# Patient Record
Sex: FEMALE | Race: WHITE | NOT HISPANIC OR LATINO | Employment: OTHER | URBAN - NONMETROPOLITAN AREA
[De-identification: names, ages, dates, MRNs, and addresses within clinical notes are randomized per-mention and may not be internally consistent; named-entity substitution may affect disease eponyms.]

---

## 2017-01-23 ENCOUNTER — OFFICE VISIT (OUTPATIENT)
Dept: ENDOCRINOLOGY | Facility: CLINIC | Age: 62
End: 2017-01-23

## 2017-01-23 VITALS
DIASTOLIC BLOOD PRESSURE: 74 MMHG | HEIGHT: 69 IN | WEIGHT: 190.9 LBS | HEART RATE: 72 BPM | SYSTOLIC BLOOD PRESSURE: 120 MMHG | BODY MASS INDEX: 28.27 KG/M2

## 2017-01-23 DIAGNOSIS — E10.69 MIXED DIABETIC HYPERLIPIDEMIA ASSOCIATED WITH TYPE 1 DIABETES MELLITUS (HCC): ICD-10-CM

## 2017-01-23 DIAGNOSIS — E78.2 MIXED DIABETIC HYPERLIPIDEMIA ASSOCIATED WITH TYPE 1 DIABETES MELLITUS (HCC): ICD-10-CM

## 2017-01-23 DIAGNOSIS — E10.42 TYPE 1 DIABETES MELLITUS WITH DIABETIC POLYNEUROPATHY (HCC): Primary | ICD-10-CM

## 2017-01-23 DIAGNOSIS — E11.59 HYPERTENSION ASSOCIATED WITH DIABETES (HCC): ICD-10-CM

## 2017-01-23 DIAGNOSIS — I15.2 HYPERTENSION ASSOCIATED WITH DIABETES (HCC): ICD-10-CM

## 2017-01-23 PROCEDURE — PTNOCHG PR CUSTOM PT NO CHARGE VISIT: Performed by: INTERNAL MEDICINE

## 2017-01-23 PROCEDURE — 95250 CONT GLUC MNTR PHYS/QHP EQP: CPT | Performed by: INTERNAL MEDICINE

## 2017-01-23 PROCEDURE — 99214 OFFICE O/P EST MOD 30 MIN: CPT | Performed by: INTERNAL MEDICINE

## 2017-01-23 NOTE — MR AVS SNAPSHOT
Janelle WAGONER Millard   1/23/2017 2:30 PM   Appointment    Dept Phone:  [phone]   Encounter #:  [id number]    Provider:  [facility]   Department:  [facility] ENDOCRINOLOGY                Your Full Care Plan              Your Updated Medication List          This list is accurate as of: 1/23/17  4:05 PM.  Always use your most recent med list.                ACCU-CHEK SMARTVIEW test strip   Generic drug:  glucose blood       atorvastatin 10 MG tablet   Commonly known as:  LIPITOR       B-D ULTRAFINE III SHORT PEN 31G X 8 MM misc   Generic drug:  Insulin Pen Needle       buPROPion 100 MG tablet   Commonly known as:  WELLBUTRIN       carvedilol 12.5 MG tablet   Commonly known as:  COREG       clindamycin 300 MG capsule   Commonly known as:  CLEOCIN       gabapentin 600 MG tablet   Commonly known as:  NEURONTIN       HUMALOG KWIKPEN 100 UNIT/ML solution pen-injector   Generic drug:  Insulin Lispro       HYDROcodone-acetaminophen 7.5-325 MG per tablet   Commonly known as:  NORCO       insulin aspart 100 UNIT/ML solution pen-injector sc pen   Commonly known as:  novoLOG FLEXPEN       isosorbide mononitrate 30 MG 24 hr tablet   Commonly known as:  IMDUR       * LANTUS SOLOSTAR 100 UNIT/ML injection pen   Generic drug:  Insulin Glargine       * TOUJEO SOLOSTAR 300 UNIT/ML solution pen-injector   Generic drug:  Insulin Glargine       leflunomide 20 MG tablet   Commonly known as:  ARAVA       lidocaine 5 %   Commonly known as:  LIDODERM       * lisinopril 5 MG tablet   Commonly known as:  PRINIVIL,ZESTRIL       * lisinopril 10 MG tablet   Commonly known as:  PRINIVIL,ZESTRIL   TAKE 1 TABLET BY MOUTH TWICE DAILY       montelukast 10 MG tablet   Commonly known as:  SINGULAIR       nitroglycerin 0.2 MG/HR patch   Commonly known as:  NITRODUR       PARoxetine 20 MG tablet   Commonly known as:  PAXIL       promethazine 25 MG tablet   Commonly known as:  PHENERGAN       RANEXA 1000 MG 12 hr tablet   Generic drug:  ranolazine       TRUEPLUS LANCETS 33G misc       * Notice:  This list has 4 medication(s) that are the same as other medications prescribed for you. Read the directions carefully, and ask your doctor or other care provider to review them with you.            Instructions     None    Patient Instructions History      Upcoming Appointments     Visit Type Date Time Department    FO[place] UP 2017  1:15 PM [facility]    DIABETES EDUCATION 2017  2:30 PM [facility]    DIABETES EDUCATION 2017  1:00 PM [facility] Signup     [facility] OnePIN allows you to send messages to your doctor, view your test results, renew your prescriptions, schedule appointments, and more. To sign up, go to [email or web address] and click on the Sign Up Now link in the New User? box. Enter your OnePIN Activation Code exactly as it appears below along with the last four digits of your Social Security Number and your Date of Birth () to complete the sign-up process. If you do not sign up before the expiration date, you must request a new code.    OnePIN Activation Code: [email or web address]  Expires: 2017  2:09 PM    If you have questions, you can email [email or web address] or call [phone] to talk to our OnePIN staff. Remember, OnePIN is NOT to be used for urgent needs. For medical emergencies, dial 911.               Other Info from Your Visit           Your Appointments     2017  1:00 PM CST   Diabetic Education with [facility], [facility] (--)    [address]  [address] [place] [id number] [phone]              Allergies     Contrast Dye  Shortness Of Breath, Itching    Dilaudid [Hydromorphone Hcl]  Itching      Vital Signs     Smoking Status                   Current Every Day Smoker

## 2017-01-23 NOTE — PROGRESS NOTES
Janelle Millard is a 61 y.o. female seen by diabetes educator 01/23/2017 for insertion of Sully diagnostic continuous glucose monitor.     Sensor inserted in office. Instructed patient to wear sensor up to 14 days. Patient is to return to clinic in 2 weeks for sensor removal and results. Instructed patient to remove sensor if he has a CT scan, MRI, or X-ray, or if skin irritation occurs. Referral for Dexcom submitted 01/23/2017.     Leatha Hernandez MS, RD, LD, CDE

## 2017-02-01 RX ORDER — LISINOPRIL 10 MG/1
TABLET ORAL
Qty: 60 TABLET | Refills: 5 | Status: SHIPPED | OUTPATIENT
Start: 2017-02-01 | End: 2017-08-29 | Stop reason: SDUPTHER

## 2017-02-05 PROBLEM — E10.69 MIXED DIABETIC HYPERLIPIDEMIA ASSOCIATED WITH TYPE 1 DIABETES MELLITUS (HCC): Status: ACTIVE | Noted: 2017-02-05

## 2017-02-05 PROBLEM — E11.59 HYPERTENSION ASSOCIATED WITH DIABETES: Status: ACTIVE | Noted: 2017-02-05

## 2017-02-05 PROBLEM — E10.42 TYPE 1 DIABETES MELLITUS WITH DIABETIC POLYNEUROPATHY: Status: ACTIVE | Noted: 2017-02-05

## 2017-02-05 PROBLEM — I15.2 HYPERTENSION ASSOCIATED WITH DIABETES (HCC): Status: ACTIVE | Noted: 2017-02-05

## 2017-02-05 PROBLEM — E78.2 MIXED DIABETIC HYPERLIPIDEMIA ASSOCIATED WITH TYPE 1 DIABETES MELLITUS (HCC): Status: ACTIVE | Noted: 2017-02-05

## 2017-06-27 ENCOUNTER — OFFICE VISIT (OUTPATIENT)
Dept: PAIN MEDICINE | Facility: CLINIC | Age: 62
End: 2017-06-27

## 2017-06-27 ENCOUNTER — APPOINTMENT (OUTPATIENT)
Dept: LAB | Facility: HOSPITAL | Age: 62
End: 2017-06-27

## 2017-06-27 VITALS
HEIGHT: 69 IN | WEIGHT: 191.8 LBS | DIASTOLIC BLOOD PRESSURE: 72 MMHG | BODY MASS INDEX: 28.41 KG/M2 | SYSTOLIC BLOOD PRESSURE: 122 MMHG

## 2017-06-27 DIAGNOSIS — Z79.899 HIGH RISK MEDICATIONS (NOT ANTICOAGULANTS) LONG-TERM USE: ICD-10-CM

## 2017-06-27 DIAGNOSIS — M47.812 CERVICAL SPONDYLOSIS WITHOUT MYELOPATHY: ICD-10-CM

## 2017-06-27 DIAGNOSIS — M79.18 MYOFACIAL MUSCLE PAIN: ICD-10-CM

## 2017-06-27 DIAGNOSIS — M50.30 DDD (DEGENERATIVE DISC DISEASE), CERVICAL: ICD-10-CM

## 2017-06-27 DIAGNOSIS — M54.2 CERVICALGIA: Primary | ICD-10-CM

## 2017-06-27 PROCEDURE — 80307 DRUG TEST PRSMV CHEM ANLYZR: CPT | Performed by: NURSE PRACTITIONER

## 2017-06-27 PROCEDURE — G0481 DRUG TEST DEF 8-14 CLASSES: HCPCS | Performed by: NURSE PRACTITIONER

## 2017-06-27 PROCEDURE — 99214 OFFICE O/P EST MOD 30 MIN: CPT | Performed by: PAIN MEDICINE

## 2017-06-27 RX ORDER — ASPIRIN 81 MG/1
81 TABLET ORAL DAILY
COMMUNITY

## 2017-06-27 RX ORDER — TIZANIDINE 4 MG/1
2 TABLET ORAL 2 TIMES DAILY PRN
Qty: 60 TABLET | Refills: 1 | Status: SHIPPED | OUTPATIENT
Start: 2017-06-27 | End: 2017-07-27

## 2017-06-27 RX ORDER — CYCLOSPORINE 0.5 MG/ML
1 EMULSION OPHTHALMIC 2 TIMES DAILY
Status: ON HOLD | COMMUNITY
End: 2020-10-14

## 2017-06-27 RX ORDER — PYRIDOXINE HCL (VITAMIN B6) 100 MG
200 TABLET ORAL 2 TIMES DAILY
Status: ON HOLD | COMMUNITY
End: 2020-10-14

## 2017-06-27 RX ORDER — ACETAMINOPHEN 325 MG/1
975 TABLET ORAL 2 TIMES DAILY
Status: ON HOLD | COMMUNITY
End: 2020-10-14

## 2017-06-27 RX ORDER — UREA 10 %
LOTION (ML) TOPICAL NIGHTLY
Status: ON HOLD | COMMUNITY
End: 2020-10-14

## 2017-06-27 RX ORDER — LANOLIN ALCOHOL/MO/W.PET/CERES
1000 CREAM (GRAM) TOPICAL 2 TIMES DAILY
Status: ON HOLD | COMMUNITY
End: 2020-10-14

## 2017-06-27 NOTE — PROGRESS NOTES
"Janelle Millard is a 61 y.o. female.   1955    HPI:   Location: neck, left shoulder, lower back and bilateral hand  Quality: aching, sharp, dull and tingling  Severity: 8/10  Timing: constant  Alleviating: rest   Aggravating: sitting  and ambulating     Pt seen Dr. Martell last 2/2016 related to chronic neck, left shoulder, hands, and low back pain. Pt had lower back eval and treated with opiate medications and injections (reports 2 injections with 50% effectiveness). Pt states \"took herself off Lortab and handled pain with OTC\". Pt with neck pain primary pain today. Pt with neck pain and off for years, neck pain with occ left arm pain for years (possible from carpal tunnel pt reports). Pt with bilateral carpal tunnel surgery via Dr. Nayana painting of 2016 remains wearing left wrist brace. I discussed MRI versus Xray.  Pt states \"I am claustrophobic\". BRYANT and any neurological impairments discussed with patient that may need of emergency evaluation. Explained in great detail history and physical, examination, ancillary physician notes and images reviewed, and pain management options.        The following portions of the patient's history were reviewed by me and updated as appropriate: allergies, current medications, past family history, past medical history, past social history, past surgical history and problem list.    Past Medical History:   Diagnosis Date   • Arthritis, rheumatoid    • Arthropathy associated with neurological disorder    • Arthropathy of lumbar facet joint    • Asthma    • Benign hypertension    • Carpal tunnel syndrome    • Diabetes    • Diabetic polyneuropathy    • Dyslipidemia    • Heart disease    • Hypertension    • Joint pain    • Mild depression     Saddness post Surgery 7/10/14 improved after counseling.   • Multiple vessel coronary artery disease     post CABG      • Myofascial pain    • Neurologic disorder associated with diabetes mellitus     Neuropathy of chest      • Neuropathy    • " Onychomycosis    • Peripheral vascular disease    • Retinopathy    • Tobacco user    • Type 1 diabetes mellitus    • Type 2 diabetes mellitus        Social History     Social History   • Marital status:      Spouse name: N/A   • Number of children: N/A   • Years of education: N/A     Occupational History   • Not on file.     Social History Main Topics   • Smoking status: Current Every Day Smoker     Types: Cigarettes   • Smokeless tobacco: Never Used   • Alcohol use No   • Drug use: No   • Sexual activity: Defer     Other Topics Concern   • Not on file     Social History Narrative       Family History   Problem Relation Age of Onset   • Asthma Other    • Coronary artery disease Other    • Diabetes Other    • Hyperlipidemia Other    • Hypertension Other    • Osteoporosis Other          Current Outpatient Prescriptions:   •  ACCU-CHEK SMARTVIEW test strip, , Disp: , Rfl:   •  acetaminophen (TYLENOL) 325 MG tablet, Take 975 mg by mouth 2 (Two) Times a Day., Disp: , Rfl:   •  aspirin 81 MG EC tablet, Take 81 mg by mouth Daily., Disp: , Rfl:   •  atorvastatin (LIPITOR) 10 MG tablet, , Disp: , Rfl:   •  B-D ULTRAFINE III SHORT PEN 31G X 8 MM misc, , Disp: , Rfl:   •  Calcium Citrate-Vitamin D (CALCIUM + D PO), Take 1 tablet/day by mouth., Disp: , Rfl:   •  carvedilol (COREG) 12.5 MG tablet, , Disp: , Rfl:   •  cycloSPORINE (RESTASIS) 0.05 % ophthalmic emulsion, 1 drop 2 (Two) Times a Day., Disp: , Rfl:   •  gabapentin (NEURONTIN) 600 MG tablet, 1,200 mg 3 (Three) Times a Day., Disp: , Rfl:   •  HUMALOG KWIKPEN 100 UNIT/ML solution pen-injector, , Disp: , Rfl:   •  insulin aspart (novoLOG FLEXPEN) 100 UNIT/ML solution pen-injector sc pen, Inject  under the skin 3 (Three) Times a Day With Meals., Disp: , Rfl:   •  isosorbide mononitrate (IMDUR) 30 MG 24 hr tablet, , Disp: , Rfl:   •  leflunomide (ARAVA) 20 MG tablet, , Disp: , Rfl:   •  lisinopril (PRINIVIL,ZESTRIL) 10 MG tablet, TAKE 1 TABLET BY MOUTH TWICE DAILY  (Patient taking differently: 1 tablet by mouth once daily per pt), Disp: 60 tablet, Rfl: 5  •  melatonin 1 MG tablet, Take  by mouth Every Night., Disp: , Rfl:   •  montelukast (SINGULAIR) 10 MG tablet, , Disp: , Rfl:   •  Multiple Vitamin (DAILY VITAMIN FORMULA PO), Take 1 tablet/day by mouth., Disp: , Rfl:   •  nitroglycerin (NITRODUR) 0.2 MG/HR patch, , Disp: , Rfl:   •  PARoxetine (PAXIL) 20 MG tablet, , Disp: , Rfl:   •  pyridoxine (VITAMIN B-6) 100 MG tablet tablet, Take 200 mg by mouth 2 (Two) Times a Day., Disp: , Rfl:   •  TOUJEO SOLOSTAR 300 UNIT/ML solution pen-injector, , Disp: , Rfl:   •  TRUEPLUS LANCETS 33G misc, , Disp: , Rfl:   •  vitamin B-12 (CYANOCOBALAMIN) 1000 MCG tablet, Take 1,000 mcg by mouth 2 (Two) Times a Day., Disp: , Rfl:   •  promethazine (PHENERGAN) 25 MG tablet, , Disp: , Rfl:   •  tiZANidine (ZANAFLEX) 4 MG tablet, Take 0.5 tablets by mouth 2 (Two) Times a Day As Needed for Muscle Spasms for up to 30 days., Disp: 60 tablet, Rfl: 1    Allergies   Allergen Reactions   • Contrast Dye Shortness Of Breath and Itching   • Corticosteroids Other (See Comments)     Pt diabetic makes sugar go up    • Codeine Itching   • Ibuprofen        Review of Systems   Musculoskeletal: Positive for back pain (lower) and neck pain.        Left shoulder pain  Bilateral hand pain   All other systems reviewed and are negative.    All systems reviewed and negative except for above.    Physical Exam   Constitutional: She is oriented to person, place, and time. She appears well-developed and well-nourished. No distress.   Walking with cane    Left wrist pain   HENT:   Head: Normocephalic and atraumatic.   Eyes: Conjunctivae and EOM are normal. Pupils are equal, round, and reactive to light.   Neck: Normal range of motion. Neck supple.   Cardiovascular: Normal rate and regular rhythm.    Pulmonary/Chest: Effort normal. No respiratory distress. She has no wheezes.   Abdominal: Soft.   Musculoskeletal:         Cervical back: She exhibits decreased range of motion (slight limited flex and ext with FL chiquis) and tenderness ( BPSM TTP).        Lumbar back: She exhibits tenderness ( mildine TTP).   Neurological: She is alert and oriented to person, place, and time. She displays no tremor. She displays no seizure activity. Gait ( cane) abnormal. Coordination normal.   Reflex Scores:       Tricep reflexes are 2+ on the right side and 2+ on the left side.       Bicep reflexes are 2+ on the right side and 2+ on the left side.       Brachioradialis reflexes are 2+ on the right side and 2+ on the left side.       Patellar reflexes are 2+ on the right side and 2+ on the left side.       Achilles reflexes are 1+ on the right side and 1+ on the left side.  Mild SLR chiquis    Upper arm strength essentially 4/5- brace to left wrist    Lower leg strength essentially 4/5   Skin: Skin is warm and dry. No rash noted. She is not diaphoretic. No pallor.   Psychiatric: She has a normal mood and affect. Her behavior is normal. Judgment normal. She expresses no homicidal and no suicidal ideation. She expresses no suicidal plans and no homicidal plans.   Slow low tone speech   Nursing note and vitals reviewed.      Janelle was seen today for back pain, neck pain, shoulder pain and hand pain.    Diagnoses and all orders for this visit:    Cervicalgia  -     XR Spine Cervical Complete 4 or 5 View; Future  -     Ambulatory Referral to Physical Therapy Evaluate and treat (neck pain with muscles- check xray done today)    Cervical spondylosis without myelopathy  -     XR Spine Cervical Complete 4 or 5 View; Future  -     Ambulatory Referral to Physical Therapy Evaluate and treat (neck pain with muscles- check xray done today)    DDD (degenerative disc disease), cervical  -     XR Spine Cervical Complete 4 or 5 View; Future  -     Ambulatory Referral to Physical Therapy Evaluate and treat (neck pain with muscles- check xray done today)    High risk  "medications (not anticoagulants) long-term use  -     ToxASSURE Select 13 (MW)    Myofacial muscle pain  -     tiZANidine (ZANAFLEX) 4 MG tablet; Take 0.5 tablets by mouth 2 (Two) Times a Day As Needed for Muscle Spasms for up to 30 days.        Medication: Zanaflex 2mg BID PRN. Precautions and Indications. Take at night for 3 nights. pt is currently on Neurontin and Tylenol.    Interventional: I will order an X Ray cervical spine today and eval.  Pt states \"I don't want MRI right now- I am claustrophobic- please do an Xray first\". I have explained the difference in MRI vs Xrays and the benefits. Will order Xray today. Cervical injections discussed. BRYANT and any neurological impairments discussed with patient that may need of emergency evaluation.      I have reviewed ancillary notes and images for today's examination;      C spine xray 2006       1.                  Severe degenerative changes at C5-6 with grade I   retrolisthesis of C5 over C6 without instability.    2.                  Significantly decreased disc space at C5-6 and   neural foraminal encroachments bilaterally.   Result Narrative     Several views including flexion and extension.       Grade I retrolisthesis of C5 over c6 with significantly decreased   disc space and large spur formations are noted. No significant   instability in flexion and extension views are seen.  No acute   fracture or dislocation is identified.  Prevertebral soft tissues are   normal.          Rehab:  I have discussed and ordered PT for neck and muscles.     Behavioral: No aberrant behavior noted. CHARISSA Report # [id number]  was reviewed and is consistent with stated history    Urine drug screen Ordered today to test for drugs of abuse and prescribed medications          This document has been electronically signed by JESSIE Merrill on June 27, 2017 4:18 PM          This document has been electronically signed by JESSIE Merrill on June 27, 2017 4:18 PM     "

## 2017-07-07 ENCOUNTER — TELEPHONE (OUTPATIENT)
Dept: PAIN MEDICINE | Facility: CLINIC | Age: 62
End: 2017-07-07

## 2017-07-07 LAB — CONV REPORT SUMMARY: NORMAL

## 2017-07-10 ENCOUNTER — HOSPITAL ENCOUNTER (OUTPATIENT)
Dept: PHYSICAL THERAPY | Facility: HOSPITAL | Age: 62
Setting detail: THERAPIES SERIES
Discharge: HOME | End: 2017-07-10

## 2017-07-10 DIAGNOSIS — M50.30 DDD (DEGENERATIVE DISC DISEASE), CERVICAL: ICD-10-CM

## 2017-07-10 DIAGNOSIS — M54.2 CERVICALGIA: Primary | ICD-10-CM

## 2017-07-10 PROCEDURE — G8981 BODY POS CURRENT STATUS: HCPCS | Performed by: PHYSICAL THERAPIST

## 2017-07-10 PROCEDURE — 97161 PT EVAL LOW COMPLEX 20 MIN: CPT | Performed by: PHYSICAL THERAPIST

## 2017-07-10 PROCEDURE — G8982 BODY POS GOAL STATUS: HCPCS | Performed by: PHYSICAL THERAPIST

## 2017-07-10 NOTE — THERAPY EVALUATION
Outpatient Physical Therapy Ortho Initial Evaluation  [facility]     Patient Name: Janelle Millard  : 1955  MRN: [id number]  Today's Date: 7/10/2017      Visit Date: 07/10/2017     Pt attended  scheduled visits.   Re-cert date 17  Pt will RTD 3 weeks    Patient Active Problem List   Diagnosis   • Carpal tunnel syndrome of left wrist   • Carpal tunnel syndrome   • S/P carpal tunnel release   • Type 1 diabetes mellitus with diabetic polyneuropathy   • Mixed diabetic hyperlipidemia associated with type 1 diabetes mellitus   • Hypertension associated with diabetes        Past Medical History:   Diagnosis Date   • Arthritis, rheumatoid    • Arthropathy associated with neurological disorder    • Arthropathy of lumbar facet joint    • Asthma    • Benign hypertension    • Carpal tunnel syndrome    • Diabetes    • Diabetic polyneuropathy    • Dyslipidemia    • Heart disease    • Hypertension    • Joint pain    • Mild depression     Saddness post Surgery 7/10/14 improved after counseling.   • Multiple vessel coronary artery disease     post CABG      • Myofascial pain    • Neurologic disorder associated with diabetes mellitus     Neuropathy of chest      • Neuropathy    • Onychomycosis    • Peripheral vascular disease    • Retinopathy    • Tobacco user    • Type 1 diabetes mellitus    • Type 2 diabetes mellitus         Past Surgical History:   Procedure Laterality Date   • CARDIAC SURGERY  2014    Critical stenosis in the RCA. Diffusely calcified LAD with 30-80% stenosis. OMB #3 with 60% to 70% stenosis. Preserved LV systolic function with EF of 55%.   • CARPAL TUNNEL RELEASE Right 10/15/2014    Right carpal tunnel release.   • CARPAL TUNNEL RELEASE     • CATARACT EXTRACTION, BILATERAL Bilateral    • CORONARY ARTERY BYPASS GRAFT  2014    CABG x 3 with LIMA to LAD, endarterectomy to LAD, SVG to OMB and SVG to distal RCA with endarterectomy to distal RCA. Endo-vein harvesting.   •  CYSTECTOMY Left     Breast cycst   • EXCISION LESION      Remove breast lesion - cyst   • EYE SURGERY      Insert lens prosthesis   • FOOT SURGERY  10/18/2011    Exostectomy of the right foot. Preulcerative lesion with Charot deformity, right foot   • LUMBAR SPINE SURGERY  11/09/2015    Lumbosacral radiofrequency thermal coagulation.   • NERVE BLOCK  09/14/2015    Lumbar medial branch block.   • TOE NAIL AVULSION  09/03/2015    DEBRIDE NAIL 6 OR MORE 45493 (1)     Medications (Admitted on 7/10/2017)     Outpatient Medications     ACCU-CHEK SMARTVIEW test strip      acetaminophen (TYLENOL) 325 MG tablet      aspirin 81 MG EC tablet      atorvastatin (LIPITOR) 10 MG tablet      B-D ULTRAFINE III SHORT PEN 31G X 8 MM misc      Calcium Citrate-Vitamin D (CALCIUM + D PO)      carvedilol (COREG) 12.5 MG tablet      cycloSPORINE (RESTASIS) 0.05 % ophthalmic emulsion      gabapentin (NEURONTIN) 600 MG tablet      HUMALOG KWIKPEN 100 UNIT/ML solution pen-injector      insulin aspart (novoLOG FLEXPEN) 100 UNIT/ML solution pen-injector sc pen      isosorbide mononitrate (IMDUR) 30 MG 24 hr tablet      leflunomide (ARAVA) 20 MG tablet      lisinopril (PRINIVIL,ZESTRIL) 10 MG tablet      melatonin 1 MG tablet      montelukast (SINGULAIR) 10 MG tablet      Multiple Vitamin (DAILY VITAMIN FORMULA PO)      nitroglycerin (NITRODUR) 0.2 MG/HR patch      PARoxetine (PAXIL) 20 MG tablet      promethazine (PHENERGAN) 25 MG tablet      pyridoxine (VITAMIN B-6) 100 MG tablet tablet      tiZANidine (ZANAFLEX) 4 MG tablet      TOUJEO SOLOSTAR 300 UNIT/ML solution pen-injector      TRUEPLUS LANCETS 33G misc      vitamin B-12 (CYANOCOBALAMIN) 1000 MCG tablet          Visit Dx:     ICD-10-CM ICD-9-CM   1. Cervicalgia M54.2 723.1   2. DDD (degenerative disc disease), cervical M50.30 722.4                 PT Ortho       07/10/17 1400    Subjective Comments    Subjective Comments 60 y/o female presents with multiple c/o pain and limitation.   is here to address her neck pain. Pt relates was performing a comprehensive exercises program , until 4 month ago when she understood her Dr to tell her to stop the program. States has been having increased soreness and pain in L>R upper trap and lateral neck. describes limitation in AROM. Pt states uses pain creme for relief. Pt is not currrently working. Pt has multiple c/o pain and co-morbidities. Pt lives at home alone.   -LF    Subjective Pain    Able to rate subjective pain? yes  -LF    Pre-Treatment Pain Level 6  -LF    Posture/Observations    Posture/Observations Comments Pt presents with brace on L hand/ wrist, uses a cane to walk, has head slightly turned/ sidebent to L, slight ly forward bent, rounded shoulders. Unsteady gait pattern. Pt tends to sit forward bent.   -LF    Special Tests/Palpation    Special Tests/Palpation --   TTP over L upper trap, C paraspinals  -LF    ROM (Range of Motion)    General ROM Detail B shoulder ROM: limited elevation > 90. CROM: flexion WFL; ext with c/o pulling; SB L 15 with c/o pain. R 30 min c/o; rotation L 20, R 25.   -LF    MMT (Manual Muscle Testing)    General MMT Assessment Detail not tested today  -LF      User Key  (r) = Recorded By, (t) = Taken By, (c) = Cosigned By    Initials Name Provider Type    LILIANA Escobar PT Physical Therapist                            Therapy Education       07/10/17 1700          Therapy Education    Given HEP;Symptoms/condition management;Pain management;Posture/body mechanics  -LF      Program New  -LF      How Provided Verbal;Demonstration;Written  -LF      Provided to Patient  -LF      Level of Understanding Teach back education performed;Verbalized;Demonstrated  -LF        User Key  (r) = Recorded By, (t) = Taken By, (c) = Cosigned By    Initials Name Provider Type    LILIANA Escobar PT Physical Therapist                PT OP Goals       07/10/17 1700       PT Short Term Goals    STG Date to Achieve 07/24/17  -LF      STG 1 Pt will demonstrate 50% active CROM   -LF     STG 2 Pt will report resuming home exercise program daily.  -LF     STG 3 I in HEP each session  -LF     Long Term Goals    LTG Date to Achieve 08/07/17  -LF     LTG 1 Pt will  demonstrate 75% CROM  -LF     LTG 2 Pt miguel report being able to look over shoulder driving with min limitation.   -LF     LTG 3 I in final HEP   -LF     Time Calculation    PT Goal Re-Cert Due Date 07/31/17  -LF       User Key  (r) = Recorded By, (t) = Taken By, (c) = Cosigned By    Initials Name Provider Type    LF Betty Escobar, PT Physical Therapist                PT Assessment/Plan       07/10/17 1700       PT Assessment    Functional Limitations Limitation in home management;Performance in leisure activities;Performance in self-care ADL  -LF     Impairments Impaired flexibility;Pain;Muscle strength;Posture;Range of motion  -LF     Assessment Comments 60 y/o female presents with c/o chronic neck pain, limited AROM, muscular tightness, lack of HEP, min stretching, demotivated. Pt would benefit from skilled intervention to address the above mentioned deficits. Pt understood and performed HEP.   -LF     Please refer to paper survey for additional self-reported information Yes  -LF     Rehab Potential Good  -LF     Patient/caregiver participated in establishment of treatment plan and goals Yes  -LF     Patient would benefit from skilled therapy intervention Yes  -LF     PT Plan    PT Frequency 2x/week  -LF     Predicted Duration of Therapy Intervention (days/wks) 4 weeks - pt may not continue therapy due to co-pay  -LF     Planned CPT's? PT RE-EVAL: 15444;PT THER PROC EA 15 MIN: 83412;PT THER ACT EA 15 MIN: 94466;PT MANUAL THERAPY EA 15 MIN: 12072;PT HOT OR COLD PACK TREAT MCARE;PT NEUROMUSC RE-EDUCATION EA 15 MIN: 98197  -LF     Physical Therapy Interventions (Optional Details) home exercise program;manual therapy techniques;modalities;patient/family education;ROM (Range of  "Motion);strengthening;stretching  -LF     PT Plan Comments Continue therapy involving education, stretching, tissue mobilization, modalites, strengthening, postural education, progressive HEP instruction.   -LF       User Key  (r) = Recorded By, (t) = Taken By, (c) = Cosigned By    Initials Name Provider Type    LILIANA Escobar, PT Physical Therapist                  Exercises       07/10/17 1400          Subjective Comments    Subjective Comments 60 y/o female presents with multiple c/o pain and limitation. States is here to address her neck pain. Pt relates was performing a comprehensive exercises program , until 4 month ago when she understood her Dr to tell her to stop the program. States has been having increased soreness and pain in L>R upper trap and lateral neck. describes limitation in AROM. Pt states uses pain creme for relief. Pt is not currrently working. Pt has multiple c/o pain and co-morbidities. Pt lives at home alone.   -LF      Subjective Pain    Able to rate subjective pain? yes  -LF      Pre-Treatment Pain Level 6  -LF      Exercise 1    Exercise Name 1 seated shoulder shrugs  -LF      Cueing 1 Verbal;Demo  -LF      Reps 1 10   instruct, demo, perform, educate for HEP   -LF      Exercise 2    Exercise Name 2 sit against wall, sit tall , practive erect posture  -LF      Cueing 2 Verbal;Demo  -LF      Reps 2 5   30 sec, 5 x.  instruct, demo, perform, educate for HEP   -LF      Exercise 3    Exercise Name 3 self mobilization with towel: \"assist\", \"resist\", tissue mobilization, upper trap fascial lengthening  -LF      Cueing 3 Verbal;Demo  -LF      Time (Seconds) 3 10   instruct, demo, perform, educate for HEP   -LF      Exercise 4    Exercise Name 4 Reviewed pt's former HEP and encouraged he rto resume , avoiding the mini squats  -LF      Cueing 4 Verbal;Demo  -LF      Time (Minutes) 4 5  -LF        User Key  (r) = Recorded By, (t) = Taken By, (c) = Cosigned By    Initials Name Provider Type    LF " Betty Escobar, PT Physical Therapist                              Outcome Measures       07/10/17 1405 07/10/17 1400       Neck Disability Index    Section 1 - Pain Intensity  3  -LF     Section 2 - Personal Care  2  -LF     Section 3 - Lifting  3  -LF     Section 4 - Work  3  -LF     Section 5 - Headaches  2  -LF     Section 6 - Concentration  2  -LF     Section 7 - Sleeping  0  -LF     Section 8 - Driving  2  -LF     Section 9 - Reading  1  -LF     Section 10 - Recreation  4  -LF     Neck Disability Index Score  22  -LF     Functional Assessment    Outcome Measure Options --  -LF Neck Disability Index (NDI)  -LF       User Key  (r) = Recorded By, (t) = Taken By, (c) = Cosigned By    Initials Name Provider Type    LF Betty Escobar, PT Physical Therapist            Time Calculation:   Start Time: 1350  Stop Time: 1430  Time Calculation (min): 40 min  Total Timed Code Minutes- PT: 40 minute(s)     Therapy Charges for Today     Code Description Service Date Service Provider Modifiers Qty    62027779647  PT CHNG MAIN POS CURRENT 7/10/2017 Betty Escobar, PT GP, CJ 1    88147062833 HC PT CHNG MAIN POS PROJECTED 7/10/2017 Betty Escobar, PT GP, CI 1    36751944724 HC PT EVAL LOW COMPLEXITY 2 7/10/2017 Betty Escobar, PT GP 1    15010527691 HC PT THER SUPP EA 15 MIN 7/10/2017 Betty Escobar, PT GP 1          PT G-Codes  Outcome Measure Options: Neck Disability Index (NDI)  Score: 22  Functional Limitation: Changing and maintaining body position  Changing and Maintaining Body Position Current Status (): At least 20 percent but less than 40 percent impaired, limited or restricted  Changing and Maintaining Body Position Goal Status (): At least 1 percent but less than 20 percent impaired, limited or restricted         Betty Escobar, PT  7/10/2017

## 2017-07-17 ENCOUNTER — DOCUMENTATION (OUTPATIENT)
Dept: PHYSICAL THERAPY | Facility: HOSPITAL | Age: 62
End: 2017-07-17

## 2017-08-23 ENCOUNTER — OFFICE VISIT (OUTPATIENT)
Dept: PAIN MEDICINE | Facility: CLINIC | Age: 62
End: 2017-08-23

## 2017-08-23 VITALS
DIASTOLIC BLOOD PRESSURE: 78 MMHG | WEIGHT: 194.9 LBS | BODY MASS INDEX: 28.87 KG/M2 | SYSTOLIC BLOOD PRESSURE: 130 MMHG | HEIGHT: 69 IN

## 2017-08-23 DIAGNOSIS — Z79.899 HIGH RISK MEDICATIONS (NOT ANTICOAGULANTS) LONG-TERM USE: ICD-10-CM

## 2017-08-23 DIAGNOSIS — M50.30 DDD (DEGENERATIVE DISC DISEASE), CERVICAL: Primary | ICD-10-CM

## 2017-08-23 DIAGNOSIS — Z87.39 HX OF RHEUMATOID ARTHRITIS: ICD-10-CM

## 2017-08-23 DIAGNOSIS — M54.2 CERVICALGIA: ICD-10-CM

## 2017-08-23 PROCEDURE — 99213 OFFICE O/P EST LOW 20 MIN: CPT | Performed by: PAIN MEDICINE

## 2017-08-23 RX ORDER — TIZANIDINE 4 MG/1
4 TABLET ORAL 3 TIMES DAILY PRN
Qty: 90 TABLET | Refills: 2 | Status: SHIPPED | OUTPATIENT
Start: 2017-08-23 | End: 2017-09-22

## 2017-08-23 NOTE — PROGRESS NOTES
Janelle Millard is a 62 y.o. female.   1955    HPI:   Location: neck, left shoulder, lower back and left hand  Quality: aching, sharp, dull and tingling  Severity: 8/10  Timing: constant  Alleviating: rest  Aggravating: ambulating  and sitting    States the tizanidine helps, but is only taking once a day currently.       The following portions of the patient's history were reviewed by me and updated as appropriate: allergies, current medications, past family history, past medical history, past social history, past surgical history and problem list.    Past Medical History:   Diagnosis Date   • Arthritis, rheumatoid    • Arthropathy associated with neurological disorder    • Arthropathy of lumbar facet joint    • Asthma    • Benign hypertension    • Carpal tunnel syndrome    • Diabetes    • Diabetic polyneuropathy    • Dyslipidemia    • Heart disease    • Hypertension    • Joint pain    • Mild depression     Saddness post Surgery 7/10/14 improved after counseling.   • Multiple vessel coronary artery disease     post CABG      • Myofascial pain    • Neurologic disorder associated with diabetes mellitus     Neuropathy of chest      • Neuropathy    • Onychomycosis    • Peripheral vascular disease    • Retinopathy    • Tobacco user    • Type 1 diabetes mellitus    • Type 2 diabetes mellitus        Social History     Social History   • Marital status:      Spouse name: N/A   • Number of children: N/A   • Years of education: N/A     Occupational History   • Not on file.     Social History Main Topics   • Smoking status: Current Every Day Smoker     Types: Cigarettes   • Smokeless tobacco: Never Used   • Alcohol use No   • Drug use: No   • Sexual activity: Defer     Other Topics Concern   • Not on file     Social History Narrative       Family History   Problem Relation Age of Onset   • Asthma Other    • Coronary artery disease Other    • Diabetes Other    • Hyperlipidemia Other    • Hypertension Other    •  Osteoporosis Other          Current Outpatient Prescriptions:   •  ACCU-CHEK SMARTVIEW test strip, , Disp: , Rfl:   •  acetaminophen (TYLENOL) 325 MG tablet, Take 975 mg by mouth 2 (Two) Times a Day., Disp: , Rfl:   •  aspirin 81 MG EC tablet, Take 81 mg by mouth Daily., Disp: , Rfl:   •  atorvastatin (LIPITOR) 10 MG tablet, , Disp: , Rfl:   •  B-D ULTRAFINE III SHORT PEN 31G X 8 MM misc, , Disp: , Rfl:   •  Calcium Citrate-Vitamin D (CALCIUM + D PO), Take 1 tablet/day by mouth., Disp: , Rfl:   •  carvedilol (COREG) 12.5 MG tablet, , Disp: , Rfl:   •  cycloSPORINE (RESTASIS) 0.05 % ophthalmic emulsion, 1 drop 2 (Two) Times a Day., Disp: , Rfl:   •  gabapentin (NEURONTIN) 600 MG tablet, 1,200 mg 3 (Three) Times a Day., Disp: , Rfl:   •  HUMALOG KWIKPEN 100 UNIT/ML solution pen-injector, , Disp: , Rfl:   •  insulin aspart (novoLOG FLEXPEN) 100 UNIT/ML solution pen-injector sc pen, Inject  under the skin 3 (Three) Times a Day With Meals., Disp: , Rfl:   •  isosorbide mononitrate (IMDUR) 30 MG 24 hr tablet, , Disp: , Rfl:   •  leflunomide (ARAVA) 20 MG tablet, , Disp: , Rfl:   •  lisinopril (PRINIVIL,ZESTRIL) 10 MG tablet, TAKE 1 TABLET BY MOUTH TWICE DAILY (Patient taking differently: 1 tablet by mouth once daily per pt), Disp: 60 tablet, Rfl: 5  •  melatonin 1 MG tablet, Take  by mouth Every Night., Disp: , Rfl:   •  montelukast (SINGULAIR) 10 MG tablet, , Disp: , Rfl:   •  Multiple Vitamin (DAILY VITAMIN FORMULA PO), Take 1 tablet/day by mouth., Disp: , Rfl:   •  nitroglycerin (NITRODUR) 0.2 MG/HR patch, , Disp: , Rfl:   •  PARoxetine (PAXIL) 20 MG tablet, , Disp: , Rfl:   •  promethazine (PHENERGAN) 25 MG tablet, , Disp: , Rfl:   •  pyridoxine (VITAMIN B-6) 100 MG tablet tablet, Take 200 mg by mouth 2 (Two) Times a Day., Disp: , Rfl:   •  TOUJEO SOLOSTAR 300 UNIT/ML solution pen-injector, , Disp: , Rfl:   •  TRUEPLUS LANCETS 33G misc, , Disp: , Rfl:   •  vitamin B-12 (CYANOCOBALAMIN) 1000 MCG tablet, Take 1,000 mcg  by mouth 2 (Two) Times a Day., Disp: , Rfl:     Allergies   Allergen Reactions   • Contrast Dye Shortness Of Breath and Itching   • Corticosteroids Other (See Comments)     Pt diabetic makes sugar go up    • Codeine Itching   • Ibuprofen        Review of Systems   Musculoskeletal: Positive for back pain and neck pain.        L.shoulder,b.hand     All other systems reviewed and are negative.    All systems reviewed and negative except for above.    Physical Exam   Constitutional: She appears well-developed and well-nourished. No distress.   Musculoskeletal:        Cervical back: Decreased range of motion: full arom with pain.   Left wrist in pain.   Neurological: She is alert.   Psychiatric: She has a normal mood and affect. Her behavior is normal. Judgment normal.       Janelle was seen today for back pain, neck pain and pain.    Diagnoses and all orders for this visit:    DDD (degenerative disc disease), cervical    Cervicalgia    Hx of rheumatoid arthritis    High risk medications (not anticoagulants) long-term use      Medication: Refill tizanidine as above.    Interventional: none at this time    Rehab: none at this time    Behavioral: No aberrant behavior noted. [facility] Report #[id number]  was reviewed and is consistent with stated history    Urine drug screen None at this time          This document has been electronically signed by Javier Martell MD on August 23, 2017 11:08 AM

## 2017-08-29 RX ORDER — LISINOPRIL 10 MG/1
TABLET ORAL
Qty: 30 TABLET | Refills: 4 | Status: SHIPPED | OUTPATIENT
Start: 2017-08-29 | End: 2018-01-25 | Stop reason: SDUPTHER

## 2017-09-01 ENCOUNTER — TELEPHONE (OUTPATIENT)
Dept: PAIN MEDICINE | Facility: CLINIC | Age: 62
End: 2017-09-01

## 2017-09-06 ENCOUNTER — OFFICE VISIT (OUTPATIENT)
Dept: PAIN MEDICINE | Facility: CLINIC | Age: 62
End: 2017-09-06

## 2017-09-06 VITALS
WEIGHT: 193.1 LBS | BODY MASS INDEX: 28.6 KG/M2 | SYSTOLIC BLOOD PRESSURE: 140 MMHG | HEIGHT: 69 IN | DIASTOLIC BLOOD PRESSURE: 80 MMHG

## 2017-09-06 DIAGNOSIS — M54.2 CERVICALGIA: ICD-10-CM

## 2017-09-06 DIAGNOSIS — M25.512 LEFT SHOULDER PAIN, UNSPECIFIED CHRONICITY: ICD-10-CM

## 2017-09-06 DIAGNOSIS — Z87.39 HX OF RHEUMATOID ARTHRITIS: ICD-10-CM

## 2017-09-06 DIAGNOSIS — M79.18 MYOFASCIAL PAIN: Primary | ICD-10-CM

## 2017-09-06 PROCEDURE — 99212 OFFICE O/P EST SF 10 MIN: CPT | Performed by: PAIN MEDICINE

## 2017-09-06 NOTE — PROGRESS NOTES
Janelle Millard is a 62 y.o. female.   1955      HPI:   Location: neck, left shoulder, lower back and left hand  Quality: aching, sharp, dull and tingling  Severity: 8/10  Timing: constant  Alleviating: rest  Aggravating: sitting  and ambulating     New left neck and shoulder pain.  Tender.  Not constant.  Has not been to PT for 3 years.     The following portions of the patient's history were reviewed by me and updated as appropriate: allergies, current medications, past family history, past medical history, past social history, past surgical history and problem list.    Past Medical History:   Diagnosis Date   • Arthritis, rheumatoid    • Arthropathy associated with neurological disorder    • Arthropathy of lumbar facet joint    • Asthma    • Benign hypertension    • Carpal tunnel syndrome    • Diabetes    • Diabetic polyneuropathy    • Dyslipidemia    • Heart disease    • Hypertension    • Joint pain    • Mild depression     Saddness post Surgery 7/10/14 improved after counseling.   • Multiple vessel coronary artery disease     post CABG      • Myofascial pain    • Neurologic disorder associated with diabetes mellitus     Neuropathy of chest      • Neuropathy    • Onychomycosis    • Peripheral vascular disease    • Retinopathy    • Tobacco user    • Type 1 diabetes mellitus    • Type 2 diabetes mellitus        Social History     Social History   • Marital status:      Spouse name: N/A   • Number of children: N/A   • Years of education: N/A     Occupational History   • Not on file.     Social History Main Topics   • Smoking status: Current Every Day Smoker     Types: Cigarettes   • Smokeless tobacco: Never Used   • Alcohol use No   • Drug use: No   • Sexual activity: Defer     Other Topics Concern   • Not on file     Social History Narrative       Family History   Problem Relation Age of Onset   • Asthma Other    • Coronary artery disease Other    • Diabetes Other    • Hyperlipidemia Other    •  Hypertension Other    • Osteoporosis Other          Current Outpatient Prescriptions:   •  ACCU-CHEK SMARTVIEW test strip, , Disp: , Rfl:   •  acetaminophen (TYLENOL) 325 MG tablet, Take 975 mg by mouth 2 (Two) Times a Day., Disp: , Rfl:   •  aspirin 81 MG EC tablet, Take 81 mg by mouth Daily., Disp: , Rfl:   •  atorvastatin (LIPITOR) 10 MG tablet, , Disp: , Rfl:   •  B-D ULTRAFINE III SHORT PEN 31G X 8 MM misc, , Disp: , Rfl:   •  Calcium Citrate-Vitamin D (CALCIUM + D PO), Take 1 tablet/day by mouth., Disp: , Rfl:   •  carvedilol (COREG) 12.5 MG tablet, , Disp: , Rfl:   •  cycloSPORINE (RESTASIS) 0.05 % ophthalmic emulsion, 1 drop 2 (Two) Times a Day., Disp: , Rfl:   •  gabapentin (NEURONTIN) 600 MG tablet, 1,200 mg 3 (Three) Times a Day., Disp: , Rfl:   •  HUMALOG KWIKPEN 100 UNIT/ML solution pen-injector, , Disp: , Rfl:   •  insulin aspart (novoLOG FLEXPEN) 100 UNIT/ML solution pen-injector sc pen, Inject  under the skin 3 (Three) Times a Day With Meals., Disp: , Rfl:   •  isosorbide mononitrate (IMDUR) 30 MG 24 hr tablet, , Disp: , Rfl:   •  leflunomide (ARAVA) 20 MG tablet, , Disp: , Rfl:   •  lisinopril (PRINIVIL,ZESTRIL) 10 MG tablet, TAKE 1 TABLET EVERY DAY, Disp: 30 tablet, Rfl: 4  •  melatonin 1 MG tablet, Take  by mouth Every Night., Disp: , Rfl:   •  montelukast (SINGULAIR) 10 MG tablet, , Disp: , Rfl:   •  Multiple Vitamin (DAILY VITAMIN FORMULA PO), Take 1 tablet/day by mouth., Disp: , Rfl:   •  nitroglycerin (NITRODUR) 0.2 MG/HR patch, , Disp: , Rfl:   •  PARoxetine (PAXIL) 20 MG tablet, , Disp: , Rfl:   •  promethazine (PHENERGAN) 25 MG tablet, , Disp: , Rfl:   •  pyridoxine (VITAMIN B-6) 100 MG tablet tablet, Take 200 mg by mouth 2 (Two) Times a Day., Disp: , Rfl:   •  tiZANidine (ZANAFLEX) 4 MG tablet, Take 1 tablet by mouth 3 (Three) Times a Day As Needed for Muscle Spasms for up to 30 days., Disp: 90 tablet, Rfl: 2  •  TOUJEO SOLOSTAR 300 UNIT/ML solution pen-injector, , Disp: , Rfl:   •   TRUEPLUS LANCETS 33G misc, , Disp: , Rfl:   •  vitamin B-12 (CYANOCOBALAMIN) 1000 MCG tablet, Take 1,000 mcg by mouth 2 (Two) Times a Day., Disp: , Rfl:     Allergies   Allergen Reactions   • Contrast Dye Shortness Of Breath and Itching   • Corticosteroids Other (See Comments)     Pt diabetic makes sugar go up    • Codeine Itching   • Ibuprofen        Review of Systems   Musculoskeletal: Positive for back pain and neck pain.        L.shoulder,l hand     All other systems reviewed and are negative.    All systems reviewed and negative except for above.    Physical Exam   Constitutional: She appears well-developed and well-nourished. No distress.   Musculoskeletal:        Cervical back: She exhibits decreased range of motion (nearly full flexion and ext with pain.  ) and tenderness.   Neurological: She is alert.   Psychiatric: She has a normal mood and affect. Her behavior is normal. Judgment normal.       Janelle was seen today for neck pain, back pain and pain.    Diagnoses and all orders for this visit:    Myofascial pain  -     Ambulatory Referral to Physical Therapy Evaluate and treat (neck and left shoulder)    Hx of rheumatoid arthritis    Cervicalgia  -     Ambulatory Referral to Physical Therapy Evaluate and treat (neck and left shoulder)    Left shoulder pain, unspecified chronicity  -     Ambulatory Referral to Physical Therapy Evaluate and treat (neck and left shoulder)        Medication: None at this time    Interventional: none at this time    Rehab: rehabillitation for neck and left shoulder    Behavioral: No aberrant behavior noted. [facility] Report #[id number]  was reviewed and is consistent with stated history    Urine drug screen None at this time          This document has been electronically signed by Javier Martell MD on September 6, 2017 11:45 AM

## 2017-09-07 RX ORDER — INSULIN GLARGINE 300 U/ML
INJECTION, SOLUTION SUBCUTANEOUS
Qty: 4.5 ML | Refills: 11 | Status: SHIPPED | OUTPATIENT
Start: 2017-09-07 | End: 2018-10-08 | Stop reason: SDUPTHER

## 2017-11-17 ENCOUNTER — TELEPHONE (OUTPATIENT)
Dept: PAIN MEDICINE | Facility: CLINIC | Age: 62
End: 2017-11-17

## 2017-11-17 RX ORDER — TIZANIDINE HYDROCHLORIDE 4 MG/1
4 CAPSULE, GELATIN COATED ORAL 2 TIMES DAILY
Qty: 60 CAPSULE | Refills: 2 | Status: SHIPPED | OUTPATIENT
Start: 2017-11-17 | End: 2017-12-08 | Stop reason: SDUPTHER

## 2017-12-08 ENCOUNTER — TELEPHONE (OUTPATIENT)
Dept: PAIN MEDICINE | Facility: CLINIC | Age: 62
End: 2017-12-08

## 2017-12-08 RX ORDER — TIZANIDINE HYDROCHLORIDE 4 MG/1
4 CAPSULE, GELATIN COATED ORAL 3 TIMES DAILY
Qty: 90 CAPSULE | Refills: 2 | Status: SHIPPED | OUTPATIENT
Start: 2017-12-08 | End: 2018-12-11

## 2018-01-25 RX ORDER — LISINOPRIL 10 MG/1
TABLET ORAL
Qty: 30 TABLET | Refills: 4 | Status: SHIPPED | OUTPATIENT
Start: 2018-01-25 | End: 2018-06-29 | Stop reason: SDUPTHER

## 2018-02-05 ENCOUNTER — TELEPHONE (OUTPATIENT)
Dept: ENDOCRINOLOGY | Facility: CLINIC | Age: 63
End: 2018-02-05

## 2018-02-05 DIAGNOSIS — E55.9 VITAMIN D DEFICIENCY: ICD-10-CM

## 2018-02-05 DIAGNOSIS — E10.42 TYPE 1 DIABETES MELLITUS WITH DIABETIC POLYNEUROPATHY (HCC): Primary | ICD-10-CM

## 2018-03-01 ENCOUNTER — OFFICE VISIT (OUTPATIENT)
Dept: ENDOCRINOLOGY | Facility: CLINIC | Age: 63
End: 2018-03-01

## 2018-03-01 ENCOUNTER — DOCUMENTATION (OUTPATIENT)
Dept: ENDOCRINOLOGY | Facility: CLINIC | Age: 63
End: 2018-03-01

## 2018-03-01 VITALS
HEART RATE: 92 BPM | SYSTOLIC BLOOD PRESSURE: 138 MMHG | DIASTOLIC BLOOD PRESSURE: 82 MMHG | HEIGHT: 69 IN | BODY MASS INDEX: 27.11 KG/M2 | WEIGHT: 183 LBS

## 2018-03-01 DIAGNOSIS — Z72.0 TOBACCO ABUSE DISORDER: ICD-10-CM

## 2018-03-01 DIAGNOSIS — E55.9 VITAMIN D DEFICIENCY: ICD-10-CM

## 2018-03-01 DIAGNOSIS — E10.42 TYPE 1 DIABETES MELLITUS WITH DIABETIC POLYNEUROPATHY (HCC): ICD-10-CM

## 2018-03-01 DIAGNOSIS — I15.2 HYPERTENSION ASSOCIATED WITH DIABETES (HCC): ICD-10-CM

## 2018-03-01 DIAGNOSIS — E10.69 MIXED DIABETIC HYPERLIPIDEMIA ASSOCIATED WITH TYPE 1 DIABETES MELLITUS (HCC): Primary | ICD-10-CM

## 2018-03-01 DIAGNOSIS — E11.59 HYPERTENSION ASSOCIATED WITH DIABETES (HCC): ICD-10-CM

## 2018-03-01 DIAGNOSIS — E78.2 MIXED DIABETIC HYPERLIPIDEMIA ASSOCIATED WITH TYPE 1 DIABETES MELLITUS (HCC): Primary | ICD-10-CM

## 2018-03-01 LAB — GLUCOSE BLDC GLUCOMTR-MCNC: 159 MG/DL (ref 70–130)

## 2018-03-01 PROCEDURE — 82962 GLUCOSE BLOOD TEST: CPT | Performed by: INTERNAL MEDICINE

## 2018-03-01 PROCEDURE — 99214 OFFICE O/P EST MOD 30 MIN: CPT | Performed by: INTERNAL MEDICINE

## 2018-03-01 PROCEDURE — 99406 BEHAV CHNG SMOKING 3-10 MIN: CPT | Performed by: INTERNAL MEDICINE

## 2018-03-01 RX ORDER — NICOTINE 21-14-7MG
KIT TRANSDERMAL
Qty: 60 EACH | Refills: 2 | Status: SHIPPED | OUTPATIENT
Start: 2018-03-01 | End: 2018-12-11

## 2018-03-01 NOTE — PROGRESS NOTES
Janelle Millard is a 62 y.o. female who presents for  evaluation of   Chief Complaint   Patient presents with   • Diabetes         Primary Care / Referring Provider  Arelis Laird MD      History of Present Illness  Duration/Timing:  Diabetes mellitus type 1  duration about 40y   constant  well controlled    Severity (Complications/Hospitalizations)  Secondary Macrovascular Complications:  CAD, No CVA, PAD  She underwent 3-vessel CABG on 02/24/2014  Secondary Microvascular Complications:  No Diabetic Nephropathy, No proteinuria, Diabetic Retinopathy, Diabetic Neuropathy    Context  Diabetes Regimen:  Insulin, Oral Medications, Compliant with regimen,    Lab Results   Component Value Date    HGBA1C 5.8 (H) 08/03/2016        Exercise:  Exercises    Associated Signs/Symptoms  Hyperglycemic Symptoms:  No polyuria, No polydipsia, No polyphagia  Hypoglycemic Episodes:  Frequent hypoglycemia , checking 4 x daily           Past Medical History:   Diagnosis Date   • Arthritis, rheumatoid    • Arthropathy associated with neurological disorder    • Arthropathy of lumbar facet joint    • Asthma    • Benign hypertension    • Carpal tunnel syndrome    • Diabetes    • Diabetic polyneuropathy    • Dyslipidemia    • Heart disease    • Hypertension    • Joint pain    • Mild depression     Saddness post Surgery 7/10/14 improved after counseling.   • Multiple vessel coronary artery disease     post CABG      • Myofascial pain    • Neurologic disorder associated with diabetes mellitus     Neuropathy of chest      • Neuropathy    • Onychomycosis    • Peripheral vascular disease    • Retinopathy    • Tobacco user    • Type 1 diabetes mellitus    • Type 2 diabetes mellitus      Family History   Problem Relation Age of Onset   • Asthma Other    • Coronary artery disease Other    • Diabetes Other    • Hyperlipidemia Other    • Hypertension Other    • Osteoporosis Other      Social History   Substance Use Topics   • Smoking status:  Current Every Day Smoker     Types: Cigarettes   • Smokeless tobacco: Never Used   • Alcohol use No         Current Outpatient Prescriptions:   •  ACCU-CHEK SMARTVIEW test strip, , Disp: , Rfl:   •  acetaminophen (TYLENOL) 325 MG tablet, Take 975 mg by mouth 2 (Two) Times a Day., Disp: , Rfl:   •  aspirin 81 MG EC tablet, Take 81 mg by mouth Daily., Disp: , Rfl:   •  atorvastatin (LIPITOR) 10 MG tablet, , Disp: , Rfl:   •  B-D ULTRAFINE III SHORT PEN 31G X 8 MM misc, , Disp: , Rfl:   •  Calcium Citrate-Vitamin D (CALCIUM + D PO), Take 1 tablet/day by mouth., Disp: , Rfl:   •  carvedilol (COREG) 12.5 MG tablet, , Disp: , Rfl:   •  cycloSPORINE (RESTASIS) 0.05 % ophthalmic emulsion, 1 drop 2 (Two) Times a Day., Disp: , Rfl:   •  gabapentin (NEURONTIN) 600 MG tablet, 1,200 mg 3 (Three) Times a Day., Disp: , Rfl:   •  insulin aspart (novoLOG FLEXPEN) 100 UNIT/ML solution pen-injector sc pen, Inject  under the skin 3 (Three) Times a Day With Meals., Disp: , Rfl:   •  isosorbide mononitrate (IMDUR) 30 MG 24 hr tablet, , Disp: , Rfl:   •  leflunomide (ARAVA) 20 MG tablet, , Disp: , Rfl:   •  lisinopril (PRINIVIL,ZESTRIL) 10 MG tablet, TAKE 1 TABLET EVERY DAY, Disp: 30 tablet, Rfl: 4  •  melatonin 1 MG tablet, Take  by mouth Every Night., Disp: , Rfl:   •  montelukast (SINGULAIR) 10 MG tablet, , Disp: , Rfl:   •  Multiple Vitamin (DAILY VITAMIN FORMULA PO), Take 1 tablet/day by mouth., Disp: , Rfl:   •  nitroglycerin (NITRODUR) 0.2 MG/HR patch, , Disp: , Rfl:   •  PARoxetine (PAXIL) 20 MG tablet, , Disp: , Rfl:   •  promethazine (PHENERGAN) 25 MG tablet, , Disp: , Rfl:   •  pyridoxine (VITAMIN B-6) 100 MG tablet tablet, Take 200 mg by mouth 2 (Two) Times a Day., Disp: , Rfl:   •  TiZANidine (ZANAFLEX) 4 MG capsule, Take 1 capsule by mouth 3 (Three) Times a Day., Disp: 90 capsule, Rfl: 2  •  TOUJEO SOLOSTAR 300 UNIT/ML solution pen-injector, INJECT 35 UNITS SUBQ AT BEDTIME, Disp: 4.5 mL, Rfl: 11  •  TRUEPLUS LANCETS 33G  misc, , Disp: , Rfl:   •  vitamin B-12 (CYANOCOBALAMIN) 1000 MCG tablet, Take 1,000 mcg by mouth 2 (Two) Times a Day., Disp: , Rfl:     Review of Systems    Review of Systems   Constitutional: Negative for activity change, appetite change, chills, diaphoresis, fatigue, fever and unexpected weight change.   HENT: Negative for congestion, dental problem, drooling, ear discharge, ear pain, facial swelling, mouth sores, postnasal drip, rhinorrhea, sinus pressure, sore throat, tinnitus, trouble swallowing and voice change.    Eyes: Negative for photophobia, pain, discharge, redness, itching and visual disturbance.   Respiratory: Negative for apnea, cough, choking, chest tightness, shortness of breath, wheezing and stridor.    Cardiovascular: Negative for chest pain, palpitations and leg swelling.   Gastrointestinal: Negative for abdominal distention, abdominal pain, constipation, diarrhea, nausea and vomiting.   Endocrine: Negative for cold intolerance, heat intolerance, polydipsia, polyphagia and polyuria.   Genitourinary: Negative for decreased urine volume, difficulty urinating, dysuria, flank pain, frequency, hematuria and urgency.   Musculoskeletal: Negative for arthralgias, back pain, gait problem, joint swelling, myalgias, neck pain and neck stiffness.   Skin: Negative for color change, pallor, rash and wound.   Allergic/Immunologic: Negative for immunocompromised state.   Neurological: Negative for dizziness, tremors, seizures, syncope, facial asymmetry, speech difficulty, weakness, light-headedness, numbness and headaches.   Hematological: Negative for adenopathy.   Psychiatric/Behavioral: Negative for agitation, behavioral problems, confusion, decreased concentration, dysphoric mood, hallucinations, self-injury, sleep disturbance and suicidal ideas. The patient is not nervous/anxious and is not hyperactive.         Objective:     /82 (BP Location: Right arm, Patient Position: Sitting, Cuff Size: Large  "Adult)  Pulse 92  Ht 175.3 cm (69.02\")  Wt 83 kg (183 lb)  BMI 27.01 kg/m2    Physical Exam   Constitutional: She is oriented to person, place, and time. She appears well-developed.   HENT:   Head: Normocephalic.   Right Ear: External ear normal.   Left Ear: External ear normal.   Nose: Nose normal.   Eyes: Conjunctivae and EOM are normal. No scleral icterus.   Neck: Normal range of motion. Neck supple. No tracheal deviation present. No thyromegaly present.   Cardiovascular: Normal rate, regular rhythm, normal heart sounds and intact distal pulses.  Exam reveals no gallop and no friction rub.    No murmur heard.  Pulmonary/Chest: Effort normal and breath sounds normal. No stridor. No respiratory distress. She has no wheezes. She has no rales. She exhibits no tenderness.   Abdominal: Soft. Bowel sounds are normal. She exhibits no distension and no mass. There is no tenderness. There is no rebound and no guarding.   Musculoskeletal: Normal range of motion. She exhibits no tenderness or deformity.   Lymphadenopathy:     She has no cervical adenopathy.   Neurological: She is alert and oriented to person, place, and time. She displays normal reflexes. She exhibits normal muscle tone. Coordination normal.   Skin: No rash noted. No erythema. No pallor.   Psychiatric: She has a normal mood and affect. Her behavior is normal. Judgment and thought content normal.       Lab Review    Results for orders placed or performed in visit on 03/01/18   POC Glucose Fingerstick   Result Value Ref Range    Glucose 159 (A) 70 - 130 mg/dL           Assessment/Plan       ICD-10-CM ICD-9-CM   1. Mixed diabetic hyperlipidemia associated with type 1 diabetes mellitus E10.69 250.81    E78.2 272.2   2. Type 1 diabetes mellitus with diabetic polyneuropathy E10.42 250.61     357.2   3. Hypertension associated with diabetes E11.59 250.80    I10 401.9   4. Vitamin D deficiency E55.9 268.9           Glycemic Management:      Lab Results "   Component Value Date    HGBA1C 5.8 (H) 08/03/2016    HGBA1C 6.7 (H) 08/18/2014    HGBA1C 7.1 (H) 02/22/2014     Lab Results   Component Value Date    CREATININE 0.5 10/03/2016       touejo 27 , back off to 24    Humalog - Novolog  1 unit per 10 grams of carbohydrate   Change to 1 to 8 since less exercise    correction 1:50     no orals     Loss of control , sully cgms inserted , define pattern - reviewed , frequent hypo and hyperlgycemia    Approve for Sully continuous sensor    #1  Patient has diabetes mellitus, insulin-dependent.    #2 She performs blood glucose testing 4 times daily and blood glucose log was brought to office with variability from .    #3  She is requiring  Basal insulin  and Prandial Insulin 3 times daily for a total of 4 injections per day.    #4 Based on blood glucose readings we are making adjustments.     #5 I have personally seen patient within the past 6 months    #6 We plan on seeing her every 2-3 months for continuous adjustment of her diabetes regimen     #7 patient has hypoglycemia with unawareness.    #8 patient has day-to-day variation in her mealtime which confounds the degree of insulin dosing with multiple daily injections.    #9 patient has completed diabetes education program with us.    #10 she has demonstrated the ability to self monitor her glucose.        #11 Patient is motivated in improving  diabetes control     Lipid Management  on atorvastatin 10 mg qhs     No results found for: CHOL  Lab Results   Component Value Date    TRIG 101 08/03/2016    TRIG 89 12/16/2014    TRIG 92 03/19/2014     Lab Results   Component Value Date    HDL 51 (L) 08/03/2016    HDL 33 (L) 12/16/2014    HDL 34 (L) 03/19/2014     No components found for: LDLCALC    Blood Pressure Management  bp controlled on ace i regimen   Microvascular Complication Monitoring:  Diabetic Retinopathy, Diabetic Neuropathy  on gabapentin 2 x  600 mg three times daily   has taken lyrica w/o benefit   cymbalta  didn't work   Immunizations:  Last pneumococcal immunization has had pneumovax  Preventive Care:  Patient is not smoking  Weight Related:  Obesity, Counseled on nutrition, Counseled on physical activity    I advised the patient of the risks in continuing to use tobacco, and I provided this patient with smoking cessation educational materials.    During this visit, I spent > 3-10 minutes counseling the patient regarding smoking cessation.  rx nicotine patches      I reviewed and summarized records from Arelis Laird MD from 2017 and I reviewed / ordered labs.     Orders Placed This Encounter   Procedures   • CBC Auto Differential   • Comprehensive Metabolic Panel   • Hemoglobin A1c   • Lipid Panel   • Microalbumin / Creatinine Urine Ratio - Urine, Clean Catch   • TSH   • Vitamin B12   • Vitamin D 25 Hydroxy   • POC Glucose Fingerstick         A copy of my note was sent to Arelis Laird MD    Please see my above opinion and suggestions.

## 2018-03-01 NOTE — PATIENT INSTRUCTIONS
For more information:    Quit Now [place]  1-800-QUIT-NOW  https://[place].quitlogix.org/en-[place]/  Steps to Quit Smoking  Smoking tobacco can be harmful to your health and can affect almost every organ in your body. Smoking puts you, and those around you, at risk for developing many serious chronic diseases. Quitting smoking is difficult, but it is one of the best things that you can do for your health. It is never too late to quit.  What are the benefits of quitting smoking?  When you quit smoking, you lower your risk of developing serious diseases and conditions, such as:  · Lung cancer or lung disease, such as COPD.  · Heart disease.  · Stroke.  · Heart attack.  · Infertility.  · Osteoporosis and bone fractures.  Additionally, symptoms such as coughing, wheezing, and shortness of breath may get better when you quit. You may also find that you get sick less often because your body is stronger at fighting off colds and infections. If you are pregnant, quitting smoking can help to reduce your chances of having a baby of low birth weight.  How do I get ready to quit?  When you decide to quit smoking, create a plan to make sure that you are successful. Before you quit:  · Pick a date to quit. Set a date within the next two weeks to give you time to prepare.  · Write down the reasons why you are quitting. Keep this list in places where you will see it often, such as on your bathroom mirror or in your car or wallet.  · Identify the people, places, things, and activities that make you want to smoke (triggers) and avoid them. Make sure to take these actions:  ¨ Throw away all cigarettes at home, at work, and in your car.  ¨ Throw away smoking accessories, such as ashtrays and lighters.  ¨ Clean your car and make sure to empty the ashtray.  ¨ Clean your home, including curtains and carpets.  · Tell your family, friends, and coworkers that you are quitting. Support from your loved ones can make quitting easier.  · Talk with  your health care provider about your options for quitting smoking.  · Find out what treatment options are covered by your health insurance.  What strategies can I use to quit smoking?  Talk with your healthcare provider about different strategies to quit smoking. Some strategies include:  · Quitting smoking altogether instead of gradually lessening how much you smoke over a period of time. Research shows that quitting “cold turkey” is more successful than gradually quitting.  · Attending in-person counseling to help you build problem-solving skills. You are more likely to have success in quitting if you attend several counseling sessions. Even short sessions of 10 minutes can be effective.  · Finding resources and support systems that can help you to quit smoking and remain smoke-free after you quit. These resources are most helpful when you use them often. They can include:  ¨ Online chats with a counselor.  ¨ Telephone quitlines.  ¨ Printed self-help materials.  ¨ Support groups or group counseling.  ¨ Text messaging programs.  ¨ Mobile phone applications.  · Taking medicines to help you quit smoking. (If you are pregnant or breastfeeding, talk with your health care provider first.) Some medicines contain nicotine and some do not. Both types of medicines help with cravings, but the medicines that include nicotine help to relieve withdrawal symptoms. Your health care provider may recommend:  ¨ Nicotine patches, gum, or lozenges.  ¨ Nicotine inhalers or sprays.  ¨ Non-nicotine medicine that is taken by mouth.  Talk with your health care provider about combining strategies, such as taking medicines while you are also receiving in-person counseling. Using these two strategies together makes you more likely to succeed in quitting than if you used either strategy on its own.  If you are pregnant or breastfeeding, talk with your health care provider about finding counseling or other support strategies to quit smoking. Do  not take medicine to help you quit smoking unless told to do so by your health care provider.  What things can I do to make it easier to quit?  Quitting smoking might feel overwhelming at first, but there is a lot that you can do to make it easier. Take these important actions:  · Reach out to your family and friends and ask that they support and encourage you during this time. Call telephone quitlines, reach out to support groups, or work with a counselor for support.  · Ask people who smoke to avoid smoking around you.  · Avoid places that trigger you to smoke, such as bars, parties, or smoke-break areas at work.  · Spend time around people who do not smoke.  · Lessen stress in your life, because stress can be a smoking trigger for some people. To lessen stress, try:  ¨ Exercising regularly.  ¨ Deep-breathing exercises.  ¨ Yoga.  ¨ Meditating.  ¨ Performing a body scan. This involves closing your eyes, scanning your body from head to toe, and noticing which parts of your body are particularly tense. Purposefully relax the muscles in those areas.  · Download or purchase mobile phone or tablet apps (applications) that can help you stick to your quit plan by providing reminders, tips, and encouragement. There are many free apps, such as QuitGuide from the CDC (Centers for Disease Control and Prevention). You can find other support for quitting smoking (smoking cessation) through smokefree.gov and other websites.  How will I feel when I quit smoking?  Within the first 24 hours of quitting smoking, you may start to feel some withdrawal symptoms. These symptoms are usually most noticeable 2-3 days after quitting, but they usually do not last beyond 2-3 weeks. Changes or symptoms that you might experience include:  · Mood swings.  · Restlessness, anxiety, or irritation.  · Difficulty concentrating.  · Dizziness.  · Strong cravings for sugary foods in addition to nicotine.  · Mild weight  gain.  · Constipation.  · Nausea.  · Coughing or a sore throat.  · Changes in how your medicines work in your body.  · A depressed mood.  · Difficulty sleeping (insomnia).  After the first 2-3 weeks of quitting, you may start to notice more positive results, such as:  · Improved sense of smell and taste.  · Decreased coughing and sore throat.  · Slower heart rate.  · Lower blood pressure.  · Clearer skin.  · The ability to breathe more easily.  · Fewer sick days.  Quitting smoking is very challenging for most people. Do not get discouraged if you are not successful the first time. Some people need to make many attempts to quit before they achieve long-term success. Do your best to stick to your quit plan, and talk with your health care provider if you have any questions or concerns.  This information is not intended to replace advice given to you by your health care provider. Make sure you discuss any questions you have with your health care provider.  Document Released: 12/12/2002 Document Revised: 08/15/2017 Document Reviewed: 05/03/2016  [employer] Interactive Patient Education © 2017 [employer]

## 2018-04-04 RX ORDER — INSULIN ASPART 100 [IU]/ML
INJECTION, SOLUTION INTRAVENOUS; SUBCUTANEOUS
Qty: 15 ML | Refills: 4 | Status: SHIPPED | OUTPATIENT
Start: 2018-04-04 | End: 2019-04-17 | Stop reason: SDUPTHER

## 2018-06-05 ENCOUNTER — TELEPHONE (OUTPATIENT)
Dept: ENDOCRINOLOGY | Facility: CLINIC | Age: 63
End: 2018-06-05

## 2018-06-05 RX ORDER — BLOOD SUGAR DIAGNOSTIC
STRIP MISCELLANEOUS
Qty: 100 EACH | Refills: 11 | Status: SHIPPED | OUTPATIENT
Start: 2018-06-05 | End: 2019-09-04

## 2018-06-29 RX ORDER — LISINOPRIL 10 MG/1
TABLET ORAL
Qty: 30 TABLET | Refills: 4 | Status: SHIPPED | OUTPATIENT
Start: 2018-06-29 | End: 2018-11-26 | Stop reason: SDUPTHER

## 2018-08-22 ENCOUNTER — TRANSCRIBE ORDERS (OUTPATIENT)
Dept: PODIATRY | Facility: CLINIC | Age: 63
End: 2018-08-22

## 2018-08-22 DIAGNOSIS — M79.672 PAIN IN BOTH FEET: ICD-10-CM

## 2018-08-22 DIAGNOSIS — M06.9 RHEUMATOID ARTHRITIS INVOLVING MULTIPLE JOINTS (HCC): Primary | ICD-10-CM

## 2018-08-22 DIAGNOSIS — M79.671 PAIN IN BOTH FEET: ICD-10-CM

## 2018-09-04 ENCOUNTER — OFFICE VISIT (OUTPATIENT)
Dept: ENDOCRINOLOGY | Facility: CLINIC | Age: 63
End: 2018-09-04

## 2018-09-04 VITALS
OXYGEN SATURATION: 92 % | BODY MASS INDEX: 27.58 KG/M2 | SYSTOLIC BLOOD PRESSURE: 122 MMHG | WEIGHT: 186.2 LBS | HEIGHT: 69 IN | HEART RATE: 84 BPM | DIASTOLIC BLOOD PRESSURE: 76 MMHG

## 2018-09-04 DIAGNOSIS — I15.2 HYPERTENSION ASSOCIATED WITH DIABETES (HCC): ICD-10-CM

## 2018-09-04 DIAGNOSIS — E10.42 TYPE 1 DIABETES MELLITUS WITH DIABETIC POLYNEUROPATHY (HCC): ICD-10-CM

## 2018-09-04 DIAGNOSIS — E78.2 MIXED DIABETIC HYPERLIPIDEMIA ASSOCIATED WITH TYPE 1 DIABETES MELLITUS (HCC): Primary | ICD-10-CM

## 2018-09-04 DIAGNOSIS — E10.8 TYPE 1 DIABETES MELLITUS WITH COMPLICATION (HCC): Primary | ICD-10-CM

## 2018-09-04 DIAGNOSIS — Z72.0 TOBACCO ABUSE DISORDER: ICD-10-CM

## 2018-09-04 DIAGNOSIS — E10.69 MIXED DIABETIC HYPERLIPIDEMIA ASSOCIATED WITH TYPE 1 DIABETES MELLITUS (HCC): Primary | ICD-10-CM

## 2018-09-04 DIAGNOSIS — E11.59 HYPERTENSION ASSOCIATED WITH DIABETES (HCC): ICD-10-CM

## 2018-09-04 DIAGNOSIS — E55.9 VITAMIN D DEFICIENCY: ICD-10-CM

## 2018-09-04 LAB — HBA1C MFR BLD: 6 %

## 2018-09-04 PROCEDURE — 99214 OFFICE O/P EST MOD 30 MIN: CPT | Performed by: INTERNAL MEDICINE

## 2018-09-04 RX ORDER — DOXYCYCLINE HYCLATE 100 MG
TABLET ORAL
Qty: 14 TABLET | Refills: 0 | Status: SHIPPED | OUTPATIENT
Start: 2018-09-04 | End: 2018-12-11

## 2018-09-04 NOTE — PROGRESS NOTES
Janelle Millard is a 63 y.o. female who presents for  evaluation of   No chief complaint on file.        Primary Care / Referring Provider  Arelis Laird MD      History of Present Illness  Duration/Timing:  Diabetes mellitus type 1  duration about 40y   constant  well controlled    Severity (Complications/Hospitalizations)  Secondary Macrovascular Complications:  CAD, No CVA, PAD  She underwent 3-vessel CABG on 02/24/2014  Secondary Microvascular Complications:  No Diabetic Nephropathy, No proteinuria, Diabetic Retinopathy, Diabetic Neuropathy    Context  Diabetes Regimen:  Insulin, Oral Medications, Compliant with regimen,    Lab Results   Component Value Date    HGBA1C 6 08/27/2018        Exercise:  Exercises    Associated Signs/Symptoms  Hyperglycemic Symptoms:  No polyuria, No polydipsia, No polyphagia  Hypoglycemic Episodes:  Frequent hypoglycemia , checking 4 x daily           Past Medical History:   Diagnosis Date   • Arthritis, rheumatoid (CMS/HCC)    • Arthropathy associated with neurological disorder    • Arthropathy of lumbar facet joint    • Asthma    • Benign hypertension    • Carpal tunnel syndrome    • Diabetes (CMS/HCC)    • Diabetic polyneuropathy (CMS/HCC)    • Dyslipidemia    • Heart disease    • Hypertension    • Joint pain    • Mild depression (CMS/HCC)     Saddness post Surgery 7/10/14 improved after counseling.   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)     Neuropathy of chest      • Neuropathy    • Onychomycosis    • Peripheral vascular disease (CMS/HCC)    • Retinopathy    • Tobacco user    • Type 1 diabetes mellitus (CMS/HCC)    • Type 2 diabetes mellitus (CMS/HCC)      Family History   Problem Relation Age of Onset   • Asthma Other    • Coronary artery disease Other    • Diabetes Other    • Hyperlipidemia Other    • Hypertension Other    • Osteoporosis Other      Social  History   Substance Use Topics   • Smoking status: Current Every Day Smoker     Types: Cigarettes   • Smokeless tobacco: Never Used   • Alcohol use No         Current Outpatient Prescriptions:   •  ACCU-CHEK SMARTVIEW test strip, Use to test sugar 3 times per day, Disp: 100 each, Rfl: 11  •  acetaminophen (TYLENOL) 325 MG tablet, Take 975 mg by mouth 2 (Two) Times a Day., Disp: , Rfl:   •  aspirin 81 MG EC tablet, Take 81 mg by mouth Daily., Disp: , Rfl:   •  atorvastatin (LIPITOR) 10 MG tablet, , Disp: , Rfl:   •  B-D ULTRAFINE III SHORT PEN 31G X 8 MM misc, , Disp: , Rfl:   •  Calcium Citrate-Vitamin D (CALCIUM + D PO), Take 1 tablet/day by mouth., Disp: , Rfl:   •  carvedilol (COREG) 12.5 MG tablet, , Disp: , Rfl:   •  cycloSPORINE (RESTASIS) 0.05 % ophthalmic emulsion, 1 drop 2 (Two) Times a Day., Disp: , Rfl:   •  gabapentin (NEURONTIN) 600 MG tablet, 1,200 mg 3 (Three) Times a Day., Disp: , Rfl:   •  isosorbide mononitrate (IMDUR) 30 MG 24 hr tablet, , Disp: , Rfl:   •  leflunomide (ARAVA) 20 MG tablet, , Disp: , Rfl:   •  lisinopril (PRINIVIL,ZESTRIL) 10 MG tablet, TAKE 1 TABLET BY MOUTH EVERY DAY, Disp: 30 tablet, Rfl: 4  •  melatonin 1 MG tablet, Take  by mouth Every Night., Disp: , Rfl:   •  montelukast (SINGULAIR) 10 MG tablet, , Disp: , Rfl:   •  Multiple Vitamin (DAILY VITAMIN FORMULA PO), Take 1 tablet/day by mouth., Disp: , Rfl:   •  Nicotine 21-14-7 MG/24HR kit, 21 mg daily for 4 weeks then 14 mg daily for 2 weeks, then 7 mg daily for 2 weeks, Disp: 60 each, Rfl: 2  •  nitroglycerin (NITRODUR) 0.2 MG/HR patch, , Disp: , Rfl:   •  NOVOLOG FLEXPEN 100 UNIT/ML solution pen-injector sc pen, INJECT BELOW THE SKIN AS DIRECTED (1 UNIT PER 10 CARBS), Disp: 15 mL, Rfl: 4  •  PARoxetine (PAXIL) 20 MG tablet, , Disp: , Rfl:   •  promethazine (PHENERGAN) 25 MG tablet, , Disp: , Rfl:   •  pyridoxine (VITAMIN B-6) 100 MG tablet tablet, Take 200 mg by mouth 2 (Two) Times a Day., Disp: , Rfl:   •  TiZANidine  (ZANAFLEX) 4 MG capsule, Take 1 capsule by mouth 3 (Three) Times a Day., Disp: 90 capsule, Rfl: 2  •  TOUJEO SOLOSTAR 300 UNIT/ML solution pen-injector, INJECT 35 UNITS SUBQ AT BEDTIME, Disp: 4.5 mL, Rfl: 11  •  TRUEPLUS LANCETS 33G misc, , Disp: , Rfl:   •  vitamin B-12 (CYANOCOBALAMIN) 1000 MCG tablet, Take 1,000 mcg by mouth 2 (Two) Times a Day., Disp: , Rfl:     Review of Systems    Review of Systems   Constitutional: Negative for activity change, appetite change, chills, diaphoresis, fatigue, fever and unexpected weight change.   HENT: Negative for congestion, dental problem, drooling, ear discharge, ear pain, facial swelling, mouth sores, postnasal drip, rhinorrhea, sinus pressure, sore throat, tinnitus, trouble swallowing and voice change.    Eyes: Negative for photophobia, pain, discharge, redness, itching and visual disturbance.   Respiratory: Negative for apnea, cough, choking, chest tightness, shortness of breath, wheezing and stridor.    Cardiovascular: Negative for chest pain, palpitations and leg swelling.   Gastrointestinal: Negative for abdominal distention, abdominal pain, constipation, diarrhea, nausea and vomiting.   Endocrine: Negative for cold intolerance, heat intolerance, polydipsia, polyphagia and polyuria.   Genitourinary: Negative for decreased urine volume, difficulty urinating, dysuria, flank pain, frequency, hematuria and urgency.   Musculoskeletal: Negative for arthralgias, back pain, gait problem, joint swelling, myalgias, neck pain and neck stiffness.   Skin: Negative for color change, pallor, rash and wound.   Allergic/Immunologic: Negative for immunocompromised state.   Neurological: Negative for dizziness, tremors, seizures, syncope, facial asymmetry, speech difficulty, weakness, light-headedness, numbness and headaches.   Hematological: Negative for adenopathy.   Psychiatric/Behavioral: Negative for agitation, behavioral problems, confusion, decreased concentration, dysphoric  "mood, hallucinations, self-injury, sleep disturbance and suicidal ideas. The patient is not nervous/anxious and is not hyperactive.         Objective:     /76   Pulse 84   Ht 175.3 cm (69\")   Wt 84.5 kg (186 lb 3.2 oz)   SpO2 92%   BMI 27.50 kg/m²     Physical Exam   Constitutional: She is oriented to person, place, and time. She appears well-developed.   HENT:   Head: Normocephalic.   Right Ear: External ear normal.   Left Ear: External ear normal.   Nose: Nose normal.   Eyes: Conjunctivae and EOM are normal. No scleral icterus.   Neck: Normal range of motion. Neck supple. No tracheal deviation present. No thyromegaly present.   Cardiovascular: Normal rate, regular rhythm, normal heart sounds and intact distal pulses.  Exam reveals no gallop and no friction rub.    No murmur heard.  Pulmonary/Chest: Effort normal and breath sounds normal. No stridor. No respiratory distress. She has no wheezes. She has no rales. She exhibits no tenderness.   Abdominal: Soft. Bowel sounds are normal. She exhibits no distension and no mass. There is no tenderness. There is no rebound and no guarding.   Musculoskeletal: Normal range of motion. She exhibits no tenderness or deformity.   Lymphadenopathy:     She has no cervical adenopathy.   Neurological: She is alert and oriented to person, place, and time. She displays normal reflexes. She exhibits normal muscle tone. Coordination normal.   Skin: No rash noted. No erythema. No pallor.   Psychiatric: She has a normal mood and affect. Her behavior is normal. Judgment and thought content normal.       Lab Review    Results for orders placed or performed in visit on 09/04/18   Hemoglobin A1c   Result Value Ref Range    Hemoglobin A1C 6            Assessment/Plan       ICD-10-CM ICD-9-CM   1. Mixed diabetic hyperlipidemia associated with type 1 diabetes mellitus (CMS/[facility]) E10.69 250.81    E78.2 272.2   2. Type 1 diabetes mellitus with diabetic polyneuropathy (CMS/[facility]) E10.42 " 250.61     357.2   3. Hypertension associated with diabetes (CMS/[facility]) E11.59 250.80    I10 401.9   4. Vitamin D deficiency E55.9 268.9   5. Tobacco abuse disorder Z72.0 305.1           Glycemic Management:      Lab Results   Component Value Date    HGBA1C 6 08/27/2018    HGBA1C 5.8 (H) 08/03/2016    HGBA1C 6.7 (H) 08/18/2014     Lab Results   Component Value Date    CREATININE 0.5 10/03/2016       t[place]ejo 27     Humalog - Novolog  1 unit per 10 grams of carbohydrate     correction 1:50     no orals     Approve for continuous sensor       #1  Patient has diabetes mellitus, insulin-dependent.    #2 She performs blood glucose testing 4 times daily and blood glucose log was brought to office with variability from .    #3  She is requiring  Basal insulin  and Prandial Insulin 3 times daily for a total of 4 injections per day.    #4 Patient tests blood sugars 4 times daily and makes frequent self-adjustments and patient is injecting insulin 4 times daily. She has been doing this for more than 90 days     #5 I have personally seen patient within the past 6 months    #6 We plan on seeing her every 2-3 months for continuous adjustment of her diabetes regimen     #7 patient has hypoglycemia with unawareness.    #8 patient has day-to-day variation in her mealtime which confounds the degree of insulin dosing with multiple daily injections.    #9 patient has completed diabetes education program with us.    #10 she has demonstrated the ability to self monitor her glucose.        #11 Patient is motivated in improving  diabetes control         Lipid Management  on atorvastatin 10 mg qhs     No results found for: CHOL  Lab Results   Component Value Date    TRIG 101 08/03/2016    TRIG 89 12/16/2014    TRIG 92 03/19/2014     Lab Results   Component Value Date    HDL 51 (L) 08/03/2016    HDL 33 (L) 12/16/2014    HDL 34 (L) 03/19/2014     No components found for: LDLCALC    Blood Pressure Management  bp controlled on ace i regimen    Microvascular Complication Monitoring:  Diabetic Retinopathy, Diabetic Neuropathy  on gabapentin 2 x  600 mg three times daily   has taken lyrica w/o benefit   cymbalta didn't work   Immunizations:  Last pneumococcal immunization has had pneumovax  Preventive Care:  Patient is not smoking  Weight Related:  Obesity, Counseled on nutrition, Counseled on physical activity    I advised the patient of the risks in continuing to use tobacco, and I provided this patient with smoking cessation educational materials.    During this visit, I spent > 3-10 minutes counseling the patient regarding smoking cessation.  rx nicotine patches      I reviewed and summarized records from Arelis Laird MD from 2017 and I reviewed / ordered labs.     Orders Placed This Encounter   Procedures   • Hemoglobin A1c     This order was created through External Result Entry         A copy of my note was sent to Arelis Laird MD    Please see my above opinion and suggestions.

## 2018-09-04 NOTE — PROGRESS NOTES
Janelle Millard is a 63 y.o. female seen by diabetes educator 09/04/2018 to discuss continuous glucose monitor options. Patient is interested in Dexcom. Patient voiced concern due to dexterity issues. Recommended Dexcom G6. Will contact [employer] and request authorization for Dexcom G6. AOB, notes, and labs faxed to [employer] 09/04/2018.     Leatha Hernandez MS, RD, LD, CDE

## 2018-09-24 ENCOUNTER — OFFICE VISIT (OUTPATIENT)
Dept: PODIATRY | Facility: CLINIC | Age: 63
End: 2018-09-24

## 2018-09-24 VITALS — BODY MASS INDEX: 27.61 KG/M2 | HEART RATE: 76 BPM | HEIGHT: 69 IN | OXYGEN SATURATION: 98 % | WEIGHT: 186.4 LBS

## 2018-09-24 DIAGNOSIS — M14.60 CHARCOT'S JOINT: Primary | ICD-10-CM

## 2018-09-24 DIAGNOSIS — E10.42 TYPE 1 DIABETES MELLITUS WITH DIABETIC POLYNEUROPATHY (HCC): ICD-10-CM

## 2018-09-24 PROCEDURE — 99203 OFFICE O/P NEW LOW 30 MIN: CPT | Performed by: PODIATRIST

## 2018-09-24 NOTE — PROGRESS NOTES
Janelle Millard  1955  63 y.o. female   [place] - 9/4/2018  A1c 6 (08/27/2018)    Patient presents today with a request to establish a new podiatrist for a diabetic foot exam.    09/24/2018    Chief Complaint   Patient presents with   • Left Foot - diabetic foot care, Pain   • Right Foot - diabetic foot care, Pain       History of Present Illness    Janelle Millard is a 63 y.o.female who presents to clinic for diabetic foot exam.  Patient is a type diabetic.  She states that her blood sugars are well controlled.  She is ambulating in regular nondiabetic shoe gear.  She relates to moderate pain to both of her feet.  She rates the pain as a 4 out of 10.  She describes primarily as burning.  She denies any injuries or trauma to her feet.  She has no other pedal complaints.      Past Medical History:   Diagnosis Date   • Arthritis, rheumatoid (CMS/HCC)    • Arthropathy associated with neurological disorder    • Arthropathy of lumbar facet joint    • Asthma    • Benign hypertension    • Carpal tunnel syndrome    • Diabetes (CMS/HCC)    • Diabetic polyneuropathy (CMS/HCC)    • Dyslipidemia    • Fallen arches    • Hammer toe    • Heart disease    • Hypertension    • Joint pain    • Mild depression (CMS/HCC)     Saddness post Surgery 7/10/14 improved after counseling.   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)     Neuropathy of chest      • Neuropathy    • Onychomycosis    • Peripheral vascular disease (CMS/HCC)    • Retinopathy    • Tobacco user    • Type 1 diabetes mellitus (CMS/HCC)    • Type 2 diabetes mellitus (CMS/HCC)          Past Surgical History:   Procedure Laterality Date   • CARDIAC SURGERY  02/21/2014    Critical stenosis in the RCA. Diffusely calcified LAD with 30-80% stenosis. OMB #3 with 60% to 70% stenosis. Preserved LV systolic function with EF of 55%.   • CARPAL TUNNEL RELEASE Right  10/15/2014    Right carpal tunnel release.   • CARPAL TUNNEL RELEASE     • CATARACT EXTRACTION, BILATERAL Bilateral    • CORONARY ARTERY BYPASS GRAFT  02/24/2014    CABG x 3 with LIMA to LAD, endarterectomy to LAD, SVG to OMB and SVG to distal RCA with endarterectomy to distal RCA. Endo-vein harvesting.   • CYSTECTOMY Left     Breast cycst   • EXCISION LESION      Remove breast lesion - cyst   • EYE SURGERY      Insert lens prosthesis   • FOOT SURGERY  10/18/2011    Exostectomy of the right foot. Preulcerative lesion with Charot deformity, right foot   • LUMBAR SPINE SURGERY  11/09/2015    Lumbosacral radiofrequency thermal coagulation.   • NERVE BLOCK  09/14/2015    Lumbar medial branch block.   • TOE NAIL AVULSION  09/03/2015    DEBRIDE NAIL 6 OR MORE 56373 (1)         Family History   Problem Relation Age of Onset   • Asthma Other    • Coronary artery disease Other    • Diabetes Other    • Hyperlipidemia Other    • Hypertension Other    • Osteoporosis Other    • Cancer Other    • Osteoporosis Mother    • Heart disease Mother    • Heart disease Father    • Diabetes Father    • Heart disease Sister    • Diabetes Sister        Allergies   Allergen Reactions   • Contrast Dye Shortness Of Breath and Itching   • Corticosteroids Other (See Comments)     Pt diabetic makes sugar go up    • Codeine Itching   • Ibuprofen        Social History     Social History   • Marital status:      Spouse name: N/A   • Number of children: N/A   • Years of education: N/A     Occupational History   • Not on file.     Social History Main Topics   • Smoking status: Current Every Day Smoker     Types: Cigarettes   • Smokeless tobacco: Never Used   • Alcohol use No   • Drug use: No   • Sexual activity: Defer     Other Topics Concern   • Not on file     Social History Narrative   • No narrative on file         Current Outpatient Prescriptions   Medication Sig Dispense Refill   • ACCU-CHEK SMARTVIEW test strip Use to test sugar 3 times  per day 100 each 11   • acetaminophen (TYLENOL) 325 MG tablet Take 975 mg by mouth 2 (Two) Times a Day.     • aspirin 81 MG EC tablet Take 81 mg by mouth Daily.     • atorvastatin (LIPITOR) 10 MG tablet      • B-D ULTRAFINE III SHORT PEN 31G X 8 MM misc      • Calcium Citrate-Vitamin D (CALCIUM + D PO) Take 1 tablet/day by mouth.     • carvedilol (COREG) 12.5 MG tablet      • cycloSPORINE (RESTASIS) 0.05 % ophthalmic emulsion 1 drop 2 (Two) Times a Day.     • doxycycline (VIBRAMYICN) 100 MG tablet Any doxcycline 100 mg po bid 14 tablet 0   • gabapentin (NEURONTIN) 600 MG tablet 1,200 mg 3 (Three) Times a Day.     • isosorbide mononitrate (IMDUR) 30 MG 24 hr tablet      • leflunomide (ARAVA) 20 MG tablet      • lisinopril (PRINIVIL,ZESTRIL) 10 MG tablet TAKE 1 TABLET BY MOUTH EVERY DAY 30 tablet 4   • melatonin 1 MG tablet Take  by mouth Every Night.     • montelukast (SINGULAIR) 10 MG tablet      • Multiple Vitamin (DAILY VITAMIN FORMULA PO) Take 1 tablet/day by mouth.     • Nicotine 21-14-7 MG/24HR kit 21 mg daily for 4 weeks then 14 mg daily for 2 weeks, then 7 mg daily for 2 weeks 60 each 2   • nitroglycerin (NITRODUR) 0.2 MG/HR patch      • NOVOLOG FLEXPEN 100 UNIT/ML solution pen-injector sc pen INJECT BELOW THE SKIN AS DIRECTED (1 UNIT PER 10 CARBS) 15 mL 4   • PARoxetine (PAXIL) 20 MG tablet      • promethazine (PHENERGAN) 25 MG tablet      • pyridoxine (VITAMIN B-6) 100 MG tablet tablet Take 200 mg by mouth 2 (Two) Times a Day.     • TiZANidine (ZANAFLEX) 4 MG capsule Take 1 capsule by mouth 3 (Three) Times a Day. 90 capsule 2   • TOUJEO SOLOSTAR 300 UNIT/ML solution pen-injector INJECT 35 UNITS SUBQ AT BEDTIME 4.5 mL 11   • TRUEPLUS LANCETS 33G misc      • vitamin B-12 (CYANOCOBALAMIN) 1000 MCG tablet Take 1,000 mcg by mouth 2 (Two) Times a Day.       No current facility-administered medications for this visit.        Review of Systems   Constitutional: Positive for fatigue.   HENT: Positive for hearing  "loss.    Eyes: Positive for visual disturbance.   Respiratory: Negative.    Cardiovascular: Positive for chest pain.   Gastrointestinal: Positive for abdominal pain, constipation, diarrhea and nausea.   Endocrine: Negative.    Genitourinary: Negative.    Musculoskeletal: Positive for arthralgias, back pain and gait problem.        Foot pain  Ankle pain   Skin: Negative.    Neurological: Positive for numbness.   Psychiatric/Behavioral: Negative.          OBJECTIVE    Pulse 76   Ht 175.3 cm (69\")   Wt 84.6 kg (186 lb 6.4 oz)   SpO2 98%   BMI 27.53 kg/m²       Physical Exam   Constitutional: She is oriented to person, place, and time. She appears well-developed and well-nourished.   HENT:   Head: Normocephalic and atraumatic.   Nose: Nose normal.   Eyes: Pupils are equal, round, and reactive to light. Conjunctivae and EOM are normal.   Pulmonary/Chest: Effort normal. No respiratory distress. She has no wheezes.    Janelle had a diabetic foot exam performed today.  Neurological: She is alert and oriented to person, place, and time. She displays normal reflexes.   Skin: Skin is warm and dry. Capillary refill takes less than 2 seconds. She is not diaphoretic.   Psychiatric: She has a normal mood and affect. Her behavior is normal.   Vitals reviewed.      Gait: Normal    Assistive Device: None    Lower Extremity    Cardiovascular:    DP/PT pulses palpable bilateral  CFT brisk  to all digits  Skin temp is warm to warm from proximal tibia to distal digits bilateral  Pedal hair growth present.   No erythema or edema noted     Musculoskeletal:  Muscle strength is 5/5 for all muscle groups tested   ROM of the 1st MTP is full without pain or crepitus bilateral  ROM of the ankle joint is full without pain or crepitus  bilateral  Rocker-bottom deformity noted bilateral    Dermatological:   Nails 1-5 bilateral are within normal limits for length   Skin is warm, dry and intact bilateral  Webspaces 1-4 bilateral are clean, dry and " intact.   No subcutaneous nodules or masses noted    No open wounds noted     Neurological:   Protective sensation diminished  Sensation intact to light touch          Procedures        ASSESSMENT AND PLAN    Janelle was seen today for diabetic foot care, pain, diabetic foot care and pain.    Diagnoses and all orders for this visit:    Charcot's joint  -     XR Foot 3+ View Bilateral    Type 1 diabetes mellitus with diabetic polyneuropathy (CMS/HCC)      - A diabetic foot screening exam was performed and the patient was educated on the foot complications related to diabetes,  preventative foot care and what signs and symptoms to watch for.  Instructed to contact our office if any foot problems develop before next visit.  - X-rays taken and reviewed  - Diagnosis and treatment of Charcot joint discussed in detail with the patient.  Dispensed handout.  - Rx for custom extra-depth diabetic shoes  - All her questions were answered  - Return to clinic in 3 months            This document has been electronically signed by Delano Jaramillo DPM on September 25, 2018 11:10 AM     9/25/2018  11:10 AM

## 2018-10-08 RX ORDER — INSULIN GLARGINE 300 U/ML
INJECTION, SOLUTION SUBCUTANEOUS
Qty: 4.5 ML | Refills: 11 | Status: SHIPPED | OUTPATIENT
Start: 2018-10-08 | End: 2019-08-12

## 2018-10-10 ENCOUNTER — TELEPHONE (OUTPATIENT)
Dept: FAMILY MEDICINE CLINIC | Facility: CLINIC | Age: 63
End: 2018-10-10

## 2018-10-10 ENCOUNTER — TELEPHONE (OUTPATIENT)
Dept: ENDOCRINOLOGY | Facility: CLINIC | Age: 63
End: 2018-10-10

## 2018-10-10 NOTE — TELEPHONE ENCOUNTER
Pt was signed up ready to go for the nelly with [facility].  Everything had been approved with [employer] but now her insurance has changed to [employer] and she doesn't know what to do.  Please call to advise. When you call just leave a voicemail and she will call you right back.  Her phone is messed up and she can't answer it when it rings.  So leave a message with your name and she will call right back. [phone]

## 2018-11-26 RX ORDER — LISINOPRIL 10 MG/1
TABLET ORAL
Qty: 30 TABLET | Refills: 4 | Status: SHIPPED | OUTPATIENT
Start: 2018-11-26 | End: 2019-04-25 | Stop reason: SDUPTHER

## 2018-11-27 ENCOUNTER — TRANSCRIBE ORDERS (OUTPATIENT)
Dept: ORTHOPEDIC SURGERY | Facility: CLINIC | Age: 63
End: 2018-11-27

## 2018-11-27 DIAGNOSIS — G56.03 BILATERAL CARPAL TUNNEL SYNDROME: Primary | ICD-10-CM

## 2018-12-10 DIAGNOSIS — M25.531 BILATERAL WRIST PAIN: Primary | ICD-10-CM

## 2018-12-10 DIAGNOSIS — M25.532 BILATERAL WRIST PAIN: Primary | ICD-10-CM

## 2018-12-11 ENCOUNTER — OFFICE VISIT (OUTPATIENT)
Dept: ORTHOPEDIC SURGERY | Facility: CLINIC | Age: 63
End: 2018-12-11

## 2018-12-11 VITALS — WEIGHT: 189 LBS | HEIGHT: 69 IN | BODY MASS INDEX: 27.99 KG/M2

## 2018-12-11 DIAGNOSIS — M25.532 BILATERAL WRIST PAIN: ICD-10-CM

## 2018-12-11 DIAGNOSIS — M25.531 BILATERAL WRIST PAIN: ICD-10-CM

## 2018-12-11 DIAGNOSIS — M79.641 BILATERAL HAND PAIN: Primary | ICD-10-CM

## 2018-12-11 DIAGNOSIS — E10.42 TYPE 1 DIABETES MELLITUS WITH DIABETIC POLYNEUROPATHY (HCC): ICD-10-CM

## 2018-12-11 DIAGNOSIS — M05.79 RHEUMATOID ARTHRITIS INVOLVING MULTIPLE SITES WITH POSITIVE RHEUMATOID FACTOR (HCC): ICD-10-CM

## 2018-12-11 DIAGNOSIS — M79.642 BILATERAL HAND PAIN: Primary | ICD-10-CM

## 2018-12-11 DIAGNOSIS — I10 ESSENTIAL HYPERTENSION: ICD-10-CM

## 2018-12-11 PROCEDURE — 99214 OFFICE O/P EST MOD 30 MIN: CPT | Performed by: ORTHOPAEDIC SURGERY

## 2018-12-11 PROCEDURE — 20605 DRAIN/INJ JOINT/BURSA W/O US: CPT | Performed by: ORTHOPAEDIC SURGERY

## 2018-12-11 RX ADMIN — TRIAMCINOLONE ACETONIDE 40 MG: 40 INJECTION, SUSPENSION INTRA-ARTICULAR; INTRAMUSCULAR at 12:27

## 2018-12-11 RX ADMIN — TRIAMCINOLONE ACETONIDE 40 MG: 40 INJECTION, SUSPENSION INTRA-ARTICULAR; INTRAMUSCULAR at 12:30

## 2018-12-11 RX ADMIN — LIDOCAINE HYDROCHLORIDE 1 ML: 10 INJECTION, SOLUTION INFILTRATION; PERINEURAL at 12:27

## 2018-12-11 RX ADMIN — LIDOCAINE HYDROCHLORIDE 1 ML: 10 INJECTION, SOLUTION INFILTRATION; PERINEURAL at 12:30

## 2018-12-11 NOTE — PROGRESS NOTES
Janelle Millard is a 63 y.o. female   Primary provider:  Arelis Laird MD       Chief Complaint   Patient presents with   • Left Hand - Pain     Xray today   • Right Hand - Pain     Xray today       HISTORY OF PRESENT ILLNESS:    Pain   This is a recurrent problem. Episode onset: 5 years. The problem occurs constantly. Associated symptoms include chest pain, joint swelling, numbness and weakness. Associated symptoms comments: Aching, bruising, swelling, tingling. Exacerbated by: Movement, gripping. She has tried ice and heat (bracing) for the symptoms. The treatment provided mild relief.     History of Carpal tunnel release bilaterally 7 years ago by Dr Kraus.    Pain got better after surgery.  Now having severe pain in both wrists  Also having numbness and tingling in hands.    Numbness in thumb, index, and middle fingers both hands   Not involving ring and small.  No new injury  No further treatment since surgery.  Worse in daytime. Wearing braces to drive.     CONCURRENT MEDICAL HISTORY:    Past Medical History:   Diagnosis Date   • Arthritis, rheumatoid (CMS/HCC)    • Arthropathy associated with neurological disorder    • Arthropathy of lumbar facet joint    • Asthma    • Benign hypertension    • Carpal tunnel syndrome    • Diabetes (CMS/HCC)    • Diabetic polyneuropathy (CMS/HCC)    • Dyslipidemia    • Fallen arches    • Hammer toe    • Heart disease    • Hypertension    • Joint pain    • Mild depression (CMS/HCC)     Saddness post Surgery 7/10/14 improved after counseling.   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)     Neuropathy of chest      • Neuropathy    • Onychomycosis    • Peripheral vascular disease (CMS/HCC)    • Retinopathy    • Tobacco user    • Type 1 diabetes mellitus (CMS/HCC)    • Type 2 diabetes mellitus (CMS/HCC)        Allergies   Allergen Reactions   • Contrast Dye Shortness Of  Breath and Itching   • Corticosteroids Other (See Comments)     Pt diabetic makes sugar go up    • Codeine Itching   • Ibuprofen          Current Outpatient Medications:   •  ACCU-CHEK SMARTVIEW test strip, Use to test sugar 3 times per day, Disp: 100 each, Rfl: 11  •  acetaminophen (TYLENOL) 325 MG tablet, Take 975 mg by mouth 2 (Two) Times a Day., Disp: , Rfl:   •  aspirin 81 MG EC tablet, Take 81 mg by mouth Daily., Disp: , Rfl:   •  atorvastatin (LIPITOR) 10 MG tablet, , Disp: , Rfl:   •  B-D ULTRAFINE III SHORT PEN 31G X 8 MM misc, , Disp: , Rfl:   •  Calcium Citrate-Vitamin D (CALCIUM + D PO), Take 1 tablet/day by mouth., Disp: , Rfl:   •  carvedilol (COREG) 12.5 MG tablet, , Disp: , Rfl:   •  cycloSPORINE (RESTASIS) 0.05 % ophthalmic emulsion, 1 drop 2 (Two) Times a Day., Disp: , Rfl:   •  gabapentin (NEURONTIN) 600 MG tablet, 1,200 mg 3 (Three) Times a Day., Disp: , Rfl:   •  isosorbide mononitrate (IMDUR) 30 MG 24 hr tablet, , Disp: , Rfl:   •  leflunomide (ARAVA) 20 MG tablet, , Disp: , Rfl:   •  lisinopril (PRINIVIL,ZESTRIL) 10 MG tablet, TAKE 1 TABLET EVERY DAY, Disp: 30 tablet, Rfl: 4  •  melatonin 1 MG tablet, Take  by mouth Every Night., Disp: , Rfl:   •  montelukast (SINGULAIR) 10 MG tablet, , Disp: , Rfl:   •  Multiple Vitamin (DAILY VITAMIN FORMULA PO), Take 1 tablet/day by mouth., Disp: , Rfl:   •  nitroglycerin (NITRODUR) 0.2 MG/HR patch, , Disp: , Rfl:   •  NOVOLOG FLEXPEN 100 UNIT/ML solution pen-injector sc pen, INJECT BELOW THE SKIN AS DIRECTED (1 UNIT PER 10 CARBS), Disp: 15 mL, Rfl: 4  •  PARoxetine (PAXIL) 20 MG tablet, , Disp: , Rfl:   •  pyridoxine (VITAMIN B-6) 100 MG tablet tablet, Take 200 mg by mouth 2 (Two) Times a Day., Disp: , Rfl:   •  TOUJEO SOLOSTAR 300 UNIT/ML solution pen-injector, INJECT 35 UNITS SUBCUTANEOUSLY AT BEDTIME, Disp: 4.5 mL, Rfl: 11  •  TRUEPLUS LANCETS 33G misc, , Disp: , Rfl:   •  vitamin B-12 (CYANOCOBALAMIN) 1000 MCG tablet, Take 1,000 mcg by mouth 2 (Two)  Times a Day., Disp: , Rfl:     Past Surgical History:   Procedure Laterality Date   • CARDIAC SURGERY  02/21/2014    Critical stenosis in the RCA. Diffusely calcified LAD with 30-80% stenosis. OMB #3 with 60% to 70% stenosis. Preserved LV systolic function with EF of 55%.   • CARPAL TUNNEL RELEASE Right 10/15/2014    Right carpal tunnel release.   • CARPAL TUNNEL RELEASE     • CATARACT EXTRACTION, BILATERAL Bilateral    • CORONARY ARTERY BYPASS GRAFT  02/24/2014    CABG x 3 with LIMA to LAD, endarterectomy to LAD, SVG to OMB and SVG to distal RCA with endarterectomy to distal RCA. Endo-vein harvesting.   • CYSTECTOMY Left     Breast cycst   • EXCISION LESION      Remove breast lesion - cyst   • EYE SURGERY      Insert lens prosthesis   • FOOT SURGERY  10/18/2011    Exostectomy of the right foot. Preulcerative lesion with Charot deformity, right foot   • LUMBAR SPINE SURGERY  11/09/2015    Lumbosacral radiofrequency thermal coagulation.   • NERVE BLOCK  09/14/2015    Lumbar medial branch block.   • TOE NAIL AVULSION  09/03/2015    DEBRIDE NAIL 6 OR MORE 32654 (1)       Family History   Problem Relation Age of Onset   • Asthma Other    • Coronary artery disease Other    • Diabetes Other    • Hyperlipidemia Other    • Hypertension Other    • Osteoporosis Other    • Cancer Other    • Osteoporosis Mother    • Heart disease Mother    • Heart disease Father    • Diabetes Father    • Heart disease Sister    • Diabetes Sister         Social History     Socioeconomic History   • Marital status:      Spouse name: Not on file   • Number of children: Not on file   • Years of education: Not on file   • Highest education level: Not on file   Social Needs   • Financial resource strain: Not on file   • Food insecurity - worry: Not on file   • Food insecurity - inability: Not on file   • Transportation needs - medical: Not on file   • Transportation needs - non-medical: Not on file   Occupational History   • Not on file  "  Tobacco Use   • Smoking status: Current Every Day Smoker     Types: Cigarettes   • Smokeless tobacco: Never Used   Substance and Sexual Activity   • Alcohol use: No   • Drug use: No   • Sexual activity: Defer   Other Topics Concern   • Not on file   Social History Narrative   • Not on file        Review of Systems   HENT: Negative.    Eyes: Positive for visual disturbance.   Respiratory: Positive for wheezing.    Cardiovascular: Positive for chest pain.   Gastrointestinal: Negative.    Endocrine: Negative.    Genitourinary: Negative.    Musculoskeletal: Positive for joint swelling.   Skin: Negative.    Allergic/Immunologic: Negative.    Neurological: Positive for weakness and numbness.        Tingling   Hematological: Negative.    Psychiatric/Behavioral: Negative.        PHYSICAL EXAMINATION:       Ht 175.3 cm (69\")   Wt 85.7 kg (189 lb)   BMI 27.91 kg/m²     Physical Exam   Constitutional: She is oriented to person, place, and time. She appears well-developed and well-nourished.   Neurological: She is alert and oriented to person, place, and time.   Psychiatric: She has a normal mood and affect. Her behavior is normal. Judgment and thought content normal.       GAIT:     []  Normal  [x]  Antalgic    Assistive device: []  None  []  Walker     []  Crutches  [x]  Cane     []  Wheelchair  []  Stretcher    Right Hand Exam     Tenderness   Right hand tenderness location: diffusely tender.    Range of Motion   Wrist   Extension: 10   Flexion: 30     Muscle Strength   : 4/5     Other   Erythema: absent  Sensation: normal  Pulse: present      Left Hand Exam     Tenderness   Left hand tenderness location: diffuse tenderness.     Range of Motion   Wrist   Extension: 10   Flexion: 40     Muscle Strength   :  4/5     Other   Erythema: absent  Sensation: normal  Pulse: present        limited  formation in both hands  Limits to full finger flexion      Xr Wrist 3+ View Bilateral    Result Date: " 12/11/2018  Narrative: Ordering Provider:  Cooper Escoto MD Ordering Diagnosis/Indication:  Bilateral wrist pain Procedure:  XR WRIST 3+ VW BILATERAL Exam Date:  12/11/18 COMPARISON:  Todays X-rays were compared to previous images dated Right wrist October 20, 2012..     Impression:  3 views of both wrists show diffuse osteoarthritic changes throughout the wrist and hand.  There are cystic changes worse on the right than on the left.  She has cystic changes involving the distal aspect of the radius, the distal ulna, as well as diffusely through the carpus.  These findings are consistent with history of rheumatoid arthritis.  These findings are also significantly progressed in comparison to x-rays from October 20, 2012.  The left side shows similar changes but to a lesser degree. Cooper Escoto MD 12/11/18           ASSESSMENT:    Diagnoses and all orders for this visit:    Bilateral hand pain  -     Medium Joint Arthrocentesis  -     Medium Joint Arthrocentesis    Bilateral wrist pain  -     Medium Joint Arthrocentesis  -     Medium Joint Arthrocentesis    Type 1 diabetes mellitus with diabetic polyneuropathy (CMS/HCC)    Rheumatoid arthritis involving multiple sites with positive rheumatoid factor (CMS/HCC)    Essential hypertension      Medium Joint Arthrocentesis  Date/Time: 12/11/2018 12:27 PM  Consent given by: patient  Site marked: site marked  Timeout: Immediately prior to procedure a time out was called to verify the correct patient, procedure, equipment, support staff and site/side marked as required   Supporting Documentation  Indications: pain   Procedure Details  Location: wrist (right) -   Preparation: Patient was prepped and draped in the usual sterile fashion  Needle size: 25 G  Approach: volar  Medications administered: 40 mg triamcinolone acetonide 40 MG/ML; 1 mL lidocaine 1 %  Patient tolerance: patient tolerated the procedure well with no immediate complications    Medium Joint  Arthrocentesis  Date/Time: 12/11/2018 12:30 PM  Consent given by: patient  Site marked: site marked  Timeout: Immediately prior to procedure a time out was called to verify the correct patient, procedure, equipment, support staff and site/side marked as required   Supporting Documentation  Indications: pain   Procedure Details  Location: wrist (left) -   Preparation: Patient was prepped and draped in the usual sterile fashion  Needle size: 25 G  Approach: volar  Medications administered: 1 mL lidocaine 1 %; 40 mg triamcinolone acetonide 40 MG/ML  Patient tolerance: patient tolerated the procedure well with no immediate complications            PLAN    Discussed steroid bilateral wrists  May need a new EMG to assess for nerve root impingement  Has progressive findings on xray due to rheumatoid arthritis.  Continue wearing braces.    Patient's Body mass index is 27.91 kg/m². BMI is above normal parameters. Recommendations include: exercise counseling and nutrition counseling.    Return if symptoms worsen or fail to improve, for recheck.    Cooper Escoto MD

## 2018-12-12 RX ORDER — TRIAMCINOLONE ACETONIDE 40 MG/ML
40 INJECTION, SUSPENSION INTRA-ARTICULAR; INTRAMUSCULAR
Status: COMPLETED | OUTPATIENT
Start: 2018-12-11 | End: 2018-12-11

## 2018-12-12 RX ORDER — LIDOCAINE HYDROCHLORIDE 10 MG/ML
1 INJECTION, SOLUTION INFILTRATION; PERINEURAL
Status: COMPLETED | OUTPATIENT
Start: 2018-12-11 | End: 2018-12-11

## 2018-12-17 ENCOUNTER — TELEPHONE (OUTPATIENT)
Dept: ENDOCRINOLOGY | Facility: CLINIC | Age: 63
End: 2018-12-17

## 2018-12-17 NOTE — TELEPHONE ENCOUNTER
Pt called and says her Dexcom Meter is leaking charge. Says she did not see a  in her boxes.  She was asking if we keep extra chargers or what she needs to do.  Please contact pt with info, thanks!

## 2019-01-15 ENCOUNTER — OFFICE VISIT (OUTPATIENT)
Dept: PODIATRY | Facility: CLINIC | Age: 64
End: 2019-01-15

## 2019-01-15 VITALS — WEIGHT: 178 LBS | BODY MASS INDEX: 26.36 KG/M2 | HEART RATE: 81 BPM | HEIGHT: 69 IN | OXYGEN SATURATION: 96 %

## 2019-01-15 DIAGNOSIS — B35.1 ONYCHOMYCOSIS: Primary | ICD-10-CM

## 2019-01-15 DIAGNOSIS — M79.675 CHRONIC TOE PAIN, BILATERAL: ICD-10-CM

## 2019-01-15 DIAGNOSIS — G89.29 CHRONIC TOE PAIN, BILATERAL: ICD-10-CM

## 2019-01-15 DIAGNOSIS — M79.674 CHRONIC TOE PAIN, BILATERAL: ICD-10-CM

## 2019-01-15 DIAGNOSIS — E10.42 TYPE 1 DIABETES MELLITUS WITH DIABETIC POLYNEUROPATHY (HCC): ICD-10-CM

## 2019-01-15 PROCEDURE — 11721 DEBRIDE NAIL 6 OR MORE: CPT | Performed by: PODIATRIST

## 2019-01-15 NOTE — PROGRESS NOTES
Janelle Dawnel  1955  63 y.o. female   Cami - 9/4/2018  VIDAL Laird 11/20/18  A1c 6 (08/27/2018)   per patient     Patient presents today for routine diabetic foot care       Chief Complaint   Patient presents with   • Left Foot - diabetic nail care   • Right Foot - diabetic nail care       History of Present Illness    Patient presents to clinic today for routine diabetic footcare.  She states that her toenails have become long and painful.  Pain is relieved with debridement.  She states that she has her custom diabetic shoes and they are comfortable.  She denies any other pedal complaints today.      Past Medical History:   Diagnosis Date   • Arthritis, rheumatoid (CMS/HCC)    • Arthropathy associated with neurological disorder    • Arthropathy of lumbar facet joint    • Asthma    • Benign hypertension    • Carpal tunnel syndrome    • Diabetes (CMS/HCC)    • Diabetic polyneuropathy (CMS/HCC)    • Dyslipidemia    • Fallen arches    • Hammer toe    • Heart disease    • Hypertension    • Joint pain    • Mild depression (CMS/HCC)     Saddness post Surgery 7/10/14 improved after counseling.   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)     Neuropathy of chest      • Neuropathy    • Onychomycosis    • Peripheral vascular disease (CMS/HCC)    • Retinopathy    • Tobacco user    • Type 1 diabetes mellitus (CMS/HCC)    • Type 2 diabetes mellitus (CMS/HCC)          Past Surgical History:   Procedure Laterality Date   • CARDIAC SURGERY  02/21/2014    Critical stenosis in the RCA. Diffusely calcified LAD with 30-80% stenosis. OMB #3 with 60% to 70% stenosis. Preserved LV systolic function with EF of 55%.   • CARPAL TUNNEL RELEASE Right 10/15/2014    Right carpal tunnel release.   • CARPAL TUNNEL RELEASE     • CATARACT EXTRACTION, BILATERAL Bilateral    • CORONARY ARTERY BYPASS GRAFT  02/24/2014     CABG x 3 with LIMA to LAD, endarterectomy to LAD, SVG to OMB and SVG to distal RCA with endarterectomy to distal RCA. Endo-vein harvesting.   • CYSTECTOMY Left     Breast cycst   • EXCISION LESION      Remove breast lesion - cyst   • EYE SURGERY      Insert lens prosthesis   • FOOT SURGERY  10/18/2011    Exostectomy of the right foot. Preulcerative lesion with Charot deformity, right foot   • LUMBAR SPINE SURGERY  11/09/2015    Lumbosacral radiofrequency thermal coagulation.   • NERVE BLOCK  09/14/2015    Lumbar medial branch block.   • TOE NAIL AVULSION  09/03/2015    DEBRIDE NAIL 6 OR MORE 21637 (1)         Family History   Problem Relation Age of Onset   • Asthma Other    • Coronary artery disease Other    • Diabetes Other    • Hyperlipidemia Other    • Hypertension Other    • Osteoporosis Other    • Cancer Other    • Osteoporosis Mother    • Heart disease Mother    • Heart disease Father    • Diabetes Father    • Heart disease Sister    • Diabetes Sister        Allergies   Allergen Reactions   • Contrast Dye Shortness Of Breath and Itching   • Corticosteroids Other (See Comments)     Pt diabetic makes sugar go up    • Codeine Itching   • Ibuprofen        Social History     Socioeconomic History   • Marital status:      Spouse name: Not on file   • Number of children: Not on file   • Years of education: Not on file   • Highest education level: Not on file   Social Needs   • Financial resource strain: Not on file   • Food insecurity - worry: Not on file   • Food insecurity - inability: Not on file   • Transportation needs - medical: Not on file   • Transportation needs - non-medical: Not on file   Occupational History   • Not on file   Tobacco Use   • Smoking status: Current Every Day Smoker     Types: Cigarettes   • Smokeless tobacco: Never Used   Substance and Sexual Activity   • Alcohol use: No   • Drug use: No   • Sexual activity: Defer   Other Topics Concern   • Not on file   Social History Narrative  "  • Not on file         Current Outpatient Medications   Medication Sig Dispense Refill   • ACCU-CHEK SMARTVIEW test strip Use to test sugar 3 times per day 100 each 11   • acetaminophen (TYLENOL) 325 MG tablet Take 975 mg by mouth 2 (Two) Times a Day.     • aspirin 81 MG EC tablet Take 81 mg by mouth Daily.     • atorvastatin (LIPITOR) 10 MG tablet      • B-D ULTRAFINE III SHORT PEN 31G X 8 MM misc      • Calcium Citrate-Vitamin D (CALCIUM + D PO) Take 1 tablet/day by mouth.     • carvedilol (COREG) 12.5 MG tablet      • cycloSPORINE (RESTASIS) 0.05 % ophthalmic emulsion 1 drop 2 (Two) Times a Day.     • gabapentin (NEURONTIN) 600 MG tablet 1,200 mg 3 (Three) Times a Day.     • isosorbide mononitrate (IMDUR) 30 MG 24 hr tablet      • leflunomide (ARAVA) 20 MG tablet      • lisinopril (PRINIVIL,ZESTRIL) 10 MG tablet TAKE 1 TABLET EVERY DAY 30 tablet 4   • melatonin 1 MG tablet Take  by mouth Every Night.     • montelukast (SINGULAIR) 10 MG tablet      • Multiple Vitamin (DAILY VITAMIN FORMULA PO) Take 1 tablet/day by mouth.     • nitroglycerin (NITRODUR) 0.2 MG/HR patch      • NOVOLOG FLEXPEN 100 UNIT/ML solution pen-injector sc pen INJECT BELOW THE SKIN AS DIRECTED (1 UNIT PER 10 CARBS) 15 mL 4   • PARoxetine (PAXIL) 20 MG tablet      • pyridoxine (VITAMIN B-6) 100 MG tablet tablet Take 200 mg by mouth 2 (Two) Times a Day.     • TOUJEO SOLOSTAR 300 UNIT/ML solution pen-injector INJECT 35 UNITS SUBCUTANEOUSLY AT BEDTIME 4.5 mL 11   • TRUEPLUS LANCETS 33G misc      • vitamin B-12 (CYANOCOBALAMIN) 1000 MCG tablet Take 1,000 mcg by mouth 2 (Two) Times a Day.       No current facility-administered medications for this visit.        Review of Systems   Constitutional: Negative.    HENT: Negative.    Musculoskeletal:        Toe pain   Skin: Negative.    Psychiatric/Behavioral: Negative.          OBJECTIVE    Pulse 81   Ht 175.3 cm (69.02\")   Wt 80.7 kg (178 lb)   SpO2 96%   BMI 26.27 kg/m²       Physical Exam "   Constitutional: She is oriented to person, place, and time. She appears well-developed and well-nourished.   HENT:   Head: Normocephalic and atraumatic.   Nose: Nose normal.   Eyes: Conjunctivae and EOM are normal. Pupils are equal, round, and reactive to light.   Pulmonary/Chest: Effort normal. No respiratory distress. She has no wheezes.   Neurological: She is alert and oriented to person, place, and time. She displays normal reflexes.   Skin: Skin is warm and dry. Capillary refill takes less than 2 seconds. She is not diaphoretic.   Psychiatric: She has a normal mood and affect. Her behavior is normal.   Vitals reviewed.      Gait: Normal    Assistive Device: None    Lower Extremity    Cardiovascular:    DP/PT pulses palpable bilateral  CFT brisk  to all digits  Skin temp is warm to warm from proximal tibia to distal digits bilateral  Pedal hair growth present.   No erythema or edema noted     Musculoskeletal:  Muscle strength is 5/5 for all muscle groups tested   ROM of the 1st MTP is full without pain or crepitus bilateral  ROM of the ankle joint is full without pain or crepitus  bilateral  Rocker-bottom deformity noted bilateral    Dermatological:   Nails 1-5 bilateral are thickened, discolored, elongated with subungual debris.  Pain on palpation to the nail plates.  Skin is warm, dry and intact bilateral  Webspaces 1-4 bilateral are clean, dry and intact.   No subcutaneous nodules or masses noted    No open wounds noted     Neurological:   Protective sensation diminished  Sensation intact to light touch          Procedures        ASSESSMENT AND PLAN    Janelle was seen today for diabetic nail care and diabetic nail care.    Diagnoses and all orders for this visit:    Onychomycosis    Chronic toe pain, bilateral    Type 1 diabetes mellitus with diabetic polyneuropathy (CMS/HCC)      - Nails 1-5 bilateral were debrided in length and thickness with nail nipper and electric  to decrease fungal load and  risk of infection.   - All her questions were answered  - Return to clinic in 3 months or sooner if needed             This document has been electronically signed by Delano Jaramillo DPM on January 15, 2019 2:26 PM     1/15/2019  2:26 PM

## 2019-04-17 RX ORDER — INSULIN ASPART 100 [IU]/ML
INJECTION, SOLUTION INTRAVENOUS; SUBCUTANEOUS
Qty: 15 ML | Refills: 4 | Status: SHIPPED | OUTPATIENT
Start: 2019-04-17 | End: 2020-04-02

## 2019-04-25 RX ORDER — LISINOPRIL 10 MG/1
10 TABLET ORAL DAILY
Qty: 30 TABLET | Refills: 4 | Status: SHIPPED | OUTPATIENT
Start: 2019-04-25 | End: 2019-06-25 | Stop reason: SDUPTHER

## 2019-06-25 ENCOUNTER — TELEPHONE (OUTPATIENT)
Dept: PODIATRY | Facility: CLINIC | Age: 64
End: 2019-06-25

## 2019-06-25 RX ORDER — LISINOPRIL 10 MG/1
10 TABLET ORAL DAILY
Qty: 90 TABLET | Refills: 0 | Status: SHIPPED | OUTPATIENT
Start: 2019-06-25 | End: 2019-08-12

## 2019-06-25 NOTE — TELEPHONE ENCOUNTER
ALLYSON    Janelle called to schedule an appt for left foot swelling w/ pain. Right foot had two nodules on bottom of foot.  I offered her an appt on 7/15/19, but she requested an earlier appt date.  I told her I would have to ask, then I will call her back w/ an appt time.  Can she be worked in sooner?  Thank you.    Janelle - [phone]

## 2019-07-01 ENCOUNTER — OFFICE VISIT (OUTPATIENT)
Dept: PODIATRY | Facility: CLINIC | Age: 64
End: 2019-07-01

## 2019-07-01 VITALS — BODY MASS INDEX: 26.36 KG/M2 | HEIGHT: 69 IN | WEIGHT: 178 LBS | OXYGEN SATURATION: 94 % | HEART RATE: 76 BPM

## 2019-07-01 DIAGNOSIS — M79.675 CHRONIC TOE PAIN, BILATERAL: ICD-10-CM

## 2019-07-01 DIAGNOSIS — B35.1 ONYCHOMYCOSIS: Primary | ICD-10-CM

## 2019-07-01 DIAGNOSIS — E10.42 TYPE 1 DIABETES MELLITUS WITH DIABETIC POLYNEUROPATHY (HCC): ICD-10-CM

## 2019-07-01 DIAGNOSIS — M14.60 CHARCOT'S JOINT: ICD-10-CM

## 2019-07-01 DIAGNOSIS — G89.29 CHRONIC TOE PAIN, BILATERAL: ICD-10-CM

## 2019-07-01 DIAGNOSIS — M79.674 CHRONIC TOE PAIN, BILATERAL: ICD-10-CM

## 2019-07-01 PROCEDURE — 11721 DEBRIDE NAIL 6 OR MORE: CPT | Performed by: PODIATRIST

## 2019-07-01 PROCEDURE — 99213 OFFICE O/P EST LOW 20 MIN: CPT | Performed by: PODIATRIST

## 2019-07-01 NOTE — PROGRESS NOTES
Janelle WAGONER Millard  1955  63 y.o. female   Cami - 9/4/2018  VIDAL Laird 11/20/18  A1c 6 (08/27/2018)   per patient     Patient presents today for routine diabetic foot care       Chief Complaint   Patient presents with   • Left Foot - diabetic nail care   • Right Foot - diabetic nail care       History of Present Illness    Patient presents to clinic today for routine diabetic footcare.  She states that her toenails have become long and painful.  Pain is relieved with debridement.  Patient states that approximately 1 week ago she had acute left ankle pain without injury.  2 days later the pain was gone.  She did nothing to treat the pain.  Patient is a type I diabetic.  States that her blood sugars are well controlled.  Relates to numbness in both feet.        Past Medical History:   Diagnosis Date   • Arthritis, rheumatoid (CMS/HCC)    • Arthropathy associated with neurological disorder    • Arthropathy of lumbar facet joint    • Asthma    • Benign hypertension    • Carpal tunnel syndrome    • Diabetes (CMS/HCC)    • Diabetic polyneuropathy (CMS/HCC)    • Dyslipidemia    • Fallen arches    • Hammer toe    • Heart disease    • Hypertension    • Joint pain    • Mild depression (CMS/HCC)     Saddness post Surgery 7/10/14 improved after counseling.   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)     Neuropathy of chest      • Neuropathy    • Onychomycosis    • Peripheral vascular disease (CMS/HCC)    • Retinopathy    • Tobacco user    • Type 1 diabetes mellitus (CMS/HCC)    • Type 2 diabetes mellitus (CMS/HCC)          Past Surgical History:   Procedure Laterality Date   • CARDIAC SURGERY  02/21/2014    Critical stenosis in the RCA. Diffusely calcified LAD with 30-80% stenosis. OMB #3 with 60% to 70% stenosis. Preserved LV systolic function with EF of 55%.   • CARPAL TUNNEL RELEASE Right 10/15/2014     Right carpal tunnel release.   • CARPAL TUNNEL RELEASE     • CATARACT EXTRACTION, BILATERAL Bilateral    • CORONARY ARTERY BYPASS GRAFT  02/24/2014    CABG x 3 with LIMA to LAD, endarterectomy to LAD, SVG to OMB and SVG to distal RCA with endarterectomy to distal RCA. Endo-vein harvesting.   • CYSTECTOMY Left     Breast cycst   • EXCISION LESION      Remove breast lesion - cyst   • EYE SURGERY      Insert lens prosthesis   • FOOT SURGERY  10/18/2011    Exostectomy of the right foot. Preulcerative lesion with Charot deformity, right foot   • LUMBAR SPINE SURGERY  11/09/2015    Lumbosacral radiofrequency thermal coagulation.   • NERVE BLOCK  09/14/2015    Lumbar medial branch block.   • TOE NAIL AVULSION  09/03/2015    DEBRIDE NAIL 6 OR MORE 75497 (1)         Family History   Problem Relation Age of Onset   • Asthma Other    • Coronary artery disease Other    • Diabetes Other    • Hyperlipidemia Other    • Hypertension Other    • Osteoporosis Other    • Cancer Other    • Osteoporosis Mother    • Heart disease Mother    • Heart disease Father    • Diabetes Father    • Heart disease Sister    • Diabetes Sister        Allergies   Allergen Reactions   • Contrast Dye Shortness Of Breath and Itching   • Corticosteroids Other (See Comments)     Pt diabetic makes sugar go up    • Codeine Itching   • Ibuprofen        Social History     Socioeconomic History   • Marital status:      Spouse name: Not on file   • Number of children: Not on file   • Years of education: Not on file   • Highest education level: Not on file   Tobacco Use   • Smoking status: Current Every Day Smoker     Types: Cigarettes   • Smokeless tobacco: Never Used   Substance and Sexual Activity   • Alcohol use: No   • Drug use: No   • Sexual activity: Defer         Current Outpatient Medications   Medication Sig Dispense Refill   • ACCU-CHEK SMARTVIEW test strip Use to test sugar 3 times per day 100 each 11   • acetaminophen (TYLENOL) 325 MG tablet  "Take 975 mg by mouth 2 (Two) Times a Day.     • aspirin 81 MG EC tablet Take 81 mg by mouth Daily.     • atorvastatin (LIPITOR) 10 MG tablet      • B-D ULTRAFINE III SHORT PEN 31G X 8 MM misc      • Calcium Citrate-Vitamin D (CALCIUM + D PO) Take 1 tablet/day by mouth.     • carvedilol (COREG) 12.5 MG tablet      • cycloSPORINE (RESTASIS) 0.05 % ophthalmic emulsion 1 drop 2 (Two) Times a Day.     • gabapentin (NEURONTIN) 600 MG tablet 1,200 mg 3 (Three) Times a Day.     • isosorbide mononitrate (IMDUR) 30 MG 24 hr tablet      • leflunomide (ARAVA) 20 MG tablet      • lisinopril (PRINIVIL,ZESTRIL) 10 MG tablet Take 1 tablet by mouth Daily. 90 tablet 0   • melatonin 1 MG tablet Take  by mouth Every Night.     • montelukast (SINGULAIR) 10 MG tablet      • Multiple Vitamin (DAILY VITAMIN FORMULA PO) Take 1 tablet/day by mouth.     • nitroglycerin (NITRODUR) 0.2 MG/HR patch      • NOVOLOG FLEXPEN 100 UNIT/ML solution pen-injector sc pen INJECT BELOW THE SKIN AS DIRECTED (1 UNIT PER 10 CARBS) 15 mL 4   • PARoxetine (PAXIL) 20 MG tablet      • pyridoxine (VITAMIN B-6) 100 MG tablet tablet Take 200 mg by mouth 2 (Two) Times a Day.     • TOUJEO SOLOSTAR 300 UNIT/ML solution pen-injector INJECT 35 UNITS SUBCUTANEOUSLY AT BEDTIME 4.5 mL 11   • TRUEPLUS LANCETS 33G misc      • vitamin B-12 (CYANOCOBALAMIN) 1000 MCG tablet Take 1,000 mcg by mouth 2 (Two) Times a Day.       No current facility-administered medications for this visit.        Review of Systems   Constitutional: Negative.    HENT: Negative.    Respiratory: Negative.    Cardiovascular: Negative.    Gastrointestinal: Negative.    Endocrine: Negative.    Genitourinary: Negative.    Musculoskeletal: Negative.         Toe pain   Skin: Negative.    Neurological: Negative.    Psychiatric/Behavioral: Negative.          OBJECTIVE    Pulse 76   Ht 175.3 cm (69\")   Wt 80.7 kg (178 lb)   SpO2 94%   BMI 26.29 kg/m²       Physical Exam   Constitutional: She is oriented to " person, place, and time. She appears well-developed and well-nourished.   HENT:   Head: Normocephalic and atraumatic.   Nose: Nose normal.   Eyes: Conjunctivae and EOM are normal. Pupils are equal, round, and reactive to light.   Pulmonary/Chest: Effort normal. No respiratory distress. She has no wheezes.    Janelle had a diabetic foot exam performed today.  Neurological: She is alert and oriented to person, place, and time. She displays normal reflexes.   Skin: Skin is warm and dry. Capillary refill takes less than 2 seconds. She is not diaphoretic.   Psychiatric: She has a normal mood and affect. Her behavior is normal.   Vitals reviewed.      Gait: Normal    Assistive Device: None    Lower Extremity    Cardiovascular:    DP/PT pulses palpable bilateral  CFT brisk  to all digits  Skin temp is warm to warm from proximal tibia to distal digits bilateral  Pedal hair growth present.   No erythema or edema noted     Musculoskeletal:  Muscle strength is 5/5 for all muscle groups tested   ROM of the 1st MTP is full without pain or crepitus bilateral  ROM of the ankle joint is full without pain or crepitus  bilateral  Rocker-bottom deformity noted bilateral    Dermatological:   Nails 1-5 bilateral are thickened, discolored, elongated with subungual debris.  Pain on palpation to the nail plates.  Skin is warm, dry and intact bilateral  Webspaces 1-4 bilateral are clean, dry and intact.   No subcutaneous nodules or masses noted    No open wounds noted     Neurological:   Protective sensation diminished  Sensation intact to light touch          Procedures        ASSESSMENT AND PLAN    Janelle was seen today for diabetic nail care and diabetic nail care.    Diagnoses and all orders for this visit:    Onychomycosis    Chronic toe pain, bilateral    Type 1 diabetes mellitus with diabetic polyneuropathy (CMS/HCC)    Charcot's joint      - A diabetic foot screening exam was performed and the patient was educated on the foot  complications related to diabetes,  preventative foot care and what signs and symptoms to watch for.  Instructed to contact our office if any foot problems develop before next visit.  - Nails 1-5 bilateral were debrided in length and thickness with nail nipper and electric  to decrease fungal load and risk of infection.  - All the patients questions were answered.  - RTC 3 months or sooner if needed.              This document has been electronically signed by Delano Jaramillo DPM on July 1, 2019 2:57 PM     7/1/2019  2:57 PM

## 2019-07-23 ENCOUNTER — APPOINTMENT (OUTPATIENT)
Dept: CT IMAGING | Facility: HOSPITAL | Age: 64
End: 2019-07-23

## 2019-07-23 ENCOUNTER — HOSPITAL ENCOUNTER (OUTPATIENT)
Facility: HOSPITAL | Age: 64
Setting detail: OBSERVATION
Discharge: HOME HEALTH | End: 2019-07-24
Attending: EMERGENCY MEDICINE | Admitting: INTERNAL MEDICINE

## 2019-07-23 ENCOUNTER — APPOINTMENT (OUTPATIENT)
Dept: ULTRASOUND IMAGING | Facility: HOSPITAL | Age: 64
End: 2019-07-23

## 2019-07-23 DIAGNOSIS — Z74.09 IMPAIRED FUNCTIONAL MOBILITY, BALANCE, GAIT, AND ENDURANCE: ICD-10-CM

## 2019-07-23 DIAGNOSIS — R55 SYNCOPE AND COLLAPSE: Primary | ICD-10-CM

## 2019-07-23 LAB
ALBUMIN SERPL-MCNC: 3.6 G/DL (ref 3.5–5.2)
ALBUMIN/GLOB SERPL: 1.4 G/DL
ALP SERPL-CCNC: 90 U/L (ref 39–117)
ALT SERPL W P-5'-P-CCNC: 12 U/L (ref 1–33)
ANION GAP SERPL CALCULATED.3IONS-SCNC: 8 MMOL/L (ref 5–15)
ANION GAP SERPL CALCULATED.3IONS-SCNC: 9 MMOL/L (ref 5–15)
AST SERPL-CCNC: 20 U/L (ref 1–32)
BASOPHILS # BLD AUTO: 0.06 10*3/MM3 (ref 0–0.2)
BASOPHILS # BLD AUTO: 0.06 10*3/MM3 (ref 0–0.2)
BASOPHILS NFR BLD AUTO: 0.8 % (ref 0–1.5)
BASOPHILS NFR BLD AUTO: 0.9 % (ref 0–1.5)
BILIRUB SERPL-MCNC: 0.2 MG/DL (ref 0.2–1.2)
BUN BLD-MCNC: 10 MG/DL (ref 8–23)
BUN BLD-MCNC: 9 MG/DL (ref 8–23)
BUN/CREAT SERPL: 18.5 (ref 7–25)
BUN/CREAT SERPL: 19.1 (ref 7–25)
CALCIUM SPEC-SCNC: 9.4 MG/DL (ref 8.6–10.5)
CALCIUM SPEC-SCNC: 9.7 MG/DL (ref 8.6–10.5)
CHLORIDE SERPL-SCNC: 104 MMOL/L (ref 98–107)
CHLORIDE SERPL-SCNC: 108 MMOL/L (ref 98–107)
CO2 SERPL-SCNC: 28 MMOL/L (ref 22–29)
CO2 SERPL-SCNC: 28 MMOL/L (ref 22–29)
CREAT BLD-MCNC: 0.47 MG/DL (ref 0.57–1)
CREAT BLD-MCNC: 0.54 MG/DL (ref 0.57–1)
DEPRECATED RDW RBC AUTO: 46.4 FL (ref 37–54)
DEPRECATED RDW RBC AUTO: 49 FL (ref 37–54)
EOSINOPHIL # BLD AUTO: 0.18 10*3/MM3 (ref 0–0.4)
EOSINOPHIL # BLD AUTO: 0.27 10*3/MM3 (ref 0–0.4)
EOSINOPHIL NFR BLD AUTO: 2.5 % (ref 0.3–6.2)
EOSINOPHIL NFR BLD AUTO: 4 % (ref 0.3–6.2)
ERYTHROCYTE [DISTWIDTH] IN BLOOD BY AUTOMATED COUNT: 14 % (ref 12.3–15.4)
ERYTHROCYTE [DISTWIDTH] IN BLOOD BY AUTOMATED COUNT: 14.3 % (ref 12.3–15.4)
GFR SERPL CREATININE-BSD FRML MDRD: 113 ML/MIN/1.73
GFR SERPL CREATININE-BSD FRML MDRD: 133 ML/MIN/1.73
GLOBULIN UR ELPH-MCNC: 2.5 GM/DL
GLUCOSE BLD-MCNC: 162 MG/DL (ref 65–99)
GLUCOSE BLD-MCNC: 76 MG/DL (ref 65–99)
GLUCOSE BLDC GLUCOMTR-MCNC: 125 MG/DL (ref 70–130)
GLUCOSE BLDC GLUCOMTR-MCNC: 152 MG/DL (ref 70–130)
GLUCOSE BLDC GLUCOMTR-MCNC: 262 MG/DL (ref 70–130)
GLUCOSE BLDC GLUCOMTR-MCNC: 320 MG/DL (ref 70–130)
GLUCOSE BLDC GLUCOMTR-MCNC: 70 MG/DL (ref 70–130)
HCT VFR BLD AUTO: 37 % (ref 34–46.6)
HCT VFR BLD AUTO: 37.1 % (ref 34–46.6)
HGB BLD-MCNC: 12.3 G/DL (ref 12–15.9)
HGB BLD-MCNC: 12.3 G/DL (ref 12–15.9)
HOLD SPECIMEN: NORMAL
IMM GRANULOCYTES # BLD AUTO: 0.01 10*3/MM3 (ref 0–0.05)
IMM GRANULOCYTES # BLD AUTO: 0.02 10*3/MM3 (ref 0–0.05)
IMM GRANULOCYTES NFR BLD AUTO: 0.1 % (ref 0–0.5)
IMM GRANULOCYTES NFR BLD AUTO: 0.3 % (ref 0–0.5)
LYMPHOCYTES # BLD AUTO: 1.84 10*3/MM3 (ref 0.7–3.1)
LYMPHOCYTES # BLD AUTO: 1.93 10*3/MM3 (ref 0.7–3.1)
LYMPHOCYTES NFR BLD AUTO: 26.5 % (ref 19.6–45.3)
LYMPHOCYTES NFR BLD AUTO: 27.5 % (ref 19.6–45.3)
MAGNESIUM SERPL-MCNC: 1.9 MG/DL (ref 1.6–2.4)
MCH RBC QN AUTO: 30.1 PG (ref 26.6–33)
MCH RBC QN AUTO: 30.8 PG (ref 26.6–33)
MCHC RBC AUTO-ENTMCNC: 33.2 G/DL (ref 31.5–35.7)
MCHC RBC AUTO-ENTMCNC: 33.2 G/DL (ref 31.5–35.7)
MCV RBC AUTO: 90.5 FL (ref 79–97)
MCV RBC AUTO: 92.8 FL (ref 79–97)
MONOCYTES # BLD AUTO: 0.71 10*3/MM3 (ref 0.1–0.9)
MONOCYTES # BLD AUTO: 0.76 10*3/MM3 (ref 0.1–0.9)
MONOCYTES NFR BLD AUTO: 10.5 % (ref 5–12)
MONOCYTES NFR BLD AUTO: 10.6 % (ref 5–12)
NEUTROPHILS # BLD AUTO: 3.81 10*3/MM3 (ref 1.7–7)
NEUTROPHILS # BLD AUTO: 4.32 10*3/MM3 (ref 1.7–7)
NEUTROPHILS NFR BLD AUTO: 56.9 % (ref 42.7–76)
NEUTROPHILS NFR BLD AUTO: 59.4 % (ref 42.7–76)
NRBC BLD AUTO-RTO: 0 /100 WBC (ref 0–0.2)
NRBC BLD AUTO-RTO: 0 /100 WBC (ref 0–0.2)
NT-PROBNP SERPL-MCNC: 316.4 PG/ML (ref 5–900)
PHOSPHATE SERPL-MCNC: 3.8 MG/DL (ref 2.5–4.5)
PLATELET # BLD AUTO: 201 10*3/MM3 (ref 140–450)
PLATELET # BLD AUTO: 203 10*3/MM3 (ref 140–450)
PMV BLD AUTO: 10.8 FL (ref 6–12)
PMV BLD AUTO: 11.2 FL (ref 6–12)
POTASSIUM BLD-SCNC: 3.7 MMOL/L (ref 3.5–5.2)
POTASSIUM BLD-SCNC: 4.3 MMOL/L (ref 3.5–5.2)
PROT SERPL-MCNC: 6.1 G/DL (ref 6–8.5)
RBC # BLD AUTO: 4 10*6/MM3 (ref 3.77–5.28)
RBC # BLD AUTO: 4.09 10*6/MM3 (ref 3.77–5.28)
SODIUM BLD-SCNC: 141 MMOL/L (ref 136–145)
SODIUM BLD-SCNC: 144 MMOL/L (ref 136–145)
TROPONIN T SERPL-MCNC: <0.01 NG/ML (ref 0–0.03)
TROPONIN T SERPL-MCNC: <0.01 NG/ML (ref 0–0.03)
WBC NRBC COR # BLD: 6.7 10*3/MM3 (ref 3.4–10.8)
WBC NRBC COR # BLD: 7.27 10*3/MM3 (ref 3.4–10.8)
WHOLE BLOOD HOLD SPECIMEN: NORMAL

## 2019-07-23 PROCEDURE — 25010000002 ENOXAPARIN PER 10 MG: Performed by: INTERNAL MEDICINE

## 2019-07-23 PROCEDURE — 93010 ELECTROCARDIOGRAM REPORT: CPT | Performed by: INTERNAL MEDICINE

## 2019-07-23 PROCEDURE — 84484 ASSAY OF TROPONIN QUANT: CPT | Performed by: INTERNAL MEDICINE

## 2019-07-23 PROCEDURE — 63710000001 INSULIN ASPART PER 5 UNITS: Performed by: INTERNAL MEDICINE

## 2019-07-23 PROCEDURE — 84484 ASSAY OF TROPONIN QUANT: CPT | Performed by: EMERGENCY MEDICINE

## 2019-07-23 PROCEDURE — 83735 ASSAY OF MAGNESIUM: CPT | Performed by: INTERNAL MEDICINE

## 2019-07-23 PROCEDURE — 85025 COMPLETE CBC W/AUTO DIFF WBC: CPT | Performed by: EMERGENCY MEDICINE

## 2019-07-23 PROCEDURE — 83880 ASSAY OF NATRIURETIC PEPTIDE: CPT | Performed by: INTERNAL MEDICINE

## 2019-07-23 PROCEDURE — 96372 THER/PROPH/DIAG INJ SC/IM: CPT

## 2019-07-23 PROCEDURE — 93005 ELECTROCARDIOGRAM TRACING: CPT | Performed by: EMERGENCY MEDICINE

## 2019-07-23 PROCEDURE — G0378 HOSPITAL OBSERVATION PER HR: HCPCS

## 2019-07-23 PROCEDURE — 82962 GLUCOSE BLOOD TEST: CPT

## 2019-07-23 PROCEDURE — 99284 EMERGENCY DEPT VISIT MOD MDM: CPT

## 2019-07-23 PROCEDURE — 36415 COLL VENOUS BLD VENIPUNCTURE: CPT | Performed by: INTERNAL MEDICINE

## 2019-07-23 PROCEDURE — 85025 COMPLETE CBC W/AUTO DIFF WBC: CPT | Performed by: INTERNAL MEDICINE

## 2019-07-23 PROCEDURE — 80053 COMPREHEN METABOLIC PANEL: CPT | Performed by: EMERGENCY MEDICINE

## 2019-07-23 PROCEDURE — 93880 EXTRACRANIAL BILAT STUDY: CPT

## 2019-07-23 PROCEDURE — 63710000001 INSULIN DETEMIR PER 5 UNITS: Performed by: INTERNAL MEDICINE

## 2019-07-23 PROCEDURE — 70450 CT HEAD/BRAIN W/O DYE: CPT

## 2019-07-23 PROCEDURE — 84100 ASSAY OF PHOSPHORUS: CPT | Performed by: INTERNAL MEDICINE

## 2019-07-23 PROCEDURE — 96361 HYDRATE IV INFUSION ADD-ON: CPT

## 2019-07-23 RX ORDER — ONDANSETRON 4 MG/1
4 TABLET, FILM COATED ORAL EVERY 6 HOURS PRN
Status: DISCONTINUED | OUTPATIENT
Start: 2019-07-23 | End: 2019-07-24 | Stop reason: HOSPADM

## 2019-07-23 RX ORDER — MONTELUKAST SODIUM 10 MG/1
10 TABLET ORAL DAILY
Status: DISCONTINUED | OUTPATIENT
Start: 2019-07-23 | End: 2019-07-24 | Stop reason: HOSPADM

## 2019-07-23 RX ORDER — SODIUM CHLORIDE 0.9 % (FLUSH) 0.9 %
3 SYRINGE (ML) INJECTION EVERY 12 HOURS SCHEDULED
Status: DISCONTINUED | OUTPATIENT
Start: 2019-07-23 | End: 2019-07-24 | Stop reason: HOSPADM

## 2019-07-23 RX ORDER — GABAPENTIN 300 MG/1
600 CAPSULE ORAL EVERY 8 HOURS SCHEDULED
Status: DISCONTINUED | OUTPATIENT
Start: 2019-07-23 | End: 2019-07-24 | Stop reason: HOSPADM

## 2019-07-23 RX ORDER — NICOTINE 21 MG/24HR
1 PATCH, TRANSDERMAL 24 HOURS TRANSDERMAL
Status: DISCONTINUED | OUTPATIENT
Start: 2019-07-23 | End: 2019-07-24 | Stop reason: HOSPADM

## 2019-07-23 RX ORDER — DEXTROSE MONOHYDRATE 25 G/50ML
25 INJECTION, SOLUTION INTRAVENOUS
Status: DISCONTINUED | OUTPATIENT
Start: 2019-07-23 | End: 2019-07-24 | Stop reason: HOSPADM

## 2019-07-23 RX ORDER — SODIUM CHLORIDE 0.9 % (FLUSH) 0.9 %
10 SYRINGE (ML) INJECTION AS NEEDED
Status: DISCONTINUED | OUTPATIENT
Start: 2019-07-23 | End: 2019-07-24 | Stop reason: HOSPADM

## 2019-07-23 RX ORDER — SODIUM CHLORIDE 9 MG/ML
75 INJECTION, SOLUTION INTRAVENOUS CONTINUOUS
Status: DISCONTINUED | OUTPATIENT
Start: 2019-07-23 | End: 2019-07-24 | Stop reason: HOSPADM

## 2019-07-23 RX ORDER — ASPIRIN 81 MG/1
81 TABLET ORAL DAILY
Status: DISCONTINUED | OUTPATIENT
Start: 2019-07-23 | End: 2019-07-24 | Stop reason: HOSPADM

## 2019-07-23 RX ORDER — TIZANIDINE 4 MG/1
4 TABLET ORAL EVERY 8 HOURS PRN
COMMUNITY
End: 2021-01-01 | Stop reason: HOSPADM

## 2019-07-23 RX ORDER — NICOTINE POLACRILEX 4 MG
15 LOZENGE BUCCAL
Status: DISCONTINUED | OUTPATIENT
Start: 2019-07-23 | End: 2019-07-24 | Stop reason: HOSPADM

## 2019-07-23 RX ORDER — ATORVASTATIN CALCIUM 10 MG/1
10 TABLET, FILM COATED ORAL NIGHTLY
Status: DISCONTINUED | OUTPATIENT
Start: 2019-07-23 | End: 2019-07-24 | Stop reason: HOSPADM

## 2019-07-23 RX ORDER — SODIUM CHLORIDE 0.9 % (FLUSH) 0.9 %
3-10 SYRINGE (ML) INJECTION AS NEEDED
Status: DISCONTINUED | OUTPATIENT
Start: 2019-07-23 | End: 2019-07-24 | Stop reason: HOSPADM

## 2019-07-23 RX ORDER — LEFLUNOMIDE 10 MG/1
20 TABLET ORAL DAILY
Status: DISCONTINUED | OUTPATIENT
Start: 2019-07-23 | End: 2019-07-24 | Stop reason: HOSPADM

## 2019-07-23 RX ORDER — LANOLIN ALCOHOL/MO/W.PET/CERES
0.75 CREAM (GRAM) TOPICAL NIGHTLY
Status: DISCONTINUED | OUTPATIENT
Start: 2019-07-23 | End: 2019-07-24 | Stop reason: HOSPADM

## 2019-07-23 RX ORDER — FAMOTIDINE 40 MG/1
40 TABLET, FILM COATED ORAL DAILY
Status: DISCONTINUED | OUTPATIENT
Start: 2019-07-23 | End: 2019-07-24 | Stop reason: HOSPADM

## 2019-07-23 RX ORDER — TIZANIDINE 4 MG/1
4 TABLET ORAL EVERY 8 HOURS SCHEDULED
Status: DISCONTINUED | OUTPATIENT
Start: 2019-07-23 | End: 2019-07-24 | Stop reason: HOSPADM

## 2019-07-23 RX ORDER — PAROXETINE HYDROCHLORIDE 20 MG/1
20 TABLET, FILM COATED ORAL DAILY
Status: DISCONTINUED | OUTPATIENT
Start: 2019-07-23 | End: 2019-07-24 | Stop reason: HOSPADM

## 2019-07-23 RX ORDER — ONDANSETRON 2 MG/ML
4 INJECTION INTRAMUSCULAR; INTRAVENOUS EVERY 6 HOURS PRN
Status: DISCONTINUED | OUTPATIENT
Start: 2019-07-23 | End: 2019-07-24 | Stop reason: HOSPADM

## 2019-07-23 RX ORDER — DOCUSATE SODIUM 100 MG/1
100 CAPSULE, LIQUID FILLED ORAL 2 TIMES DAILY
Status: DISCONTINUED | OUTPATIENT
Start: 2019-07-23 | End: 2019-07-24 | Stop reason: HOSPADM

## 2019-07-23 RX ADMIN — SODIUM CHLORIDE 1000 ML: 900 INJECTION, SOLUTION INTRAVENOUS at 03:24

## 2019-07-23 RX ADMIN — INSULIN DETEMIR 21 UNITS: 100 INJECTION, SOLUTION SUBCUTANEOUS at 20:20

## 2019-07-23 RX ADMIN — LEFLUNOMIDE 20 MG: 10 TABLET ORAL at 10:08

## 2019-07-23 RX ADMIN — ATORVASTATIN CALCIUM 10 MG: 10 TABLET, FILM COATED ORAL at 20:20

## 2019-07-23 RX ADMIN — MONTELUKAST SODIUM 10 MG: 10 TABLET, COATED ORAL at 10:08

## 2019-07-23 RX ADMIN — INSULIN ASPART 4 UNITS: 100 INJECTION, SOLUTION INTRAVENOUS; SUBCUTANEOUS at 20:21

## 2019-07-23 RX ADMIN — DOCUSATE SODIUM 100 MG: 100 CAPSULE, LIQUID FILLED ORAL at 20:20

## 2019-07-23 RX ADMIN — SODIUM CHLORIDE, PRESERVATIVE FREE 3 ML: 5 INJECTION INTRAVENOUS at 09:55

## 2019-07-23 RX ADMIN — INSULIN ASPART 5 UNITS: 100 INJECTION, SOLUTION INTRAVENOUS; SUBCUTANEOUS at 17:29

## 2019-07-23 RX ADMIN — FAMOTIDINE 40 MG: 40 TABLET ORAL at 10:08

## 2019-07-23 RX ADMIN — SODIUM CHLORIDE 75 ML/HR: 900 INJECTION, SOLUTION INTRAVENOUS at 17:34

## 2019-07-23 RX ADMIN — GABAPENTIN 600 MG: 300 CAPSULE ORAL at 17:34

## 2019-07-23 RX ADMIN — ENOXAPARIN SODIUM 40 MG: 40 INJECTION SUBCUTANEOUS at 05:49

## 2019-07-23 RX ADMIN — PAROXETINE HYDROCHLORIDE 20 MG: 20 TABLET, FILM COATED ORAL at 10:08

## 2019-07-23 RX ADMIN — DOCUSATE SODIUM 100 MG: 100 CAPSULE, LIQUID FILLED ORAL at 09:57

## 2019-07-23 RX ADMIN — TIZANIDINE 4 MG: 4 TABLET ORAL at 20:20

## 2019-07-23 RX ADMIN — ASPIRIN 81 MG: 81 TABLET ORAL at 09:57

## 2019-07-23 RX ADMIN — GABAPENTIN 600 MG: 300 CAPSULE ORAL at 09:57

## 2019-07-23 RX ADMIN — NICOTINE 1 PATCH: 21 PATCH TRANSDERMAL at 09:57

## 2019-07-23 RX ADMIN — MELATONIN 0.75 MG: at 20:20

## 2019-07-23 RX ADMIN — SODIUM CHLORIDE 75 ML/HR: 900 INJECTION, SOLUTION INTRAVENOUS at 06:30

## 2019-07-24 VITALS
WEIGHT: 184.6 LBS | RESPIRATION RATE: 18 BRPM | DIASTOLIC BLOOD PRESSURE: 76 MMHG | HEART RATE: 69 BPM | HEIGHT: 69 IN | OXYGEN SATURATION: 95 % | BODY MASS INDEX: 27.34 KG/M2 | SYSTOLIC BLOOD PRESSURE: 147 MMHG | TEMPERATURE: 97.1 F

## 2019-07-24 LAB
ANION GAP SERPL CALCULATED.3IONS-SCNC: 8 MMOL/L (ref 5–15)
BASOPHILS # BLD AUTO: 0.04 10*3/MM3 (ref 0–0.2)
BASOPHILS NFR BLD AUTO: 0.7 % (ref 0–1.5)
BUN BLD-MCNC: 9 MG/DL (ref 8–23)
BUN/CREAT SERPL: 23.7 (ref 7–25)
CALCIUM SPEC-SCNC: 9.1 MG/DL (ref 8.6–10.5)
CHLORIDE SERPL-SCNC: 110 MMOL/L (ref 98–107)
CO2 SERPL-SCNC: 27 MMOL/L (ref 22–29)
CREAT BLD-MCNC: 0.38 MG/DL (ref 0.57–1)
DEPRECATED RDW RBC AUTO: 47.2 FL (ref 37–54)
EOSINOPHIL # BLD AUTO: 0.26 10*3/MM3 (ref 0–0.4)
EOSINOPHIL NFR BLD AUTO: 4.8 % (ref 0.3–6.2)
ERYTHROCYTE [DISTWIDTH] IN BLOOD BY AUTOMATED COUNT: 14.2 % (ref 12.3–15.4)
GFR SERPL CREATININE-BSD FRML MDRD: >150 ML/MIN/1.73
GLUCOSE BLD-MCNC: 73 MG/DL (ref 65–99)
GLUCOSE BLDC GLUCOMTR-MCNC: 106 MG/DL (ref 70–130)
GLUCOSE BLDC GLUCOMTR-MCNC: 132 MG/DL (ref 70–130)
GLUCOSE BLDC GLUCOMTR-MCNC: 161 MG/DL (ref 70–130)
GLUCOSE BLDC GLUCOMTR-MCNC: 181 MG/DL (ref 70–130)
GLUCOSE BLDC GLUCOMTR-MCNC: 32 MG/DL (ref 70–130)
GLUCOSE BLDC GLUCOMTR-MCNC: 57 MG/DL (ref 70–130)
HCT VFR BLD AUTO: 37.3 % (ref 34–46.6)
HGB BLD-MCNC: 12.4 G/DL (ref 12–15.9)
IMM GRANULOCYTES # BLD AUTO: 0.01 10*3/MM3 (ref 0–0.05)
IMM GRANULOCYTES NFR BLD AUTO: 0.2 % (ref 0–0.5)
LYMPHOCYTES # BLD AUTO: 2.21 10*3/MM3 (ref 0.7–3.1)
LYMPHOCYTES NFR BLD AUTO: 40.9 % (ref 19.6–45.3)
MCH RBC QN AUTO: 30.3 PG (ref 26.6–33)
MCHC RBC AUTO-ENTMCNC: 33.2 G/DL (ref 31.5–35.7)
MCV RBC AUTO: 91.2 FL (ref 79–97)
MONOCYTES # BLD AUTO: 0.65 10*3/MM3 (ref 0.1–0.9)
MONOCYTES NFR BLD AUTO: 12 % (ref 5–12)
NEUTROPHILS # BLD AUTO: 2.23 10*3/MM3 (ref 1.7–7)
NEUTROPHILS NFR BLD AUTO: 41.4 % (ref 42.7–76)
NRBC BLD AUTO-RTO: 0 /100 WBC (ref 0–0.2)
PLATELET # BLD AUTO: 184 10*3/MM3 (ref 140–450)
PMV BLD AUTO: 11.2 FL (ref 6–12)
POTASSIUM BLD-SCNC: 4 MMOL/L (ref 3.5–5.2)
RBC # BLD AUTO: 4.09 10*6/MM3 (ref 3.77–5.28)
SODIUM BLD-SCNC: 145 MMOL/L (ref 136–145)
WBC NRBC COR # BLD: 5.4 10*3/MM3 (ref 3.4–10.8)

## 2019-07-24 PROCEDURE — G0378 HOSPITAL OBSERVATION PER HR: HCPCS

## 2019-07-24 PROCEDURE — 96361 HYDRATE IV INFUSION ADD-ON: CPT

## 2019-07-24 PROCEDURE — 85025 COMPLETE CBC W/AUTO DIFF WBC: CPT | Performed by: INTERNAL MEDICINE

## 2019-07-24 PROCEDURE — 97162 PT EVAL MOD COMPLEX 30 MIN: CPT

## 2019-07-24 PROCEDURE — 82962 GLUCOSE BLOOD TEST: CPT

## 2019-07-24 PROCEDURE — 97116 GAIT TRAINING THERAPY: CPT

## 2019-07-24 PROCEDURE — 96374 THER/PROPH/DIAG INJ IV PUSH: CPT

## 2019-07-24 PROCEDURE — 80048 BASIC METABOLIC PNL TOTAL CA: CPT | Performed by: INTERNAL MEDICINE

## 2019-07-24 PROCEDURE — 63710000001 INSULIN ASPART PER 5 UNITS: Performed by: INTERNAL MEDICINE

## 2019-07-24 PROCEDURE — 96372 THER/PROPH/DIAG INJ SC/IM: CPT

## 2019-07-24 PROCEDURE — 25010000002 ENOXAPARIN PER 10 MG: Performed by: INTERNAL MEDICINE

## 2019-07-24 PROCEDURE — 97530 THERAPEUTIC ACTIVITIES: CPT

## 2019-07-24 PROCEDURE — 96376 TX/PRO/DX INJ SAME DRUG ADON: CPT

## 2019-07-24 RX ORDER — DEXTROSE MONOHYDRATE 25 G/50ML
INJECTION, SOLUTION INTRAVENOUS
Status: COMPLETED
Start: 2019-07-24 | End: 2019-07-24

## 2019-07-24 RX ORDER — ATORVASTATIN CALCIUM 10 MG/1
20 TABLET, FILM COATED ORAL NIGHTLY
Qty: 30 TABLET | Refills: 1 | Status: SHIPPED | OUTPATIENT
Start: 2019-07-24 | End: 2019-09-16 | Stop reason: SDUPTHER

## 2019-07-24 RX ADMIN — GABAPENTIN 600 MG: 300 CAPSULE ORAL at 01:34

## 2019-07-24 RX ADMIN — ASPIRIN 81 MG: 81 TABLET ORAL at 08:54

## 2019-07-24 RX ADMIN — NICOTINE 1 PATCH: 21 PATCH TRANSDERMAL at 08:54

## 2019-07-24 RX ADMIN — DEXTROSE MONOHYDRATE 25 G: 25 INJECTION, SOLUTION INTRAVENOUS at 07:31

## 2019-07-24 RX ADMIN — GABAPENTIN 600 MG: 300 CAPSULE ORAL at 17:38

## 2019-07-24 RX ADMIN — ENOXAPARIN SODIUM 40 MG: 40 INJECTION SUBCUTANEOUS at 05:35

## 2019-07-24 RX ADMIN — SODIUM CHLORIDE 75 ML/HR: 900 INJECTION, SOLUTION INTRAVENOUS at 06:03

## 2019-07-24 RX ADMIN — GABAPENTIN 600 MG: 300 CAPSULE ORAL at 08:57

## 2019-07-24 RX ADMIN — TIZANIDINE 4 MG: 4 TABLET ORAL at 14:18

## 2019-07-24 RX ADMIN — TIZANIDINE 4 MG: 4 TABLET ORAL at 05:35

## 2019-07-24 RX ADMIN — INSULIN ASPART 2 UNITS: 100 INJECTION, SOLUTION INTRAVENOUS; SUBCUTANEOUS at 11:36

## 2019-07-24 RX ADMIN — PAROXETINE HYDROCHLORIDE 20 MG: 20 TABLET, FILM COATED ORAL at 08:54

## 2019-07-24 RX ADMIN — DEXTROSE MONOHYDRATE 50 ML: 25 INJECTION, SOLUTION INTRAVENOUS at 01:33

## 2019-07-24 RX ADMIN — LEFLUNOMIDE 20 MG: 10 TABLET ORAL at 08:54

## 2019-07-24 RX ADMIN — MONTELUKAST SODIUM 10 MG: 10 TABLET, COATED ORAL at 08:54

## 2019-07-24 RX ADMIN — DOCUSATE SODIUM 100 MG: 100 CAPSULE, LIQUID FILLED ORAL at 08:54

## 2019-07-24 RX ADMIN — FAMOTIDINE 40 MG: 40 TABLET ORAL at 08:54

## 2019-08-12 ENCOUNTER — OFFICE VISIT (OUTPATIENT)
Dept: ENDOCRINOLOGY | Facility: CLINIC | Age: 64
End: 2019-08-12

## 2019-08-12 VITALS
SYSTOLIC BLOOD PRESSURE: 132 MMHG | HEART RATE: 84 BPM | HEIGHT: 69 IN | DIASTOLIC BLOOD PRESSURE: 76 MMHG | WEIGHT: 182.5 LBS | BODY MASS INDEX: 27.03 KG/M2 | OXYGEN SATURATION: 94 %

## 2019-08-12 DIAGNOSIS — I15.2 HYPERTENSION ASSOCIATED WITH DIABETES (HCC): ICD-10-CM

## 2019-08-12 DIAGNOSIS — E10.69 MIXED DIABETIC HYPERLIPIDEMIA ASSOCIATED WITH TYPE 1 DIABETES MELLITUS (HCC): ICD-10-CM

## 2019-08-12 DIAGNOSIS — E55.9 VITAMIN D DEFICIENCY: ICD-10-CM

## 2019-08-12 DIAGNOSIS — E11.59 HYPERTENSION ASSOCIATED WITH DIABETES (HCC): ICD-10-CM

## 2019-08-12 DIAGNOSIS — E10.42 TYPE 1 DIABETES MELLITUS WITH DIABETIC POLYNEUROPATHY (HCC): Primary | ICD-10-CM

## 2019-08-12 DIAGNOSIS — E78.2 MIXED DIABETIC HYPERLIPIDEMIA ASSOCIATED WITH TYPE 1 DIABETES MELLITUS (HCC): ICD-10-CM

## 2019-08-12 PROBLEM — M06.89 OTHER SPECIFIED RHEUMATOID ARTHRITIS, MULTIPLE SITES (HCC): Status: ACTIVE | Noted: 2017-01-10

## 2019-08-12 LAB
GLUCOSE BLDC GLUCOMTR-MCNC: 306 MG/DL (ref 70–130)
HBA1C MFR BLD: 6.8 %

## 2019-08-12 PROCEDURE — 99406 BEHAV CHNG SMOKING 3-10 MIN: CPT | Performed by: INTERNAL MEDICINE

## 2019-08-12 PROCEDURE — 82962 GLUCOSE BLOOD TEST: CPT | Performed by: INTERNAL MEDICINE

## 2019-08-12 PROCEDURE — 83036 HEMOGLOBIN GLYCOSYLATED A1C: CPT | Performed by: INTERNAL MEDICINE

## 2019-08-12 PROCEDURE — 99214 OFFICE O/P EST MOD 30 MIN: CPT | Performed by: INTERNAL MEDICINE

## 2019-08-12 RX ORDER — LISINOPRIL 10 MG/1
10 TABLET ORAL DAILY
Qty: 90 TABLET | Refills: 3 | Status: ON HOLD | OUTPATIENT
Start: 2019-08-12 | End: 2020-10-14

## 2019-08-12 RX ORDER — LISINOPRIL 10 MG/1
10 TABLET ORAL DAILY
Qty: 90 TABLET | Refills: 0 | Status: SHIPPED | OUTPATIENT
Start: 2019-08-12 | End: 2019-08-12 | Stop reason: SDUPTHER

## 2019-08-12 NOTE — PROGRESS NOTES
Janelle Millard is a 63 y.o. female who presents for  evaluation of   Chief Complaint   Patient presents with   • Diabetes     bs 306         Primary Care / Referring Provider  Arelis Laird MD      History of Present Illness  Duration/Timing:  Diabetes mellitus type 1  duration about 40y   constant  well controlled    Severity (Complications/Hospitalizations)  Secondary Macrovascular Complications:  CAD, No CVA, PAD  She underwent 3-vessel CABG on 02/24/2014  Secondary Microvascular Complications:  No Diabetic Nephropathy, No proteinuria, Diabetic Retinopathy, Diabetic Neuropathy    Context  Diabetes Regimen:  Insulin, Oral Medications, Compliant with regimen,    Lab Results   Component Value Date    HGBA1C 6.8 08/12/2019        Exercise:  Exercises    Associated Signs/Symptoms  Hyperglycemic Symptoms:  No polyuria, No polydipsia, No polyphagia  Hypoglycemic Episodes:  Frequent hypoglycemia , checking 4 x daily           Past Medical History:   Diagnosis Date   • Arthritis, rheumatoid (CMS/HCC)    • Arthropathy associated with neurological disorder    • Arthropathy of lumbar facet joint    • Asthma    • Benign hypertension    • Carpal tunnel syndrome    • Diabetes (CMS/HCC)    • Diabetic polyneuropathy (CMS/HCC)    • Dyslipidemia    • Fallen arches    • Hammer toe    • Heart disease    • Hypertension    • Joint pain    • Mild depression (CMS/HCC)     Saddness post Surgery 7/10/14 improved after counseling.   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)     Neuropathy of chest      • Neuropathy    • Onychomycosis    • Peripheral vascular disease (CMS/HCC)    • Retinopathy    • Tobacco user    • Type 1 diabetes mellitus (CMS/HCC)    • Type 2 diabetes mellitus (CMS/HCC)      Family History   Problem Relation Age of Onset   • Asthma Other    • Coronary artery disease Other    • Diabetes Other    •  Hyperlipidemia Other    • Hypertension Other    • Osteoporosis Other    • Cancer Other    • Osteoporosis Mother    • Heart disease Mother    • Heart disease Father    • Diabetes Father    • Heart disease Sister    • Diabetes Sister      Social History     Tobacco Use   • Smoking status: Current Every Day Smoker     Types: Cigarettes   • Smokeless tobacco: Never Used   Substance Use Topics   • Alcohol use: No   • Drug use: No         Current Outpatient Medications:   •  ACCU-CHEK SMARTVIEW test strip, Use to test sugar 3 times per day, Disp: 100 each, Rfl: 11  •  acetaminophen (TYLENOL) 325 MG tablet, Take 975 mg by mouth 2 (Two) Times a Day., Disp: , Rfl:   •  aspirin 81 MG EC tablet, Take 81 mg by mouth Daily., Disp: , Rfl:   •  atorvastatin (LIPITOR) 10 MG tablet, Take 2 tablets by mouth Every Night., Disp: 30 tablet, Rfl: 1  •  B-D ULTRAFINE III SHORT PEN 31G X 8 MM misc, , Disp: , Rfl:   •  Calcium Citrate-Vitamin D (CALCIUM + D PO), Take 1 tablet/day by mouth., Disp: , Rfl:   •  cycloSPORINE (RESTASIS) 0.05 % ophthalmic emulsion, 1 drop 2 (Two) Times a Day., Disp: , Rfl:   •  gabapentin (NEURONTIN) 600 MG tablet, 1,200 mg 3 (Three) Times a Day., Disp: , Rfl:   •  isosorbide mononitrate (IMDUR) 30 MG 24 hr tablet, , Disp: , Rfl:   •  leflunomide (ARAVA) 20 MG tablet, , Disp: , Rfl:   •  lisinopril (PRINIVIL,ZESTRIL) 10 MG tablet, Take 1 tablet by mouth Daily. (Patient taking differently: Take 10 mg by mouth 2 (Two) Times a Day.), Disp: 90 tablet, Rfl: 0  •  melatonin 1 MG tablet, Take  by mouth Every Night., Disp: , Rfl:   •  montelukast (SINGULAIR) 10 MG tablet, , Disp: , Rfl:   •  Multiple Vitamin (DAILY VITAMIN FORMULA PO), Take 1 tablet/day by mouth., Disp: , Rfl:   •  nitroglycerin (NITRODUR) 0.2 MG/HR patch, , Disp: , Rfl:   •  NOVOLOG FLEXPEN 100 UNIT/ML solution pen-injector sc pen, INJECT BELOW THE SKIN AS DIRECTED (1 UNIT PER 10 CARBS), Disp: 15 mL, Rfl: 4  •  PARoxetine (PAXIL) 20 MG tablet, , Disp: ,  Rfl:   •  pyridoxine (VITAMIN B-6) 100 MG tablet tablet, Take 200 mg by mouth 2 (Two) Times a Day., Disp: , Rfl:   •  tiZANidine (ZANAFLEX) 4 MG tablet, Take 4 mg by mouth 3 (Three) Times a Day., Disp: , Rfl:   •  TOUJEO SOLOSTAR 300 UNIT/ML solution pen-injector, INJECT 35 UNITS SUBCUTANEOUSLY AT BEDTIME (Patient taking differently: INJECT 27 UNITS SUBCUTANEOUSLY AT BEDTIME), Disp: 4.5 mL, Rfl: 11  •  TRUEPLUS LANCETS 33G misc, , Disp: , Rfl:   •  vitamin B-12 (CYANOCOBALAMIN) 1000 MCG tablet, Take 1,000 mcg by mouth 2 (Two) Times a Day., Disp: , Rfl:     Review of Systems    Review of Systems   Constitutional: Negative for activity change, appetite change, chills, diaphoresis, fatigue, fever and unexpected weight change.   HENT: Negative for congestion, dental problem, drooling, ear discharge, ear pain, facial swelling, mouth sores, postnasal drip, rhinorrhea, sinus pressure, sore throat, tinnitus, trouble swallowing and voice change.    Eyes: Negative for photophobia, pain, discharge, redness, itching and visual disturbance.   Respiratory: Negative for apnea, cough, choking, chest tightness, shortness of breath, wheezing and stridor.    Cardiovascular: Negative for chest pain, palpitations and leg swelling.   Gastrointestinal: Negative for abdominal distention, abdominal pain, constipation, diarrhea, nausea and vomiting.   Endocrine: Negative for cold intolerance, heat intolerance, polydipsia, polyphagia and polyuria.   Genitourinary: Negative for decreased urine volume, difficulty urinating, dysuria, flank pain, frequency, hematuria and urgency.   Musculoskeletal: Negative for arthralgias, back pain, gait problem, joint swelling, myalgias, neck pain and neck stiffness.   Skin: Negative for color change, pallor, rash and wound.   Allergic/Immunologic: Negative for immunocompromised state.   Neurological: Negative for dizziness, tremors, seizures, syncope, facial asymmetry, speech difficulty, weakness,  "light-headedness, numbness and headaches.   Hematological: Negative for adenopathy.   Psychiatric/Behavioral: Negative for agitation, behavioral problems, confusion, decreased concentration, dysphoric mood, hallucinations, self-injury, sleep disturbance and suicidal ideas. The patient is not nervous/anxious and is not hyperactive.         Objective:     /76   Pulse 84   Ht 175.3 cm (69\")   Wt 82.8 kg (182 lb 8 oz)   SpO2 94%   BMI 26.95 kg/m²     Physical Exam   Constitutional: She is oriented to person, place, and time. She appears well-developed.   HENT:   Head: Normocephalic.   Right Ear: External ear normal.   Left Ear: External ear normal.   Nose: Nose normal.   Eyes: Conjunctivae and EOM are normal. No scleral icterus.   Neck: Normal range of motion. Neck supple. No tracheal deviation present. No thyromegaly present.   Cardiovascular: Normal rate, regular rhythm, normal heart sounds and intact distal pulses. Exam reveals no gallop and no friction rub.   No murmur heard.  Pulmonary/Chest: Effort normal and breath sounds normal. No stridor. No respiratory distress. She has no wheezes. She has no rales. She exhibits no tenderness.   Abdominal: Soft. Bowel sounds are normal. She exhibits no distension and no mass. There is no tenderness. There is no rebound and no guarding.   Musculoskeletal: Normal range of motion. She exhibits no tenderness or deformity.   Lymphadenopathy:     She has no cervical adenopathy.   Neurological: She is alert and oriented to person, place, and time. She displays normal reflexes. She exhibits normal muscle tone. Coordination normal.   Skin: No rash noted. No erythema. No pallor.   Psychiatric: She has a normal mood and affect. Her behavior is normal. Judgment and thought content normal.       Lab Review    Results for orders placed or performed in visit on 08/12/19   POC Glucose   Result Value Ref Range    Glucose 306 (A) 70 - 130 mg/dL   POC Glycosylated Hemoglobin (Hb A1C) "   Result Value Ref Range    Hemoglobin A1C 6.8 %           Assessment/Plan       ICD-10-CM ICD-9-CM   1. Type 1 diabetes mellitus with diabetic polyneuropathy (CMS/[facility]) E10.42 250.61     357.2   2. Mixed diabetic hyperlipidemia associated with type 1 diabetes mellitus (CMS/[facility]) E10.69 250.81    E78.2 272.2   3. Hypertension associated with diabetes (CMS/[facility]) E11.59 250.80    I10 401.9   4. Vitamin D deficiency E55.9 268.9   5. Tobacco abuse disorder Z72.0 305.1           Glycemic Management:      Lab Results   Component Value Date    HGBA1C 6.8 08/12/2019    HGBA1C 6 08/27/2018    HGBA1C 6 08/20/2018     Lab Results   Component Value Date    CREATININE 0.38 (L) 07/24/2019       Toujeo 27 units qhs     Humalog - Novolog  1 unit per 10 grams of carbohydrate     correction 1:50     no orals     Has G5 but hasn't used it since didn't know how to put it on         Lipid Management  on atorvastatin 10 mg qhs     No results found for: CHOL  Lab Results   Component Value Date    TRIG 101 08/03/2016    TRIG 89 12/16/2014    TRIG 92 03/19/2014     Lab Results   Component Value Date    HDL 51 (L) 08/03/2016    HDL 33 (L) 12/16/2014    HDL 34 (L) 03/19/2014     No components found for: LDLCALC    Blood Pressure Management    On lisinopril 10 mg qd , increased to 20 and had syncope     Had syncope due to antihypertensives but now doses were decreased    Microvascular Complication Monitoring:  Diabetic Retinopathy, Diabetic Neuropathy  on gabapentin 2 x  600 mg three times daily   has taken lyrica w/o benefit   cymbalta didn't work     Immunizations:  Last pneumococcal immunization has had pneumovax    Preventive Care:  Patient is not smoking    Weight Related:  Obesity, Counseled on nutrition, Counseled on physical activity    I advised the patient of the risks in continuing to use tobacco, and I provided this patient with smoking cessation educational materials.    During this visit, I spent > 3-10 minutes counseling the  patient regarding smoking cessation.  rx nicotine patches    No longer smoking       I reviewed and summarized records from Arelis Laird MD from present year  and I reviewed / ordered labs.     Orders Placed This Encounter   Procedures   • POC Glucose   • POC Glycosylated Hemoglobin (Hb A1C)         A copy of my note was sent to Arelis Laird MD    Please see my above opinion and suggestions.

## 2019-08-13 ENCOUNTER — OFFICE VISIT (OUTPATIENT)
Dept: ENDOCRINOLOGY | Facility: CLINIC | Age: 64
End: 2019-08-13

## 2019-08-13 DIAGNOSIS — E10.42 TYPE 1 DIABETES MELLITUS WITH DIABETIC POLYNEUROPATHY (HCC): Primary | ICD-10-CM

## 2019-08-13 NOTE — PROGRESS NOTES
Janelle Millard is a 63 y.o. female seen by diabetes educator on 08/13/2019 for [employer] G5 training.     Patient was on G5 in past. Had issues applying due to dexterity. Emailed [employer] to see company where transmitter is ordered due to needing new one. Patient receives these from [employer] company. Spoke with [employer] to get order - they need updated notes. Faxed. A reorder specialist will call patient to reorder the transmitter then instructed patient to call office to set up dex[employer] training.      Monae Huffman, KARANN, RN  Diabetes Educator

## 2019-08-29 ENCOUNTER — TELEPHONE (OUTPATIENT)
Dept: ENDOCRINOLOGY | Facility: CLINIC | Age: 64
End: 2019-08-29

## 2019-08-29 NOTE — TELEPHONE ENCOUNTER
Patient stated no one has called her from [employer]. Left message with Mami at [employer] to check on status. Patient stated also needs test strips.

## 2019-09-04 ENCOUNTER — TELEPHONE (OUTPATIENT)
Dept: ENDOCRINOLOGY | Facility: CLINIC | Age: 64
End: 2019-09-04

## 2019-09-16 ENCOUNTER — OFFICE VISIT (OUTPATIENT)
Dept: CARDIAC SURGERY | Facility: CLINIC | Age: 64
End: 2019-09-16

## 2019-09-16 VITALS
HEIGHT: 69 IN | SYSTOLIC BLOOD PRESSURE: 140 MMHG | WEIGHT: 187 LBS | TEMPERATURE: 97.4 F | HEART RATE: 105 BPM | OXYGEN SATURATION: 94 % | DIASTOLIC BLOOD PRESSURE: 70 MMHG | BODY MASS INDEX: 27.7 KG/M2

## 2019-09-16 DIAGNOSIS — E66.3 OVERWEIGHT (BMI 25.0-29.9): ICD-10-CM

## 2019-09-16 DIAGNOSIS — E10.69 MIXED DIABETIC HYPERLIPIDEMIA ASSOCIATED WITH TYPE 1 DIABETES MELLITUS (HCC): ICD-10-CM

## 2019-09-16 DIAGNOSIS — M06.89 OTHER SPECIFIED RHEUMATOID ARTHRITIS, MULTIPLE SITES (HCC): ICD-10-CM

## 2019-09-16 DIAGNOSIS — E11.59 HYPERTENSION ASSOCIATED WITH DIABETES (HCC): ICD-10-CM

## 2019-09-16 DIAGNOSIS — I15.2 HYPERTENSION ASSOCIATED WITH DIABETES (HCC): ICD-10-CM

## 2019-09-16 DIAGNOSIS — E78.2 MIXED DIABETIC HYPERLIPIDEMIA ASSOCIATED WITH TYPE 1 DIABETES MELLITUS (HCC): ICD-10-CM

## 2019-09-16 DIAGNOSIS — I65.23 BILATERAL CAROTID ARTERY STENOSIS: Primary | ICD-10-CM

## 2019-09-16 DIAGNOSIS — E10.42 TYPE 1 DIABETES MELLITUS WITH DIABETIC POLYNEUROPATHY (HCC): ICD-10-CM

## 2019-09-16 DIAGNOSIS — I25.10 CORONARY ARTERY DISEASE INVOLVING NATIVE CORONARY ARTERY OF NATIVE HEART WITHOUT ANGINA PECTORIS: ICD-10-CM

## 2019-09-16 PROCEDURE — 99204 OFFICE O/P NEW MOD 45 MIN: CPT | Performed by: THORACIC SURGERY (CARDIOTHORACIC VASCULAR SURGERY)

## 2019-09-16 RX ORDER — ATORVASTATIN CALCIUM 20 MG/1
20 TABLET, FILM COATED ORAL NIGHTLY
Qty: 90 TABLET | Refills: 4 | Status: SHIPPED | OUTPATIENT
Start: 2019-09-16

## 2019-09-21 PROBLEM — I65.23 BILATERAL CAROTID ARTERY STENOSIS: Status: ACTIVE | Noted: 2019-09-21

## 2019-09-21 PROBLEM — E66.3 OVERWEIGHT (BMI 25.0-29.9): Status: ACTIVE | Noted: 2019-09-21

## 2019-09-21 NOTE — PROGRESS NOTES
9/16/2019    Janelle Millard  1955    Chief Complaint:    Chief Complaint   Patient presents with   • Carotid Artery Disease       HPI:      PCP:  Arelis Laird MD  Cardiology:  Dr Calderón    64 y.o. female with HTN(stable, increased risk stroke, rupture), Diabetes Mellitus(stable, increased risk cardiovascular events) and Obesity(uncontrolled, increased risk cardiovascular events) ,CAD(stable, increase risk cardiovascular events) Carotid Stenosis(new, increase risk stroke).  former smoker.  Asymptomatic carotid stenosis.  Recent hospitalization for syncope, hypotension..  No TIA stroke amaurosis.  No MI claudication. No other associated signs, symptoms or modifying factors.    7/2019 Carotid Duplex([facility]):  DONALD 0-49% (15/31cm/s, ratio 1.3), LICA 50-69% (140/35cm/s, ratio 1.3) antegrade verts.  7/2019 CT Head:  No acute intracranial abnormality.    2014 CABG, MI  7/2019 ECG:  NSR 64, QTc 443  7/2019 Echocardiogram:  EF 50%, LA 41mm, LV 50mm, RVSP 21mmHg.  Mild MR TR.    The following portions of the patient's history were reviewed and updated as appropriate: allergies, current medications, past family history, past medical history, past social history, past surgical history and problem list.  Recent images independently reviewed.  Available laboratory values reviewed.    PMH:  Past Medical History:   Diagnosis Date   • Arthritis, rheumatoid (CMS/HCC)    • Arthropathy associated with neurological disorder    • Arthropathy of lumbar facet joint    • Asthma    • Benign hypertension    • Carpal tunnel syndrome    • Diabetes (CMS/HCC)    • Diabetic polyneuropathy (CMS/HCC)    • Dyslipidemia    • Fallen arches    • Hammer toe    • Heart disease    • Joint pain    • Mild depression (CMS/HCC)     Saddness post Surgery 7/10/14 improved after counseling.   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)      Neuropathy of chest      • Neuropathy    • Onychomycosis    • Peripheral vascular disease (CMS/HCC)    • Retinopathy    • Tobacco user    • Type 1 diabetes mellitus (CMS/HCC)      Past Surgical History:   Procedure Laterality Date   • CARDIAC SURGERY  02/21/2014    Critical stenosis in the RCA. Diffusely calcified LAD with 30-80% stenosis. OMB #3 with 60% to 70% stenosis. Preserved LV systolic function with EF of 55%.   • CARPAL TUNNEL RELEASE Right 10/15/2014    Right carpal tunnel release.   • CARPAL TUNNEL RELEASE     • CATARACT EXTRACTION, BILATERAL Bilateral    • CORONARY ARTERY BYPASS GRAFT  02/24/2014    CABG x 3 with LIMA to LAD, endarterectomy to LAD, SVG to OMB and SVG to distal RCA with endarterectomy to distal RCA. Endo-vein harvesting.   • CYSTECTOMY Left     Breast cycst   • EXCISION LESION      Remove breast lesion - cyst   • EYE SURGERY      Insert lens prosthesis   • FOOT SURGERY  10/18/2011    Exostectomy of the right foot. Preulcerative lesion with Charot deformity, right foot   • LUMBAR SPINE SURGERY  11/09/2015    Lumbosacral radiofrequency thermal coagulation.   • NERVE BLOCK  09/14/2015    Lumbar medial branch block.   • TOE NAIL AVULSION  09/03/2015    DEBRIDE NAIL 6 OR MORE 86253 (1)     Family History   Problem Relation Age of Onset   • Asthma Other    • Coronary artery disease Other    • Diabetes Other    • Hyperlipidemia Other    • Hypertension Other    • Osteoporosis Other    • Cancer Other    • Osteoporosis Mother    • Heart disease Mother    • Heart disease Father    • Diabetes Father    • Heart disease Sister    • Diabetes Sister      Social History     Tobacco Use   • Smoking status: Former Smoker     Types: Cigarettes   • Smokeless tobacco: Never Used   Substance Use Topics   • Alcohol use: No   • Drug use: No       ALLERGIES:  Allergies   Allergen Reactions   • Contrast Dye Shortness Of Breath and Itching   • Corticosteroids Other (See Comments)     Pt diabetic makes sugar go up     • Codeine Itching   • Ibuprofen          MEDICATIONS:    Current Outpatient Medications:   •  acetaminophen (TYLENOL) 325 MG tablet, Take 975 mg by mouth 2 (Two) Times a Day., Disp: , Rfl:   •  aspirin 81 MG EC tablet, Take 81 mg by mouth Daily., Disp: , Rfl:   •  B-D ULTRAFINE III SHORT PEN 31G X 8 MM misc, , Disp: , Rfl:   •  Calcium Citrate-Vitamin D (CALCIUM + D PO), Take 1 tablet/day by mouth., Disp: , Rfl:   •  cycloSPORINE (RESTASIS) 0.05 % ophthalmic emulsion, 1 drop 2 (Two) Times a Day., Disp: , Rfl:   •  gabapentin (NEURONTIN) 600 MG tablet, 1,200 mg 3 (Three) Times a Day., Disp: , Rfl:   •  glucose blood test strip, Contour next test strips use to test sugar 4 times per day. E 10.9, Disp: 200 each, Rfl: 11  •  Insulin Glargine (TOUJEO SOLOSTAR) 300 UNIT/ML solution pen-injector, Inject 27-35 Units under the skin into the appropriate area as directed every night at bedtime. Up to 35 units according to patient, Disp: 3 pen, Rfl: 11  •  isosorbide mononitrate (IMDUR) 30 MG 24 hr tablet, , Disp: , Rfl:   •  leflunomide (ARAVA) 20 MG tablet, , Disp: , Rfl:   •  lisinopril (PRINIVIL,ZESTRIL) 10 MG tablet, Take 1 tablet by mouth Daily., Disp: 90 tablet, Rfl: 3  •  melatonin 1 MG tablet, Take  by mouth Every Night., Disp: , Rfl:   •  montelukast (SINGULAIR) 10 MG tablet, , Disp: , Rfl:   •  Multiple Vitamin (DAILY VITAMIN FORMULA PO), Take 1 tablet/day by mouth., Disp: , Rfl:   •  nitroglycerin (NITRODUR) 0.2 MG/HR patch, , Disp: , Rfl:   •  NOVOLOG FLEXPEN 100 UNIT/ML solution pen-injector sc pen, INJECT BELOW THE SKIN AS DIRECTED (1 UNIT PER 10 CARBS), Disp: 15 mL, Rfl: 4  •  PARoxetine (PAXIL) 20 MG tablet, , Disp: , Rfl:   •  pyridoxine (VITAMIN B-6) 100 MG tablet tablet, Take 200 mg by mouth 2 (Two) Times a Day., Disp: , Rfl:   •  tiZANidine (ZANAFLEX) 4 MG tablet, Take 4 mg by mouth 3 (Three) Times a Day., Disp: , Rfl:   •  TRUEPLUS LANCETS 33G misc, , Disp: , Rfl:   •  vitamin B-12 (CYANOCOBALAMIN) 1000  "MCG tablet, Take 1,000 mcg by mouth 2 (Two) Times a Day., Disp: , Rfl:   •  atorvastatin (LIPITOR) 20 MG tablet, Take 1 tablet by mouth Every Night., Disp: 90 tablet, Rfl: 4    Review of Systems   Review of Systems   Constitution: Positive for malaise/fatigue. Negative for night sweats and weight loss.   HENT: Positive for hearing loss. Negative for hoarse voice and stridor.    Eyes: Negative for vision loss in left eye, vision loss in right eye and visual disturbance.   Cardiovascular: Positive for chest pain. Negative for leg swelling and palpitations.        Pain with walking   Respiratory: Negative for cough, hemoptysis and shortness of breath.    Hematologic/Lymphatic: Negative for adenopathy and bleeding problem. Does not bruise/bleed easily.   Skin: Positive for itching. Negative for color change, poor wound healing and rash.   Musculoskeletal: Positive for arthritis, back pain, muscle weakness and neck pain.   Gastrointestinal: Negative for abdominal pain, dysphagia and heartburn.   Neurological: Positive for numbness and paresthesias. Negative for dizziness and seizures.   Psychiatric/Behavioral: Negative for altered mental status, depression and memory loss. The patient is nervous/anxious.        Physical Exam   Vitals:    09/16/19 1401   BP: 140/70   BP Location: Left arm   Pulse: 105   Temp: 97.4 °F (36.3 °C)   TempSrc: Temporal   SpO2: 94%   Weight: 84.8 kg (187 lb)   Height: 175.3 cm (69\")     Physical Exam   Constitutional: She is oriented to person, place, and time. She is active and cooperative. She does not appear ill. No distress.   HENT:   Head: Atraumatic.   Right Ear: Hearing normal.   Left Ear: Hearing normal.   Nose: No nasal deformity. No epistaxis.   Mouth/Throat: She does not have dentures. Normal dentition.   Eyes: Conjunctivae and lids are normal. Right pupil is round and reactive. Left pupil is round and reactive.   Neck: No JVD present. Carotid bruit is not present. No tracheal " deviation present. No thyroid mass and no thyromegaly present.   Cardiovascular: Normal rate and regular rhythm.   No murmur heard.  Pulses:       Carotid pulses are 2+ on the right side, and 2+ on the left side.       Radial pulses are 2+ on the right side, and 2+ on the left side.        Dorsalis pedis pulses are 2+ on the right side, and 2+ on the left side.        Posterior tibial pulses are 2+ on the right side, and 2+ on the left side.   Pulmonary/Chest: Effort normal and breath sounds normal.   Abdominal: Soft. She exhibits no distension and no mass. There is no splenomegaly or hepatomegaly. There is no tenderness.   Musculoskeletal: She exhibits no deformity.   Gait normal.    Lymphadenopathy:     She has no cervical adenopathy.        Right: No supraclavicular adenopathy present.        Left: No supraclavicular adenopathy present.   Neurological: She is alert and oriented to person, place, and time. She has normal strength.   Skin: Skin is warm and dry. No cyanosis or erythema. No pallor.   No venous staining   Psychiatric: She has a normal mood and affect. Her speech is normal. Judgment and thought content normal.     BUN 9 Creat 0.5      ASSESSMENT:  Janelle was seen today for carotid artery disease.    Diagnoses and all orders for this visit:    Bilateral carotid artery stenosis  -     Duplex Carotid Ultrasound CAR; Future    Mixed diabetic hyperlipidemia associated with type 1 diabetes mellitus (CMS/HCC)    Hypertension associated with diabetes (CMS/HCC)    Coronary artery disease involving native coronary artery of native heart without angina pectoris    Type 1 diabetes mellitus with diabetic polyneuropathy (CMS/HCC)    Other specified rheumatoid arthritis, multiple sites (CMS/HCC)    Other orders  -     atorvastatin (LIPITOR) 20 MG tablet; Take 1 tablet by mouth Every Night.      PLAN:  Detailed discussion with Janelle Millard regarding situation and options.  asympotmatic carotid stenosis..   Multiple risk factors with severe comorbidities.  No intervention indicated at this time.  Will follow with interval imaging.  Risks, benefits discussed.  Understands and wishes to proceed with plan.    Return in 6 months with Carotid Duplex    Return after above studies complete  Obesity Class  0. Increased risk cardiovascular events, sleep and breathing disorders, joint issues, type 2 diabetes mellitus. Options for weight management, heart healthy diet, exercise programs, and associated health risks of obesity discussed.  Recommended regular physical activity, progressive walking program.  Continue current medications as directed.  Will Obtain relevant old records.    Thank you for the opportunity to participate in this patient's care.    Copy to primary care provider.    EMR Dragon/Transcription disclaimer:   Much of this encounter note is an electronic transcription/translation of spoken language to printed text. The electronic translation of spoken language may permit erroneous, or at times, nonsensical words or phrases to be inadvertently transcribed; Although I have reviewed the note for such errors, some may still exist.

## 2019-10-21 RX ORDER — PROCHLORPERAZINE 25 MG/1
1 SUPPOSITORY RECTAL ONCE
Qty: 1 DEVICE | Refills: 0 | Status: SHIPPED | OUTPATIENT
Start: 2019-10-21 | End: 2019-10-21

## 2019-10-21 RX ORDER — PROCHLORPERAZINE 25 MG/1
1 SUPPOSITORY RECTAL ONCE
Qty: 1 EACH | Refills: 3 | Status: SHIPPED | OUTPATIENT
Start: 2019-10-21 | End: 2019-10-21

## 2019-10-21 RX ORDER — PROCHLORPERAZINE 25 MG/1
SUPPOSITORY RECTAL AS NEEDED
Qty: 9 EACH | Refills: 3 | Status: SHIPPED | OUTPATIENT
Start: 2019-10-21

## 2019-10-21 RX ORDER — PROCHLORPERAZINE 25 MG/1
SUPPOSITORY RECTAL AS NEEDED
Qty: 9 EACH | Refills: 3 | Status: SHIPPED | OUTPATIENT
Start: 2019-10-21 | End: 2019-10-21 | Stop reason: SDUPTHER

## 2019-10-21 RX ORDER — PROCHLORPERAZINE 25 MG/1
1 SUPPOSITORY RECTAL ONCE
Qty: 1 EACH | Refills: 3 | Status: SHIPPED | OUTPATIENT
Start: 2019-10-21 | End: 2019-10-21 | Stop reason: SDUPTHER

## 2019-10-21 RX ORDER — PROCHLORPERAZINE 25 MG/1
1 SUPPOSITORY RECTAL ONCE
Qty: 1 DEVICE | Refills: 0 | Status: SHIPPED | OUTPATIENT
Start: 2019-10-21 | End: 2019-10-21 | Stop reason: SDUPTHER

## 2019-11-15 RX ORDER — INSULIN GLARGINE 300 U/ML
INJECTION, SOLUTION SUBCUTANEOUS
Qty: 4.5 ML | Refills: 1 | Status: SHIPPED | OUTPATIENT
Start: 2019-11-15 | End: 2020-03-18

## 2019-11-25 ENCOUNTER — OFFICE VISIT (OUTPATIENT)
Dept: ENDOCRINOLOGY | Facility: CLINIC | Age: 64
End: 2019-11-25

## 2019-11-25 VITALS
HEART RATE: 91 BPM | WEIGHT: 187 LBS | OXYGEN SATURATION: 95 % | SYSTOLIC BLOOD PRESSURE: 128 MMHG | HEIGHT: 69 IN | DIASTOLIC BLOOD PRESSURE: 72 MMHG | BODY MASS INDEX: 27.7 KG/M2

## 2019-11-25 DIAGNOSIS — E10.42 TYPE 1 DIABETES MELLITUS WITH DIABETIC POLYNEUROPATHY (HCC): Primary | ICD-10-CM

## 2019-11-25 PROCEDURE — 99213 OFFICE O/P EST LOW 20 MIN: CPT | Performed by: NURSE PRACTITIONER

## 2019-11-25 RX ORDER — LEVOTHYROXINE SODIUM 0.03 MG/1
50 TABLET ORAL DAILY
COMMUNITY

## 2019-11-25 NOTE — PROGRESS NOTES
Janelle Millard is a 64 y.o. female who presents for  evaluation of   Chief Complaint   Patient presents with   • Diabetes         Primary Care / Referring Provider  Arelis Laird MD      History of Present Illness  Duration/Timing:  Diabetes mellitus type 1  Duration: about 40y   constant  well controlled    Severity (Complications/Hospitalizations)  Secondary Macrovascular Complications:  CAD, No CVA, PAD  She underwent 3-vessel CABG on 02/24/2014  Secondary Microvascular Complications:  No Diabetic Nephropathy, No proteinuria, Diabetic Retinopathy, Diabetic Neuropathy    Context  Diabetes Regimen:  Insulin,Compliant with regimen,    Lab Results   Component Value Date    HGBA1C 6 10/08/2019        Exercise:  Exercises     Associated Signs/Symptoms  Hyperglycemic Symptoms:  No polyuria, No polydipsia, No polyphagia  Hypoglycemic Episodes:  Frequent hypoglycemia , checking 4 x daily           Past Medical History:   Diagnosis Date   • Arthritis, rheumatoid (CMS/HCC)    • Arthropathy associated with neurological disorder    • Arthropathy of lumbar facet joint    • Asthma    • Benign hypertension    • Carpal tunnel syndrome    • Diabetes (CMS/HCC)    • Diabetic polyneuropathy (CMS/HCC)    • Dyslipidemia    • Fallen arches    • Hammer toe    • Heart disease    • Joint pain    • Mild depression (CMS/HCC)     Saddness post Surgery 7/10/14 improved after counseling.   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)     Neuropathy of chest      • Neuropathy    • Onychomycosis    • Peripheral vascular disease (CMS/HCC)    • Retinopathy    • Tobacco user    • Type 1 diabetes mellitus (CMS/HCC)      Family History   Problem Relation Age of Onset   • Asthma Other    • Coronary artery disease Other    • Diabetes Other    • Hyperlipidemia Other    • Hypertension Other    • Osteoporosis Other    • Cancer Other    •  DOS: 9/11/2018 [facility] - RESCHEDULE (lumbar)   Received: Today   Message Contents   [facility] Preauth-Pain Procedures             Surgery/Procedure RESCHEDULED:     Original Date:  9/11/2018     New Date:  9/21/2018   OR Location: [facility]     Other changes to procedure?  No     Reason for change:  Wants the hip injection first.   Thanks!         Osteoporosis Mother    • Heart disease Mother    • Heart disease Father    • Diabetes Father    • Heart disease Sister    • Diabetes Sister      Social History     Tobacco Use   • Smoking status: Former Smoker     Types: Cigarettes   • Smokeless tobacco: Never Used   Substance Use Topics   • Alcohol use: No   • Drug use: No         Current Outpatient Medications:   •  acetaminophen (TYLENOL) 325 MG tablet, Take 975 mg by mouth 2 (Two) Times a Day., Disp: , Rfl:   •  aspirin 81 MG EC tablet, Take 81 mg by mouth Daily., Disp: , Rfl:   •  atorvastatin (LIPITOR) 20 MG tablet, Take 1 tablet by mouth Every Night., Disp: 90 tablet, Rfl: 4  •  B-D ULTRAFINE III SHORT PEN 31G X 8 MM misc, , Disp: , Rfl:   •  Calcium Citrate-Vitamin D (CALCIUM + D PO), Take 1 tablet/day by mouth., Disp: , Rfl:   •  Continuous Blood Gluc Sensor (DEXCOM G6 SENSOR), As Needed (glucose control). Every 10 daysDexcom G6 Sensor Kit [address]-85 Use as directed for continuous glucose monitoring, Disp: 9 each, Rfl: 3  •  cycloSPORINE (RESTASIS) 0.05 % ophthalmic emulsion, 1 drop 2 (Two) Times a Day., Disp: , Rfl:   •  gabapentin (NEURONTIN) 600 MG tablet, 1,200 mg 3 (Three) Times a Day., Disp: , Rfl:   •  glucose blood test strip, Contour next test strips use to test sugar 4 times per day. E 10.9, Disp: 200 each, Rfl: 11  •  Insulin Glargine (TOUJEO SOLOSTAR) 300 UNIT/ML solution pen-injector, Inject 27-35 Units under the skin into the appropriate area as directed every night at bedtime. Up to 35 units according to patient, Disp: 3 pen, Rfl: 11  •  isosorbide mononitrate (IMDUR) 30 MG 24 hr tablet, , Disp: , Rfl:   •  leflunomide (ARAVA) 20 MG tablet, , Disp: , Rfl:   •  levothyroxine (SYNTHROID) 25 MCG tablet, Take 25 mcg by mouth Daily., Disp: , Rfl:   •  lisinopril (PRINIVIL,ZESTRIL) 10 MG tablet, Take 1 tablet by mouth Daily., Disp: 90 tablet, Rfl: 3  •  melatonin 1 MG tablet, Take  by mouth Every Night., Disp: , Rfl:   •  montelukast (SINGULAIR)  10 MG tablet, , Disp: , Rfl:   •  Multiple Vitamin (DAILY VITAMIN FORMULA PO), Take 1 tablet/day by mouth., Disp: , Rfl:   •  nitroglycerin (NITRODUR) 0.2 MG/HR patch, , Disp: , Rfl:   •  NOVOLOG FLEXPEN 100 UNIT/ML solution pen-injector sc pen, INJECT BELOW THE SKIN AS DIRECTED (1 UNIT PER 10 CARBS), Disp: 15 mL, Rfl: 4  •  PARoxetine (PAXIL) 20 MG tablet, , Disp: , Rfl:   •  pyridoxine (VITAMIN B-6) 100 MG tablet tablet, Take 200 mg by mouth 2 (Two) Times a Day., Disp: , Rfl:   •  tiZANidine (ZANAFLEX) 4 MG tablet, Take 4 mg by mouth 3 (Three) Times a Day., Disp: , Rfl:   •  TOUJEO SOLOSTAR 300 UNIT/ML solution pen-injector injection, INJECT 35 UNITS SUBCUTANEOUSLY AT BEDTIME, Disp: 4.5 mL, Rfl: 1  •  TRUEPLUS LANCETS 33G misc, , Disp: , Rfl:   •  vitamin B-12 (CYANOCOBALAMIN) 1000 MCG tablet, Take 1,000 mcg by mouth 2 (Two) Times a Day., Disp: , Rfl:     Review of Systems    Review of Systems   Constitutional: Negative for activity change, appetite change, chills, diaphoresis, fatigue, fever and unexpected weight change.   HENT: Negative for congestion, dental problem, drooling, ear discharge, ear pain, facial swelling, mouth sores, postnasal drip, rhinorrhea, sinus pressure, sore throat, tinnitus, trouble swallowing and voice change.    Eyes: Negative for photophobia, pain, discharge, redness, itching and visual disturbance.   Respiratory: Negative for apnea, cough, choking, chest tightness, shortness of breath, wheezing and stridor.    Cardiovascular: Negative for chest pain, palpitations and leg swelling.   Gastrointestinal: Negative for abdominal distention, abdominal pain, constipation, diarrhea, nausea and vomiting.   Endocrine: Negative for cold intolerance, heat intolerance, polydipsia, polyphagia and polyuria.   Genitourinary: Negative for decreased urine volume, difficulty urinating, dysuria, flank pain, frequency, hematuria and urgency.   Musculoskeletal: Negative for arthralgias, back pain, gait  "problem, joint swelling, myalgias, neck pain and neck stiffness.   Skin: Negative for color change, pallor, rash and wound.   Allergic/Immunologic: Negative for immunocompromised state.   Neurological: Negative for dizziness, tremors, seizures, syncope, facial asymmetry, speech difficulty, weakness, light-headedness, numbness and headaches.   Hematological: Negative for adenopathy.   Psychiatric/Behavioral: Negative for agitation, behavioral problems, confusion, decreased concentration, dysphoric mood, hallucinations, self-injury, sleep disturbance and suicidal ideas. The patient is not nervous/anxious and is not hyperactive.         Objective:     /72   Pulse 91   Ht 175.3 cm (69\")   Wt 84.8 kg (187 lb)   SpO2 95%   BMI 27.62 kg/m²     Physical Exam   Constitutional: She is oriented to person, place, and time. She appears well-developed.   HENT:   Head: Normocephalic.   Right Ear: External ear normal.   Left Ear: External ear normal.   Nose: Nose normal.   Eyes: Conjunctivae and EOM are normal. No scleral icterus.   Neck: Normal range of motion. Neck supple. No tracheal deviation present. No thyromegaly present.   Cardiovascular: Normal rate, regular rhythm, normal heart sounds and intact distal pulses. Exam reveals no gallop and no friction rub.   No murmur heard.  Pulmonary/Chest: Effort normal and breath sounds normal. No stridor. No respiratory distress. She has no wheezes. She has no rales. She exhibits no tenderness.   Abdominal: Soft. Bowel sounds are normal. She exhibits no distension and no mass. There is no tenderness. There is no rebound and no guarding.   Musculoskeletal: Normal range of motion. She exhibits no tenderness or deformity.   Lymphadenopathy:     She has no cervical adenopathy.   Neurological: She is alert and oriented to person, place, and time. She displays normal reflexes. She exhibits normal muscle tone. Coordination normal.   Skin: No rash noted. No erythema. No pallor. "   Psychiatric: She has a normal mood and affect. Her behavior is normal. Judgment and thought content normal.       Lab Review    Results for orders placed or performed in visit on 08/12/19   POC Glucose   Result Value Ref Range    Glucose 306 (A) 70 - 130 mg/dL   POC Glycosylated Hemoglobin (Hb A1C)   Result Value Ref Range    Hemoglobin A1C 6.8 %           Assessment/Plan       ICD-10-CM ICD-9-CM   1. Type 1 diabetes mellitus with diabetic polyneuropathy (CMS/[facility]) E10.42 250.61     357.2           Glycemic Management:      Lab Results   Component Value Date    HGBA1C 6 10/08/2019    HGBA1C 6.8 08/12/2019    HGBA1C 6 08/27/2018     Lab Results   Component Value Date    CREATININE 0.38 (L) 07/24/2019       Toujeo 27 units qhs     Humalog - Novolog  1 unit per 10 grams of carbohydrate     correction 1:50     no orals     Has G5 but hasn't used it since didn't know how to put it on     Waiting for Dexcom    Well controlled     Lipid Management    on atorvastatin 10 mg qhs     Lab Results   Component Value Date    CHOL 149 10/08/2019     Lab Results   Component Value Date    TRIG 55 10/08/2019    TRIG 101 08/03/2016    TRIG 89 12/16/2014     Lab Results   Component Value Date    HDL 61 10/08/2019    HDL 51 (L) 08/03/2016    HDL 33 (L) 12/16/2014     No components found for: LDLCALC    Blood Pressure Management    On lisinopril 10 mg qd , increased to 20 and had syncope     Had syncope due to antihypertensives but now doses were decreased    Microvascular Complication Monitoring:  Diabetic Retinopathy, Diabetic Neuropathy  on gabapentin 2 x  600 mg three times daily   has taken lyrica w/o benefit   cymbalta didn't work     Immunizations:  Last pneumococcal immunization has had pneumovax    Preventive Care:  Patient is not smoking    Weight Related:  Obesity, Refused to be weighed     No longer smoking         Return in about 6 months (around 5/25/2020), or if symptoms worsen or fail to improve, for Recheck.        This  document has been electronically signed by JESSIE Lewis on November 25, 2019 1:32 PM        A copy of my note was sent to Arelis Laird MD    Please see my above opinion and suggestions.

## 2020-01-15 ENCOUNTER — OFFICE VISIT (OUTPATIENT)
Dept: PODIATRY | Facility: CLINIC | Age: 65
End: 2020-01-15

## 2020-01-15 VITALS — BODY MASS INDEX: 27.7 KG/M2 | WEIGHT: 187 LBS | HEART RATE: 94 BPM | HEIGHT: 69 IN | OXYGEN SATURATION: 97 %

## 2020-01-15 DIAGNOSIS — M79.674 CHRONIC TOE PAIN, BILATERAL: ICD-10-CM

## 2020-01-15 DIAGNOSIS — M79.2 NEUROPATHIC PAIN OF FOOT, UNSPECIFIED LATERALITY: ICD-10-CM

## 2020-01-15 DIAGNOSIS — G89.29 CHRONIC TOE PAIN, BILATERAL: ICD-10-CM

## 2020-01-15 DIAGNOSIS — B35.1 ONYCHOMYCOSIS: Primary | ICD-10-CM

## 2020-01-15 DIAGNOSIS — E10.42 TYPE 1 DIABETES MELLITUS WITH DIABETIC POLYNEUROPATHY (HCC): ICD-10-CM

## 2020-01-15 DIAGNOSIS — M79.675 CHRONIC TOE PAIN, BILATERAL: ICD-10-CM

## 2020-01-15 PROCEDURE — 99213 OFFICE O/P EST LOW 20 MIN: CPT | Performed by: PODIATRIST

## 2020-01-15 PROCEDURE — 11721 DEBRIDE NAIL 6 OR MORE: CPT | Performed by: PODIATRIST

## 2020-01-15 NOTE — PROGRESS NOTES
Janelle Millard  1955  64 y.o. female   VIDAL Laird - 11/12/2019  BS - 118 per pt    Patient presents today with a complaint of painful feet (burning, sharp, tingling pain).    01/15/2020     Chief Complaint   Patient presents with   • Left Foot - Follow-up, Pain   • Right Foot - Follow-up, Pain       History of Present Illness    Patient presents to clinic today for routine diabetic footcare.  States that her toenails have become long and painful.  Pain is relieved with debridement.  She also complains of increasing burning, tingling pain to both of her feet.  She does take gabapentin for this which helped slightly.  However pain has become more intense over the last few months.  He denies any new injuries or trauma.  She has no other lower extremity complaints.      Past Medical History:   Diagnosis Date   • Arthritis, rheumatoid (CMS/HCC)    • Arthropathy associated with neurological disorder    • Arthropathy of lumbar facet joint    • Asthma    • Benign hypertension    • Carpal tunnel syndrome    • Diabetes (CMS/HCC)    • Diabetic polyneuropathy (CMS/HCC)    • Dyslipidemia    • Fallen arches    • Hammer toe    • Heart disease    • Joint pain    • Mild depression (CMS/HCC)     Saddness post Surgery 7/10/14 improved after counseling.   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 Diffusely calcified LAD with 30-80% stenosis. OMB #3 with 60% to 70% stenosis. Preserved LV systolic function with EF of 55%.   • CARPAL TUNNEL RELEASE Right 10/15/2014    Right carpal tunnel release.   • CARPAL TUNNEL RELEASE        • CATARACT EXTRACTION, BILATERAL Bilateral    • CORONARY ARTERY BYPASS GRAFT  02/24/2014    CABG x 3 with LIMA to LAD, endarterectomy to LAD, SVG to OMB and SVG to distal RCA with endarterectomy to distal RCA. Endo-vein harvesting.   • CYSTECTOMY Left     Breast cycst   • EXCISION LESION      Remove breast lesion - cyst   • EYE SURGERY      Insert lens prosthesis   • FOOT SURGERY  10/18/2011    Exostectomy of the right foot. Preulcerative lesion with Charot deformity, right foot   • LUMBAR SPINE SURGERY  11/09/2015    Lumbosacral radiofrequency thermal coagulation.   • NERVE BLOCK  09/14/2015    Lumbar medial branch block.   • TOE NAIL AVULSION  09/03/2015    DEBRIDE NAIL 6 OR MORE 99782 (1)         Family History   Problem Relation Age of Onset   • Asthma Other    • Coronary artery disease Other    • Diabetes Other    • Hyperlipidemia Other    • Hypertension Other    • Osteoporosis Other    • Cancer Other    • Osteoporosis Mother    • Heart disease Mother    • Heart disease Father    • Diabetes Father    • Heart disease Sister    • Diabetes Sister        Allergies   Allergen Reactions   • Contrast Dye Shortness Of Breath and Itching   • Corticosteroids Other (See Comments)     Pt diabetic makes sugar go up    • Codeine Itching   • Ibuprofen Unknown - Low Severity       Social History     Socioeconomic History   • Marital status:      Spouse name: Not on file   • Number of children: Not on file   • Years of education: Not on file   • Highest education level: Not on file   Tobacco Use   • Smoking status: Former Smoker     Types: Cigarettes   • Smokeless tobacco: Never Used   Substance and Sexual Activity   • Alcohol use: No   • Drug use: No   • Sexual activity: Defer         Current Outpatient Medications   Medication Sig Dispense Refill   • acetaminophen (TYLENOL) 325 MG tablet Take 975 mg by mouth 2 (Two) Times a Day.     • aspirin 81 MG EC tablet Take 81 mg by mouth Daily.     • atorvastatin (LIPITOR) 20  MG tablet Take 1 tablet by mouth Every Night. 90 tablet 4   • B-D ULTRAFINE III SHORT PEN 31G X 8 MM misc      • Calcium Citrate-Vitamin D (CALCIUM + D PO) Take 1 tablet/day by mouth.     • Continuous Blood Gluc Sensor (DEXCOM G6 SENSOR) As Needed (glucose control). Every 10 daysDexcom G6 Sensor Kit [address]-27 Use as directed for continuous glucose monitoring 9 each 3   • cycloSPORINE (RESTASIS) 0.05 % ophthalmic emulsion 1 drop 2 (Two) Times a Day.     • gabapentin (NEURONTIN) 600 MG tablet 1,200 mg 3 (Three) Times a Day.     • glucose blood test strip Contour next test strips use to test sugar 4 times per day. E 10.9 200 each 11   • Insulin Glargine (TOUJEO SOLOSTAR) 300 UNIT/ML solution pen-injector Inject 27-35 Units under the skin into the appropriate area as directed every night at bedtime. Up to 35 units according to patient 3 pen 11   • isosorbide mononitrate (IMDUR) 30 MG 24 hr tablet      • leflunomide (ARAVA) 20 MG tablet      • levothyroxine (SYNTHROID) 25 MCG tablet Take 25 mcg by mouth Daily.     • lisinopril (PRINIVIL,ZESTRIL) 10 MG tablet Take 1 tablet by mouth Daily. 90 tablet 3   • melatonin 1 MG tablet Take  by mouth Every Night.     • montelukast (SINGULAIR) 10 MG tablet      • Multiple Vitamin (DAILY VITAMIN FORMULA PO) Take 1 tablet/day by mouth.     • nitroglycerin (NITRODUR) 0.2 MG/HR patch      • NOVOLOG FLEXPEN 100 UNIT/ML solution pen-injector sc pen INJECT BELOW THE SKIN AS DIRECTED (1 UNIT PER 10 CARBS) 15 mL 4   • PARoxetine (PAXIL) 20 MG tablet      • pyridoxine (VITAMIN B-6) 100 MG tablet tablet Take 200 mg by mouth 2 (Two) Times a Day.     • tiZANidine (ZANAFLEX) 4 MG tablet Take 4 mg by mouth 3 (Three) Times a Day.     • TOUJEO SOLOSTAR 300 UNIT/ML solution pen-injector injection INJECT 35 UNITS SUBCUTANEOUSLY AT BEDTIME 4.5 mL 1   • TRUEPLUS LANCETS 33G misc      • vitamin B-12 (CYANOCOBALAMIN) 1000 MCG tablet Take 1,000 mcg by mouth 2 (Two) Times a Day.       No current  "facility-administered medications for this visit.        Review of Systems   Constitutional: Negative.    HENT: Negative.    Respiratory: Negative.    Cardiovascular: Negative.    Gastrointestinal: Negative.    Endocrine: Negative.    Genitourinary: Negative.    Musculoskeletal:        Bilateral foot pain     Skin: Negative.    Neurological: Positive for numbness. Negative for dizziness.   Psychiatric/Behavioral: Negative.          OBJECTIVE    Pulse 94   Ht 175.3 cm (69\")   Wt 84.8 kg (187 lb)   SpO2 97%   BMI 27.62 kg/m²       Physical Exam   Constitutional: She is oriented to person, place, and time. She appears well-developed and well-nourished.   HENT:   Head: Normocephalic and atraumatic.   Nose: Nose normal.   Eyes: Pupils are equal, round, and reactive to light. Conjunctivae and EOM are normal.   Pulmonary/Chest: Effort normal. No respiratory distress. She has no wheezes.   Musculoskeletal: She exhibits edema, tenderness and deformity.   Neurological: She is alert and oriented to person, place, and time.   Skin: Skin is warm and dry. Capillary refill takes less than 2 seconds. She is not diaphoretic. No erythema.   Psychiatric: She has a normal mood and affect. Her behavior is normal.   Vitals reviewed.      Gait: Normal    Assistive Device: None    Lower Extremity    Cardiovascular:    DP/PT pulses palpable bilateral  CFT brisk  to all digits  Skin temp is warm to warm from proximal tibia to distal digits bilateral  Pedal hair growth present.   No erythema or edema noted   Musculoskeletal:  Muscle strength is 5/5 for all muscle groups tested   ROM of the 1st MTP is full without pain or crepitus bilateral  ROM of the ankle joint is full without pain or crepitus  bilateral  Rocker-bottom deformity noted bilateral  Dermatological:   Nails 1-5 bilateral are thickened, discolored, elongated with subungual debris.  Pain on palpation to the nail plates.  Skin is warm, dry and intact bilateral  Webspaces 1-4 " bilateral are clean, dry and intact.   No subcutaneous nodules or masses noted    No open wounds noted   Neurological:   Protective sensation diminished  Sensation intact to light touch          Procedures        ASSESSMENT AND PLAN    Janelle was seen today for follow-up, pain, follow-up and pain.    Diagnoses and all orders for this visit:    Onychomycosis    Chronic toe pain, bilateral    Type 1 diabetes mellitus with diabetic polyneuropathy (CMS/HCC)    Neuropathic pain of foot, unspecified laterality      - rx for compounded pain cream for neuropathic foot pain.   - Nails 1-5 bilateral were debrided in length and thickness with nail nipper and electric  to decrease fungal load and risk of infection.  - All the patients questions were answered.  - RTC 3 months or sooner if needed.              This document has been electronically signed by Delano Jaramillo DPM on January 16, 2020 2:00 PM     1/16/2020  2:00 PM

## 2020-02-17 ENCOUNTER — TELEPHONE (OUTPATIENT)
Dept: ENDOCRINOLOGY | Facility: CLINIC | Age: 65
End: 2020-02-17

## 2020-02-17 NOTE — TELEPHONE ENCOUNTER
[facility] called ( Harjinder) and said needs clarification on Novalog flex pen directions  and also needs max daily dose for ins. Purposes.   Thank You

## 2020-02-17 NOTE — TELEPHONE ENCOUNTER
[facility] called ( Harjinder) and said needs clarification on Novalog flex pen directions  and also needs max daily dose for ins. Purposes.   Thank You     Rachelle...................

## 2020-03-18 RX ORDER — INSULIN GLARGINE 300 U/ML
INJECTION, SOLUTION SUBCUTANEOUS
Qty: 4.5 ML | Refills: 1 | Status: ON HOLD | OUTPATIENT
Start: 2020-03-18 | End: 2020-10-14

## 2020-04-02 RX ORDER — INSULIN ASPART 100 [IU]/ML
INJECTION, SOLUTION INTRAVENOUS; SUBCUTANEOUS
Qty: 15 ML | Refills: 3 | Status: ON HOLD | OUTPATIENT
Start: 2020-04-02 | End: 2020-10-14

## 2020-09-23 RX ORDER — ATORVASTATIN CALCIUM 20 MG/1
TABLET, FILM COATED ORAL
Qty: 90 TABLET | Refills: 0 | OUTPATIENT
Start: 2020-09-23

## 2020-09-25 RX ORDER — ATORVASTATIN CALCIUM 20 MG/1
TABLET, FILM COATED ORAL
Qty: 90 TABLET | Refills: 3 | OUTPATIENT
Start: 2020-09-25

## 2020-10-14 ENCOUNTER — HOSPITAL ENCOUNTER (OUTPATIENT)
Facility: HOSPITAL | Age: 65
Setting detail: OBSERVATION
Discharge: HOME | End: 2020-10-16
Attending: FAMILY MEDICINE | Admitting: HOSPITALIST

## 2020-10-14 ENCOUNTER — APPOINTMENT (OUTPATIENT)
Dept: GENERAL RADIOLOGY | Facility: HOSPITAL | Age: 65
End: 2020-10-14

## 2020-10-14 DIAGNOSIS — R55 SYNCOPE AND COLLAPSE: ICD-10-CM

## 2020-10-14 DIAGNOSIS — I25.10 ATHEROSCLEROSIS OF NATIVE CORONARY ARTERY OF NATIVE HEART, ANGINA PRESENCE UNSPECIFIED: ICD-10-CM

## 2020-10-14 DIAGNOSIS — R55 NEAR SYNCOPE: ICD-10-CM

## 2020-10-14 DIAGNOSIS — Z95.1 HX OF CABG: ICD-10-CM

## 2020-10-14 DIAGNOSIS — I10 ESSENTIAL HYPERTENSION, BENIGN: ICD-10-CM

## 2020-10-14 DIAGNOSIS — R07.2 PRECORDIAL PAIN: Primary | ICD-10-CM

## 2020-10-14 LAB
ALBUMIN SERPL-MCNC: 3.3 G/DL (ref 3.5–5.2)
ALBUMIN/GLOB SERPL: 0.9 G/DL
ALP SERPL-CCNC: 105 U/L (ref 39–117)
ALT SERPL W P-5'-P-CCNC: 9 U/L (ref 1–33)
ANION GAP SERPL CALCULATED.3IONS-SCNC: 7 MMOL/L (ref 5–15)
AST SERPL-CCNC: 17 U/L (ref 1–32)
BASOPHILS # BLD AUTO: 0.06 10*3/MM3 (ref 0–0.2)
BASOPHILS NFR BLD AUTO: 0.9 % (ref 0–1.5)
BILIRUB SERPL-MCNC: 0.3 MG/DL (ref 0–1.2)
BUN SERPL-MCNC: 10 MG/DL (ref 8–23)
BUN/CREAT SERPL: 22.2 (ref 7–25)
CALCIUM SPEC-SCNC: 9.7 MG/DL (ref 8.6–10.5)
CHLORIDE SERPL-SCNC: 102 MMOL/L (ref 98–107)
CO2 SERPL-SCNC: 27 MMOL/L (ref 22–29)
CREAT SERPL-MCNC: 0.45 MG/DL (ref 0.57–1)
DEPRECATED RDW RBC AUTO: 52.9 FL (ref 37–54)
EOSINOPHIL # BLD AUTO: 0.19 10*3/MM3 (ref 0–0.4)
EOSINOPHIL NFR BLD AUTO: 2.7 % (ref 0.3–6.2)
ERYTHROCYTE [DISTWIDTH] IN BLOOD BY AUTOMATED COUNT: 15.8 % (ref 12.3–15.4)
GFR SERPL CREATININE-BSD FRML MDRD: 139 ML/MIN/1.73
GLOBULIN UR ELPH-MCNC: 3.5 GM/DL
GLUCOSE SERPL-MCNC: 88 MG/DL (ref 65–99)
HCT VFR BLD AUTO: 38.2 % (ref 34–46.6)
HGB BLD-MCNC: 12.3 G/DL (ref 12–15.9)
HOLD SPECIMEN: NORMAL
HOLD SPECIMEN: NORMAL
IMM GRANULOCYTES # BLD AUTO: 0.03 10*3/MM3 (ref 0–0.05)
IMM GRANULOCYTES NFR BLD AUTO: 0.4 % (ref 0–0.5)
LYMPHOCYTES # BLD AUTO: 1.62 10*3/MM3 (ref 0.7–3.1)
LYMPHOCYTES NFR BLD AUTO: 23 % (ref 19.6–45.3)
MCH RBC QN AUTO: 29.6 PG (ref 26.6–33)
MCHC RBC AUTO-ENTMCNC: 32.2 G/DL (ref 31.5–35.7)
MCV RBC AUTO: 91.8 FL (ref 79–97)
MONOCYTES # BLD AUTO: 0.62 10*3/MM3 (ref 0.1–0.9)
MONOCYTES NFR BLD AUTO: 8.8 % (ref 5–12)
NEUTROPHILS NFR BLD AUTO: 4.53 10*3/MM3 (ref 1.7–7)
NEUTROPHILS NFR BLD AUTO: 64.2 % (ref 42.7–76)
NRBC BLD AUTO-RTO: 0 /100 WBC (ref 0–0.2)
NT-PROBNP SERPL-MCNC: 417.3 PG/ML (ref 0–900)
PLATELET # BLD AUTO: 321 10*3/MM3 (ref 140–450)
PMV BLD AUTO: 9.7 FL (ref 6–12)
POTASSIUM SERPL-SCNC: 3.7 MMOL/L (ref 3.5–5.2)
PROT SERPL-MCNC: 6.8 G/DL (ref 6–8.5)
RBC # BLD AUTO: 4.16 10*6/MM3 (ref 3.77–5.28)
SODIUM SERPL-SCNC: 136 MMOL/L (ref 136–145)
TROPONIN T SERPL-MCNC: <0.01 NG/ML (ref 0–0.03)
WBC # BLD AUTO: 7.05 10*3/MM3 (ref 3.4–10.8)
WHOLE BLOOD HOLD SPECIMEN: NORMAL
WHOLE BLOOD HOLD SPECIMEN: NORMAL

## 2020-10-14 PROCEDURE — 25010000002 HEPARIN (PORCINE) PER 1000 UNITS: Performed by: HOSPITALIST

## 2020-10-14 PROCEDURE — G0378 HOSPITAL OBSERVATION PER HR: HCPCS

## 2020-10-14 PROCEDURE — 99285 EMERGENCY DEPT VISIT HI MDM: CPT

## 2020-10-14 PROCEDURE — 93005 ELECTROCARDIOGRAM TRACING: CPT | Performed by: HOSPITALIST

## 2020-10-14 PROCEDURE — 84484 ASSAY OF TROPONIN QUANT: CPT | Performed by: FAMILY MEDICINE

## 2020-10-14 PROCEDURE — 82962 GLUCOSE BLOOD TEST: CPT

## 2020-10-14 PROCEDURE — 83880 ASSAY OF NATRIURETIC PEPTIDE: CPT | Performed by: FAMILY MEDICINE

## 2020-10-14 PROCEDURE — 71045 X-RAY EXAM CHEST 1 VIEW: CPT

## 2020-10-14 PROCEDURE — 93010 ELECTROCARDIOGRAM REPORT: CPT | Performed by: INTERNAL MEDICINE

## 2020-10-14 PROCEDURE — 63710000001 INSULIN DETEMIR PER 5 UNITS: Performed by: HOSPITALIST

## 2020-10-14 PROCEDURE — 80053 COMPREHEN METABOLIC PANEL: CPT | Performed by: FAMILY MEDICINE

## 2020-10-14 PROCEDURE — 96372 THER/PROPH/DIAG INJ SC/IM: CPT

## 2020-10-14 PROCEDURE — 84484 ASSAY OF TROPONIN QUANT: CPT | Performed by: HOSPITALIST

## 2020-10-14 PROCEDURE — 85025 COMPLETE CBC W/AUTO DIFF WBC: CPT | Performed by: FAMILY MEDICINE

## 2020-10-14 PROCEDURE — 93005 ELECTROCARDIOGRAM TRACING: CPT | Performed by: FAMILY MEDICINE

## 2020-10-14 RX ORDER — SODIUM CHLORIDE 0.9 % (FLUSH) 0.9 %
10 SYRINGE (ML) INJECTION AS NEEDED
Status: DISCONTINUED | OUTPATIENT
Start: 2020-10-14 | End: 2020-10-16 | Stop reason: HOSPADM

## 2020-10-14 RX ORDER — CHOLECALCIFEROL (VITAMIN D3) 125 MCG
5 CAPSULE ORAL NIGHTLY
COMMUNITY

## 2020-10-14 RX ORDER — ACETAMINOPHEN 325 MG/1
650 TABLET ORAL 3 TIMES DAILY
Status: DISCONTINUED | OUTPATIENT
Start: 2020-10-14 | End: 2020-10-16 | Stop reason: HOSPADM

## 2020-10-14 RX ORDER — GABAPENTIN 300 MG/1
600 CAPSULE ORAL EVERY 8 HOURS SCHEDULED
Status: DISCONTINUED | OUTPATIENT
Start: 2020-10-14 | End: 2020-10-16 | Stop reason: HOSPADM

## 2020-10-14 RX ORDER — PROMETHAZINE HYDROCHLORIDE 25 MG/1
25 TABLET ORAL EVERY 6 HOURS PRN
Status: DISCONTINUED | OUTPATIENT
Start: 2020-10-14 | End: 2020-10-16 | Stop reason: HOSPADM

## 2020-10-14 RX ORDER — DEXTROSE MONOHYDRATE 25 G/50ML
25 INJECTION, SOLUTION INTRAVENOUS
Status: DISCONTINUED | OUTPATIENT
Start: 2020-10-14 | End: 2020-10-16 | Stop reason: HOSPADM

## 2020-10-14 RX ORDER — PROMETHAZINE HYDROCHLORIDE 25 MG/1
25 TABLET ORAL EVERY 6 HOURS PRN
COMMUNITY
Start: 2020-05-14 | End: 2021-01-01 | Stop reason: HOSPADM

## 2020-10-14 RX ORDER — INSULIN GLARGINE 300 U/ML
28 INJECTION, SOLUTION SUBCUTANEOUS NIGHTLY
COMMUNITY
End: 2020-12-07 | Stop reason: SDUPTHER

## 2020-10-14 RX ORDER — ACETAMINOPHEN 325 MG/1
650 TABLET ORAL 3 TIMES DAILY
COMMUNITY

## 2020-10-14 RX ORDER — CARVEDILOL 12.5 MG/1
12.5 TABLET ORAL 2 TIMES DAILY WITH MEALS
Status: DISCONTINUED | OUTPATIENT
Start: 2020-10-14 | End: 2020-10-16 | Stop reason: HOSPADM

## 2020-10-14 RX ORDER — NITROGLYCERIN 40 MG/1
1 PATCH TRANSDERMAL DAILY
Status: DISCONTINUED | OUTPATIENT
Start: 2020-10-14 | End: 2020-10-16 | Stop reason: HOSPADM

## 2020-10-14 RX ORDER — LEVOTHYROXINE SODIUM 0.03 MG/1
25 TABLET ORAL
Status: DISCONTINUED | OUTPATIENT
Start: 2020-10-15 | End: 2020-10-16 | Stop reason: HOSPADM

## 2020-10-14 RX ORDER — MULTIVIT-MIN/IRON/FOLIC ACID/K 18-600-40
1 CAPSULE ORAL DAILY
COMMUNITY

## 2020-10-14 RX ORDER — HEPARIN SODIUM 5000 [USP'U]/ML
5000 INJECTION, SOLUTION INTRAVENOUS; SUBCUTANEOUS EVERY 8 HOURS SCHEDULED
Status: DISCONTINUED | OUTPATIENT
Start: 2020-10-14 | End: 2020-10-16 | Stop reason: HOSPADM

## 2020-10-14 RX ORDER — MELATONIN
2000 DAILY
Status: DISCONTINUED | OUTPATIENT
Start: 2020-10-14 | End: 2020-10-16 | Stop reason: HOSPADM

## 2020-10-14 RX ORDER — ASPIRIN 81 MG/1
81 TABLET ORAL DAILY
Status: DISCONTINUED | OUTPATIENT
Start: 2020-10-14 | End: 2020-10-16 | Stop reason: HOSPADM

## 2020-10-14 RX ORDER — NICOTINE POLACRILEX 4 MG
15 LOZENGE BUCCAL
Status: DISCONTINUED | OUTPATIENT
Start: 2020-10-14 | End: 2020-10-16 | Stop reason: HOSPADM

## 2020-10-14 RX ORDER — ISOSORBIDE MONONITRATE 30 MG/1
30 TABLET, EXTENDED RELEASE ORAL DAILY
Status: DISCONTINUED | OUTPATIENT
Start: 2020-10-14 | End: 2020-10-16 | Stop reason: HOSPADM

## 2020-10-14 RX ORDER — ATORVASTATIN CALCIUM 20 MG/1
20 TABLET, FILM COATED ORAL NIGHTLY
Status: DISCONTINUED | OUTPATIENT
Start: 2020-10-14 | End: 2020-10-16 | Stop reason: HOSPADM

## 2020-10-14 RX ORDER — LISINOPRIL 10 MG/1
10 TABLET ORAL DAILY
Status: DISCONTINUED | OUTPATIENT
Start: 2020-10-14 | End: 2020-10-16 | Stop reason: HOSPADM

## 2020-10-14 RX ORDER — CARVEDILOL 12.5 MG/1
TABLET ORAL
COMMUNITY
Start: 2020-04-27

## 2020-10-14 RX ORDER — LANOLIN ALCOHOL/MO/W.PET/CERES
5 CREAM (GRAM) TOPICAL NIGHTLY
Status: DISCONTINUED | OUTPATIENT
Start: 2020-10-14 | End: 2020-10-16 | Stop reason: HOSPADM

## 2020-10-14 RX ORDER — LISINOPRIL 10 MG/1
10 TABLET ORAL DAILY
COMMUNITY
End: 2021-01-01 | Stop reason: HOSPADM

## 2020-10-14 RX ORDER — PAROXETINE HYDROCHLORIDE 20 MG/1
20 TABLET, FILM COATED ORAL NIGHTLY
Status: DISCONTINUED | OUTPATIENT
Start: 2020-10-14 | End: 2020-10-16 | Stop reason: HOSPADM

## 2020-10-14 RX ORDER — TIZANIDINE 4 MG/1
4 TABLET ORAL EVERY 8 HOURS PRN
Status: DISCONTINUED | OUTPATIENT
Start: 2020-10-14 | End: 2020-10-16 | Stop reason: HOSPADM

## 2020-10-14 RX ORDER — SODIUM CHLORIDE 0.9 % (FLUSH) 0.9 %
10 SYRINGE (ML) INJECTION EVERY 12 HOURS SCHEDULED
Status: DISCONTINUED | OUTPATIENT
Start: 2020-10-14 | End: 2020-10-16 | Stop reason: HOSPADM

## 2020-10-14 RX ORDER — GABAPENTIN 600 MG/1
1200 TABLET ORAL 3 TIMES DAILY
COMMUNITY
Start: 2020-08-06 | End: 2021-01-01 | Stop reason: HOSPADM

## 2020-10-14 RX ADMIN — HEPARIN SODIUM 5000 UNITS: 5000 INJECTION INTRAVENOUS; SUBCUTANEOUS at 21:20

## 2020-10-14 RX ADMIN — MELATONIN 2000 UNITS: at 20:00

## 2020-10-14 RX ADMIN — Medication 10 ML: at 10:15

## 2020-10-14 RX ADMIN — ATORVASTATIN CALCIUM 20 MG: 20 TABLET, FILM COATED ORAL at 20:01

## 2020-10-14 RX ADMIN — TIZANIDINE 4 MG: 4 TABLET ORAL at 20:00

## 2020-10-14 RX ADMIN — ASPIRIN 81 MG: 81 TABLET, COATED ORAL at 20:00

## 2020-10-14 RX ADMIN — SODIUM CHLORIDE, PRESERVATIVE FREE 10 ML: 5 INJECTION INTRAVENOUS at 20:03

## 2020-10-14 RX ADMIN — ACETAMINOPHEN 650 MG: 325 TABLET, FILM COATED ORAL at 20:01

## 2020-10-14 RX ADMIN — INSULIN DETEMIR 25 UNITS: 100 INJECTION, SOLUTION SUBCUTANEOUS at 21:19

## 2020-10-14 RX ADMIN — LISINOPRIL 10 MG: 10 TABLET ORAL at 20:00

## 2020-10-14 RX ADMIN — GABAPENTIN 600 MG: 300 CAPSULE ORAL at 21:19

## 2020-10-14 RX ADMIN — CARVEDILOL 12.5 MG: 12.5 TABLET, FILM COATED ORAL at 20:00

## 2020-10-14 RX ADMIN — PAROXETINE HYDROCHLORIDE 20 MG: 20 TABLET, FILM COATED ORAL at 20:01

## 2020-10-14 RX ADMIN — ISOSORBIDE MONONITRATE 30 MG: 30 TABLET, EXTENDED RELEASE ORAL at 20:01

## 2020-10-14 RX ADMIN — MELATONIN 5.25 MG: 3 TAB ORAL at 21:19

## 2020-10-14 RX ADMIN — Medication 1 TABLET: at 20:01

## 2020-10-14 NOTE — ED NOTES
Pt asking for something to eat and drink - no diet orders at this time. inpt RN notified. This RN notified patient.     Monica Mendoza, RN  10/14/20 1316

## 2020-10-14 NOTE — ED PROVIDER NOTES
Subjective   930  cabg 2/2006 [facility]      Syncope  Associated symptoms: chest pain, nausea, shortness of breath and weakness    Associated symptoms: no confusion, no diaphoresis, no dizziness, no fever, no headaches, no seizures and no vomiting    Chest Pain  Pain location:  L chest  Pain quality: stabbing    Pain radiates to:  Neck  Pain severity:  Moderate  Onset quality:  Sudden  Progression:  Improving  Context: movement    Relieved by:  Rest  Worsened by:  Movement  Associated symptoms: fatigue, nausea, shortness of breath, syncope and weakness    Associated symptoms: no abdominal pain, no cough, no diaphoresis, no dizziness, no dysphagia, no fever, no headache and no vomiting    Risk factors: coronary artery disease, diabetes mellitus, high cholesterol, hypertension and smoking        Review of Systems   Constitutional: Positive for activity change and fatigue. Negative for appetite change, chills, diaphoresis and fever.   HENT: Negative for congestion, ear discharge, ear pain, nosebleeds, rhinorrhea, sinus pressure, sore throat and trouble swallowing.    Eyes: Negative for discharge and redness.   Respiratory: Positive for shortness of breath. Negative for apnea, cough, chest tightness and wheezing.    Cardiovascular: Positive for chest pain and syncope.   Gastrointestinal: Positive for diarrhea and nausea. Negative for abdominal pain and vomiting.   Endocrine: Negative for polyuria.   Genitourinary: Negative for dysuria, frequency and urgency.   Musculoskeletal: Negative for myalgias and neck pain.   Skin: Negative for color change and rash.   Allergic/Immunologic: Negative for immunocompromised state.   Neurological: Positive for syncope and weakness. Negative for dizziness, seizures, light-headedness and headaches.   Hematological: Negative for adenopathy. Does not bruise/bleed easily.   Psychiatric/Behavioral: Negative for behavioral problems and confusion.   All other systems reviewed and are  negative.      Past Medical History:   Diagnosis Date   • Arthritis, rheumatoid (CMS/HCC)    • Arthropathy associated with neurological disorder    • Arthropathy of lumbar facet joint    • Asthma    • Benign hypertension    • Carpal tunnel syndrome    • Diabetes (CMS/HCC)    • Diabetic polyneuropathy (CMS/HCC)    • Dyslipidemia    • Fallen arches    • Hammer toe    • Heart disease    • Joint pain    • Mild depression (CMS/HCC)     Saddness post Surgery 7/10/14 improved after counseling.   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)     Neuropathy of chest      • Neuropathy    • Onychomycosis    • Peripheral vascular disease (CMS/HCC)    • Retinopathy    • Tobacco user    • Type 1 diabetes mellitus (CMS/HCC)        Allergies   Allergen Reactions   • Contrast Dye Shortness Of Breath and Itching   • Corticosteroids Other (See Comments)     Pt diabetic makes sugar go up    • Codeine Itching   • Ibuprofen Unknown - Low Severity       Past Surgical History:   Procedure Laterality Date   • CARDIAC SURGERY  02/21/2014    Critical stenosis in the RCA. Diffusely calcified LAD with 30-80% stenosis. OMB #3 with 60% to 70% stenosis. Preserved LV systolic function with EF of 55%.   • CARPAL TUNNEL RELEASE Right 10/15/2014    Right carpal tunnel release.   • CARPAL TUNNEL RELEASE     • CATARACT EXTRACTION, BILATERAL Bilateral    • CORONARY ARTERY BYPASS GRAFT  02/24/2014    CABG x 3 with LIMA to LAD, endarterectomy to LAD, SVG to OMB and SVG to distal RCA with endarterectomy to distal RCA. Endo-vein harvesting.   • CYSTECTOMY Left     Breast cycst   • EXCISION LESION      Remove breast lesion - cyst   • EYE SURGERY      Insert lens prosthesis   • FOOT SURGERY  10/18/2011    Exostectomy of the right foot. Preulcerative lesion with Charot deformity, right foot   • LUMBAR SPINE SURGERY  11/09/2015    Lumbosacral radiofrequency  thermal coagulation.   • NERVE BLOCK  09/14/2015    Lumbar medial branch block.   • TOE NAIL AVULSION  09/03/2015    DEBRIDE NAIL 6 OR MORE 73429 (1)       Family History   Problem Relation Age of Onset   • Asthma Other    • Coronary artery disease Other    • Diabetes Other    • Hyperlipidemia Other    • Hypertension Other    • Osteoporosis Other    • Cancer Other    • Osteoporosis Mother    • Heart disease Mother    • Heart disease Father    • Diabetes Father    • Heart disease Sister    • Diabetes Sister        Social History     Socioeconomic History   • Marital status:      Spouse name: Not on file   • Number of children: Not on file   • Years of education: Not on file   • Highest education level: Not on file   Tobacco Use   • Smoking status: Former Smoker     Types: Cigarettes   • Smokeless tobacco: Never Used   Substance and Sexual Activity   • Alcohol use: No   • Drug use: No   • Sexual activity: Defer           Objective   Physical Exam  Vitals signs and nursing note reviewed.   Constitutional:       Appearance: She is well-developed.   HENT:      Head: Normocephalic and atraumatic.      Nose: Nose normal.   Eyes:      General: No scleral icterus.        Right eye: No discharge.         Left eye: No discharge.      Conjunctiva/sclera: Conjunctivae normal.      Pupils: Pupils are equal, round, and reactive to light.   Neck:      Musculoskeletal: Normal range of motion and neck supple.      Trachea: No tracheal deviation.   Cardiovascular:      Rate and Rhythm: Normal rate and regular rhythm.      Heart sounds: Normal heart sounds. No murmur.   Pulmonary:      Effort: Pulmonary effort is normal. No respiratory distress.      Breath sounds: Normal breath sounds. No stridor. No wheezing or rales.   Abdominal:      General: Bowel sounds are normal. There is no distension.      Palpations: Abdomen is soft. There is no mass.      Tenderness: There is no abdominal tenderness. There is no guarding or rebound.    Skin:     General: Skin is warm and dry.      Findings: No erythema or rash.   Neurological:      Mental Status: She is alert and oriented to person, place, and time.      Coordination: Coordination normal.   Psychiatric:         Behavior: Behavior normal.         Thought Content: Thought content normal.         Procedures           ED Course             Labs Reviewed   COMPREHENSIVE METABOLIC PANEL - Abnormal; Notable for the following components:       Result Value    Creatinine 0.45 (*)     Albumin 3.30 (*)     All other components within normal limits    Narrative:     GFR Normal >60  Chronic Kidney Disease <60  Kidney Failure <15     CBC WITH AUTO DIFFERENTIAL - Abnormal; Notable for the following components:    RDW 15.8 (*)     All other components within normal limits   TROPONIN (IN-HOUSE) - Normal    Narrative:     Troponin T Reference Range:  <= 0.03 ng/mL-   Negative for AMI  >0.03 ng/mL-     Abnormal for myocardial necrosis.  Clinicians would have to utilize clinical acumen, EKG, Troponin and serial changes to determine if it is an Acute Myocardial Infarction or myocardial injury due to an underlying chronic condition.       Results may be falsely decreased if patient taking Biotin.     TROPONIN (IN-HOUSE) - Normal    Narrative:     Troponin T Reference Range:  <= 0.03 ng/mL-   Negative for AMI  >0.03 ng/mL-     Abnormal for myocardial necrosis.  Clinicians would have to utilize clinical acumen, EKG, Troponin and serial changes to determine if it is an Acute Myocardial Infarction or myocardial injury due to an underlying chronic condition.       Results may be falsely decreased if patient taking Biotin.     BNP (IN-HOUSE) - Normal    Narrative:     Among patients with dyspnea, NT-proBNP is highly sensitive for the detection of acute congestive heart failure. In addition NT-proBNP of <300 pg/ml effectively rules out acute congestive heart failure with 99% negative predictive value.    Results may be  falsely decreased if patient taking Biotin.     RAINBOW DRAW    Narrative:     The following orders were created for panel order [place] Draw.  Procedure                               Abnormality         Status                     ---------                               -----------         ------                     Light Blue Top[[id number]]                                   Final result               Green Top (Gel)[[id number]]                                  Final result               Lavender Top[[id number]]                                     Final result               Gold Top - SST[[id number]]                                   Final result                 Please view results for these tests on the individual orders.   CBC AND DIFFERENTIAL    Narrative:     The following orders were created for panel order CBC & Differential.  Procedure                               Abnormality         Status                     ---------                               -----------         ------                     CBC Auto Differential[[id number]]        Abnormal            Final result                 Please view results for these tests on the individual orders.   LIGHT BLUE TOP   GREEN TOP   LAVENDER TOP   GOLD TOP - SST       XR Chest 1 View   Final Result   Stable exam without acute cardiac pulmonary process.      Electronically signed by:  Gautam Allen MD  10/14/2020   11:04 AM CDT Workstation: [id number]                                          [facility]    Final diagnoses:   Precordial pain   Near syncope            David Kelley MD  10/14/20 0310

## 2020-10-14 NOTE — PLAN OF CARE
Problem: Adult Inpatient Plan of Care  Goal: Plan of Care Review  Outcome: Ongoing, Progressing  Flowsheets (Taken 10/14/2020 1514)  Progress: no change  Plan of Care Reviewed With: patient  Outcome Summary: new admit   Goal Outcome Evaluation:  Plan of Care Reviewed With: patient  Progress: no change  Outcome Summary: new admit

## 2020-10-14 NOTE — H&P
[facility] Medicine Admission      Date of Admission: 10/14/2020      Primary Care Physician: Arelis Laird MD      Chief Complaint: Syncope, chest pain    HPI: Patient is a 65-year-old female with multiple medical problems including diabetes, coronary artery disease, and peripheral vascular disease who presented emergency department complaints chest pain and syncope.  Patient states that she has been staying with her niece and nephew for approximately the last 6 weeks and has been improving in strength.  She states that today she went out on the porch and she was going back and she had a syncopal episode.  She did have some left-sided chest pain that radiated to her neck at around the same time.  She had associated nausea.  She denies fever, chills, headaches, and vomiting.  The time of my review she was asymptomatic.  There were no alleviating or exacerbating factors.    Concurrent Medical History:  has a past medical history of Arthritis, rheumatoid (CMS/HCC), Arthropathy associated with neurological disorder, Arthropathy of lumbar facet joint, Asthma, Benign hypertension, Carpal tunnel syndrome, Diabetes (CMS/HCC), Diabetic polyneuropathy (CMS/HCC), Dyslipidemia, Fallen arches, Hammer toe, Heart disease, Joint pain, Mild depression (CMS/HCC), Multiple vessel coronary artery disease, Myofascial pain, Neurologic disorder associated with diabetes mellitus (CMS/HCC), Neuropathy, Onychomycosis, Peripheral vascular disease (CMS/HCC), Retinopathy, Tobacco user, and Type 1 diabetes mellitus (CMS/HCC).    Past Surgical History:  has a past surgical history that includes Carpal tunnel release (Right, 10/15/2014); Cardiac surgery (02/21/2014); Cystectomy (Left); Cataract extraction, bilateral (Bilateral); Carpal tunnel release; Coronary artery bypass graft (02/24/2014); Toe Nail Avulsion (09/03/2015); Foot surgery (10/18/2011); Nerve Block (09/14/2015); Eye surgery;  Lumbar spine surgery (11/09/2015); and Excision Lesion.    Family History: family history includes Asthma in an other family member; Cancer in an other family member; Coronary artery disease in an other family member; Diabetes in her father, sister, and another family member; Heart disease in her father, mother, and sister; Hyperlipidemia in an other family member; Hypertension in an other family member; Osteoporosis in her mother and another family member.     Social History:  reports that she has quit smoking. Her smoking use included cigarettes. She has never used smokeless tobacco. She reports that she does not drink alcohol or use drugs.    Allergies:   Allergies   Allergen Reactions   • Contrast Dye Shortness Of Breath and Itching   • Corticosteroids Other (See Comments)     Pt diabetic makes sugar go up    • Codeine Itching   • Ibuprofen Unknown - Low Severity       Medications:   Prior to Admission medications    Medication Sig Start Date End Date Taking? Authorizing Provider   acetaminophen (TYLENOL) 325 MG tablet Take 650 mg by mouth 3 (Three) Times a Day.   Yes Christiano Coates MD   aspirin 81 MG EC tablet Take 81 mg by mouth Daily.   Yes Christiano Coates MD   atorvastatin (LIPITOR) 20 MG tablet Take 1 tablet by mouth Every Night. 9/16/19  Yes Jorden Sanz MD   B-D ULTRAFINE III SHORT PEN 31G X 8 MM misc  8/8/16  Yes Christiano Coates MD   carvedilol (COREG) 12.5 MG tablet TAKE 1 TABLET BY MOUTH TWICE DAILY 4/27/20  Yes Christiano Coates MD   Continuous Blood Gluc Sensor (DEXCOM G6 SENSOR) As Needed (glucose control). Every 10 daysDexcom G6 Sensor Kit [address]-89 Use as directed for continuous glucose monitoring 10/21/19  Yes Dominic Willard MD   gabapentin (NEURONTIN) 600 MG tablet Take 600 mg by mouth 3 (Three) Times a Day. 8/6/20  Yes Christiano Coates MD   glucose blood test strip Contour next test strips use to test sugar 4 times per day. E 10.9 9/4/19   Yes Dominic Willard MD   insulin aspart (novoLOG FLEXPEN) 100 UNIT/ML solution pen-injector sc pen Inject  under the skin into the appropriate area as directed 3 (Three) Times a Day With Meals. 150-200=2 units, 201-250=3 units, 251-300=5 units, 301-350=7 units, 351-400=9 units, 401-450=12 units, >450=15 units   Yes Christiano Coates MD   Insulin Glargine, 2 Unit Dial, (Toujeo Max SoloStar) 300 UNIT/ML solution pen-injector injection Inject 28 Units under the skin into the appropriate area as directed Every Night.   Yes Christiano Coates MD   isosorbide mononitrate (IMDUR) 30 MG 24 hr tablet Take 30 mg by mouth Daily. 7/18/16  Yes Christiano Coates MD   levothyroxine (SYNTHROID) 25 MCG tablet Take 25 mcg by mouth Daily.   Yes Christiano Coates MD   lisinopril (PRINIVIL,ZESTRIL) 10 MG tablet Take 10 mg by mouth Daily.   Yes Christiano Coates MD   melatonin 5 MG tablet tablet Take 5 mg by mouth Every Night.   Yes Christiano Coates MD   Multiple Vitamins-Minerals (Centrum Silver 50+Women) tablet Take 1 tablet by mouth Daily.   Yes Christiano Coates MD   nitroglycerin (NITRODUR) 0.2 MG/HR patch Place 1 patch on the skin as directed by provider Daily. 6/23/16  Yes Christiano Coates MD   PARoxetine (PAXIL) 20 MG tablet Take 20 mg by mouth Every Night. 8/30/16  Yes Christiano Coates MD   promethazine (PHENERGAN) 25 MG tablet Take 25 mg by mouth Every 6 (Six) Hours As Needed. 5/14/20  Yes Christiano Coates MD   tiZANidine (ZANAFLEX) 4 MG tablet Take 4 mg by mouth Every 8 (Eight) Hours As Needed.   Yes Christiano Coates MD   TRUEPLUS LANCETS 33G misc  7/6/16  Yes Crhistiano Coates MD   Vitamin D, Cholecalciferol, 50 MCG (2000 UT) capsule Take 1 capsule by mouth Daily.   Yes Christiano Coates MD   lisinopril (PRINIVIL,ZESTRIL) 10 MG tablet Take 1 tablet by mouth Daily. 8/12/19 10/14/20 Yes Dominic Willard MD   melatonin 1 MG tablet Take  by mouth Every  Night.  10/14/20 Yes Christiano Coates MD   acetaminophen (TYLENOL) 325 MG tablet Take 975 mg by mouth 2 (Two) Times a Day.  10/14/20  Christiano Coates MD   Calcium Citrate-Vitamin D (CALCIUM + D PO) Take 1 tablet/day by mouth.  10/14/20  Christiano Coates MD   cycloSPORINE (RESTASIS) 0.05 % ophthalmic emulsion 1 drop 2 (Two) Times a Day.  10/14/20  Christiano Coates MD   gabapentin (NEURONTIN) 600 MG tablet 1,200 mg 3 (Three) Times a Day. 8/15/16 10/14/20  Christiano Coates MD   Insulin Glargine (TOUJEO SOLOSTAR) 300 UNIT/ML solution pen-injector Inject 27-35 Units under the skin into the appropriate area as directed every night at bedtime. Up to 35 units according to patient 8/12/19 10/14/20  Dominic Willard MD   leflunomide (ARAVA) 20 MG tablet  8/8/16 10/14/20  Christiano Coates MD   montelukast (SINGULAIR) 10 MG tablet  8/18/16 10/14/20  Christiano Coates MD   Multiple Vitamin (DAILY VITAMIN FORMULA PO) Take 1 tablet/day by mouth.  10/14/20  Christiano Coates MD   NOVOLOG FLEXPEN 100 UNIT/ML solution pen-injector sc pen INJECT UNDER THE SKIN AS DIRECTED (1 UNIT PER 10 CARBS) 4/2/20 10/14/20  Dominic Willard MD   pyridoxine (VITAMIN B-6) 100 MG tablet tablet Take 200 mg by mouth 2 (Two) Times a Day.  10/14/20  Christiano Coates MD TOUJEO SOLOSTAR 300 UNIT/ML solution pen-injector injection INJECT 35 UNITS SUBCUTANEOUSLY AT BEDTIME  Patient taking differently: Inject 28 Units under the skin into the appropriate area as directed Every Night. 3/18/20 10/14/20  Dominic Willard MD   vitamin B-12 (CYANOCOBALAMIN) 1000 MCG tablet Take 1,000 mcg by mouth 2 (Two) Times a Day.  10/14/20  Christiano Coates MD       Review of Systems:  Review of Systems   Constitutional: Negative for appetite change, chills, fatigue, fever and unexpected weight change.   HENT: Negative for congestion, facial swelling, hearing loss, nosebleeds, rhinorrhea, sneezing,  trouble swallowing and voice change.    Eyes: Negative for photophobia and visual disturbance.   Respiratory: Positive for cough and shortness of breath. Negative for apnea, choking, chest tightness, wheezing and stridor.    Cardiovascular: Positive for chest pain. Negative for palpitations and leg swelling.   Gastrointestinal: Negative for abdominal pain, blood in stool, constipation, diarrhea, nausea and vomiting.   Endocrine: Negative for cold intolerance, heat intolerance, polydipsia, polyphagia and polyuria.   Genitourinary: Negative for dysuria, flank pain and hematuria.   Musculoskeletal: Negative for arthralgias, back pain, myalgias and neck pain.   Skin: Negative for rash and wound.   Allergic/Immunologic: Negative for immunocompromised state.   Neurological: Positive for syncope. Negative for dizziness, seizures, speech difficulty, weakness, light-headedness, numbness and headaches.   Hematological: Does not bruise/bleed easily.   Psychiatric/Behavioral: Negative for agitation, behavioral problems, confusion, decreased concentration, hallucinations, self-injury and suicidal ideas. The patient is not nervous/anxious.       Otherwise complete ROS is negative except as mentioned above.    Physical Exam:   Temp:  [97.8 °F (36.6 °C)-99.3 °F (37.4 °C)] 99.3 °F (37.4 °C)  Heart Rate:  [73-90] 89  Resp:  [16-20] 20  BP: (148-175)/(69-81) 175/81     Physical Exam  Constitutional:       Appearance: She is well-developed.   HENT:      Head: Normocephalic and atraumatic.      Nose: Nose normal.   Eyes:      General: Lids are normal. No scleral icterus.     Conjunctiva/sclera: Conjunctivae normal.      Pupils: Pupils are equal, round, and reactive to light.   Neck:      Musculoskeletal: Normal range of motion and neck supple. Normal range of motion. No edema, neck rigidity or spinous process tenderness.      Vascular: No JVD.      Trachea: No tracheal tenderness or tracheal deviation.   Cardiovascular:      Rate and  Rhythm: Normal rate and regular rhythm.      Pulses: Normal pulses.      Heart sounds: Normal heart sounds, S1 normal and S2 normal. No murmur. No friction rub. No gallop.    Pulmonary:      Effort: Pulmonary effort is normal. No accessory muscle usage or respiratory distress.      Breath sounds: Normal breath sounds. No decreased breath sounds, wheezing or rales.   Chest:      Chest wall: No tenderness.   Abdominal:      General: Bowel sounds are normal. There is no distension.      Palpations: Abdomen is soft. There is no mass.      Tenderness: There is no abdominal tenderness. There is no guarding or rebound.   Musculoskeletal:         General: No tenderness.      Right shoulder: She exhibits normal range of motion, no tenderness and no pain.   Skin:     General: Skin is warm.      Coloration: Skin is not pale.      Findings: No rash.   Neurological:      Mental Status: She is alert and oriented to person, place, and time.      Cranial Nerves: No cranial nerve deficit.      Sensory: No sensory deficit.      Motor: No atrophy, abnormal muscle tone or seizure activity.      Coordination: Coordination normal.      Deep Tendon Reflexes: Reflexes are normal and symmetric. Reflexes normal.   Psychiatric:         Behavior: Behavior normal.         Thought Content: Thought content normal.         Judgment: Judgment normal.         Results Reviewed:  I have personally reviewed current lab, radiology, and data and agree with results.  Lab Results (last 24 hours)     Procedure Component Value Units Date/Time    Troponin [[id number]]  (Normal) Collected: 10/14/20 1216    Specimen: Blood Updated: 10/14/20 1240     Troponin T <0.010 ng/mL     Narrative:      Troponin T Reference Range:  <= 0.03 ng/mL-   Negative for AMI  >0.03 ng/mL-     Abnormal for myocardial necrosis.  Clinicians would have to utilize clinical acumen, EKG, Troponin and serial changes to determine if it is an Acute Myocardial Infarction or myocardial injury  due to an underlying chronic condition.       Results may be falsely decreased if patient taking Biotin.      [facility] [[id number]] Collected: 10/14/20 1015    Specimen: Blood Updated: 10/14/20 1130    Narrative:      The following orders were created for panel order [facility].  Procedure                               Abnormality         Status                     ---------                               -----------         ------                     Light Blue Top[[id number]]                                   Final result               Green Top (Gel)[[id number]]                                  Final result               Lavender Top[[id number]]                                     Final result               Gold Top - SST[[id number]]                                   Final result                 Please view results for these tests on the individual orders.    Lavender Top [[id number]] Collected: 10/14/20 1015    Specimen: Blood Updated: 10/14/20 1130     Extra Tube hold for add-on     Comment: Auto resulted       Light Blue Top [[id number]] Collected: 10/14/20 1015    Specimen: Blood Updated: 10/14/20 1115     Extra Tube hold for add-on     Comment: Auto resulted       Green Top (Gel) [[id number]] Collected: 10/14/20 1015    Specimen: Blood Updated: 10/14/20 1115     Extra Tube Hold for add-ons.     Comment: Auto resulted.       Gold Top - SST [[id number]] Collected: 10/14/20 1015    Specimen: Blood Updated: 10/14/20 1115     Extra Tube Hold for add-ons.     Comment: Auto resulted.       Troponin [[id number]]  (Normal) Collected: 10/14/20 1015    Specimen: Blood Updated: 10/14/20 1047     Troponin T <0.010 ng/mL     Narrative:      Troponin T Reference Range:  <= 0.03 ng/mL-   Negative for AMI  >0.03 ng/mL-     Abnormal for myocardial necrosis.  Clinicians would have to utilize clinical acumen, EKG, Troponin and serial changes to determine if it is an Acute Myocardial Infarction or myocardial injury due to an  underlying chronic condition.       Results may be falsely decreased if patient taking Biotin.      Comprehensive Metabolic Panel [[id number]]  (Abnormal) Collected: 10/14/20 1015    Specimen: Blood Updated: 10/14/20 1047     Glucose 88 mg/dL      BUN 10 mg/dL      Creatinine 0.45 mg/dL      Sodium 136 mmol/L      Potassium 3.7 mmol/L      Chloride 102 mmol/L      CO2 27.0 mmol/L      Calcium 9.7 mg/dL      Total Protein 6.8 g/dL      Albumin 3.30 g/dL      ALT (SGPT) 9 U/L      AST (SGOT) 17 U/L      Alkaline Phosphatase 105 U/L      Total Bilirubin 0.3 mg/dL      eGFR Non African Amer 139 mL/min/1.73      Globulin 3.5 gm/dL      A/G Ratio 0.9 g/dL      BUN/Creatinine Ratio 22.2     Anion Gap 7.0 mmol/L     Narrative:      GFR Normal >60  Chronic Kidney Disease <60  Kidney Failure <15      BNP [[id number]]  (Normal) Collected: 10/14/20 1015    Specimen: Blood Updated: 10/14/20 1043     proBNP 417.3 pg/mL     Narrative:      Among patients with dyspnea, NT-proBNP is highly sensitive for the detection of acute congestive heart failure. In addition NT-proBNP of <300 pg/ml effectively rules out acute congestive heart failure with 99% negative predictive value.    Results may be falsely decreased if patient taking Biotin.      CBC & Differential [[id number]]  (Abnormal) Collected: 10/14/20 1015    Specimen: Blood Updated: 10/14/20 1025    Narrative:      The following orders were created for panel order CBC & Differential.  Procedure                               Abnormality         Status                     ---------                               -----------         ------                     CBC Auto Differential[[id number]]        Abnormal            Final result                 Please view results for these tests on the individual orders.    CBC Auto Differential [[id number]]  (Abnormal) Collected: 10/14/20 1015    Specimen: Blood Updated: 10/14/20 1025     WBC 7.05 10*3/mm3      RBC 4.16 10*6/mm3      Hemoglobin 12.3  g/dL      Hematocrit 38.2 %      MCV 91.8 fL      MCH 29.6 pg      MCHC 32.2 g/dL      RDW 15.8 %      RDW-SD 52.9 fl      MPV 9.7 fL      Platelets 321 10*3/mm3      Neutrophil % 64.2 %      Lymphocyte % 23.0 %      Monocyte % 8.8 %      Eosinophil % 2.7 %      Basophil % 0.9 %      Immature Grans % 0.4 %      Neutrophils, Absolute 4.53 10*3/mm3      Lymphocytes, Absolute 1.62 10*3/mm3      Monocytes, Absolute 0.62 10*3/mm3      Eosinophils, Absolute 0.19 10*3/mm3      Basophils, Absolute 0.06 10*3/mm3      Immature Grans, Absolute 0.03 10*3/mm3      nRBC 0.0 /100 WBC         Imaging Results (Last 24 Hours)     Procedure Component Value Units Date/Time    XR Chest 1 View [[id number]] Collected: 10/14/20 1023     Updated: 10/14/20 1105    Narrative:      EXAM: XR CHEST 1 VIEW    COMPARISONS: Radiograph 12/15/2014 and 3/26/2014.    INDICATION: Chest pain    FINDINGS:  Frontal view of the chest.    No significant change in elevation of left hemidiaphragm with  subjacent subsegmental atelectasis. No consolidation, effusion,  pneumothorax. Cardiomediastinal silhouette is unchanged with  normal heart size. No acute osseous abnormality. Sternotomy wires  are noted.      Impression:      Stable exam without acute cardiac pulmonary process.    Electronically signed by:  Gautam Allen MD  10/14/2020  11:04 AM CDT Workstation: [id number]            Assessment:    Active Hospital Problems    Diagnosis   • Precordial pain             Plan:  1.  Syncope: Patient with no history of syncope.  Will monitor on telemetry and get echocardiography.   2.  Chest pain:  Low risk.  Atypical..  Finish rule out myocardial infarction.  3.  Coronary artery disease, continue home medication.  4.  Diabetes: Continue long-acting insulin from home and sliding scale.  5.  DVT prophylaxis: Heparin.    I discussed the patient's findings and my recommendations with: Patient        This document has been electronically signed by Sukhjinder AGUILAR  MD Farooq on October 14, 2020 18:41 CDT

## 2020-10-15 ENCOUNTER — APPOINTMENT (OUTPATIENT)
Dept: CARDIOLOGY | Facility: HOSPITAL | Age: 65
End: 2020-10-15

## 2020-10-15 LAB
ANION GAP SERPL CALCULATED.3IONS-SCNC: 7 MMOL/L (ref 5–15)
BASOPHILS # BLD AUTO: 0.05 10*3/MM3 (ref 0–0.2)
BASOPHILS NFR BLD AUTO: 0.9 % (ref 0–1.5)
BH CV ECHO MEAS - ACS: 1.8 CM
BH CV ECHO MEAS - AO MAX PG (FULL): 8.1 MMHG
BH CV ECHO MEAS - AO MAX PG: 14.6 MMHG
BH CV ECHO MEAS - AO MEAN PG (FULL): 5 MMHG
BH CV ECHO MEAS - AO MEAN PG: 9 MMHG
BH CV ECHO MEAS - AO ROOT AREA (BSA CORRECTED): 1.3
BH CV ECHO MEAS - AO ROOT AREA: 5.7 CM^2
BH CV ECHO MEAS - AO ROOT DIAM: 2.7 CM
BH CV ECHO MEAS - AO V2 MAX: 191 CM/SEC
BH CV ECHO MEAS - AO V2 MEAN: 144 CM/SEC
BH CV ECHO MEAS - AO V2 VTI: 34.8 CM
BH CV ECHO MEAS - ASC AORTA: 3 CM
BH CV ECHO MEAS - AVA(I,A): 3.1 CM^2
BH CV ECHO MEAS - AVA(I,D): 3.1 CM^2
BH CV ECHO MEAS - AVA(V,A): 3 CM^2
BH CV ECHO MEAS - AVA(V,D): 3 CM^2
BH CV ECHO MEAS - BSA(HAYCOCK): 2.1 M^2
BH CV ECHO MEAS - BSA: 2.1 M^2
BH CV ECHO MEAS - BZI_BMI: 29.8 KILOGRAMS/M^2
BH CV ECHO MEAS - BZI_METRIC_HEIGHT: 175.3 CM
BH CV ECHO MEAS - BZI_METRIC_WEIGHT: 91.6 KG
BH CV ECHO MEAS - EDV(CUBED): 35.3 ML
BH CV ECHO MEAS - EDV(MOD-SP2): 39.5 ML
BH CV ECHO MEAS - EDV(MOD-SP4): 47.6 ML
BH CV ECHO MEAS - EDV(TEICH): 43.5 ML
BH CV ECHO MEAS - EF(CUBED): 66.9 %
BH CV ECHO MEAS - EF(MOD-SP2): 37.2 %
BH CV ECHO MEAS - EF(MOD-SP4): 68.7 %
BH CV ECHO MEAS - EF(TEICH): 59.7 %
BH CV ECHO MEAS - ESV(CUBED): 11.7 ML
BH CV ECHO MEAS - ESV(MOD-SP2): 24.8 ML
BH CV ECHO MEAS - ESV(MOD-SP4): 14.9 ML
BH CV ECHO MEAS - ESV(TEICH): 17.5 ML
BH CV ECHO MEAS - FS: 30.8 %
BH CV ECHO MEAS - IVS/LVPW: 0.96
BH CV ECHO MEAS - IVSD: 0.98 CM
BH CV ECHO MEAS - LA DIMENSION: 2.9 CM
BH CV ECHO MEAS - LA/AO: 1.1
BH CV ECHO MEAS - LV DIASTOLIC VOL/BSA (35-75): 22.9 ML/M^2
BH CV ECHO MEAS - LV MASS(C)D: 94 GRAMS
BH CV ECHO MEAS - LV MASS(C)DI: 45.3 GRAMS/M^2
BH CV ECHO MEAS - LV MAX PG: 6.5 MMHG
BH CV ECHO MEAS - LV MEAN PG: 4 MMHG
BH CV ECHO MEAS - LV SYSTOLIC VOL/BSA (12-30): 7.2 ML/M^2
BH CV ECHO MEAS - LV V1 MAX: 127 CM/SEC
BH CV ECHO MEAS - LV V1 MEAN: 89.7 CM/SEC
BH CV ECHO MEAS - LV V1 VTI: 23.9 CM
BH CV ECHO MEAS - LVIDD: 3.3 CM
BH CV ECHO MEAS - LVIDS: 2.3 CM
BH CV ECHO MEAS - LVLD AP2: 7.2 CM
BH CV ECHO MEAS - LVLD AP4: 6.2 CM
BH CV ECHO MEAS - LVLS AP2: 6.3 CM
BH CV ECHO MEAS - LVLS AP4: 5.2 CM
BH CV ECHO MEAS - LVOT AREA (M): 4.5 CM^2
BH CV ECHO MEAS - LVOT AREA: 4.5 CM^2
BH CV ECHO MEAS - LVOT DIAM: 2.4 CM
BH CV ECHO MEAS - LVPWD: 1 CM
BH CV ECHO MEAS - MV A MAX VEL: 135 CM/SEC
BH CV ECHO MEAS - MV DEC SLOPE: 548 CM/SEC^2
BH CV ECHO MEAS - MV E MAX VEL: 114 CM/SEC
BH CV ECHO MEAS - MV E/A: 0.84
BH CV ECHO MEAS - MV P1/2T MAX VEL: 125 CM/SEC
BH CV ECHO MEAS - MV P1/2T: 66.8 MSEC
BH CV ECHO MEAS - MVA P1/2T LCG: 1.8 CM^2
BH CV ECHO MEAS - MVA(P1/2T): 3.3 CM^2
BH CV ECHO MEAS - PA MAX PG (FULL): 0.15 MMHG
BH CV ECHO MEAS - PA MAX PG: 3.3 MMHG
BH CV ECHO MEAS - PA V2 MAX: 90.2 CM/SEC
BH CV ECHO MEAS - RAP SYSTOLE: 5 MMHG
BH CV ECHO MEAS - RV MAX PG: 3.1 MMHG
BH CV ECHO MEAS - RV MEAN PG: 2 MMHG
BH CV ECHO MEAS - RV V1 MAX: 88.1 CM/SEC
BH CV ECHO MEAS - RV V1 MEAN: 71.5 CM/SEC
BH CV ECHO MEAS - RV V1 VTI: 19.5 CM
BH CV ECHO MEAS - RVDD: 2.9 CM
BH CV ECHO MEAS - RVSP: 25.1 MMHG
BH CV ECHO MEAS - SI(AO): 96.1 ML/M^2
BH CV ECHO MEAS - SI(CUBED): 11.4 ML/M^2
BH CV ECHO MEAS - SI(LVOT): 52.1 ML/M^2
BH CV ECHO MEAS - SI(MOD-SP2): 7.1 ML/M^2
BH CV ECHO MEAS - SI(MOD-SP4): 15.8 ML/M^2
BH CV ECHO MEAS - SI(TEICH): 12.5 ML/M^2
BH CV ECHO MEAS - SV(AO): 199.2 ML
BH CV ECHO MEAS - SV(CUBED): 23.6 ML
BH CV ECHO MEAS - SV(LVOT): 108.1 ML
BH CV ECHO MEAS - SV(MOD-SP2): 14.7 ML
BH CV ECHO MEAS - SV(MOD-SP4): 32.7 ML
BH CV ECHO MEAS - SV(TEICH): 26 ML
BH CV ECHO MEAS - TR MAX VEL: 224 CM/SEC
BUN SERPL-MCNC: 9 MG/DL (ref 8–23)
BUN/CREAT SERPL: 22.5 (ref 7–25)
CALCIUM SPEC-SCNC: 9.6 MG/DL (ref 8.6–10.5)
CHLORIDE SERPL-SCNC: 104 MMOL/L (ref 98–107)
CO2 SERPL-SCNC: 28 MMOL/L (ref 22–29)
CREAT SERPL-MCNC: 0.4 MG/DL (ref 0.57–1)
DEPRECATED RDW RBC AUTO: 52.7 FL (ref 37–54)
EOSINOPHIL # BLD AUTO: 0.16 10*3/MM3 (ref 0–0.4)
EOSINOPHIL NFR BLD AUTO: 2.9 % (ref 0.3–6.2)
ERYTHROCYTE [DISTWIDTH] IN BLOOD BY AUTOMATED COUNT: 15.9 % (ref 12.3–15.4)
GFR SERPL CREATININE-BSD FRML MDRD: >150 ML/MIN/1.73
GLUCOSE BLDC GLUCOMTR-MCNC: 171 MG/DL (ref 70–130)
GLUCOSE BLDC GLUCOMTR-MCNC: 257 MG/DL (ref 70–130)
GLUCOSE BLDC GLUCOMTR-MCNC: 266 MG/DL (ref 70–130)
GLUCOSE BLDC GLUCOMTR-MCNC: 314 MG/DL (ref 70–130)
GLUCOSE BLDC GLUCOMTR-MCNC: 357 MG/DL (ref 70–130)
GLUCOSE SERPL-MCNC: 185 MG/DL (ref 65–99)
HCT VFR BLD AUTO: 35.7 % (ref 34–46.6)
HGB BLD-MCNC: 11.6 G/DL (ref 12–15.9)
IMM GRANULOCYTES # BLD AUTO: 0.02 10*3/MM3 (ref 0–0.05)
IMM GRANULOCYTES NFR BLD AUTO: 0.4 % (ref 0–0.5)
LYMPHOCYTES # BLD AUTO: 2.11 10*3/MM3 (ref 0.7–3.1)
LYMPHOCYTES NFR BLD AUTO: 37.7 % (ref 19.6–45.3)
MAXIMAL PREDICTED HEART RATE: 153 BPM
MCH RBC QN AUTO: 29.4 PG (ref 26.6–33)
MCHC RBC AUTO-ENTMCNC: 32.5 G/DL (ref 31.5–35.7)
MCV RBC AUTO: 90.6 FL (ref 79–97)
MONOCYTES # BLD AUTO: 0.64 10*3/MM3 (ref 0.1–0.9)
MONOCYTES NFR BLD AUTO: 11.4 % (ref 5–12)
NEUTROPHILS NFR BLD AUTO: 2.62 10*3/MM3 (ref 1.7–7)
NEUTROPHILS NFR BLD AUTO: 46.7 % (ref 42.7–76)
NRBC BLD AUTO-RTO: 0 /100 WBC (ref 0–0.2)
PLATELET # BLD AUTO: 320 10*3/MM3 (ref 140–450)
PMV BLD AUTO: 10.4 FL (ref 6–12)
POTASSIUM SERPL-SCNC: 4 MMOL/L (ref 3.5–5.2)
RBC # BLD AUTO: 3.94 10*6/MM3 (ref 3.77–5.28)
SODIUM SERPL-SCNC: 139 MMOL/L (ref 136–145)
STRESS TARGET HR: 130 BPM
WBC # BLD AUTO: 5.6 10*3/MM3 (ref 3.4–10.8)

## 2020-10-15 PROCEDURE — 93306 TTE W/DOPPLER COMPLETE: CPT

## 2020-10-15 PROCEDURE — 85025 COMPLETE CBC W/AUTO DIFF WBC: CPT | Performed by: HOSPITALIST

## 2020-10-15 PROCEDURE — 82962 GLUCOSE BLOOD TEST: CPT

## 2020-10-15 PROCEDURE — 93005 ELECTROCARDIOGRAM TRACING: CPT | Performed by: HOSPITALIST

## 2020-10-15 PROCEDURE — 80048 BASIC METABOLIC PNL TOTAL CA: CPT | Performed by: HOSPITALIST

## 2020-10-15 PROCEDURE — 96372 THER/PROPH/DIAG INJ SC/IM: CPT

## 2020-10-15 PROCEDURE — 63710000001 PROMETHAZINE PER 25 MG: Performed by: HOSPITALIST

## 2020-10-15 PROCEDURE — G0378 HOSPITAL OBSERVATION PER HR: HCPCS

## 2020-10-15 PROCEDURE — 93010 ELECTROCARDIOGRAM REPORT: CPT | Performed by: INTERNAL MEDICINE

## 2020-10-15 PROCEDURE — 25010000002 HEPARIN (PORCINE) PER 1000 UNITS: Performed by: HOSPITALIST

## 2020-10-15 PROCEDURE — 63710000001 INSULIN DETEMIR PER 5 UNITS: Performed by: HOSPITALIST

## 2020-10-15 PROCEDURE — 63710000001 INSULIN ASPART PER 5 UNITS: Performed by: HOSPITALIST

## 2020-10-15 RX ADMIN — PAROXETINE HYDROCHLORIDE 20 MG: 20 TABLET, FILM COATED ORAL at 21:11

## 2020-10-15 RX ADMIN — HEPARIN SODIUM 5000 UNITS: 5000 INJECTION INTRAVENOUS; SUBCUTANEOUS at 21:11

## 2020-10-15 RX ADMIN — TIZANIDINE 4 MG: 4 TABLET ORAL at 05:49

## 2020-10-15 RX ADMIN — INSULIN ASPART 3 UNITS: 100 INJECTION, SOLUTION INTRAVENOUS; SUBCUTANEOUS at 07:56

## 2020-10-15 RX ADMIN — PROMETHAZINE HYDROCHLORIDE 25 MG: 25 TABLET ORAL at 19:20

## 2020-10-15 RX ADMIN — GABAPENTIN 600 MG: 300 CAPSULE ORAL at 21:11

## 2020-10-15 RX ADMIN — CARVEDILOL 12.5 MG: 12.5 TABLET, FILM COATED ORAL at 07:58

## 2020-10-15 RX ADMIN — ACETAMINOPHEN 650 MG: 325 TABLET, FILM COATED ORAL at 21:11

## 2020-10-15 RX ADMIN — MELATONIN 2000 UNITS: at 07:57

## 2020-10-15 RX ADMIN — LISINOPRIL 10 MG: 10 TABLET ORAL at 07:58

## 2020-10-15 RX ADMIN — LEVOTHYROXINE SODIUM 25 MCG: 25 TABLET ORAL at 05:44

## 2020-10-15 RX ADMIN — SODIUM CHLORIDE, PRESERVATIVE FREE 10 ML: 5 INJECTION INTRAVENOUS at 21:12

## 2020-10-15 RX ADMIN — INSULIN ASPART 12 UNITS: 100 INJECTION, SOLUTION INTRAVENOUS; SUBCUTANEOUS at 16:16

## 2020-10-15 RX ADMIN — ACETAMINOPHEN 650 MG: 325 TABLET, FILM COATED ORAL at 15:04

## 2020-10-15 RX ADMIN — GABAPENTIN 600 MG: 300 CAPSULE ORAL at 13:13

## 2020-10-15 RX ADMIN — NITROGLYCERIN 1 PATCH: 0.2 PATCH TRANSDERMAL at 07:59

## 2020-10-15 RX ADMIN — ATORVASTATIN CALCIUM 20 MG: 20 TABLET, FILM COATED ORAL at 21:11

## 2020-10-15 RX ADMIN — Medication 1 TABLET: at 07:58

## 2020-10-15 RX ADMIN — INSULIN ASPART 8 UNITS: 100 INJECTION, SOLUTION INTRAVENOUS; SUBCUTANEOUS at 10:44

## 2020-10-15 RX ADMIN — MELATONIN 5.25 MG: 3 TAB ORAL at 21:11

## 2020-10-15 RX ADMIN — ASPIRIN 81 MG: 81 TABLET, COATED ORAL at 07:58

## 2020-10-15 RX ADMIN — HEPARIN SODIUM 5000 UNITS: 5000 INJECTION INTRAVENOUS; SUBCUTANEOUS at 05:44

## 2020-10-15 RX ADMIN — CARVEDILOL 12.5 MG: 12.5 TABLET, FILM COATED ORAL at 15:06

## 2020-10-15 RX ADMIN — SODIUM CHLORIDE, PRESERVATIVE FREE 10 ML: 5 INJECTION INTRAVENOUS at 07:56

## 2020-10-15 RX ADMIN — TIZANIDINE 4 MG: 4 TABLET ORAL at 19:18

## 2020-10-15 RX ADMIN — GABAPENTIN 600 MG: 300 CAPSULE ORAL at 05:44

## 2020-10-15 RX ADMIN — INSULIN DETEMIR 25 UNITS: 100 INJECTION, SOLUTION SUBCUTANEOUS at 21:11

## 2020-10-15 RX ADMIN — ACETAMINOPHEN 650 MG: 325 TABLET, FILM COATED ORAL at 08:00

## 2020-10-15 RX ADMIN — ISOSORBIDE MONONITRATE 30 MG: 30 TABLET, EXTENDED RELEASE ORAL at 07:58

## 2020-10-15 RX ADMIN — HEPARIN SODIUM 5000 UNITS: 5000 INJECTION INTRAVENOUS; SUBCUTANEOUS at 13:13

## 2020-10-15 NOTE — PROGRESS NOTES
[facility]  INPATIENT PROGRESS NOTE    Length of Stay: 0  Date of Admission: 10/14/2020  Primary Care Physician: Arelis Laird MD    Subjective   Chief Complaint: Syncope, chest pain  HPI:   Feeling better.  No specific complaints.    Review of Systems   Constitutional: Negative for appetite change, chills, fatigue, fever and unexpected weight change.   Respiratory: Negative for cough, choking, chest tightness, shortness of breath and wheezing.    Cardiovascular: Negative for chest pain, palpitations and leg swelling.   Gastrointestinal: Negative for abdominal pain, blood in stool, constipation, diarrhea, nausea and vomiting.   Genitourinary: Negative for dysuria, flank pain and hematuria.   Neurological: Negative for dizziness, seizures, syncope, speech difficulty, weakness, light-headedness, numbness and headaches.   Hematological: Does not bruise/bleed easily.        All pertinent negatives and positives are as above. All other systems have been reviewed and are negative unless otherwise stated.     Objective    Temp:  [97.2 °F (36.2 °C)-99.3 °F (37.4 °C)] 97.2 °F (36.2 °C)  Heart Rate:  [76-90] 82  Resp:  [16-20] 16  BP: (143-180)/(65-81) 143/68    Physical Exam  Vitals signs reviewed.   Constitutional:       Appearance: She is well-developed.   HENT:      Head: Normocephalic and atraumatic.   Eyes:      Pupils: Pupils are equal, round, and reactive to light.   Neck:      Musculoskeletal: Normal range of motion and neck supple.   Cardiovascular:      Rate and Rhythm: Normal rate and regular rhythm.      Heart sounds: Normal heart sounds. No murmur. No friction rub. No gallop.    Pulmonary:      Effort: Pulmonary effort is normal. No respiratory distress.      Breath sounds: Normal breath sounds. No wheezing or rales.   Chest:      Chest wall: No tenderness.   Abdominal:      General: Bowel sounds are normal. There is no distension.       Palpations: Abdomen is soft.      Tenderness: There is no abdominal tenderness. There is no guarding.   Skin:     General: Skin is warm and dry.   Psychiatric:         Behavior: Behavior normal.         Thought Content: Thought content normal.             Results Review:  I have reviewed the labs, radiology results, and diagnostic studies.    Laboratory Data:   Results from last 7 days   Lab Units 10/15/20  0542 10/14/20  1015   SODIUM mmol/L 139 136   POTASSIUM mmol/L 4.0 3.7   CHLORIDE mmol/L 104 102   CO2 mmol/L 28.0 27.0   BUN mg/dL 9 10   CREATININE mg/dL 0.40* 0.45*   GLUCOSE mg/dL 185* 88   CALCIUM mg/dL 9.6 9.7   BILIRUBIN mg/dL  --  0.3   ALK PHOS U/L  --  105   ALT (SGPT) U/L  --  9   AST (SGOT) U/L  --  17   ANION GAP mmol/L 7.0 7.0     Estimated Creatinine Clearance: 83.2 mL/min (A) (by C-G formula based on SCr of 0.4 mg/dL (L)).          Results from last 7 days   Lab Units 10/15/20  0543 10/14/20  1015   WBC 10*3/mm3 5.60 7.05   HEMOGLOBIN g/dL 11.6* 12.3   HEMATOCRIT % 35.7 38.2   PLATELETS 10*3/mm3 320 321           Culture Data:   No results found for: BLOODCX  No results found for: URINECX  No results found for: RESPCX  No results found for: WOUNDCX  No results found for: STOOLCX  No components found for: BODYFLD    Radiology Data:   Imaging Results (Last 24 Hours)     ** No results found for the last 24 hours. **          I have reviewed the patient's current medications.     Assessment/Plan     Active Hospital Problems    Diagnosis   • Precordial pain       Plan:    1.  Syncope:  Telemetry unremarkable to this point.  Echocardiography pending.  Will check orthostatics.  2.  Chest pain:  Low risk.  Atypical.  CE neg x 3.  3.  Coronary artery disease, continue home medication.  4.  Diabetes: Continue long-acting insulin from home and sliding scale.  5.  DVT prophylaxis: Heparin.    The patient was evaluated during the global COVID-19 pandemic, and the diagnosis was suspected/considered upon their  initial presentation.  Evaluation, treatment, and testing were consistent with current guidelines for patients who present with complaints or symptoms that may be related to COVID-19.    Discharge Planning: I expect patient to be discharged to home in 1-2 days.        This document has been electronically signed by Sukhjinder Trivedi MD on October 15, 2020 12:48 CDT

## 2020-10-15 NOTE — SIGNIFICANT NOTE
10/15/20 1033   OTHER   Discipline occupational therapist;physical therapist   Rehab Time/Intention   Session Not Performed patient/family declined evaluation   Recommendation   PT - Next Appointment 10/16/20   Recommendation   OT - Next Appointment 10/16/20     PT/OT francesca attempted this AM. Pt declined PT/OT services despite thorough explanation of benefits of increased functional mobility. Pt states she is in too much pain. MD and RN aware. Informed pt that PT/OT will check back tomorrow once pt has had the opportunity to discuss care with her family.

## 2020-10-15 NOTE — PLAN OF CARE
Goal Outcome Evaluation:  Plan of Care Reviewed With: patient  Progress: no change  Outcome Summary: Pt has not complaints of chest pain, SOA, or dizziness overnight. Plan for echo in AM.

## 2020-10-16 VITALS
DIASTOLIC BLOOD PRESSURE: 66 MMHG | WEIGHT: 199.25 LBS | SYSTOLIC BLOOD PRESSURE: 145 MMHG | TEMPERATURE: 97.1 F | HEIGHT: 69 IN | OXYGEN SATURATION: 95 % | BODY MASS INDEX: 29.51 KG/M2 | HEART RATE: 91 BPM | RESPIRATION RATE: 17 BRPM

## 2020-10-16 LAB
ANION GAP SERPL CALCULATED.3IONS-SCNC: 8 MMOL/L (ref 5–15)
BASOPHILS # BLD AUTO: 0.06 10*3/MM3 (ref 0–0.2)
BASOPHILS NFR BLD AUTO: 1 % (ref 0–1.5)
BUN SERPL-MCNC: 8 MG/DL (ref 8–23)
BUN/CREAT SERPL: 20 (ref 7–25)
CALCIUM SPEC-SCNC: 9.8 MG/DL (ref 8.6–10.5)
CHLORIDE SERPL-SCNC: 106 MMOL/L (ref 98–107)
CO2 SERPL-SCNC: 30 MMOL/L (ref 22–29)
CREAT SERPL-MCNC: 0.4 MG/DL (ref 0.57–1)
DEPRECATED RDW RBC AUTO: 52.4 FL (ref 37–54)
EOSINOPHIL # BLD AUTO: 0.22 10*3/MM3 (ref 0–0.4)
EOSINOPHIL NFR BLD AUTO: 3.5 % (ref 0.3–6.2)
ERYTHROCYTE [DISTWIDTH] IN BLOOD BY AUTOMATED COUNT: 15.7 % (ref 12.3–15.4)
GFR SERPL CREATININE-BSD FRML MDRD: >150 ML/MIN/1.73
GLUCOSE BLDC GLUCOMTR-MCNC: 115 MG/DL (ref 70–130)
GLUCOSE BLDC GLUCOMTR-MCNC: 291 MG/DL (ref 70–130)
GLUCOSE SERPL-MCNC: 117 MG/DL (ref 65–99)
HCT VFR BLD AUTO: 36.6 % (ref 34–46.6)
HGB BLD-MCNC: 11.7 G/DL (ref 12–15.9)
IMM GRANULOCYTES # BLD AUTO: 0.02 10*3/MM3 (ref 0–0.05)
IMM GRANULOCYTES NFR BLD AUTO: 0.3 % (ref 0–0.5)
LYMPHOCYTES # BLD AUTO: 2.21 10*3/MM3 (ref 0.7–3.1)
LYMPHOCYTES NFR BLD AUTO: 35 % (ref 19.6–45.3)
MCH RBC QN AUTO: 29.1 PG (ref 26.6–33)
MCHC RBC AUTO-ENTMCNC: 32 G/DL (ref 31.5–35.7)
MCV RBC AUTO: 91 FL (ref 79–97)
MONOCYTES # BLD AUTO: 0.67 10*3/MM3 (ref 0.1–0.9)
MONOCYTES NFR BLD AUTO: 10.6 % (ref 5–12)
NEUTROPHILS NFR BLD AUTO: 3.13 10*3/MM3 (ref 1.7–7)
NEUTROPHILS NFR BLD AUTO: 49.6 % (ref 42.7–76)
NRBC BLD AUTO-RTO: 0 /100 WBC (ref 0–0.2)
PLATELET # BLD AUTO: 299 10*3/MM3 (ref 140–450)
PMV BLD AUTO: 9.9 FL (ref 6–12)
POTASSIUM SERPL-SCNC: 4.2 MMOL/L (ref 3.5–5.2)
RBC # BLD AUTO: 4.02 10*6/MM3 (ref 3.77–5.28)
SODIUM SERPL-SCNC: 144 MMOL/L (ref 136–145)
WBC # BLD AUTO: 6.31 10*3/MM3 (ref 3.4–10.8)

## 2020-10-16 PROCEDURE — 85025 COMPLETE CBC W/AUTO DIFF WBC: CPT | Performed by: HOSPITALIST

## 2020-10-16 PROCEDURE — 90686 IIV4 VACC NO PRSV 0.5 ML IM: CPT | Performed by: HOSPITALIST

## 2020-10-16 PROCEDURE — 80048 BASIC METABOLIC PNL TOTAL CA: CPT | Performed by: HOSPITALIST

## 2020-10-16 PROCEDURE — 25010000002 HEPARIN (PORCINE) PER 1000 UNITS: Performed by: HOSPITALIST

## 2020-10-16 PROCEDURE — G0378 HOSPITAL OBSERVATION PER HR: HCPCS

## 2020-10-16 PROCEDURE — 96372 THER/PROPH/DIAG INJ SC/IM: CPT

## 2020-10-16 PROCEDURE — G0008 ADMIN INFLUENZA VIRUS VAC: HCPCS | Performed by: HOSPITALIST

## 2020-10-16 PROCEDURE — 82962 GLUCOSE BLOOD TEST: CPT

## 2020-10-16 PROCEDURE — 25010000002 INFLUENZA VAC SPLIT QUAD 0.5 ML SUSPENSION PREFILLED SYRINGE: Performed by: HOSPITALIST

## 2020-10-16 RX ADMIN — LISINOPRIL 10 MG: 10 TABLET ORAL at 08:10

## 2020-10-16 RX ADMIN — ASPIRIN 81 MG: 81 TABLET, COATED ORAL at 08:11

## 2020-10-16 RX ADMIN — ISOSORBIDE MONONITRATE 30 MG: 30 TABLET, EXTENDED RELEASE ORAL at 08:10

## 2020-10-16 RX ADMIN — SODIUM CHLORIDE, PRESERVATIVE FREE 10 ML: 5 INJECTION INTRAVENOUS at 08:11

## 2020-10-16 RX ADMIN — NITROGLYCERIN 1 PATCH: 0.2 PATCH TRANSDERMAL at 08:11

## 2020-10-16 RX ADMIN — Medication 1 TABLET: at 08:10

## 2020-10-16 RX ADMIN — CARVEDILOL 12.5 MG: 12.5 TABLET, FILM COATED ORAL at 08:09

## 2020-10-16 RX ADMIN — HEPARIN SODIUM 5000 UNITS: 5000 INJECTION INTRAVENOUS; SUBCUTANEOUS at 05:38

## 2020-10-16 RX ADMIN — LEVOTHYROXINE SODIUM 25 MCG: 25 TABLET ORAL at 05:38

## 2020-10-16 RX ADMIN — GABAPENTIN 600 MG: 300 CAPSULE ORAL at 05:38

## 2020-10-16 RX ADMIN — TIZANIDINE 4 MG: 4 TABLET ORAL at 05:38

## 2020-10-16 RX ADMIN — MELATONIN 2000 UNITS: at 08:10

## 2020-10-16 RX ADMIN — INFLUENZA VIRUS VACCINE 0.5 ML: 15; 15; 15; 15 SUSPENSION INTRAMUSCULAR at 10:09

## 2020-10-16 RX ADMIN — ACETAMINOPHEN 650 MG: 325 TABLET, FILM COATED ORAL at 08:11

## 2020-10-16 NOTE — PLAN OF CARE
Goal Outcome Evaluation:  Plan of Care Reviewed With: patient  Progress: improving  Outcome Summary: Pt's VSS, pain controlled w/ scheduled and prn meds, appears to be resting, will continue to monitor.

## 2020-10-16 NOTE — SIGNIFICANT NOTE
"   10/16/20 0824   OTHER   Discipline physical therapist;occupational therapist   Rehab Time/Intention   Session Not Performed patient/family declined evaluation     PT/OT eval attempted again this AM. Pt again stated she did not want or need PT/OT. PT asked pt if she would like PT/OT to re-attempt eval again tomorrow to which pt stated \"no\" as she \"would not change her mind.\" Will d/c PT/OT orders at this time per pt request. RN informed.   "

## 2020-10-17 ENCOUNTER — READMISSION MANAGEMENT (OUTPATIENT)
Dept: CALL CENTER | Facility: HOSPITAL | Age: 65
End: 2020-10-17

## 2020-10-17 NOTE — OUTREACH NOTE
Prep Survey      Responses   [facility] facility patient discharged from?  [facility]   Is LACE score < 7 ?  No   Eligibility  Readm Mgmt   Discharge diagnosis  Precordial pain   Does the patient have one of the following disease processes/diagnoses(primary or secondary)?  Other   Does the patient have Home health ordered?  No   Is there a DME ordered?  No   Comments regarding appointments  Scheduled per AVS   Prep survey completed?  Yes          Sommer Tellez RN

## 2020-10-19 ENCOUNTER — READMISSION MANAGEMENT (OUTPATIENT)
Dept: CALL CENTER | Facility: HOSPITAL | Age: 65
End: 2020-10-19

## 2020-10-19 NOTE — OUTREACH NOTE
Medical Week 1 Survey      Responses   [facility] patient discharged from?  [place]   Does the patient have one of the following disease processes/diagnoses(primary or secondary)?  Other   Week 1 attempt successful?  Yes   Call start time  1343   Call end time  1345   Discharge diagnosis  Precordial pain   Meds reviewed with patient/caregiver?  Yes   Is the patient having any side effects they believe may be caused by any medication additions or changes?  No   Does the patient have all medications ordered at discharge?  N/A   Is the patient taking all medications as directed (includes completed medication regime)?  Yes   Does the patient have a primary care provider?   Yes   Does the patient have an appointment with their PCP within 7 days of discharge?  Yes   Has the patient kept scheduled appointments due by today?  N/A   Has home health visited the patient within 72 hours of discharge?  N/A   Psychosocial issues?  No   Did the patient receive a copy of their discharge instructions?  Yes   Nursing interventions  Reviewed instructions with patient   What is the patient's perception of their health status since discharge?  Improving   Is the patient/caregiver able to teach back signs and symptoms related to disease process for when to call PCP?  Yes   Is the patient/caregiver able to teach back signs and symptoms related to disease process for when to call 911?  Yes   Is the patient/caregiver able to teach back the hierarchy of who to call/visit for symptoms/problems? PCP, Specialist, Home health nurse, Urgent Care, ED, 911  Yes   If the patient is a current smoker, are they able to teach back resources for cessation?  [id number]   Additional teach back comments  Not interested in smoking cessation, says she is doing very well and requires no further calls.   Week 1 call completed?  Yes   Graduated  Yes   Did the patient feel the follow up calls were helpful during their recovery period?  Yes   Was the  number of calls appropriate?  Yes   Graduated/Revoked comments  Doing very well she says.          Jaye Zapata RN

## 2020-10-21 NOTE — DISCHARGE SUMMARY
[facility] Medicine Services  DISCHARGE SUMMARY       Date of Admission: 10/14/2020  Date of Discharge:  10/16/2020  Primary Care Physician: Arelis Laird MD    Presenting Problem/History of Present Illness:  Precordial pain [R07.2]  Near syncope [R55]       Final Discharge Diagnoses:  Active Hospital Problems    Diagnosis   • Precordial pain       Consults:   Consults     No orders found from 9/15/2020 to 10/15/2020.          Pertinent Test Results:   Lab Results (most recent)     Procedure Component Value Units Date/Time    POC Glucose Once [[id number]]  (Abnormal) Collected: 10/16/20 1008    Specimen: Blood Updated: 10/16/20 1051     Glucose 291 mg/dL     Basic Metabolic Panel [[id number]]  (Abnormal) Collected: 10/16/20 0707    Specimen: Blood Updated: 10/16/20 0737     Glucose 117 mg/dL      BUN 8 mg/dL      Creatinine 0.40 mg/dL      Sodium 144 mmol/L      Potassium 4.2 mmol/L      Chloride 106 mmol/L      CO2 30.0 mmol/L      Calcium 9.8 mg/dL      eGFR Non African Amer >150 mL/min/1.73      BUN/Creatinine Ratio 20.0     Anion Gap 8.0 mmol/L     Narrative:      GFR Normal >60  Chronic Kidney Disease <60  Kidney Failure <15      CBC & Differential [[id number]]  (Abnormal) Collected: 10/16/20 0707    Specimen: Blood Updated: 10/16/20 0722    Narrative:      The following orders were created for panel order CBC & Differential.  Procedure                               Abnormality         Status                     ---------                               -----------         ------                     CBC Auto Differential[[id number]]        Abnormal            Final result                 Please view results for these tests on the individual orders.    CBC Auto Differential [[id number]]  (Abnormal) Collected: 10/16/20 0707    Specimen: Blood Updated: 10/16/20 0722     WBC 6.31 10*3/mm3      RBC 4.02 10*6/mm3      Hemoglobin 11.7 g/dL      Hematocrit 36.6 %      MCV  91.0 fL      MCH 29.1 pg      MCHC 32.0 g/dL      RDW 15.7 %      RDW-SD 52.4 fl      MPV 9.9 fL      Platelets 299 10*3/mm3      Neutrophil % 49.6 %      Lymphocyte % 35.0 %      Monocyte % 10.6 %      Eosinophil % 3.5 %      Basophil % 1.0 %      Immature Grans % 0.3 %      Neutrophils, Absolute 3.13 10*3/mm3      Lymphocytes, Absolute 2.21 10*3/mm3      Monocytes, Absolute 0.67 10*3/mm3      Eosinophils, Absolute 0.22 10*3/mm3      Basophils, Absolute 0.06 10*3/mm3      Immature Grans, Absolute 0.02 10*3/mm3      nRBC 0.0 /100 WBC     POC Glucose Once [[id number]]  (Normal) Collected: 10/16/20 0614    Specimen: Blood Updated: 10/16/20 0656     Glucose 115 mg/dL     Basic Metabolic Panel [[id number]]  (Abnormal) Collected: 10/15/20 0542    Specimen: Blood Updated: 10/15/20 0628     Glucose 185 mg/dL      BUN 9 mg/dL      Creatinine 0.40 mg/dL      Sodium 139 mmol/L      Potassium 4.0 mmol/L      Chloride 104 mmol/L      CO2 28.0 mmol/L      Calcium 9.6 mg/dL      eGFR Non African Amer >150 mL/min/1.73      BUN/Creatinine Ratio 22.5     Anion Gap 7.0 mmol/L     Narrative:      GFR Normal >60  Chronic Kidney Disease <60  Kidney Failure <15      CBC & Differential [[id number]]  (Abnormal) Collected: 10/15/20 0543    Specimen: Blood Updated: 10/15/20 0615    Narrative:      The following orders were created for panel order CBC & Differential.  Procedure                               Abnormality         Status                     ---------                               -----------         ------                     CBC Auto Differential[[id number]]        Abnormal            Final result                 Please view results for these tests on the individual orders.    CBC Auto Differential [[id number]]  (Abnormal) Collected: 10/15/20 0543    Specimen: Blood Updated: 10/15/20 0615     WBC 5.60 10*3/mm3      RBC 3.94 10*6/mm3      Hemoglobin 11.6 g/dL      Hematocrit 35.7 %      MCV 90.6 fL      MCH 29.4 pg      MCHC 32.5 g/dL       RDW 15.9 %      RDW-SD 52.7 fl      MPV 10.4 fL      Platelets 320 10*3/mm3      Neutrophil % 46.7 %      Lymphocyte % 37.7 %      Monocyte % 11.4 %      Eosinophil % 2.9 %      Basophil % 0.9 %      Immature Grans % 0.4 %      Neutrophils, Absolute 2.62 10*3/mm3      Lymphocytes, Absolute 2.11 10*3/mm3      Monocytes, Absolute 0.64 10*3/mm3      Eosinophils, Absolute 0.16 10*3/mm3      Basophils, Absolute 0.05 10*3/mm3      Immature Grans, Absolute 0.02 10*3/mm3      nRBC 0.0 /100 WBC     Troponin [[id number]]  (Normal) Collected: 10/14/20 1850    Specimen: Blood Updated: 10/14/20 1919     Troponin T <0.010 ng/mL     Narrative:      Troponin T Reference Range:  <= 0.03 ng/mL-   Negative for AMI  >0.03 ng/mL-     Abnormal for myocardial necrosis.  Clinicians would have to utilize clinical acumen, EKG, Troponin and serial changes to determine if it is an Acute Myocardial Infarction or myocardial injury due to an underlying chronic condition.       Results may be falsely decreased if patient taking Biotin.      Troponin [[id number]]  (Normal) Collected: 10/14/20 1216    Specimen: Blood Updated: 10/14/20 1240     Troponin T <0.010 ng/mL     Narrative:      Troponin T Reference Range:  <= 0.03 ng/mL-   Negative for AMI  >0.03 ng/mL-     Abnormal for myocardial necrosis.  Clinicians would have to utilize clinical acumen, EKG, Troponin and serial changes to determine if it is an Acute Myocardial Infarction or myocardial injury due to an underlying chronic condition.       Results may be falsely decreased if patient taking Biotin.      [place] Draw [[id number]] Collected: 10/14/20 1015    Specimen: Blood Updated: 10/14/20 1130    Narrative:      The following orders were created for panel order [place] Draw.  Procedure                               Abnormality         Status                     ---------                               -----------         ------                     Light Blue Top[[id number]]                                    Final result               Green Top (Gel)[[id number]]                                  Final result               Lavender Top[[id number]]                                     Final result               Gold Top - SST[[id number]]                                   Final result                 Please view results for these tests on the individual orders.    Lavender Top [[id number]] Collected: 10/14/20 1015    Specimen: Blood Updated: 10/14/20 1130     Extra Tube hold for add-on     Comment: Auto resulted       Light Blue Top [[id number]] Collected: 10/14/20 1015    Specimen: Blood Updated: 10/14/20 1115     Extra Tube hold for add-on     Comment: Auto resulted       Green Top (Gel) [[id number]] Collected: 10/14/20 1015    Specimen: Blood Updated: 10/14/20 1115     Extra Tube Hold for add-ons.     Comment: Auto resulted.       Gold Top - SST [[id number]] Collected: 10/14/20 1015    Specimen: Blood Updated: 10/14/20 1115     Extra Tube Hold for add-ons.     Comment: Auto resulted.       Comprehensive Metabolic Panel [[id number]]  (Abnormal) Collected: 10/14/20 1015    Specimen: Blood Updated: 10/14/20 1047     Glucose 88 mg/dL      BUN 10 mg/dL      Creatinine 0.45 mg/dL      Sodium 136 mmol/L      Potassium 3.7 mmol/L      Chloride 102 mmol/L      CO2 27.0 mmol/L      Calcium 9.7 mg/dL      Total Protein 6.8 g/dL      Albumin 3.30 g/dL      ALT (SGPT) 9 U/L      AST (SGOT) 17 U/L      Alkaline Phosphatase 105 U/L      Total Bilirubin 0.3 mg/dL      eGFR Non African Amer 139 mL/min/1.73      Globulin 3.5 gm/dL      A/G Ratio 0.9 g/dL      BUN/Creatinine Ratio 22.2     Anion Gap 7.0 mmol/L     Narrative:      GFR Normal >60  Chronic Kidney Disease <60  Kidney Failure <15      BNP [[id number]]  (Normal) Collected: 10/14/20 1015    Specimen: Blood Updated: 10/14/20 1043     proBNP 417.3 pg/mL     Narrative:      Among patients with dyspnea, NT-proBNP is highly sensitive for the detection of acute congestive heart  "failure. In addition NT-proBNP of <300 pg/ml effectively rules out acute congestive heart failure with 99% negative predictive value.    Results may be falsely decreased if patient taking Biotin.          Imaging Results (Most Recent)     Procedure Component Value Units Date/Time    XR Chest 1 View [[id number]] Collected: 10/14/20 1023     Updated: 10/14/20 1105    Narrative:      EXAM: XR CHEST 1 VIEW    COMPARISONS: Radiograph 12/15/2014 and 3/26/2014.    INDICATION: Chest pain    FINDINGS:  Frontal view of the chest.    No significant change in elevation of left hemidiaphragm with  subjacent subsegmental atelectasis. No consolidation, effusion,  pneumothorax. Cardiomediastinal silhouette is unchanged with  normal heart size. No acute osseous abnormality. Sternotomy wires  are noted.      Impression:      Stable exam without acute cardiac pulmonary process.    Electronically signed by:  Gautam Allen MD  10/14/2020  11:04 AM CDT Workstation: [id number]          Chief Complaint on Day of Discharge: None    Hospital Course:  The patient is a 65 y.o. female who presented to [facility] with syncope and chest pain.  Chest pain was atypical and ruled out.  Syncopal episode was felt to be orthostatic.  Patient was hydrated and did well.  She was discharged home in good condition.  She will follow-up with Dr. Laird in 1 week    Condition on Discharge: Stable    Physical Exam on Discharge:  /66 (BP Location: Right arm, Patient Position: Lying)   Pulse 91   Temp 97.1 °F (36.2 °C) (Temporal)   Resp 17   Ht 175.3 cm (69\")   Wt 90.4 kg (199 lb 4 oz)   SpO2 95%   BMI 29.42 kg/m²   Physical Exam  Vitals signs reviewed.   Constitutional:       Appearance: She is well-developed.   HENT:      Head: Normocephalic and atraumatic.   Eyes:      Pupils: Pupils are equal, round, and reactive to light.   Neck:      Musculoskeletal: Normal range of motion and neck supple.   Cardiovascular:      " Rate and Rhythm: Normal rate and regular rhythm.      Heart sounds: Normal heart sounds. No murmur. No friction rub. No gallop.    Pulmonary:      Effort: Pulmonary effort is normal. No respiratory distress.      Breath sounds: Normal breath sounds. No wheezing or rales.   Chest:      Chest wall: No tenderness.   Abdominal:      General: Bowel sounds are normal. There is no distension.      Palpations: Abdomen is soft.      Tenderness: There is no abdominal tenderness. There is no guarding.   Skin:     General: Skin is warm and dry.   Psychiatric:         Behavior: Behavior normal.         Thought Content: Thought content normal.       Discharge Disposition:  Home or Self Care    Discharge Medications:     Discharge Medications      Continue These Medications      Instructions Start Date   acetaminophen 325 MG tablet  Commonly known as: TYLENOL   650 mg, Oral, 3 Times Daily      aspirin 81 MG EC tablet   81 mg, Oral, Daily      atorvastatin 20 MG tablet  Commonly known as: LIPITOR   20 mg, Oral, Nightly      B-D ULTRAFINE III SHORT PEN 31G X 8 MM misc  Generic drug: Insulin Pen Needle   No dose, route, or frequency recorded.      carvedilol 12.5 MG tablet  Commonly known as: COREG   TAKE 1 TABLET BY MOUTH TWICE DAILY      Centrum Silver 50+Women tablet   1 tablet, Oral, Daily      Dexcom G6 Sensor   Does not apply, As Needed, Every 10 daysDexcom G6 Sensor Kit [address]-16 Use as directed for continuous glucose monitoring      gabapentin 600 MG tablet  Commonly known as: NEURONTIN   600 mg, Oral, 3 Times Daily      glucose blood test strip   Contour next test strips use to test sugar 4 times per day. E 10.9      insulin aspart 100 UNIT/ML solution pen-injector sc pen  Commonly known as: novoLOG FLEXPEN   Subcutaneous, 3 Times Daily With Meals, 150-200=2 units, 201-250=3 units, 251-300=5 units, 301-350=7 units, 351-400=9 units, 401-450=12 units, >450=15 units       isosorbide mononitrate 30 MG 24 hr tablet  Commonly  known as: IMDUR   30 mg, Oral, Daily      lisinopril 10 MG tablet  Commonly known as: PRINIVIL,ZESTRIL   10 mg, Oral, Daily      melatonin 5 MG tablet tablet   5 mg, Oral, Nightly      nitroglycerin 0.2 MG/HR patch  Commonly known as: NITRODUR   1 patch, Transdermal, Daily      PARoxetine 20 MG tablet  Commonly known as: PAXIL   20 mg, Oral, Nightly      promethazine 25 MG tablet  Commonly known as: PHENERGAN   25 mg, Oral, Every 6 Hours PRN      Synthroid 25 MCG tablet  Generic drug: levothyroxine   25 mcg, Oral, Daily      tiZANidine 4 MG tablet  Commonly known as: ZANAFLEX   4 mg, Oral, Every 8 Hours PRN      Toujeo Max SoloStar 300 UNIT/ML solution pen-injector injection  Generic drug: Insulin Glargine (2 Unit Dial)   28 Units, Subcutaneous, Nightly      TRUEplus Lancets 33G misc   No dose, route, or frequency recorded.      Vitamin D (Cholecalciferol) 50 MCG (2000 UT) capsule   1 capsule, Oral, Daily             Discharge Diet:   Diet Instructions     Advance Diet As Tolerated            Activity at Discharge:   Activity Instructions     Activity as Tolerated            Follow-up Appointments:   PCP in 1 week          This document has been electronically signed by Sukhjinder Trivedi MD on October 21, 2020 17:56 CDT      Time:  35 min

## 2020-10-27 DIAGNOSIS — R07.9 CHEST PAIN, UNSPECIFIED TYPE: Primary | ICD-10-CM

## 2020-11-13 ENCOUNTER — TELEPHONE (OUTPATIENT)
Dept: ENDOCRINOLOGY | Facility: CLINIC | Age: 65
End: 2020-11-13

## 2020-11-13 NOTE — TELEPHONE ENCOUNTER
Pt has run out of her Dexacom 6 and they contacted [employer] and they have faxed us a request . Has appt 12/7 with Sophia  But needs a PA to get supplies going again ..  Pt needs to know if we have rec. The fax . Pt request someone to call . Sugars are running 297 is highest and lows 64     Patient needs someone to call .  [phone]    Thank you

## 2020-12-03 ENCOUNTER — APPOINTMENT (OUTPATIENT)
Dept: NUCLEAR MEDICINE | Facility: HOSPITAL | Age: 65
End: 2020-12-03

## 2020-12-03 ENCOUNTER — APPOINTMENT (OUTPATIENT)
Dept: CARDIOLOGY | Facility: HOSPITAL | Age: 65
End: 2020-12-03

## 2020-12-07 ENCOUNTER — TELEMEDICINE (OUTPATIENT)
Dept: ENDOCRINOLOGY | Facility: CLINIC | Age: 65
End: 2020-12-07

## 2020-12-07 DIAGNOSIS — E55.9 VITAMIN D DEFICIENCY: ICD-10-CM

## 2020-12-07 DIAGNOSIS — I15.2 HYPERTENSION ASSOCIATED WITH DIABETES (HCC): ICD-10-CM

## 2020-12-07 DIAGNOSIS — E10.42 TYPE 1 DIABETES MELLITUS WITH DIABETIC POLYNEUROPATHY (HCC): Primary | ICD-10-CM

## 2020-12-07 DIAGNOSIS — E10.69 MIXED DIABETIC HYPERLIPIDEMIA ASSOCIATED WITH TYPE 1 DIABETES MELLITUS (HCC): ICD-10-CM

## 2020-12-07 DIAGNOSIS — E11.59 HYPERTENSION ASSOCIATED WITH DIABETES (HCC): ICD-10-CM

## 2020-12-07 DIAGNOSIS — E78.2 MIXED DIABETIC HYPERLIPIDEMIA ASSOCIATED WITH TYPE 1 DIABETES MELLITUS (HCC): ICD-10-CM

## 2020-12-07 PROCEDURE — 99213 OFFICE O/P EST LOW 20 MIN: CPT | Performed by: NURSE PRACTITIONER

## 2020-12-07 RX ORDER — INSULIN GLARGINE 300 U/ML
28 INJECTION, SOLUTION SUBCUTANEOUS NIGHTLY
Qty: 2 PEN | Refills: 11 | Status: ON HOLD | OUTPATIENT
Start: 2020-12-07 | End: 2021-01-01 | Stop reason: SDUPTHER

## 2020-12-07 NOTE — PROGRESS NOTES
Janelle Millard is a 65 y.o. female who presents for  evaluation of   Chief Complaint   Patient presents with   • Diabetes   • Hypertension   • Hyperlipidemia     You have chosen to receive care through a telehealth visit.  Do you consent to use a video/audio connection for your medical care today? Yes    This visit lasted 17 minutes     Primary Care / Referring Provider  Arelis Laird MD      History of Present Illness  Duration/Timing:  Diabetes mellitus type 1  Duration: about 40y   constant  well controlled    Severity (Complications/Hospitalizations)  Secondary Macrovascular Complications:  CAD, No CVA, PAD  She underwent 3-vessel CABG on 02/24/2014  Secondary Microvascular Complications:  No Diabetic Nephropathy, No proteinuria, Diabetic Retinopathy, Diabetic Neuropathy    Context  Diabetes Regimen:  Insulin,Compliant with regimen,    Lab Results   Component Value Date    HGBA1C 6 10/08/2019        Exercise:  Exercises     Associated Signs/Symptoms  Hyperglycemic Symptoms:  No polyuria, No polydipsia, No polyphagia  Hypoglycemic Episodes:  Frequent hypoglycemia , checking 4 x daily -      Patient states blood sugar has been pretty well controlled, will try to get to lab as soon as she can.     Past Medical History:   Diagnosis Date   • Arthritis, rheumatoid (CMS/HCC)    • Arthropathy associated with neurological disorder    • Arthropathy of lumbar facet joint    • Asthma    • Benign hypertension    • Carpal tunnel syndrome    • Diabetes (CMS/HCC)    • Diabetic polyneuropathy (CMS/HCC)    • Dyslipidemia    • Fallen arches    • Hammer toe    • Heart disease    • Joint pain    • Mild depression (CMS/HCC)     Saddness post Surgery 7/10/14 improved after counseling.   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)     Neuropathy of chest      • Neuropathy    • Onychomycosis    • Peripheral vascular  disease (CMS/[facility])    • Retinopathy    • Tobacco user    • Type 1 diabetes mellitus (CMS/[facility])      Family History   Problem Relation Age of Onset   • Asthma Other    • Coronary artery disease Other    • Diabetes Other    • Hyperlipidemia Other    • Hypertension Other    • Osteoporosis Other    • Cancer Other    • Osteoporosis Mother    • Heart disease Mother    • Heart disease Father    • Diabetes Father    • Heart disease Sister    • Diabetes Sister      Social History     Tobacco Use   • Smoking status: Former Smoker     Types: Cigarettes   • Smokeless tobacco: Never Used   Substance Use Topics   • Alcohol use: No   • Drug use: No         Current Outpatient Medications:   •  acetaminophen (TYLENOL) 325 MG tablet, Take 650 mg by mouth 3 (Three) Times a Day., Disp: , Rfl:   •  aspirin 81 MG EC tablet, Take 81 mg by mouth Daily., Disp: , Rfl:   •  atorvastatin (LIPITOR) 20 MG tablet, Take 1 tablet by mouth Every Night., Disp: 90 tablet, Rfl: 4  •  B-D ULTRAFINE III SHORT PEN 31G X 8 MM misc, , Disp: , Rfl:   •  carvedilol (COREG) 12.5 MG tablet, TAKE 1 TABLET BY MOUTH TWICE DAILY, Disp: , Rfl:   •  Continuous Blood Gluc Sensor (DEXCOM G6 SENSOR), As Needed (glucose control). Every 10 daysDexcom G6 Sensor Kit [address]-33 Use as directed for continuous glucose monitoring, Disp: 9 each, Rfl: 3  •  gabapentin (NEURONTIN) 600 MG tablet, Take 600 mg by mouth 3 (Three) Times a Day., Disp: , Rfl:   •  glucose blood test strip, Contour next test strips use to test sugar 4 times per day. E 10.9, Disp: 200 each, Rfl: 11  •  insulin aspart (novoLOG FLEXPEN) 100 UNIT/ML solution pen-injector sc pen, 150-200=2 units, 201-250=3 units, 251-300=5 units, 301-350=7 units, 351-400=9 units, 401-450=12 units, >450=15 units, Disp: 4 pen, Rfl: 11  •  Insulin Glargine, 2 Unit Dial, (Toujeo Max SoloStar) 300 UNIT/ML solution pen-injector injection, Inject 28 Units under the skin into the appropriate area as directed Every Night., Disp: 2  pen, Rfl: 11  •  isosorbide mononitrate (IMDUR) 30 MG 24 hr tablet, Take 30 mg by mouth Daily., Disp: , Rfl:   •  levothyroxine (SYNTHROID) 25 MCG tablet, Take 25 mcg by mouth Daily., Disp: , Rfl:   •  lisinopril (PRINIVIL,ZESTRIL) 10 MG tablet, Take 10 mg by mouth Daily., Disp: , Rfl:   •  melatonin 5 MG tablet tablet, Take 5 mg by mouth Every Night., Disp: , Rfl:   •  Multiple Vitamins-Minerals (Centrum Silver 50+Women) tablet, Take 1 tablet by mouth Daily., Disp: , Rfl:   •  nitroglycerin (NITRODUR) 0.2 MG/HR patch, Place 1 patch on the skin as directed by provider Daily., Disp: , Rfl:   •  PARoxetine (PAXIL) 20 MG tablet, Take 20 mg by mouth Every Night., Disp: , Rfl:   •  promethazine (PHENERGAN) 25 MG tablet, Take 25 mg by mouth Every 6 (Six) Hours As Needed., Disp: , Rfl:   •  tiZANidine (ZANAFLEX) 4 MG tablet, Take 4 mg by mouth Every 8 (Eight) Hours As Needed., Disp: , Rfl:   •  TRUEPLUS LANCETS 33G misc, , Disp: , Rfl:   •  Vitamin D, Cholecalciferol, 50 MCG (2000 UT) capsule, Take 1 capsule by mouth Daily., Disp: , Rfl:     Review of Systems    Review of Systems   Constitutional: Negative for activity change, appetite change, chills, diaphoresis, fatigue, fever and unexpected weight change.   HENT: Negative for congestion, dental problem, drooling, ear discharge, ear pain, facial swelling, mouth sores, postnasal drip, rhinorrhea, sinus pressure, sore throat, tinnitus, trouble swallowing and voice change.    Eyes: Negative for photophobia, pain, discharge, redness, itching and visual disturbance.   Respiratory: Negative for apnea, cough, choking, chest tightness, shortness of breath, wheezing and stridor.    Cardiovascular: Negative for chest pain, palpitations and leg swelling.   Gastrointestinal: Negative for abdominal distention, abdominal pain, constipation, diarrhea, nausea and vomiting.   Endocrine: Negative for cold intolerance, heat intolerance, polydipsia, polyphagia and polyuria.   Genitourinary:  Negative for decreased urine volume, difficulty urinating, dysuria, flank pain, frequency, hematuria and urgency.   Musculoskeletal: Negative for arthralgias, back pain, gait problem, joint swelling, myalgias, neck pain and neck stiffness.   Skin: Negative for color change, pallor, rash and wound.   Allergic/Immunologic: Negative for immunocompromised state.   Neurological: Negative for dizziness, tremors, seizures, syncope, facial asymmetry, speech difficulty, weakness, light-headedness, numbness and headaches.   Hematological: Negative for adenopathy.   Psychiatric/Behavioral: Negative for agitation, behavioral problems, confusion, decreased concentration, dysphoric mood, hallucinations, self-injury, sleep disturbance and suicidal ideas. The patient is not nervous/anxious and is not hyperactive.         Objective:     There were no vitals taken for this visit.    Physical Exam   Constitutional: She is cooperative.   HENT:   Head: Normocephalic.   Eyes: Lids are normal.   Abdominal: Normal appearance.   Neurological: She is alert.   Psychiatric: Her speech is normal and behavior is normal. Mood and judgment normal.       Lab Review    Results for orders placed or performed during the hospital encounter of 10/14/20   Troponin    Specimen: Blood   Result Value Ref Range    Troponin T <0.010 0.000 - 0.030 ng/mL   Troponin    Specimen: Blood   Result Value Ref Range    Troponin T <0.010 0.000 - 0.030 ng/mL   Comprehensive Metabolic Panel    Specimen: Blood   Result Value Ref Range    Glucose 88 65 - 99 mg/dL    BUN 10 8 - 23 mg/dL    Creatinine 0.45 (L) 0.57 - 1.00 mg/dL    Sodium 136 136 - 145 mmol/L    Potassium 3.7 3.5 - 5.2 mmol/L    Chloride 102 98 - 107 mmol/L    CO2 27.0 22.0 - 29.0 mmol/L    Calcium 9.7 8.6 - 10.5 mg/dL    Total Protein 6.8 6.0 - 8.5 g/dL    Albumin 3.30 (L) 3.50 - 5.20 g/dL    ALT (SGPT) 9 1 - 33 U/L    AST (SGOT) 17 1 - 32 U/L    Alkaline Phosphatase 105 39 - 117 U/L    Total Bilirubin 0.3  0.0 - 1.2 mg/dL    eGFR Non African Amer 139 >60 mL/min/1.73    Globulin 3.5 gm/dL    A/G Ratio 0.9 g/dL    BUN/Creatinine Ratio 22.2 7.0 - 25.0    Anion Gap 7.0 5.0 - 15.0 mmol/L   BNP    Specimen: Blood   Result Value Ref Range    proBNP 417.3 0.0 - 900.0 pg/mL   CBC Auto Differential    Specimen: Blood   Result Value Ref Range    WBC 7.05 3.40 - 10.80 10*3/mm3    RBC 4.16 3.77 - 5.28 10*6/mm3    Hemoglobin 12.3 12.0 - 15.9 g/dL    Hematocrit 38.2 34.0 - 46.6 %    MCV 91.8 79.0 - 97.0 fL    MCH 29.6 26.6 - 33.0 pg    MCHC 32.2 31.5 - 35.7 g/dL    RDW 15.8 (H) 12.3 - 15.4 %    RDW-SD 52.9 37.0 - 54.0 fl    MPV 9.7 6.0 - 12.0 fL    Platelets 321 140 - 450 10*3/mm3    Neutrophil % 64.2 42.7 - 76.0 %    Lymphocyte % 23.0 19.6 - 45.3 %    Monocyte % 8.8 5.0 - 12.0 %    Eosinophil % 2.7 0.3 - 6.2 %    Basophil % 0.9 0.0 - 1.5 %    Immature Grans % 0.4 0.0 - 0.5 %    Neutrophils, Absolute 4.53 1.70 - 7.00 10*3/mm3    Lymphocytes, Absolute 1.62 0.70 - 3.10 10*3/mm3    Monocytes, Absolute 0.62 0.10 - 0.90 10*3/mm3    Eosinophils, Absolute 0.19 0.00 - 0.40 10*3/mm3    Basophils, Absolute 0.06 0.00 - 0.20 10*3/mm3    Immature Grans, Absolute 0.03 0.00 - 0.05 10*3/mm3    nRBC 0.0 0.0 - 0.2 /100 WBC   Troponin    Specimen: Blood   Result Value Ref Range    Troponin T <0.010 0.000 - 0.030 ng/mL   Basic Metabolic Panel    Specimen: Blood   Result Value Ref Range    Glucose 185 (H) 65 - 99 mg/dL    BUN 9 8 - 23 mg/dL    Creatinine 0.40 (L) 0.57 - 1.00 mg/dL    Sodium 139 136 - 145 mmol/L    Potassium 4.0 3.5 - 5.2 mmol/L    Chloride 104 98 - 107 mmol/L    CO2 28.0 22.0 - 29.0 mmol/L    Calcium 9.6 8.6 - 10.5 mg/dL    eGFR Non African Amer >150 >60 mL/min/1.73    BUN/Creatinine Ratio 22.5 7.0 - 25.0    Anion Gap 7.0 5.0 - 15.0 mmol/L   CBC Auto Differential    Specimen: Blood   Result Value Ref Range    WBC 5.60 3.40 - 10.80 10*3/mm3    RBC 3.94 3.77 - 5.28 10*6/mm3    Hemoglobin 11.6 (L) 12.0 - 15.9 g/dL    Hematocrit 35.7 34.0 -  46.6 %    MCV 90.6 79.0 - 97.0 fL    MCH 29.4 26.6 - 33.0 pg    MCHC 32.5 31.5 - 35.7 g/dL    RDW 15.9 (H) 12.3 - 15.4 %    RDW-SD 52.7 37.0 - 54.0 fl    MPV 10.4 6.0 - 12.0 fL    Platelets 320 140 - 450 10*3/mm3    Neutrophil % 46.7 42.7 - 76.0 %    Lymphocyte % 37.7 19.6 - 45.3 %    Monocyte % 11.4 5.0 - 12.0 %    Eosinophil % 2.9 0.3 - 6.2 %    Basophil % 0.9 0.0 - 1.5 %    Immature Grans % 0.4 0.0 - 0.5 %    Neutrophils, Absolute 2.62 1.70 - 7.00 10*3/mm3    Lymphocytes, Absolute 2.11 0.70 - 3.10 10*3/mm3    Monocytes, Absolute 0.64 0.10 - 0.90 10*3/mm3    Eosinophils, Absolute 0.16 0.00 - 0.40 10*3/mm3    Basophils, Absolute 0.05 0.00 - 0.20 10*3/mm3    Immature Grans, Absolute 0.02 0.00 - 0.05 10*3/mm3    nRBC 0.0 0.0 - 0.2 /100 WBC   Basic Metabolic Panel    Specimen: Blood   Result Value Ref Range    Glucose 117 (H) 65 - 99 mg/dL    BUN 8 8 - 23 mg/dL    Creatinine 0.40 (L) 0.57 - 1.00 mg/dL    Sodium 144 136 - 145 mmol/L    Potassium 4.2 3.5 - 5.2 mmol/L    Chloride 106 98 - 107 mmol/L    CO2 30.0 (H) 22.0 - 29.0 mmol/L    Calcium 9.8 8.6 - 10.5 mg/dL    eGFR Non African Amer >150 >60 mL/min/1.73    BUN/Creatinine Ratio 20.0 7.0 - 25.0    Anion Gap 8.0 5.0 - 15.0 mmol/L   CBC Auto Differential    Specimen: Blood   Result Value Ref Range    WBC 6.31 3.40 - 10.80 10*3/mm3    RBC 4.02 3.77 - 5.28 10*6/mm3    Hemoglobin 11.7 (L) 12.0 - 15.9 g/dL    Hematocrit 36.6 34.0 - 46.6 %    MCV 91.0 79.0 - 97.0 fL    MCH 29.1 26.6 - 33.0 pg    MCHC 32.0 31.5 - 35.7 g/dL    RDW 15.7 (H) 12.3 - 15.4 %    RDW-SD 52.4 37.0 - 54.0 fl    MPV 9.9 6.0 - 12.0 fL    Platelets 299 140 - 450 10*3/mm3    Neutrophil % 49.6 42.7 - 76.0 %    Lymphocyte % 35.0 19.6 - 45.3 %    Monocyte % 10.6 5.0 - 12.0 %    Eosinophil % 3.5 0.3 - 6.2 %    Basophil % 1.0 0.0 - 1.5 %    Immature Grans % 0.3 0.0 - 0.5 %    Neutrophils, Absolute 3.13 1.70 - 7.00 10*3/mm3    Lymphocytes, Absolute 2.21 0.70 - 3.10 10*3/mm3    Monocytes, Absolute 0.67 0.10 -  0.90 10*3/mm3    Eosinophils, Absolute 0.22 0.00 - 0.40 10*3/mm3    Basophils, Absolute 0.06 0.00 - 0.20 10*3/mm3    Immature Grans, Absolute 0.02 0.00 - 0.05 10*3/mm3    nRBC 0.0 0.0 - 0.2 /100 WBC   POC Glucose Once    Specimen: Blood   Result Value Ref Range    Glucose 266 (H) 70 - 130 mg/dL   POC Glucose Once    Specimen: Blood   Result Value Ref Range    Glucose 171 (H) 70 - 130 mg/dL   POC Glucose Once    Specimen: Blood   Result Value Ref Range    Glucose 257 (H) 70 - 130 mg/dL   POC Glucose Once    Specimen: Blood   Result Value Ref Range    Glucose 357 (H) 70 - 130 mg/dL   POC Glucose Once    Specimen: Blood   Result Value Ref Range    Glucose 314 (H) 70 - 130 mg/dL   POC Glucose Once    Specimen: Blood   Result Value Ref Range    Glucose 115 70 - 130 mg/dL   POC Glucose Once    Specimen: Blood   Result Value Ref Range    Glucose 291 (H) 70 - 130 mg/dL   Transthoracic Echo Complete With Contrast if Necessary Per Protocol   Result Value Ref Range    BSA 2.1 m^2    RVIDd 2.9 cm    IVSd 0.98 cm    LVIDd 3.3 cm    LVIDs 2.3 cm    LVPWd 1.0 cm    IVS/LVPW 0.96     FS 30.8 %    EDV(Teich) 43.5 ml    ESV(Teich) 17.5 ml    EF(Teich) 59.7 %    EDV(cubed) 35.3 ml    ESV(cubed) 11.7 ml    EF(cubed) 66.9 %    LV mass(C)d 94.0 grams    LV mass(C)dI 45.3 grams/m^2    SV(Teich) 26.0 ml    SI(Teich) 12.5 ml/m^2    SV(cubed) 23.6 ml    SI(cubed) 11.4 ml/m^2    Ao root diam 2.7 cm    Ao root area 5.7 cm^2    ACS 1.8 cm    LA dimension 2.9 cm    asc Aorta Diam 3.0 cm    LA/Ao 1.1     LVOT diam 2.4 cm    LVOT area 4.5 cm^2    LVOT area(traced) 4.5 cm^2    LVLd ap4 6.2 cm    EDV(MOD-sp4) 47.6 ml    LVLs ap4 5.2 cm    ESV(MOD-sp4) 14.9 ml    EF(MOD-sp4) 68.7 %    LVLd ap2 7.2 cm    EDV(MOD-sp2) 39.5 ml    LVLs ap2 6.3 cm    ESV(MOD-sp2) 24.8 ml    EF(MOD-sp2) 37.2 %    SV(MOD-sp4) 32.7 ml    SI(MOD-sp4) 15.8 ml/m^2    SV(MOD-sp2) 14.7 ml    SI(MOD-sp2) 7.1 ml/m^2    Ao root area (BSA corrected) 1.3     LV Donato Vol (BSA  corrected) 22.9 ml/m^2    LV Sys Vol (BSA corrected) 7.2 ml/m^2    MV E max riaz 114.0 cm/sec    MV A max riaz 135.0 cm/sec    MV E/A 0.84     MV P1/2t max riaz 125.0 cm/sec    MV P1/2t 66.8 msec    MVA(P1/2t) 3.3 cm^2    MV dec slope 548.0 cm/sec^2    Ao pk riaz 191.0 cm/sec    Ao max PG 14.6 mmHg    Ao max PG (full) 8.1 mmHg    Ao V2 mean 144.0 cm/sec    Ao mean PG 9.0 mmHg    Ao mean PG (full) 5.0 mmHg    Ao V2 VTI 34.8 cm    KELI(I,A) 3.1 cm^2    KELI(I,D) 3.1 cm^2    KELI(V,A) 3.0 cm^2    KELI(V,D) 3.0 cm^2    LV V1 max PG 6.5 mmHg    LV V1 mean PG 4.0 mmHg    LV V1 max 127.0 cm/sec    LV V1 mean 89.7 cm/sec    LV V1 VTI 23.9 cm    SV(Ao) 199.2 ml    SI(Ao) 96.1 ml/m^2    SV(LVOT) 108.1 ml    SI(LVOT) 52.1 ml/m^2    PA V2 max 90.2 cm/sec    PA max PG 3.3 mmHg    PA max PG (full) 0.15 mmHg    RV V1 max PG 3.1 mmHg    RV V1 mean PG 2.0 mmHg    RV V1 max 88.1 cm/sec    RV V1 mean 71.5 cm/sec    RV V1 VTI 19.5 cm    TR max riaz 224.0 cm/sec    RVSP(TR) 25.1 mmHg    RAP systole 5.0 mmHg    MVA P1/2T LCG 1.8 cm^2     CV ECHO SASHA - BZI_BMI 29.8 kilograms/m^2     CV ECHO SASHA - BSA(HAYCOCK) 2.1 m^2     CV ECHO SASHA - BZI_METRIC_WEIGHT 91.6 kg     CV ECHO SASHA - BZI_METRIC_HEIGHT 175.3 cm    Target HR (85%) 130 bpm    Max. Pred. HR (100%) 153 bpm   Light Blue Top   Result Value Ref Range    Extra Tube hold for add-on    Green Top (Gel)   Result Value Ref Range    Extra Tube Hold for add-ons.    Lavender Top   Result Value Ref Range    Extra Tube hold for add-on    Gold Top - SST   Result Value Ref Range    Extra Tube Hold for add-ons.            Assessment/Plan       ICD-10-CM ICD-9-CM   1. Type 1 diabetes mellitus with diabetic polyneuropathy (CMS/HCC)  E10.42 250.61     357.2   2. Mixed diabetic hyperlipidemia associated with type 1 diabetes mellitus (CMS/HCC)  E10.69 250.81    E78.2 272.2   3. Hypertension associated with diabetes (CMS/HCC)  E11.59 250.80    I10 401.9   4. Vitamin D deficiency  E55.9 268.9            Glycemic Management:      Lab Results   Component Value Date    HGBA1C 6 10/08/2019    HGBA1C 6.8 08/12/2019    HGBA1C 6 08/27/2018     Lab Results   Component Value Date    CREATININE 0.40 (L) 10/16/2020       Toujeo 26 units qhs- continue     Humalog - Novolog  1 unit per 10 grams of carbohydrate - continue     Has G5 but hasn't used it since didn't know how to put it on     Waiting for Dexcom g6- I will send office note and paper work to [employer]     Well controlled     Since living with niece she has been eating more carbs, trying to improve       Has been unable to get lab work done    We will continue with these doses     Lipid Management    on atorvastatin 10 mg qhs      Lab Results   Component Value Date    CHOL 149 10/08/2019     Lab Results   Component Value Date    TRIG 55 10/08/2019    TRIG 101 08/03/2016    TRIG 89 12/16/2014     Lab Results   Component Value Date    HDL 61 10/08/2019    HDL 51 (L) 08/03/2016    HDL 33 (L) 12/16/2014     No components found for: LDLCALC    Blood Pressure Management    On lisinopril 10 mg qd , increased to 20 and had syncope     Had syncope due to antihypertensives but now doses  decreased    Microvascular Complication Monitoring:  Diabetic Retinopathy, Diabetic Neuropathy  on gabapentin 2 x  600 mg three times daily   has taken lyrica w/o benefit   cymbalta didn't work     Immunizations:  Last pneumococcal immunization has had pneumovax    Flu shot up to date     Preventive Care:  Patient is not smoking    Weight Related:         Niece is now POA her number is [phone]- Violet Maldonado       Return in about 3 months (around 3/7/2021), or if symptoms worsen or fail to improve, for Recheck.        This document has been electronically signed by JESSIE Lewis on December 7, 2020 13:27 CST        A copy of my note was sent to Arelis Laird MD    Please see my above opinion and suggestions.

## 2020-12-11 ENCOUNTER — DOCUMENTATION (OUTPATIENT)
Dept: ENDOCRINOLOGY | Facility: CLINIC | Age: 65
End: 2020-12-11

## 2020-12-29 ENCOUNTER — DOCUMENTATION (OUTPATIENT)
Dept: ENDOCRINOLOGY | Facility: CLINIC | Age: 65
End: 2020-12-29

## 2021-01-01 ENCOUNTER — LAB REQUISITION (OUTPATIENT)
Dept: LAB | Facility: HOSPITAL | Age: 66
End: 2021-01-01

## 2021-01-01 ENCOUNTER — APPOINTMENT (OUTPATIENT)
Dept: GENERAL RADIOLOGY | Facility: HOSPITAL | Age: 66
End: 2021-01-01

## 2021-01-01 ENCOUNTER — APPOINTMENT (OUTPATIENT)
Dept: CARDIOLOGY | Facility: HOSPITAL | Age: 66
End: 2021-01-01

## 2021-01-01 ENCOUNTER — HOSPITAL ENCOUNTER (INPATIENT)
Facility: HOSPITAL | Age: 66
LOS: 13 days | Discharge: NURSING, REHAB OR LONG-TERM CARE | End: 2021-03-19
Attending: EMERGENCY MEDICINE | Admitting: HOSPITALIST

## 2021-01-01 ENCOUNTER — APPOINTMENT (OUTPATIENT)
Dept: CT IMAGING | Facility: HOSPITAL | Age: 66
End: 2021-01-01

## 2021-01-01 ENCOUNTER — HOSPITAL ENCOUNTER (OUTPATIENT)
Facility: HOSPITAL | Age: 66
Setting detail: OBSERVATION
Discharge: NURSING, REHAB OR LONG-TERM CARE | End: 2021-03-29
Attending: EMERGENCY MEDICINE | Admitting: HOSPITALIST

## 2021-01-01 ENCOUNTER — APPOINTMENT (OUTPATIENT)
Dept: NUCLEAR MEDICINE | Facility: HOSPITAL | Age: 66
End: 2021-01-01

## 2021-01-01 ENCOUNTER — HOSPITAL ENCOUNTER (OUTPATIENT)
Facility: HOSPITAL | Age: 66
Setting detail: OBSERVATION
Discharge: NURSING, REHAB OR LONG-TERM CARE | End: 2021-12-03
Attending: EMERGENCY MEDICINE | Admitting: INTERNAL MEDICINE

## 2021-01-01 ENCOUNTER — ANESTHESIA EVENT (OUTPATIENT)
Dept: PERIOP | Facility: HOSPITAL | Age: 66
End: 2021-01-01

## 2021-01-01 ENCOUNTER — APPOINTMENT (OUTPATIENT)
Dept: ULTRASOUND IMAGING | Facility: HOSPITAL | Age: 66
End: 2021-01-01

## 2021-01-01 ENCOUNTER — HOSPITAL ENCOUNTER (INPATIENT)
Facility: HOSPITAL | Age: 66
LOS: 12 days | Discharge: NURSING, REHAB OR LONG-TERM CARE | End: 2021-12-17
Attending: EMERGENCY MEDICINE | Admitting: INTERNAL MEDICINE

## 2021-01-01 ENCOUNTER — ANESTHESIA (OUTPATIENT)
Dept: PERIOP | Facility: HOSPITAL | Age: 66
End: 2021-01-01

## 2021-01-01 ENCOUNTER — APPOINTMENT (OUTPATIENT)
Dept: INTERVENTIONAL RADIOLOGY/VASCULAR | Facility: HOSPITAL | Age: 66
End: 2021-01-01

## 2021-01-01 VITALS
BODY MASS INDEX: 28.23 KG/M2 | SYSTOLIC BLOOD PRESSURE: 138 MMHG | RESPIRATION RATE: 18 BRPM | DIASTOLIC BLOOD PRESSURE: 62 MMHG | HEIGHT: 69 IN | WEIGHT: 190.6 LBS | HEART RATE: 86 BPM | OXYGEN SATURATION: 96 % | TEMPERATURE: 97.2 F

## 2021-01-01 VITALS
DIASTOLIC BLOOD PRESSURE: 64 MMHG | BODY MASS INDEX: 28.15 KG/M2 | HEART RATE: 103 BPM | OXYGEN SATURATION: 95 % | SYSTOLIC BLOOD PRESSURE: 143 MMHG | TEMPERATURE: 97.3 F | HEIGHT: 69 IN | WEIGHT: 190.04 LBS | RESPIRATION RATE: 18 BRPM

## 2021-01-01 VITALS
HEIGHT: 69 IN | DIASTOLIC BLOOD PRESSURE: 64 MMHG | OXYGEN SATURATION: 98 % | WEIGHT: 204.7 LBS | HEART RATE: 77 BPM | TEMPERATURE: 96.8 F | SYSTOLIC BLOOD PRESSURE: 141 MMHG | BODY MASS INDEX: 30.32 KG/M2 | RESPIRATION RATE: 18 BRPM

## 2021-01-01 VITALS
TEMPERATURE: 98.1 F | HEART RATE: 86 BPM | HEIGHT: 69 IN | OXYGEN SATURATION: 97 % | RESPIRATION RATE: 18 BRPM | BODY MASS INDEX: 29.22 KG/M2 | SYSTOLIC BLOOD PRESSURE: 163 MMHG | DIASTOLIC BLOOD PRESSURE: 90 MMHG | WEIGHT: 197.31 LBS

## 2021-01-01 DIAGNOSIS — J18.9 PNEUMONIA OF BOTH LUNGS DUE TO INFECTIOUS ORGANISM, UNSPECIFIED PART OF LUNG: ICD-10-CM

## 2021-01-01 DIAGNOSIS — I25.10 ATHEROSCLEROSIS OF NATIVE CORONARY ARTERY OF NATIVE HEART WITHOUT ANGINA PECTORIS: ICD-10-CM

## 2021-01-01 DIAGNOSIS — G93.40 SEPSIS WITH ENCEPHALOPATHY WITHOUT SEPTIC SHOCK, DUE TO UNSPECIFIED ORGANISM (HCC): Primary | ICD-10-CM

## 2021-01-01 DIAGNOSIS — N39.0 URINARY TRACT INFECTION, SITE NOT SPECIFIED: ICD-10-CM

## 2021-01-01 DIAGNOSIS — Z78.9 IMPAIRED MOBILITY AND ADLS: ICD-10-CM

## 2021-01-01 DIAGNOSIS — J45.909 UNSPECIFIED ASTHMA, UNCOMPLICATED: ICD-10-CM

## 2021-01-01 DIAGNOSIS — N30.01 ACUTE CYSTITIS WITH HEMATURIA: ICD-10-CM

## 2021-01-01 DIAGNOSIS — J18.9 PNEUMONIA OF BOTH LOWER LOBES DUE TO INFECTIOUS ORGANISM: ICD-10-CM

## 2021-01-01 DIAGNOSIS — Z74.09 IMPAIRED FUNCTIONAL MOBILITY, BALANCE, GAIT, AND ENDURANCE: ICD-10-CM

## 2021-01-01 DIAGNOSIS — F41.9 ANXIETY DISORDER, UNSPECIFIED TYPE: ICD-10-CM

## 2021-01-01 DIAGNOSIS — D72.829 LEUKOCYTOSIS, UNSPECIFIED TYPE: ICD-10-CM

## 2021-01-01 DIAGNOSIS — R13.12 OROPHARYNGEAL DYSPHAGIA: ICD-10-CM

## 2021-01-01 DIAGNOSIS — D64.9 ANEMIA, UNSPECIFIED TYPE: ICD-10-CM

## 2021-01-01 DIAGNOSIS — R07.9 CHEST PAIN, UNSPECIFIED TYPE: ICD-10-CM

## 2021-01-01 DIAGNOSIS — I10 ESSENTIAL (PRIMARY) HYPERTENSION: ICD-10-CM

## 2021-01-01 DIAGNOSIS — G89.4 CHRONIC PAIN SYNDROME: ICD-10-CM

## 2021-01-01 DIAGNOSIS — A49.9 UTI (URINARY TRACT INFECTION), BACTERIAL: ICD-10-CM

## 2021-01-01 DIAGNOSIS — I25.710 ATHEROSCLEROSIS OF AUTOLOGOUS VEIN CORONARY ARTERY BYPASS GRAFT WITH UNSTABLE ANGINA PECTORIS (HCC): ICD-10-CM

## 2021-01-01 DIAGNOSIS — Z74.09 IMPAIRED MOBILITY AND ADLS: ICD-10-CM

## 2021-01-01 DIAGNOSIS — Z78.9 IMPAIRED MOBILITY AND ACTIVITIES OF DAILY LIVING: ICD-10-CM

## 2021-01-01 DIAGNOSIS — A41.9 SEPSIS WITHOUT ACUTE ORGAN DYSFUNCTION, DUE TO UNSPECIFIED ORGANISM (HCC): Primary | ICD-10-CM

## 2021-01-01 DIAGNOSIS — N39.0 UTI (URINARY TRACT INFECTION), BACTERIAL: ICD-10-CM

## 2021-01-01 DIAGNOSIS — R68.89 OTHER GENERAL SYMPTOMS AND SIGNS: ICD-10-CM

## 2021-01-01 DIAGNOSIS — R74.8 ELEVATED LIVER ENZYMES: ICD-10-CM

## 2021-01-01 DIAGNOSIS — E10.42 TYPE 1 DIABETES MELLITUS WITH DIABETIC POLYNEUROPATHY (HCC): ICD-10-CM

## 2021-01-01 DIAGNOSIS — R07.9 CHEST PAIN, RULE OUT ACUTE MYOCARDIAL INFARCTION: Primary | ICD-10-CM

## 2021-01-01 DIAGNOSIS — R07.9 CHEST PAIN WITH HIGH RISK FOR CARDIAC ETIOLOGY: Primary | ICD-10-CM

## 2021-01-01 DIAGNOSIS — R41.82 ALTERED MENTAL STATUS, UNSPECIFIED ALTERED MENTAL STATUS TYPE: ICD-10-CM

## 2021-01-01 DIAGNOSIS — R77.8 ELEVATED TROPONIN: ICD-10-CM

## 2021-01-01 DIAGNOSIS — R79.89 ELEVATED D-DIMER: ICD-10-CM

## 2021-01-01 DIAGNOSIS — N17.9 ACUTE KIDNEY FAILURE, UNSPECIFIED (HCC): ICD-10-CM

## 2021-01-01 DIAGNOSIS — R79.89 POSITIVE D DIMER: ICD-10-CM

## 2021-01-01 DIAGNOSIS — R65.20 SEPSIS WITH ENCEPHALOPATHY WITHOUT SEPTIC SHOCK, DUE TO UNSPECIFIED ORGANISM (HCC): Primary | ICD-10-CM

## 2021-01-01 DIAGNOSIS — N13.2 URETERAL STONE WITH HYDRONEPHROSIS: ICD-10-CM

## 2021-01-01 DIAGNOSIS — Z74.09 IMPAIRED MOBILITY AND ACTIVITIES OF DAILY LIVING: ICD-10-CM

## 2021-01-01 DIAGNOSIS — A41.9 SEPSIS WITH ENCEPHALOPATHY WITHOUT SEPTIC SHOCK, DUE TO UNSPECIFIED ORGANISM (HCC): Primary | ICD-10-CM

## 2021-01-01 DIAGNOSIS — Z79.899 OTHER LONG TERM (CURRENT) DRUG THERAPY: ICD-10-CM

## 2021-01-01 DIAGNOSIS — N17.9 AKI (ACUTE KIDNEY INJURY) (HCC): ICD-10-CM

## 2021-01-01 DIAGNOSIS — R79.89 OTHER SPECIFIED ABNORMAL FINDINGS OF BLOOD CHEMISTRY: ICD-10-CM

## 2021-01-01 LAB
ACETONE BLD QL: ABNORMAL
ALBUMIN SERPL-MCNC: 2.6 G/DL (ref 3.5–5.2)
ALBUMIN SERPL-MCNC: 2.6 G/DL (ref 3.5–5.2)
ALBUMIN SERPL-MCNC: 2.8 G/DL (ref 3.5–5.2)
ALBUMIN SERPL-MCNC: 2.9 G/DL (ref 3.5–5.2)
ALBUMIN SERPL-MCNC: 3 G/DL (ref 3.5–5.2)
ALBUMIN SERPL-MCNC: 3.1 G/DL (ref 3.5–5.2)
ALBUMIN SERPL-MCNC: 3.2 G/DL (ref 3.5–5.2)
ALBUMIN SERPL-MCNC: 3.2 G/DL (ref 3.5–5.2)
ALBUMIN SERPL-MCNC: 3.3 G/DL (ref 3.5–5.2)
ALBUMIN SERPL-MCNC: 3.3 G/DL (ref 3.5–5.2)
ALBUMIN SERPL-MCNC: 3.4 G/DL (ref 3.5–5.2)
ALBUMIN SERPL-MCNC: 3.5 G/DL (ref 3.5–5.2)
ALBUMIN SERPL-MCNC: 4.2 G/DL (ref 3.5–5.2)
ALBUMIN/GLOB SERPL: 0.8 G/DL
ALBUMIN/GLOB SERPL: 0.8 G/DL
ALBUMIN/GLOB SERPL: 0.9 G/DL
ALBUMIN/GLOB SERPL: 1 G/DL
ALBUMIN/GLOB SERPL: 1 G/DL
ALBUMIN/GLOB SERPL: 1.1 G/DL
ALBUMIN/GLOB SERPL: 1.1 G/DL
ALP SERPL-CCNC: 114 U/L (ref 39–117)
ALP SERPL-CCNC: 116 U/L (ref 39–117)
ALP SERPL-CCNC: 121 U/L (ref 39–117)
ALP SERPL-CCNC: 123 U/L (ref 39–117)
ALP SERPL-CCNC: 126 U/L (ref 39–117)
ALP SERPL-CCNC: 138 U/L (ref 39–117)
ALP SERPL-CCNC: 139 U/L (ref 39–117)
ALP SERPL-CCNC: 152 U/L (ref 39–117)
ALP SERPL-CCNC: 160 U/L (ref 39–117)
ALP SERPL-CCNC: 182 U/L (ref 39–117)
ALP SERPL-CCNC: 190 U/L (ref 39–117)
ALP SERPL-CCNC: 90 U/L (ref 39–117)
ALT SERPL W P-5'-P-CCNC: 11 U/L (ref 1–33)
ALT SERPL W P-5'-P-CCNC: 12 U/L (ref 1–33)
ALT SERPL W P-5'-P-CCNC: 12 U/L (ref 1–33)
ALT SERPL W P-5'-P-CCNC: 124 U/L (ref 1–33)
ALT SERPL W P-5'-P-CCNC: 124 U/L (ref 1–33)
ALT SERPL W P-5'-P-CCNC: 13 U/L (ref 1–33)
ALT SERPL W P-5'-P-CCNC: 13 U/L (ref 1–33)
ALT SERPL W P-5'-P-CCNC: 134 U/L (ref 1–33)
ALT SERPL W P-5'-P-CCNC: 143 U/L (ref 1–33)
ALT SERPL W P-5'-P-CCNC: 152 U/L (ref 1–33)
ALT SERPL W P-5'-P-CCNC: 16 U/L (ref 1–33)
ALT SERPL W P-5'-P-CCNC: 18 U/L (ref 1–33)
AMORPH URATE CRY URNS QL MICRO: ABNORMAL /HPF
ANION GAP SERPL CALCULATED.3IONS-SCNC: 11 MMOL/L (ref 5–15)
ANION GAP SERPL CALCULATED.3IONS-SCNC: 12 MMOL/L (ref 5–15)
ANION GAP SERPL CALCULATED.3IONS-SCNC: 12 MMOL/L (ref 5–15)
ANION GAP SERPL CALCULATED.3IONS-SCNC: 16 MMOL/L (ref 5–15)
ANION GAP SERPL CALCULATED.3IONS-SCNC: 19 MMOL/L (ref 5–15)
ANION GAP SERPL CALCULATED.3IONS-SCNC: 20 MMOL/L (ref 5–15)
ANION GAP SERPL CALCULATED.3IONS-SCNC: 3 MMOL/L (ref 5–15)
ANION GAP SERPL CALCULATED.3IONS-SCNC: 5 MMOL/L (ref 5–15)
ANION GAP SERPL CALCULATED.3IONS-SCNC: 6 MMOL/L (ref 5–15)
ANION GAP SERPL CALCULATED.3IONS-SCNC: 7 MMOL/L (ref 5–15)
ANION GAP SERPL CALCULATED.3IONS-SCNC: 8 MMOL/L (ref 5–15)
ANION GAP SERPL CALCULATED.3IONS-SCNC: 9 MMOL/L (ref 5–15)
ANISOCYTOSIS BLD QL: ABNORMAL
ANISOCYTOSIS BLD QL: NORMAL
APTT PPP: 100.4 SECONDS (ref 20–40.3)
APTT PPP: 27.7 SECONDS (ref 20–40.3)
APTT PPP: 28.2 SECONDS (ref 20–40.3)
APTT PPP: 30.2 SECONDS (ref 20–40.3)
APTT PPP: 33.1 SECONDS (ref 20–40.3)
APTT PPP: 37 SECONDS (ref 20–40.3)
APTT PPP: 47.1 SECONDS (ref 20–40.3)
APTT PPP: 58.4 SECONDS (ref 20–40.3)
APTT PPP: 69.1 SECONDS (ref 20–40.3)
APTT PPP: 81.6 SECONDS (ref 20–40.3)
APTT PPP: 82.9 SECONDS (ref 20–40.3)
APTT PPP: 84.1 SECONDS (ref 20–40.3)
APTT PPP: >240 SECONDS (ref 20–40.3)
ARTERIAL PATENCY WRIST A: ABNORMAL
AST SERPL-CCNC: 109 U/L (ref 1–32)
AST SERPL-CCNC: 16 U/L (ref 1–32)
AST SERPL-CCNC: 16 U/L (ref 1–32)
AST SERPL-CCNC: 169 U/L (ref 1–32)
AST SERPL-CCNC: 19 U/L (ref 1–32)
AST SERPL-CCNC: 21 U/L (ref 1–32)
AST SERPL-CCNC: 21 U/L (ref 1–32)
AST SERPL-CCNC: 26 U/L (ref 1–32)
AST SERPL-CCNC: 263 U/L (ref 1–32)
AST SERPL-CCNC: 36 U/L (ref 1–32)
AST SERPL-CCNC: 378 U/L (ref 1–32)
AST SERPL-CCNC: 98 U/L (ref 1–32)
ATMOSPHERIC PRESS: 741 MMHG
ATMOSPHERIC PRESS: 749 MMHG
ATMOSPHERIC PRESS: 755 MMHG
BACTERIA BLD CULT: ABNORMAL
BACTERIA ISLT: NORMAL
BACTERIA SPEC AEROBE CULT: ABNORMAL
BACTERIA SPEC AEROBE CULT: NO GROWTH
BACTERIA SPEC AEROBE CULT: NORMAL
BACTERIA UR CULT: NORMAL
BACTERIA UR CULT: NORMAL
BACTERIA UR QL AUTO: ABNORMAL /HPF
BASE EXCESS BLDA CALC-SCNC: -5.9 MMOL/L (ref 0–2)
BASE EXCESS BLDA CALC-SCNC: -9.4 MMOL/L (ref 0–2)
BASE EXCESS BLDA CALC-SCNC: 1.1 MMOL/L (ref 0–2)
BASOPHILS # BLD AUTO: 0.01 10*3/MM3 (ref 0–0.2)
BASOPHILS # BLD AUTO: 0.03 10*3/MM3 (ref 0–0.2)
BASOPHILS # BLD AUTO: 0.04 10*3/MM3 (ref 0–0.2)
BASOPHILS # BLD AUTO: 0.05 10*3/MM3 (ref 0–0.2)
BASOPHILS # BLD AUTO: 0.05 10*3/MM3 (ref 0–0.2)
BASOPHILS # BLD AUTO: 0.06 10*3/MM3 (ref 0–0.2)
BASOPHILS # BLD AUTO: 0.06 10*3/MM3 (ref 0–0.2)
BASOPHILS # BLD AUTO: 0.07 10*3/MM3 (ref 0–0.2)
BASOPHILS # BLD AUTO: 0.08 10*3/MM3 (ref 0–0.2)
BASOPHILS # BLD AUTO: 0.08 10*3/MM3 (ref 0–0.2)
BASOPHILS # BLD AUTO: 0.09 10*3/MM3 (ref 0–0.2)
BASOPHILS # BLD AUTO: 0.09 10*3/MM3 (ref 0–0.2)
BASOPHILS # BLD AUTO: 0.1 10*3/MM3 (ref 0–0.2)
BASOPHILS # BLD AUTO: 0.11 10*3/MM3 (ref 0–0.2)
BASOPHILS # BLD AUTO: 0.13 10*3/MM3 (ref 0–0.2)
BASOPHILS # BLD MANUAL: 0.22 10*3/MM3 (ref 0–0.2)
BASOPHILS NFR BLD AUTO: 0.1 % (ref 0–1.5)
BASOPHILS NFR BLD AUTO: 0.2 % (ref 0–1.5)
BASOPHILS NFR BLD AUTO: 0.3 % (ref 0–1.5)
BASOPHILS NFR BLD AUTO: 0.4 % (ref 0–1.5)
BASOPHILS NFR BLD AUTO: 0.4 % (ref 0–1.5)
BASOPHILS NFR BLD AUTO: 0.5 % (ref 0–1.5)
BASOPHILS NFR BLD AUTO: 0.6 % (ref 0–1.5)
BASOPHILS NFR BLD AUTO: 0.8 % (ref 0–1.5)
BASOPHILS NFR BLD AUTO: 0.9 % (ref 0–1.5)
BASOPHILS NFR BLD AUTO: 0.9 % (ref 0–1.5)
BASOPHILS NFR BLD AUTO: 1 % (ref 0–1.5)
BASOPHILS NFR BLD AUTO: 1.5 % (ref 0–1.5)
BASOPHILS NFR BLD AUTO: 1.7 % (ref 0–1.5)
BDY SITE: ABNORMAL
BH CV ECHO MEAS - ACS: 1.8 CM
BH CV ECHO MEAS - AO MAX PG (FULL): 17.7 MMHG
BH CV ECHO MEAS - AO MAX PG: 11.3 MMHG
BH CV ECHO MEAS - AO MAX PG: 23.4 MMHG
BH CV ECHO MEAS - AO MEAN PG (FULL): 9 MMHG
BH CV ECHO MEAS - AO MEAN PG: 12 MMHG
BH CV ECHO MEAS - AO ROOT AREA (BSA CORRECTED): 1.6
BH CV ECHO MEAS - AO ROOT AREA: 8.6 CM^2
BH CV ECHO MEAS - AO ROOT DIAM: 3.3 CM
BH CV ECHO MEAS - AO V2 MAX: 168 CM/SEC
BH CV ECHO MEAS - AO V2 MAX: 242 CM/SEC
BH CV ECHO MEAS - AO V2 MEAN: 161 CM/SEC
BH CV ECHO MEAS - AO V2 VTI: 53.8 CM
BH CV ECHO MEAS - AVA(I,A): 2.3 CM^2
BH CV ECHO MEAS - AVA(I,D): 2.3 CM^2
BH CV ECHO MEAS - AVA(V,A): 2.1 CM^2
BH CV ECHO MEAS - AVA(V,D): 2.1 CM^2
BH CV ECHO MEAS - BSA(HAYCOCK): 2.1 M^2
BH CV ECHO MEAS - BSA(HAYCOCK): 2.1 M^2
BH CV ECHO MEAS - BSA: 2 M^2
BH CV ECHO MEAS - BSA: 2 M^2
BH CV ECHO MEAS - BZI_BMI: 28.1 KILOGRAMS/M^2
BH CV ECHO MEAS - BZI_BMI: 28.1 KILOGRAMS/M^2
BH CV ECHO MEAS - BZI_METRIC_HEIGHT: 175.3 CM
BH CV ECHO MEAS - BZI_METRIC_HEIGHT: 175.3 CM
BH CV ECHO MEAS - BZI_METRIC_WEIGHT: 86.2 KG
BH CV ECHO MEAS - BZI_METRIC_WEIGHT: 86.2 KG
BH CV ECHO MEAS - EDV(CUBED): 101.2 ML
BH CV ECHO MEAS - EDV(MOD-SP2): 111 ML
BH CV ECHO MEAS - EDV(MOD-SP2): 23.5 ML
BH CV ECHO MEAS - EDV(MOD-SP4): 106 ML
BH CV ECHO MEAS - EDV(MOD-SP4): 50.1 ML
BH CV ECHO MEAS - EDV(TEICH): 100.3 ML
BH CV ECHO MEAS - EF(CUBED): 75.1 %
BH CV ECHO MEAS - EF(MOD-SP2): 34.5 %
BH CV ECHO MEAS - EF(MOD-SP2): 74.3 %
BH CV ECHO MEAS - EF(MOD-SP4): 28.5 %
BH CV ECHO MEAS - EF(MOD-SP4): 72.7 %
BH CV ECHO MEAS - EF(TEICH): 67.1 %
BH CV ECHO MEAS - ESV(CUBED): 25.2 ML
BH CV ECHO MEAS - ESV(MOD-SP2): 6 ML
BH CV ECHO MEAS - ESV(MOD-SP2): 72.7 ML
BH CV ECHO MEAS - ESV(MOD-SP4): 13.7 ML
BH CV ECHO MEAS - ESV(MOD-SP4): 75.8 ML
BH CV ECHO MEAS - ESV(TEICH): 33 ML
BH CV ECHO MEAS - FS: 37.1 %
BH CV ECHO MEAS - IVS/LVPW: 0.59
BH CV ECHO MEAS - IVSD: 0.69 CM
BH CV ECHO MEAS - LA DIMENSION: 3.2 CM
BH CV ECHO MEAS - LA/AO: 0.97
BH CV ECHO MEAS - LV DIASTOLIC VOL/BSA (35-75): 24.8 ML/M^2
BH CV ECHO MEAS - LV DIASTOLIC VOL/BSA (35-75): 52.4 ML/M^2
BH CV ECHO MEAS - LV MASS(C)D: 146.5 GRAMS
BH CV ECHO MEAS - LV MASS(C)DI: 72.5 GRAMS/M^2
BH CV ECHO MEAS - LV MAX PG: 5.8 MMHG
BH CV ECHO MEAS - LV MEAN PG: 3 MMHG
BH CV ECHO MEAS - LV SYSTOLIC VOL/BSA (12-30): 37.5 ML/M^2
BH CV ECHO MEAS - LV SYSTOLIC VOL/BSA (12-30): 6.8 ML/M^2
BH CV ECHO MEAS - LV V1 MAX: 120 CM/SEC
BH CV ECHO MEAS - LV V1 MEAN: 85.8 CM/SEC
BH CV ECHO MEAS - LV V1 VTI: 29.2 CM
BH CV ECHO MEAS - LVIDD: 4.7 CM
BH CV ECHO MEAS - LVIDS: 2.9 CM
BH CV ECHO MEAS - LVLD AP2: 6.8 CM
BH CV ECHO MEAS - LVLD AP2: 8.5 CM
BH CV ECHO MEAS - LVLD AP4: 6.6 CM
BH CV ECHO MEAS - LVLD AP4: 8.2 CM
BH CV ECHO MEAS - LVLS AP2: 5.9 CM
BH CV ECHO MEAS - LVLS AP2: 7.7 CM
BH CV ECHO MEAS - LVLS AP4: 5.8 CM
BH CV ECHO MEAS - LVLS AP4: 7.6 CM
BH CV ECHO MEAS - LVOT AREA (M): 4.2 CM^2
BH CV ECHO MEAS - LVOT AREA: 4.2 CM^2
BH CV ECHO MEAS - LVOT DIAM: 2.3 CM
BH CV ECHO MEAS - LVPWD: 1.2 CM
BH CV ECHO MEAS - MR MAX PG: 44.1 MMHG
BH CV ECHO MEAS - MR MAX PG: 52.1 MMHG
BH CV ECHO MEAS - MR MAX VEL: 332 CM/SEC
BH CV ECHO MEAS - MR MAX VEL: 361 CM/SEC
BH CV ECHO MEAS - MV A MAX VEL: 162 CM/SEC
BH CV ECHO MEAS - MV DEC SLOPE: 814 CM/SEC^2
BH CV ECHO MEAS - MV E MAX VEL: 170 CM/SEC
BH CV ECHO MEAS - MV E/A: 1
BH CV ECHO MEAS - MV MAX PG: 14.1 MMHG
BH CV ECHO MEAS - MV MEAN PG: 7 MMHG
BH CV ECHO MEAS - MV P1/2T MAX VEL: 160 CM/SEC
BH CV ECHO MEAS - MV P1/2T: 57.6 MSEC
BH CV ECHO MEAS - MV V2 MAX: 188 CM/SEC
BH CV ECHO MEAS - MV V2 MEAN: 126 CM/SEC
BH CV ECHO MEAS - MV V2 VTI: 53.7 CM
BH CV ECHO MEAS - MVA P1/2T LCG: 1.4 CM^2
BH CV ECHO MEAS - MVA(P1/2T): 3.8 CM^2
BH CV ECHO MEAS - MVA(VTI): 2.3 CM^2
BH CV ECHO MEAS - PA MAX PG (FULL): 3.2 MMHG
BH CV ECHO MEAS - PA MAX PG: 5.7 MMHG
BH CV ECHO MEAS - PA V2 MAX: 119 CM/SEC
BH CV ECHO MEAS - RAP SYSTOLE: 10 MMHG
BH CV ECHO MEAS - RAP SYSTOLE: 5 MMHG
BH CV ECHO MEAS - RV MAX PG: 2.5 MMHG
BH CV ECHO MEAS - RV MEAN PG: 1 MMHG
BH CV ECHO MEAS - RV V1 MAX: 79 CM/SEC
BH CV ECHO MEAS - RV V1 MEAN: 56.9 CM/SEC
BH CV ECHO MEAS - RV V1 VTI: 21.3 CM
BH CV ECHO MEAS - RVDD: 2.2 CM
BH CV ECHO MEAS - RVSP: 48 MMHG
BH CV ECHO MEAS - RVSP: 56 MMHG
BH CV ECHO MEAS - SI(AO): 227.7 ML/M^2
BH CV ECHO MEAS - SI(CUBED): 37.6 ML/M^2
BH CV ECHO MEAS - SI(LVOT): 60 ML/M^2
BH CV ECHO MEAS - SI(MOD-SP2): 18.9 ML/M^2
BH CV ECHO MEAS - SI(MOD-SP2): 8.6 ML/M^2
BH CV ECHO MEAS - SI(MOD-SP4): 14.9 ML/M^2
BH CV ECHO MEAS - SI(MOD-SP4): 18 ML/M^2
BH CV ECHO MEAS - SI(TEICH): 33.3 ML/M^2
BH CV ECHO MEAS - SV(AO): 460.2 ML
BH CV ECHO MEAS - SV(CUBED): 76 ML
BH CV ECHO MEAS - SV(LVOT): 121.3 ML
BH CV ECHO MEAS - SV(MOD-SP2): 17.5 ML
BH CV ECHO MEAS - SV(MOD-SP2): 38.3 ML
BH CV ECHO MEAS - SV(MOD-SP4): 30.2 ML
BH CV ECHO MEAS - SV(MOD-SP4): 36.4 ML
BH CV ECHO MEAS - SV(TEICH): 67.3 ML
BH CV ECHO MEAS - TR MAX VEL: 328 CM/SEC
BH CV ECHO MEAS - TR MAX VEL: 339 CM/SEC
BILIRUB SERPL-MCNC: 0.2 MG/DL (ref 0–1.2)
BILIRUB SERPL-MCNC: 0.3 MG/DL (ref 0–1.2)
BILIRUB SERPL-MCNC: 0.4 MG/DL (ref 0–1.2)
BILIRUB SERPL-MCNC: 0.5 MG/DL (ref 0–1.2)
BILIRUB SERPL-MCNC: 0.5 MG/DL (ref 0–1.2)
BILIRUB SERPL-MCNC: 0.6 MG/DL (ref 0–1.2)
BILIRUB SERPL-MCNC: 0.6 MG/DL (ref 0–1.2)
BILIRUB SERPL-MCNC: 0.7 MG/DL (ref 0–1.2)
BILIRUB UR QL STRIP: ABNORMAL
BILIRUB UR QL STRIP: ABNORMAL
BILIRUB UR QL STRIP: NEGATIVE
BUN SERPL-MCNC: 10 MG/DL (ref 8–23)
BUN SERPL-MCNC: 11 MG/DL (ref 8–23)
BUN SERPL-MCNC: 12 MG/DL (ref 8–23)
BUN SERPL-MCNC: 12 MG/DL (ref 8–23)
BUN SERPL-MCNC: 13 MG/DL (ref 8–23)
BUN SERPL-MCNC: 13 MG/DL (ref 8–23)
BUN SERPL-MCNC: 14 MG/DL (ref 8–23)
BUN SERPL-MCNC: 15 MG/DL (ref 8–23)
BUN SERPL-MCNC: 16 MG/DL (ref 8–23)
BUN SERPL-MCNC: 17 MG/DL (ref 8–23)
BUN SERPL-MCNC: 17 MG/DL (ref 8–23)
BUN SERPL-MCNC: 19 MG/DL (ref 8–23)
BUN SERPL-MCNC: 19 MG/DL (ref 8–23)
BUN SERPL-MCNC: 20 MG/DL (ref 8–23)
BUN SERPL-MCNC: 21 MG/DL (ref 8–23)
BUN SERPL-MCNC: 22 MG/DL (ref 8–23)
BUN SERPL-MCNC: 23 MG/DL (ref 8–23)
BUN SERPL-MCNC: 32 MG/DL (ref 8–23)
BUN SERPL-MCNC: 32 MG/DL (ref 8–23)
BUN SERPL-MCNC: 33 MG/DL (ref 8–23)
BUN SERPL-MCNC: 36 MG/DL (ref 8–23)
BUN SERPL-MCNC: 36 MG/DL (ref 8–23)
BUN SERPL-MCNC: 41 MG/DL (ref 8–23)
BUN SERPL-MCNC: 43 MG/DL (ref 8–23)
BUN SERPL-MCNC: 43 MG/DL (ref 8–23)
BUN SERPL-MCNC: 44 MG/DL (ref 8–23)
BUN SERPL-MCNC: 5 MG/DL (ref 8–23)
BUN SERPL-MCNC: 8 MG/DL (ref 8–23)
BUN SERPL-MCNC: 8 MG/DL (ref 8–23)
BUN SERPL-MCNC: 9 MG/DL (ref 8–23)
BUN SERPL-MCNC: 9 MG/DL (ref 8–23)
BUN/CREAT SERPL: 12.3 (ref 7–25)
BUN/CREAT SERPL: 12.5 (ref 7–25)
BUN/CREAT SERPL: 13.2 (ref 7–25)
BUN/CREAT SERPL: 13.3 (ref 7–25)
BUN/CREAT SERPL: 14.6 (ref 7–25)
BUN/CREAT SERPL: 15.6 (ref 7–25)
BUN/CREAT SERPL: 15.7 (ref 7–25)
BUN/CREAT SERPL: 16.7 (ref 7–25)
BUN/CREAT SERPL: 17.3 (ref 7–25)
BUN/CREAT SERPL: 17.8 (ref 7–25)
BUN/CREAT SERPL: 18.9 (ref 7–25)
BUN/CREAT SERPL: 21.9 (ref 7–25)
BUN/CREAT SERPL: 22 (ref 7–25)
BUN/CREAT SERPL: 22.2 (ref 7–25)
BUN/CREAT SERPL: 25 (ref 7–25)
BUN/CREAT SERPL: 25 (ref 7–25)
BUN/CREAT SERPL: 25.4 (ref 7–25)
BUN/CREAT SERPL: 25.6 (ref 7–25)
BUN/CREAT SERPL: 26.7 (ref 7–25)
BUN/CREAT SERPL: 27.1 (ref 7–25)
BUN/CREAT SERPL: 27.3 (ref 7–25)
BUN/CREAT SERPL: 27.8 (ref 7–25)
BUN/CREAT SERPL: 32.6 (ref 7–25)
BUN/CREAT SERPL: 33.9 (ref 7–25)
BUN/CREAT SERPL: 34.5 (ref 7–25)
BUN/CREAT SERPL: 35.9 (ref 7–25)
BUN/CREAT SERPL: 37.9 (ref 7–25)
BUN/CREAT SERPL: 39.8 (ref 7–25)
BUN/CREAT SERPL: 40.2 (ref 7–25)
BUN/CREAT SERPL: 42.9 (ref 7–25)
BUN/CREAT SERPL: 44.4 (ref 7–25)
BUN/CREAT SERPL: 44.6 (ref 7–25)
BUN/CREAT SERPL: 45.1 (ref 7–25)
BUN/CREAT SERPL: 46.8 (ref 7–25)
CALCIUM SPEC-SCNC: 10.2 MG/DL (ref 8.6–10.5)
CALCIUM SPEC-SCNC: 10.2 MG/DL (ref 8.6–10.5)
CALCIUM SPEC-SCNC: 10.3 MG/DL (ref 8.6–10.5)
CALCIUM SPEC-SCNC: 10.4 MG/DL (ref 8.6–10.5)
CALCIUM SPEC-SCNC: 10.6 MG/DL (ref 8.6–10.5)
CALCIUM SPEC-SCNC: 11.4 MG/DL (ref 8.6–10.5)
CALCIUM SPEC-SCNC: 8.7 MG/DL (ref 8.6–10.5)
CALCIUM SPEC-SCNC: 8.8 MG/DL (ref 8.6–10.5)
CALCIUM SPEC-SCNC: 8.8 MG/DL (ref 8.6–10.5)
CALCIUM SPEC-SCNC: 8.9 MG/DL (ref 8.6–10.5)
CALCIUM SPEC-SCNC: 9 MG/DL (ref 8.6–10.5)
CALCIUM SPEC-SCNC: 9.1 MG/DL (ref 8.6–10.5)
CALCIUM SPEC-SCNC: 9.4 MG/DL (ref 8.6–10.5)
CALCIUM SPEC-SCNC: 9.5 MG/DL (ref 8.6–10.5)
CALCIUM SPEC-SCNC: 9.7 MG/DL (ref 8.6–10.5)
CALCIUM SPEC-SCNC: 9.7 MG/DL (ref 8.6–10.5)
CALCIUM SPEC-SCNC: 9.8 MG/DL (ref 8.6–10.5)
CALCIUM SPEC-SCNC: 9.9 MG/DL (ref 8.6–10.5)
CHLORIDE SERPL-SCNC: 101 MMOL/L (ref 98–107)
CHLORIDE SERPL-SCNC: 101 MMOL/L (ref 98–107)
CHLORIDE SERPL-SCNC: 102 MMOL/L (ref 98–107)
CHLORIDE SERPL-SCNC: 103 MMOL/L (ref 98–107)
CHLORIDE SERPL-SCNC: 105 MMOL/L (ref 98–107)
CHLORIDE SERPL-SCNC: 108 MMOL/L (ref 98–107)
CHLORIDE SERPL-SCNC: 109 MMOL/L (ref 98–107)
CHLORIDE SERPL-SCNC: 109 MMOL/L (ref 98–107)
CHLORIDE SERPL-SCNC: 110 MMOL/L (ref 98–107)
CHLORIDE SERPL-SCNC: 111 MMOL/L (ref 98–107)
CHLORIDE SERPL-SCNC: 113 MMOL/L (ref 98–107)
CHLORIDE SERPL-SCNC: 114 MMOL/L (ref 98–107)
CHLORIDE SERPL-SCNC: 115 MMOL/L (ref 98–107)
CHLORIDE SERPL-SCNC: 116 MMOL/L (ref 98–107)
CHLORIDE SERPL-SCNC: 117 MMOL/L (ref 98–107)
CHLORIDE SERPL-SCNC: 118 MMOL/L (ref 98–107)
CHLORIDE SERPL-SCNC: 122 MMOL/L (ref 98–107)
CHLORIDE SERPL-SCNC: 87 MMOL/L (ref 98–107)
CHLORIDE SERPL-SCNC: 93 MMOL/L (ref 98–107)
CHLORIDE SERPL-SCNC: 96 MMOL/L (ref 98–107)
CHLORIDE SERPL-SCNC: 96 MMOL/L (ref 98–107)
CHLORIDE SERPL-SCNC: 97 MMOL/L (ref 98–107)
CHLORIDE SERPL-SCNC: 99 MMOL/L (ref 98–107)
CLARITY UR: ABNORMAL
CLARITY UR: CLEAR
CO2 SERPL-SCNC: 13 MMOL/L (ref 22–29)
CO2 SERPL-SCNC: 13 MMOL/L (ref 22–29)
CO2 SERPL-SCNC: 18 MMOL/L (ref 22–29)
CO2 SERPL-SCNC: 18 MMOL/L (ref 22–29)
CO2 SERPL-SCNC: 19 MMOL/L (ref 22–29)
CO2 SERPL-SCNC: 20 MMOL/L (ref 22–29)
CO2 SERPL-SCNC: 21 MMOL/L (ref 22–29)
CO2 SERPL-SCNC: 22 MMOL/L (ref 22–29)
CO2 SERPL-SCNC: 23 MMOL/L (ref 22–29)
CO2 SERPL-SCNC: 24 MMOL/L (ref 22–29)
CO2 SERPL-SCNC: 26 MMOL/L (ref 22–29)
CO2 SERPL-SCNC: 26 MMOL/L (ref 22–29)
CO2 SERPL-SCNC: 28 MMOL/L (ref 22–29)
CO2 SERPL-SCNC: 29 MMOL/L (ref 22–29)
CO2 SERPL-SCNC: 30 MMOL/L (ref 22–29)
CO2 SERPL-SCNC: 30 MMOL/L (ref 22–29)
CO2 SERPL-SCNC: 32 MMOL/L (ref 22–29)
CO2 SERPL-SCNC: 34 MMOL/L (ref 22–29)
COLOR UR: ABNORMAL
COLOR UR: YELLOW
CRE SCREEN PCR: NOT DETECTED
CRE SCREEN PCR: NOT DETECTED
CREAT SERPL-MCNC: 0.4 MG/DL (ref 0.57–1)
CREAT SERPL-MCNC: 0.45 MG/DL (ref 0.57–1)
CREAT SERPL-MCNC: 0.5 MG/DL (ref 0.57–1)
CREAT SERPL-MCNC: 0.52 MG/DL (ref 0.57–1)
CREAT SERPL-MCNC: 0.53 MG/DL (ref 0.57–1)
CREAT SERPL-MCNC: 0.54 MG/DL (ref 0.57–1)
CREAT SERPL-MCNC: 0.55 MG/DL (ref 0.57–1)
CREAT SERPL-MCNC: 0.55 MG/DL (ref 0.57–1)
CREAT SERPL-MCNC: 0.56 MG/DL (ref 0.57–1)
CREAT SERPL-MCNC: 0.58 MG/DL (ref 0.57–1)
CREAT SERPL-MCNC: 0.58 MG/DL (ref 0.57–1)
CREAT SERPL-MCNC: 0.6 MG/DL (ref 0.57–1)
CREAT SERPL-MCNC: 0.6 MG/DL (ref 0.57–1)
CREAT SERPL-MCNC: 0.62 MG/DL (ref 0.57–1)
CREAT SERPL-MCNC: 0.64 MG/DL (ref 0.57–1)
CREAT SERPL-MCNC: 0.67 MG/DL (ref 0.57–1)
CREAT SERPL-MCNC: 0.68 MG/DL (ref 0.57–1)
CREAT SERPL-MCNC: 0.7 MG/DL (ref 0.57–1)
CREAT SERPL-MCNC: 0.72 MG/DL (ref 0.57–1)
CREAT SERPL-MCNC: 0.74 MG/DL (ref 0.57–1)
CREAT SERPL-MCNC: 0.76 MG/DL (ref 0.57–1)
CREAT SERPL-MCNC: 0.77 MG/DL (ref 0.57–1)
CREAT SERPL-MCNC: 0.77 MG/DL (ref 0.57–1)
CREAT SERPL-MCNC: 0.78 MG/DL (ref 0.57–1)
CREAT SERPL-MCNC: 0.81 MG/DL (ref 0.57–1)
CREAT SERPL-MCNC: 0.83 MG/DL (ref 0.57–1)
CREAT SERPL-MCNC: 0.84 MG/DL (ref 0.57–1)
CREAT SERPL-MCNC: 0.89 MG/DL (ref 0.57–1)
CREAT SERPL-MCNC: 0.91 MG/DL (ref 0.57–1)
CREAT SERPL-MCNC: 1.07 MG/DL (ref 0.57–1)
CREAT SERPL-MCNC: 1.08 MG/DL (ref 0.57–1)
CREAT SERPL-MCNC: 1.35 MG/DL (ref 0.57–1)
CREAT SERPL-MCNC: 1.46 MG/DL (ref 0.57–1)
CRP SERPL-MCNC: 6.51 MG/DL (ref 0–0.5)
CRP SERPL-MCNC: 8.88 MG/DL (ref 0–0.5)
D-DIMER, QUANTITATIVE (MAD,POW, STR): 2611 NG/ML (FEU) (ref 0–470)
D-DIMER, QUANTITATIVE (MAD,POW, STR): 3859 NG/ML (FEU) (ref 0–470)
D-LACTATE SERPL-SCNC: 0.9 MMOL/L (ref 0.5–2)
D-LACTATE SERPL-SCNC: 1.1 MMOL/L (ref 0.5–2)
D-LACTATE SERPL-SCNC: 1.2 MMOL/L (ref 0.5–2)
D-LACTATE SERPL-SCNC: 1.4 MMOL/L (ref 0.5–2)
D-LACTATE SERPL-SCNC: 2.6 MMOL/L (ref 0.5–2)
D-LACTATE SERPL-SCNC: 2.6 MMOL/L (ref 0.5–2)
DEPRECATED RDW RBC AUTO: 44.1 FL (ref 37–54)
DEPRECATED RDW RBC AUTO: 46.4 FL (ref 37–54)
DEPRECATED RDW RBC AUTO: 47.2 FL (ref 37–54)
DEPRECATED RDW RBC AUTO: 48.1 FL (ref 37–54)
DEPRECATED RDW RBC AUTO: 48.3 FL (ref 37–54)
DEPRECATED RDW RBC AUTO: 48.4 FL (ref 37–54)
DEPRECATED RDW RBC AUTO: 49.1 FL (ref 37–54)
DEPRECATED RDW RBC AUTO: 50.6 FL (ref 37–54)
DEPRECATED RDW RBC AUTO: 51.5 FL (ref 37–54)
DEPRECATED RDW RBC AUTO: 54.4 FL (ref 37–54)
DEPRECATED RDW RBC AUTO: 55.8 FL (ref 37–54)
DEPRECATED RDW RBC AUTO: 56 FL (ref 37–54)
DEPRECATED RDW RBC AUTO: 57.1 FL (ref 37–54)
DEPRECATED RDW RBC AUTO: 59 FL (ref 37–54)
DEPRECATED RDW RBC AUTO: 59.3 FL (ref 37–54)
DEPRECATED RDW RBC AUTO: 59.3 FL (ref 37–54)
DEPRECATED RDW RBC AUTO: 59.5 FL (ref 37–54)
DEPRECATED RDW RBC AUTO: 61.4 FL (ref 37–54)
DEPRECATED RDW RBC AUTO: 62.5 FL (ref 37–54)
DEPRECATED RDW RBC AUTO: 62.8 FL (ref 37–54)
DEPRECATED RDW RBC AUTO: 64.2 FL (ref 37–54)
DEPRECATED RDW RBC AUTO: 66 FL (ref 37–54)
DEPRECATED RDW RBC AUTO: 66.2 FL (ref 37–54)
DEPRECATED RDW RBC AUTO: 67.5 FL (ref 37–54)
EOSINOPHIL # BLD AUTO: 0 10*3/MM3 (ref 0–0.4)
EOSINOPHIL # BLD AUTO: 0 10*3/MM3 (ref 0–0.4)
EOSINOPHIL # BLD AUTO: 0.01 10*3/MM3 (ref 0–0.4)
EOSINOPHIL # BLD AUTO: 0.02 10*3/MM3 (ref 0–0.4)
EOSINOPHIL # BLD AUTO: 0.06 10*3/MM3 (ref 0–0.4)
EOSINOPHIL # BLD AUTO: 0.08 10*3/MM3 (ref 0–0.4)
EOSINOPHIL # BLD AUTO: 0.15 10*3/MM3 (ref 0–0.4)
EOSINOPHIL # BLD AUTO: 0.18 10*3/MM3 (ref 0–0.4)
EOSINOPHIL # BLD AUTO: 0.2 10*3/MM3 (ref 0–0.4)
EOSINOPHIL # BLD AUTO: 0.24 10*3/MM3 (ref 0–0.4)
EOSINOPHIL # BLD AUTO: 0.28 10*3/MM3 (ref 0–0.4)
EOSINOPHIL # BLD AUTO: 0.36 10*3/MM3 (ref 0–0.4)
EOSINOPHIL # BLD AUTO: 0.39 10*3/MM3 (ref 0–0.4)
EOSINOPHIL # BLD AUTO: 0.4 10*3/MM3 (ref 0–0.4)
EOSINOPHIL # BLD AUTO: 0.44 10*3/MM3 (ref 0–0.4)
EOSINOPHIL # BLD AUTO: 0.46 10*3/MM3 (ref 0–0.4)
EOSINOPHIL # BLD AUTO: 0.47 10*3/MM3 (ref 0–0.4)
EOSINOPHIL # BLD AUTO: 0.49 10*3/MM3 (ref 0–0.4)
EOSINOPHIL # BLD AUTO: 0.49 10*3/MM3 (ref 0–0.4)
EOSINOPHIL # BLD AUTO: 0.54 10*3/MM3 (ref 0–0.4)
EOSINOPHIL # BLD AUTO: 0.56 10*3/MM3 (ref 0–0.4)
EOSINOPHIL NFR BLD AUTO: 0 % (ref 0.3–6.2)
EOSINOPHIL NFR BLD AUTO: 0 % (ref 0.3–6.2)
EOSINOPHIL NFR BLD AUTO: 0.1 % (ref 0.3–6.2)
EOSINOPHIL NFR BLD AUTO: 0.1 % (ref 0.3–6.2)
EOSINOPHIL NFR BLD AUTO: 0.5 % (ref 0.3–6.2)
EOSINOPHIL NFR BLD AUTO: 0.7 % (ref 0.3–6.2)
EOSINOPHIL NFR BLD AUTO: 1.2 % (ref 0.3–6.2)
EOSINOPHIL NFR BLD AUTO: 1.7 % (ref 0.3–6.2)
EOSINOPHIL NFR BLD AUTO: 2.5 % (ref 0.3–6.2)
EOSINOPHIL NFR BLD AUTO: 2.6 % (ref 0.3–6.2)
EOSINOPHIL NFR BLD AUTO: 3 % (ref 0.3–6.2)
EOSINOPHIL NFR BLD AUTO: 3 % (ref 0.3–6.2)
EOSINOPHIL NFR BLD AUTO: 3.2 % (ref 0.3–6.2)
EOSINOPHIL NFR BLD AUTO: 3.5 % (ref 0.3–6.2)
EOSINOPHIL NFR BLD AUTO: 3.6 % (ref 0.3–6.2)
EOSINOPHIL NFR BLD AUTO: 3.6 % (ref 0.3–6.2)
EOSINOPHIL NFR BLD AUTO: 3.7 % (ref 0.3–6.2)
EOSINOPHIL NFR BLD AUTO: 3.8 % (ref 0.3–6.2)
EOSINOPHIL NFR BLD AUTO: 5.4 % (ref 0.3–6.2)
EOSINOPHIL NFR BLD AUTO: 5.4 % (ref 0.3–6.2)
EOSINOPHIL NFR BLD AUTO: 6.4 % (ref 0.3–6.2)
ERYTHROCYTE [DISTWIDTH] IN BLOOD BY AUTOMATED COUNT: 13.6 % (ref 12.3–15.4)
ERYTHROCYTE [DISTWIDTH] IN BLOOD BY AUTOMATED COUNT: 14 % (ref 12.3–15.4)
ERYTHROCYTE [DISTWIDTH] IN BLOOD BY AUTOMATED COUNT: 14 % (ref 12.3–15.4)
ERYTHROCYTE [DISTWIDTH] IN BLOOD BY AUTOMATED COUNT: 14.1 % (ref 12.3–15.4)
ERYTHROCYTE [DISTWIDTH] IN BLOOD BY AUTOMATED COUNT: 14.3 % (ref 12.3–15.4)
ERYTHROCYTE [DISTWIDTH] IN BLOOD BY AUTOMATED COUNT: 14.4 % (ref 12.3–15.4)
ERYTHROCYTE [DISTWIDTH] IN BLOOD BY AUTOMATED COUNT: 14.4 % (ref 12.3–15.4)
ERYTHROCYTE [DISTWIDTH] IN BLOOD BY AUTOMATED COUNT: 14.5 % (ref 12.3–15.4)
ERYTHROCYTE [DISTWIDTH] IN BLOOD BY AUTOMATED COUNT: 14.6 % (ref 12.3–15.4)
ERYTHROCYTE [DISTWIDTH] IN BLOOD BY AUTOMATED COUNT: 14.6 % (ref 12.3–15.4)
ERYTHROCYTE [DISTWIDTH] IN BLOOD BY AUTOMATED COUNT: 14.8 % (ref 12.3–15.4)
ERYTHROCYTE [DISTWIDTH] IN BLOOD BY AUTOMATED COUNT: 17.2 % (ref 12.3–15.4)
ERYTHROCYTE [DISTWIDTH] IN BLOOD BY AUTOMATED COUNT: 17.5 % (ref 12.3–15.4)
ERYTHROCYTE [DISTWIDTH] IN BLOOD BY AUTOMATED COUNT: 17.9 % (ref 12.3–15.4)
ERYTHROCYTE [DISTWIDTH] IN BLOOD BY AUTOMATED COUNT: 18 % (ref 12.3–15.4)
ERYTHROCYTE [DISTWIDTH] IN BLOOD BY AUTOMATED COUNT: 18.1 % (ref 12.3–15.4)
ERYTHROCYTE [DISTWIDTH] IN BLOOD BY AUTOMATED COUNT: 18.4 % (ref 12.3–15.4)
ERYTHROCYTE [DISTWIDTH] IN BLOOD BY AUTOMATED COUNT: 18.7 % (ref 12.3–15.4)
ERYTHROCYTE [DISTWIDTH] IN BLOOD BY AUTOMATED COUNT: 19.3 % (ref 12.3–15.4)
ERYTHROCYTE [DISTWIDTH] IN BLOOD BY AUTOMATED COUNT: 19.4 % (ref 12.3–15.4)
ERYTHROCYTE [DISTWIDTH] IN BLOOD BY AUTOMATED COUNT: 19.5 % (ref 12.3–15.4)
ERYTHROCYTE [DISTWIDTH] IN BLOOD BY AUTOMATED COUNT: 19.5 % (ref 12.3–15.4)
ERYTHROCYTE [DISTWIDTH] IN BLOOD BY AUTOMATED COUNT: 19.6 % (ref 12.3–15.4)
ERYTHROCYTE [DISTWIDTH] IN BLOOD BY AUTOMATED COUNT: 19.7 % (ref 12.3–15.4)
ERYTHROCYTE [DISTWIDTH] IN BLOOD BY AUTOMATED COUNT: 19.8 % (ref 12.3–15.4)
ERYTHROCYTE [DISTWIDTH] IN BLOOD BY AUTOMATED COUNT: 19.9 % (ref 12.3–15.4)
FLUAV RNA RESP QL NAA+PROBE: NOT DETECTED
FLUAV SUBTYP SPEC NAA+PROBE: NOT DETECTED
FLUAV SUBTYP SPEC NAA+PROBE: NOT DETECTED
FLUBV RNA ISLT QL NAA+PROBE: NOT DETECTED
FLUBV RNA ISLT QL NAA+PROBE: NOT DETECTED
FLUBV RNA RESP QL NAA+PROBE: NOT DETECTED
GAS FLOW AIRWAY: 2 LPM
GAS FLOW AIRWAY: 2 LPM
GAS FLOW AIRWAY: 21 LPM
GFR SERPL CREATININE-BSD FRML MDRD: 100 ML/MIN/1.73
GFR SERPL CREATININE-BSD FRML MDRD: 103 ML/MIN/1.73
GFR SERPL CREATININE-BSD FRML MDRD: 104 ML/MIN/1.73
GFR SERPL CREATININE-BSD FRML MDRD: 108 ML/MIN/1.73
GFR SERPL CREATININE-BSD FRML MDRD: 110 ML/MIN/1.73
GFR SERPL CREATININE-BSD FRML MDRD: 110 ML/MIN/1.73
GFR SERPL CREATININE-BSD FRML MDRD: 112 ML/MIN/1.73
GFR SERPL CREATININE-BSD FRML MDRD: 113 ML/MIN/1.73
GFR SERPL CREATININE-BSD FRML MDRD: 113 ML/MIN/1.73
GFR SERPL CREATININE-BSD FRML MDRD: 115 ML/MIN/1.73
GFR SERPL CREATININE-BSD FRML MDRD: 117 ML/MIN/1.73
GFR SERPL CREATININE-BSD FRML MDRD: 123 ML/MIN/1.73
GFR SERPL CREATININE-BSD FRML MDRD: 139 ML/MIN/1.73
GFR SERPL CREATININE-BSD FRML MDRD: 36 ML/MIN/1.73
GFR SERPL CREATININE-BSD FRML MDRD: 39 ML/MIN/1.73
GFR SERPL CREATININE-BSD FRML MDRD: 50 ML/MIN/1.73
GFR SERPL CREATININE-BSD FRML MDRD: 51 ML/MIN/1.73
GFR SERPL CREATININE-BSD FRML MDRD: 61 ML/MIN/1.73
GFR SERPL CREATININE-BSD FRML MDRD: 63 ML/MIN/1.73
GFR SERPL CREATININE-BSD FRML MDRD: 67 ML/MIN/1.73
GFR SERPL CREATININE-BSD FRML MDRD: 68 ML/MIN/1.73
GFR SERPL CREATININE-BSD FRML MDRD: 70 ML/MIN/1.73
GFR SERPL CREATININE-BSD FRML MDRD: 74 ML/MIN/1.73
GFR SERPL CREATININE-BSD FRML MDRD: 75 ML/MIN/1.73
GFR SERPL CREATININE-BSD FRML MDRD: 75 ML/MIN/1.73
GFR SERPL CREATININE-BSD FRML MDRD: 76 ML/MIN/1.73
GFR SERPL CREATININE-BSD FRML MDRD: 78 ML/MIN/1.73
GFR SERPL CREATININE-BSD FRML MDRD: 81 ML/MIN/1.73
GFR SERPL CREATININE-BSD FRML MDRD: 83 ML/MIN/1.73
GFR SERPL CREATININE-BSD FRML MDRD: 86 ML/MIN/1.73
GFR SERPL CREATININE-BSD FRML MDRD: 88 ML/MIN/1.73
GFR SERPL CREATININE-BSD FRML MDRD: 92 ML/MIN/1.73
GFR SERPL CREATININE-BSD FRML MDRD: 96 ML/MIN/1.73
GFR SERPL CREATININE-BSD FRML MDRD: 99 ML/MIN/1.73
GFR SERPL CREATININE-BSD FRML MDRD: >150 ML/MIN/1.73
GLOBULIN UR ELPH-MCNC: 3.1 GM/DL
GLOBULIN UR ELPH-MCNC: 3.2 GM/DL
GLOBULIN UR ELPH-MCNC: 3.3 GM/DL
GLOBULIN UR ELPH-MCNC: 3.4 GM/DL
GLOBULIN UR ELPH-MCNC: 3.5 GM/DL
GLOBULIN UR ELPH-MCNC: 3.6 GM/DL
GLOBULIN UR ELPH-MCNC: 4 GM/DL
GLUCOSE BLDC GLUCOMTR-MCNC: 100 MG/DL (ref 70–130)
GLUCOSE BLDC GLUCOMTR-MCNC: 102 MG/DL (ref 70–130)
GLUCOSE BLDC GLUCOMTR-MCNC: 103 MG/DL (ref 70–130)
GLUCOSE BLDC GLUCOMTR-MCNC: 106 MG/DL (ref 70–130)
GLUCOSE BLDC GLUCOMTR-MCNC: 106 MG/DL (ref 70–130)
GLUCOSE BLDC GLUCOMTR-MCNC: 117 MG/DL (ref 70–130)
GLUCOSE BLDC GLUCOMTR-MCNC: 121 MG/DL (ref 70–130)
GLUCOSE BLDC GLUCOMTR-MCNC: 127 MG/DL (ref 70–130)
GLUCOSE BLDC GLUCOMTR-MCNC: 129 MG/DL (ref 70–130)
GLUCOSE BLDC GLUCOMTR-MCNC: 134 MG/DL (ref 70–130)
GLUCOSE BLDC GLUCOMTR-MCNC: 138 MG/DL (ref 70–130)
GLUCOSE BLDC GLUCOMTR-MCNC: 140 MG/DL (ref 70–130)
GLUCOSE BLDC GLUCOMTR-MCNC: 145 MG/DL (ref 70–130)
GLUCOSE BLDC GLUCOMTR-MCNC: 146 MG/DL (ref 70–130)
GLUCOSE BLDC GLUCOMTR-MCNC: 147 MG/DL (ref 70–130)
GLUCOSE BLDC GLUCOMTR-MCNC: 148 MG/DL (ref 70–130)
GLUCOSE BLDC GLUCOMTR-MCNC: 153 MG/DL (ref 70–130)
GLUCOSE BLDC GLUCOMTR-MCNC: 153 MG/DL (ref 70–130)
GLUCOSE BLDC GLUCOMTR-MCNC: 156 MG/DL (ref 70–130)
GLUCOSE BLDC GLUCOMTR-MCNC: 157 MG/DL (ref 70–130)
GLUCOSE BLDC GLUCOMTR-MCNC: 160 MG/DL (ref 70–130)
GLUCOSE BLDC GLUCOMTR-MCNC: 162 MG/DL (ref 70–130)
GLUCOSE BLDC GLUCOMTR-MCNC: 163 MG/DL (ref 70–130)
GLUCOSE BLDC GLUCOMTR-MCNC: 164 MG/DL (ref 70–130)
GLUCOSE BLDC GLUCOMTR-MCNC: 169 MG/DL (ref 70–130)
GLUCOSE BLDC GLUCOMTR-MCNC: 173 MG/DL (ref 70–130)
GLUCOSE BLDC GLUCOMTR-MCNC: 178 MG/DL (ref 70–130)
GLUCOSE BLDC GLUCOMTR-MCNC: 180 MG/DL (ref 70–130)
GLUCOSE BLDC GLUCOMTR-MCNC: 181 MG/DL (ref 70–130)
GLUCOSE BLDC GLUCOMTR-MCNC: 183 MG/DL (ref 70–130)
GLUCOSE BLDC GLUCOMTR-MCNC: 184 MG/DL (ref 70–130)
GLUCOSE BLDC GLUCOMTR-MCNC: 184 MG/DL (ref 70–130)
GLUCOSE BLDC GLUCOMTR-MCNC: 186 MG/DL (ref 70–130)
GLUCOSE BLDC GLUCOMTR-MCNC: 188 MG/DL (ref 70–130)
GLUCOSE BLDC GLUCOMTR-MCNC: 189 MG/DL (ref 70–130)
GLUCOSE BLDC GLUCOMTR-MCNC: 190 MG/DL (ref 70–130)
GLUCOSE BLDC GLUCOMTR-MCNC: 190 MG/DL (ref 70–130)
GLUCOSE BLDC GLUCOMTR-MCNC: 191 MG/DL (ref 70–130)
GLUCOSE BLDC GLUCOMTR-MCNC: 191 MG/DL (ref 70–130)
GLUCOSE BLDC GLUCOMTR-MCNC: 195 MG/DL (ref 70–130)
GLUCOSE BLDC GLUCOMTR-MCNC: 201 MG/DL (ref 70–130)
GLUCOSE BLDC GLUCOMTR-MCNC: 202 MG/DL (ref 70–130)
GLUCOSE BLDC GLUCOMTR-MCNC: 202 MG/DL (ref 70–130)
GLUCOSE BLDC GLUCOMTR-MCNC: 204 MG/DL (ref 70–130)
GLUCOSE BLDC GLUCOMTR-MCNC: 206 MG/DL (ref 70–130)
GLUCOSE BLDC GLUCOMTR-MCNC: 207 MG/DL (ref 70–130)
GLUCOSE BLDC GLUCOMTR-MCNC: 208 MG/DL (ref 70–130)
GLUCOSE BLDC GLUCOMTR-MCNC: 214 MG/DL (ref 70–130)
GLUCOSE BLDC GLUCOMTR-MCNC: 217 MG/DL (ref 70–130)
GLUCOSE BLDC GLUCOMTR-MCNC: 217 MG/DL (ref 70–130)
GLUCOSE BLDC GLUCOMTR-MCNC: 218 MG/DL (ref 70–130)
GLUCOSE BLDC GLUCOMTR-MCNC: 219 MG/DL (ref 70–130)
GLUCOSE BLDC GLUCOMTR-MCNC: 223 MG/DL (ref 70–130)
GLUCOSE BLDC GLUCOMTR-MCNC: 226 MG/DL (ref 70–130)
GLUCOSE BLDC GLUCOMTR-MCNC: 227 MG/DL (ref 70–130)
GLUCOSE BLDC GLUCOMTR-MCNC: 234 MG/DL (ref 70–130)
GLUCOSE BLDC GLUCOMTR-MCNC: 240 MG/DL (ref 70–130)
GLUCOSE BLDC GLUCOMTR-MCNC: 241 MG/DL (ref 70–130)
GLUCOSE BLDC GLUCOMTR-MCNC: 242 MG/DL (ref 70–130)
GLUCOSE BLDC GLUCOMTR-MCNC: 242 MG/DL (ref 70–130)
GLUCOSE BLDC GLUCOMTR-MCNC: 250 MG/DL (ref 70–130)
GLUCOSE BLDC GLUCOMTR-MCNC: 251 MG/DL (ref 70–130)
GLUCOSE BLDC GLUCOMTR-MCNC: 252 MG/DL (ref 70–130)
GLUCOSE BLDC GLUCOMTR-MCNC: 259 MG/DL (ref 70–130)
GLUCOSE BLDC GLUCOMTR-MCNC: 259 MG/DL (ref 70–130)
GLUCOSE BLDC GLUCOMTR-MCNC: 261 MG/DL (ref 70–130)
GLUCOSE BLDC GLUCOMTR-MCNC: 261 MG/DL (ref 70–130)
GLUCOSE BLDC GLUCOMTR-MCNC: 262 MG/DL (ref 70–130)
GLUCOSE BLDC GLUCOMTR-MCNC: 263 MG/DL (ref 70–130)
GLUCOSE BLDC GLUCOMTR-MCNC: 274 MG/DL (ref 70–130)
GLUCOSE BLDC GLUCOMTR-MCNC: 279 MG/DL (ref 70–130)
GLUCOSE BLDC GLUCOMTR-MCNC: 279 MG/DL (ref 70–130)
GLUCOSE BLDC GLUCOMTR-MCNC: 280 MG/DL (ref 70–130)
GLUCOSE BLDC GLUCOMTR-MCNC: 292 MG/DL (ref 70–130)
GLUCOSE BLDC GLUCOMTR-MCNC: 299 MG/DL (ref 70–130)
GLUCOSE BLDC GLUCOMTR-MCNC: 304 MG/DL (ref 70–130)
GLUCOSE BLDC GLUCOMTR-MCNC: 312 MG/DL (ref 70–130)
GLUCOSE BLDC GLUCOMTR-MCNC: 312 MG/DL (ref 70–130)
GLUCOSE BLDC GLUCOMTR-MCNC: 314 MG/DL (ref 70–130)
GLUCOSE BLDC GLUCOMTR-MCNC: 316 MG/DL (ref 70–130)
GLUCOSE BLDC GLUCOMTR-MCNC: 318 MG/DL (ref 70–130)
GLUCOSE BLDC GLUCOMTR-MCNC: 318 MG/DL (ref 70–130)
GLUCOSE BLDC GLUCOMTR-MCNC: 322 MG/DL (ref 70–130)
GLUCOSE BLDC GLUCOMTR-MCNC: 329 MG/DL (ref 70–130)
GLUCOSE BLDC GLUCOMTR-MCNC: 331 MG/DL (ref 70–130)
GLUCOSE BLDC GLUCOMTR-MCNC: 338 MG/DL (ref 70–130)
GLUCOSE BLDC GLUCOMTR-MCNC: 340 MG/DL (ref 70–130)
GLUCOSE BLDC GLUCOMTR-MCNC: 342 MG/DL (ref 70–130)
GLUCOSE BLDC GLUCOMTR-MCNC: 344 MG/DL (ref 70–130)
GLUCOSE BLDC GLUCOMTR-MCNC: 345 MG/DL (ref 70–130)
GLUCOSE BLDC GLUCOMTR-MCNC: 348 MG/DL (ref 70–130)
GLUCOSE BLDC GLUCOMTR-MCNC: 355 MG/DL (ref 70–130)
GLUCOSE BLDC GLUCOMTR-MCNC: 359 MG/DL (ref 70–130)
GLUCOSE BLDC GLUCOMTR-MCNC: 360 MG/DL (ref 70–130)
GLUCOSE BLDC GLUCOMTR-MCNC: 361 MG/DL (ref 70–130)
GLUCOSE BLDC GLUCOMTR-MCNC: 364 MG/DL (ref 70–130)
GLUCOSE BLDC GLUCOMTR-MCNC: 367 MG/DL (ref 70–130)
GLUCOSE BLDC GLUCOMTR-MCNC: 371 MG/DL (ref 70–130)
GLUCOSE BLDC GLUCOMTR-MCNC: 374 MG/DL (ref 70–130)
GLUCOSE BLDC GLUCOMTR-MCNC: 378 MG/DL (ref 70–130)
GLUCOSE BLDC GLUCOMTR-MCNC: 383 MG/DL (ref 70–130)
GLUCOSE BLDC GLUCOMTR-MCNC: 386 MG/DL (ref 70–130)
GLUCOSE BLDC GLUCOMTR-MCNC: 395 MG/DL (ref 70–130)
GLUCOSE BLDC GLUCOMTR-MCNC: 396 MG/DL (ref 70–130)
GLUCOSE BLDC GLUCOMTR-MCNC: 407 MG/DL (ref 70–130)
GLUCOSE BLDC GLUCOMTR-MCNC: 412 MG/DL (ref 70–130)
GLUCOSE BLDC GLUCOMTR-MCNC: 42 MG/DL (ref 70–130)
GLUCOSE BLDC GLUCOMTR-MCNC: 426 MG/DL (ref 70–130)
GLUCOSE BLDC GLUCOMTR-MCNC: 427 MG/DL (ref 70–130)
GLUCOSE BLDC GLUCOMTR-MCNC: 438 MG/DL (ref 70–130)
GLUCOSE BLDC GLUCOMTR-MCNC: 441 MG/DL (ref 70–130)
GLUCOSE BLDC GLUCOMTR-MCNC: 442 MG/DL (ref 70–130)
GLUCOSE BLDC GLUCOMTR-MCNC: 444 MG/DL (ref 70–130)
GLUCOSE BLDC GLUCOMTR-MCNC: 457 MG/DL (ref 70–130)
GLUCOSE BLDC GLUCOMTR-MCNC: 465 MG/DL (ref 70–130)
GLUCOSE BLDC GLUCOMTR-MCNC: 476 MG/DL (ref 70–130)
GLUCOSE BLDC GLUCOMTR-MCNC: 491 MG/DL (ref 70–130)
GLUCOSE BLDC GLUCOMTR-MCNC: 501 MG/DL (ref 70–130)
GLUCOSE BLDC GLUCOMTR-MCNC: 505 MG/DL (ref 70–130)
GLUCOSE BLDC GLUCOMTR-MCNC: 510 MG/DL (ref 70–130)
GLUCOSE BLDC GLUCOMTR-MCNC: 510 MG/DL (ref 70–130)
GLUCOSE BLDC GLUCOMTR-MCNC: 517 MG/DL (ref 70–130)
GLUCOSE BLDC GLUCOMTR-MCNC: 526 MG/DL (ref 70–130)
GLUCOSE BLDC GLUCOMTR-MCNC: 526 MG/DL (ref 70–130)
GLUCOSE BLDC GLUCOMTR-MCNC: 529 MG/DL (ref 70–130)
GLUCOSE BLDC GLUCOMTR-MCNC: 62 MG/DL (ref 70–130)
GLUCOSE BLDC GLUCOMTR-MCNC: 66 MG/DL (ref 70–130)
GLUCOSE BLDC GLUCOMTR-MCNC: 77 MG/DL (ref 70–130)
GLUCOSE BLDC GLUCOMTR-MCNC: 83 MG/DL (ref 70–130)
GLUCOSE BLDC GLUCOMTR-MCNC: 86 MG/DL (ref 70–130)
GLUCOSE BLDC GLUCOMTR-MCNC: 87 MG/DL (ref 70–130)
GLUCOSE BLDC GLUCOMTR-MCNC: 91 MG/DL (ref 70–130)
GLUCOSE BLDC GLUCOMTR-MCNC: 93 MG/DL (ref 70–130)
GLUCOSE BLDC GLUCOMTR-MCNC: 99 MG/DL (ref 70–130)
GLUCOSE SERPL-MCNC: 112 MG/DL (ref 65–99)
GLUCOSE SERPL-MCNC: 115 MG/DL (ref 65–99)
GLUCOSE SERPL-MCNC: 121 MG/DL (ref 65–99)
GLUCOSE SERPL-MCNC: 128 MG/DL (ref 65–99)
GLUCOSE SERPL-MCNC: 152 MG/DL (ref 65–99)
GLUCOSE SERPL-MCNC: 158 MG/DL (ref 65–99)
GLUCOSE SERPL-MCNC: 162 MG/DL (ref 65–99)
GLUCOSE SERPL-MCNC: 172 MG/DL (ref 65–99)
GLUCOSE SERPL-MCNC: 176 MG/DL (ref 65–99)
GLUCOSE SERPL-MCNC: 182 MG/DL (ref 65–99)
GLUCOSE SERPL-MCNC: 187 MG/DL (ref 65–99)
GLUCOSE SERPL-MCNC: 196 MG/DL (ref 65–99)
GLUCOSE SERPL-MCNC: 200 MG/DL (ref 65–99)
GLUCOSE SERPL-MCNC: 208 MG/DL (ref 65–99)
GLUCOSE SERPL-MCNC: 215 MG/DL (ref 65–99)
GLUCOSE SERPL-MCNC: 225 MG/DL (ref 65–99)
GLUCOSE SERPL-MCNC: 225 MG/DL (ref 65–99)
GLUCOSE SERPL-MCNC: 227 MG/DL (ref 65–99)
GLUCOSE SERPL-MCNC: 235 MG/DL (ref 65–99)
GLUCOSE SERPL-MCNC: 236 MG/DL (ref 65–99)
GLUCOSE SERPL-MCNC: 252 MG/DL (ref 65–99)
GLUCOSE SERPL-MCNC: 270 MG/DL (ref 65–99)
GLUCOSE SERPL-MCNC: 273 MG/DL (ref 65–99)
GLUCOSE SERPL-MCNC: 298 MG/DL (ref 65–99)
GLUCOSE SERPL-MCNC: 313 MG/DL (ref 65–99)
GLUCOSE SERPL-MCNC: 368 MG/DL (ref 65–99)
GLUCOSE SERPL-MCNC: 384 MG/DL (ref 65–99)
GLUCOSE SERPL-MCNC: 397 MG/DL (ref 65–99)
GLUCOSE SERPL-MCNC: 413 MG/DL (ref 65–99)
GLUCOSE SERPL-MCNC: 484 MG/DL (ref 65–99)
GLUCOSE SERPL-MCNC: 493 MG/DL (ref 65–99)
GLUCOSE SERPL-MCNC: 515 MG/DL (ref 65–99)
GLUCOSE SERPL-MCNC: 535 MG/DL (ref 65–99)
GLUCOSE SERPL-MCNC: 560 MG/DL (ref 65–99)
GLUCOSE SERPL-MCNC: 88 MG/DL (ref 65–99)
GLUCOSE SERPL-MCNC: 91 MG/DL (ref 65–99)
GLUCOSE SERPL-MCNC: 99 MG/DL (ref 65–99)
GLUCOSE UR STRIP-MCNC: ABNORMAL MG/DL
GLUCOSE UR STRIP-MCNC: NEGATIVE MG/DL
GRAM STN SPEC: ABNORMAL
GRAM STN SPEC: ABNORMAL
HBA1C MFR BLD: 7.9 % (ref 4.8–5.6)
HBA1C MFR BLD: 8 % (ref 4.8–5.6)
HBA1C MFR BLD: 9.3 % (ref 4.8–5.6)
HCO3 BLDA-SCNC: 17.2 MMOL/L (ref 20–26)
HCO3 BLDA-SCNC: 19.3 MMOL/L (ref 20–26)
HCO3 BLDA-SCNC: 25.8 MMOL/L (ref 20–26)
HCT VFR BLD AUTO: 28.8 % (ref 34–46.6)
HCT VFR BLD AUTO: 30.4 % (ref 34–46.6)
HCT VFR BLD AUTO: 31 % (ref 34–46.6)
HCT VFR BLD AUTO: 31.9 % (ref 34–46.6)
HCT VFR BLD AUTO: 32.1 % (ref 34–46.6)
HCT VFR BLD AUTO: 32.3 % (ref 34–46.6)
HCT VFR BLD AUTO: 32.6 % (ref 34–46.6)
HCT VFR BLD AUTO: 33 % (ref 34–46.6)
HCT VFR BLD AUTO: 33.4 % (ref 34–46.6)
HCT VFR BLD AUTO: 33.6 % (ref 34–46.6)
HCT VFR BLD AUTO: 33.7 % (ref 34–46.6)
HCT VFR BLD AUTO: 33.9 % (ref 34–46.6)
HCT VFR BLD AUTO: 34.3 % (ref 34–46.6)
HCT VFR BLD AUTO: 34.4 % (ref 34–46.6)
HCT VFR BLD AUTO: 35 % (ref 34–46.6)
HCT VFR BLD AUTO: 35.2 % (ref 34–46.6)
HCT VFR BLD AUTO: 35.6 % (ref 34–46.6)
HCT VFR BLD AUTO: 36.2 % (ref 34–46.6)
HCT VFR BLD AUTO: 36.5 % (ref 34–46.6)
HCT VFR BLD AUTO: 36.5 % (ref 34–46.6)
HCT VFR BLD AUTO: 36.7 % (ref 34–46.6)
HCT VFR BLD AUTO: 36.7 % (ref 34–46.6)
HCT VFR BLD AUTO: 37.7 % (ref 34–46.6)
HCT VFR BLD AUTO: 37.7 % (ref 34–46.6)
HCT VFR BLD AUTO: 38.6 % (ref 34–46.6)
HCT VFR BLD AUTO: 40.4 % (ref 34–46.6)
HGB BLD-MCNC: 10.2 G/DL (ref 12–15.9)
HGB BLD-MCNC: 10.2 G/DL (ref 12–15.9)
HGB BLD-MCNC: 10.5 G/DL (ref 12–15.9)
HGB BLD-MCNC: 10.7 G/DL (ref 12–15.9)
HGB BLD-MCNC: 11 G/DL (ref 12–15.9)
HGB BLD-MCNC: 11.1 G/DL (ref 12–15.9)
HGB BLD-MCNC: 11.3 G/DL (ref 12–15.9)
HGB BLD-MCNC: 11.4 G/DL (ref 12–15.9)
HGB BLD-MCNC: 11.5 G/DL (ref 12–15.9)
HGB BLD-MCNC: 11.5 G/DL (ref 12–15.9)
HGB BLD-MCNC: 11.6 G/DL (ref 12–15.9)
HGB BLD-MCNC: 11.7 G/DL (ref 12–15.9)
HGB BLD-MCNC: 11.8 G/DL (ref 12–15.9)
HGB BLD-MCNC: 11.8 G/DL (ref 12–15.9)
HGB BLD-MCNC: 11.9 G/DL (ref 12–15.9)
HGB BLD-MCNC: 11.9 G/DL (ref 12–15.9)
HGB BLD-MCNC: 12.3 G/DL (ref 12–15.9)
HGB BLD-MCNC: 12.4 G/DL (ref 12–15.9)
HGB BLD-MCNC: 12.5 G/DL (ref 12–15.9)
HGB BLD-MCNC: 13 G/DL (ref 12–15.9)
HGB BLD-MCNC: 13.7 G/DL (ref 12–15.9)
HGB BLD-MCNC: 9.3 G/DL (ref 12–15.9)
HGB UR QL STRIP.AUTO: ABNORMAL
HOLD SPECIMEN: NORMAL
HYALINE CASTS UR QL AUTO: ABNORMAL /LPF
HYPOCHROMIA BLD QL: NORMAL
IMM GRANULOCYTES # BLD AUTO: 0.01 10*3/MM3 (ref 0–0.05)
IMM GRANULOCYTES # BLD AUTO: 0.04 10*3/MM3 (ref 0–0.05)
IMM GRANULOCYTES # BLD AUTO: 0.07 10*3/MM3 (ref 0–0.05)
IMM GRANULOCYTES # BLD AUTO: 0.07 10*3/MM3 (ref 0–0.05)
IMM GRANULOCYTES # BLD AUTO: 0.08 10*3/MM3 (ref 0–0.05)
IMM GRANULOCYTES # BLD AUTO: 0.08 10*3/MM3 (ref 0–0.05)
IMM GRANULOCYTES # BLD AUTO: 0.09 10*3/MM3 (ref 0–0.05)
IMM GRANULOCYTES # BLD AUTO: 0.09 10*3/MM3 (ref 0–0.05)
IMM GRANULOCYTES # BLD AUTO: 0.1 10*3/MM3 (ref 0–0.05)
IMM GRANULOCYTES # BLD AUTO: 0.1 10*3/MM3 (ref 0–0.05)
IMM GRANULOCYTES # BLD AUTO: 0.12 10*3/MM3 (ref 0–0.05)
IMM GRANULOCYTES # BLD AUTO: 0.12 10*3/MM3 (ref 0–0.05)
IMM GRANULOCYTES # BLD AUTO: 0.13 10*3/MM3 (ref 0–0.05)
IMM GRANULOCYTES # BLD AUTO: 0.16 10*3/MM3 (ref 0–0.05)
IMM GRANULOCYTES # BLD AUTO: 0.21 10*3/MM3 (ref 0–0.05)
IMM GRANULOCYTES # BLD AUTO: 0.21 10*3/MM3 (ref 0–0.05)
IMM GRANULOCYTES # BLD AUTO: 0.23 10*3/MM3 (ref 0–0.05)
IMM GRANULOCYTES # BLD AUTO: 0.28 10*3/MM3 (ref 0–0.05)
IMM GRANULOCYTES # BLD AUTO: 0.32 10*3/MM3 (ref 0–0.05)
IMM GRANULOCYTES # BLD AUTO: 0.42 10*3/MM3 (ref 0–0.05)
IMM GRANULOCYTES # BLD AUTO: 1.25 10*3/MM3 (ref 0–0.05)
IMM GRANULOCYTES NFR BLD AUTO: 0.1 % (ref 0–0.5)
IMM GRANULOCYTES NFR BLD AUTO: 0.5 % (ref 0–0.5)
IMM GRANULOCYTES NFR BLD AUTO: 0.6 % (ref 0–0.5)
IMM GRANULOCYTES NFR BLD AUTO: 0.7 % (ref 0–0.5)
IMM GRANULOCYTES NFR BLD AUTO: 1 % (ref 0–0.5)
IMM GRANULOCYTES NFR BLD AUTO: 1 % (ref 0–0.5)
IMM GRANULOCYTES NFR BLD AUTO: 1.1 % (ref 0–0.5)
IMM GRANULOCYTES NFR BLD AUTO: 1.2 % (ref 0–0.5)
IMM GRANULOCYTES NFR BLD AUTO: 1.3 % (ref 0–0.5)
IMM GRANULOCYTES NFR BLD AUTO: 1.6 % (ref 0–0.5)
IMM GRANULOCYTES NFR BLD AUTO: 1.7 % (ref 0–0.5)
IMM GRANULOCYTES NFR BLD AUTO: 1.8 % (ref 0–0.5)
IMM GRANULOCYTES NFR BLD AUTO: 2.4 % (ref 0–0.5)
IMM GRANULOCYTES NFR BLD AUTO: 3.8 % (ref 0–0.5)
IMM GRANULOCYTES NFR BLD AUTO: 6.8 % (ref 0–0.5)
IMP STRAIN: NOT DETECTED
IMP STRAIN: NOT DETECTED
INR PPP: 1.23 (ref 0.8–1.2)
INR PPP: 1.4 (ref 0.8–1.2)
INR PPP: 1.4 (ref 0.8–1.2)
INR PPP: 1.83 (ref 0.8–1.2)
ISOLATED FROM: ABNORMAL
KETONES UR QL STRIP: ABNORMAL
KETONES UR QL STRIP: NEGATIVE
KPC STRAIN: NOT DETECTED
KPC STRAIN: NOT DETECTED
LEUKOCYTE ESTERASE UR QL STRIP.AUTO: ABNORMAL
LIPASE SERPL-CCNC: 5 U/L (ref 13–60)
LYMPHOCYTES # BLD AUTO: 0.93 10*3/MM3 (ref 0.7–3.1)
LYMPHOCYTES # BLD AUTO: 1.54 10*3/MM3 (ref 0.7–3.1)
LYMPHOCYTES # BLD AUTO: 1.62 10*3/MM3 (ref 0.7–3.1)
LYMPHOCYTES # BLD AUTO: 1.66 10*3/MM3 (ref 0.7–3.1)
LYMPHOCYTES # BLD AUTO: 1.66 10*3/MM3 (ref 0.7–3.1)
LYMPHOCYTES # BLD AUTO: 1.93 10*3/MM3 (ref 0.7–3.1)
LYMPHOCYTES # BLD AUTO: 1.96 10*3/MM3 (ref 0.7–3.1)
LYMPHOCYTES # BLD AUTO: 2.01 10*3/MM3 (ref 0.7–3.1)
LYMPHOCYTES # BLD AUTO: 2.02 10*3/MM3 (ref 0.7–3.1)
LYMPHOCYTES # BLD AUTO: 2.04 10*3/MM3 (ref 0.7–3.1)
LYMPHOCYTES # BLD AUTO: 2.05 10*3/MM3 (ref 0.7–3.1)
LYMPHOCYTES # BLD AUTO: 2.09 10*3/MM3 (ref 0.7–3.1)
LYMPHOCYTES # BLD AUTO: 2.11 10*3/MM3 (ref 0.7–3.1)
LYMPHOCYTES # BLD AUTO: 2.12 10*3/MM3 (ref 0.7–3.1)
LYMPHOCYTES # BLD AUTO: 2.16 10*3/MM3 (ref 0.7–3.1)
LYMPHOCYTES # BLD AUTO: 2.17 10*3/MM3 (ref 0.7–3.1)
LYMPHOCYTES # BLD AUTO: 2.51 10*3/MM3 (ref 0.7–3.1)
LYMPHOCYTES # BLD AUTO: 2.54 10*3/MM3 (ref 0.7–3.1)
LYMPHOCYTES # BLD AUTO: 2.61 10*3/MM3 (ref 0.7–3.1)
LYMPHOCYTES # BLD AUTO: 2.64 10*3/MM3 (ref 0.7–3.1)
LYMPHOCYTES # BLD AUTO: 2.86 10*3/MM3 (ref 0.7–3.1)
LYMPHOCYTES # BLD MANUAL: 1.27 10*3/MM3 (ref 0.7–3.1)
LYMPHOCYTES # BLD MANUAL: 2.4 10*3/MM3 (ref 0.7–3.1)
LYMPHOCYTES NFR BLD AUTO: 10.6 % (ref 19.6–45.3)
LYMPHOCYTES NFR BLD AUTO: 10.8 % (ref 19.6–45.3)
LYMPHOCYTES NFR BLD AUTO: 13.3 % (ref 19.6–45.3)
LYMPHOCYTES NFR BLD AUTO: 13.4 % (ref 19.6–45.3)
LYMPHOCYTES NFR BLD AUTO: 14.8 % (ref 19.6–45.3)
LYMPHOCYTES NFR BLD AUTO: 15.1 % (ref 19.6–45.3)
LYMPHOCYTES NFR BLD AUTO: 15.8 % (ref 19.6–45.3)
LYMPHOCYTES NFR BLD AUTO: 16.5 % (ref 19.6–45.3)
LYMPHOCYTES NFR BLD AUTO: 17.1 % (ref 19.6–45.3)
LYMPHOCYTES NFR BLD AUTO: 18.6 % (ref 19.6–45.3)
LYMPHOCYTES NFR BLD AUTO: 19 % (ref 19.6–45.3)
LYMPHOCYTES NFR BLD AUTO: 20.4 % (ref 19.6–45.3)
LYMPHOCYTES NFR BLD AUTO: 22.3 % (ref 19.6–45.3)
LYMPHOCYTES NFR BLD AUTO: 22.8 % (ref 19.6–45.3)
LYMPHOCYTES NFR BLD AUTO: 24.8 % (ref 19.6–45.3)
LYMPHOCYTES NFR BLD AUTO: 28 % (ref 19.6–45.3)
LYMPHOCYTES NFR BLD AUTO: 29.6 % (ref 19.6–45.3)
LYMPHOCYTES NFR BLD AUTO: 39.4 % (ref 19.6–45.3)
LYMPHOCYTES NFR BLD AUTO: 4.1 % (ref 19.6–45.3)
LYMPHOCYTES NFR BLD AUTO: 7.6 % (ref 19.6–45.3)
LYMPHOCYTES NFR BLD AUTO: 9.8 % (ref 19.6–45.3)
LYMPHOCYTES NFR BLD MANUAL: 11 % (ref 19.6–45.3)
LYMPHOCYTES NFR BLD MANUAL: 4 % (ref 5–12)
LYMPHOCYTES NFR BLD MANUAL: 6 % (ref 19.6–45.3)
LYMPHOCYTES NFR BLD MANUAL: 8 % (ref 5–12)
Lab: ABNORMAL
MAGNESIUM SERPL-MCNC: 1.5 MG/DL (ref 1.6–2.4)
MAGNESIUM SERPL-MCNC: 1.5 MG/DL (ref 1.6–2.4)
MAGNESIUM SERPL-MCNC: 1.6 MG/DL (ref 1.6–2.4)
MAGNESIUM SERPL-MCNC: 1.7 MG/DL (ref 1.6–2.4)
MAGNESIUM SERPL-MCNC: 1.8 MG/DL (ref 1.6–2.4)
MAGNESIUM SERPL-MCNC: 1.9 MG/DL (ref 1.6–2.4)
MAGNESIUM SERPL-MCNC: 2 MG/DL (ref 1.6–2.4)
MAXIMAL PREDICTED HEART RATE: 152 BPM
MAXIMAL PREDICTED HEART RATE: 152 BPM
MCH RBC QN AUTO: 28.7 PG (ref 26.6–33)
MCH RBC QN AUTO: 28.9 PG (ref 26.6–33)
MCH RBC QN AUTO: 29 PG (ref 26.6–33)
MCH RBC QN AUTO: 29.1 PG (ref 26.6–33)
MCH RBC QN AUTO: 29.3 PG (ref 26.6–33)
MCH RBC QN AUTO: 29.3 PG (ref 26.6–33)
MCH RBC QN AUTO: 29.4 PG (ref 26.6–33)
MCH RBC QN AUTO: 29.6 PG (ref 26.6–33)
MCH RBC QN AUTO: 29.6 PG (ref 26.6–33)
MCH RBC QN AUTO: 29.9 PG (ref 26.6–33)
MCH RBC QN AUTO: 30.2 PG (ref 26.6–33)
MCH RBC QN AUTO: 30.2 PG (ref 26.6–33)
MCH RBC QN AUTO: 30.4 PG (ref 26.6–33)
MCH RBC QN AUTO: 30.4 PG (ref 26.6–33)
MCH RBC QN AUTO: 30.8 PG (ref 26.6–33)
MCH RBC QN AUTO: 30.9 PG (ref 26.6–33)
MCH RBC QN AUTO: 31 PG (ref 26.6–33)
MCH RBC QN AUTO: 31.1 PG (ref 26.6–33)
MCH RBC QN AUTO: 31.2 PG (ref 26.6–33)
MCH RBC QN AUTO: 31.3 PG (ref 26.6–33)
MCH RBC QN AUTO: 31.4 PG (ref 26.6–33)
MCH RBC QN AUTO: 31.6 PG (ref 26.6–33)
MCH RBC QN AUTO: 31.9 PG (ref 26.6–33)
MCH RBC QN AUTO: 31.9 PG (ref 26.6–33)
MCHC RBC AUTO-ENTMCNC: 32.2 G/DL (ref 31.5–35.7)
MCHC RBC AUTO-ENTMCNC: 32.2 G/DL (ref 31.5–35.7)
MCHC RBC AUTO-ENTMCNC: 32.3 G/DL (ref 31.5–35.7)
MCHC RBC AUTO-ENTMCNC: 32.4 G/DL (ref 31.5–35.7)
MCHC RBC AUTO-ENTMCNC: 32.6 G/DL (ref 31.5–35.7)
MCHC RBC AUTO-ENTMCNC: 32.7 G/DL (ref 31.5–35.7)
MCHC RBC AUTO-ENTMCNC: 32.7 G/DL (ref 31.5–35.7)
MCHC RBC AUTO-ENTMCNC: 32.8 G/DL (ref 31.5–35.7)
MCHC RBC AUTO-ENTMCNC: 32.9 G/DL (ref 31.5–35.7)
MCHC RBC AUTO-ENTMCNC: 33.1 G/DL (ref 31.5–35.7)
MCHC RBC AUTO-ENTMCNC: 33.2 G/DL (ref 31.5–35.7)
MCHC RBC AUTO-ENTMCNC: 33.6 G/DL (ref 31.5–35.7)
MCHC RBC AUTO-ENTMCNC: 33.7 G/DL (ref 31.5–35.7)
MCHC RBC AUTO-ENTMCNC: 33.8 G/DL (ref 31.5–35.7)
MCHC RBC AUTO-ENTMCNC: 33.9 G/DL (ref 31.5–35.7)
MCHC RBC AUTO-ENTMCNC: 34 G/DL (ref 31.5–35.7)
MCHC RBC AUTO-ENTMCNC: 34.1 G/DL (ref 31.5–35.7)
MCHC RBC AUTO-ENTMCNC: 34.4 G/DL (ref 31.5–35.7)
MCHC RBC AUTO-ENTMCNC: 34.4 G/DL (ref 31.5–35.7)
MCHC RBC AUTO-ENTMCNC: 34.8 G/DL (ref 31.5–35.7)
MCV RBC AUTO: 86.7 FL (ref 79–97)
MCV RBC AUTO: 87.3 FL (ref 79–97)
MCV RBC AUTO: 87.8 FL (ref 79–97)
MCV RBC AUTO: 89.2 FL (ref 79–97)
MCV RBC AUTO: 89.4 FL (ref 79–97)
MCV RBC AUTO: 89.7 FL (ref 79–97)
MCV RBC AUTO: 90 FL (ref 79–97)
MCV RBC AUTO: 90.3 FL (ref 79–97)
MCV RBC AUTO: 90.6 FL (ref 79–97)
MCV RBC AUTO: 90.7 FL (ref 79–97)
MCV RBC AUTO: 91 FL (ref 79–97)
MCV RBC AUTO: 91.3 FL (ref 79–97)
MCV RBC AUTO: 91.6 FL (ref 79–97)
MCV RBC AUTO: 91.6 FL (ref 79–97)
MCV RBC AUTO: 91.7 FL (ref 79–97)
MCV RBC AUTO: 91.8 FL (ref 79–97)
MCV RBC AUTO: 92.2 FL (ref 79–97)
MCV RBC AUTO: 92.5 FL (ref 79–97)
MCV RBC AUTO: 92.8 FL (ref 79–97)
MCV RBC AUTO: 92.9 FL (ref 79–97)
MCV RBC AUTO: 93.2 FL (ref 79–97)
MCV RBC AUTO: 93.6 FL (ref 79–97)
MCV RBC AUTO: 95.5 FL (ref 79–97)
MCV RBC AUTO: 96.2 FL (ref 79–97)
MODALITY: ABNORMAL
MONOCYTES # BLD AUTO: 0.62 10*3/MM3 (ref 0.1–0.9)
MONOCYTES # BLD AUTO: 0.65 10*3/MM3 (ref 0.1–0.9)
MONOCYTES # BLD AUTO: 0.65 10*3/MM3 (ref 0.1–0.9)
MONOCYTES # BLD AUTO: 0.72 10*3/MM3 (ref 0.1–0.9)
MONOCYTES # BLD AUTO: 0.77 10*3/MM3 (ref 0.1–0.9)
MONOCYTES # BLD AUTO: 0.78 10*3/MM3 (ref 0.1–0.9)
MONOCYTES # BLD AUTO: 0.87 10*3/MM3 (ref 0.1–0.9)
MONOCYTES # BLD AUTO: 0.89 10*3/MM3 (ref 0.1–0.9)
MONOCYTES # BLD AUTO: 0.98 10*3/MM3 (ref 0.1–0.9)
MONOCYTES # BLD AUTO: 0.98 10*3/MM3 (ref 0.1–0.9)
MONOCYTES # BLD AUTO: 1.27 10*3/MM3 (ref 0.1–0.9)
MONOCYTES # BLD AUTO: 1.3 10*3/MM3 (ref 0.1–0.9)
MONOCYTES # BLD AUTO: 1.32 10*3/MM3 (ref 0.1–0.9)
MONOCYTES # BLD AUTO: 1.32 10*3/MM3 (ref 0.1–0.9)
MONOCYTES # BLD AUTO: 1.33 10*3/MM3 (ref 0.1–0.9)
MONOCYTES # BLD AUTO: 1.38 10*3/MM3 (ref 0.1–0.9)
MONOCYTES # BLD AUTO: 1.42 10*3/MM3 (ref 0.1–0.9)
MONOCYTES # BLD AUTO: 1.42 10*3/MM3 (ref 0.1–0.9)
MONOCYTES # BLD AUTO: 1.43 10*3/MM3 (ref 0.1–0.9)
MONOCYTES # BLD AUTO: 1.57 10*3/MM3 (ref 0.1–0.9)
MONOCYTES # BLD AUTO: 1.58 10*3/MM3 (ref 0.1–0.9)
MONOCYTES # BLD AUTO: 1.64 10*3/MM3 (ref 0.1–0.9)
MONOCYTES # BLD AUTO: 1.69 10*3/MM3 (ref 0.1–0.9)
MONOCYTES NFR BLD AUTO: 10.4 % (ref 5–12)
MONOCYTES NFR BLD AUTO: 10.6 % (ref 5–12)
MONOCYTES NFR BLD AUTO: 10.9 % (ref 5–12)
MONOCYTES NFR BLD AUTO: 11 % (ref 5–12)
MONOCYTES NFR BLD AUTO: 11.5 % (ref 5–12)
MONOCYTES NFR BLD AUTO: 12 % (ref 5–12)
MONOCYTES NFR BLD AUTO: 12.7 % (ref 5–12)
MONOCYTES NFR BLD AUTO: 12.9 % (ref 5–12)
MONOCYTES NFR BLD AUTO: 13.7 % (ref 5–12)
MONOCYTES NFR BLD AUTO: 4.2 % (ref 5–12)
MONOCYTES NFR BLD AUTO: 5.5 % (ref 5–12)
MONOCYTES NFR BLD AUTO: 5.7 % (ref 5–12)
MONOCYTES NFR BLD AUTO: 5.7 % (ref 5–12)
MONOCYTES NFR BLD AUTO: 6 % (ref 5–12)
MONOCYTES NFR BLD AUTO: 6.9 % (ref 5–12)
MONOCYTES NFR BLD AUTO: 7.2 % (ref 5–12)
MONOCYTES NFR BLD AUTO: 8 % (ref 5–12)
MONOCYTES NFR BLD AUTO: 8.2 % (ref 5–12)
MONOCYTES NFR BLD AUTO: 9 % (ref 5–12)
MONOCYTES NFR BLD AUTO: 9.4 % (ref 5–12)
MONOCYTES NFR BLD AUTO: 9.7 % (ref 5–12)
NDM STRAIN: NOT DETECTED
NDM STRAIN: NOT DETECTED
NEUTROPHILS # BLD AUTO: 18.14 10*3/MM3 (ref 1.7–7)
NEUTROPHILS # BLD AUTO: 18.3 10*3/MM3 (ref 1.7–7)
NEUTROPHILS NFR BLD AUTO: 10.78 10*3/MM3 (ref 1.7–7)
NEUTROPHILS NFR BLD AUTO: 11.54 10*3/MM3 (ref 1.7–7)
NEUTROPHILS NFR BLD AUTO: 12.77 10*3/MM3 (ref 1.7–7)
NEUTROPHILS NFR BLD AUTO: 14.33 10*3/MM3 (ref 1.7–7)
NEUTROPHILS NFR BLD AUTO: 18.98 10*3/MM3 (ref 1.7–7)
NEUTROPHILS NFR BLD AUTO: 20.2 10*3/MM3 (ref 1.7–7)
NEUTROPHILS NFR BLD AUTO: 24.52 10*3/MM3 (ref 1.7–7)
NEUTROPHILS NFR BLD AUTO: 3.18 10*3/MM3 (ref 1.7–7)
NEUTROPHILS NFR BLD AUTO: 3.69 10*3/MM3 (ref 1.7–7)
NEUTROPHILS NFR BLD AUTO: 3.84 10*3/MM3 (ref 1.7–7)
NEUTROPHILS NFR BLD AUTO: 4.12 10*3/MM3 (ref 1.7–7)
NEUTROPHILS NFR BLD AUTO: 4.64 10*3/MM3 (ref 1.7–7)
NEUTROPHILS NFR BLD AUTO: 43.7 % (ref 42.7–76)
NEUTROPHILS NFR BLD AUTO: 53.4 % (ref 42.7–76)
NEUTROPHILS NFR BLD AUTO: 53.4 % (ref 42.7–76)
NEUTROPHILS NFR BLD AUTO: 57.3 % (ref 42.7–76)
NEUTROPHILS NFR BLD AUTO: 6.82 10*3/MM3 (ref 1.7–7)
NEUTROPHILS NFR BLD AUTO: 61.1 % (ref 42.7–76)
NEUTROPHILS NFR BLD AUTO: 61.3 % (ref 42.7–76)
NEUTROPHILS NFR BLD AUTO: 62.2 % (ref 42.7–76)
NEUTROPHILS NFR BLD AUTO: 66.1 % (ref 42.7–76)
NEUTROPHILS NFR BLD AUTO: 68 % (ref 42.7–76)
NEUTROPHILS NFR BLD AUTO: 68.6 % (ref 42.7–76)
NEUTROPHILS NFR BLD AUTO: 69.7 % (ref 42.7–76)
NEUTROPHILS NFR BLD AUTO: 7 10*3/MM3 (ref 1.7–7)
NEUTROPHILS NFR BLD AUTO: 7.83 10*3/MM3 (ref 1.7–7)
NEUTROPHILS NFR BLD AUTO: 7.95 10*3/MM3 (ref 1.7–7)
NEUTROPHILS NFR BLD AUTO: 70.6 % (ref 42.7–76)
NEUTROPHILS NFR BLD AUTO: 70.8 % (ref 42.7–76)
NEUTROPHILS NFR BLD AUTO: 71.5 % (ref 42.7–76)
NEUTROPHILS NFR BLD AUTO: 74.1 % (ref 42.7–76)
NEUTROPHILS NFR BLD AUTO: 75.3 % (ref 42.7–76)
NEUTROPHILS NFR BLD AUTO: 77.4 % (ref 42.7–76)
NEUTROPHILS NFR BLD AUTO: 77.9 % (ref 42.7–76)
NEUTROPHILS NFR BLD AUTO: 8.38 10*3/MM3 (ref 1.7–7)
NEUTROPHILS NFR BLD AUTO: 8.75 10*3/MM3 (ref 1.7–7)
NEUTROPHILS NFR BLD AUTO: 81.9 % (ref 42.7–76)
NEUTROPHILS NFR BLD AUTO: 85.5 % (ref 42.7–76)
NEUTROPHILS NFR BLD AUTO: 89.3 % (ref 42.7–76)
NEUTROPHILS NFR BLD AUTO: 9.14 10*3/MM3 (ref 1.7–7)
NEUTROPHILS NFR BLD AUTO: 9.44 10*3/MM3 (ref 1.7–7)
NEUTROPHILS NFR BLD AUTO: 9.53 10*3/MM3 (ref 1.7–7)
NEUTROPHILS NFR BLD MANUAL: 60 % (ref 42.7–76)
NEUTROPHILS NFR BLD MANUAL: 73 % (ref 42.7–76)
NEUTS BAND NFR BLD MANUAL: 13 % (ref 0–5)
NEUTS BAND NFR BLD MANUAL: 24 % (ref 0–5)
NITRITE UR QL STRIP: NEGATIVE
NITRITE UR QL STRIP: POSITIVE
NRBC BLD AUTO-RTO: 0 /100 WBC (ref 0–0.2)
NT-PROBNP SERPL-MCNC: 415.8 PG/ML (ref 0–900)
NT-PROBNP SERPL-MCNC: 698.2 PG/ML (ref 0–900)
NT-PROBNP SERPL-MCNC: ABNORMAL PG/ML (ref 0–900)
OSMOLALITY SERPL: 330 MOSM/KG (ref 280–301)
OXA 48 STRAIN: NOT DETECTED
OXA 48 STRAIN: NOT DETECTED
PCO2 BLDA: 36.1 MM HG (ref 35–45)
PCO2 BLDA: 39 MM HG (ref 35–45)
PCO2 BLDA: 40.9 MM HG (ref 35–45)
PH BLDA: 7.25 PH UNITS (ref 7.35–7.45)
PH BLDA: 7.34 PH UNITS (ref 7.35–7.45)
PH BLDA: 7.41 PH UNITS (ref 7.35–7.45)
PH UR STRIP.AUTO: 5.5 [PH] (ref 5–9)
PH UR STRIP.AUTO: 6 [PH] (ref 5–9)
PH UR STRIP.AUTO: 6.5 [PH] (ref 5–9)
PH UR STRIP.AUTO: 6.5 [PH] (ref 5–9)
PH UR STRIP.AUTO: 7 [PH] (ref 5–9)
PH UR STRIP.AUTO: <=5 [PH] (ref 5–9)
PHOSPHATE SERPL-MCNC: 1.3 MG/DL (ref 2.5–4.5)
PHOSPHATE SERPL-MCNC: 1.5 MG/DL (ref 2.5–4.5)
PHOSPHATE SERPL-MCNC: 1.8 MG/DL (ref 2.5–4.5)
PHOSPHATE SERPL-MCNC: 2 MG/DL (ref 2.5–4.5)
PHOSPHATE SERPL-MCNC: 2.1 MG/DL (ref 2.5–4.5)
PHOSPHATE SERPL-MCNC: 2.5 MG/DL (ref 2.5–4.5)
PHOSPHATE SERPL-MCNC: 2.6 MG/DL (ref 2.5–4.5)
PHOSPHATE SERPL-MCNC: 2.6 MG/DL (ref 2.5–4.5)
PHOSPHATE SERPL-MCNC: 3 MG/DL (ref 2.5–4.5)
PHOSPHATE SERPL-MCNC: 3.3 MG/DL (ref 2.5–4.5)
PHOSPHATE SERPL-MCNC: 3.7 MG/DL (ref 2.5–4.5)
PLAT MORPH BLD: NORMAL
PLATELET # BLD AUTO: 168 10*3/MM3 (ref 140–450)
PLATELET # BLD AUTO: 181 10*3/MM3 (ref 140–450)
PLATELET # BLD AUTO: 184 10*3/MM3 (ref 140–450)
PLATELET # BLD AUTO: 194 10*3/MM3 (ref 140–450)
PLATELET # BLD AUTO: 198 10*3/MM3 (ref 140–450)
PLATELET # BLD AUTO: 199 10*3/MM3 (ref 140–450)
PLATELET # BLD AUTO: 218 10*3/MM3 (ref 140–450)
PLATELET # BLD AUTO: 225 10*3/MM3 (ref 140–450)
PLATELET # BLD AUTO: 227 10*3/MM3 (ref 140–450)
PLATELET # BLD AUTO: 235 10*3/MM3 (ref 140–450)
PLATELET # BLD AUTO: 236 10*3/MM3 (ref 140–450)
PLATELET # BLD AUTO: 237 10*3/MM3 (ref 140–450)
PLATELET # BLD AUTO: 259 10*3/MM3 (ref 140–450)
PLATELET # BLD AUTO: 268 10*3/MM3 (ref 140–450)
PLATELET # BLD AUTO: 272 10*3/MM3 (ref 140–450)
PLATELET # BLD AUTO: 278 10*3/MM3 (ref 140–450)
PLATELET # BLD AUTO: 278 10*3/MM3 (ref 140–450)
PLATELET # BLD AUTO: 286 10*3/MM3 (ref 140–450)
PLATELET # BLD AUTO: 288 10*3/MM3 (ref 140–450)
PLATELET # BLD AUTO: 302 10*3/MM3 (ref 140–450)
PLATELET # BLD AUTO: 317 10*3/MM3 (ref 140–450)
PLATELET # BLD AUTO: 327 10*3/MM3 (ref 140–450)
PLATELET # BLD AUTO: 335 10*3/MM3 (ref 140–450)
PLATELET # BLD AUTO: 337 10*3/MM3 (ref 140–450)
PLATELET # BLD AUTO: 384 10*3/MM3 (ref 140–450)
PLATELET # BLD AUTO: 426 10*3/MM3 (ref 140–450)
PMV BLD AUTO: 10.1 FL (ref 6–12)
PMV BLD AUTO: 10.2 FL (ref 6–12)
PMV BLD AUTO: 10.4 FL (ref 6–12)
PMV BLD AUTO: 10.5 FL (ref 6–12)
PMV BLD AUTO: 10.6 FL (ref 6–12)
PMV BLD AUTO: 10.7 FL (ref 6–12)
PMV BLD AUTO: 10.8 FL (ref 6–12)
PMV BLD AUTO: 10.9 FL (ref 6–12)
PMV BLD AUTO: 10.9 FL (ref 6–12)
PMV BLD AUTO: 11 FL (ref 6–12)
PMV BLD AUTO: 11.1 FL (ref 6–12)
PMV BLD AUTO: 11.2 FL (ref 6–12)
PMV BLD AUTO: 11.3 FL (ref 6–12)
PMV BLD AUTO: 11.4 FL (ref 6–12)
PMV BLD AUTO: 11.4 FL (ref 6–12)
PMV BLD AUTO: 11.5 FL (ref 6–12)
PMV BLD AUTO: 11.5 FL (ref 6–12)
PMV BLD AUTO: 11.6 FL (ref 6–12)
PMV BLD AUTO: 11.6 FL (ref 6–12)
PMV BLD AUTO: 13 FL (ref 6–12)
PO2 BLDA: 76.2 MM HG (ref 83–108)
PO2 BLDA: 84.1 MM HG (ref 83–108)
PO2 BLDA: 95.2 MM HG (ref 83–108)
POIKILOCYTOSIS BLD QL SMEAR: NORMAL
POTASSIUM SERPL-SCNC: 2.6 MMOL/L (ref 3.5–5.2)
POTASSIUM SERPL-SCNC: 3.1 MMOL/L (ref 3.5–5.2)
POTASSIUM SERPL-SCNC: 3.3 MMOL/L (ref 3.5–5.2)
POTASSIUM SERPL-SCNC: 3.4 MMOL/L (ref 3.5–5.2)
POTASSIUM SERPL-SCNC: 3.5 MMOL/L (ref 3.5–5.2)
POTASSIUM SERPL-SCNC: 3.6 MMOL/L (ref 3.5–5.2)
POTASSIUM SERPL-SCNC: 3.7 MMOL/L (ref 3.5–5.2)
POTASSIUM SERPL-SCNC: 3.7 MMOL/L (ref 3.5–5.2)
POTASSIUM SERPL-SCNC: 3.8 MMOL/L (ref 3.5–5.2)
POTASSIUM SERPL-SCNC: 3.9 MMOL/L (ref 3.5–5.2)
POTASSIUM SERPL-SCNC: 4 MMOL/L (ref 3.5–5.2)
POTASSIUM SERPL-SCNC: 4 MMOL/L (ref 3.5–5.2)
POTASSIUM SERPL-SCNC: 4.1 MMOL/L (ref 3.5–5.2)
POTASSIUM SERPL-SCNC: 4.2 MMOL/L (ref 3.5–5.2)
POTASSIUM SERPL-SCNC: 4.4 MMOL/L (ref 3.5–5.2)
POTASSIUM SERPL-SCNC: 4.4 MMOL/L (ref 3.5–5.2)
POTASSIUM SERPL-SCNC: 4.5 MMOL/L (ref 3.5–5.2)
POTASSIUM SERPL-SCNC: 4.7 MMOL/L (ref 3.5–5.2)
PROCALCITONIN SERPL-MCNC: 3.08 NG/ML (ref 0–0.25)
PROT SERPL-MCNC: 6 G/DL (ref 6–8.5)
PROT SERPL-MCNC: 6.1 G/DL (ref 6–8.5)
PROT SERPL-MCNC: 6.3 G/DL (ref 6–8.5)
PROT SERPL-MCNC: 6.3 G/DL (ref 6–8.5)
PROT SERPL-MCNC: 6.5 G/DL (ref 6–8.5)
PROT SERPL-MCNC: 6.5 G/DL (ref 6–8.5)
PROT SERPL-MCNC: 6.6 G/DL (ref 6–8.5)
PROT SERPL-MCNC: 6.7 G/DL (ref 6–8.5)
PROT SERPL-MCNC: 6.7 G/DL (ref 6–8.5)
PROT SERPL-MCNC: 6.8 G/DL (ref 6–8.5)
PROT SERPL-MCNC: 7.1 G/DL (ref 6–8.5)
PROT SERPL-MCNC: 8.2 G/DL (ref 6–8.5)
PROT UR QL STRIP: ABNORMAL
PROT UR QL STRIP: NEGATIVE
PROTHROMBIN TIME: 15.3 SECONDS (ref 11.1–15.3)
PROTHROMBIN TIME: 16.9 SECONDS (ref 11.1–15.3)
PROTHROMBIN TIME: 17.9 SECONDS (ref 11.1–15.3)
PROTHROMBIN TIME: 20.8 SECONDS (ref 11.1–15.3)
QT INTERVAL: 276 MS
QT INTERVAL: 282 MS
QT INTERVAL: 290 MS
QT INTERVAL: 326 MS
QT INTERVAL: 330 MS
QT INTERVAL: 354 MS
QT INTERVAL: 354 MS
QT INTERVAL: 358 MS
QT INTERVAL: 366 MS
QT INTERVAL: 372 MS
QT INTERVAL: 380 MS
QT INTERVAL: 412 MS
QTC INTERVAL: 404 MS
QTC INTERVAL: 415 MS
QTC INTERVAL: 433 MS
QTC INTERVAL: 439 MS
QTC INTERVAL: 439 MS
QTC INTERVAL: 440 MS
QTC INTERVAL: 450 MS
QTC INTERVAL: 451 MS
QTC INTERVAL: 452 MS
QTC INTERVAL: 454 MS
QTC INTERVAL: 463 MS
QTC INTERVAL: 474 MS
RBC # BLD AUTO: 3.2 10*6/MM3 (ref 3.77–5.28)
RBC # BLD AUTO: 3.31 10*6/MM3 (ref 3.77–5.28)
RBC # BLD AUTO: 3.45 10*6/MM3 (ref 3.77–5.28)
RBC # BLD AUTO: 3.45 10*6/MM3 (ref 3.77–5.28)
RBC # BLD AUTO: 3.48 10*6/MM3 (ref 3.77–5.28)
RBC # BLD AUTO: 3.53 10*6/MM3 (ref 3.77–5.28)
RBC # BLD AUTO: 3.54 10*6/MM3 (ref 3.77–5.28)
RBC # BLD AUTO: 3.57 10*6/MM3 (ref 3.77–5.28)
RBC # BLD AUTO: 3.67 10*6/MM3 (ref 3.77–5.28)
RBC # BLD AUTO: 3.7 10*6/MM3 (ref 3.77–5.28)
RBC # BLD AUTO: 3.72 10*6/MM3 (ref 3.77–5.28)
RBC # BLD AUTO: 3.74 10*6/MM3 (ref 3.77–5.28)
RBC # BLD AUTO: 3.78 10*6/MM3 (ref 3.77–5.28)
RBC # BLD AUTO: 3.79 10*6/MM3 (ref 3.77–5.28)
RBC # BLD AUTO: 3.82 10*6/MM3 (ref 3.77–5.28)
RBC # BLD AUTO: 3.82 10*6/MM3 (ref 3.77–5.28)
RBC # BLD AUTO: 3.88 10*6/MM3 (ref 3.77–5.28)
RBC # BLD AUTO: 3.92 10*6/MM3 (ref 3.77–5.28)
RBC # BLD AUTO: 3.93 10*6/MM3 (ref 3.77–5.28)
RBC # BLD AUTO: 3.97 10*6/MM3 (ref 3.77–5.28)
RBC # BLD AUTO: 4.02 10*6/MM3 (ref 3.77–5.28)
RBC # BLD AUTO: 4.02 10*6/MM3 (ref 3.77–5.28)
RBC # BLD AUTO: 4.06 10*6/MM3 (ref 3.77–5.28)
RBC # BLD AUTO: 4.16 10*6/MM3 (ref 3.77–5.28)
RBC # BLD AUTO: 4.35 10*6/MM3 (ref 3.77–5.28)
RBC # BLD AUTO: 4.63 10*6/MM3 (ref 3.77–5.28)
RBC # UR STRIP: ABNORMAL /HPF
RBC # UR: ABNORMAL /HPF
RBC MORPH BLD: NORMAL
RBC MORPH BLD: NORMAL
REF LAB TEST METHOD: ABNORMAL
SAO2 % BLDCOA: 93.8 % (ref 94–99)
SAO2 % BLDCOA: 96.4 % (ref 94–99)
SAO2 % BLDCOA: 96.4 % (ref 94–99)
SARS-COV-2 RNA PNL SPEC NAA+PROBE: NOT DETECTED
SARS-COV-2 RNA RESP QL NAA+PROBE: NOT DETECTED
SMALL PLATELETS BLD QL SMEAR: ADEQUATE
SODIUM SERPL-SCNC: 128 MMOL/L (ref 136–145)
SODIUM SERPL-SCNC: 132 MMOL/L (ref 136–145)
SODIUM SERPL-SCNC: 133 MMOL/L (ref 136–145)
SODIUM SERPL-SCNC: 134 MMOL/L (ref 136–145)
SODIUM SERPL-SCNC: 134 MMOL/L (ref 136–145)
SODIUM SERPL-SCNC: 135 MMOL/L (ref 136–145)
SODIUM SERPL-SCNC: 135 MMOL/L (ref 136–145)
SODIUM SERPL-SCNC: 136 MMOL/L (ref 136–145)
SODIUM SERPL-SCNC: 136 MMOL/L (ref 136–145)
SODIUM SERPL-SCNC: 137 MMOL/L (ref 136–145)
SODIUM SERPL-SCNC: 138 MMOL/L (ref 136–145)
SODIUM SERPL-SCNC: 138 MMOL/L (ref 136–145)
SODIUM SERPL-SCNC: 139 MMOL/L (ref 136–145)
SODIUM SERPL-SCNC: 140 MMOL/L (ref 136–145)
SODIUM SERPL-SCNC: 141 MMOL/L (ref 136–145)
SODIUM SERPL-SCNC: 142 MMOL/L (ref 136–145)
SODIUM SERPL-SCNC: 144 MMOL/L (ref 136–145)
SODIUM SERPL-SCNC: 144 MMOL/L (ref 136–145)
SODIUM SERPL-SCNC: 146 MMOL/L (ref 136–145)
SODIUM SERPL-SCNC: 148 MMOL/L (ref 136–145)
SODIUM SERPL-SCNC: 153 MMOL/L (ref 136–145)
SP GR UR STRIP: 1.01 (ref 1–1.03)
SP GR UR STRIP: 1.02 (ref 1–1.03)
SQUAMOUS #/AREA URNS HPF: ABNORMAL /HPF
STRESS TARGET HR: 129 BPM
STRESS TARGET HR: 129 BPM
T4 FREE SERPL-MCNC: 0.9 NG/DL (ref 0.93–1.7)
T4 FREE SERPL-MCNC: 1.26 NG/DL (ref 0.93–1.7)
TOXIC GRANULATION: ABNORMAL
TROPONIN T SERPL-MCNC: 1.58 NG/ML (ref 0–0.03)
TROPONIN T SERPL-MCNC: 2.53 NG/ML (ref 0–0.03)
TROPONIN T SERPL-MCNC: <0.01 NG/ML (ref 0–0.03)
TSH SERPL DL<=0.05 MIU/L-ACNC: 0.58 UIU/ML (ref 0.27–4.2)
TSH SERPL DL<=0.05 MIU/L-ACNC: 1.12 UIU/ML (ref 0.27–4.2)
TSH SERPL DL<=0.05 MIU/L-ACNC: 2.2 UIU/ML (ref 0.27–4.2)
UROBILINOGEN UR QL STRIP: ABNORMAL
VANCOMYCIN PEAK SERPL-MCNC: 22.1 MCG/ML (ref 20–40)
VANCOMYCIN PEAK SERPL-MCNC: 44.9 MCG/ML (ref 20–40)
VANCOMYCIN SERPL-MCNC: <4 MCG/ML (ref 5–40)
VANCOMYCIN TROUGH SERPL-MCNC: 14.9 MCG/ML (ref 5–20)
VANCOMYCIN TROUGH SERPL-MCNC: 15.2 MCG/ML (ref 5–20)
VANCOMYCIN TROUGH SERPL-MCNC: 4.4 MCG/ML (ref 5–20)
VANCOMYCIN TROUGH SERPL-MCNC: 6.3 MCG/ML (ref 5–20)
VENTILATOR MODE: ABNORMAL
VIM STRAIN: NOT DETECTED
VIM STRAIN: NOT DETECTED
WBC # BLD AUTO: 10.39 10*3/MM3 (ref 3.4–10.8)
WBC # BLD AUTO: 11.11 10*3/MM3 (ref 3.4–10.8)
WBC # BLD AUTO: 11.46 10*3/MM3 (ref 3.4–10.8)
WBC # BLD AUTO: 12 10*3/MM3 (ref 3.4–10.8)
WBC # BLD AUTO: 18.41 10*3/MM3 (ref 3.4–10.8)
WBC # BLD AUTO: 21.09 10*3/MM3 (ref 3.4–10.8)
WBC # BLD AUTO: 21.79 10*3/MM3 (ref 3.4–10.8)
WBC # BLD AUTO: 23.17 10*3/MM3 (ref 3.4–10.8)
WBC # BLD AUTO: 7.26 10*3/MM3 (ref 3.4–10.8)
WBC # BLD AUTO: 7.46 10*3/MM3 (ref 3.4–10.8)
WBC # BLD AUTO: 7.71 10*3/MM3 (ref 3.4–10.8)
WBC # UR STRIP: ABNORMAL /HPF
WBC MORPH BLD: NORMAL
WBC NRBC COR # BLD: 12.21 10*3/MM3 (ref 3.4–10.8)
WBC NRBC COR # BLD: 12.88 10*3/MM3 (ref 3.4–10.8)
WBC NRBC COR # BLD: 12.91 10*3/MM3 (ref 3.4–10.8)
WBC NRBC COR # BLD: 13.38 10*3/MM3 (ref 3.4–10.8)
WBC NRBC COR # BLD: 13.67 10*3/MM3 (ref 3.4–10.8)
WBC NRBC COR # BLD: 14.85 10*3/MM3 (ref 3.4–10.8)
WBC NRBC COR # BLD: 15.08 10*3/MM3 (ref 3.4–10.8)
WBC NRBC COR # BLD: 15.56 10*3/MM3 (ref 3.4–10.8)
WBC NRBC COR # BLD: 16.49 10*3/MM3 (ref 3.4–10.8)
WBC NRBC COR # BLD: 17.73 10*3/MM3 (ref 3.4–10.8)
WBC NRBC COR # BLD: 22.64 10*3/MM3 (ref 3.4–10.8)
WBC NRBC COR # BLD: 22.8 10*3/MM3 (ref 3.4–10.8)
WBC NRBC COR # BLD: 28.68 10*3/MM3 (ref 3.4–10.8)
WBC NRBC COR # BLD: 6.7 10*3/MM3 (ref 3.4–10.8)
WBC NRBC COR # BLD: 6.9 10*3/MM3 (ref 3.4–10.8)
WBC UR QL AUTO: ABNORMAL /HPF
WHOLE BLOOD HOLD SPECIMEN: NORMAL
YEAST URNS QL MICRO: ABNORMAL /HPF

## 2021-01-01 PROCEDURE — 63710000001 INSULIN ASPART PER 5 UNITS: Performed by: NURSE PRACTITIONER

## 2021-01-01 PROCEDURE — 25010000002 ENOXAPARIN PER 10 MG: Performed by: UROLOGY

## 2021-01-01 PROCEDURE — 85025 COMPLETE CBC W/AUTO DIFF WBC: CPT | Performed by: NURSE PRACTITIONER

## 2021-01-01 PROCEDURE — 85027 COMPLETE CBC AUTOMATED: CPT | Performed by: INTERNAL MEDICINE

## 2021-01-01 PROCEDURE — 97162 PT EVAL MOD COMPLEX 30 MIN: CPT

## 2021-01-01 PROCEDURE — 94799 UNLISTED PULMONARY SVC/PX: CPT

## 2021-01-01 PROCEDURE — 71250 CT THORAX DX C-: CPT

## 2021-01-01 PROCEDURE — 87636 SARSCOV2 & INF A&B AMP PRB: CPT | Performed by: EMERGENCY MEDICINE

## 2021-01-01 PROCEDURE — 25010000002 PIPERACILLIN SOD-TAZOBACTAM PER 1 G: Performed by: INTERNAL MEDICINE

## 2021-01-01 PROCEDURE — 63710000001 INSULIN ASPART PER 5 UNITS: Performed by: INTERNAL MEDICINE

## 2021-01-01 PROCEDURE — 97530 THERAPEUTIC ACTIVITIES: CPT

## 2021-01-01 PROCEDURE — 87147 CULTURE TYPE IMMUNOLOGIC: CPT | Performed by: FAMILY MEDICINE

## 2021-01-01 PROCEDURE — 25010000002 ENOXAPARIN PER 10 MG: Performed by: HOSPITALIST

## 2021-01-01 PROCEDURE — 81001 URINALYSIS AUTO W/SCOPE: CPT | Performed by: EMERGENCY MEDICINE

## 2021-01-01 PROCEDURE — 25010000002 ENOXAPARIN PER 10 MG: Performed by: EMERGENCY MEDICINE

## 2021-01-01 PROCEDURE — 83735 ASSAY OF MAGNESIUM: CPT | Performed by: INTERNAL MEDICINE

## 2021-01-01 PROCEDURE — 84484 ASSAY OF TROPONIN QUANT: CPT | Performed by: EMERGENCY MEDICINE

## 2021-01-01 PROCEDURE — 93010 ELECTROCARDIOGRAM REPORT: CPT | Performed by: INTERNAL MEDICINE

## 2021-01-01 PROCEDURE — 63710000001 INSULIN ASPART PER 5 UNITS: Performed by: HOSPITALIST

## 2021-01-01 PROCEDURE — 93005 ELECTROCARDIOGRAM TRACING: CPT | Performed by: EMERGENCY MEDICINE

## 2021-01-01 PROCEDURE — 97110 THERAPEUTIC EXERCISES: CPT

## 2021-01-01 PROCEDURE — 82962 GLUCOSE BLOOD TEST: CPT

## 2021-01-01 PROCEDURE — 25010000002 ONDANSETRON PER 1 MG: Performed by: INTERNAL MEDICINE

## 2021-01-01 PROCEDURE — 63710000001 ONDANSETRON PER 8 MG: Performed by: UROLOGY

## 2021-01-01 PROCEDURE — 87086 URINE CULTURE/COLONY COUNT: CPT | Performed by: FAMILY MEDICINE

## 2021-01-01 PROCEDURE — 81003 URINALYSIS AUTO W/O SCOPE: CPT | Performed by: FAMILY MEDICINE

## 2021-01-01 PROCEDURE — 84145 PROCALCITONIN (PCT): CPT | Performed by: FAMILY MEDICINE

## 2021-01-01 PROCEDURE — 84100 ASSAY OF PHOSPHORUS: CPT | Performed by: HOSPITALIST

## 2021-01-01 PROCEDURE — 84443 ASSAY THYROID STIM HORMONE: CPT | Performed by: EMERGENCY MEDICINE

## 2021-01-01 PROCEDURE — C1769 GUIDE WIRE: HCPCS | Performed by: UROLOGY

## 2021-01-01 PROCEDURE — 84132 ASSAY OF SERUM POTASSIUM: CPT | Performed by: HOSPITALIST

## 2021-01-01 PROCEDURE — 25010000002 HEPARIN (PORCINE) PER 1000 UNITS: Performed by: INTERNAL MEDICINE

## 2021-01-01 PROCEDURE — 63710000001 INSULIN ASPART PER 5 UNITS: Performed by: UROLOGY

## 2021-01-01 PROCEDURE — 85025 COMPLETE CBC W/AUTO DIFF WBC: CPT | Performed by: FAMILY MEDICINE

## 2021-01-01 PROCEDURE — 25010000002 MORPHINE PER 10 MG: Performed by: HOSPITALIST

## 2021-01-01 PROCEDURE — 36415 COLL VENOUS BLD VENIPUNCTURE: CPT | Performed by: HOSPITALIST

## 2021-01-01 PROCEDURE — 25010000002 CEFTRIAXONE PER 250 MG: Performed by: HOSPITALIST

## 2021-01-01 PROCEDURE — 25010000002 DIGOXIN PER 500 MCG: Performed by: INTERNAL MEDICINE

## 2021-01-01 PROCEDURE — 93005 ELECTROCARDIOGRAM TRACING: CPT

## 2021-01-01 PROCEDURE — 63710000001 INSULIN DETEMIR PER 5 UNITS: Performed by: UROLOGY

## 2021-01-01 PROCEDURE — 85610 PROTHROMBIN TIME: CPT | Performed by: INTERNAL MEDICINE

## 2021-01-01 PROCEDURE — 25010000003 MAGNESIUM SULFATE 4 GM/100ML SOLUTION: Performed by: UROLOGY

## 2021-01-01 PROCEDURE — 84484 ASSAY OF TROPONIN QUANT: CPT | Performed by: HOSPITALIST

## 2021-01-01 PROCEDURE — 86140 C-REACTIVE PROTEIN: CPT | Performed by: FAMILY MEDICINE

## 2021-01-01 PROCEDURE — 25010000002 PIPERACILLIN SOD-TAZOBACTAM PER 1 G: Performed by: UROLOGY

## 2021-01-01 PROCEDURE — 63710000001 INSULIN DETEMIR PER 5 UNITS: Performed by: HOSPITALIST

## 2021-01-01 PROCEDURE — 87077 CULTURE AEROBIC IDENTIFY: CPT | Performed by: FAMILY MEDICINE

## 2021-01-01 PROCEDURE — 36415 COLL VENOUS BLD VENIPUNCTURE: CPT | Performed by: EMERGENCY MEDICINE

## 2021-01-01 PROCEDURE — 63710000001 INSULIN DETEMIR PER 5 UNITS: Performed by: INTERNAL MEDICINE

## 2021-01-01 PROCEDURE — 85025 COMPLETE CBC W/AUTO DIFF WBC: CPT | Performed by: EMERGENCY MEDICINE

## 2021-01-01 PROCEDURE — 85007 BL SMEAR W/DIFF WBC COUNT: CPT | Performed by: HOSPITALIST

## 2021-01-01 PROCEDURE — 96375 TX/PRO/DX INJ NEW DRUG ADDON: CPT

## 2021-01-01 PROCEDURE — 87635 SARS-COV-2 COVID-19 AMP PRB: CPT | Performed by: HOSPITALIST

## 2021-01-01 PROCEDURE — 71045 X-RAY EXAM CHEST 1 VIEW: CPT

## 2021-01-01 PROCEDURE — 0 TECHNETIUM ALBUMIN AGGREGATED: Performed by: INTERNAL MEDICINE

## 2021-01-01 PROCEDURE — 93970 EXTREMITY STUDY: CPT

## 2021-01-01 PROCEDURE — 92526 ORAL FUNCTION THERAPY: CPT | Performed by: SPEECH-LANGUAGE PATHOLOGIST

## 2021-01-01 PROCEDURE — 96361 HYDRATE IV INFUSION ADD-ON: CPT

## 2021-01-01 PROCEDURE — 25010000002 CEFTRIAXONE PER 250 MG

## 2021-01-01 PROCEDURE — 80048 BASIC METABOLIC PNL TOTAL CA: CPT | Performed by: NURSE PRACTITIONER

## 2021-01-01 PROCEDURE — 0 POTASSIUM CHLORIDE 10 MEQ/100ML SOLUTION: Performed by: INTERNAL MEDICINE

## 2021-01-01 PROCEDURE — 80202 ASSAY OF VANCOMYCIN: CPT | Performed by: INTERNAL MEDICINE

## 2021-01-01 PROCEDURE — 85025 COMPLETE CBC W/AUTO DIFF WBC: CPT | Performed by: HOSPITALIST

## 2021-01-01 PROCEDURE — 84439 ASSAY OF FREE THYROXINE: CPT | Performed by: INTERNAL MEDICINE

## 2021-01-01 PROCEDURE — 83880 ASSAY OF NATRIURETIC PEPTIDE: CPT | Performed by: EMERGENCY MEDICINE

## 2021-01-01 PROCEDURE — 0T778DZ DILATION OF LEFT URETER WITH INTRALUMINAL DEVICE, VIA NATURAL OR ARTIFICIAL OPENING ENDOSCOPIC: ICD-10-PCS | Performed by: UROLOGY

## 2021-01-01 PROCEDURE — 85610 PROTHROMBIN TIME: CPT | Performed by: EMERGENCY MEDICINE

## 2021-01-01 PROCEDURE — C9803 HOPD COVID-19 SPEC COLLECT: HCPCS

## 2021-01-01 PROCEDURE — 80053 COMPREHEN METABOLIC PANEL: CPT | Performed by: FAMILY MEDICINE

## 2021-01-01 PROCEDURE — 76937 US GUIDE VASCULAR ACCESS: CPT

## 2021-01-01 PROCEDURE — 87186 SC STD MICRODIL/AGAR DIL: CPT | Performed by: HOSPITALIST

## 2021-01-01 PROCEDURE — 80053 COMPREHEN METABOLIC PANEL: CPT | Performed by: INTERNAL MEDICINE

## 2021-01-01 PROCEDURE — 85025 COMPLETE CBC W/AUTO DIFF WBC: CPT

## 2021-01-01 PROCEDURE — 83930 ASSAY OF BLOOD OSMOLALITY: CPT | Performed by: INTERNAL MEDICINE

## 2021-01-01 PROCEDURE — 82565 ASSAY OF CREATININE: CPT | Performed by: FAMILY MEDICINE

## 2021-01-01 PROCEDURE — 85730 THROMBOPLASTIN TIME PARTIAL: CPT | Performed by: INTERNAL MEDICINE

## 2021-01-01 PROCEDURE — 25010000002 PIPERACILLIN SOD-TAZOBACTAM PER 1 G: Performed by: HOSPITALIST

## 2021-01-01 PROCEDURE — 25010000002 MORPHINE PER 10 MG: Performed by: EMERGENCY MEDICINE

## 2021-01-01 PROCEDURE — 25010000002 MAGNESIUM SULFATE 2 GM/50ML SOLUTION: Performed by: UROLOGY

## 2021-01-01 PROCEDURE — 96372 THER/PROPH/DIAG INJ SC/IM: CPT

## 2021-01-01 PROCEDURE — 25010000002 HYDRALAZINE PER 20 MG: Performed by: FAMILY MEDICINE

## 2021-01-01 PROCEDURE — 93321 DOPPLER ECHO F-UP/LMTD STD: CPT

## 2021-01-01 PROCEDURE — 94760 N-INVAS EAR/PLS OXIMETRY 1: CPT

## 2021-01-01 PROCEDURE — 80048 BASIC METABOLIC PNL TOTAL CA: CPT | Performed by: UROLOGY

## 2021-01-01 PROCEDURE — 87186 SC STD MICRODIL/AGAR DIL: CPT | Performed by: EMERGENCY MEDICINE

## 2021-01-01 PROCEDURE — G0378 HOSPITAL OBSERVATION PER HR: HCPCS

## 2021-01-01 PROCEDURE — A9540 TC99M MAA: HCPCS | Performed by: INTERNAL MEDICINE

## 2021-01-01 PROCEDURE — 83605 ASSAY OF LACTIC ACID: CPT | Performed by: FAMILY MEDICINE

## 2021-01-01 PROCEDURE — 25010000002 ONDANSETRON PER 1 MG: Performed by: UROLOGY

## 2021-01-01 PROCEDURE — 80053 COMPREHEN METABOLIC PANEL: CPT | Performed by: EMERGENCY MEDICINE

## 2021-01-01 PROCEDURE — 92610 EVALUATE SWALLOWING FUNCTION: CPT | Performed by: SPEECH-LANGUAGE PATHOLOGIST

## 2021-01-01 PROCEDURE — 93005 ELECTROCARDIOGRAM TRACING: CPT | Performed by: INTERNAL MEDICINE

## 2021-01-01 PROCEDURE — 74176 CT ABD & PELVIS W/O CONTRAST: CPT

## 2021-01-01 PROCEDURE — 84100 ASSAY OF PHOSPHORUS: CPT | Performed by: INTERNAL MEDICINE

## 2021-01-01 PROCEDURE — 25010000002 ONDANSETRON PER 1 MG: Performed by: HOSPITALIST

## 2021-01-01 PROCEDURE — 93005 ELECTROCARDIOGRAM TRACING: CPT | Performed by: HOSPITALIST

## 2021-01-01 PROCEDURE — 25010000002 VANCOMYCIN 10 G RECONSTITUTED SOLUTION: Performed by: INTERNAL MEDICINE

## 2021-01-01 PROCEDURE — 85025 COMPLETE CBC W/AUTO DIFF WBC: CPT | Performed by: INTERNAL MEDICINE

## 2021-01-01 PROCEDURE — 93005 ELECTROCARDIOGRAM TRACING: CPT | Performed by: NURSE PRACTITIONER

## 2021-01-01 PROCEDURE — 84132 ASSAY OF SERUM POTASSIUM: CPT | Performed by: INTERNAL MEDICINE

## 2021-01-01 PROCEDURE — 85730 THROMBOPLASTIN TIME PARTIAL: CPT | Performed by: HOSPITALIST

## 2021-01-01 PROCEDURE — 70450 CT HEAD/BRAIN W/O DYE: CPT

## 2021-01-01 PROCEDURE — 80048 BASIC METABOLIC PNL TOTAL CA: CPT | Performed by: HOSPITALIST

## 2021-01-01 PROCEDURE — 87086 URINE CULTURE/COLONY COUNT: CPT | Performed by: EMERGENCY MEDICINE

## 2021-01-01 PROCEDURE — 25010000002 PIPERACILLIN SOD-TAZOBACTAM PER 1 G: Performed by: EMERGENCY MEDICINE

## 2021-01-01 PROCEDURE — 84484 ASSAY OF TROPONIN QUANT: CPT | Performed by: INTERNAL MEDICINE

## 2021-01-01 PROCEDURE — 97535 SELF CARE MNGMENT TRAINING: CPT

## 2021-01-01 PROCEDURE — 83690 ASSAY OF LIPASE: CPT | Performed by: EMERGENCY MEDICINE

## 2021-01-01 PROCEDURE — 36415 COLL VENOUS BLD VENIPUNCTURE: CPT | Performed by: INTERNAL MEDICINE

## 2021-01-01 PROCEDURE — 25010000002 VANCOMYCIN: Performed by: EMERGENCY MEDICINE

## 2021-01-01 PROCEDURE — 36410 VNPNXR 3YR/> PHY/QHP DX/THER: CPT

## 2021-01-01 PROCEDURE — 82803 BLOOD GASES ANY COMBINATION: CPT

## 2021-01-01 PROCEDURE — 25010000002 MORPHINE PER 10 MG: Performed by: UROLOGY

## 2021-01-01 PROCEDURE — 25010000002 VANCOMYCIN 1 G RECONSTITUTED SOLUTION: Performed by: INTERNAL MEDICINE

## 2021-01-01 PROCEDURE — 87040 BLOOD CULTURE FOR BACTERIA: CPT | Performed by: EMERGENCY MEDICINE

## 2021-01-01 PROCEDURE — 99285 EMERGENCY DEPT VISIT HI MDM: CPT

## 2021-01-01 PROCEDURE — 25010000002 ONDANSETRON PER 1 MG: Performed by: EMERGENCY MEDICINE

## 2021-01-01 PROCEDURE — 84484 ASSAY OF TROPONIN QUANT: CPT | Performed by: NURSE PRACTITIONER

## 2021-01-01 PROCEDURE — 81001 URINALYSIS AUTO W/SCOPE: CPT | Performed by: INTERNAL MEDICINE

## 2021-01-01 PROCEDURE — C2617 STENT, NON-COR, TEM W/O DEL: HCPCS | Performed by: UROLOGY

## 2021-01-01 PROCEDURE — 83605 ASSAY OF LACTIC ACID: CPT | Performed by: EMERGENCY MEDICINE

## 2021-01-01 PROCEDURE — 93325 DOPPLER ECHO COLOR FLOW MAPG: CPT

## 2021-01-01 PROCEDURE — 87636 SARSCOV2 & INF A&B AMP PRB: CPT | Performed by: INTERNAL MEDICINE

## 2021-01-01 PROCEDURE — 83605 ASSAY OF LACTIC ACID: CPT | Performed by: INTERNAL MEDICINE

## 2021-01-01 PROCEDURE — 36600 WITHDRAWAL OF ARTERIAL BLOOD: CPT

## 2021-01-01 PROCEDURE — 87086 URINE CULTURE/COLONY COUNT: CPT | Performed by: HOSPITALIST

## 2021-01-01 PROCEDURE — 63710000001 INSULIN ASPART PER 5 UNITS: Performed by: FAMILY MEDICINE

## 2021-01-01 PROCEDURE — 97166 OT EVAL MOD COMPLEX 45 MIN: CPT

## 2021-01-01 PROCEDURE — 85730 THROMBOPLASTIN TIME PARTIAL: CPT | Performed by: EMERGENCY MEDICINE

## 2021-01-01 PROCEDURE — C1758 CATHETER, URETERAL: HCPCS | Performed by: UROLOGY

## 2021-01-01 PROCEDURE — 87102 FUNGUS ISOLATION CULTURE: CPT | Performed by: INTERNAL MEDICINE

## 2021-01-01 PROCEDURE — 99284 EMERGENCY DEPT VISIT MOD MDM: CPT

## 2021-01-01 PROCEDURE — 84443 ASSAY THYROID STIM HORMONE: CPT | Performed by: INTERNAL MEDICINE

## 2021-01-01 PROCEDURE — 76705 ECHO EXAM OF ABDOMEN: CPT

## 2021-01-01 PROCEDURE — BT1F1ZZ FLUOROSCOPY OF LEFT KIDNEY, URETER AND BLADDER USING LOW OSMOLAR CONTRAST: ICD-10-PCS | Performed by: UROLOGY

## 2021-01-01 PROCEDURE — 87186 SC STD MICRODIL/AGAR DIL: CPT | Performed by: FAMILY MEDICINE

## 2021-01-01 PROCEDURE — 84520 ASSAY OF UREA NITROGEN: CPT | Performed by: FAMILY MEDICINE

## 2021-01-01 PROCEDURE — 85379 FIBRIN DEGRADATION QUANT: CPT | Performed by: EMERGENCY MEDICINE

## 2021-01-01 PROCEDURE — 83036 HEMOGLOBIN GLYCOSYLATED A1C: CPT | Performed by: HOSPITALIST

## 2021-01-01 PROCEDURE — 81001 URINALYSIS AUTO W/SCOPE: CPT | Performed by: NURSE PRACTITIONER

## 2021-01-01 PROCEDURE — 84439 ASSAY OF FREE THYROXINE: CPT | Performed by: EMERGENCY MEDICINE

## 2021-01-01 PROCEDURE — 80048 BASIC METABOLIC PNL TOTAL CA: CPT | Performed by: INTERNAL MEDICINE

## 2021-01-01 PROCEDURE — 83735 ASSAY OF MAGNESIUM: CPT | Performed by: UROLOGY

## 2021-01-01 PROCEDURE — 96367 TX/PROPH/DG ADDL SEQ IV INF: CPT

## 2021-01-01 PROCEDURE — 0 INSULIN REGULAR HUMAN PER 5 UNITS: Performed by: INTERNAL MEDICINE

## 2021-01-01 PROCEDURE — 81001 URINALYSIS AUTO W/SCOPE: CPT | Performed by: HOSPITALIST

## 2021-01-01 PROCEDURE — 81001 URINALYSIS AUTO W/SCOPE: CPT | Performed by: FAMILY MEDICINE

## 2021-01-01 PROCEDURE — 25010000002 CEFTRIAXONE: Performed by: EMERGENCY MEDICINE

## 2021-01-01 PROCEDURE — 85025 COMPLETE CBC W/AUTO DIFF WBC: CPT | Performed by: UROLOGY

## 2021-01-01 PROCEDURE — C1751 CATH, INF, PER/CENT/MIDLINE: HCPCS

## 2021-01-01 PROCEDURE — 97530 THERAPEUTIC ACTIVITIES: CPT | Performed by: PHYSICAL THERAPIST

## 2021-01-01 PROCEDURE — 87081 CULTURE SCREEN ONLY: CPT | Performed by: INTERNAL MEDICINE

## 2021-01-01 PROCEDURE — 83036 HEMOGLOBIN GLYCOSYLATED A1C: CPT | Performed by: INTERNAL MEDICINE

## 2021-01-01 PROCEDURE — 87088 URINE BACTERIA CULTURE: CPT | Performed by: HOSPITALIST

## 2021-01-01 PROCEDURE — 82947 ASSAY GLUCOSE BLOOD QUANT: CPT | Performed by: HOSPITALIST

## 2021-01-01 PROCEDURE — 82947 ASSAY GLUCOSE BLOOD QUANT: CPT | Performed by: EMERGENCY MEDICINE

## 2021-01-01 PROCEDURE — 25010000002 ONDANSETRON PER 1 MG: Performed by: NURSE PRACTITIONER

## 2021-01-01 PROCEDURE — 87040 BLOOD CULTURE FOR BACTERIA: CPT | Performed by: FAMILY MEDICINE

## 2021-01-01 PROCEDURE — 78580 LUNG PERFUSION IMAGING: CPT

## 2021-01-01 PROCEDURE — 63710000001 ONDANSETRON PER 8 MG: Performed by: NURSE PRACTITIONER

## 2021-01-01 PROCEDURE — 80048 BASIC METABOLIC PNL TOTAL CA: CPT | Performed by: FAMILY MEDICINE

## 2021-01-01 PROCEDURE — 25010000002 FENTANYL CITRATE (PF) 100 MCG/2ML SOLUTION: Performed by: NURSE ANESTHETIST, CERTIFIED REGISTERED

## 2021-01-01 PROCEDURE — 25010000002 ENOXAPARIN PER 10 MG: Performed by: NURSE PRACTITIONER

## 2021-01-01 PROCEDURE — 96365 THER/PROPH/DIAG IV INF INIT: CPT

## 2021-01-01 PROCEDURE — 81001 URINALYSIS AUTO W/SCOPE: CPT | Performed by: UROLOGY

## 2021-01-01 PROCEDURE — 36415 COLL VENOUS BLD VENIPUNCTURE: CPT

## 2021-01-01 PROCEDURE — 85007 BL SMEAR W/DIFF WBC COUNT: CPT | Performed by: EMERGENCY MEDICINE

## 2021-01-01 PROCEDURE — 87150 DNA/RNA AMPLIFIED PROBE: CPT | Performed by: EMERGENCY MEDICINE

## 2021-01-01 PROCEDURE — 83880 ASSAY OF NATRIURETIC PEPTIDE: CPT | Performed by: INTERNAL MEDICINE

## 2021-01-01 PROCEDURE — 80069 RENAL FUNCTION PANEL: CPT | Performed by: INTERNAL MEDICINE

## 2021-01-01 PROCEDURE — 80202 ASSAY OF VANCOMYCIN: CPT | Performed by: FAMILY MEDICINE

## 2021-01-01 PROCEDURE — 93308 TTE F-UP OR LMTD: CPT

## 2021-01-01 PROCEDURE — 87086 URINE CULTURE/COLONY COUNT: CPT | Performed by: NURSE PRACTITIONER

## 2021-01-01 PROCEDURE — 85730 THROMBOPLASTIN TIME PARTIAL: CPT | Performed by: FAMILY MEDICINE

## 2021-01-01 PROCEDURE — 25010000002 FUROSEMIDE PER 20 MG: Performed by: INTERNAL MEDICINE

## 2021-01-01 PROCEDURE — 25010000002 MAGNESIUM SULFATE 2 GM/50ML SOLUTION: Performed by: INTERNAL MEDICINE

## 2021-01-01 PROCEDURE — P9612 CATHETERIZE FOR URINE SPEC: HCPCS

## 2021-01-01 PROCEDURE — 63710000001 INSULIN DETEMIR PER 5 UNITS: Performed by: NURSE PRACTITIONER

## 2021-01-01 PROCEDURE — 83735 ASSAY OF MAGNESIUM: CPT | Performed by: HOSPITALIST

## 2021-01-01 PROCEDURE — 74420 UROGRAPHY RTRGR +-KUB: CPT

## 2021-01-01 PROCEDURE — 93306 TTE W/DOPPLER COMPLETE: CPT

## 2021-01-01 PROCEDURE — 96376 TX/PRO/DX INJ SAME DRUG ADON: CPT

## 2021-01-01 PROCEDURE — 82009 KETONE BODYS QUAL: CPT | Performed by: INTERNAL MEDICINE

## 2021-01-01 PROCEDURE — 97162 PT EVAL MOD COMPLEX 30 MIN: CPT | Performed by: PHYSICAL THERAPIST

## 2021-01-01 PROCEDURE — 25010000002 CEFEPIME PER 500 MG: Performed by: EMERGENCY MEDICINE

## 2021-01-01 PROCEDURE — 25010000002 PROPOFOL 10 MG/ML EMULSION: Performed by: NURSE ANESTHETIST, CERTIFIED REGISTERED

## 2021-01-01 DEVICE — URETERAL STENT
Type: IMPLANTABLE DEVICE | Site: URETER | Status: FUNCTIONAL
Brand: POLARIS™ ULTRA

## 2021-01-01 RX ORDER — ONDANSETRON 4 MG/1
4 TABLET, FILM COATED ORAL EVERY 6 HOURS PRN
Status: DISCONTINUED | OUTPATIENT
Start: 2021-01-01 | End: 2021-01-01 | Stop reason: HOSPADM

## 2021-01-01 RX ORDER — INSULIN GLARGINE 300 U/ML
16 INJECTION, SOLUTION SUBCUTANEOUS DAILY
COMMUNITY

## 2021-01-01 RX ORDER — CEFDINIR 300 MG/1
300 CAPSULE ORAL 2 TIMES DAILY
Qty: 6 CAPSULE | Refills: 0 | Status: SHIPPED | OUTPATIENT
Start: 2021-01-01 | End: 2021-01-01

## 2021-01-01 RX ORDER — NITROGLYCERIN 40 MG/1
1 PATCH TRANSDERMAL DAILY
Status: DISCONTINUED | OUTPATIENT
Start: 2021-01-01 | End: 2021-01-01 | Stop reason: HOSPADM

## 2021-01-01 RX ORDER — SODIUM CHLORIDE 0.9 % (FLUSH) 0.9 %
10 SYRINGE (ML) INJECTION EVERY 12 HOURS SCHEDULED
Status: DISCONTINUED | OUTPATIENT
Start: 2021-01-01 | End: 2021-01-01 | Stop reason: HOSPADM

## 2021-01-01 RX ORDER — DIAZEPAM 2 MG/1
2 TABLET ORAL 2 TIMES DAILY
Status: ON HOLD | COMMUNITY
End: 2021-01-01 | Stop reason: SDUPTHER

## 2021-01-01 RX ORDER — LEVOFLOXACIN 500 MG/1
500 TABLET, FILM COATED ORAL NIGHTLY
COMMUNITY
End: 2021-01-01 | Stop reason: HOSPADM

## 2021-01-01 RX ORDER — NICOTINE POLACRILEX 4 MG
15 LOZENGE BUCCAL
Status: DISCONTINUED | OUTPATIENT
Start: 2021-01-01 | End: 2021-01-01 | Stop reason: SDUPTHER

## 2021-01-01 RX ORDER — CHLORTHALIDONE 25 MG/1
25 TABLET ORAL DAILY
Status: DISCONTINUED | OUTPATIENT
Start: 2021-01-01 | End: 2021-01-01 | Stop reason: HOSPADM

## 2021-01-01 RX ORDER — DIAZEPAM 2 MG/1
2 TABLET ORAL 2 TIMES DAILY
Qty: 6 TABLET | Refills: 0 | Status: ON HOLD | OUTPATIENT
Start: 2021-01-01 | End: 2022-01-01 | Stop reason: SDUPTHER

## 2021-01-01 RX ORDER — CARVEDILOL 12.5 MG/1
12.5 TABLET ORAL 2 TIMES DAILY
Status: DISCONTINUED | OUTPATIENT
Start: 2021-01-01 | End: 2021-01-01 | Stop reason: HOSPADM

## 2021-01-01 RX ORDER — POTASSIUM CHLORIDE, DEXTROSE MONOHYDRATE AND SODIUM CHLORIDE 300; 5; 900 MG/100ML; G/100ML; MG/100ML
150 INJECTION, SOLUTION INTRAVENOUS CONTINUOUS PRN
Status: DISCONTINUED | OUTPATIENT
Start: 2021-01-01 | End: 2021-01-01

## 2021-01-01 RX ORDER — ISOSORBIDE MONONITRATE 30 MG/1
30 TABLET, EXTENDED RELEASE ORAL DAILY
Status: DISCONTINUED | OUTPATIENT
Start: 2021-01-01 | End: 2021-01-01 | Stop reason: HOSPADM

## 2021-01-01 RX ORDER — DEXTROSE MONOHYDRATE 25 G/50ML
25 INJECTION, SOLUTION INTRAVENOUS
Status: DISCONTINUED | OUTPATIENT
Start: 2021-01-01 | End: 2021-01-01 | Stop reason: HOSPADM

## 2021-01-01 RX ORDER — HALOPERIDOL 5 MG/ML
2 INJECTION INTRAMUSCULAR ONCE
Status: DISCONTINUED | OUTPATIENT
Start: 2021-01-01 | End: 2021-01-01 | Stop reason: HOSPADM

## 2021-01-01 RX ORDER — ACETAMINOPHEN 325 MG/1
650 TABLET ORAL 3 TIMES DAILY
Status: DISCONTINUED | OUTPATIENT
Start: 2021-01-01 | End: 2021-01-01 | Stop reason: HOSPADM

## 2021-01-01 RX ORDER — SODIUM CHLORIDE 0.9 % (FLUSH) 0.9 %
10 SYRINGE (ML) INJECTION AS NEEDED
Status: DISCONTINUED | OUTPATIENT
Start: 2021-01-01 | End: 2021-01-01 | Stop reason: HOSPADM

## 2021-01-01 RX ORDER — PAROXETINE HYDROCHLORIDE 20 MG/1
20 TABLET, FILM COATED ORAL NIGHTLY
Status: DISCONTINUED | OUTPATIENT
Start: 2021-01-01 | End: 2021-01-01 | Stop reason: HOSPADM

## 2021-01-01 RX ORDER — PROPOFOL 10 MG/ML
VIAL (ML) INTRAVENOUS AS NEEDED
Status: DISCONTINUED | OUTPATIENT
Start: 2021-01-01 | End: 2021-01-01 | Stop reason: SURG

## 2021-01-01 RX ORDER — NYSTATIN 100000 [USP'U]/G
POWDER TOPICAL EVERY 12 HOURS SCHEDULED
Status: DISCONTINUED | OUTPATIENT
Start: 2021-01-01 | End: 2021-01-01 | Stop reason: HOSPADM

## 2021-01-01 RX ORDER — DIGOXIN 0.25 MG/ML
250 INJECTION INTRAMUSCULAR; INTRAVENOUS ONCE
Status: COMPLETED | OUTPATIENT
Start: 2021-01-01 | End: 2021-01-01

## 2021-01-01 RX ORDER — SODIUM CHLORIDE 9 MG/ML
125 INJECTION, SOLUTION INTRAVENOUS CONTINUOUS
Status: DISCONTINUED | OUTPATIENT
Start: 2021-01-01 | End: 2021-01-01

## 2021-01-01 RX ORDER — AMIODARONE HYDROCHLORIDE 200 MG/1
200 TABLET ORAL EVERY 12 HOURS SCHEDULED
Status: DISCONTINUED | OUTPATIENT
Start: 2021-01-01 | End: 2021-01-01 | Stop reason: HOSPADM

## 2021-01-01 RX ORDER — ASPIRIN 81 MG/1
81 TABLET ORAL DAILY
Status: DISCONTINUED | OUTPATIENT
Start: 2021-01-01 | End: 2021-01-01 | Stop reason: HOSPADM

## 2021-01-01 RX ORDER — TRAMADOL HYDROCHLORIDE 50 MG/1
50 TABLET ORAL EVERY 6 HOURS PRN
Qty: 12 TABLET | Refills: 0 | Status: SHIPPED | OUTPATIENT
Start: 2021-01-01 | End: 2021-01-01 | Stop reason: HOSPADM

## 2021-01-01 RX ORDER — AMIODARONE HYDROCHLORIDE 200 MG/1
400 TABLET ORAL EVERY 12 HOURS SCHEDULED
Status: DISCONTINUED | OUTPATIENT
Start: 2021-01-01 | End: 2021-01-01 | Stop reason: HOSPADM

## 2021-01-01 RX ORDER — DEXTROSE MONOHYDRATE 25 G/50ML
25 INJECTION, SOLUTION INTRAVENOUS
Status: DISCONTINUED | OUTPATIENT
Start: 2021-01-01 | End: 2021-01-01 | Stop reason: SDUPTHER

## 2021-01-01 RX ORDER — SODIUM CHLORIDE 0.9 % (FLUSH) 0.9 %
10 SYRINGE (ML) INJECTION EVERY 12 HOURS SCHEDULED
Status: DISCONTINUED | OUTPATIENT
Start: 2021-01-01 | End: 2021-01-01

## 2021-01-01 RX ORDER — ATORVASTATIN CALCIUM 20 MG/1
20 TABLET, FILM COATED ORAL NIGHTLY
Status: DISCONTINUED | OUTPATIENT
Start: 2021-01-01 | End: 2021-01-01 | Stop reason: HOSPADM

## 2021-01-01 RX ORDER — TRAMADOL HYDROCHLORIDE 50 MG/1
50 TABLET ORAL EVERY 6 HOURS PRN
Status: ON HOLD | COMMUNITY
End: 2021-01-01 | Stop reason: SDUPTHER

## 2021-01-01 RX ORDER — HEPARIN SODIUM 5000 [USP'U]/ML
30 INJECTION, SOLUTION INTRAVENOUS; SUBCUTANEOUS AS NEEDED
Status: DISCONTINUED | OUTPATIENT
Start: 2021-01-01 | End: 2021-01-01

## 2021-01-01 RX ORDER — IPRATROPIUM BROMIDE AND ALBUTEROL SULFATE 2.5; .5 MG/3ML; MG/3ML
3 SOLUTION RESPIRATORY (INHALATION) EVERY 6 HOURS PRN
Status: DISCONTINUED | OUTPATIENT
Start: 2021-01-01 | End: 2021-01-01 | Stop reason: HOSPADM

## 2021-01-01 RX ORDER — FUROSEMIDE 20 MG/1
20 TABLET ORAL 2 TIMES DAILY
COMMUNITY

## 2021-01-01 RX ORDER — AMIODARONE HYDROCHLORIDE 400 MG/1
400 TABLET ORAL EVERY 12 HOURS SCHEDULED
Qty: 2 TABLET | Refills: 0 | Status: SHIPPED | OUTPATIENT
Start: 2021-01-01 | End: 2021-01-01

## 2021-01-01 RX ORDER — ONDANSETRON 2 MG/ML
4 INJECTION INTRAMUSCULAR; INTRAVENOUS ONCE AS NEEDED
Status: DISCONTINUED | OUTPATIENT
Start: 2021-01-01 | End: 2021-01-01 | Stop reason: HOSPADM

## 2021-01-01 RX ORDER — LEVOTHYROXINE SODIUM 0.03 MG/1
25 TABLET ORAL DAILY
Status: DISCONTINUED | OUTPATIENT
Start: 2021-01-01 | End: 2021-01-01 | Stop reason: HOSPADM

## 2021-01-01 RX ORDER — POTASSIUM CHLORIDE 1.5 G/1.77G
40 POWDER, FOR SOLUTION ORAL AS NEEDED
Status: DISCONTINUED | OUTPATIENT
Start: 2021-01-01 | End: 2021-01-01 | Stop reason: HOSPADM

## 2021-01-01 RX ORDER — OXYCODONE AND ACETAMINOPHEN 10; 325 MG/1; MG/1
1 TABLET ORAL EVERY 4 HOURS PRN
Qty: 18 TABLET | Refills: 0 | Status: ON HOLD | OUTPATIENT
Start: 2021-01-01 | End: 2022-01-01 | Stop reason: SDUPTHER

## 2021-01-01 RX ORDER — MORPHINE SULFATE 2 MG/ML
1 INJECTION, SOLUTION INTRAMUSCULAR; INTRAVENOUS EVERY 4 HOURS PRN
Status: DISPENSED | OUTPATIENT
Start: 2021-01-01 | End: 2021-01-01

## 2021-01-01 RX ORDER — FENTANYL CITRATE 50 UG/ML
INJECTION, SOLUTION INTRAMUSCULAR; INTRAVENOUS AS NEEDED
Status: DISCONTINUED | OUTPATIENT
Start: 2021-01-01 | End: 2021-01-01 | Stop reason: SURG

## 2021-01-01 RX ORDER — HYDROXYZINE HYDROCHLORIDE 25 MG/1
25 TABLET, FILM COATED ORAL NIGHTLY PRN
Status: DISCONTINUED | OUTPATIENT
Start: 2021-01-01 | End: 2021-01-01 | Stop reason: HOSPADM

## 2021-01-01 RX ORDER — SODIUM CHLORIDE 9 MG/ML
250 INJECTION, SOLUTION INTRAVENOUS CONTINUOUS PRN
Status: DISCONTINUED | OUTPATIENT
Start: 2021-01-01 | End: 2021-01-01

## 2021-01-01 RX ORDER — HYDROCODONE BITARTRATE AND ACETAMINOPHEN 5; 325 MG/1; MG/1
1 TABLET ORAL EVERY 4 HOURS PRN
Status: DISCONTINUED | OUTPATIENT
Start: 2021-01-01 | End: 2021-01-01 | Stop reason: HOSPADM

## 2021-01-01 RX ORDER — HEPARIN SODIUM 5000 [USP'U]/ML
5000 INJECTION, SOLUTION INTRAVENOUS; SUBCUTANEOUS EVERY 8 HOURS SCHEDULED
Status: DISCONTINUED | OUTPATIENT
Start: 2021-01-01 | End: 2021-01-01

## 2021-01-01 RX ORDER — INSULIN GLARGINE 300 U/ML
26 INJECTION, SOLUTION SUBCUTANEOUS NIGHTLY
Start: 2021-01-01

## 2021-01-01 RX ORDER — ACETAMINOPHEN 650 MG/1
650 SUPPOSITORY RECTAL EVERY 4 HOURS PRN
Status: DISCONTINUED | OUTPATIENT
Start: 2021-01-01 | End: 2021-01-01 | Stop reason: HOSPADM

## 2021-01-01 RX ORDER — HEPARIN SODIUM 5000 [USP'U]/ML
5000 INJECTION, SOLUTION INTRAVENOUS; SUBCUTANEOUS EVERY 12 HOURS SCHEDULED
Status: DISCONTINUED | OUTPATIENT
Start: 2021-01-01 | End: 2021-01-01 | Stop reason: HOSPADM

## 2021-01-01 RX ORDER — ALBUTEROL SULFATE 2.5 MG/3ML
3 SOLUTION RESPIRATORY (INHALATION)
Status: ON HOLD | COMMUNITY
Start: 2021-01-01 | End: 2021-01-01 | Stop reason: SDUPTHER

## 2021-01-01 RX ORDER — NICOTINE POLACRILEX 4 MG
15 LOZENGE BUCCAL
Status: DISCONTINUED | OUTPATIENT
Start: 2021-01-01 | End: 2021-01-01

## 2021-01-01 RX ORDER — DEXTROSE AND SODIUM CHLORIDE 5; .45 G/100ML; G/100ML
150 INJECTION, SOLUTION INTRAVENOUS CONTINUOUS PRN
Status: DISCONTINUED | OUTPATIENT
Start: 2021-01-01 | End: 2021-01-01

## 2021-01-01 RX ORDER — HEPARIN SODIUM 10000 [USP'U]/100ML
17.2 INJECTION, SOLUTION INTRAVENOUS
Status: DISCONTINUED | OUTPATIENT
Start: 2021-01-01 | End: 2021-01-01

## 2021-01-01 RX ORDER — MAGNESIUM SULFATE HEPTAHYDRATE 40 MG/ML
4 INJECTION, SOLUTION INTRAVENOUS AS NEEDED
Status: DISCONTINUED | OUTPATIENT
Start: 2021-01-01 | End: 2021-01-01 | Stop reason: HOSPADM

## 2021-01-01 RX ORDER — DONEPEZIL HYDROCHLORIDE 5 MG/1
2.5 TABLET, FILM COATED ORAL NIGHTLY
Status: DISCONTINUED | OUTPATIENT
Start: 2021-01-01 | End: 2021-01-01 | Stop reason: HOSPADM

## 2021-01-01 RX ORDER — ONDANSETRON 2 MG/ML
4 INJECTION INTRAMUSCULAR; INTRAVENOUS EVERY 6 HOURS PRN
Status: DISCONTINUED | OUTPATIENT
Start: 2021-01-01 | End: 2021-01-01 | Stop reason: HOSPADM

## 2021-01-01 RX ORDER — ACETAMINOPHEN 650 MG
1 TABLET, EXTENDED RELEASE ORAL 2 TIMES DAILY
COMMUNITY

## 2021-01-01 RX ORDER — DEXTROSE, SODIUM CHLORIDE, AND POTASSIUM CHLORIDE 5; .45; .15 G/100ML; G/100ML; G/100ML
150 INJECTION INTRAVENOUS CONTINUOUS PRN
Status: DISCONTINUED | OUTPATIENT
Start: 2021-01-01 | End: 2021-01-01

## 2021-01-01 RX ORDER — NICOTINE POLACRILEX 4 MG
15 LOZENGE BUCCAL
Status: DISCONTINUED | OUTPATIENT
Start: 2021-01-01 | End: 2021-01-01 | Stop reason: HOSPADM

## 2021-01-01 RX ORDER — SODIUM CHLORIDE 0.9 % (FLUSH) 0.9 %
30 SYRINGE (ML) INJECTION ONCE AS NEEDED
Status: DISCONTINUED | OUTPATIENT
Start: 2021-01-01 | End: 2021-01-01 | Stop reason: HOSPADM

## 2021-01-01 RX ORDER — TRAMADOL HYDROCHLORIDE 50 MG/1
50 TABLET ORAL EVERY 6 HOURS PRN
Status: DISCONTINUED | OUTPATIENT
Start: 2021-01-01 | End: 2021-01-01 | Stop reason: HOSPADM

## 2021-01-01 RX ORDER — SODIUM CHLORIDE AND POTASSIUM CHLORIDE 150; 450 MG/100ML; MG/100ML
250 INJECTION, SOLUTION INTRAVENOUS CONTINUOUS PRN
Status: DISCONTINUED | OUTPATIENT
Start: 2021-01-01 | End: 2021-01-01

## 2021-01-01 RX ORDER — MENTHOL 40 MG/ML
GEL TOPICAL
COMMUNITY

## 2021-01-01 RX ORDER — MULTIPLE VITAMINS W/ MINERALS TAB 9MG-400MCG
1 TAB ORAL DAILY
Status: DISCONTINUED | OUTPATIENT
Start: 2021-01-01 | End: 2021-01-01 | Stop reason: HOSPADM

## 2021-01-01 RX ORDER — HEPARIN SODIUM 5000 [USP'U]/ML
4000 INJECTION, SOLUTION INTRAVENOUS; SUBCUTANEOUS AS NEEDED
Status: DISCONTINUED | OUTPATIENT
Start: 2021-01-01 | End: 2021-01-01

## 2021-01-01 RX ORDER — FLUCONAZOLE 150 MG/1
150 TABLET ORAL ONCE
Status: COMPLETED | OUTPATIENT
Start: 2021-01-01 | End: 2021-01-01

## 2021-01-01 RX ORDER — ONDANSETRON 2 MG/ML
4 INJECTION INTRAMUSCULAR; INTRAVENOUS ONCE
Status: COMPLETED | OUTPATIENT
Start: 2021-01-01 | End: 2021-01-01

## 2021-01-01 RX ORDER — POTASSIUM CHLORIDE 7.45 MG/ML
10 INJECTION INTRAVENOUS
Status: DISCONTINUED | OUTPATIENT
Start: 2021-01-01 | End: 2021-01-01 | Stop reason: HOSPADM

## 2021-01-01 RX ORDER — MAGNESIUM SULFATE HEPTAHYDRATE 40 MG/ML
2 INJECTION, SOLUTION INTRAVENOUS AS NEEDED
Status: DISCONTINUED | OUTPATIENT
Start: 2021-01-01 | End: 2021-01-01 | Stop reason: HOSPADM

## 2021-01-01 RX ORDER — ASPIRIN 81 MG/1
324 TABLET, CHEWABLE ORAL ONCE
Status: DISCONTINUED | OUTPATIENT
Start: 2021-01-01 | End: 2021-01-01 | Stop reason: HOSPADM

## 2021-01-01 RX ORDER — SODIUM CHLORIDE 9 MG/ML
30 INJECTION, SOLUTION INTRAVENOUS CONTINUOUS PRN
Status: DISCONTINUED | OUTPATIENT
Start: 2021-01-01 | End: 2021-01-01

## 2021-01-01 RX ORDER — MORPHINE SULFATE 2 MG/ML
2 INJECTION, SOLUTION INTRAMUSCULAR; INTRAVENOUS EVERY 4 HOURS PRN
Status: DISCONTINUED | OUTPATIENT
Start: 2021-01-01 | End: 2021-01-01 | Stop reason: HOSPADM

## 2021-01-01 RX ORDER — FUROSEMIDE 10 MG/ML
40 INJECTION INTRAMUSCULAR; INTRAVENOUS DAILY
Status: DISCONTINUED | OUTPATIENT
Start: 2021-01-01 | End: 2021-01-01

## 2021-01-01 RX ORDER — SODIUM CHLORIDE AND POTASSIUM CHLORIDE 300; 900 MG/100ML; MG/100ML
250 INJECTION, SOLUTION INTRAVENOUS CONTINUOUS PRN
Status: DISCONTINUED | OUTPATIENT
Start: 2021-01-01 | End: 2021-01-01

## 2021-01-01 RX ORDER — ACETAMINOPHEN 160 MG/5ML
650 SOLUTION ORAL EVERY 4 HOURS PRN
Status: DISCONTINUED | OUTPATIENT
Start: 2021-01-01 | End: 2021-01-01 | Stop reason: HOSPADM

## 2021-01-01 RX ORDER — CLINDAMYCIN PHOSPHATE 600 MG/50ML
600 INJECTION INTRAVENOUS ONCE
Status: COMPLETED | OUTPATIENT
Start: 2021-01-01 | End: 2021-01-01

## 2021-01-01 RX ORDER — CHLORTHALIDONE 25 MG/1
25 TABLET ORAL DAILY
Qty: 30 TABLET | Refills: 0 | Status: ON HOLD | OUTPATIENT
Start: 2021-01-01 | End: 2022-01-01

## 2021-01-01 RX ORDER — OXYCODONE AND ACETAMINOPHEN 10; 325 MG/1; MG/1
1 TABLET ORAL
Status: ON HOLD | COMMUNITY
End: 2021-01-01 | Stop reason: SDUPTHER

## 2021-01-01 RX ORDER — DEXTROSE, SODIUM CHLORIDE, AND POTASSIUM CHLORIDE 5; .9; .15 G/100ML; G/100ML; G/100ML
150 INJECTION INTRAVENOUS CONTINUOUS PRN
Status: DISCONTINUED | OUTPATIENT
Start: 2021-01-01 | End: 2021-01-01

## 2021-01-01 RX ORDER — ALBUTEROL SULFATE 2.5 MG/3ML
2.5 SOLUTION RESPIRATORY (INHALATION) EVERY 4 HOURS PRN
Refills: 12
Start: 2021-01-01

## 2021-01-01 RX ORDER — SODIUM CHLORIDE 450 MG/100ML
250 INJECTION, SOLUTION INTRAVENOUS CONTINUOUS PRN
Status: DISCONTINUED | OUTPATIENT
Start: 2021-01-01 | End: 2021-01-01

## 2021-01-01 RX ORDER — DEXTROSE, SODIUM CHLORIDE, AND POTASSIUM CHLORIDE 5; .45; .3 G/100ML; G/100ML; G/100ML
150 INJECTION INTRAVENOUS CONTINUOUS PRN
Status: DISCONTINUED | OUTPATIENT
Start: 2021-01-01 | End: 2021-01-01

## 2021-01-01 RX ORDER — DEXTROSE MONOHYDRATE 25 G/50ML
12.5 INJECTION, SOLUTION INTRAVENOUS AS NEEDED
Status: DISCONTINUED | OUTPATIENT
Start: 2021-01-01 | End: 2021-01-01 | Stop reason: HOSPADM

## 2021-01-01 RX ORDER — DONEPEZIL HYDROCHLORIDE 5 MG/1
2.5 TABLET, FILM COATED ORAL NIGHTLY
COMMUNITY

## 2021-01-01 RX ORDER — LEVOTHYROXINE SODIUM 0.03 MG/1
25 TABLET ORAL EVERY MORNING
Status: DISCONTINUED | OUTPATIENT
Start: 2021-01-01 | End: 2021-01-01 | Stop reason: HOSPADM

## 2021-01-01 RX ORDER — SODIUM CHLORIDE 9 MG/ML
10 INJECTION, SOLUTION INTRAVENOUS CONTINUOUS PRN
Status: DISCONTINUED | OUTPATIENT
Start: 2021-01-01 | End: 2021-01-01 | Stop reason: HOSPADM

## 2021-01-01 RX ORDER — DEXTROSE MONOHYDRATE 25 G/50ML
25 INJECTION, SOLUTION INTRAVENOUS
Status: DISCONTINUED | OUTPATIENT
Start: 2021-01-01 | End: 2021-01-01

## 2021-01-01 RX ORDER — SODIUM CHLORIDE AND POTASSIUM CHLORIDE 150; 900 MG/100ML; MG/100ML
250 INJECTION, SOLUTION INTRAVENOUS CONTINUOUS PRN
Status: DISCONTINUED | OUTPATIENT
Start: 2021-01-01 | End: 2021-01-01

## 2021-01-01 RX ORDER — SODIUM CHLORIDE 9 MG/ML
100 INJECTION, SOLUTION INTRAVENOUS CONTINUOUS
Status: DISCONTINUED | OUTPATIENT
Start: 2021-01-01 | End: 2021-01-01

## 2021-01-01 RX ORDER — POTASSIUM CHLORIDE 750 MG/1
40 CAPSULE, EXTENDED RELEASE ORAL AS NEEDED
Status: DISCONTINUED | OUTPATIENT
Start: 2021-01-01 | End: 2021-01-01 | Stop reason: HOSPADM

## 2021-01-01 RX ORDER — HYDROXYZINE HYDROCHLORIDE 25 MG/1
25 TABLET, FILM COATED ORAL
COMMUNITY
Start: 2021-01-01

## 2021-01-01 RX ORDER — ACETAMINOPHEN 325 MG/1
650 TABLET ORAL EVERY 4 HOURS PRN
Status: DISCONTINUED | OUTPATIENT
Start: 2021-01-01 | End: 2021-01-01 | Stop reason: HOSPADM

## 2021-01-01 RX ORDER — SODIUM CHLORIDE 0.9 % (FLUSH) 0.9 %
10 SYRINGE (ML) INJECTION ONCE AS NEEDED
Status: COMPLETED | OUTPATIENT
Start: 2021-01-01 | End: 2021-01-01

## 2021-01-01 RX ORDER — LANOLIN ALCOHOL/MO/W.PET/CERES
5 CREAM (GRAM) TOPICAL NIGHTLY
Status: DISCONTINUED | OUTPATIENT
Start: 2021-01-01 | End: 2021-01-01 | Stop reason: HOSPADM

## 2021-01-01 RX ORDER — DILTIAZEM HCL IN NACL,ISO-OSM 125 MG/125
5 PLASTIC BAG, INJECTION (ML) INTRAVENOUS CONTINUOUS
Status: DISCONTINUED | OUTPATIENT
Start: 2021-01-01 | End: 2021-01-01

## 2021-01-01 RX ORDER — FUROSEMIDE 10 MG/ML
40 INJECTION INTRAMUSCULAR; INTRAVENOUS ONCE
Status: COMPLETED | OUTPATIENT
Start: 2021-01-01 | End: 2021-01-01

## 2021-01-01 RX ORDER — DEXTROSE AND SODIUM CHLORIDE 5; .9 G/100ML; G/100ML
150 INJECTION, SOLUTION INTRAVENOUS CONTINUOUS PRN
Status: DISCONTINUED | OUTPATIENT
Start: 2021-01-01 | End: 2021-01-01

## 2021-01-01 RX ORDER — NITROGLYCERIN 0.4 MG/1
0.4 TABLET SUBLINGUAL
Status: DISCONTINUED | OUTPATIENT
Start: 2021-01-01 | End: 2021-01-01 | Stop reason: HOSPADM

## 2021-01-01 RX ORDER — DILTIAZEM HYDROCHLORIDE 5 MG/ML
10 INJECTION INTRAVENOUS ONCE
Status: COMPLETED | OUTPATIENT
Start: 2021-01-01 | End: 2021-01-01

## 2021-01-01 RX ORDER — CEFTRIAXONE 1 G/1
1 INJECTION, POWDER, FOR SOLUTION INTRAMUSCULAR; INTRAVENOUS EVERY 24 HOURS
Qty: 3 G | Refills: 0 | Status: SHIPPED | OUTPATIENT
Start: 2021-01-01 | End: 2021-01-01

## 2021-01-01 RX ORDER — HEPARIN SODIUM 5000 [USP'U]/ML
4000 INJECTION, SOLUTION INTRAVENOUS; SUBCUTANEOUS ONCE
Status: DISCONTINUED | OUTPATIENT
Start: 2021-01-01 | End: 2021-01-01

## 2021-01-01 RX ORDER — SODIUM CHLORIDE, SODIUM LACTATE, POTASSIUM CHLORIDE, CALCIUM CHLORIDE 600; 310; 30; 20 MG/100ML; MG/100ML; MG/100ML; MG/100ML
100 INJECTION, SOLUTION INTRAVENOUS CONTINUOUS
Status: DISCONTINUED | OUTPATIENT
Start: 2021-01-01 | End: 2021-01-01

## 2021-01-01 RX ORDER — NYSTATIN 100000 [USP'U]/G
POWDER TOPICAL DAILY
Status: DISCONTINUED | OUTPATIENT
Start: 2021-01-01 | End: 2021-01-01 | Stop reason: HOSPADM

## 2021-01-01 RX ORDER — HYDRALAZINE HYDROCHLORIDE 20 MG/ML
10 INJECTION INTRAMUSCULAR; INTRAVENOUS EVERY 6 HOURS PRN
Status: DISCONTINUED | OUTPATIENT
Start: 2021-01-01 | End: 2021-01-01 | Stop reason: HOSPADM

## 2021-01-01 RX ORDER — DIAZEPAM 2 MG/1
2 TABLET ORAL 2 TIMES DAILY
Status: DISCONTINUED | OUTPATIENT
Start: 2021-01-01 | End: 2021-01-01 | Stop reason: HOSPADM

## 2021-01-01 RX ORDER — SODIUM CHLORIDE 0.9 % (FLUSH) 0.9 %
3 SYRINGE (ML) INJECTION EVERY 12 HOURS SCHEDULED
Status: DISCONTINUED | OUTPATIENT
Start: 2021-01-01 | End: 2021-01-01 | Stop reason: HOSPADM

## 2021-01-01 RX ORDER — LIDOCAINE 50 MG/G
1 PATCH TOPICAL EVERY 24 HOURS
Qty: 30 EACH | Refills: 0 | Status: SHIPPED | OUTPATIENT
Start: 2021-01-01

## 2021-01-01 RX ORDER — OLANZAPINE 5 MG/1
5 TABLET ORAL NIGHTLY
COMMUNITY

## 2021-01-01 RX ORDER — OLANZAPINE 2.5 MG/1
5 TABLET ORAL NIGHTLY
Status: DISCONTINUED | OUTPATIENT
Start: 2021-01-01 | End: 2021-01-01 | Stop reason: HOSPADM

## 2021-01-01 RX ORDER — SODIUM CHLORIDE 9 MG/ML
50 INJECTION, SOLUTION INTRAVENOUS CONTINUOUS
Status: DISCONTINUED | OUTPATIENT
Start: 2021-01-01 | End: 2021-01-01

## 2021-01-01 RX ORDER — AMIODARONE HYDROCHLORIDE 200 MG/1
200 TABLET ORAL EVERY 12 HOURS SCHEDULED
Qty: 60 TABLET | Refills: 0 | Status: SHIPPED | OUTPATIENT
Start: 2021-01-01

## 2021-01-01 RX ADMIN — INSULIN ASPART 7 UNITS: 100 INJECTION, SOLUTION INTRAVENOUS; SUBCUTANEOUS at 07:18

## 2021-01-01 RX ADMIN — SODIUM CHLORIDE 125 ML/HR: 900 INJECTION, SOLUTION INTRAVENOUS at 05:31

## 2021-01-01 RX ADMIN — INSULIN ASPART 2 UNITS: 100 INJECTION, SOLUTION INTRAVENOUS; SUBCUTANEOUS at 11:04

## 2021-01-01 RX ADMIN — ONDANSETRON 4 MG: 2 INJECTION INTRAMUSCULAR; INTRAVENOUS at 21:23

## 2021-01-01 RX ADMIN — ASPIRIN 81 MG: 81 TABLET, FILM COATED ORAL at 12:48

## 2021-01-01 RX ADMIN — DEXTROSE MONOHYDRATE 12.5 G: 500 INJECTION PARENTERAL at 21:04

## 2021-01-01 RX ADMIN — DILTIAZEM HYDROCHLORIDE 30 MG: 30 TABLET ORAL at 17:35

## 2021-01-01 RX ADMIN — DILTIAZEM HYDROCHLORIDE 30 MG: 30 TABLET ORAL at 06:10

## 2021-01-01 RX ADMIN — ISOSORBIDE MONONITRATE 30 MG: 30 TABLET, EXTENDED RELEASE ORAL at 08:49

## 2021-01-01 RX ADMIN — ONDANSETRON 4 MG: 2 INJECTION INTRAMUSCULAR; INTRAVENOUS at 14:46

## 2021-01-01 RX ADMIN — CARVEDILOL 12.5 MG: 12.5 TABLET, FILM COATED ORAL at 08:03

## 2021-01-01 RX ADMIN — POTASSIUM CHLORIDE 10 MEQ: 7.46 INJECTION, SOLUTION INTRAVENOUS at 14:09

## 2021-01-01 RX ADMIN — MAGNESIUM SULFATE HEPTAHYDRATE 2 G: 40 INJECTION, SOLUTION INTRAVENOUS at 08:35

## 2021-01-01 RX ADMIN — NYSTATIN: 100000 POWDER TOPICAL at 20:53

## 2021-01-01 RX ADMIN — CARVEDILOL 12.5 MG: 12.5 TABLET, FILM COATED ORAL at 12:49

## 2021-01-01 RX ADMIN — DILTIAZEM HYDROCHLORIDE 30 MG: 30 TABLET ORAL at 00:06

## 2021-01-01 RX ADMIN — MORPHINE SULFATE 1 MG: 2 INJECTION, SOLUTION INTRAMUSCULAR; INTRAVENOUS at 14:13

## 2021-01-01 RX ADMIN — INSULIN ASPART 6 UNITS: 100 INJECTION, SOLUTION INTRAVENOUS; SUBCUTANEOUS at 17:33

## 2021-01-01 RX ADMIN — SODIUM CHLORIDE, PRESERVATIVE FREE 10 ML: 5 INJECTION INTRAVENOUS at 08:54

## 2021-01-01 RX ADMIN — SACUBITRIL AND VALSARTAN 1 TABLET: 24; 26 TABLET, FILM COATED ORAL at 20:55

## 2021-01-01 RX ADMIN — DIAZEPAM 2 MG: 2 TABLET ORAL at 08:03

## 2021-01-01 RX ADMIN — ONDANSETRON 4 MG: 2 INJECTION INTRAMUSCULAR; INTRAVENOUS at 10:28

## 2021-01-01 RX ADMIN — ISOSORBIDE MONONITRATE 30 MG: 30 TABLET, EXTENDED RELEASE ORAL at 11:29

## 2021-01-01 RX ADMIN — DILTIAZEM HYDROCHLORIDE 30 MG: 30 TABLET ORAL at 12:18

## 2021-01-01 RX ADMIN — ONDANSETRON 4 MG: 2 INJECTION INTRAMUSCULAR; INTRAVENOUS at 21:02

## 2021-01-01 RX ADMIN — VANCOMYCIN HYDROCHLORIDE 1000 MG: 1 INJECTION, POWDER, LYOPHILIZED, FOR SOLUTION INTRAVENOUS at 01:18

## 2021-01-01 RX ADMIN — ONDANSETRON 4 MG: 2 INJECTION INTRAMUSCULAR; INTRAVENOUS at 17:17

## 2021-01-01 RX ADMIN — PAROXETINE HYDROCHLORIDE 20 MG: 20 TABLET, FILM COATED ORAL at 21:06

## 2021-01-01 RX ADMIN — PIPERACILLIN SODIUM AND TAZOBACTAM SODIUM 3.38 G: 3; .375 INJECTION, POWDER, LYOPHILIZED, FOR SOLUTION INTRAVENOUS at 05:08

## 2021-01-01 RX ADMIN — ASPIRIN 81 MG: 81 TABLET, FILM COATED ORAL at 09:38

## 2021-01-01 RX ADMIN — INSULIN DETEMIR 15 UNITS: 100 INJECTION, SOLUTION SUBCUTANEOUS at 21:17

## 2021-01-01 RX ADMIN — ENOXAPARIN SODIUM 90 MG: 100 INJECTION SUBCUTANEOUS at 16:02

## 2021-01-01 RX ADMIN — VANCOMYCIN HYDROCHLORIDE 750 MG: 100 INJECTION, POWDER, LYOPHILIZED, FOR SOLUTION INTRAVENOUS at 15:25

## 2021-01-01 RX ADMIN — CEFTRIAXONE SODIUM 2 G: 2 INJECTION, POWDER, FOR SOLUTION INTRAMUSCULAR; INTRAVENOUS at 15:46

## 2021-01-01 RX ADMIN — CARVEDILOL 12.5 MG: 12.5 TABLET, FILM COATED ORAL at 20:11

## 2021-01-01 RX ADMIN — CARVEDILOL 12.5 MG: 12.5 TABLET, FILM COATED ORAL at 21:05

## 2021-01-01 RX ADMIN — INSULIN ASPART 8 UNITS: 100 INJECTION, SOLUTION INTRAVENOUS; SUBCUTANEOUS at 09:37

## 2021-01-01 RX ADMIN — ENOXAPARIN SODIUM 90 MG: 100 INJECTION SUBCUTANEOUS at 14:47

## 2021-01-01 RX ADMIN — SODIUM CHLORIDE, PRESERVATIVE FREE 10 ML: 5 INJECTION INTRAVENOUS at 20:52

## 2021-01-01 RX ADMIN — PIPERACILLIN SODIUM AND TAZOBACTAM SODIUM 3.38 G: 3; .375 INJECTION, POWDER, LYOPHILIZED, FOR SOLUTION INTRAVENOUS at 19:33

## 2021-01-01 RX ADMIN — VANCOMYCIN HYDROCHLORIDE 1750 MG: 100 INJECTION, POWDER, LYOPHILIZED, FOR SOLUTION INTRAVENOUS at 11:00

## 2021-01-01 RX ADMIN — CARVEDILOL 12.5 MG: 12.5 TABLET, FILM COATED ORAL at 08:05

## 2021-01-01 RX ADMIN — DIAZEPAM 2 MG: 2 TABLET ORAL at 21:01

## 2021-01-01 RX ADMIN — PIPERACILLIN SODIUM AND TAZOBACTAM SODIUM 3.38 G: 3; .375 INJECTION, POWDER, LYOPHILIZED, FOR SOLUTION INTRAVENOUS at 14:25

## 2021-01-01 RX ADMIN — PAROXETINE HYDROCHLORIDE 20 MG: 20 TABLET, FILM COATED ORAL at 20:11

## 2021-01-01 RX ADMIN — VANCOMYCIN HYDROCHLORIDE 750 MG: 100 INJECTION, POWDER, LYOPHILIZED, FOR SOLUTION INTRAVENOUS at 16:13

## 2021-01-01 RX ADMIN — TRAMADOL HYDROCHLORIDE 50 MG: 50 TABLET, COATED ORAL at 16:10

## 2021-01-01 RX ADMIN — DONEPEZIL HYDROCHLORIDE 2.5 MG: 5 TABLET, FILM COATED ORAL at 21:02

## 2021-01-01 RX ADMIN — INSULIN DETEMIR 15 UNITS: 100 INJECTION, SOLUTION SUBCUTANEOUS at 20:47

## 2021-01-01 RX ADMIN — HYDROCODONE BITARTRATE AND ACETAMINOPHEN 1 TABLET: 5; 325 TABLET ORAL at 08:02

## 2021-01-01 RX ADMIN — CARVEDILOL 12.5 MG: 12.5 TABLET, FILM COATED ORAL at 22:37

## 2021-01-01 RX ADMIN — PAROXETINE HYDROCHLORIDE 20 MG: 20 TABLET, FILM COATED ORAL at 20:48

## 2021-01-01 RX ADMIN — ISOSORBIDE MONONITRATE 30 MG: 30 TABLET, EXTENDED RELEASE ORAL at 09:23

## 2021-01-01 RX ADMIN — POTASSIUM CHLORIDE 10 MEQ: 7.46 INJECTION, SOLUTION INTRAVENOUS at 11:20

## 2021-01-01 RX ADMIN — SACUBITRIL AND VALSARTAN 1 TABLET: 24; 26 TABLET, FILM COATED ORAL at 08:40

## 2021-01-01 RX ADMIN — DILTIAZEM HYDROCHLORIDE 30 MG: 30 TABLET ORAL at 00:29

## 2021-01-01 RX ADMIN — DILTIAZEM HYDROCHLORIDE 30 MG: 30 TABLET ORAL at 01:17

## 2021-01-01 RX ADMIN — ATORVASTATIN CALCIUM 20 MG: 20 TABLET, FILM COATED ORAL at 21:08

## 2021-01-01 RX ADMIN — DIAZEPAM 2 MG: 2 TABLET ORAL at 08:09

## 2021-01-01 RX ADMIN — POTASSIUM CHLORIDE, DEXTROSE MONOHYDRATE AND SODIUM CHLORIDE 150 ML/HR: 150; 5; 450 INJECTION, SOLUTION INTRAVENOUS at 16:11

## 2021-01-01 RX ADMIN — INSULIN DETEMIR 15 UNITS: 100 INJECTION, SOLUTION SUBCUTANEOUS at 20:39

## 2021-01-01 RX ADMIN — PIPERACILLIN SODIUM AND TAZOBACTAM SODIUM 3.38 G: 3; .375 INJECTION, POWDER, LYOPHILIZED, FOR SOLUTION INTRAVENOUS at 02:14

## 2021-01-01 RX ADMIN — ENOXAPARIN SODIUM 40 MG: 40 INJECTION SUBCUTANEOUS at 22:26

## 2021-01-01 RX ADMIN — METOPROLOL TARTRATE 5 MG: 5 INJECTION INTRAVENOUS at 23:19

## 2021-01-01 RX ADMIN — MAGNESIUM SULFATE HEPTAHYDRATE 2 G: 40 INJECTION, SOLUTION INTRAVENOUS at 06:42

## 2021-01-01 RX ADMIN — ENOXAPARIN SODIUM 40 MG: 40 INJECTION SUBCUTANEOUS at 20:10

## 2021-01-01 RX ADMIN — CHLORTHALIDONE 25 MG: 25 TABLET ORAL at 08:39

## 2021-01-01 RX ADMIN — ONDANSETRON 4 MG: 2 INJECTION INTRAMUSCULAR; INTRAVENOUS at 14:30

## 2021-01-01 RX ADMIN — SODIUM CHLORIDE 1000 ML: 900 INJECTION, SOLUTION INTRAVENOUS at 16:01

## 2021-01-01 RX ADMIN — METOPROLOL TARTRATE 5 MG: 5 INJECTION INTRAVENOUS at 00:22

## 2021-01-01 RX ADMIN — DILTIAZEM HYDROCHLORIDE 30 MG: 30 TABLET ORAL at 00:05

## 2021-01-01 RX ADMIN — DIAZEPAM 2 MG: 2 TABLET ORAL at 20:43

## 2021-01-01 RX ADMIN — INSULIN ASPART 15 UNITS: 100 INJECTION, SOLUTION INTRAVENOUS; SUBCUTANEOUS at 20:11

## 2021-01-01 RX ADMIN — CARVEDILOL 12.5 MG: 12.5 TABLET, FILM COATED ORAL at 21:17

## 2021-01-01 RX ADMIN — DILTIAZEM HYDROCHLORIDE 30 MG: 30 TABLET ORAL at 05:01

## 2021-01-01 RX ADMIN — VANCOMYCIN HYDROCHLORIDE 1000 MG: 1 INJECTION, POWDER, LYOPHILIZED, FOR SOLUTION INTRAVENOUS at 11:50

## 2021-01-01 RX ADMIN — ATORVASTATIN CALCIUM 20 MG: 20 TABLET, FILM COATED ORAL at 20:43

## 2021-01-01 RX ADMIN — PAROXETINE HYDROCHLORIDE 20 MG: 20 TABLET, FILM COATED ORAL at 20:01

## 2021-01-01 RX ADMIN — DILTIAZEM HYDROCHLORIDE 30 MG: 30 TABLET ORAL at 17:07

## 2021-01-01 RX ADMIN — NYSTATIN: 100000 POWDER TOPICAL at 20:01

## 2021-01-01 RX ADMIN — INSULIN ASPART 6 UNITS: 100 INJECTION, SOLUTION INTRAVENOUS; SUBCUTANEOUS at 11:49

## 2021-01-01 RX ADMIN — DIAZEPAM 2 MG: 2 TABLET ORAL at 20:00

## 2021-01-01 RX ADMIN — ISOSORBIDE MONONITRATE 30 MG: 30 TABLET, EXTENDED RELEASE ORAL at 08:21

## 2021-01-01 RX ADMIN — MORPHINE SULFATE 1 MG: 2 INJECTION, SOLUTION INTRAMUSCULAR; INTRAVENOUS at 17:14

## 2021-01-01 RX ADMIN — CARVEDILOL 12.5 MG: 12.5 TABLET, FILM COATED ORAL at 09:43

## 2021-01-01 RX ADMIN — DONEPEZIL HYDROCHLORIDE 2.5 MG: 5 TABLET, FILM COATED ORAL at 20:53

## 2021-01-01 RX ADMIN — METOPROLOL TARTRATE 5 MG: 5 INJECTION INTRAVENOUS at 23:55

## 2021-01-01 RX ADMIN — HEPARIN SODIUM 5000 UNITS: 5000 INJECTION, SOLUTION INTRAVENOUS; SUBCUTANEOUS at 05:57

## 2021-01-01 RX ADMIN — MORPHINE SULFATE 2 MG: 2 INJECTION, SOLUTION INTRAMUSCULAR; INTRAVENOUS at 22:36

## 2021-01-01 RX ADMIN — CARVEDILOL 12.5 MG: 12.5 TABLET, FILM COATED ORAL at 21:08

## 2021-01-01 RX ADMIN — PIPERACILLIN SODIUM AND TAZOBACTAM SODIUM 3.38 G: 3; .375 INJECTION, POWDER, LYOPHILIZED, FOR SOLUTION INTRAVENOUS at 14:00

## 2021-01-01 RX ADMIN — INSULIN DETEMIR 15 UNITS: 100 INJECTION, SOLUTION SUBCUTANEOUS at 21:08

## 2021-01-01 RX ADMIN — CARVEDILOL 12.5 MG: 12.5 TABLET, FILM COATED ORAL at 20:07

## 2021-01-01 RX ADMIN — ENOXAPARIN SODIUM 40 MG: 40 INJECTION SUBCUTANEOUS at 21:14

## 2021-01-01 RX ADMIN — SODIUM CHLORIDE 100 ML/HR: 900 INJECTION, SOLUTION INTRAVENOUS at 23:44

## 2021-01-01 RX ADMIN — INSULIN ASPART 14 UNITS: 100 INJECTION, SOLUTION INTRAVENOUS; SUBCUTANEOUS at 16:44

## 2021-01-01 RX ADMIN — CARVEDILOL 12.5 MG: 12.5 TABLET, FILM COATED ORAL at 20:36

## 2021-01-01 RX ADMIN — NITROGLYCERIN 1 PATCH: 0.2 PATCH TRANSDERMAL at 08:22

## 2021-01-01 RX ADMIN — SODIUM CHLORIDE, PRESERVATIVE FREE 10 ML: 5 INJECTION INTRAVENOUS at 08:39

## 2021-01-01 RX ADMIN — LEVOTHYROXINE SODIUM 25 MCG: 25 TABLET ORAL at 09:38

## 2021-01-01 RX ADMIN — TRAMADOL HYDROCHLORIDE 50 MG: 50 TABLET, COATED ORAL at 20:27

## 2021-01-01 RX ADMIN — INSULIN DETEMIR 10 UNITS: 100 INJECTION, SOLUTION SUBCUTANEOUS at 20:37

## 2021-01-01 RX ADMIN — SODIUM CHLORIDE 15 MG/HR: 900 INJECTION, SOLUTION INTRAVENOUS at 23:31

## 2021-01-01 RX ADMIN — MAGNESIUM SULFATE HEPTAHYDRATE 4 G: 40 INJECTION, SOLUTION INTRAVENOUS at 15:01

## 2021-01-01 RX ADMIN — ISOSORBIDE MONONITRATE 30 MG: 30 TABLET, EXTENDED RELEASE ORAL at 07:50

## 2021-01-01 RX ADMIN — DILTIAZEM HYDROCHLORIDE 30 MG: 30 TABLET ORAL at 00:28

## 2021-01-01 RX ADMIN — ENOXAPARIN SODIUM 40 MG: 40 INJECTION SUBCUTANEOUS at 21:18

## 2021-01-01 RX ADMIN — NITROGLYCERIN 1 PATCH: 0.2 PATCH TRANSDERMAL at 09:29

## 2021-01-01 RX ADMIN — PIPERACILLIN SODIUM AND TAZOBACTAM SODIUM 3.38 G: 3; .375 INJECTION, POWDER, LYOPHILIZED, FOR SOLUTION INTRAVENOUS at 10:46

## 2021-01-01 RX ADMIN — VANCOMYCIN HYDROCHLORIDE 750 MG: 100 INJECTION, POWDER, LYOPHILIZED, FOR SOLUTION INTRAVENOUS at 02:15

## 2021-01-01 RX ADMIN — LEVOTHYROXINE SODIUM 25 MCG: 25 TABLET ORAL at 09:01

## 2021-01-01 RX ADMIN — LEVOTHYROXINE SODIUM 25 MCG: 25 TABLET ORAL at 08:24

## 2021-01-01 RX ADMIN — SODIUM CHLORIDE, PRESERVATIVE FREE 10 ML: 5 INJECTION INTRAVENOUS at 22:37

## 2021-01-01 RX ADMIN — CARVEDILOL 12.5 MG: 12.5 TABLET, FILM COATED ORAL at 08:55

## 2021-01-01 RX ADMIN — LEVOTHYROXINE SODIUM 25 MCG: 25 TABLET ORAL at 08:22

## 2021-01-01 RX ADMIN — INSULIN ASPART 2 UNITS: 100 INJECTION, SOLUTION INTRAVENOUS; SUBCUTANEOUS at 08:17

## 2021-01-01 RX ADMIN — SODIUM CHLORIDE, PRESERVATIVE FREE 10 ML: 5 INJECTION INTRAVENOUS at 20:49

## 2021-01-01 RX ADMIN — SODIUM CHLORIDE 15 MG/HR: 900 INJECTION, SOLUTION INTRAVENOUS at 05:45

## 2021-01-01 RX ADMIN — DIAZEPAM 2 MG: 2 TABLET ORAL at 20:50

## 2021-01-01 RX ADMIN — SODIUM CHLORIDE, PRESERVATIVE FREE 3 ML: 5 INJECTION INTRAVENOUS at 20:50

## 2021-01-01 RX ADMIN — ONDANSETRON 4 MG: 2 INJECTION INTRAMUSCULAR; INTRAVENOUS at 21:20

## 2021-01-01 RX ADMIN — METOPROLOL TARTRATE 5 MG: 5 INJECTION INTRAVENOUS at 01:53

## 2021-01-01 RX ADMIN — ACETAMINOPHEN 650 MG: 325 TABLET ORAL at 09:28

## 2021-01-01 RX ADMIN — ASPIRIN 81 MG: 81 TABLET, FILM COATED ORAL at 11:30

## 2021-01-01 RX ADMIN — NITROGLYCERIN 1 PATCH: 0.2 PATCH TRANSDERMAL at 13:15

## 2021-01-01 RX ADMIN — INSULIN DETEMIR 15 UNITS: 100 INJECTION, SOLUTION SUBCUTANEOUS at 02:26

## 2021-01-01 RX ADMIN — PAROXETINE HYDROCHLORIDE 20 MG: 20 TABLET, FILM COATED ORAL at 20:07

## 2021-01-01 RX ADMIN — INSULIN ASPART 6 UNITS: 100 INJECTION, SOLUTION INTRAVENOUS; SUBCUTANEOUS at 17:01

## 2021-01-01 RX ADMIN — LEVOTHYROXINE SODIUM 25 MCG: 25 TABLET ORAL at 07:47

## 2021-01-01 RX ADMIN — ATORVASTATIN CALCIUM 20 MG: 20 TABLET, FILM COATED ORAL at 20:10

## 2021-01-01 RX ADMIN — SODIUM CHLORIDE, PRESERVATIVE FREE 3 ML: 5 INJECTION INTRAVENOUS at 20:53

## 2021-01-01 RX ADMIN — INSULIN DETEMIR 15 UNITS: 100 INJECTION, SOLUTION SUBCUTANEOUS at 08:43

## 2021-01-01 RX ADMIN — CARVEDILOL 12.5 MG: 12.5 TABLET, FILM COATED ORAL at 07:47

## 2021-01-01 RX ADMIN — PIPERACILLIN SODIUM AND TAZOBACTAM SODIUM 3.38 G: 3; .375 INJECTION, POWDER, LYOPHILIZED, FOR SOLUTION INTRAVENOUS at 19:15

## 2021-01-01 RX ADMIN — DILTIAZEM HYDROCHLORIDE 30 MG: 30 TABLET ORAL at 11:13

## 2021-01-01 RX ADMIN — CARVEDILOL 12.5 MG: 12.5 TABLET, FILM COATED ORAL at 09:07

## 2021-01-01 RX ADMIN — SODIUM CHLORIDE, PRESERVATIVE FREE 10 ML: 5 INJECTION INTRAVENOUS at 21:03

## 2021-01-01 RX ADMIN — CARVEDILOL 12.5 MG: 12.5 TABLET, FILM COATED ORAL at 08:22

## 2021-01-01 RX ADMIN — MORPHINE SULFATE 1 MG: 2 INJECTION, SOLUTION INTRAMUSCULAR; INTRAVENOUS at 20:55

## 2021-01-01 RX ADMIN — NYSTATIN: 100000 POWDER TOPICAL at 17:35

## 2021-01-01 RX ADMIN — Medication 400 MG: at 01:53

## 2021-01-01 RX ADMIN — ASPIRIN 81 MG: 81 TABLET, FILM COATED ORAL at 08:37

## 2021-01-01 RX ADMIN — ONDANSETRON 4 MG: 2 INJECTION INTRAMUSCULAR; INTRAVENOUS at 21:13

## 2021-01-01 RX ADMIN — DIAZEPAM 2 MG: 2 TABLET ORAL at 08:44

## 2021-01-01 RX ADMIN — DILTIAZEM HYDROCHLORIDE 30 MG: 30 TABLET ORAL at 17:47

## 2021-01-01 RX ADMIN — SODIUM CHLORIDE 1000 ML: 9 INJECTION, SOLUTION INTRAVENOUS at 10:19

## 2021-01-01 RX ADMIN — DILTIAZEM HYDROCHLORIDE 30 MG: 30 TABLET ORAL at 13:01

## 2021-01-01 RX ADMIN — CHLORTHALIDONE 25 MG: 25 TABLET ORAL at 08:16

## 2021-01-01 RX ADMIN — MAGNESIUM SULFATE HEPTAHYDRATE 2 G: 40 INJECTION, SOLUTION INTRAVENOUS at 17:22

## 2021-01-01 RX ADMIN — ACETAMINOPHEN 650 MG: 325 TABLET, FILM COATED ORAL at 08:39

## 2021-01-01 RX ADMIN — INSULIN ASPART 6 UNITS: 100 INJECTION, SOLUTION INTRAVENOUS; SUBCUTANEOUS at 17:34

## 2021-01-01 RX ADMIN — SODIUM CHLORIDE 100 ML/HR: 9 INJECTION, SOLUTION INTRAVENOUS at 09:22

## 2021-01-01 RX ADMIN — DILTIAZEM HYDROCHLORIDE 30 MG: 30 TABLET ORAL at 04:57

## 2021-01-01 RX ADMIN — DONEPEZIL HYDROCHLORIDE 2.5 MG: 5 TABLET, FILM COATED ORAL at 20:42

## 2021-01-01 RX ADMIN — HYDROCODONE BITARTRATE AND ACETAMINOPHEN 1 TABLET: 5; 325 TABLET ORAL at 21:13

## 2021-01-01 RX ADMIN — PIPERACILLIN SODIUM AND TAZOBACTAM SODIUM 3.38 G: 3; .375 INJECTION, POWDER, LYOPHILIZED, FOR SOLUTION INTRAVENOUS at 22:24

## 2021-01-01 RX ADMIN — HYDROCODONE BITARTRATE AND ACETAMINOPHEN 1 TABLET: 5; 325 TABLET ORAL at 09:06

## 2021-01-01 RX ADMIN — INSULIN ASPART 6 UNITS: 100 INJECTION, SOLUTION INTRAVENOUS; SUBCUTANEOUS at 11:56

## 2021-01-01 RX ADMIN — PIPERACILLIN SODIUM AND TAZOBACTAM SODIUM 3.38 G: 3; .375 INJECTION, POWDER, LYOPHILIZED, FOR SOLUTION INTRAVENOUS at 18:39

## 2021-01-01 RX ADMIN — CHLORTHALIDONE 25 MG: 25 TABLET ORAL at 09:13

## 2021-01-01 RX ADMIN — PAROXETINE HYDROCHLORIDE 20 MG: 20 TABLET, FILM COATED ORAL at 22:36

## 2021-01-01 RX ADMIN — HEPARIN SODIUM 15.2 UNITS/KG/HR: 10000 INJECTION, SOLUTION INTRAVENOUS at 19:57

## 2021-01-01 RX ADMIN — POTASSIUM CHLORIDE 10 MEQ: 7.46 INJECTION, SOLUTION INTRAVENOUS at 09:38

## 2021-01-01 RX ADMIN — NYSTATIN: 100000 POWDER TOPICAL at 08:28

## 2021-01-01 RX ADMIN — CARVEDILOL 12.5 MG: 12.5 TABLET, FILM COATED ORAL at 21:01

## 2021-01-01 RX ADMIN — SODIUM CHLORIDE, PRESERVATIVE FREE 3 ML: 5 INJECTION INTRAVENOUS at 08:48

## 2021-01-01 RX ADMIN — SODIUM CHLORIDE, PRESERVATIVE FREE 10 ML: 5 INJECTION INTRAVENOUS at 08:46

## 2021-01-01 RX ADMIN — DILTIAZEM HYDROCHLORIDE 30 MG: 30 TABLET ORAL at 11:19

## 2021-01-01 RX ADMIN — MORPHINE SULFATE 2 MG: 2 INJECTION, SOLUTION INTRAMUSCULAR; INTRAVENOUS at 02:30

## 2021-01-01 RX ADMIN — DILTIAZEM HYDROCHLORIDE 30 MG: 30 TABLET ORAL at 13:19

## 2021-01-01 RX ADMIN — PIPERACILLIN SODIUM AND TAZOBACTAM SODIUM 3.38 G: 3; .375 INJECTION, POWDER, LYOPHILIZED, FOR SOLUTION INTRAVENOUS at 03:22

## 2021-01-01 RX ADMIN — LEVOTHYROXINE SODIUM 25 MCG: 25 TABLET ORAL at 08:55

## 2021-01-01 RX ADMIN — PIPERACILLIN SODIUM AND TAZOBACTAM SODIUM 3.38 G: 3; .375 INJECTION, POWDER, LYOPHILIZED, FOR SOLUTION INTRAVENOUS at 10:20

## 2021-01-01 RX ADMIN — TRAMADOL HYDROCHLORIDE 50 MG: 50 TABLET, COATED ORAL at 02:47

## 2021-01-01 RX ADMIN — NYSTATIN: 100000 POWDER TOPICAL at 07:50

## 2021-01-01 RX ADMIN — CARVEDILOL 12.5 MG: 12.5 TABLET, FILM COATED ORAL at 08:16

## 2021-01-01 RX ADMIN — POTASSIUM CHLORIDE 40 MEQ: 10 CAPSULE, COATED, EXTENDED RELEASE ORAL at 12:34

## 2021-01-01 RX ADMIN — DIAZEPAM 2 MG: 2 TABLET ORAL at 20:54

## 2021-01-01 RX ADMIN — SACUBITRIL AND VALSARTAN 1 TABLET: 24; 26 TABLET, FILM COATED ORAL at 20:23

## 2021-01-01 RX ADMIN — SODIUM CHLORIDE, PRESERVATIVE FREE 10 ML: 5 INJECTION INTRAVENOUS at 09:29

## 2021-01-01 RX ADMIN — SODIUM CHLORIDE, PRESERVATIVE FREE 10 ML: 5 INJECTION INTRAVENOUS at 20:56

## 2021-01-01 RX ADMIN — INSULIN ASPART 15 UNITS: 100 INJECTION, SOLUTION INTRAVENOUS; SUBCUTANEOUS at 18:09

## 2021-01-01 RX ADMIN — SODIUM CHLORIDE 100 ML/HR: 900 INJECTION, SOLUTION INTRAVENOUS at 02:52

## 2021-01-01 RX ADMIN — DIAZEPAM 2 MG: 2 TABLET ORAL at 08:22

## 2021-01-01 RX ADMIN — PIPERACILLIN SODIUM AND TAZOBACTAM SODIUM 3.38 G: 3; .375 INJECTION, POWDER, LYOPHILIZED, FOR SOLUTION INTRAVENOUS at 02:33

## 2021-01-01 RX ADMIN — ACETAMINOPHEN 650 MG: 325 TABLET ORAL at 21:04

## 2021-01-01 RX ADMIN — PIPERACILLIN SODIUM AND TAZOBACTAM SODIUM 3.38 G: 3; .375 INJECTION, POWDER, LYOPHILIZED, FOR SOLUTION INTRAVENOUS at 02:41

## 2021-01-01 RX ADMIN — MORPHINE SULFATE 1 MG: 2 INJECTION, SOLUTION INTRAMUSCULAR; INTRAVENOUS at 03:54

## 2021-01-01 RX ADMIN — POTASSIUM CHLORIDE 40 MEQ: 750 CAPSULE, EXTENDED RELEASE ORAL at 10:20

## 2021-01-01 RX ADMIN — SACUBITRIL AND VALSARTAN 1 TABLET: 24; 26 TABLET, FILM COATED ORAL at 20:50

## 2021-01-01 RX ADMIN — DILTIAZEM HYDROCHLORIDE 30 MG: 30 TABLET ORAL at 13:00

## 2021-01-01 RX ADMIN — METOPROLOL TARTRATE 5 MG: 5 INJECTION INTRAVENOUS at 11:24

## 2021-01-01 RX ADMIN — INSULIN DETEMIR 15 UNITS: 100 INJECTION, SOLUTION SUBCUTANEOUS at 08:14

## 2021-01-01 RX ADMIN — METOPROLOL TARTRATE 5 MG: 5 INJECTION INTRAVENOUS at 08:08

## 2021-01-01 RX ADMIN — INSULIN DETEMIR 15 UNITS: 100 INJECTION, SOLUTION SUBCUTANEOUS at 21:05

## 2021-01-01 RX ADMIN — TRAMADOL HYDROCHLORIDE 50 MG: 50 TABLET, COATED ORAL at 01:08

## 2021-01-01 RX ADMIN — DIAZEPAM 2 MG: 2 TABLET ORAL at 09:07

## 2021-01-01 RX ADMIN — SODIUM CHLORIDE 100 ML/HR: 900 INJECTION, SOLUTION INTRAVENOUS at 14:19

## 2021-01-01 RX ADMIN — VANCOMYCIN HYDROCHLORIDE 750 MG: 100 INJECTION, POWDER, LYOPHILIZED, FOR SOLUTION INTRAVENOUS at 01:36

## 2021-01-01 RX ADMIN — INSULIN ASPART 2 UNITS: 100 INJECTION, SOLUTION INTRAVENOUS; SUBCUTANEOUS at 11:21

## 2021-01-01 RX ADMIN — PAROXETINE HYDROCHLORIDE 20 MG: 20 TABLET, FILM COATED ORAL at 20:51

## 2021-01-01 RX ADMIN — LEVOTHYROXINE SODIUM 25 MCG: 25 TABLET ORAL at 08:05

## 2021-01-01 RX ADMIN — ENOXAPARIN SODIUM 40 MG: 40 INJECTION SUBCUTANEOUS at 20:36

## 2021-01-01 RX ADMIN — DILTIAZEM HYDROCHLORIDE 10 MG: 5 INJECTION INTRAVENOUS at 05:14

## 2021-01-01 RX ADMIN — METOPROLOL TARTRATE 5 MG: 5 INJECTION INTRAVENOUS at 23:57

## 2021-01-01 RX ADMIN — CARVEDILOL 12.5 MG: 12.5 TABLET, FILM COATED ORAL at 09:13

## 2021-01-01 RX ADMIN — MORPHINE SULFATE 1 MG: 2 INJECTION, SOLUTION INTRAMUSCULAR; INTRAVENOUS at 13:25

## 2021-01-01 RX ADMIN — LEVOTHYROXINE SODIUM 25 MCG: 25 TABLET ORAL at 09:23

## 2021-01-01 RX ADMIN — TRAMADOL HYDROCHLORIDE 50 MG: 50 TABLET, COATED ORAL at 20:53

## 2021-01-01 RX ADMIN — POTASSIUM PHOSPHATE, MONOBASIC AND POTASSIUM PHOSPHATE, DIBASIC 30 MMOL: 224; 236 INJECTION, SOLUTION, CONCENTRATE INTRAVENOUS at 23:45

## 2021-01-01 RX ADMIN — INSULIN ASPART 3 UNITS: 100 INJECTION, SOLUTION INTRAVENOUS; SUBCUTANEOUS at 07:48

## 2021-01-01 RX ADMIN — ISOSORBIDE MONONITRATE 30 MG: 30 TABLET, EXTENDED RELEASE ORAL at 07:47

## 2021-01-01 RX ADMIN — DIAZEPAM 2 MG: 2 TABLET ORAL at 08:49

## 2021-01-01 RX ADMIN — DILTIAZEM HYDROCHLORIDE 30 MG: 30 TABLET ORAL at 12:16

## 2021-01-01 RX ADMIN — ONDANSETRON HYDROCHLORIDE 4 MG: 4 TABLET, FILM COATED ORAL at 18:19

## 2021-01-01 RX ADMIN — ATORVASTATIN CALCIUM 20 MG: 20 TABLET, FILM COATED ORAL at 20:23

## 2021-01-01 RX ADMIN — ACETAMINOPHEN 650 MG: 325 TABLET, FILM COATED ORAL at 21:08

## 2021-01-01 RX ADMIN — NYSTATIN: 100000 POWDER TOPICAL at 08:39

## 2021-01-01 RX ADMIN — OLANZAPINE 5 MG: 2.5 TABLET, FILM COATED ORAL at 20:51

## 2021-01-01 RX ADMIN — PIPERACILLIN SODIUM AND TAZOBACTAM SODIUM 3.38 G: 3; .375 INJECTION, POWDER, LYOPHILIZED, FOR SOLUTION INTRAVENOUS at 21:39

## 2021-01-01 RX ADMIN — LEVOTHYROXINE SODIUM 25 MCG: 25 TABLET ORAL at 08:09

## 2021-01-01 RX ADMIN — ONDANSETRON HYDROCHLORIDE 4 MG: 4 TABLET, FILM COATED ORAL at 00:29

## 2021-01-01 RX ADMIN — PIPERACILLIN SODIUM AND TAZOBACTAM SODIUM 3.38 G: 3; .375 INJECTION, POWDER, LYOPHILIZED, FOR SOLUTION INTRAVENOUS at 15:13

## 2021-01-01 RX ADMIN — CARVEDILOL 12.5 MG: 12.5 TABLET, FILM COATED ORAL at 09:01

## 2021-01-01 RX ADMIN — SODIUM CHLORIDE, PRESERVATIVE FREE 10 ML: 5 INJECTION INTRAVENOUS at 07:47

## 2021-01-01 RX ADMIN — CARVEDILOL 12.5 MG: 12.5 TABLET, FILM COATED ORAL at 20:23

## 2021-01-01 RX ADMIN — NITROGLYCERIN 1 PATCH: 0.2 PATCH TRANSDERMAL at 08:40

## 2021-01-01 RX ADMIN — NITROGLYCERIN 1 PATCH: 0.2 PATCH TRANSDERMAL at 08:16

## 2021-01-01 RX ADMIN — SODIUM CHLORIDE 100 ML/HR: 900 INJECTION, SOLUTION INTRAVENOUS at 20:14

## 2021-01-01 RX ADMIN — POTASSIUM PHOSPHATE, MONOBASIC AND POTASSIUM PHOSPHATE, DIBASIC 15 MMOL: 224; 236 INJECTION, SOLUTION, CONCENTRATE INTRAVENOUS at 15:46

## 2021-01-01 RX ADMIN — PIPERACILLIN SODIUM AND TAZOBACTAM SODIUM 3.38 G: 3; .375 INJECTION, POWDER, LYOPHILIZED, FOR SOLUTION INTRAVENOUS at 02:49

## 2021-01-01 RX ADMIN — ASPIRIN 81 MG: 81 TABLET, FILM COATED ORAL at 07:50

## 2021-01-01 RX ADMIN — SODIUM CHLORIDE, PRESERVATIVE FREE 10 ML: 5 INJECTION INTRAVENOUS at 09:07

## 2021-01-01 RX ADMIN — SODIUM CHLORIDE 100 ML/HR: 900 INJECTION, SOLUTION INTRAVENOUS at 03:40

## 2021-01-01 RX ADMIN — ONDANSETRON HYDROCHLORIDE 4 MG: 4 TABLET, FILM COATED ORAL at 14:04

## 2021-01-01 RX ADMIN — FLUCONAZOLE 150 MG: 150 TABLET ORAL at 13:45

## 2021-01-01 RX ADMIN — DILTIAZEM HYDROCHLORIDE 30 MG: 30 TABLET ORAL at 23:01

## 2021-01-01 RX ADMIN — NYSTATIN: 100000 POWDER TOPICAL at 20:55

## 2021-01-01 RX ADMIN — DILTIAZEM HYDROCHLORIDE 30 MG: 30 TABLET ORAL at 04:53

## 2021-01-01 RX ADMIN — VANCOMYCIN HYDROCHLORIDE 750 MG: 100 INJECTION, POWDER, LYOPHILIZED, FOR SOLUTION INTRAVENOUS at 14:34

## 2021-01-01 RX ADMIN — INSULIN ASPART 5 UNITS: 100 INJECTION, SOLUTION INTRAVENOUS; SUBCUTANEOUS at 08:27

## 2021-01-01 RX ADMIN — NYSTATIN: 100000 POWDER TOPICAL at 21:06

## 2021-01-01 RX ADMIN — TRAMADOL HYDROCHLORIDE 50 MG: 50 TABLET, COATED ORAL at 08:03

## 2021-01-01 RX ADMIN — HEPARIN SODIUM 12 UNITS/KG/HR: 10000 INJECTION, SOLUTION INTRAVENOUS at 08:50

## 2021-01-01 RX ADMIN — SODIUM CHLORIDE 6 UNITS/HR: 9 INJECTION, SOLUTION INTRAVENOUS at 17:38

## 2021-01-01 RX ADMIN — INSULIN ASPART 7 UNITS: 100 INJECTION, SOLUTION INTRAVENOUS; SUBCUTANEOUS at 20:34

## 2021-01-01 RX ADMIN — PAROXETINE HYDROCHLORIDE 20 MG: 20 TABLET, FILM COATED ORAL at 20:23

## 2021-01-01 RX ADMIN — SACUBITRIL AND VALSARTAN 1 TABLET: 24; 26 TABLET, FILM COATED ORAL at 11:29

## 2021-01-01 RX ADMIN — Medication: at 04:11

## 2021-01-01 RX ADMIN — INSULIN ASPART 10 UNITS: 100 INJECTION, SOLUTION INTRAVENOUS; SUBCUTANEOUS at 19:47

## 2021-01-01 RX ADMIN — ATORVASTATIN CALCIUM 20 MG: 20 TABLET, FILM COATED ORAL at 21:05

## 2021-01-01 RX ADMIN — SODIUM CHLORIDE, PRESERVATIVE FREE 10 ML: 5 INJECTION INTRAVENOUS at 08:48

## 2021-01-01 RX ADMIN — Medication 5 MG/HR: at 05:19

## 2021-01-01 RX ADMIN — MORPHINE SULFATE 2 MG: 2 INJECTION, SOLUTION INTRAMUSCULAR; INTRAVENOUS at 20:14

## 2021-01-01 RX ADMIN — DILTIAZEM HYDROCHLORIDE 30 MG: 30 TABLET ORAL at 17:29

## 2021-01-01 RX ADMIN — INSULIN ASPART 8 UNITS: 100 INJECTION, SOLUTION INTRAVENOUS; SUBCUTANEOUS at 12:21

## 2021-01-01 RX ADMIN — ATORVASTATIN CALCIUM 20 MG: 20 TABLET, FILM COATED ORAL at 20:25

## 2021-01-01 RX ADMIN — CARVEDILOL 12.5 MG: 12.5 TABLET, FILM COATED ORAL at 08:39

## 2021-01-01 RX ADMIN — VANCOMYCIN HYDROCHLORIDE 1750 MG: 1 INJECTION, POWDER, LYOPHILIZED, FOR SOLUTION INTRAVENOUS at 16:31

## 2021-01-01 RX ADMIN — MELATONIN 5.25 MG: 3 TAB ORAL at 21:07

## 2021-01-01 RX ADMIN — SODIUM CHLORIDE, PRESERVATIVE FREE 10 ML: 5 INJECTION INTRAVENOUS at 08:33

## 2021-01-01 RX ADMIN — DILTIAZEM HYDROCHLORIDE 30 MG: 30 TABLET ORAL at 00:04

## 2021-01-01 RX ADMIN — SODIUM CHLORIDE 125 ML/HR: 900 INJECTION, SOLUTION INTRAVENOUS at 11:45

## 2021-01-01 RX ADMIN — CLINDAMYCIN PHOSPHATE 600 MG: 600 INJECTION, SOLUTION INTRAVENOUS at 05:42

## 2021-01-01 RX ADMIN — DIAZEPAM 2 MG: 2 TABLET ORAL at 11:29

## 2021-01-01 RX ADMIN — SACUBITRIL AND VALSARTAN 1 TABLET: 24; 26 TABLET, FILM COATED ORAL at 08:52

## 2021-01-01 RX ADMIN — INSULIN ASPART 10 UNITS: 100 INJECTION, SOLUTION INTRAVENOUS; SUBCUTANEOUS at 13:34

## 2021-01-01 RX ADMIN — INSULIN ASPART 4 UNITS: 100 INJECTION, SOLUTION INTRAVENOUS; SUBCUTANEOUS at 06:36

## 2021-01-01 RX ADMIN — PIPERACILLIN SODIUM AND TAZOBACTAM SODIUM 3.38 G: 3; .375 INJECTION, POWDER, LYOPHILIZED, FOR SOLUTION INTRAVENOUS at 14:03

## 2021-01-01 RX ADMIN — ONDANSETRON 4 MG: 2 INJECTION INTRAMUSCULAR; INTRAVENOUS at 22:58

## 2021-01-01 RX ADMIN — HYDROXYZINE HYDROCHLORIDE 25 MG: 25 TABLET, FILM COATED ORAL at 20:52

## 2021-01-01 RX ADMIN — DILTIAZEM HYDROCHLORIDE 30 MG: 30 TABLET ORAL at 13:46

## 2021-01-01 RX ADMIN — ISOSORBIDE MONONITRATE 30 MG: 30 TABLET, EXTENDED RELEASE ORAL at 14:48

## 2021-01-01 RX ADMIN — PIPERACILLIN SODIUM AND TAZOBACTAM SODIUM 3.38 G: 3; .375 INJECTION, POWDER, LYOPHILIZED, FOR SOLUTION INTRAVENOUS at 06:40

## 2021-01-01 RX ADMIN — POTASSIUM CHLORIDE 40 MEQ: 1.5 POWDER, FOR SOLUTION ORAL at 09:02

## 2021-01-01 RX ADMIN — CHLORTHALIDONE 25 MG: 25 TABLET ORAL at 07:50

## 2021-01-01 RX ADMIN — INSULIN DETEMIR 15 UNITS: 100 INJECTION, SOLUTION SUBCUTANEOUS at 08:22

## 2021-01-01 RX ADMIN — CARVEDILOL 12.5 MG: 12.5 TABLET, FILM COATED ORAL at 09:23

## 2021-01-01 RX ADMIN — SACUBITRIL AND VALSARTAN 1 TABLET: 24; 26 TABLET, FILM COATED ORAL at 20:53

## 2021-01-01 RX ADMIN — ASPIRIN 81 MG: 81 TABLET, FILM COATED ORAL at 09:02

## 2021-01-01 RX ADMIN — VANCOMYCIN HYDROCHLORIDE 750 MG: 100 INJECTION, POWDER, LYOPHILIZED, FOR SOLUTION INTRAVENOUS at 02:09

## 2021-01-01 RX ADMIN — LEVOTHYROXINE SODIUM 25 MCG: 25 TABLET ORAL at 05:57

## 2021-01-01 RX ADMIN — MORPHINE SULFATE 1 MG: 2 INJECTION, SOLUTION INTRAMUSCULAR; INTRAVENOUS at 16:27

## 2021-01-01 RX ADMIN — PAROXETINE HYDROCHLORIDE 20 MG: 20 TABLET, FILM COATED ORAL at 20:26

## 2021-01-01 RX ADMIN — SODIUM CHLORIDE, PRESERVATIVE FREE 10 ML: 5 INJECTION INTRAVENOUS at 20:55

## 2021-01-01 RX ADMIN — ISOSORBIDE MONONITRATE 30 MG: 30 TABLET, EXTENDED RELEASE ORAL at 08:09

## 2021-01-01 RX ADMIN — POTASSIUM CHLORIDE 10 MEQ: 7.46 INJECTION, SOLUTION INTRAVENOUS at 12:57

## 2021-01-01 RX ADMIN — INSULIN ASPART 10 UNITS: 100 INJECTION, SOLUTION INTRAVENOUS; SUBCUTANEOUS at 16:43

## 2021-01-01 RX ADMIN — DILTIAZEM HYDROCHLORIDE 30 MG: 30 TABLET ORAL at 12:21

## 2021-01-01 RX ADMIN — HEPARIN SODIUM 2600 UNITS: 5000 INJECTION, SOLUTION INTRAVENOUS; SUBCUTANEOUS at 19:20

## 2021-01-01 RX ADMIN — DILTIAZEM HYDROCHLORIDE 30 MG: 30 TABLET ORAL at 05:37

## 2021-01-01 RX ADMIN — NITROGLYCERIN 1 PATCH: 0.2 PATCH TRANSDERMAL at 08:38

## 2021-01-01 RX ADMIN — SODIUM CHLORIDE 100 ML/HR: 900 INJECTION, SOLUTION INTRAVENOUS at 06:40

## 2021-01-01 RX ADMIN — HYDROCODONE BITARTRATE AND ACETAMINOPHEN 1 TABLET: 5; 325 TABLET ORAL at 10:37

## 2021-01-01 RX ADMIN — SACUBITRIL AND VALSARTAN 1 TABLET: 24; 26 TABLET, FILM COATED ORAL at 08:09

## 2021-01-01 RX ADMIN — DILTIAZEM HYDROCHLORIDE 30 MG: 30 TABLET ORAL at 05:32

## 2021-01-01 RX ADMIN — CEFTRIAXONE SODIUM 2 G: 2 INJECTION, POWDER, FOR SOLUTION INTRAMUSCULAR; INTRAVENOUS at 16:22

## 2021-01-01 RX ADMIN — ENOXAPARIN SODIUM 40 MG: 40 INJECTION SUBCUTANEOUS at 20:53

## 2021-01-01 RX ADMIN — HYDRALAZINE HYDROCHLORIDE 10 MG: 20 INJECTION INTRAMUSCULAR; INTRAVENOUS at 00:05

## 2021-01-01 RX ADMIN — ONDANSETRON HYDROCHLORIDE 4 MG: 4 TABLET, FILM COATED ORAL at 20:26

## 2021-01-01 RX ADMIN — SODIUM CHLORIDE 100 ML/HR: 900 INJECTION, SOLUTION INTRAVENOUS at 09:43

## 2021-01-01 RX ADMIN — INSULIN ASPART 2 UNITS: 100 INJECTION, SOLUTION INTRAVENOUS; SUBCUTANEOUS at 11:33

## 2021-01-01 RX ADMIN — HEPARIN SODIUM 18 UNITS/KG/HR: 10000 INJECTION, SOLUTION INTRAVENOUS at 07:25

## 2021-01-01 RX ADMIN — INSULIN ASPART 5 UNITS: 100 INJECTION, SOLUTION INTRAVENOUS; SUBCUTANEOUS at 09:25

## 2021-01-01 RX ADMIN — ONDANSETRON 4 MG: 2 INJECTION INTRAMUSCULAR; INTRAVENOUS at 19:35

## 2021-01-01 RX ADMIN — MORPHINE SULFATE 2 MG: 2 INJECTION, SOLUTION INTRAMUSCULAR; INTRAVENOUS at 07:42

## 2021-01-01 RX ADMIN — PIPERACILLIN SODIUM AND TAZOBACTAM SODIUM 3.38 G: 3; .375 INJECTION, POWDER, LYOPHILIZED, FOR SOLUTION INTRAVENOUS at 19:36

## 2021-01-01 RX ADMIN — ATORVASTATIN CALCIUM 20 MG: 20 TABLET, FILM COATED ORAL at 20:54

## 2021-01-01 RX ADMIN — INSULIN ASPART 6 UNITS: 100 INJECTION, SOLUTION INTRAVENOUS; SUBCUTANEOUS at 11:13

## 2021-01-01 RX ADMIN — ASPIRIN 81 MG: 81 TABLET, FILM COATED ORAL at 09:23

## 2021-01-01 RX ADMIN — ATORVASTATIN CALCIUM 20 MG: 20 TABLET, FILM COATED ORAL at 20:50

## 2021-01-01 RX ADMIN — PAROXETINE HYDROCHLORIDE 20 MG: 20 TABLET, FILM COATED ORAL at 20:53

## 2021-01-01 RX ADMIN — SODIUM CHLORIDE 15 MG/HR: 900 INJECTION, SOLUTION INTRAVENOUS at 21:03

## 2021-01-01 RX ADMIN — ATORVASTATIN CALCIUM 20 MG: 20 TABLET, FILM COATED ORAL at 20:53

## 2021-01-01 RX ADMIN — DILTIAZEM HYDROCHLORIDE 30 MG: 30 TABLET ORAL at 04:16

## 2021-01-01 RX ADMIN — CARVEDILOL 12.5 MG: 12.5 TABLET, FILM COATED ORAL at 11:29

## 2021-01-01 RX ADMIN — ONDANSETRON 4 MG: 2 INJECTION INTRAMUSCULAR; INTRAVENOUS at 07:22

## 2021-01-01 RX ADMIN — INSULIN ASPART 3 UNITS: 100 INJECTION, SOLUTION INTRAVENOUS; SUBCUTANEOUS at 10:24

## 2021-01-01 RX ADMIN — DONEPEZIL HYDROCHLORIDE 2.5 MG: 5 TABLET, FILM COATED ORAL at 20:23

## 2021-01-01 RX ADMIN — INSULIN DETEMIR 10 UNITS: 100 INJECTION, SOLUTION SUBCUTANEOUS at 20:57

## 2021-01-01 RX ADMIN — INSULIN ASPART 12 UNITS: 100 INJECTION, SOLUTION INTRAVENOUS; SUBCUTANEOUS at 16:33

## 2021-01-01 RX ADMIN — SODIUM CHLORIDE, PRESERVATIVE FREE 10 ML: 5 INJECTION INTRAVENOUS at 21:04

## 2021-01-01 RX ADMIN — METOPROLOL TARTRATE 5 MG: 5 INJECTION INTRAVENOUS at 23:25

## 2021-01-01 RX ADMIN — SODIUM CHLORIDE 1000 ML: 9 INJECTION, SOLUTION INTRAVENOUS at 06:49

## 2021-01-01 RX ADMIN — DILTIAZEM HYDROCHLORIDE 30 MG: 30 TABLET ORAL at 16:31

## 2021-01-01 RX ADMIN — METOPROLOL TARTRATE 5 MG: 5 INJECTION INTRAVENOUS at 22:57

## 2021-01-01 RX ADMIN — INSULIN DETEMIR 10 UNITS: 100 INJECTION, SOLUTION SUBCUTANEOUS at 21:04

## 2021-01-01 RX ADMIN — PIPERACILLIN SODIUM AND TAZOBACTAM SODIUM 3.38 G: 3; .375 INJECTION, POWDER, LYOPHILIZED, FOR SOLUTION INTRAVENOUS at 20:49

## 2021-01-01 RX ADMIN — SODIUM CHLORIDE 10 MG/HR: 900 INJECTION, SOLUTION INTRAVENOUS at 16:49

## 2021-01-01 RX ADMIN — INSULIN ASPART 3 UNITS: 100 INJECTION, SOLUTION INTRAVENOUS; SUBCUTANEOUS at 09:12

## 2021-01-01 RX ADMIN — ONDANSETRON 4 MG: 2 INJECTION INTRAMUSCULAR; INTRAVENOUS at 17:33

## 2021-01-01 RX ADMIN — HYDROCODONE BITARTRATE AND ACETAMINOPHEN 1 TABLET: 5; 325 TABLET ORAL at 08:27

## 2021-01-01 RX ADMIN — INSULIN DETEMIR 15 UNITS: 100 INJECTION, SOLUTION SUBCUTANEOUS at 22:25

## 2021-01-01 RX ADMIN — DILTIAZEM HYDROCHLORIDE 30 MG: 30 TABLET ORAL at 06:04

## 2021-01-01 RX ADMIN — NITROGLYCERIN 1 PATCH: 0.2 PATCH TRANSDERMAL at 08:04

## 2021-01-01 RX ADMIN — NYSTATIN: 100000 POWDER TOPICAL at 21:14

## 2021-01-01 RX ADMIN — METOPROLOL TARTRATE 5 MG: 5 INJECTION INTRAVENOUS at 10:20

## 2021-01-01 RX ADMIN — OLANZAPINE 5 MG: 2.5 TABLET, FILM COATED ORAL at 20:23

## 2021-01-01 RX ADMIN — TRAMADOL HYDROCHLORIDE 50 MG: 50 TABLET, COATED ORAL at 09:07

## 2021-01-01 RX ADMIN — POTASSIUM CHLORIDE 10 MEQ: 7.46 INJECTION, SOLUTION INTRAVENOUS at 09:06

## 2021-01-01 RX ADMIN — ENOXAPARIN SODIUM 40 MG: 40 INJECTION SUBCUTANEOUS at 20:12

## 2021-01-01 RX ADMIN — PAROXETINE HYDROCHLORIDE 20 MG: 20 TABLET, FILM COATED ORAL at 20:55

## 2021-01-01 RX ADMIN — DILTIAZEM HYDROCHLORIDE 30 MG: 30 TABLET ORAL at 05:57

## 2021-01-01 RX ADMIN — LEVOTHYROXINE SODIUM 25 MCG: 25 TABLET ORAL at 11:29

## 2021-01-01 RX ADMIN — ENOXAPARIN SODIUM 40 MG: 40 INJECTION SUBCUTANEOUS at 20:49

## 2021-01-01 RX ADMIN — HYDROCODONE BITARTRATE AND ACETAMINOPHEN 1 TABLET: 5; 325 TABLET ORAL at 03:59

## 2021-01-01 RX ADMIN — DILTIAZEM HYDROCHLORIDE 30 MG: 30 TABLET ORAL at 11:24

## 2021-01-01 RX ADMIN — ASPIRIN 81 MG: 81 TABLET, FILM COATED ORAL at 08:39

## 2021-01-01 RX ADMIN — POTASSIUM CHLORIDE 40 MEQ: 1.5 POWDER, FOR SOLUTION ORAL at 12:47

## 2021-01-01 RX ADMIN — VANCOMYCIN HYDROCHLORIDE 1000 MG: 1 INJECTION, POWDER, LYOPHILIZED, FOR SOLUTION INTRAVENOUS at 23:30

## 2021-01-01 RX ADMIN — INSULIN DETEMIR 15 UNITS: 100 INJECTION, SOLUTION SUBCUTANEOUS at 08:02

## 2021-01-01 RX ADMIN — PROPOFOL 30 MG: 10 INJECTION, EMULSION INTRAVENOUS at 13:31

## 2021-01-01 RX ADMIN — PIPERACILLIN SODIUM AND TAZOBACTAM SODIUM 3.38 G: 3; .375 INJECTION, POWDER, LYOPHILIZED, FOR SOLUTION INTRAVENOUS at 05:09

## 2021-01-01 RX ADMIN — INSULIN ASPART 3 UNITS: 100 INJECTION, SOLUTION INTRAVENOUS; SUBCUTANEOUS at 07:51

## 2021-01-01 RX ADMIN — LEVOTHYROXINE SODIUM 25 MCG: 25 TABLET ORAL at 08:16

## 2021-01-01 RX ADMIN — INSULIN DETEMIR 15 UNITS: 100 INJECTION, SOLUTION SUBCUTANEOUS at 11:31

## 2021-01-01 RX ADMIN — ASPIRIN 81 MG: 81 TABLET, FILM COATED ORAL at 08:16

## 2021-01-01 RX ADMIN — SODIUM CHLORIDE, PRESERVATIVE FREE 10 ML: 5 INJECTION INTRAVENOUS at 21:06

## 2021-01-01 RX ADMIN — INSULIN ASPART 2 UNITS: 100 INJECTION, SOLUTION INTRAVENOUS; SUBCUTANEOUS at 22:11

## 2021-01-01 RX ADMIN — LEVOTHYROXINE SODIUM 25 MCG: 25 TABLET ORAL at 08:46

## 2021-01-01 RX ADMIN — Medication 1 TABLET: at 09:27

## 2021-01-01 RX ADMIN — LEVOTHYROXINE SODIUM 25 MCG: 25 TABLET ORAL at 09:07

## 2021-01-01 RX ADMIN — ENOXAPARIN SODIUM 90 MG: 100 INJECTION SUBCUTANEOUS at 03:00

## 2021-01-01 RX ADMIN — INSULIN ASPART 6 UNITS: 100 INJECTION, SOLUTION INTRAVENOUS; SUBCUTANEOUS at 17:48

## 2021-01-01 RX ADMIN — NYSTATIN: 100000 POWDER TOPICAL at 08:06

## 2021-01-01 RX ADMIN — DILTIAZEM HYDROCHLORIDE 30 MG: 30 TABLET ORAL at 13:34

## 2021-01-01 RX ADMIN — ONDANSETRON 4 MG: 2 INJECTION INTRAMUSCULAR; INTRAVENOUS at 23:29

## 2021-01-01 RX ADMIN — INSULIN ASPART 6 UNITS: 100 INJECTION, SOLUTION INTRAVENOUS; SUBCUTANEOUS at 16:54

## 2021-01-01 RX ADMIN — NYSTATIN: 100000 POWDER TOPICAL at 20:49

## 2021-01-01 RX ADMIN — NYSTATIN: 100000 POWDER TOPICAL at 21:03

## 2021-01-01 RX ADMIN — LEVOTHYROXINE SODIUM 25 MCG: 25 TABLET ORAL at 09:02

## 2021-01-01 RX ADMIN — PAROXETINE HYDROCHLORIDE 20 MG: 20 TABLET, FILM COATED ORAL at 21:08

## 2021-01-01 RX ADMIN — CARVEDILOL 12.5 MG: 12.5 TABLET, FILM COATED ORAL at 08:46

## 2021-01-01 RX ADMIN — METOPROLOL TARTRATE 5 MG: 5 INJECTION INTRAVENOUS at 12:26

## 2021-01-01 RX ADMIN — OLANZAPINE 5 MG: 2.5 TABLET, FILM COATED ORAL at 20:44

## 2021-01-01 RX ADMIN — TRAMADOL HYDROCHLORIDE 50 MG: 50 TABLET, COATED ORAL at 11:20

## 2021-01-01 RX ADMIN — ISOSORBIDE MONONITRATE 30 MG: 30 TABLET, EXTENDED RELEASE ORAL at 09:01

## 2021-01-01 RX ADMIN — MORPHINE SULFATE 2 MG: 2 INJECTION, SOLUTION INTRAMUSCULAR; INTRAVENOUS at 01:43

## 2021-01-01 RX ADMIN — DIAZEPAM 2 MG: 2 TABLET ORAL at 20:38

## 2021-01-01 RX ADMIN — ACETAMINOPHEN 650 MG: 325 TABLET, FILM COATED ORAL at 17:35

## 2021-01-01 RX ADMIN — INSULIN DETEMIR 15 UNITS: 100 INJECTION, SOLUTION SUBCUTANEOUS at 21:45

## 2021-01-01 RX ADMIN — HYDRALAZINE HYDROCHLORIDE 10 MG: 20 INJECTION INTRAMUSCULAR; INTRAVENOUS at 15:16

## 2021-01-01 RX ADMIN — INSULIN DETEMIR 15 UNITS: 100 INJECTION, SOLUTION SUBCUTANEOUS at 21:15

## 2021-01-01 RX ADMIN — INSULIN ASPART 5 UNITS: 100 INJECTION, SOLUTION INTRAVENOUS; SUBCUTANEOUS at 07:54

## 2021-01-01 RX ADMIN — SODIUM CHLORIDE 125 ML/HR: 900 INJECTION, SOLUTION INTRAVENOUS at 21:01

## 2021-01-01 RX ADMIN — LEVOTHYROXINE SODIUM 25 MCG: 25 TABLET ORAL at 09:43

## 2021-01-01 RX ADMIN — Medication: at 01:55

## 2021-01-01 RX ADMIN — PIPERACILLIN SODIUM AND TAZOBACTAM SODIUM 3.38 G: 3; .375 INJECTION, POWDER, LYOPHILIZED, FOR SOLUTION INTRAVENOUS at 13:57

## 2021-01-01 RX ADMIN — ISOSORBIDE MONONITRATE 30 MG: 30 TABLET, EXTENDED RELEASE ORAL at 09:28

## 2021-01-01 RX ADMIN — IPRATROPIUM BROMIDE AND ALBUTEROL SULFATE 3 ML: 2.5; .5 SOLUTION RESPIRATORY (INHALATION) at 22:48

## 2021-01-01 RX ADMIN — PIPERACILLIN SODIUM AND TAZOBACTAM SODIUM 3.38 G: 3; .375 INJECTION, POWDER, LYOPHILIZED, FOR SOLUTION INTRAVENOUS at 05:59

## 2021-01-01 RX ADMIN — CHLORTHALIDONE 25 MG: 25 TABLET ORAL at 09:38

## 2021-01-01 RX ADMIN — SODIUM CHLORIDE 5 MG/HR: 900 INJECTION, SOLUTION INTRAVENOUS at 10:28

## 2021-01-01 RX ADMIN — OLANZAPINE 5 MG: 2.5 TABLET, FILM COATED ORAL at 21:05

## 2021-01-01 RX ADMIN — DILTIAZEM HYDROCHLORIDE 30 MG: 30 TABLET ORAL at 12:47

## 2021-01-01 RX ADMIN — DILTIAZEM HYDROCHLORIDE 30 MG: 30 TABLET ORAL at 01:00

## 2021-01-01 RX ADMIN — CARVEDILOL 12.5 MG: 12.5 TABLET, FILM COATED ORAL at 20:25

## 2021-01-01 RX ADMIN — NYSTATIN: 100000 POWDER TOPICAL at 20:40

## 2021-01-01 RX ADMIN — PIPERACILLIN SODIUM AND TAZOBACTAM SODIUM 3.38 G: 3; .375 INJECTION, POWDER, LYOPHILIZED, FOR SOLUTION INTRAVENOUS at 14:30

## 2021-01-01 RX ADMIN — ONDANSETRON 4 MG: 2 INJECTION INTRAMUSCULAR; INTRAVENOUS at 22:35

## 2021-01-01 RX ADMIN — INSULIN DETEMIR 10 UNITS: 100 INJECTION, SOLUTION SUBCUTANEOUS at 21:15

## 2021-01-01 RX ADMIN — MAGNESIUM SULFATE HEPTAHYDRATE 4 G: 40 INJECTION, SOLUTION INTRAVENOUS at 10:40

## 2021-01-01 RX ADMIN — ISOSORBIDE MONONITRATE 30 MG: 30 TABLET, EXTENDED RELEASE ORAL at 08:55

## 2021-01-01 RX ADMIN — METOPROLOL TARTRATE 5 MG: 5 INJECTION INTRAVENOUS at 23:31

## 2021-01-01 RX ADMIN — SODIUM CHLORIDE, PRESERVATIVE FREE 10 ML: 5 INJECTION INTRAVENOUS at 20:36

## 2021-01-01 RX ADMIN — MORPHINE SULFATE 1 MG: 2 INJECTION, SOLUTION INTRAMUSCULAR; INTRAVENOUS at 02:06

## 2021-01-01 RX ADMIN — SACUBITRIL AND VALSARTAN 1 TABLET: 24; 26 TABLET, FILM COATED ORAL at 08:45

## 2021-01-01 RX ADMIN — NITROGLYCERIN 1 PATCH: 0.2 PATCH TRANSDERMAL at 08:46

## 2021-01-01 RX ADMIN — POTASSIUM CHLORIDE, DEXTROSE MONOHYDRATE AND SODIUM CHLORIDE 150 ML/HR: 150; 5; 450 INJECTION, SOLUTION INTRAVENOUS at 00:55

## 2021-01-01 RX ADMIN — POTASSIUM CHLORIDE 40 MEQ: 750 CAPSULE, EXTENDED RELEASE ORAL at 14:22

## 2021-01-01 RX ADMIN — NITROGLYCERIN 1 PATCH: 0.2 PATCH TRANSDERMAL at 08:08

## 2021-01-01 RX ADMIN — POTASSIUM CHLORIDE 10 MEQ: 7.46 INJECTION, SOLUTION INTRAVENOUS at 02:38

## 2021-01-01 RX ADMIN — INSULIN DETEMIR 15 UNITS: 100 INJECTION, SOLUTION SUBCUTANEOUS at 08:27

## 2021-01-01 RX ADMIN — SODIUM CHLORIDE, PRESERVATIVE FREE 10 ML: 5 INJECTION INTRAVENOUS at 20:26

## 2021-01-01 RX ADMIN — INSULIN ASPART 7 UNITS: 100 INJECTION, SOLUTION INTRAVENOUS; SUBCUTANEOUS at 17:48

## 2021-01-01 RX ADMIN — PIPERACILLIN SODIUM AND TAZOBACTAM SODIUM 3.38 G: 3; .375 INJECTION, POWDER, LYOPHILIZED, FOR SOLUTION INTRAVENOUS at 02:32

## 2021-01-01 RX ADMIN — PIPERACILLIN SODIUM AND TAZOBACTAM SODIUM 3.38 G: 3; .375 INJECTION, POWDER, LYOPHILIZED, FOR SOLUTION INTRAVENOUS at 03:34

## 2021-01-01 RX ADMIN — CEFTRIAXONE 2 G: 2 INJECTION, POWDER, FOR SOLUTION INTRAMUSCULAR; INTRAVENOUS at 10:26

## 2021-01-01 RX ADMIN — OLANZAPINE 5 MG: 2.5 TABLET, FILM COATED ORAL at 20:53

## 2021-01-01 RX ADMIN — CHLORTHALIDONE 25 MG: 25 TABLET ORAL at 14:48

## 2021-01-01 RX ADMIN — ASPIRIN 81 MG: 81 TABLET, FILM COATED ORAL at 08:49

## 2021-01-01 RX ADMIN — INSULIN ASPART 3 UNITS: 100 INJECTION, SOLUTION INTRAVENOUS; SUBCUTANEOUS at 11:13

## 2021-01-01 RX ADMIN — DIAZEPAM 2 MG: 2 TABLET ORAL at 20:27

## 2021-01-01 RX ADMIN — ONDANSETRON 4 MG: 2 INJECTION INTRAMUSCULAR; INTRAVENOUS at 09:51

## 2021-01-01 RX ADMIN — HEPARIN SODIUM 20 UNITS/KG/HR: 10000 INJECTION, SOLUTION INTRAVENOUS at 01:55

## 2021-01-01 RX ADMIN — ONDANSETRON 4 MG: 2 INJECTION INTRAMUSCULAR; INTRAVENOUS at 08:15

## 2021-01-01 RX ADMIN — PAROXETINE HYDROCHLORIDE 20 MG: 20 TABLET, FILM COATED ORAL at 21:17

## 2021-01-01 RX ADMIN — SODIUM CHLORIDE, PRESERVATIVE FREE 10 ML: 5 INJECTION INTRAVENOUS at 07:55

## 2021-01-01 RX ADMIN — PIPERACILLIN SODIUM AND TAZOBACTAM SODIUM 3.38 G: 3; .375 INJECTION, POWDER, LYOPHILIZED, FOR SOLUTION INTRAVENOUS at 11:28

## 2021-01-01 RX ADMIN — ATORVASTATIN CALCIUM 20 MG: 20 TABLET, FILM COATED ORAL at 22:36

## 2021-01-01 RX ADMIN — DILTIAZEM HYDROCHLORIDE 30 MG: 30 TABLET ORAL at 11:49

## 2021-01-01 RX ADMIN — NITROGLYCERIN 1 INCH: 20 OINTMENT TOPICAL at 16:03

## 2021-01-01 RX ADMIN — OLANZAPINE 5 MG: 2.5 TABLET, FILM COATED ORAL at 21:01

## 2021-01-01 RX ADMIN — ONDANSETRON 4 MG: 2 INJECTION INTRAMUSCULAR; INTRAVENOUS at 18:31

## 2021-01-01 RX ADMIN — INSULIN ASPART 3 UNITS: 100 INJECTION, SOLUTION INTRAVENOUS; SUBCUTANEOUS at 08:55

## 2021-01-01 RX ADMIN — DIAZEPAM 2 MG: 2 TABLET ORAL at 21:05

## 2021-01-01 RX ADMIN — PIPERACILLIN SODIUM AND TAZOBACTAM SODIUM 3.38 G: 3; .375 INJECTION, POWDER, LYOPHILIZED, FOR SOLUTION INTRAVENOUS at 18:31

## 2021-01-01 RX ADMIN — CHLORTHALIDONE 25 MG: 25 TABLET ORAL at 09:28

## 2021-01-01 RX ADMIN — DILTIAZEM HYDROCHLORIDE 30 MG: 30 TABLET ORAL at 16:54

## 2021-01-01 RX ADMIN — NYSTATIN: 100000 POWDER TOPICAL at 08:40

## 2021-01-01 RX ADMIN — POTASSIUM CHLORIDE 10 MEQ: 7.46 INJECTION, SOLUTION INTRAVENOUS at 13:09

## 2021-01-01 RX ADMIN — PAROXETINE HYDROCHLORIDE 20 MG: 20 TABLET, FILM COATED ORAL at 20:38

## 2021-01-01 RX ADMIN — SODIUM CHLORIDE 2000 ML: 9 INJECTION, SOLUTION INTRAVENOUS at 06:49

## 2021-01-01 RX ADMIN — INSULIN ASPART 4 UNITS: 100 INJECTION, SOLUTION INTRAVENOUS; SUBCUTANEOUS at 17:14

## 2021-01-01 RX ADMIN — INSULIN ASPART 6 UNITS: 100 INJECTION, SOLUTION INTRAVENOUS; SUBCUTANEOUS at 16:46

## 2021-01-01 RX ADMIN — INSULIN DETEMIR 15 UNITS: 100 INJECTION, SOLUTION SUBCUTANEOUS at 09:12

## 2021-01-01 RX ADMIN — INSULIN ASPART 2 UNITS: 100 INJECTION, SOLUTION INTRAVENOUS; SUBCUTANEOUS at 18:24

## 2021-01-01 RX ADMIN — INSULIN DETEMIR 15 UNITS: 100 INJECTION, SOLUTION SUBCUTANEOUS at 08:53

## 2021-01-01 RX ADMIN — SACUBITRIL AND VALSARTAN 1 TABLET: 24; 26 TABLET, FILM COATED ORAL at 21:05

## 2021-01-01 RX ADMIN — SACUBITRIL AND VALSARTAN 1 TABLET: 24; 26 TABLET, FILM COATED ORAL at 20:43

## 2021-01-01 RX ADMIN — HYDROCODONE BITARTRATE AND ACETAMINOPHEN 1 TABLET: 5; 325 TABLET ORAL at 15:33

## 2021-01-01 RX ADMIN — ASPIRIN 81 MG: 81 TABLET, FILM COATED ORAL at 07:47

## 2021-01-01 RX ADMIN — TRAMADOL HYDROCHLORIDE 50 MG: 50 TABLET, COATED ORAL at 19:30

## 2021-01-01 RX ADMIN — ONDANSETRON 4 MG: 2 INJECTION INTRAMUSCULAR; INTRAVENOUS at 09:12

## 2021-01-01 RX ADMIN — ASPIRIN 81 MG: 81 TABLET, FILM COATED ORAL at 09:27

## 2021-01-01 RX ADMIN — HYDROCODONE BITARTRATE AND ACETAMINOPHEN 1 TABLET: 5; 325 TABLET ORAL at 13:34

## 2021-01-01 RX ADMIN — INSULIN DETEMIR 15 UNITS: 100 INJECTION, SOLUTION SUBCUTANEOUS at 09:04

## 2021-01-01 RX ADMIN — ENOXAPARIN SODIUM 40 MG: 40 INJECTION SUBCUTANEOUS at 20:27

## 2021-01-01 RX ADMIN — DILTIAZEM HYDROCHLORIDE 30 MG: 30 TABLET ORAL at 23:29

## 2021-01-01 RX ADMIN — INSULIN ASPART 3 UNITS: 100 INJECTION, SOLUTION INTRAVENOUS; SUBCUTANEOUS at 11:24

## 2021-01-01 RX ADMIN — Medication 1 TABLET: at 14:48

## 2021-01-01 RX ADMIN — PIPERACILLIN SODIUM AND TAZOBACTAM SODIUM 3.38 G: 3; .375 INJECTION, POWDER, LYOPHILIZED, FOR SOLUTION INTRAVENOUS at 06:04

## 2021-01-01 RX ADMIN — PIPERACILLIN SODIUM AND TAZOBACTAM SODIUM 3.38 G: 3; .375 INJECTION, POWDER, LYOPHILIZED, FOR SOLUTION INTRAVENOUS at 21:37

## 2021-01-01 RX ADMIN — ASPIRIN 81 MG: 81 TABLET, FILM COATED ORAL at 08:05

## 2021-01-01 RX ADMIN — CARVEDILOL 12.5 MG: 12.5 TABLET, FILM COATED ORAL at 20:39

## 2021-01-01 RX ADMIN — ENOXAPARIN SODIUM 40 MG: 40 INJECTION SUBCUTANEOUS at 21:04

## 2021-01-01 RX ADMIN — PROPOFOL 50 MG: 10 INJECTION, EMULSION INTRAVENOUS at 13:28

## 2021-01-01 RX ADMIN — METOPROLOL TARTRATE 5 MG: 5 INJECTION INTRAVENOUS at 11:20

## 2021-01-01 RX ADMIN — NYSTATIN: 100000 POWDER TOPICAL at 11:35

## 2021-01-01 RX ADMIN — ISOSORBIDE MONONITRATE 30 MG: 30 TABLET, EXTENDED RELEASE ORAL at 08:05

## 2021-01-01 RX ADMIN — ISOSORBIDE MONONITRATE 30 MG: 30 TABLET, EXTENDED RELEASE ORAL at 08:52

## 2021-01-01 RX ADMIN — DILTIAZEM HYDROCHLORIDE 30 MG: 30 TABLET ORAL at 04:00

## 2021-01-01 RX ADMIN — CARVEDILOL 12.5 MG: 12.5 TABLET, FILM COATED ORAL at 09:38

## 2021-01-01 RX ADMIN — INSULIN ASPART 4 UNITS: 100 INJECTION, SOLUTION INTRAVENOUS; SUBCUTANEOUS at 06:17

## 2021-01-01 RX ADMIN — NYSTATIN: 100000 POWDER TOPICAL at 14:46

## 2021-01-01 RX ADMIN — CARVEDILOL 12.5 MG: 12.5 TABLET, FILM COATED ORAL at 09:02

## 2021-01-01 RX ADMIN — INSULIN DETEMIR 10 UNITS: 100 INJECTION, SOLUTION SUBCUTANEOUS at 20:15

## 2021-01-01 RX ADMIN — INSULIN DETEMIR 15 UNITS: 100 INJECTION, SOLUTION SUBCUTANEOUS at 21:14

## 2021-01-01 RX ADMIN — INSULIN ASPART 3 UNITS: 100 INJECTION, SOLUTION INTRAVENOUS; SUBCUTANEOUS at 17:39

## 2021-01-01 RX ADMIN — INSULIN ASPART 20 UNITS: 100 INJECTION, SOLUTION INTRAVENOUS; SUBCUTANEOUS at 17:34

## 2021-01-01 RX ADMIN — METOPROLOL TARTRATE 5 MG: 5 INJECTION INTRAVENOUS at 11:44

## 2021-01-01 RX ADMIN — TRAMADOL HYDROCHLORIDE 50 MG: 50 TABLET, COATED ORAL at 23:56

## 2021-01-01 RX ADMIN — SODIUM CHLORIDE 100 ML/HR: 900 INJECTION, SOLUTION INTRAVENOUS at 15:42

## 2021-01-01 RX ADMIN — DONEPEZIL HYDROCHLORIDE 2.5 MG: 5 TABLET, FILM COATED ORAL at 20:50

## 2021-01-01 RX ADMIN — ISOSORBIDE MONONITRATE 30 MG: 30 TABLET, EXTENDED RELEASE ORAL at 09:38

## 2021-01-01 RX ADMIN — NYSTATIN: 100000 POWDER TOPICAL at 22:26

## 2021-01-01 RX ADMIN — CEFTRIAXONE SODIUM 2 G: 2 INJECTION, POWDER, FOR SOLUTION INTRAMUSCULAR; INTRAVENOUS at 16:31

## 2021-01-01 RX ADMIN — INSULIN DETEMIR 25 UNITS: 100 INJECTION, SOLUTION SUBCUTANEOUS at 21:06

## 2021-01-01 RX ADMIN — INSULIN ASPART 6 UNITS: 100 INJECTION, SOLUTION INTRAVENOUS; SUBCUTANEOUS at 11:24

## 2021-01-01 RX ADMIN — DILTIAZEM HYDROCHLORIDE 30 MG: 30 TABLET ORAL at 23:55

## 2021-01-01 RX ADMIN — CEFTRIAXONE SODIUM 2 G: 2 INJECTION, POWDER, FOR SOLUTION INTRAMUSCULAR; INTRAVENOUS at 15:14

## 2021-01-01 RX ADMIN — ENOXAPARIN SODIUM 40 MG: 40 INJECTION SUBCUTANEOUS at 20:06

## 2021-01-01 RX ADMIN — TRAMADOL HYDROCHLORIDE 50 MG: 50 TABLET, FILM COATED ORAL at 21:08

## 2021-01-01 RX ADMIN — ISOSORBIDE MONONITRATE 30 MG: 30 TABLET, EXTENDED RELEASE ORAL at 08:37

## 2021-01-01 RX ADMIN — DILTIAZEM HYDROCHLORIDE 30 MG: 30 TABLET ORAL at 05:55

## 2021-01-01 RX ADMIN — MORPHINE SULFATE 1 MG: 2 INJECTION, SOLUTION INTRAMUSCULAR; INTRAVENOUS at 11:50

## 2021-01-01 RX ADMIN — SACUBITRIL AND VALSARTAN 1 TABLET: 24; 26 TABLET, FILM COATED ORAL at 20:38

## 2021-01-01 RX ADMIN — VANCOMYCIN HYDROCHLORIDE 750 MG: 100 INJECTION, POWDER, LYOPHILIZED, FOR SOLUTION INTRAVENOUS at 01:43

## 2021-01-01 RX ADMIN — AMIODARONE HYDROCHLORIDE 400 MG: 200 TABLET ORAL at 08:09

## 2021-01-01 RX ADMIN — ONDANSETRON HYDROCHLORIDE 4 MG: 4 TABLET, FILM COATED ORAL at 13:57

## 2021-01-01 RX ADMIN — INSULIN ASPART 3 UNITS: 100 INJECTION, SOLUTION INTRAVENOUS; SUBCUTANEOUS at 16:58

## 2021-01-01 RX ADMIN — ACETAMINOPHEN 650 MG: 650 SOLUTION ORAL at 08:50

## 2021-01-01 RX ADMIN — ATORVASTATIN CALCIUM 20 MG: 20 TABLET, FILM COATED ORAL at 20:39

## 2021-01-01 RX ADMIN — HEPARIN SODIUM 4000 UNITS: 5000 INJECTION, SOLUTION INTRAVENOUS; SUBCUTANEOUS at 22:04

## 2021-01-01 RX ADMIN — DONEPEZIL HYDROCHLORIDE 2.5 MG: 5 TABLET, FILM COATED ORAL at 20:38

## 2021-01-01 RX ADMIN — PIPERACILLIN SODIUM AND TAZOBACTAM SODIUM 3.38 G: 3; .375 INJECTION, POWDER, LYOPHILIZED, FOR SOLUTION INTRAVENOUS at 22:22

## 2021-01-01 RX ADMIN — DILTIAZEM HYDROCHLORIDE 30 MG: 30 TABLET ORAL at 23:57

## 2021-01-01 RX ADMIN — CARVEDILOL 12.5 MG: 12.5 TABLET, FILM COATED ORAL at 20:43

## 2021-01-01 RX ADMIN — ISOSORBIDE MONONITRATE 30 MG: 30 TABLET, EXTENDED RELEASE ORAL at 09:13

## 2021-01-01 RX ADMIN — TRAMADOL HYDROCHLORIDE 50 MG: 50 TABLET, FILM COATED ORAL at 14:48

## 2021-01-01 RX ADMIN — NYSTATIN: 100000 POWDER TOPICAL at 08:48

## 2021-01-01 RX ADMIN — PIPERACILLIN SODIUM AND TAZOBACTAM SODIUM 3.38 G: 3; .375 INJECTION, POWDER, LYOPHILIZED, FOR SOLUTION INTRAVENOUS at 21:14

## 2021-01-01 RX ADMIN — CARVEDILOL 12.5 MG: 12.5 TABLET, FILM COATED ORAL at 08:44

## 2021-01-01 RX ADMIN — INSULIN ASPART 6 UNITS: 100 INJECTION, SOLUTION INTRAVENOUS; SUBCUTANEOUS at 10:39

## 2021-01-01 RX ADMIN — NITROGLYCERIN 1 PATCH: 0.2 PATCH TRANSDERMAL at 11:34

## 2021-01-01 RX ADMIN — MELATONIN 5.25 MG: 3 TAB ORAL at 21:04

## 2021-01-01 RX ADMIN — INSULIN DETEMIR 10 UNITS: 100 INJECTION, SOLUTION SUBCUTANEOUS at 20:20

## 2021-01-01 RX ADMIN — SODIUM CHLORIDE, PRESERVATIVE FREE 10 ML: 5 INJECTION INTRAVENOUS at 20:14

## 2021-01-01 RX ADMIN — INSULIN DETEMIR 15 UNITS: 100 INJECTION, SOLUTION SUBCUTANEOUS at 20:55

## 2021-01-01 RX ADMIN — DILTIAZEM HYDROCHLORIDE 30 MG: 30 TABLET ORAL at 11:20

## 2021-01-01 RX ADMIN — CARVEDILOL 12.5 MG: 12.5 TABLET, FILM COATED ORAL at 20:53

## 2021-01-01 RX ADMIN — DILTIAZEM HYDROCHLORIDE 30 MG: 30 TABLET ORAL at 11:00

## 2021-01-01 RX ADMIN — ASPIRIN 81 MG: 81 TABLET, FILM COATED ORAL at 09:01

## 2021-01-01 RX ADMIN — SODIUM CHLORIDE, PRESERVATIVE FREE 3 ML: 5 INJECTION INTRAVENOUS at 21:04

## 2021-01-01 RX ADMIN — ATORVASTATIN CALCIUM 20 MG: 20 TABLET, FILM COATED ORAL at 20:36

## 2021-01-01 RX ADMIN — INSULIN ASPART 7 UNITS: 100 INJECTION, SOLUTION INTRAVENOUS; SUBCUTANEOUS at 11:27

## 2021-01-01 RX ADMIN — MORPHINE SULFATE 1 MG: 2 INJECTION, SOLUTION INTRAMUSCULAR; INTRAVENOUS at 22:30

## 2021-01-01 RX ADMIN — INSULIN DETEMIR 10 UNITS: 100 INJECTION, SOLUTION SUBCUTANEOUS at 20:49

## 2021-01-01 RX ADMIN — DILTIAZEM HYDROCHLORIDE 30 MG: 30 TABLET ORAL at 05:22

## 2021-01-01 RX ADMIN — MAGNESIUM SULFATE HEPTAHYDRATE 4 G: 40 INJECTION, SOLUTION INTRAVENOUS at 09:49

## 2021-01-01 RX ADMIN — PIPERACILLIN SODIUM AND TAZOBACTAM SODIUM 3.38 G: 3; .375 INJECTION, POWDER, LYOPHILIZED, FOR SOLUTION INTRAVENOUS at 11:44

## 2021-01-01 RX ADMIN — HEPARIN SODIUM 2600 UNITS: 5000 INJECTION, SOLUTION INTRAVENOUS; SUBCUTANEOUS at 23:31

## 2021-01-01 RX ADMIN — PIPERACILLIN SODIUM AND TAZOBACTAM SODIUM 3.38 G: 3; .375 INJECTION, POWDER, LYOPHILIZED, FOR SOLUTION INTRAVENOUS at 21:19

## 2021-01-01 RX ADMIN — ATORVASTATIN CALCIUM 20 MG: 20 TABLET, FILM COATED ORAL at 20:00

## 2021-01-01 RX ADMIN — ATORVASTATIN CALCIUM 20 MG: 20 TABLET, FILM COATED ORAL at 20:07

## 2021-01-01 RX ADMIN — PIPERACILLIN SODIUM AND TAZOBACTAM SODIUM 3.38 G: 3; .375 INJECTION, POWDER, LYOPHILIZED, FOR SOLUTION INTRAVENOUS at 13:10

## 2021-01-01 RX ADMIN — INSULIN ASPART 4 UNITS: 100 INJECTION, SOLUTION INTRAVENOUS; SUBCUTANEOUS at 09:01

## 2021-01-01 RX ADMIN — ATORVASTATIN CALCIUM 20 MG: 20 TABLET, FILM COATED ORAL at 20:48

## 2021-01-01 RX ADMIN — NITROGLYCERIN 1 PATCH: 0.2 PATCH TRANSDERMAL at 09:06

## 2021-01-01 RX ADMIN — FENTANYL CITRATE 25 MCG: 50 INJECTION INTRAMUSCULAR; INTRAVENOUS at 13:29

## 2021-01-01 RX ADMIN — NYSTATIN: 100000 POWDER TOPICAL at 09:07

## 2021-01-01 RX ADMIN — SODIUM CHLORIDE, PRESERVATIVE FREE 10 ML: 5 INJECTION INTRAVENOUS at 08:16

## 2021-01-01 RX ADMIN — CARVEDILOL 12.5 MG: 12.5 TABLET, FILM COATED ORAL at 21:14

## 2021-01-01 RX ADMIN — ONDANSETRON 4 MG: 2 INJECTION INTRAMUSCULAR; INTRAVENOUS at 22:56

## 2021-01-01 RX ADMIN — DILTIAZEM HYDROCHLORIDE 30 MG: 30 TABLET ORAL at 06:06

## 2021-01-01 RX ADMIN — DIAZEPAM 2 MG: 2 TABLET ORAL at 20:53

## 2021-01-01 RX ADMIN — DILTIAZEM HYDROCHLORIDE 30 MG: 30 TABLET ORAL at 17:42

## 2021-01-01 RX ADMIN — SODIUM CHLORIDE 125 ML/HR: 9 INJECTION, SOLUTION INTRAVENOUS at 15:00

## 2021-01-01 RX ADMIN — KIT FOR THE PREPARATION OF TECHNETIUM TC 99M ALBUMIN AGGREGATED 1 DOSE: 2.5 INJECTION, POWDER, FOR SOLUTION INTRAVENOUS at 08:00

## 2021-01-01 RX ADMIN — VANCOMYCIN HYDROCHLORIDE 750 MG: 100 INJECTION, POWDER, LYOPHILIZED, FOR SOLUTION INTRAVENOUS at 15:01

## 2021-01-01 RX ADMIN — DILTIAZEM HYDROCHLORIDE 30 MG: 30 TABLET ORAL at 05:10

## 2021-01-01 RX ADMIN — PAROXETINE HYDROCHLORIDE 20 MG: 20 TABLET, FILM COATED ORAL at 21:03

## 2021-01-01 RX ADMIN — ISOSORBIDE MONONITRATE 30 MG: 30 TABLET, EXTENDED RELEASE ORAL at 08:03

## 2021-01-01 RX ADMIN — DILTIAZEM HYDROCHLORIDE 30 MG: 30 TABLET ORAL at 17:41

## 2021-01-01 RX ADMIN — INSULIN DETEMIR 15 UNITS: 100 INJECTION, SOLUTION SUBCUTANEOUS at 08:21

## 2021-01-01 RX ADMIN — CHLORTHALIDONE 25 MG: 25 TABLET ORAL at 08:56

## 2021-01-01 RX ADMIN — ENOXAPARIN SODIUM 40 MG: 40 INJECTION SUBCUTANEOUS at 20:54

## 2021-01-01 RX ADMIN — MAGNESIUM SULFATE HEPTAHYDRATE 2 G: 40 INJECTION, SOLUTION INTRAVENOUS at 14:47

## 2021-01-01 RX ADMIN — INSULIN DETEMIR 15 UNITS: 100 INJECTION, SOLUTION SUBCUTANEOUS at 20:11

## 2021-01-01 RX ADMIN — SODIUM CHLORIDE, PRESERVATIVE FREE 10 ML: 5 INJECTION INTRAVENOUS at 09:02

## 2021-01-01 RX ADMIN — CARVEDILOL 12.5 MG: 12.5 TABLET, FILM COATED ORAL at 20:54

## 2021-01-01 RX ADMIN — NITROGLYCERIN 1 PATCH: 0.2 PATCH TRANSDERMAL at 08:48

## 2021-01-01 RX ADMIN — ATORVASTATIN CALCIUM 20 MG: 20 TABLET, FILM COATED ORAL at 21:03

## 2021-01-01 RX ADMIN — ONDANSETRON 4 MG: 2 INJECTION INTRAMUSCULAR; INTRAVENOUS at 07:51

## 2021-01-01 RX ADMIN — POTASSIUM CHLORIDE 10 MEQ: 7.46 INJECTION, SOLUTION INTRAVENOUS at 16:13

## 2021-01-01 RX ADMIN — PAROXETINE HYDROCHLORIDE 20 MG: 20 TABLET, FILM COATED ORAL at 20:36

## 2021-01-01 RX ADMIN — INSULIN ASPART 5 UNITS: 100 INJECTION, SOLUTION INTRAVENOUS; SUBCUTANEOUS at 11:44

## 2021-01-01 RX ADMIN — CARVEDILOL 12.5 MG: 12.5 TABLET, FILM COATED ORAL at 08:10

## 2021-01-01 RX ADMIN — CARVEDILOL 12.5 MG: 12.5 TABLET, FILM COATED ORAL at 20:52

## 2021-01-01 RX ADMIN — HYDROCODONE BITARTRATE AND ACETAMINOPHEN 1 TABLET: 5; 325 TABLET ORAL at 20:48

## 2021-01-01 RX ADMIN — PAROXETINE HYDROCHLORIDE 20 MG: 20 TABLET, FILM COATED ORAL at 21:04

## 2021-01-01 RX ADMIN — NITROGLYCERIN 1 PATCH: 0.2 PATCH TRANSDERMAL at 14:49

## 2021-01-01 RX ADMIN — DIGOXIN 250 MCG: 0.25 INJECTION INTRAMUSCULAR; INTRAVENOUS at 18:13

## 2021-01-01 RX ADMIN — NITROGLYCERIN 1 PATCH: 0.2 PATCH TRANSDERMAL at 08:20

## 2021-01-01 RX ADMIN — SODIUM CHLORIDE, PRESERVATIVE FREE 10 ML: 5 INJECTION INTRAVENOUS at 08:09

## 2021-01-01 RX ADMIN — SACUBITRIL AND VALSARTAN 1 TABLET: 24; 26 TABLET, FILM COATED ORAL at 21:01

## 2021-01-01 RX ADMIN — PAROXETINE HYDROCHLORIDE 20 MG: 20 TABLET, FILM COATED ORAL at 20:10

## 2021-01-01 RX ADMIN — ACETAMINOPHEN 650 MG: 325 TABLET, FILM COATED ORAL at 02:52

## 2021-01-01 RX ADMIN — INSULIN ASPART 2 UNITS: 100 INJECTION, SOLUTION INTRAVENOUS; SUBCUTANEOUS at 11:11

## 2021-01-01 RX ADMIN — ACETAMINOPHEN 650 MG: 325 TABLET, FILM COATED ORAL at 21:13

## 2021-01-01 RX ADMIN — POTASSIUM CHLORIDE 10 MEQ: 7.46 INJECTION, SOLUTION INTRAVENOUS at 10:07

## 2021-01-01 RX ADMIN — SODIUM CHLORIDE 1000 ML: 9 INJECTION, SOLUTION INTRAVENOUS at 04:53

## 2021-01-01 RX ADMIN — SODIUM CHLORIDE 10 MG/HR: 900 INJECTION, SOLUTION INTRAVENOUS at 00:57

## 2021-01-01 RX ADMIN — PAROXETINE HYDROCHLORIDE 20 MG: 20 TABLET, FILM COATED ORAL at 20:54

## 2021-01-01 RX ADMIN — SACUBITRIL AND VALSARTAN 1 TABLET: 24; 26 TABLET, FILM COATED ORAL at 08:49

## 2021-01-01 RX ADMIN — LEVOTHYROXINE SODIUM 25 MCG: 25 TABLET ORAL at 08:44

## 2021-01-01 RX ADMIN — INSULIN ASPART 5 UNITS: 100 INJECTION, SOLUTION INTRAVENOUS; SUBCUTANEOUS at 17:47

## 2021-01-01 RX ADMIN — TRAMADOL HYDROCHLORIDE 50 MG: 50 TABLET, COATED ORAL at 12:38

## 2021-01-01 RX ADMIN — SACUBITRIL AND VALSARTAN 1 TABLET: 24; 26 TABLET, FILM COATED ORAL at 20:01

## 2021-01-01 RX ADMIN — PIPERACILLIN SODIUM AND TAZOBACTAM SODIUM 3.38 G: 3; .375 INJECTION, POWDER, LYOPHILIZED, FOR SOLUTION INTRAVENOUS at 19:38

## 2021-01-01 RX ADMIN — ASPIRIN 81 MG: 81 TABLET, FILM COATED ORAL at 08:45

## 2021-01-01 RX ADMIN — CARVEDILOL 12.5 MG: 12.5 TABLET, FILM COATED ORAL at 08:37

## 2021-01-01 RX ADMIN — ISOSORBIDE MONONITRATE 30 MG: 30 TABLET, EXTENDED RELEASE ORAL at 09:02

## 2021-01-01 RX ADMIN — SACUBITRIL AND VALSARTAN 1 TABLET: 24; 26 TABLET, FILM COATED ORAL at 08:37

## 2021-01-01 RX ADMIN — DONEPEZIL HYDROCHLORIDE 2.5 MG: 5 TABLET, FILM COATED ORAL at 20:00

## 2021-01-01 RX ADMIN — SODIUM CHLORIDE, PRESERVATIVE FREE 10 ML: 5 INJECTION INTRAVENOUS at 20:06

## 2021-01-01 RX ADMIN — DILTIAZEM HYDROCHLORIDE 30 MG: 30 TABLET ORAL at 00:22

## 2021-01-01 RX ADMIN — CARVEDILOL 12.5 MG: 12.5 TABLET, FILM COATED ORAL at 08:24

## 2021-01-01 RX ADMIN — POTASSIUM CHLORIDE 10 MEQ: 7.46 INJECTION, SOLUTION INTRAVENOUS at 01:36

## 2021-01-01 RX ADMIN — SODIUM CHLORIDE 10 MG/HR: 900 INJECTION, SOLUTION INTRAVENOUS at 10:43

## 2021-01-01 RX ADMIN — ATORVASTATIN CALCIUM 20 MG: 20 TABLET, FILM COATED ORAL at 21:02

## 2021-01-01 RX ADMIN — PIPERACILLIN SODIUM AND TAZOBACTAM SODIUM 3.38 G: 3; .375 INJECTION, POWDER, LYOPHILIZED, FOR SOLUTION INTRAVENOUS at 11:04

## 2021-01-01 RX ADMIN — METOPROLOL TARTRATE 5 MG: 5 INJECTION INTRAVENOUS at 22:58

## 2021-01-01 RX ADMIN — LEVOTHYROXINE SODIUM 25 MCG: 25 TABLET ORAL at 07:50

## 2021-01-01 RX ADMIN — MORPHINE SULFATE 2 MG: 2 INJECTION, SOLUTION INTRAMUSCULAR; INTRAVENOUS at 12:44

## 2021-01-01 RX ADMIN — MORPHINE SULFATE 4 MG: 4 INJECTION, SOLUTION INTRAMUSCULAR; INTRAVENOUS at 10:28

## 2021-01-01 RX ADMIN — SODIUM CHLORIDE, PRESERVATIVE FREE 10 ML: 5 INJECTION INTRAVENOUS at 07:53

## 2021-01-01 RX ADMIN — DEXTROSE MONOHYDRATE 25 G: 500 INJECTION PARENTERAL at 06:14

## 2021-01-01 RX ADMIN — DIAZEPAM 2 MG: 2 TABLET ORAL at 08:46

## 2021-01-01 RX ADMIN — DILTIAZEM HYDROCHLORIDE 30 MG: 30 TABLET ORAL at 23:33

## 2021-01-01 RX ADMIN — CARVEDILOL 12.5 MG: 12.5 TABLET, FILM COATED ORAL at 09:27

## 2021-01-01 RX ADMIN — SODIUM CHLORIDE 10 MG/HR: 900 INJECTION, SOLUTION INTRAVENOUS at 16:14

## 2021-01-01 RX ADMIN — ONDANSETRON 4 MG: 2 INJECTION INTRAMUSCULAR; INTRAVENOUS at 10:43

## 2021-01-01 RX ADMIN — ASPIRIN 81 MG: 81 TABLET, FILM COATED ORAL at 08:09

## 2021-01-01 RX ADMIN — INSULIN ASPART 5 UNITS: 100 INJECTION, SOLUTION INTRAVENOUS; SUBCUTANEOUS at 17:21

## 2021-01-01 RX ADMIN — ISOSORBIDE MONONITRATE 30 MG: 30 TABLET, EXTENDED RELEASE ORAL at 08:16

## 2021-01-01 RX ADMIN — SODIUM CHLORIDE, PRESERVATIVE FREE 10 ML: 5 INJECTION INTRAVENOUS at 11:34

## 2021-01-01 RX ADMIN — POTASSIUM CHLORIDE 10 MEQ: 7.46 INJECTION, SOLUTION INTRAVENOUS at 18:21

## 2021-01-01 RX ADMIN — MORPHINE SULFATE 1 MG: 2 INJECTION, SOLUTION INTRAMUSCULAR; INTRAVENOUS at 07:18

## 2021-01-01 RX ADMIN — SODIUM CHLORIDE, PRESERVATIVE FREE 10 ML: 5 INJECTION INTRAVENOUS at 21:15

## 2021-01-01 RX ADMIN — NYSTATIN: 100000 POWDER TOPICAL at 09:38

## 2021-01-01 RX ADMIN — ISOSORBIDE MONONITRATE 30 MG: 30 TABLET, EXTENDED RELEASE ORAL at 08:40

## 2021-01-01 RX ADMIN — INSULIN ASPART 2 UNITS: 100 INJECTION, SOLUTION INTRAVENOUS; SUBCUTANEOUS at 11:03

## 2021-01-01 RX ADMIN — ASPIRIN 81 MG: 81 TABLET, FILM COATED ORAL at 14:53

## 2021-01-01 RX ADMIN — DONEPEZIL HYDROCHLORIDE 2.5 MG: 5 TABLET, FILM COATED ORAL at 21:06

## 2021-01-01 RX ADMIN — DIAZEPAM 2 MG: 2 TABLET ORAL at 08:37

## 2021-01-01 RX ADMIN — INSULIN DETEMIR 10 UNITS: 100 INJECTION, SOLUTION SUBCUTANEOUS at 20:54

## 2021-01-01 RX ADMIN — ONDANSETRON HYDROCHLORIDE 4 MG: 4 TABLET, FILM COATED ORAL at 14:46

## 2021-01-01 RX ADMIN — MORPHINE SULFATE 1 MG: 2 INJECTION, SOLUTION INTRAMUSCULAR; INTRAVENOUS at 07:58

## 2021-01-01 RX ADMIN — NYSTATIN 1 APPLICATION: 100000 POWDER TOPICAL at 08:54

## 2021-01-01 RX ADMIN — SODIUM CHLORIDE, PRESERVATIVE FREE 3 ML: 5 INJECTION INTRAVENOUS at 20:56

## 2021-01-01 RX ADMIN — ENOXAPARIN SODIUM 40 MG: 40 INJECTION SUBCUTANEOUS at 20:26

## 2021-01-01 RX ADMIN — HYDROCODONE BITARTRATE AND ACETAMINOPHEN 1 TABLET: 5; 325 TABLET ORAL at 13:56

## 2021-01-01 RX ADMIN — DILTIAZEM HYDROCHLORIDE 30 MG: 30 TABLET ORAL at 16:44

## 2021-01-01 RX ADMIN — ASPIRIN 81 MG: 81 TABLET, FILM COATED ORAL at 08:44

## 2021-01-01 RX ADMIN — LEVOTHYROXINE SODIUM 25 MCG: 25 TABLET ORAL at 12:49

## 2021-01-01 RX ADMIN — HYDROCODONE BITARTRATE AND ACETAMINOPHEN 1 TABLET: 5; 325 TABLET ORAL at 20:36

## 2021-01-01 RX ADMIN — TRAMADOL HYDROCHLORIDE 50 MG: 50 TABLET, COATED ORAL at 07:51

## 2021-01-01 RX ADMIN — PIPERACILLIN SODIUM AND TAZOBACTAM SODIUM 3.38 G: 3; .375 INJECTION, POWDER, LYOPHILIZED, FOR SOLUTION INTRAVENOUS at 14:33

## 2021-01-01 RX ADMIN — INSULIN ASPART 4 UNITS: 100 INJECTION, SOLUTION INTRAVENOUS; SUBCUTANEOUS at 06:37

## 2021-01-01 RX ADMIN — INSULIN ASPART 2 UNITS: 100 INJECTION, SOLUTION INTRAVENOUS; SUBCUTANEOUS at 17:09

## 2021-01-01 RX ADMIN — SODIUM CHLORIDE, PRESERVATIVE FREE 10 ML: 5 INJECTION INTRAVENOUS at 08:56

## 2021-01-01 RX ADMIN — SODIUM CHLORIDE 100 ML/HR: 900 INJECTION, SOLUTION INTRAVENOUS at 14:00

## 2021-01-01 RX ADMIN — PIPERACILLIN SODIUM AND TAZOBACTAM SODIUM 3.38 G: 3; .375 INJECTION, POWDER, LYOPHILIZED, FOR SOLUTION INTRAVENOUS at 05:13

## 2021-01-01 RX ADMIN — PROPOFOL 20 MG: 10 INJECTION, EMULSION INTRAVENOUS at 13:34

## 2021-01-01 RX ADMIN — ASPIRIN 81 MG: 81 TABLET, FILM COATED ORAL at 09:13

## 2021-01-01 RX ADMIN — SACUBITRIL AND VALSARTAN 1 TABLET: 24; 26 TABLET, FILM COATED ORAL at 09:07

## 2021-01-01 RX ADMIN — SODIUM CHLORIDE, PRESERVATIVE FREE 3 ML: 5 INJECTION INTRAVENOUS at 08:11

## 2021-01-01 RX ADMIN — MAGNESIUM SULFATE HEPTAHYDRATE 2 G: 40 INJECTION, SOLUTION INTRAVENOUS at 21:03

## 2021-01-01 RX ADMIN — Medication 400 MG: at 08:09

## 2021-01-01 RX ADMIN — HYDROCODONE BITARTRATE AND ACETAMINOPHEN 1 TABLET: 5; 325 TABLET ORAL at 06:10

## 2021-01-01 RX ADMIN — TRAMADOL HYDROCHLORIDE 50 MG: 50 TABLET, COATED ORAL at 02:19

## 2021-01-01 RX ADMIN — CARVEDILOL 12.5 MG: 12.5 TABLET, FILM COATED ORAL at 20:49

## 2021-01-01 RX ADMIN — SODIUM CHLORIDE, PRESERVATIVE FREE 10 ML: 5 INJECTION INTRAVENOUS at 09:15

## 2021-01-01 RX ADMIN — DILTIAZEM HYDROCHLORIDE 30 MG: 30 TABLET ORAL at 17:09

## 2021-01-01 RX ADMIN — LEVOTHYROXINE SODIUM 25 MCG: 25 TABLET ORAL at 09:13

## 2021-01-01 RX ADMIN — INSULIN DETEMIR 15 UNITS: 100 INJECTION, SOLUTION SUBCUTANEOUS at 08:08

## 2021-01-01 RX ADMIN — PAROXETINE HYDROCHLORIDE 20 MG: 20 TABLET, FILM COATED ORAL at 20:42

## 2021-01-01 RX ADMIN — CHLORTHALIDONE 25 MG: 25 TABLET ORAL at 09:01

## 2021-01-01 RX ADMIN — CEFTRIAXONE SODIUM 2 G: 2 INJECTION, POWDER, FOR SOLUTION INTRAMUSCULAR; INTRAVENOUS at 15:00

## 2021-01-01 RX ADMIN — CHLORTHALIDONE 25 MG: 25 TABLET ORAL at 07:48

## 2021-01-01 RX ADMIN — ASPIRIN 81 MG: 81 TABLET, FILM COATED ORAL at 08:24

## 2021-01-01 RX ADMIN — NYSTATIN: 100000 POWDER TOPICAL at 07:56

## 2021-01-01 RX ADMIN — TRAMADOL HYDROCHLORIDE 50 MG: 50 TABLET, FILM COATED ORAL at 08:39

## 2021-01-01 RX ADMIN — DILTIAZEM HYDROCHLORIDE 30 MG: 30 TABLET ORAL at 17:21

## 2021-01-01 RX ADMIN — MAGNESIUM SULFATE HEPTAHYDRATE 2 G: 40 INJECTION, SOLUTION INTRAVENOUS at 15:55

## 2021-01-01 RX ADMIN — LEVOTHYROXINE SODIUM 25 MCG: 25 TABLET ORAL at 08:49

## 2021-01-01 RX ADMIN — SACUBITRIL AND VALSARTAN 1 TABLET: 24; 26 TABLET, FILM COATED ORAL at 08:02

## 2021-01-01 RX ADMIN — VANCOMYCIN HYDROCHLORIDE 750 MG: 100 INJECTION, POWDER, LYOPHILIZED, FOR SOLUTION INTRAVENOUS at 15:59

## 2021-01-01 RX ADMIN — SODIUM CHLORIDE 100 ML/HR: 900 INJECTION, SOLUTION INTRAVENOUS at 20:21

## 2021-01-01 RX ADMIN — INSULIN ASPART 3 UNITS: 100 INJECTION, SOLUTION INTRAVENOUS; SUBCUTANEOUS at 07:29

## 2021-01-01 RX ADMIN — ASPIRIN 81 MG: 81 TABLET, FILM COATED ORAL at 08:55

## 2021-01-01 RX ADMIN — CARVEDILOL 12.5 MG: 12.5 TABLET, FILM COATED ORAL at 20:10

## 2021-01-01 RX ADMIN — INSULIN ASPART 2 UNITS: 100 INJECTION, SOLUTION INTRAVENOUS; SUBCUTANEOUS at 17:29

## 2021-01-01 RX ADMIN — MORPHINE SULFATE 1 MG: 2 INJECTION, SOLUTION INTRAMUSCULAR; INTRAVENOUS at 18:37

## 2021-01-01 RX ADMIN — INSULIN ASPART 5 UNITS: 100 INJECTION, SOLUTION INTRAVENOUS; SUBCUTANEOUS at 11:20

## 2021-01-01 RX ADMIN — PAROXETINE HYDROCHLORIDE 20 MG: 20 TABLET, FILM COATED ORAL at 21:14

## 2021-01-01 RX ADMIN — DOXYCYCLINE 100 MG: 100 INJECTION, POWDER, LYOPHILIZED, FOR SOLUTION INTRAVENOUS at 11:13

## 2021-01-01 RX ADMIN — INSULIN ASPART 7 UNITS: 100 INJECTION, SOLUTION INTRAVENOUS; SUBCUTANEOUS at 12:17

## 2021-01-01 RX ADMIN — CARVEDILOL 12.5 MG: 12.5 TABLET, FILM COATED ORAL at 21:03

## 2021-01-01 RX ADMIN — ISOSORBIDE MONONITRATE 30 MG: 30 TABLET, EXTENDED RELEASE ORAL at 08:24

## 2021-01-01 RX ADMIN — SODIUM CHLORIDE, PRESERVATIVE FREE 10 ML: 5 INJECTION INTRAVENOUS at 09:23

## 2021-01-01 RX ADMIN — POTASSIUM CHLORIDE 10 MEQ: 7.46 INJECTION, SOLUTION INTRAVENOUS at 12:08

## 2021-01-01 RX ADMIN — AMIODARONE HYDROCHLORIDE 400 MG: 200 TABLET ORAL at 01:53

## 2021-01-01 RX ADMIN — SODIUM CHLORIDE, PRESERVATIVE FREE 10 ML: 5 INJECTION INTRAVENOUS at 08:10

## 2021-01-01 RX ADMIN — DILTIAZEM HYDROCHLORIDE 30 MG: 30 TABLET ORAL at 12:26

## 2021-01-01 RX ADMIN — INSULIN ASPART 2 UNITS: 100 INJECTION, SOLUTION INTRAVENOUS; SUBCUTANEOUS at 11:30

## 2021-01-01 RX ADMIN — DILTIAZEM HYDROCHLORIDE 30 MG: 30 TABLET ORAL at 17:34

## 2021-01-01 RX ADMIN — DILTIAZEM HYDROCHLORIDE 30 MG: 30 TABLET ORAL at 16:28

## 2021-01-01 RX ADMIN — INSULIN ASPART 2 UNITS: 100 INJECTION, SOLUTION INTRAVENOUS; SUBCUTANEOUS at 16:38

## 2021-01-01 RX ADMIN — NYSTATIN: 100000 POWDER TOPICAL at 20:23

## 2021-01-01 RX ADMIN — DONEPEZIL HYDROCHLORIDE 2.5 MG: 5 TABLET, FILM COATED ORAL at 20:54

## 2021-01-01 RX ADMIN — CARVEDILOL 12.5 MG: 12.5 TABLET, FILM COATED ORAL at 07:52

## 2021-01-01 RX ADMIN — TRAMADOL HYDROCHLORIDE 50 MG: 50 TABLET, COATED ORAL at 08:48

## 2021-01-01 RX ADMIN — ASPIRIN 81 MG: 81 TABLET, FILM COATED ORAL at 08:21

## 2021-01-01 RX ADMIN — ASPIRIN 81 MG: 81 TABLET, FILM COATED ORAL at 08:03

## 2021-01-01 RX ADMIN — DILTIAZEM HYDROCHLORIDE 30 MG: 30 TABLET ORAL at 05:41

## 2021-01-01 RX ADMIN — OLANZAPINE 5 MG: 2.5 TABLET, FILM COATED ORAL at 20:55

## 2021-01-01 RX ADMIN — ATORVASTATIN CALCIUM 20 MG: 20 TABLET, FILM COATED ORAL at 21:18

## 2021-01-01 RX ADMIN — ATORVASTATIN CALCIUM 20 MG: 20 TABLET, FILM COATED ORAL at 21:14

## 2021-01-01 RX ADMIN — ISOSORBIDE MONONITRATE 30 MG: 30 TABLET, EXTENDED RELEASE ORAL at 08:46

## 2021-01-01 RX ADMIN — LEVOTHYROXINE SODIUM 25 MCG: 25 TABLET ORAL at 08:37

## 2021-01-01 RX ADMIN — INSULIN DETEMIR 10 UNITS: 100 INJECTION, SOLUTION SUBCUTANEOUS at 20:26

## 2021-01-01 RX ADMIN — CARVEDILOL 12.5 MG: 12.5 TABLET, FILM COATED ORAL at 20:00

## 2021-01-01 RX ADMIN — PIPERACILLIN SODIUM AND TAZOBACTAM SODIUM 3.38 G: 3; .375 INJECTION, POWDER, LYOPHILIZED, FOR SOLUTION INTRAVENOUS at 05:55

## 2021-01-01 RX ADMIN — POTASSIUM CHLORIDE 10 MEQ: 7.46 INJECTION, SOLUTION INTRAVENOUS at 08:16

## 2021-01-01 RX ADMIN — SODIUM CHLORIDE, PRESERVATIVE FREE 10 ML: 5 INJECTION INTRAVENOUS at 20:28

## 2021-01-01 RX ADMIN — POTASSIUM CHLORIDE 10 MEQ: 7.46 INJECTION, SOLUTION INTRAVENOUS at 11:07

## 2021-01-01 RX ADMIN — POTASSIUM CHLORIDE 40 MEQ: 10 CAPSULE, COATED, EXTENDED RELEASE ORAL at 17:41

## 2021-01-01 RX ADMIN — NYSTATIN: 100000 POWDER TOPICAL at 20:52

## 2021-01-01 RX ADMIN — LEVOTHYROXINE SODIUM 25 MCG: 25 TABLET ORAL at 09:28

## 2021-01-01 RX ADMIN — METOPROLOL TARTRATE 5 MG: 5 INJECTION INTRAVENOUS at 08:49

## 2021-01-01 RX ADMIN — ONDANSETRON HYDROCHLORIDE 4 MG: 4 TABLET, FILM COATED ORAL at 05:32

## 2021-01-01 RX ADMIN — DILTIAZEM HYDROCHLORIDE 30 MG: 30 TABLET ORAL at 11:04

## 2021-01-01 RX ADMIN — CARVEDILOL 12.5 MG: 12.5 TABLET, FILM COATED ORAL at 08:49

## 2021-01-01 RX ADMIN — LEVOTHYROXINE SODIUM 25 MCG: 25 TABLET ORAL at 08:03

## 2021-01-01 RX ADMIN — POTASSIUM CHLORIDE 40 MEQ: 10 CAPSULE, COATED, EXTENDED RELEASE ORAL at 08:27

## 2021-01-01 RX ADMIN — ASPIRIN 81 MG: 81 TABLET, FILM COATED ORAL at 09:43

## 2021-01-01 RX ADMIN — CARVEDILOL 12.5 MG: 12.5 TABLET, FILM COATED ORAL at 20:50

## 2021-01-01 RX ADMIN — ISOSORBIDE MONONITRATE 30 MG: 30 TABLET, EXTENDED RELEASE ORAL at 09:07

## 2021-01-01 RX ADMIN — OLANZAPINE 5 MG: 2.5 TABLET, FILM COATED ORAL at 20:01

## 2021-01-01 RX ADMIN — TRAMADOL HYDROCHLORIDE 50 MG: 50 TABLET, COATED ORAL at 05:03

## 2021-01-01 RX ADMIN — PIPERACILLIN SODIUM AND TAZOBACTAM SODIUM 3.38 G: 3; .375 INJECTION, POWDER, LYOPHILIZED, FOR SOLUTION INTRAVENOUS at 18:13

## 2021-01-01 RX ADMIN — INSULIN DETEMIR 10 UNITS: 100 INJECTION, SOLUTION SUBCUTANEOUS at 21:18

## 2021-01-01 RX ADMIN — NYSTATIN: 100000 POWDER TOPICAL at 08:16

## 2021-01-01 RX ADMIN — FUROSEMIDE 40 MG: 10 INJECTION, SOLUTION INTRAVENOUS at 23:30

## 2021-01-01 RX ADMIN — CEFEPIME HYDROCHLORIDE 2 G: 2 INJECTION, POWDER, FOR SOLUTION INTRAVENOUS at 04:53

## 2021-01-01 RX ADMIN — NITROGLYCERIN 1 PATCH: 0.2 PATCH TRANSDERMAL at 08:53

## 2021-01-01 RX ADMIN — PIPERACILLIN SODIUM AND TAZOBACTAM SODIUM 3.38 G: 3; .375 INJECTION, POWDER, LYOPHILIZED, FOR SOLUTION INTRAVENOUS at 11:50

## 2021-01-01 RX ADMIN — MORPHINE SULFATE 2 MG: 2 INJECTION, SOLUTION INTRAMUSCULAR; INTRAVENOUS at 00:21

## 2021-01-01 RX ADMIN — SODIUM CHLORIDE, PRESERVATIVE FREE 10 ML: 5 INJECTION INTRAVENOUS at 21:18

## 2021-01-01 RX ADMIN — INSULIN DETEMIR 15 UNITS: 100 INJECTION, SOLUTION SUBCUTANEOUS at 20:58

## 2021-01-01 RX ADMIN — SODIUM CHLORIDE 8 UNITS/HR: 9 INJECTION, SOLUTION INTRAVENOUS at 08:43

## 2021-01-01 RX ADMIN — METOPROLOL TARTRATE 5 MG: 5 INJECTION INTRAVENOUS at 11:28

## 2021-01-01 RX ADMIN — OLANZAPINE 5 MG: 2.5 TABLET, FILM COATED ORAL at 20:39

## 2021-01-01 RX ADMIN — ACETAMINOPHEN 650 MG: 325 TABLET ORAL at 14:48

## 2021-01-01 RX ADMIN — SODIUM CHLORIDE, PRESERVATIVE FREE 10 ML: 5 INJECTION INTRAVENOUS at 20:23

## 2021-01-01 RX ADMIN — INSULIN ASPART 4 UNITS: 100 INJECTION, SOLUTION INTRAVENOUS; SUBCUTANEOUS at 08:31

## 2021-01-01 RX ADMIN — ISOSORBIDE MONONITRATE 30 MG: 30 TABLET, EXTENDED RELEASE ORAL at 09:43

## 2021-01-01 RX ADMIN — TRAMADOL HYDROCHLORIDE 50 MG: 50 TABLET, COATED ORAL at 08:45

## 2021-01-01 RX ADMIN — ASPIRIN 81 MG: 81 TABLET, FILM COATED ORAL at 09:07

## 2021-01-01 RX ADMIN — ISOSORBIDE MONONITRATE 30 MG: 30 TABLET, EXTENDED RELEASE ORAL at 12:49

## 2021-01-01 RX ADMIN — METOPROLOL TARTRATE 5 MG: 5 INJECTION INTRAVENOUS at 17:09

## 2021-01-01 RX ADMIN — ACETAMINOPHEN 650 MG: 325 TABLET, FILM COATED ORAL at 15:22

## 2021-01-01 RX ADMIN — POTASSIUM CHLORIDE 10 MEQ: 7.46 INJECTION, SOLUTION INTRAVENOUS at 00:34

## 2021-01-01 RX ADMIN — SODIUM CHLORIDE, PRESERVATIVE FREE 10 ML: 5 INJECTION INTRAVENOUS at 20:12

## 2021-01-01 RX ADMIN — PIPERACILLIN SODIUM AND TAZOBACTAM SODIUM 3.38 G: 3; .375 INJECTION, POWDER, LYOPHILIZED, FOR SOLUTION INTRAVENOUS at 11:20

## 2021-01-01 RX ADMIN — INSULIN ASPART 6 UNITS: 100 INJECTION, SOLUTION INTRAVENOUS; SUBCUTANEOUS at 12:31

## 2021-01-20 ENCOUNTER — TELEPHONE (OUTPATIENT)
Dept: ENDOCRINOLOGY | Facility: CLINIC | Age: 66
End: 2021-01-20

## 2021-01-20 NOTE — TELEPHONE ENCOUNTER
Pt needs lab orders faxed to [facility] on [facility] and [address]   Did not have fax number     Also wants to know if we have heard anything about a PA on a CGM for her [phone]    Thank You

## 2021-03-06 PROBLEM — Z72.0 TOBACCO ABUSE: Status: ACTIVE | Noted: 2021-01-01

## 2021-03-06 PROBLEM — A41.9 SEPSIS WITH ENCEPHALOPATHY WITHOUT SEPTIC SHOCK (HCC): Status: ACTIVE | Noted: 2021-01-01

## 2021-03-06 PROBLEM — G93.40 SEPSIS WITH ENCEPHALOPATHY WITHOUT SEPTIC SHOCK (HCC): Status: ACTIVE | Noted: 2021-01-01

## 2021-03-06 PROBLEM — N39.0 UTI (URINARY TRACT INFECTION): Status: ACTIVE | Noted: 2021-01-01

## 2021-03-06 PROBLEM — R65.20 SEPSIS WITH ENCEPHALOPATHY WITHOUT SEPTIC SHOCK: Status: ACTIVE | Noted: 2021-01-01

## 2021-03-06 NOTE — ED NOTES
Attempted to in and out cath patient but was unsuccessful due to pt anatomy. Provider made aware. Pt was bladder scanned and showed 200ml of urine in bladder.      Shante Foote RN  03/06/21 2054

## 2021-03-06 NOTE — ED PROVIDER NOTES
Subjective   66yo female pmh significant htn/hyperlipidemia/dm2/hypothyroid/cad/pvd/RA presents ED via EMS reported 2d hx increased lethargy/sleeping associated with decreased urinary output/nausea/retching/chills/hypothermia (Temp 95F).  Pt ill appearing on arrival with delayed verbal response and vague history.  History provided by patient caregiver.      History provided by:  Patient and caregiver  History limited by:  Mental status change  Illness  Severity:  Severe  Onset quality:  Gradual  Duration:  2 days  Associated symptoms: fatigue and nausea        Review of Systems   Unable to perform ROS: Mental status change   Constitutional: Positive for activity change, appetite change, chills and fatigue.   Respiratory: Negative.    Cardiovascular: Negative.    Gastrointestinal: Positive for nausea. Negative for anal bleeding.   Genitourinary: Positive for decreased urine volume.       Past Medical History:   Diagnosis Date   • Arthritis, rheumatoid (CMS/HCC)    • Arthropathy associated with neurological disorder    • Arthropathy of lumbar facet joint    • Asthma    • Benign hypertension    • Carpal tunnel syndrome    • Diabetes (CMS/HCC)    • Diabetic polyneuropathy (CMS/HCC)    • Dyslipidemia    • Fallen arches    • Hammer toe    • Heart disease    • Joint pain    • Mild depression (CMS/HCC)     Saddness post Surgery 7/10/14 improved after counseling.   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 Diffusely calcified LAD with 30-80% stenosis. OMB #3 with 60% to 70% stenosis. Preserved LV systolic function with EF of 55%.   • CARPAL TUNNEL RELEASE Right 10/15/2014    Right carpal tunnel release.   • CARPAL TUNNEL RELEASE     • CATARACT EXTRACTION, BILATERAL Bilateral    • CORONARY ARTERY BYPASS GRAFT  02/24/2014    CABG x 3 with LIMA to LAD, endarterectomy to LAD, SVG to OMB and SVG to distal RCA with endarterectomy to distal RCA. Endo-vein harvesting.   • CYSTECTOMY Left     Breast cycst   • EXCISION LESION      Remove breast lesion - cyst   • EYE SURGERY      Insert lens prosthesis   • FOOT SURGERY  10/18/2011    Exostectomy of the right foot. Preulcerative lesion with Charot deformity, right foot   • LUMBAR SPINE SURGERY  11/09/2015    Lumbosacral radiofrequency thermal coagulation.   • NERVE BLOCK  09/14/2015    Lumbar medial branch block.   • TOE NAIL AVULSION  09/03/2015    DEBRIDE NAIL 6 OR MORE 45458 (1)       Family History   Problem Relation Age of Onset   • Asthma Other    • Coronary artery disease Other    • Diabetes Other    • Hyperlipidemia Other    • Hypertension Other    • Osteoporosis Other    • Cancer Other    • Osteoporosis Mother    • Heart disease Mother    • Heart disease Father    • Diabetes Father    • Heart disease Sister    • Diabetes Sister        Social History     Socioeconomic History   • Marital status:      Spouse name: Not on file   • Number of children: Not on file   • Years of education: Not on file   • Highest education level: Not on file   Tobacco Use   • Smoking status: Former Smoker     Types: Cigarettes   • Smokeless tobacco: Never Used   Substance and Sexual Activity   • Alcohol use: No   • Drug use: No   • Sexual activity: Defer           Objective   Physical Exam  Vitals and nursing note reviewed.   Constitutional:       General: She is not in acute distress.      Appearance: She is ill-appearing.   HENT:      Head: Normocephalic and atraumatic.      Right Ear: Tympanic membrane, ear canal and external ear normal. There is no impacted cerumen.      Left Ear: Tympanic membrane, ear canal and external ear normal. There is no impacted cerumen.      Nose: Nose normal.      Mouth/Throat:      Mouth: Mucous membranes are moist.   Eyes:      Pupils: Pupils are equal, round, and reactive to light.   Neck:      Trachea: Trachea and phonation normal.      Meningeal: Brudzinski's sign and Kernig's sign absent.   Cardiovascular:      Rate and Rhythm: Normal rate and regular rhythm.      Pulses: Normal pulses.      Heart sounds: Normal heart sounds. No murmur. No friction rub. No gallop.    Pulmonary:      Effort: Pulmonary effort is normal. No respiratory distress.      Breath sounds: Normal breath sounds. No stridor. No wheezing, rhonchi or rales.   Abdominal:      General: Abdomen is flat. Bowel sounds are normal. There is no distension.      Palpations: Abdomen is soft.      Tenderness: There is no abdominal tenderness. There is no right CVA tenderness, left CVA tenderness, guarding or rebound.   Musculoskeletal:         General: No swelling.      Cervical back: Normal range of motion and neck supple. No rigidity.   Lymphadenopathy:      Cervical: No cervical adenopathy.   Skin:     General: Skin is warm and dry.      Coloration: Skin is pale.   Neurological:      General: No focal deficit present.      Mental Status: She is oriented to person, place, and time.      GCS: GCS eye subscore is 3. GCS verbal subscore is 5. GCS motor subscore is 6.      Sensory: Sensation is intact.      Motor: Motor function is intact.         ECG 12 Lead      Date/Time: 3/6/2021 2:05 PM  Performed by: Lamont Rivera MD  Authorized by: Lamont Rivera MD   Interpreted by physician  Rhythm: sinus rhythm  Rate: normal  BPM: 88  QRS axis: normal  Conduction: conduction normal  ST Segments: ST segments  normal  T Waves: T waves normal  Other: no other findings  Other findings: PRWP  Clinical impression: abnormal ECG                 ED Course      Labs Reviewed   COMPREHENSIVE METABOLIC PANEL - Abnormal; Notable for the following components:       Result Value    Glucose 182 (*)     BUN 32 (*)     Creatinine 1.46 (*)     Albumin 2.90 (*)     AST (SGOT) 36 (*)     eGFR Non  Amer 36 (*)     All other components within normal limits    Narrative:     GFR Normal >60  Chronic Kidney Disease <60  Kidney Failure <15     LIPASE - Abnormal; Notable for the following components:    Lipase 5 (*)     All other components within normal limits   CBC WITH AUTO DIFFERENTIAL - Abnormal; Notable for the following components:    WBC 21.09 (*)     RBC 3.67 (*)     Hemoglobin 11.7 (*)     Hematocrit 33.6 (*)     All other components within normal limits   PROTIME-INR - Abnormal; Notable for the following components:    Protime 17.9 (*)     INR 1.40 (*)     All other components within normal limits    Narrative:     Therapeutic range for most indications is 2.0-3.0 INR,  or 2.5-3.5 for mechanical heart valves.   TSH+FREE T4 - Abnormal; Notable for the following components:    Free T4 0.90 (*)     All other components within normal limits   BLOOD GAS, ARTERIAL - Abnormal; Notable for the following components:    pO2, Arterial 76.2 (*)     O2 Saturation, Arterial 93.8 (*)     All other components within normal limits   MANUAL DIFFERENTIAL - Abnormal; Notable for the following components:    Lymphocyte % 6.0 (*)     Bands %  13.0 (*)     Neutrophils Absolute 18.14 (*)     Monocytes Absolute 1.69 (*)     All other components within normal limits   POCT GLUCOSE FINGERSTICK - Abnormal; Notable for the following components:    Glucose 190 (*)     All other components within normal limits   LACTIC ACID, PLASMA - Normal   APTT - Normal    Narrative:     The recommended Heparin therapeutic range is 68-97 seconds.   TROPONIN (IN-HOUSE) -  Normal    Narrative:     Troponin T Reference Range:  <= 0.03 ng/mL-   Negative for AMI  >0.03 ng/mL-     Abnormal for myocardial necrosis.  Clinicians would have to utilize clinical acumen, EKG, Troponin and serial changes to determine if it is an Acute Myocardial Infarction or myocardial injury due to an underlying chronic condition.       Results may be falsely decreased if patient taking Biotin.     BLOOD CULTURE   COVID-19 AND FLU A/B, NP SWAB IN TRANSPORT MEDIA 8-12 HR TAT   BLOOD GAS, ARTERIAL   URINALYSIS W/ MICROSCOPIC IF INDICATED (NO CULTURE)   POCT GLUCOSE FINGERSTICK   CBC AND DIFFERENTIAL    Narrative:     The following orders were created for panel order CBC & Differential.  Procedure                               Abnormality         Status                     ---------                               -----------         ------                     Scan Slide[[id number]]                                                                  CBC Auto Differential[[id number]]        Abnormal            Final result                 Please view results for these tests on the individual orders.   EXTRA TUBES    Narrative:     The following orders were created for panel order Extra Tubes.  Procedure                               Abnormality         Status                     ---------                               -----------         ------                     Gold Top - SST[[id number]]                                   In process                   Please view results for these tests on the individual orders.   GOLD TOP - SST     CT Head Without Contrast    Result Date: 3/6/2021  Narrative: CT Head Without Contrast History: Altered mental status Axial scans of the brain were obtained without intravenous contrast.  Coronal and sagital reconstructions were preformed. This exam was performed according to our departmental dose-optimization program, which includes automated exposure control, adjustment of the mA and/or kV  according to patient size and/or use of iterative reconstruction technique. DLP: 955.30 Comparison: July 23, 2019 Findings: Bone windows are unremarkable. The visualized paranasal sinuses are unremarkable. No acute process. Minimal cerebral atrophy. No hemorrhage. No mass. No abnormal areas of increased or decreased attenuation. No midline shift. No abnormal extra-axial fluid collections.     Impression: CONCLUSION: No acute process. Minimal cerebral atrophy. [id number] Electronically signed by:  Darius Kruger MD  3/6/2021 3:35 PM CST Workstation: Apportable Chest 1 View    Result Date: 3/6/2021  Narrative: PORTABLE CHEST HISTORY: Coronary artery disease Portable AP film of the chest was obtained at 1:54 PM. COMPARISON: October 14, 2020 FINDINGS: EKG leads. Linear atelectasis lung bases. Coronary artery bypass. The heart is not enlarged. The pulmonary vasculature is not increased. No pleural effusion. No pneumothorax. No acute osseous abnormality. Degenerative changes are present in the thoracic spine.     Impression: CONCLUSION: Linear atelectasis lung bases. Coronary artery bypass. [id number] Electronically signed by:  Darius Kruger MD  3/6/2021 3:22 PM CST Workstation: [employer]                                         [facility]    Final diagnoses:   Sepsis with encephalopathy without septic shock, due to unspecified organism (CMS/[facility])   NAGI (acute kidney injury) (CMS/[facility])            Lamont Rivera MD  03/06/21 1552

## 2021-03-06 NOTE — H&P
HISTORY AND PHYSICAL   [facility]        Patient Identification:  Name: Janelle Millard  Age: 65 y.o.  Sex: female  :  1955  MRN: [id number]                     Primary Care Physician: Arelis Laird MD    Chief Complaint:  Weakness with nausea and vomting with altered mental status    History of Present Illness:        The patient is a 65-year-old white female with history of rheumatoid arthritis, hypertension, diabetes on insulin type I, neuropathy, hyperlipidemia,   coronary artery disease status post CABG, peripheral vascular disease and tobacco abuse who was admitted with 1 day history of being extremely weak lethargic with some nausea and vomiting lower back pain and some symptoms of chills and low-grade fever.  The patient was very lethargic and not acting like her usual self and was brought to the emergency room for further evaluation.  The patient appeared to be septic and also was found to have urinary tract infection in the ER.  Cultures were done and patient was given some fluid boluses and started on broad-spectrum IV antibiotics and admitted for further evaluation treatment with sepsis.    Past Medical History:  Past Medical History:   Diagnosis Date   • Arthritis, rheumatoid (CMS/HCC)    • Arthropathy associated with neurological disorder    • Arthropathy of lumbar facet joint    • Asthma    • Benign hypertension    • Carpal tunnel syndrome    • Diabetes (CMS/HCC)    • Diabetic polyneuropathy (CMS/HCC)    • Dyslipidemia    • Fallen arches    • Hammer toe    • Heart disease    • Joint pain    • Mild depression (CMS/HCC)     Saddness post Surgery 7/10/14 improved after counseling.   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)     Neuropathy of chest      • Neuropathy    • Onychomycosis    • Peripheral vascular disease (CMS/HCC)    • Retinopathy    • Tobacco user     • Type 1 diabetes mellitus (CMS/HCC)      Past Surgical History:  Past Surgical History:   Procedure Laterality Date   • CARDIAC SURGERY  02/21/2014    Critical stenosis in the RCA. Diffusely calcified LAD with 30-80% stenosis. OMB #3 with 60% to 70% stenosis. Preserved LV systolic function with EF of 55%.   • CARPAL TUNNEL RELEASE Right 10/15/2014    Right carpal tunnel release.   • CARPAL TUNNEL RELEASE     • CATARACT EXTRACTION, BILATERAL Bilateral    • CORONARY ARTERY BYPASS GRAFT  02/24/2014    CABG x 3 with LIMA to LAD, endarterectomy to LAD, SVG to OMB and SVG to distal RCA with endarterectomy to distal RCA. Endo-vein harvesting.   • CYSTECTOMY Left     Breast cycst   • EXCISION LESION      Remove breast lesion - cyst   • EYE SURGERY      Insert lens prosthesis   • FOOT SURGERY  10/18/2011    Exostectomy of the right foot. Preulcerative lesion with Charot deformity, right foot   • LUMBAR SPINE SURGERY  11/09/2015    Lumbosacral radiofrequency thermal coagulation.   • NERVE BLOCK  09/14/2015    Lumbar medial branch block.   • TOE NAIL AVULSION  09/03/2015    DEBRIDE NAIL 6 OR MORE 72752 (1)      Home Meds:  (Not in a hospital admission)    CURRENT MEDS    Current Facility-Administered Medications:   •  acetaminophen (TYLENOL) tablet 650 mg, 650 mg, Oral, Q4H PRN **OR** acetaminophen (TYLENOL) 160 MG/5ML solution 650 mg, 650 mg, Oral, Q4H PRN **OR** acetaminophen (TYLENOL) suppository 650 mg, 650 mg, Rectal, Q4H PRN, Steven Pompa MD  •  enoxaparin (LOVENOX) syringe 40 mg, 40 mg, Subcutaneous, Q24H, Steven Pompa MD  •  ondansetron (ZOFRAN) tablet 4 mg, 4 mg, Oral, Q6H PRN **OR** ondansetron (ZOFRAN) injection 4 mg, 4 mg, Intravenous, Q6H PRN, Steven Pompa MD  •  Pharmacy to Dose Zosyn, , Does not apply, Continuous PRN, Steven Pompa MD  •  piperacillin-tazobactam (ZOSYN) 3.375 g/100 mL 0.9% NS IVPB (mbp), 3.375 g, Intravenous, Q8H, Steven Pompa MD  •  [COMPLETED] Insert peripheral IV, , , Once **AND**  sodium chloride 0.9 % flush 10 mL, 10 mL, Intravenous, PRN, Lamont Rivera MD  •  sodium chloride 0.9 % flush 10 mL, 10 mL, Intravenous, Q12H, Steven Pompa MD  •  sodium chloride 0.9 % flush 10 mL, 10 mL, Intravenous, PRN, Steven Pompa MD  •  sodium chloride 0.9 % infusion, 100 mL/hr, Intravenous, Continuous, Steven Pompa MD  •  vancomycin 1750 mg/500 mL 0.9% NS IVPB ([facility]), 20 mg/kg, Intravenous, Once, Lamont Rivera MD, 1,750 mg at 03/06/21 1631    Current Outpatient Medications:   •  acetaminophen (TYLENOL) 325 MG tablet, Take 650 mg by mouth 3 (Three) Times a Day., Disp: , Rfl:   •  aspirin 81 MG EC tablet, Take 81 mg by mouth Daily., Disp: , Rfl:   •  atorvastatin (LIPITOR) 20 MG tablet, Take 1 tablet by mouth Every Night., Disp: 90 tablet, Rfl: 4  •  B-D ULTRAFINE III SHORT PEN 31G X 8 MM misc, , Disp: , Rfl:   •  carvedilol (COREG) 12.5 MG tablet, TAKE 1 TABLET BY MOUTH TWICE DAILY, Disp: , Rfl:   •  Continuous Blood Gluc Sensor (DEXCOM G6 SENSOR), As Needed (glucose control). Every 10 daysDexcom G6 Sensor Kit [address]-85 Use as directed for continuous glucose monitoring, Disp: 9 each, Rfl: 3  •  gabapentin (NEURONTIN) 600 MG tablet, Take 1,200 mg by mouth 3 (Three) Times a Day., Disp: , Rfl:   •  glucose blood test strip, Livongot test strips use to test sugar 4 times per day. E 10.9, Disp: 200 each, Rfl: 11  •  insulin aspart (novoLOG FLEXPEN) 100 UNIT/ML solution pen-injector sc pen, 150-200=2 units, 201-250=3 units, 251-300=5 units, 301-350=7 units, 351-400=9 units, 401-450=12 units, >450=15 units, Disp: 4 pen, Rfl: 11  •  Insulin Glargine, 2 Unit Dial, (Toujeo Max SoloStar) 300 UNIT/ML solution pen-injector injection, Inject 28 Units under the skin into the appropriate area as directed Every Night., Disp: 2 pen, Rfl: 11  •  isosorbide mononitrate (IMDUR) 30 MG 24 hr tablet, Take 30 mg by mouth Daily., Disp: , Rfl:   •  levothyroxine (SYNTHROID) 25 MCG tablet, Take 25 mcg by mouth Daily., Disp: ,  Rfl:   •  lisinopril (PRINIVIL,ZESTRIL) 10 MG tablet, Take 10 mg by mouth Daily., Disp: , Rfl:   •  melatonin 5 MG tablet tablet, Take 5 mg by mouth Every Night., Disp: , Rfl:   •  Multiple Vitamins-Minerals (Centrum Silver 50+Women) tablet, Take 1 tablet by mouth Daily., Disp: , Rfl:   •  nitroglycerin (NITRODUR) 0.2 MG/HR patch, Place 1 patch on the skin as directed by provider Daily., Disp: , Rfl:   •  PARoxetine (PAXIL) 20 MG tablet, Take 20 mg by mouth Every Night., Disp: , Rfl:   •  promethazine (PHENERGAN) 25 MG tablet, Take 25 mg by mouth Every 6 (Six) Hours As Needed., Disp: , Rfl:   •  tiZANidine (ZANAFLEX) 4 MG tablet, Take 4 mg by mouth Every 8 (Eight) Hours As Needed., Disp: , Rfl:   •  TRUEPLUS LANCETS 33G misc, , Disp: , Rfl:   •  Vitamin D, Cholecalciferol, 50 MCG (2000 UT) capsule, Take 1 capsule by mouth Daily., Disp: , Rfl:   Allergies:  Allergies   Allergen Reactions   • Contrast Dye Shortness Of Breath and Itching   • Corticosteroids Other (See Comments)     Pt diabetic makes sugar go up    • Codeine Itching   • Ibuprofen Unknown - Low Severity     Immunizations:  Immunization History   Administered Date(s) Administered   • Flu Mist 10/06/2011   • Flulaval/Fluarix/Fluzone Quad 10/16/2020     Social History:   Social History     Social History Narrative   • Not on file     Social History     Socioeconomic History   • Marital status:      Spouse name: Not on file   • Number of children: Not on file   • Years of education: Not on file   • Highest education level: Not on file   Tobacco Use   • Smoking status: Former Smoker     Types: Cigarettes   • Smokeless tobacco: Never Used   Substance and Sexual Activity   • Alcohol use: No   • Drug use: No   • Sexual activity: Defer       Family History:  Family History   Problem Relation Age of Onset   • Asthma Other    • Coronary artery disease Other    • Diabetes Other    • Hyperlipidemia Other    • Hypertension Other    • Osteoporosis Other    •  "Cancer Other    • Osteoporosis Mother    • Heart disease Mother    • Heart disease Father    • Diabetes Father    • Heart disease Sister    • Diabetes Sister         Review of Systems  See history of present illness and past medical history.  Patient denies headache, dizziness, syncope, falls, trauma, change in vision, change in hearing, change in taste, changes in weight, changes in appetite, focal weakness, numbness, or paresthesia.  Patient denies chest pain, palpitations, dyspnea, orthopnea, PND, cough, sinus pressure, rhinorrhea, epistaxis, hemoptysis,hematemesis, diarrhea, constipation or hematchezia.  Denies cold or heat intolerance, polydipsia, polyuria, polyphagia. Denies hematuria, pyuria, dysuria, hesitancy, frequency or urgency.   Denies fever, chills, sweats, night sweats.  Remainder of ROS is negative.    Objective:  tMax 24 hrs: Temp (24hrs), Av.2 °F (37.3 °C), Min:99.2 °F (37.3 °C), Max:99.2 °F (37.3 °C)    Vitals Ranges:   Temp:  [99.2 °F (37.3 °C)] 99.2 °F (37.3 °C)  Heart Rate:  [86-88] 86  Resp:  [18] 18  BP: (101-106)/(47-50) 105/50      Exam:  /50 (BP Location: Left arm, Patient Position: Lying)   Pulse 86   Temp 99.2 °F (37.3 °C) (Axillary)   Resp 18   Ht 175.3 cm (69\")   Wt 87.2 kg (192 lb 4.8 oz)   SpO2 97%   BMI 28.40 kg/m²     General Appearance:    Alert, cooperative, no distress, appears stated age   Head:    Normocephalic, without obvious abnormality, atraumatic   Eyes:    PERRL, conjunctiva/corneas clear, EOM's intact, both eyes   Ears:    Normal external ear canals, both ears   Nose:   Nares normal, septum midline, mucosa normal, no drainage    or sinus tenderness   Throat:   Lips, mucosa, and tongue normal   Neck:   Supple, symmetrical, trachea midline, no adenopathy;     thyroid:  no enlargement/tenderness/nodules; no carotid    bruit or JVD   Back:     Symmetric, no curvature, ROM normal, no CVA tenderness   Lungs:     Clear to auscultation bilaterally, " respirations unlabored   Chest Wall:    No tenderness or deformity    Heart:    Regular rate and rhythm, S1 and S2 normal, no murmur, rub   or gallop   Abdomen:     Soft, non-tender, bowel sounds active all four quadrants,     no masses, no hepatomegaly, no splenomegaly   Extremities:   Extremities normal, atraumatic, no cyanosis or edema   Pulses:   2+ and symmetric all extremities   Skin:   Skin color, texture, turgor normal, no rashes or lesions   Lymph nodes:   Cervical, supraclavicular, and axillary nodes normal   Neurologic:   CNII-XII intact, normal strength, sensation intact throughout      .    Data Review:  Lab Results (last 72 hours)     Procedure Component Value Units Date/Time    COVID-19 and FLU A/B PCR - Swab, Nasopharynx [[id number]]  (Normal) Collected: 03/06/21 1559    Specimen: Swab from Nasopharynx Updated: 03/06/21 1651     COVID19 Not Detected     Influenza A PCR Not Detected     Influenza B PCR Not Detected    Narrative:      Fact sheet for providers: https://www.fda.gov/media/438666/download    Fact sheet for patients: https://www.fda.gov/media/254855/download    Test performed by PCR.    Extra Tubes [[id number]] Collected: 03/06/21 1502    Specimen: Blood from Arm, Right Updated: 03/06/21 1615    Narrative:      The following orders were created for panel order Extra Tubes.  Procedure                               Abnormality         Status                     ---------                               -----------         ------                     Gold Top - SST[[id number]]                                   Final result                 Please view results for these tests on the individual orders.    Gold Top - SST [[id number]] Collected: 03/06/21 1502    Specimen: Blood from Arm, Right Updated: 03/06/21 1615     Extra Tube Hold for add-ons.     Comment: Auto resulted.       Manual Differential [[id number]]  (Abnormal) Collected: 03/06/21 1502    Specimen: Blood Updated: 03/06/21 1535     Neutrophil  % 73.0 %      Lymphocyte % 6.0 %      Monocyte % 8.0 %      Bands %  13.0 %      Neutrophils Absolute 18.14 10*3/mm3      Lymphocytes Absolute 1.27 10*3/mm3      Monocytes Absolute 1.69 10*3/mm3      RBC Morphology Normal     WBC Morphology Normal     Platelet Morphology Normal    TSH+Free T4 [[id number]]  (Abnormal) Collected: 03/06/21 1502    Specimen: Blood Updated: 03/06/21 1534     TSH 1.120 uIU/mL      Free T4 0.90 ng/dL      Comment: T4 results may be falsely increased if patient taking Biotin.       Troponin [[id number]]  (Normal) Collected: 03/06/21 1502    Specimen: Blood Updated: 03/06/21 1534     Troponin T <0.010 ng/mL     Narrative:      Troponin T Reference Range:  <= 0.03 ng/mL-   Negative for AMI  >0.03 ng/mL-     Abnormal for myocardial necrosis.  Clinicians would have to utilize clinical acumen, EKG, Troponin and serial changes to determine if it is an Acute Myocardial Infarction or myocardial injury due to an underlying chronic condition.       Results may be falsely decreased if patient taking Biotin.      Comprehensive Metabolic Panel [[id number]]  (Abnormal) Collected: 03/06/21 1502    Specimen: Blood Updated: 03/06/21 1528     Glucose 182 mg/dL      BUN 32 mg/dL      Creatinine 1.46 mg/dL      Sodium 139 mmol/L      Potassium 4.2 mmol/L      Chloride 105 mmol/L      CO2 23.0 mmol/L      Calcium 9.0 mg/dL      Total Protein 6.1 g/dL      Albumin 2.90 g/dL      ALT (SGPT) 13 U/L      AST (SGOT) 36 U/L      Alkaline Phosphatase 90 U/L      Total Bilirubin 0.2 mg/dL      eGFR Non African Amer 36 mL/min/1.73      Globulin 3.2 gm/dL      A/G Ratio 0.9 g/dL      BUN/Creatinine Ratio 21.9     Anion Gap 11.0 mmol/L     Narrative:      GFR Normal >60  Chronic Kidney Disease <60  Kidney Failure <15      Lipase [[id number]]  (Abnormal) Collected: 03/06/21 1502    Specimen: Blood Updated: 03/06/21 1528     Lipase 5 U/L     aPTT [[id number]]  (Normal) Collected: 03/06/21 1502    Specimen: Blood Updated:  03/06/21 1525     PTT 37.0 seconds     Narrative:      The recommended Heparin therapeutic range is 68-97 seconds.    Lactic Acid, Plasma [[id number]]  (Normal) Collected: 03/06/21 1502    Specimen: Blood Updated: 03/06/21 1525     Lactate 0.9 mmol/L     Protime-INR [[id number]]  (Abnormal) Collected: 03/06/21 1502    Specimen: Blood Updated: 03/06/21 1524     Protime 17.9 Seconds      INR 1.40    Narrative:      Therapeutic range for most indications is 2.0-3.0 INR,  or 2.5-3.5 for mechanical heart valves.    CBC & Differential [[id number]]  (Abnormal) Collected: 03/06/21 1502    Specimen: Blood Updated: 03/06/21 1518    Narrative:      The following orders were created for panel order CBC & Differential.  Procedure                               Abnormality         Status                     ---------                               -----------         ------                     Scan Slide[[id number]]                                                                  CBC Auto Differential[[id number]]        Abnormal            Final result                 Please view results for these tests on the individual orders.    CBC Auto Differential [[id number]]  (Abnormal) Collected: 03/06/21 1502    Specimen: Blood Updated: 03/06/21 1518     WBC 21.09 10*3/mm3      RBC 3.67 10*6/mm3      Hemoglobin 11.7 g/dL      Hematocrit 33.6 %      MCV 91.6 fL      MCH 31.9 pg      MCHC 34.8 g/dL      RDW 14.6 %      RDW-SD 48.4 fl      MPV 10.2 fL      Platelets 194 10*3/mm3     Blood Culture - Blood, Arm, Right [[id number]] Collected: 03/06/21 1502    Specimen: Blood from Arm, Right Updated: 03/06/21 1506    POC Glucose Once [[id number]]  (Abnormal) Collected: 03/06/21 1410    Specimen: Blood Updated: 03/06/21 1424     Glucose 190 mg/dL      Comment: RN NotifiedNotify DoctorOperator: [id number] MEYER HARPREETTatianna ID: [id number]       Blood Gas, Arterial - [[id number]]  (Abnormal) Collected: 03/06/21 1345    Specimen: Arterial Blood Updated:  03/06/21 1359     Site Right Brachial     Addy's Test N/A     pH, Arterial 7.409 pH units      pCO2, Arterial 40.9 mm Hg      pO2, Arterial 76.2 mm Hg      Comment: 84 Value below reference range        HCO3, Arterial 25.8 mmol/L      Base Excess, Arterial 1.1 mmol/L      O2 Saturation, Arterial 93.8 %      Comment: 84 Value below reference range        Barometric Pressure for Blood Gas 755 mmHg      Modality Room Air     Flow Rate 21.0 lpm      Ventilator Mode NA     Collected by MM     Comment: Meter: [id number]     :  [id number]             Results from last 7 days   Lab Units 03/06/21  1502   TSH uIU/mL 1.120   FREE T4 ng/dL 0.90*          Imaging Results (All)     Procedure Component Value Units Date/Time    CT Abdomen Pelvis Without Contrast [[id number]] Collected: 03/06/21 1512     Updated: 03/06/21 1640    Narrative:        CT Abdomen and Pelvis Without Contrast    History: Vomiting    Axials spiral scans of the abdomen and pelvis were obtained  without contrast.  Coronal and sagital reconstructions were  preformed.      This exam was performed according to our departmental  dose-optimization program, which includes automated exposure  control, adjustment of the mA and/or kV according to patient size  and/or use of iterative reconstruction technique.    DLP: 453.50    Comparison: None    Findings:   Scans of the lung bases demonstrate minimal atelectasis.  Coronary artery bypass and coronary artery calcifications.  Cardiomegaly.    No acute osseous abnormality.  Degenerative changes and degenerative disc disease thoracic and  lumbar spine.    The liver is unremarkable.  The gallbladder is somewhat distended.  The spleen is unremarkable.  The pancreas is unremarkable.  The adrenal glands are unremarkable.    No renal or ureteral calculi.  No renal mass.  No hydronephrosis.    Unremarkable bladder.  No pelvic mass.    Extensive atherosclerotic calcifications.  No abdominal aortic aneurysm.  No  adenopathy.    No bowel obstruction.  Large amount retained feces in the colon.  No free air.  No free fluid.    No hernia.      Impression:      Conclusion:  Coronary artery bypass and coronary artery calcifications.  Cardiomegaly.  The gallbladder is somewhat distended.  Extensive atherosclerotic calcifications.  Large amount retained feces in the colon.    [id number]    Electronically signed by:  Darius Kruger MD  3/6/2021 4:38 PM CST  Workstation: Draft    CT Head Without Contrast [[id number]] Collected: 03/06/21 1510     Updated: 03/06/21 1536    Narrative:        CT Head Without Contrast    History: Altered mental status    Axial scans of the brain were obtained without intravenous  contrast.  Coronal and sagital reconstructions were preformed.    This exam was performed according to our departmental  dose-optimization program, which includes automated exposure  control, adjustment of the mA and/or kV according to patient size  and/or use of iterative reconstruction technique.    DLP: 955.30    Comparison: July 23, 2019    Findings:  Bone windows are unremarkable.  The visualized paranasal sinuses are unremarkable.    No acute process.  Minimal cerebral atrophy.  No hemorrhage.  No mass.  No abnormal areas of increased or decreased attenuation.  No midline shift.  No abnormal extra-axial fluid collections.      Impression:      CONCLUSION:  No acute process.  Minimal cerebral atrophy.    [id number]    Electronically signed by:  Darius Kruger MD  3/6/2021 3:35 PM CST  Workstation: Draft    XR Chest 1 View [[id number]] Collected: 03/06/21 1326     Updated: 03/06/21 1523    Narrative:        PORTABLE CHEST    HISTORY: Coronary artery disease    Portable AP film of the chest was obtained at 1:54 PM.  COMPARISON: October 14, 2020    FINDINGS:   EKG leads.  Linear atelectasis lung bases.  Coronary artery bypass.  The heart is not enlarged.  The pulmonary vasculature is not increased.  No pleural effusion.  No  pneumothorax.  No acute osseous abnormality.  Degenerative changes are present in the thoracic spine.      Impression:      CONCLUSION:  Linear atelectasis lung bases.  Coronary artery bypass.    [id number]    Electronically signed by:  Darius Kruger MD  3/6/2021 3:22 PM CST  Workstation: [employer]        Patient Active Problem List   Diagnosis Code   • Carpal tunnel syndrome of left wrist G56.02   • Type 1 diabetes mellitus with diabetic polyneuropathy (CMS/HCC) E10.42   • Mixed diabetic hyperlipidemia associated with type 1 diabetes mellitus (CMS/HCC) E10.69, E78.2   • Hypertension associated with diabetes (CMS/HCC) E11.59, I15.2   • Syncope and collapse R55   • CAD (coronary artery disease) I25.10   • Asthma J45.909   • Depressive disorder, not elsewhere classified F32.9   • Neuropathy G62.9   • Other specified rheumatoid arthritis, multiple sites (CMS/HCC) M06.89   • Carpal tunnel syndrome on both sides G56.03   • Bilateral carotid artery stenosis I65.23   • Overweight (BMI 25.0-29.9) E66.3   • Precordial pain R07.2   • Sepsis with encephalopathy without septic shock (CMS/HCC) A41.9, R65.20, G93.40   • Tobacco abuse Z72.0   • UTI (urinary tract infection) N39.0       Assessment:  Active Hospital Problems    Diagnosis  POA   • **Sepsis with encephalopathy without septic shock (CMS/HCC) [A41.9, R65.20, G93.40]  Yes   • Tobacco abuse [Z72.0]  Unknown   • UTI (urinary tract infection) [N39.0]  Unknown   • Type 1 diabetes mellitus with diabetic polyneuropathy (CMS/HCC) [E10.42]  Yes   • Mixed diabetic hyperlipidemia associated with type 1 diabetes mellitus (CMS/HCC) [E10.69, E78.2]  Yes   • Hypertension associated with diabetes (CMS/HCC) [E11.59, I15.2]  Yes   • Other specified rheumatoid arthritis, multiple sites (CMS/HCC) [M06.89]  Yes   • CAD (coronary artery disease) [I25.10]  Yes      Resolved Hospital Problems   No resolved problems to display.       Plan:  The patient is admitted to the hospital continue  broad-spectrum IV antibiotics and await results of cultures.  Patient appears to have sepsis with urinary tract infection.  She does have advanced directive and wishes to be DNR.  We will continue with some IV fluid for hydration.    Steven Pompa MD  3/6/2021  18:12 CST

## 2021-03-06 NOTE — ED NOTES
Pt used bedpan to have a bowel movement and urinated in bedpan as well. Unable to obtain sample due to contamination.      Shante Foote RN  03/06/21 8034

## 2021-03-07 NOTE — PLAN OF CARE
Problem: Adult Inpatient Plan of Care  Goal: Plan of Care Review  Outcome: Ongoing, Progressing  Flowsheets (Taken 3/6/2021 2226)  Progress: improving  Plan of Care Reviewed With: patient  Outcome Summary: pt receiving fluids/antibiotics  sleepy at present     Problem: Adult Inpatient Plan of Care  Goal: Patient-Specific Goal (Individualized)  Outcome: Ongoing, Progressing     Problem: Adult Inpatient Plan of Care  Goal: Absence of Hospital-Acquired Illness or Injury  Outcome: Ongoing, Progressing     Problem: Adult Inpatient Plan of Care  Goal: Absence of Hospital-Acquired Illness or Injury  Intervention: Identify and Manage Fall Risk  Flowsheets  Taken 3/6/2021 2200  Safety Promotion/Fall Prevention:   assistive device/personal items within reach   clutter free environment maintained   nonskid shoes/slippers when out of bed   safety round/check completed  Taken 3/6/2021 2100  Safety Promotion/Fall Prevention:   safety round/check completed   nonskid shoes/slippers when out of bed   clutter free environment maintained   assistive device/personal items within reach  Taken 3/6/2021 2000  Safety Promotion/Fall Prevention:   assistive device/personal items within reach   clutter free environment maintained   nonskid shoes/slippers when out of bed   safety round/check completed     Problem: Adult Inpatient Plan of Care  Goal: Absence of Hospital-Acquired Illness or Injury  Intervention: Prevent Skin Injury  Flowsheets  Taken 3/6/2021 2200  Body Position:   supine   side-lying, right  Taken 3/6/2021 2100  Body Position: supine  Taken 3/6/2021 2000  Body Position: supine     Problem: Adult Inpatient Plan of Care  Goal: Absence of Hospital-Acquired Illness or Injury  Intervention: Prevent and Manage VTE (venous thromboembolism) Risk  Flowsheets  Taken 3/6/2021 2200  VTE Prevention/Management: (pt on lovenox  sq) --  Taken 3/6/2021 2100  VTE Prevention/Management: (pt on lovenox  sq) --  Taken 3/6/2021 2000  VTE  Prevention/Management: (pt on lovenox  sq) --     Problem: Adult Inpatient Plan of Care  Goal: Absence of Hospital-Acquired Illness or Injury  Intervention: Prevent Infection  Flowsheets (Taken 3/6/2021 2226)  Infection Prevention: rest/sleep promoted     Problem: Adult Inpatient Plan of Care  Goal: Optimal Comfort and Wellbeing  Outcome: Ongoing, Progressing     Problem: Adult Inpatient Plan of Care  Goal: Optimal Comfort and Wellbeing  Intervention: Provide Person-Centered Care  Flowsheets (Taken 3/6/2021 2000)  Trust Relationship/Rapport:   care explained   questions encouraged     Problem: Adult Inpatient Plan of Care  Goal: Readiness for Transition of Care  Outcome: Ongoing, Progressing     Problem: Adult Inpatient Plan of Care  Goal: Readiness for Transition of Care  Intervention: Mutually Develop Transition Plan  Flowsheets  Taken 3/6/2021 2000  Equipment Currently Used at Home: walker, standard  Taken 3/6/2021 1951  Transportation Anticipated: family or friend will provide  Patient/Family Anticipated Services at Transition: none  Patient/Family Anticipates Transition to: home with family     Problem: Fall Injury Risk  Goal: Absence of Fall and Fall-Related Injury  Outcome: Ongoing, Progressing     Problem: Fall Injury Risk  Goal: Absence of Fall and Fall-Related Injury  Intervention: Identify and Manage Contributors to Fall Injury Risk  Flowsheets (Taken 3/6/2021 2000)  Medication Review/Management: medications reviewed     Problem: Fall Injury Risk  Goal: Absence of Fall and Fall-Related Injury  Intervention: Promote Injury-Free Environment  Flowsheets  Taken 3/6/2021 2200  Safety Promotion/Fall Prevention:   assistive device/personal items within reach   clutter free environment maintained   nonskid shoes/slippers when out of bed   safety round/check completed  Taken 3/6/2021 2100  Safety Promotion/Fall Prevention:   safety round/check completed   nonskid shoes/slippers when out of bed   clutter free  environment maintained   assistive device/personal items within reach  Taken 3/6/2021 2000  Safety Promotion/Fall Prevention:   assistive device/personal items within reach   clutter free environment maintained   nonskid shoes/slippers when out of bed   safety round/check completed     Problem: Skin Injury Risk Increased  Goal: Skin Health and Integrity  Outcome: Ongoing, Progressing     Problem: Skin Injury Risk Increased  Goal: Skin Health and Integrity  Intervention: Optimize Skin Protection  Flowsheets  Taken 3/6/2021 2200  Head of Bed (HOB): HOB at 20-30 degrees  Taken 3/6/2021 2100  Head of Bed (HOB): HOB at 20-30 degrees  Taken 3/6/2021 2000  Head of Bed (HOB): HOB at 20-30 degrees  Pressure Reduction Devices: positioning supports utilized     Problem: Glycemic Control Impaired (Sepsis/Septic Shock)  Goal: Blood Glucose Level Within Desired Range  Outcome: Ongoing, Progressing     Problem: Glycemic Control Impaired (Sepsis/Septic Shock)  Goal: Blood Glucose Level Within Desired Range  Intervention: Optimize Glycemic Control  Flowsheets (Taken 3/6/2021 2000)  Glycemic Management: blood glucose monitoring     Problem: Infection (Sepsis/Septic Shock)  Goal: Absence of Infection Signs and Symptoms  Outcome: Ongoing, Progressing     Problem: Infection (Sepsis/Septic Shock)  Goal: Absence of Infection Signs and Symptoms  Intervention: Prevent or Manage Infection Progression  Flowsheets (Taken 3/6/2021 2226)  Infection Prevention: rest/sleep promoted     Problem: UTI (Urinary Tract Infection)  Goal: Improved Infection Symptoms  Outcome: Ongoing, Progressing   Goal Outcome Evaluation:  Plan of Care Reviewed With: patient  Progress: improving  Outcome Summary: pt receiving fluids/antibiotics  sleepy at present

## 2021-03-07 NOTE — PROGRESS NOTES
"DAILY PROGRESS NOTE  [facility]    Patient Identification:  Name: Janelle Millard  Age: 65 y.o.  Sex: female  :  1955  MRN: [id number]         Primary Care Physician: Arelis Laird MD    Subjective:  Interval History:She is weak and confused.  Feels better.    Objective:    Scheduled Meds:enoxaparin, 40 mg, Subcutaneous, Q24H  piperacillin-tazobactam, 3.375 g, Intravenous, Q8H  sodium chloride, 10 mL, Intravenous, Q12H      Continuous Infusions:Pharmacy to Dose Zosyn,   sodium chloride, 100 mL/hr, Last Rate: 100 mL/hr (21)        Vital signs in last 24 hours:  Temp:  [96.8 °F (36 °C)-99.2 °F (37.3 °C)] 97 °F (36.1 °C)  Heart Rate:  [84-91] 91  Resp:  [18] 18  BP: (100-137)/(47-67) 120/56    Intake/Output:    Intake/Output Summary (Last 24 hours) at 3/7/2021 1158  Last data filed at 3/7/2021 0900  Gross per 24 hour   Intake 2050 ml   Output --   Net 2050 ml       Exam:  /56 (BP Location: Right arm, Patient Position: Lying)   Pulse 91   Temp 97 °F (36.1 °C) (Oral)   Resp 18   Ht 175.3 cm (69\")   Wt 89.5 kg (197 lb 6.4 oz)   SpO2 93%   BMI 29.15 kg/m²     General Appearance:    Alert, cooperative, no distress   Head:    Normocephalic, without obvious abnormality, atraumatic   Eyes:       Throat:   Lips, tongue, gums normal   Neck:   Supple, symmetrical, trachea midline, no JVD   Lungs:     Clear to auscultation bilaterally, respirations unlabored   Chest Wall:    No tenderness or deformity    Heart:    Regular rate and rhythm, S1 and S2 normal, no murmur,no  Rub or gallop   Abdomen:     Soft, non-tender, bowel sounds active, no masses, no organomegaly    Extremities:   Extremities normal, atraumatic, no cyanosis or edema   Pulses:      Skin:   Skin is warm and dry,  no rashes or palpable lesions   Neurologic:   no focal deficits noted      [unfilled]  Data Review:  Results from last 7 days   Lab Units 21  0539 21  1502   SODIUM mmol/L 137 139 "   POTASSIUM mmol/L 4.1 4.2   CHLORIDE mmol/L 103 105   CO2 mmol/L 22.0 23.0   BUN mg/dL 44* 32*   CREATININE mg/dL 1.35* 1.46*   GLUCOSE mg/dL 225* 182*   CALCIUM mg/dL 8.7 9.0     Results from last 7 days   Lab Units 03/07/21  0539 03/06/21  1502   WBC 10*3/mm3 18.41* 21.09*   HEMOGLOBIN g/dL 12.4 11.7*   HEMATOCRIT % 36.5 33.6*   PLATELETS 10*3/mm3 168 194     Results from last 7 days   Lab Units 03/06/21  1502   TSH uIU/mL 1.120   FREE T4 ng/dL 0.90*         Lab Results   Lab Value Date/Time    TROPONINT <0.010 03/06/2021 1502    TROPONINT <0.010 10/14/2020 1850    TROPONINT <0.010 10/14/2020 1216    TROPONINT <0.010 10/14/2020 1015    TROPONINT <0.010 07/23/2019 0814    TROPONINT <0.010 07/23/2019 0301         Results from last 7 days   Lab Units 03/06/21  1502   ALK PHOS U/L 90   BILIRUBIN mg/dL 0.2   ALT (SGPT) U/L 13   AST (SGOT) U/L 36*     Results from last 7 days   Lab Units 03/06/21  1502   TSH uIU/mL 1.120   FREE T4 ng/dL 0.90*         Glucose   Date/Time Value Ref Range Status   03/06/2021 1944 218 (H) 70 - 130 mg/dL Final     Comment:     Result Not ConfirmedOperator: [id number] AJAY GAVINTAMeter ID: [id number]   03/06/2021 1410 190 (H) 70 - 130 mg/dL Final     Comment:     RN NotifiedNotify DoctorOperator: [id number] BETSY Armenta ID: [id number]     Results from last 7 days   Lab Units 03/06/21  1502   INR  1.40*       Patient Active Problem List   Diagnosis Code   • Carpal tunnel syndrome of left wrist G56.02   • Type 1 diabetes mellitus with diabetic polyneuropathy (CMS/HCC) E10.42   • Mixed diabetic hyperlipidemia associated with type 1 diabetes mellitus (CMS/HCC) E10.69, E78.2   • Hypertension associated with diabetes (CMS/[facility]) E11.59, I15.2   • Syncope and collapse R55   • CAD (coronary artery disease) I25.10   • Asthma J45.909   • Depressive disorder, not elsewhere classified F32.9   • Neuropathy G62.9   • Other specified rheumatoid arthritis, multiple sites (CMS/[facility]) M06.89   • Carpal  tunnel syndrome on both sides G56.03   • Bilateral carotid artery stenosis I65.23   • Overweight (BMI 25.0-29.9) E66.3   • Precordial pain R07.2   • Sepsis with encephalopathy without septic shock (CMS/HCC) A41.9, R65.20, G93.40   • Tobacco abuse Z72.0   • UTI (urinary tract infection) N39.0       Assessment:  Active Hospital Problems    Diagnosis  POA   • **Sepsis with encephalopathy without septic shock (CMS/HCC) [A41.9, R65.20, G93.40]  Yes   • Tobacco abuse [Z72.0]  Unknown   • UTI (urinary tract infection) [N39.0]  Unknown   • Type 1 diabetes mellitus with diabetic polyneuropathy (CMS/HCC) [E10.42]  Yes   • Mixed diabetic hyperlipidemia associated with type 1 diabetes mellitus (CMS/HCC) [E10.69, E78.2]  Yes   • Hypertension associated with diabetes (CMS/HCC) [E11.59, I15.2]  Yes   • Other specified rheumatoid arthritis, multiple sites (CMS/HCC) [M06.89]  Yes   • CAD (coronary artery disease) [I25.10]  Yes      Resolved Hospital Problems   No resolved problems to display.       Plan:  Continue with IV fluids, antibiotics and await cultures. Follow lab.    Steven Pompa MD  3/7/2021  11:58 CST

## 2021-03-07 NOTE — PLAN OF CARE
Goal Outcome Evaluation:  Plan of Care Reviewed With: patient  Progress: no change   Pt admitted for septic shock, still confused at times, incont b/b at times, prn pain meds effective, will continue to monitor

## 2021-03-08 NOTE — PROGRESS NOTES
Discharge Planning Assessment  [facility]     Patient Name: Janelle Millard  MRN: [id number]  Today's Date: 3/8/2021    Admit Date: 3/6/2021    Discharge Needs Assessment     Row Name 03/08/21 1056       Living Environment    Lives With  other relative(s)    Name(s) of Who Lives With Patient  Lamine. Pt nephew and his spouse.    Current Living Arrangements  home/apartment/condo    Primary Care Provided by  other (see comments) Pt has caregiver that assist with needs M-Fr for 8 hours a day.    Provides Primary Care For  no one    Family Caregiver if Needed  other relative(s);other (see comments)    Family Caregiver Names  Amy Villarreal    Quality of Family Relationships  helpful;involved;supportive    Able to Return to Prior Arrangements  yes    Living Arrangement Comments  Pt resides at home with nephew and his spouse. Pt also has caregiver that assist with needs M-Fr.       Resource/Environmental Concerns    Resource/Environmental Concerns  none    Transportation Concerns  -- Pt relies on family for assistance with transporation.       Transition Planning    Patient/Family Anticipates Transition to  home with family    Patient/Family Anticipated Services at Transition  none    Transportation Anticipated  family or friend will provide       Discharge Needs Assessment    Equipment Currently Used at Home  commode;wheelchair;walker, standard;cane, straight;shower chair    Concerns to be Addressed  adjustment to diagnosis/illness    Anticipated Changes Related to Illness  none    Equipment Needed After Discharge  none    Discharge Facility/Level of Care Needs  -- Did not anticipate need for home health.    Current Discharge Risk  chronically ill    Discharge Coordination/Progress  Pt's PCP is Dr Laird.        Discharge Plan     Row Name 03/08/21 1100       Plan    Plan Comments  LSW assessment complete. Pt resides at home with nephew and his spouse. Pt appears to have good support melva. Pt has  caregiver M-Fr for 8 hours a day. Per caregiver pt is mostly independent with ADLs and she assist with IADLs. Pt does use cane, walker and wheelchair to assist with mobility. Pt has used home health in past no services currently. Plan is for pt to return home at d/c and resume services with caregiver. No additional needs anticipated at this time. LSW/case mgt will follow up as consulted and complete arrangements as ordered. Gallo Rodriguez LSW    Row Name 03/08/21 0981       Plan    Plan Comments  Continue with IV fluids, antibiotics and await cultures.Still has some confusion. LINO Esteves,[facility]        Continued Care and Services - Admitted Since 3/6/2021    Coordination has not been started for this encounter.         Demographic Summary     Row Name 03/08/21 1051       General Information    Admission Type  inpatient    Referral Source  high risk screening    Reason for Consult  discharge planning    Preferred Language  English     Used During This Interaction  yes       Contact Information    Contact Information Obtained for          Functional Status     Row Name 03/08/21 1052       Functional Status    Usual Activity Tolerance  fair    Current Activity Tolerance  fair    Functional Status Comments  Pt uses wheelchair, walker and cane to assist with mobility.       Functional Status, IADL    Medications  assistive person Pt uses [facility]    Meal Preparation  assistive person    Housekeeping  assistive person    Laundry  assistive person    Shopping  assistive person    IADL Comments  According to caregiver pt is mostly independent with ADLs and she assist with IADLs.       Mental Status Summary    Recent Changes in Mental Status/Cognitive Functioning  no changes        Psychosocial    No documentation.       Abuse/Neglect    No documentation.       Legal    No documentation.       Substance Abuse    No documentation.       Patient Forms    No documentation.           Gallo Rodriguez,  LSW

## 2021-03-08 NOTE — PLAN OF CARE
Goal Outcome Evaluation:  Plan of Care Reviewed With: patient  Progress: no change  Outcome Summary: Initital assessment.  Intake 25% - 1x, 0% - 1x.  Encourage intake.

## 2021-03-08 NOTE — PLAN OF CARE
Problem: Adult Inpatient Plan of Care  Goal: Plan of Care Review  Outcome: Ongoing, Progressing  Flowsheets  Taken 3/7/2021 1746 by Wally Rausch RN  Progress: no change  Plan of Care Reviewed With: patient  Taken 3/6/2021 2226 by Alexey Casey RN  Outcome Summary: pt receiving fluids/antibiotics  sleepy at present     Problem: Adult Inpatient Plan of Care  Goal: Patient-Specific Goal (Individualized)  Outcome: Ongoing, Progressing     Problem: Adult Inpatient Plan of Care  Goal: Absence of Hospital-Acquired Illness or Injury  Outcome: Ongoing, Progressing     Problem: Adult Inpatient Plan of Care  Goal: Absence of Hospital-Acquired Illness or Injury  Intervention: Identify and Manage Fall Risk  Flowsheets  Taken 3/8/2021 0000  Safety Promotion/Fall Prevention:   safety round/check completed   nonskid shoes/slippers when out of bed   clutter free environment maintained   assistive device/personal items within reach  Taken 3/7/2021 2200  Safety Promotion/Fall Prevention:   assistive device/personal items within reach   clutter free environment maintained   nonskid shoes/slippers when out of bed   safety round/check completed  Taken 3/7/2021 2100  Safety Promotion/Fall Prevention:   assistive device/personal items within reach   clutter free environment maintained   nonskid shoes/slippers when out of bed   safety round/check completed  Taken 3/7/2021 2000  Safety Promotion/Fall Prevention:   safety round/check completed   nonskid shoes/slippers when out of bed   clutter free environment maintained   assistive device/personal items within reach  Taken 3/7/2021 1900  Safety Promotion/Fall Prevention:   assistive device/personal items within reach   clutter free environment maintained   nonskid shoes/slippers when out of bed   safety round/check completed     Problem: Adult Inpatient Plan of Care  Goal: Absence of Hospital-Acquired Illness or Injury  Intervention: Prevent Skin Injury  Flowsheets  Taken 3/8/2021  0000  Body Position: side-lying, left  Taken 3/7/2021 2200  Body Position: side-lying, right  Taken 3/7/2021 2100  Body Position: supine  Taken 3/7/2021 2000  Body Position: side-lying, left  Taken 3/7/2021 1900  Body Position: side-lying, left     Problem: Adult Inpatient Plan of Care  Goal: Absence of Hospital-Acquired Illness or Injury  Intervention: Prevent and Manage VTE (venous thromboembolism) Risk  Flowsheets  Taken 3/8/2021 0000  VTE Prevention/Management: (pt taking lovenox  sq) --  Taken 3/7/2021 2200  VTE Prevention/Management: (pt taking lovenox  sq) --  Taken 3/7/2021 2100  VTE Prevention/Management: (pt taking lovenox  sq) --  Taken 3/7/2021 2000  VTE Prevention/Management: (pt taking lovenox  sq) --  Taken 3/7/2021 1900  VTE Prevention/Management: (pt taking lovenox  sq) --     Problem: Adult Inpatient Plan of Care  Goal: Absence of Hospital-Acquired Illness or Injury  Intervention: Prevent Infection  Flowsheets  Taken 3/8/2021 0000  Infection Prevention: rest/sleep promoted  Taken 3/7/2021 2200  Infection Prevention: rest/sleep promoted  Taken 3/7/2021 2100  Infection Prevention: rest/sleep promoted  Taken 3/7/2021 2000  Infection Prevention: rest/sleep promoted  Taken 3/7/2021 1900  Infection Prevention: rest/sleep promoted     Problem: Adult Inpatient Plan of Care  Goal: Optimal Comfort and Wellbeing  Outcome: Ongoing, Progressing     Problem: Adult Inpatient Plan of Care  Goal: Optimal Comfort and Wellbeing  Intervention: Provide Person-Centered Care  Flowsheets (Taken 3/7/2021 2000)  Trust Relationship/Rapport:   care explained   questions encouraged     Problem: Adult Inpatient Plan of Care  Goal: Readiness for Transition of Care  Outcome: Ongoing, Progressing     Problem: Adult Inpatient Plan of Care  Goal: Readiness for Transition of Care  Intervention: Mutually Develop Transition Plan  Flowsheets  Taken 3/6/2021 2000  Equipment Currently Used at Home: walker, standard  Taken 3/6/2021  1951  Transportation Anticipated: family or friend will provide  Patient/Family Anticipated Services at Transition: none  Patient/Family Anticipates Transition to: home with family     Problem: Fall Injury Risk  Goal: Absence of Fall and Fall-Related Injury  Outcome: Ongoing, Progressing     Problem: Fall Injury Risk  Goal: Absence of Fall and Fall-Related Injury  Intervention: Identify and Manage Contributors to Fall Injury Risk  Flowsheets (Taken 3/7/2021 2000)  Medication Review/Management: medications reviewed     Problem: Fall Injury Risk  Goal: Absence of Fall and Fall-Related Injury  Intervention: Promote Injury-Free Environment  Flowsheets  Taken 3/8/2021 0000  Safety Promotion/Fall Prevention:   safety round/check completed   nonskid shoes/slippers when out of bed   clutter free environment maintained   assistive device/personal items within reach  Taken 3/7/2021 2200  Safety Promotion/Fall Prevention:   assistive device/personal items within reach   clutter free environment maintained   nonskid shoes/slippers when out of bed   safety round/check completed  Taken 3/7/2021 2100  Safety Promotion/Fall Prevention:   assistive device/personal items within reach   clutter free environment maintained   nonskid shoes/slippers when out of bed   safety round/check completed  Taken 3/7/2021 2000  Safety Promotion/Fall Prevention:   safety round/check completed   nonskid shoes/slippers when out of bed   clutter free environment maintained   assistive device/personal items within reach  Taken 3/7/2021 1900  Safety Promotion/Fall Prevention:   assistive device/personal items within reach   clutter free environment maintained   nonskid shoes/slippers when out of bed   safety round/check completed     Problem: Skin Injury Risk Increased  Goal: Skin Health and Integrity  Outcome: Ongoing, Progressing     Problem: Skin Injury Risk Increased  Goal: Skin Health and Integrity  Intervention: Optimize Skin  Protection  Flowsheets  Taken 3/8/2021 0000  Head of Bed (HOB): HOB at 20-30 degrees  Pressure Reduction Devices: specialty bed utilized  Taken 3/7/2021 2200  Head of Bed (HOB): HOB at 20-30 degrees  Pressure Reduction Devices: specialty bed utilized  Taken 3/7/2021 2100  Head of Bed (HOB): HOB at 20-30 degrees  Pressure Reduction Devices: specialty bed utilized  Taken 3/7/2021 2000  Head of Bed (HOB): HOB at 20-30 degrees  Pressure Reduction Devices:   specialty bed utilized   positioning supports utilized  Taken 3/7/2021 1900  Head of Bed (HOB): HOB at 20-30 degrees  Pressure Reduction Devices:   positioning supports utilized   specialty bed utilized     Problem: Glycemic Control Impaired (Sepsis/Septic Shock)  Goal: Blood Glucose Level Within Desired Range  Outcome: Ongoing, Progressing     Problem: Glycemic Control Impaired (Sepsis/Septic Shock)  Goal: Blood Glucose Level Within Desired Range  Intervention: Optimize Glycemic Control  Flowsheets (Taken 3/7/2021 2000)  Glycemic Management: blood glucose monitoring     Problem: Infection (Sepsis/Septic Shock)  Goal: Absence of Infection Signs and Symptoms  Outcome: Ongoing, Progressing     Problem: Infection (Sepsis/Septic Shock)  Goal: Absence of Infection Signs and Symptoms  Intervention: Prevent or Manage Infection Progression  Flowsheets  Taken 3/8/2021 0000  Infection Prevention: rest/sleep promoted  Taken 3/7/2021 2200  Infection Prevention: rest/sleep promoted  Taken 3/7/2021 2100  Infection Prevention: rest/sleep promoted  Taken 3/7/2021 2000  Infection Prevention: rest/sleep promoted  Taken 3/7/2021 1900  Infection Prevention: rest/sleep promoted     Problem: UTI (Urinary Tract Infection)  Goal: Improved Infection Symptoms  Outcome: Ongoing, Progressing   Goal Outcome Evaluation:  Plan of Care Reviewed With: patient  Progress: improving  Outcome Summary: pt receiving fluids/antibiotics  sleepy at present

## 2021-03-08 NOTE — PROGRESS NOTES
"    [facility] Medicine Services  INPATIENT PROGRESS NOTE    Length of Stay: 2  Date of Admission: 3/6/2021  Primary Care Physician: Arelis Laird MD    Subjective   Chief Complaint: I feel terrible  HPI:  Patient reports feeling \"terrible\" and describes diffuse pain \"all over\" and increasing pain in her abdomen. She has associated nausea but no vomiting. No fever, chills. No diarrhea. No cough, congestion, chest pain, shortness of breath.   Requests that home medications be resumed for pain, arthritis.     Review of Systems     All pertinent negatives and positives are as above. All other systems have been reviewed and are negative unless otherwise stated.     Objective    Temp:  [97 °F (36.1 °C)-98 °F (36.7 °C)] 97.4 °F (36.3 °C)  Heart Rate:  [82-95] 82  Resp:  [18] 18  BP: (124-164)/(58-78) 164/71    Physical Exam  Vitals and nursing note reviewed.   Constitutional:       General: She is in acute distress.   HENT:      Head: Normocephalic.      Mouth/Throat:      Mouth: Mucous membranes are moist.   Cardiovascular:      Rate and Rhythm: Normal rate and regular rhythm.      Pulses: Normal pulses.   Pulmonary:      Effort: Pulmonary effort is normal.   Abdominal:      General: Bowel sounds are normal.      Palpations: Abdomen is soft.      Tenderness: There is abdominal tenderness (localized to RUQ). There is no guarding or rebound.   Musculoskeletal:      Cervical back: Normal range of motion and neck supple.      Right lower leg: No edema.      Left lower leg: No edema.   Skin:     General: Skin is warm and dry.   Neurological:      General: No focal deficit present.      Mental Status: She is alert and oriented to person, place, and time.   Psychiatric:         Mood and Affect: Mood normal.         Behavior: Behavior normal.             Results Review:  I have reviewed the labs, radiology results, and diagnostic studies.    Laboratory Data:   Results from last 7 " days   Lab Units 03/08/21  0611 03/07/21  0539 03/06/21  1502   SODIUM mmol/L 139 137 139   POTASSIUM mmol/L 4.0 4.1 4.2   CHLORIDE mmol/L 108* 103 105   CO2 mmol/L 24.0 22.0 23.0   BUN mg/dL 22 44* 32*   CREATININE mg/dL 0.58 1.35* 1.46*   GLUCOSE mg/dL 252* 225* 182*   CALCIUM mg/dL 9.4 8.7 9.0   BILIRUBIN mg/dL  --   --  0.2   ALK PHOS U/L  --   --  90   ALT (SGPT) U/L  --   --  13   AST (SGOT) U/L  --   --  36*   ANION GAP mmol/L 7.0 12.0 11.0     Estimated Creatinine Clearance: 80.3 mL/min (by C-G formula based on SCr of 0.58 mg/dL).          Results from last 7 days   Lab Units 03/08/21  0611 03/07/21  0539 03/06/21  1502   WBC 10*3/mm3 21.79* 18.41* 21.09*   HEMOGLOBIN g/dL 11.0* 12.4 11.7*   HEMATOCRIT % 32.3* 36.5 33.6*   PLATELETS 10*3/mm3 181 168 194     Results from last 7 days   Lab Units 03/06/21  1502   INR  1.40*       Culture Data:   Blood Culture   Date Value Ref Range Status   03/06/2021 Escherichia coli (C)  Preliminary     Urine Culture   Date Value Ref Range Status   03/06/2021 >100,000 CFU/mL Escherichia coli (A)  Final     No results found for: RESPCX  No results found for: WOUNDCX  No results found for: STOOLCX  No components found for: BODYFLD    Radiology Data:   Imaging Results (Last 24 Hours)     ** No results found for the last 24 hours. **          I have reviewed the patient's current medications.     Assessment/Plan         Sepsis with encephalopathy without septic shock (CMS/HCC)-Initially presumed secondary to UTI. Urine and blood cultures with growth of Ecoli susceptible to current coverage with zosyn d# 3.   Leukocytosis is worsening today. Continue current coverage and obtain repeat cultures as well as RUQ US (pain on exam, CT shows distention).     Type 1 diabetes mellitus with diabetic polyneuropathy (CMS/HCC)- Uncontrolled by review of labs. Increase levemir dosing and continue SSI/accuchecks.     Mixed diabetic hyperlipidemia associated with type 1 diabetes mellitus  (CMS/[facility])    Hypertension associated with diabetes (CMS/[facility])- Controlled on coreg and isosorbide.     CAD (coronary artery disease)-Stable without angina. Continue ASA, statin, BB, nitrates.     Other specified rheumatoid arthritis, multiple sites (CMS/[facility])    Tobacco abuse    UTI (urinary tract infection)- as above.         Discharge Planning: I expect patient to be discharged in 2-3 days.     Kalie Anderson MD

## 2021-03-08 NOTE — CONSULTS
Adult Nutrition  Assessment    Patient Name:  Janelle Millard  YOB: 1955  MRN: [id number]  Admit Date:  3/6/2021    Assessment Date:  3/8/2021    Comments:  Pt admitted due to sepsis with encephalopathy.  Pt reports poor appetite.  Reports 15 lb wt loss the past ~ 2 weeks.  Intake 25% - 1x, 0% - 1x.  Pt declined supplements.  Has problems chewing and needs a soft ground diet.  Indicates she has a sore throat.  Reports nausea, some vomiting.  PRN Zofran prescribed.   Blood glucose, HgbA1C are elevated.  Levemir insulin, sliding scale novolog prescribed.  Will maintain pt on prescribed diet, monitor, and make recommendations as appropriate.       Reason for Assessment     Row Name 03/08/21 1348          Reason for Assessment    Reason For Assessment  identified at risk by screening criteria     Identified At Risk by Screening Criteria  MST SCORE 2+             Labs/Tests/Procedures/Meds     Row Name 03/08/21 1345          Labs/Procedures/Meds    Lab Results Reviewed  reviewed, pertinent        Medications    Pertinent Medications Reviewed  reviewed, pertinent           Estimated/Assessed Needs     Row Name 03/08/21 1346          Calculation Measurements    Weight Used For Calculations  71.2 kg (156 lb 15.5 oz)        Estimated/Assessed Needs    Additional Documentation  Calorie Requirements (Group);Fluid Requirements (Group);[place]-St. Jeor Equation (Group);Protein Requirements (Group)        Calorie Requirements    Estimated Calorie Requirement (kcal/day)  1704     Estimated Calorie Need Method  [place]-St Jeor        [place]-St. Jeor Equation    RMR ([place]-St. Jeor Equation)  1311.375        Protein Requirements    Weight Used For Protein Calculations  71.7 kg (158 lb 1.1 oz)     Est Protein Requirement Amount (gms/kg)  1.2 gm protein     Estimated Protein Requirements (gms/day)  86.04        Fluid Requirements    Fluid Requirements (mL/day)  1778     RDA Method (mL)  1778         Nutrition  Prescription Ordered     Row Name 03/08/21 1349          Nutrition Prescription PO    Current PO Diet  Regular     Common Modifiers  Cardiac;Consistent Carbohydrate         Evaluation of Received Nutrient/Fluid Intake     Row Name 03/08/21 1351          PO Evaluation    Number of Meals  2     % PO Intake  25% - 1x, 0% - 1x               Electronically signed by:  Krista Rodriguez RD  03/08/21 13:52 CST

## 2021-03-09 NOTE — PLAN OF CARE
Goal Outcome Evaluation:  Plan of Care Reviewed With: patient  Progress: declining   Patient BP elevated. Dr. Trivedi made aware. New orders received. Patient c/o abdominal pain. Prn medication administered.

## 2021-03-09 NOTE — PROGRESS NOTES
[facility] Medicine Services  INPATIENT PROGRESS NOTE    Length of Stay: 3  Date of Admission: 3/6/2021  Primary Care Physician: Arelis Laird MD      HPI:  Patient is lethargic this morning. By RN report, patient recently received dose of IV morphine and has been sleeping all morning. No evidence of respiratory compromise.   Patient awakes and states she feels better. Says medication has helped her pain. Falls back to sleep before answering additional questions.   Per RN, reported one BM yesterday. Unsure if formed. No vomiting. Restless overnight due to pain with reported hypertension.     Review of Systems     All pertinent negatives and positives are as above. All other systems have been reviewed and are negative unless otherwise stated.     Objective    Temp:  [96.7 °F (35.9 °C)-97.7 °F (36.5 °C)] 96.7 °F (35.9 °C)  Heart Rate:  [] 84  Resp:  [16-25] 16  BP: (150-200)/() 168/85    Physical Exam  Vitals and nursing note reviewed.   Constitutional:       General: She is not in acute distress.     Appearance: She is not ill-appearing, toxic-appearing or diaphoretic.   HENT:      Head: Normocephalic.      Mouth/Throat:      Mouth: Mucous membranes are moist.   Cardiovascular:      Rate and Rhythm: Normal rate and regular rhythm.      Pulses: Normal pulses.   Pulmonary:      Effort: Pulmonary effort is normal.   Abdominal:      General: Bowel sounds are normal.      Palpations: Abdomen is soft. There is no mass.      Tenderness: There is no guarding or rebound.      Comments: Non-tender throughout abdomen and bilateral flanks   Musculoskeletal:      Cervical back: Normal range of motion and neck supple.      Right lower leg: No edema.      Left lower leg: No edema.   Skin:     General: Skin is warm and dry.   Neurological:      General: No focal deficit present.      Mental Status: She is alert and oriented to person, place, and time.   Psychiatric:          Mood and Affect: Mood normal.         Behavior: Behavior normal.             Results Review:  I have reviewed the labs, radiology results, and diagnostic studies.    Laboratory Data:   Results from last 7 days   Lab Units 03/09/21  0637 03/08/21  0611 03/07/21  0539 03/06/21  1502   SODIUM mmol/L 138 139 137 139   POTASSIUM mmol/L 4.2 4.0 4.1 4.2   CHLORIDE mmol/L 110* 108* 103 105   CO2 mmol/L 20.0* 24.0 22.0 23.0   BUN mg/dL 9 22 44* 32*   CREATININE mg/dL 0.54* 0.58 1.35* 1.46*   GLUCOSE mg/dL 208* 252* 225* 182*   CALCIUM mg/dL 9.5 9.4 8.7 9.0   BILIRUBIN mg/dL 0.4  --   --  0.2   ALK PHOS U/L 139*  --   --  90   ALT (SGPT) U/L 13  --   --  13   AST (SGOT) U/L 26  --   --  36*   ANION GAP mmol/L 8.0 7.0 12.0 11.0     Estimated Creatinine Clearance: 81 mL/min (A) (by C-G formula based on SCr of 0.54 mg/dL (L)).          Results from last 7 days   Lab Units 03/09/21  0444 03/08/21  0611 03/07/21  0539 03/06/21  1502   WBC 10*3/mm3 23.17* 21.79* 18.41* 21.09*   HEMOGLOBIN g/dL 12.3 11.0* 12.4 11.7*   HEMATOCRIT % 37.7 32.3* 36.5 33.6*   PLATELETS 10*3/mm3 184 181 168 194     Results from last 7 days   Lab Units 03/06/21  1502   INR  1.40*       Culture Data:   Blood Culture   Date Value Ref Range Status   03/06/2021 Escherichia coli (C)  Final     Urine Culture   Date Value Ref Range Status   03/06/2021 >100,000 CFU/mL Escherichia coli (A)  Final     No results found for: RESPCX  No results found for: WOUNDCX  No results found for: STOOLCX  No components found for: BODYFLD    Radiology Data:   Imaging Results (Last 24 Hours)       Procedure Component Value Units Date/Time     Gallbladder [[id number]] Collected: 03/08/21 1453     Updated: 03/08/21 1612    Narrative:      EXAMINATION:  ultrasounds abdomen, limited (RUQ)    CLINICAL INDICATION / HISTORY:  RUQ pain and nausea, A41.9  Sepsis, unspecified organism R65.20 Severe sepsis without septic  shock G93.40 Encephalopathy, unspecified N17.9 Acute  kidney  failure, unspecified    COMPARISON:  none    TECHNIQUE:  ultrasound, limited, right upper quadrant    FULL RESULTS / FINDINGS:    Liver:      size: limited evaluation, grossly negative      echotexture wnl      no focal mass or intrahepatic biliary ductal dilatation     Biliary:    Gall bladder:  no cholelithiasis                            no wall thickening or pericholecystic  fluid      Common bile duct:  normal caliber      Pancreas (visualized portions):    Cannot see the pancreas well enough to evaluate.    Kidney, right (limited):      size:  normal, measuring 11.5 x 5.7 x 6.5 cm    echotexture:  normal    no nephrolithiasis, solid mass, or collecting system dilation        Impression:      CONCLUSION:   1.  Cannot see the pancreas well enough to evaluate.  2.  Otherwise negative ultrasound right upper abdominal quadrant.    Electronically signed by:  Malick Watson MD  3/8/2021 3:08 PM CST  Workstation: [id number]            I have reviewed the patient's current medications.     Assessment/Plan         Sepsis with encephalopathy without septic shock (CMS/HCC)-Initially presumed secondary to UTI. Urine and blood cultures with growth of Ecoli susceptible to current coverage with zosyn d# 3.   Leukocytosis with further worsening today. LA 1.4. RUQ US negative and exam limited due to sedation/recent morphine dosing.  Continue current coverage and await repeat culture results.  Considering interval development of abscess vs cholecystitis. Will obtain repeat CT over HIDA scan this am. Spoke with patient's POA who reports severe/anaphylactic reaction.    Type 1 diabetes mellitus with diabetic polyneuropathy (CMS/HCC)- Improved control by review of labs. Continue levemir and SSI/accuchecks.     Mixed diabetic hyperlipidemia associated with type 1 diabetes mellitus (CMS/HCC)    Hypertension associated with diabetes (CMS/HCC)- Controlled on coreg and isosorbide.     CAD (coronary artery disease)-Stable without  angina. Continue ASA, statin, BB, nitrates.     Other specified rheumatoid arthritis, multiple sites (CMS/[facility])    Tobacco abuse    UTI (urinary tract infection)- as above.     NAGI-on admission, now resolved.      Discharge Planning: I expect patient to be discharged in 2-3 days.     Kalie Anderson MD

## 2021-03-09 NOTE — PLAN OF CARE
Goal Outcome Evaluation:     Progress: no change         Somnolent most of the shift; monitoring urinary retention at this time. Bladder scan revealed retention earlier in the shift, but was able to get pt up and in the bathroom to void. Last bladder scan still reading less than 300cc; pt off unit at this time for CT of abdomen.     Caregiver upset at lunch d/t a mechanical soft diet being ordered. I encouraged pt and Caregiver to let me know what sounds good and I would be happy to have it ordered. Pt has not shown much interest at meal times despite being offered different choices.    VSS; weaned back on RA; will continue to monitor.

## 2021-03-10 PROBLEM — N13.2 URETERAL STONE WITH HYDRONEPHROSIS: Status: ACTIVE | Noted: 2021-01-01

## 2021-03-10 NOTE — OP NOTE
CYSTOSCOPY URETEROSCOPY RETROGRADE PYELOGRAM HOLMIUM LASER STENT INSERTION  Procedure Note    Janelle Millard  3/10/2021    Pre-op Diagnosis:   Ureteral stone with hydronephrosis [N13.2]    Post-op Diagnosis:     Post-Op Diagnosis Codes:     * Ureteral stone with hydronephrosis [N13.2]    Procedure(s):  , LEFT RETROGRADE PYELOGRAM, STENT INSERTION LEFT URETER    Surgeon(s):  Cooper Brice MD    Anesthesia: Choice    Staff:   Circulator: Carmelita Toure RN; Julieta Georges RN  Radiology Technologist: Zaida Dee  Scrub Person: Ana Cristina Vera  Assistant: Sona Hamilton CSA    Assistant: Sona Hamilton CSA was responsible for performing the following activities: Irrigation and their skilled assistance was necessary for the success of this case.     Estimated Blood Loss: none    Specimens:                None      Drains:   Urethral Catheter Coude 16 Fr. (Active)   Daily Indications Acute Urinary Retention 03/09/21 2120   Site Assessment Clean;Skin intact 03/09/21 2120   Collection Container Standard drainage bag 03/09/21 2120   Securement Method Securing device 03/09/21 2120   Catheter care complete Yes 03/10/21 0800   Output (mL) 250 mL 03/10/21 0600       Findings: Distal left ureteral stone with hydronephrosis    Complications: None    Indications: Same    Description of Procedure: Patient brought to surgery placed in dorsolithotomy position.  Sterile prep and drape of genitalia routine fashion I passed a 20 Italian cystoscope into the bladder left ureter was catheterized the ureter was edematous we put no 3 8 guidewire past the stone on the left-hand side and then a retrograde catheter over the top of that we shot retrograde study and then put a 038 guidewire back past the stone and over the top guidewire through-the-scope we placed a 6 x 26 double-J stent placed tortuous ureter when she was in good position we drained the bladder move the scope and then anchored #16 Grady catheter 10 cc  placed in the bladder.  Patient taken recovery out of procedure well    Cooper Brice MD     Date: 3/10/2021  Time: 13:45 CST

## 2021-03-10 NOTE — CONSULTS
Adult Nutrition  Assessment    Patient Name:  Janelle Millard  YOB: 1955  MRN: [id number]  Admit Date:  3/6/2021    Assessment Date:  3/10/2021    Comments:  Pt indicates her appetite is bad.  Intake 505 -1x, 0% - 2x.  Indicates she needs soft foods.  Reports nausea/vomiting.  PRN Zofran prescribed.  Willing to receive milk with meals.  Blood glucose is elevated.  Levemir insulin, sliding scale novolog prescribed.  Will add soft ground diet restriction and add milk to meals.    Reason for Assessment     Row Name 03/10/21 1628          Reason for Assessment    Reason For Assessment  follow-up protocol             Labs/Tests/Procedures/Meds     Row Name 03/10/21 1629          Labs/Procedures/Meds    Lab Results Reviewed  reviewed, pertinent        Medications    Pertinent Medications Reviewed  reviewed, pertinent             Nutrition Prescription Ordered     Row Name 03/10/21 1629          Nutrition Prescription PO    Current PO Diet  Regular         Evaluation of Received Nutrient/Fluid Intake     Row Name 03/10/21 1630          PO Evaluation    Number of Meals  3     % PO Intake  50% - 1x, 0% - 2x               Electronically signed by:  Krista Rodriguez RD  03/10/21 16:31 CST

## 2021-03-10 NOTE — PLAN OF CARE
Goal Outcome Evaluation:  Plan of Care Reviewed With: patient  Progress: no change  Outcome Summary: Intake 50% - 1x, 0% - 2x.  Encourage intake of meals and milk.

## 2021-03-10 NOTE — PROGRESS NOTES
LOS: 4 days     Patient Care Team:  Arelis Laird MD as PCP - General  Francine Gallegos (Inactive) as Technician      Subjective     Left flank pain urinary retention distal left ureteral stone    Objective       Vital Signs  Temp:  [96.7 °F (35.9 °C)-99.5 °F (37.5 °C)] 96.7 °F (35.9 °C)  Heart Rate:  [] 106  Resp:  [17-22] 18  BP: (114-196)/() 143/69    Physical Exam:        General Appearance:   Hurting     Respiratory:    UNLABORED RESPIRATIONS.     Abdomen:     SOFT.       Genitourinary:  Grady in place     Rectal:     DEFERRED       Results Review:       Imaging Results (Last 24 Hours)     ** No results found for the last 24 hours. **        Lab Results (last 24 hours)     Procedure Component Value Units Date/Time    POC Glucose Once [[id number]]  (Abnormal) Collected: 03/10/21 1037    Specimen: Blood Updated: 03/10/21 1101     Glucose 189 mg/dL      Comment: RN NotifiedOperator: [id number] MAURICIO VALDOVINOSRyan ID: [id number]       Blood Culture - Blood, Hand, Right [[id number]] Collected: 03/08/21 1040    Specimen: Blood from Hand, Right Updated: 03/10/21 1045     Blood Culture No growth at 2 days    Blood Culture - Blood, Hand, Left [[id number]] Collected: 03/08/21 0958    Specimen: Blood from Hand, Left Updated: 03/10/21 1016     Blood Culture No growth at 2 days    Troponin [[id number]]  (Normal) Collected: 03/10/21 0838    Specimen: Blood Updated: 03/10/21 0859     Troponin T <0.010 ng/mL     Narrative:      Troponin T Reference Range:  <= 0.03 ng/mL-   Negative for AMI  >0.03 ng/mL-     Abnormal for myocardial necrosis.  Clinicians would have to utilize clinical acumen, EKG, Troponin and serial changes to determine if it is an Acute Myocardial Infarction or myocardial injury due to an underlying chronic condition.       Results may be falsely decreased if patient taking Biotin.      Extra Tubes [[id number]] Collected: 03/10/21 0642    Specimen: Blood, Venous Line Updated: 03/10/21 6069     Narrative:      The following orders were created for panel order Extra Tubes.  Procedure                               Abnormality         Status                     ---------                               -----------         ------                     Lavender Top[[id number]]                                     Final result                 Please view results for these tests on the individual orders.    Lavender Top [[id number]] Collected: 03/10/21 0642    Specimen: Blood Updated: 03/10/21 0746     Extra Tube hold for add-on     Comment: Auto resulted       Troponin [[id number]]  (Normal) Collected: 03/10/21 0642    Specimen: Blood Updated: 03/10/21 0718     Troponin T <0.010 ng/mL     Narrative:      Troponin T Reference Range:  <= 0.03 ng/mL-   Negative for AMI  >0.03 ng/mL-     Abnormal for myocardial necrosis.  Clinicians would have to utilize clinical acumen, EKG, Troponin and serial changes to determine if it is an Acute Myocardial Infarction or myocardial injury due to an underlying chronic condition.       Results may be falsely decreased if patient taking Biotin.      POC Glucose Once [[id number]]  (Abnormal) Collected: 03/10/21 0550    Specimen: Blood Updated: 03/10/21 0631     Glucose 240 mg/dL      Comment: RN NotifiedOperator: [id number] JIMENEZ [facility] Center[facility]IFERM[facility] ID: [id number]       Magnesium [[id number]]  (Abnormal) Collected: 03/10/21 0332    Specimen: Blood Updated: 03/10/21 0556     Magnesium 1.5 mg/dL     Comprehensive Metabolic Panel [[id number]]  (Abnormal) Collected: 03/10/21 0332    Specimen: Blood Updated: 03/10/21 0442     Glucose 227 mg/dL      BUN 8 mg/dL      Creatinine 0.60 mg/dL      Sodium 139 mmol/L      Potassium 3.7 mmol/L      Chloride 108 mmol/L      CO2 20.0 mmol/L      Calcium 9.1 mg/dL      Total Protein 6.0 g/dL      Albumin 2.60 g/dL      ALT (SGPT) 11 U/L      AST (SGOT) 16 U/L      Alkaline Phosphatase 114 U/L      Total Bilirubin 0.4 mg/dL      eGFR Non African Amer 100  mL/min/1.73      Globulin 3.4 gm/dL      A/G Ratio 0.8 g/dL      BUN/Creatinine Ratio 13.3     Anion Gap 11.0 mmol/L     Narrative:      GFR Normal >60  Chronic Kidney Disease <60  Kidney Failure <15      C-reactive Protein [[id number]]  (Abnormal) Collected: 03/10/21 0332    Specimen: Blood Updated: 03/10/21 0442     C-Reactive Protein 6.51 mg/dL     Troponin [[id number]]  (Normal) Collected: 03/10/21 0332    Specimen: Blood Updated: 03/10/21 0407     Troponin T <0.010 ng/mL     Narrative:      Troponin T Reference Range:  <= 0.03 ng/mL-   Negative for AMI  >0.03 ng/mL-     Abnormal for myocardial necrosis.  Clinicians would have to utilize clinical acumen, EKG, Troponin and serial changes to determine if it is an Acute Myocardial Infarction or myocardial injury due to an underlying chronic condition.       Results may be falsely decreased if patient taking Biotin.      Lactic Acid, Plasma [[id number]]  (Normal) Collected: 03/10/21 0339    Specimen: Blood Updated: 03/10/21 0406     Lactate 1.1 mmol/L     CBC & Differential [[id number]]  (Abnormal) Collected: 03/10/21 0339    Specimen: Blood Updated: 03/10/21 0358    Narrative:      The following orders were created for panel order CBC & Differential.  Procedure                               Abnormality         Status                     ---------                               -----------         ------                     CBC Auto Differential[[id number]]        Abnormal            Final result                 Please view results for these tests on the individual orders.    CBC Auto Differential [[id number]]  (Abnormal) Collected: 03/10/21 0339    Specimen: Blood Updated: 03/10/21 0358     WBC 12.00 10*3/mm3      RBC 3.72 10*6/mm3      Hemoglobin 11.6 g/dL      Hematocrit 33.7 %      MCV 90.6 fL      MCH 31.2 pg      MCHC 34.4 g/dL      RDW 14.1 %      RDW-SD 46.4 fl      MPV 10.9 fL      Platelets 199 10*3/mm3      Neutrophil % 66.1 %      Lymphocyte % 17.1 %       Monocyte % 13.7 %      Eosinophil % 0.5 %      Basophil % 0.8 %      Immature Grans % 1.8 %      Neutrophils, Absolute 7.95 10*3/mm3      Lymphocytes, Absolute 2.05 10*3/mm3      Monocytes, Absolute 1.64 10*3/mm3      Eosinophils, Absolute 0.06 10*3/mm3      Basophils, Absolute 0.09 10*3/mm3      Immature Grans, Absolute 0.21 10*3/mm3      nRBC 0.0 /100 WBC     Urinalysis, Microscopic Only - Urine, Catheter [[id number]]  (Abnormal) Collected: 03/09/21 2125    Specimen: Urine, Catheter Updated: 03/09/21 2152     RBC, UA 0-2 /HPF      WBC, UA 3-5 /HPF      Bacteria, UA Trace /HPF      Squamous Epithelial Cells, UA 0-2 /HPF      Yeast, UA Large/3+ Budding Yeast /HPF      Hyaline Casts, UA 7-12 /LPF      Methodology Manual Light Microscopy    Urinalysis With Culture If Indicated - Urine, Catheter [[id number]]  (Abnormal) Collected: 03/09/21 2125    Specimen: Urine, Catheter Updated: 03/09/21 2145     Color, UA Yellow     Appearance, UA Cloudy     pH, UA 5.5     Specific Gravity, UA 1.017     Glucose,  mg/dL (1+)     Ketones, UA 40 mg/dL (2+)     Bilirubin, UA Negative     Blood, UA Trace     Protein, UA Negative     Leuk Esterase, UA Trace     Nitrite, UA Negative     Urobilinogen, UA 0.2 E.U./dL    POC Glucose Once [[id number]]  (Abnormal) Collected: 03/09/21 1941    Specimen: Blood Updated: 03/09/21 2021     Glucose 206 mg/dL      Comment: RN NotifiedOperator: [id number] JIMENEZ JENNIFERMeter ID: [id number]               I reviewed the patient's new clinical results.  I reviewed the patient's new imaging results and agree with the interpretation.  I reviewed the patient's other test results and agree with the interpretation        Assessment/Plan       Sepsis with encephalopathy without septic shock (CMS/HCC)    Type 1 diabetes mellitus with diabetic polyneuropathy (CMS/HCC)    Mixed diabetic hyperlipidemia associated with type 1 diabetes mellitus (CMS/HCC)    Hypertension associated with diabetes (CMS/HCC)    CAD  (coronary artery disease)    Other specified rheumatoid arthritis, multiple sites (CMS/HCC)    Tobacco abuse    UTI (urinary tract infection)    Ureteral stone with hydronephrosis      Cystoscopy left retrograde ureteroscopy laser lithotripsy J stent placement risks and benefits have been discussed      Cooper Brice MD  03/10/21  12:52 CST

## 2021-03-10 NOTE — PROGRESS NOTES
LOS: 3 days     Patient Care Team:  Arelis Laird MD as PCP - General  Francine Gallegos (Inactive) as Technician      Subjective     Urinary retention sepsis secondary to distal ureteral stone confusion    Objective       Vital Signs  Temp:  [96.7 °F (35.9 °C)-99.5 °F (37.5 °C)] 98 °F (36.7 °C)  Heart Rate:  [] 107  Resp:  [16-25] 19  BP: (168-200)/() 190/90    Physical Exam:        General Appearance:   Semiconfused     Respiratory:    UNLABORED RESPIRATIONS.     Abdomen:     SOFT.       Genitourinary:  Urinary retention with over 400 cc in her bladder cannot be cath by nursing staff.  With assistance of the nursing staff I placed a 16 [place] coudé catheter 10 cc placed in the balloon urine clear     Rectal:     DEFERRED       Results Review:       Imaging Results (Last 24 Hours)     Procedure Component Value Units Date/Time    CT Abdomen Pelvis Without Contrast [[id number]] Collected: 03/09/21 1544     Updated: 03/09/21 1646    Narrative:      EXAM:  CT ABDOMEN PELVIS WITHOUT IV CONTRAST    ORDERING PROVIDER:  WILLIAMS CERDA    CLINICAL HISTORY:  Infection, abscess    COMPARISON:  3/6/2021    TECHNIQUE:   CT abdomen and pelvis performed without IV or oral contrast,  reformatted in the sagittal and coronal planes.     This examination was performed according to our departmental dose  optimization program which includes automated exposure control,  adjustment of the MA and kV according to patient size, and/or use  of iterative reconstruction technique.    FINDINGS:    BASILAR CHEST: Mild bibasilar atelectasis versus infiltrate with  improvement since prior.    LIVER: No mass, enlargement or abnormal density.    BILIARY TRACT: Unremarkable gallbladder.    SPLEEN: No mass or enlargement. Multiple calcified granuloma.    PANCREAS: No mass or inflammatory process. Normal pancreatic duct    ADRENAL GLANDS: Unremarkable. No mass.    URINARY SYSTEM: New left distal ureteral stone at the UVJ  measuring  5.9 mm with mild to moderate left hydroureteronephrosis  new since prior, concerning for obstructive uropathy. Mild left  perinephric infiltration. Bilateral renal vascular calcifications  in the renal hilum. Kidneys are normal in size. No obstructing  stone, hydronephrosis, or mass in the right kidney. Normal right  ureter.  Bladder is normal without mass or stone.      GI TRACT: No mass, dilation, or wall thickening.  No inflamed  diverticula.  No hernia.  Appendix is not well visualized. There  is new perirectal infiltration concerning for proctitis.    REPRODUCTIVE SYSTEM: Unremarkable    PERITONEAL SPACE:No free air, free fluid, mass or adenopathy.    RETROPERITONEAL SPACE:  No adenopathy, mass, aneurysm or  significant vascular abnormality.     BONES AND EXTRA-ABDOMINAL SOFT TISSUES: Stable L1 compression  fracture deformity..  No inguinal adenopathy or hernia.      Impression:      New left distal ureteral stone at the UVJ measuring 5.9 mm with  mild to moderate left hydroureteronephrosis new since prior,  concerning for obstructive uropathy.   Mild left perinephric infiltration.   Bilateral renal vascular calcifications in the renal hilum.    There is new perirectal infiltration concerning for proctitis.  Stable L1 compression fracture deformity..    Mild bibasilar atelectasis versus infiltrate with improvement  since prior.    Electronically signed by:  Gabe Quinteros MD  3/9/2021 4:44 PM CST  Workstation: [id number]        Lab Results (last 24 hours)     Procedure Component Value Units Date/Time    POC Glucose Once [[id number]]  (Abnormal) Collected: 03/09/21 1941    Specimen: Blood Updated: 03/09/21 2021     Glucose 206 mg/dL      Comment: RN NotifiedOperator: [id number] [facility] ID: [id number]       POC Glucose Once [[id number]]  (Abnormal) Collected: 03/09/21 1637    Specimen: Blood Updated: 03/09/21 1650     Glucose 234 mg/dL      Comment: RN NotifiedOperator: [id number] APRIL Conway ID:  [id number]       POC Glucose Once [[id number]]  (Abnormal) Collected: 03/09/21 1048    Specimen: Blood Updated: 03/09/21 1120     Glucose 191 mg/dL      Comment: RN NotifiedOperator: [id number] APRIL Conway ID: [id number]       Blood Culture - Blood, Hand, Right [[id number]] Collected: 03/08/21 1040    Specimen: Blood from Hand, Right Updated: 03/09/21 1101     Blood Culture No growth at 24 hours    Blood Culture - Blood, Hand, Left [[id number]] Collected: 03/08/21 0958    Specimen: Blood from Hand, Left Updated: 03/09/21 1015     Blood Culture No growth at 24 hours    Blood Culture - Blood, Arm, Right [[id number]]  (Abnormal)  (Susceptibility) Collected: 03/06/21 1502    Specimen: Blood from Arm, Right Updated: 03/09/21 0841     Blood Culture Escherichia coli     Isolated from Aerobic and Anaerobic Bottles     Gram Stain Aerobic Bottle Gram negative bacilli     Comment: 1/2 drawn positive for gnb. 3/7/21 06:20         Anaerobic Bottle Gram negative bacilli     Comment: 2 bottles positive of 2 drawn       Susceptibility      Escherichia coli      JAMMIE      Ampicillin Resistant      Ampicillin + Sulbactam Susceptible      Cefepime Susceptible      Ceftazidime Susceptible      Ceftriaxone Susceptible      Gentamicin Susceptible      Levofloxacin Susceptible      Piperacillin + Tazobactam Susceptible      Trimethoprim + Sulfamethoxazole Susceptible               Linear View               Susceptibility Comments     Escherichia coli    Cefazolin sensitivity will not be reported for Enterobacteriaceae in non-urine isolates. If cefazolin is preferred, please call the microbiology lab to request an E-test.  With the exception of urinary-sourced infections, aminoglycosides should not be used as monotherapy.             Procalcitonin [[id number]]  (Abnormal) Collected: 03/09/21 0637    Specimen: Blood Updated: 03/09/21 0751     Procalcitonin 3.08 ng/mL     Narrative:      As a Marker for Sepsis (Non-Neonates):   1. <0.5  "ng/mL represents a low risk of severe sepsis and/or septic shock.  1. >2 ng/mL represents a high risk of severe sepsis and/or septic shock.    As a Marker for Lower Respiratory Tract Infections that require antibiotic therapy:  PCT on Admission     Antibiotic Therapy             6-12 Hrs later  > 0.5                Strongly Recommended            >0.25 - <0.5         Recommended  0.1 - 0.25           Discouraged                   Remeasure/reassess PCT  <0.1                 Strongly Discouraged          Remeasure/reassess PCT      As 28 day mortality risk marker: \"Change in Procalcitonin Result\" (> 80 % or <=80 %) if Day 0 (or Day 1) and Day 4 values are available. Refer to http://www.Dynamixyzpct-calculator.com/   Change in PCT <=80 %   A decrease of PCT levels below or equal to 80 % defines a positive change in PCT test result representing a higher risk for 28-day all-cause mortality of patients diagnosed with severe sepsis or septic shock.  Change in PCT > 80 %   A decrease of PCT levels of more than 80 % defines a negative change in PCT result representing a lower risk for 28-day all-cause mortality of patients diagnosed with severe sepsis or septic shock.                Results may be falsely decreased if patient taking Biotin.     Comprehensive Metabolic Panel [[id number]]  (Abnormal) Collected: 03/09/21 0637    Specimen: Blood Updated: 03/09/21 0711     Glucose 208 mg/dL      BUN 9 mg/dL      Creatinine 0.54 mg/dL      Sodium 138 mmol/L      Potassium 4.2 mmol/L      Comment: Slight hemolysis detected by analyzer. Results may be affected.        Chloride 110 mmol/L      CO2 20.0 mmol/L      Calcium 9.5 mg/dL      Total Protein 6.3 g/dL      Albumin 2.80 g/dL      ALT (SGPT) 13 U/L      AST (SGOT) 26 U/L      Alkaline Phosphatase 139 U/L      Total Bilirubin 0.4 mg/dL      eGFR Non African Amer 113 mL/min/1.73      Globulin 3.5 gm/dL      A/G Ratio 0.8 g/dL      BUN/Creatinine Ratio 16.7     Anion Gap 8.0 mmol/L  "    Narrative:      GFR Normal >60  Chronic Kidney Disease <60  Kidney Failure <15      POC Glucose Once [[id number]]  (Abnormal) Collected: 03/09/21 0615    Specimen: Blood Updated: 03/09/21 0652     Glucose 184 mg/dL      Comment: RN NotifiedOperator: [id number] BARBARA Garciaer ID: [id number]       C-reactive Protein [[id number]]  (Abnormal) Collected: 03/09/21 0444    Specimen: Blood Updated: 03/09/21 0530     C-Reactive Protein 8.88 mg/dL     CBC & Differential [[id number]]  (Abnormal) Collected: 03/09/21 0444    Specimen: Blood Updated: 03/09/21 0503    Narrative:      The following orders were created for panel order CBC & Differential.  Procedure                               Abnormality         Status                     ---------                               -----------         ------                     CBC Auto Differential[[id number]]        Abnormal            Final result                 Please view results for these tests on the individual orders.    CBC Auto Differential [[id number]]  (Abnormal) Collected: 03/09/21 0444    Specimen: Blood Updated: 03/09/21 0503     WBC 23.17 10*3/mm3      RBC 3.92 10*6/mm3      Hemoglobin 12.3 g/dL      Hematocrit 37.7 %      MCV 96.2 fL      MCH 31.4 pg      MCHC 32.6 g/dL      RDW 14.6 %      RDW-SD 51.5 fl      MPV 11.3 fL      Platelets 184 10*3/mm3      Neutrophil % 81.9 %      Lymphocyte % 10.8 %      Monocyte % 5.7 %      Eosinophil % 0.1 %      Basophil % 0.5 %      Immature Grans % 1.0 %      Neutrophils, Absolute 18.98 10*3/mm3      Lymphocytes, Absolute 2.51 10*3/mm3      Monocytes, Absolute 1.32 10*3/mm3      Eosinophils, Absolute 0.02 10*3/mm3      Basophils, Absolute 0.11 10*3/mm3      Immature Grans, Absolute 0.23 10*3/mm3      nRBC 0.0 /100 WBC             I reviewed the patient's new clinical results.  I reviewed the patient's new imaging results and agree with the interpretation.  I reviewed the patient's other test results and agree with the  interpretation        Assessment/Plan       Sepsis with encephalopathy without septic shock (CMS/HCC)    Type 1 diabetes mellitus with diabetic polyneuropathy (CMS/HCC)    Mixed diabetic hyperlipidemia associated with type 1 diabetes mellitus (CMS/HCC)    Hypertension associated with diabetes (CMS/HCC)    CAD (coronary artery disease)    Other specified rheumatoid arthritis, multiple sites (CMS/HCC)    Tobacco abuse    UTI (urinary tract infection)      Ureteral stone left hydronephrosis urinary retention urinary tract infection    Mentation probably secondary to infection    Probable left CR UL LS Wednesday      Cooper Brice MD  03/09/21  20:46 CST

## 2021-03-10 NOTE — ANESTHESIA POSTPROCEDURE EVALUATION
Patient: Janelle Millard    Procedure Summary     Date: 03/10/21 Room / Location: [facility] OR 02 / [facility] OR    Anesthesia Start: 1318 Anesthesia Stop: 1354    Procedure: , LEFT RETROGRADE PYELOGRAM, STENT INSERTION LEFT URETER (Left ) Diagnosis:       Ureteral stone with hydronephrosis      (Ureteral stone with hydronephrosis [N13.2])    Surgeons: Cooper Brice MD Provider: Chelle Watts DO    Anesthesia Type: MAC ASA Status: 4 - Emergent          Anesthesia Type: MAC    Vitals  No vitals data found for the desired time range.          Post Anesthesia Care and Evaluation    Patient location during evaluation: PACU  Patient participation: waiting for patient participation  Post-procedure mental status: confused, drowsy, baseline.  Pain management: adequate  Airway patency: patent  Anesthetic complications: No anesthetic complications  PONV Status: none  Cardiovascular status: acceptable and hemodynamically stable  Respiratory status: acceptable, spontaneous ventilation and face mask  Hydration status: acceptable

## 2021-03-10 NOTE — ANESTHESIA PREPROCEDURE EVALUATION
Anesthesia Evaluation     Patient summary reviewed and Nursing notes reviewed   no history of anesthetic complications:  NPO Solid Status: > 8 hours  NPO Liquid Status: > 8 hours           Airway   Mallampati: III  TM distance: <3 FB  Neck ROM: full  Possible difficult intubation  Dental    (+) lower dentures and upper dentures    Pulmonary     breath sounds clear to auscultation  (+) a smoker Former,   (-) COPD, asthma, rhonchi, decreased breath sounds, wheezes, no home oxygen  Cardiovascular   Exercise tolerance: poor (<4 METS)    ECG reviewed  Patient on routine beta blocker and Beta blocker given within 24 hours of surgery  Rhythm: regular  Rate: normal    (+) hypertension well controlled 2 medications or greater, valvular problems/murmurs MR, CAD, CABG >6 Months, dysrhythmias Atrial Fib, CHF Diastolic >=55%, PVD, hyperlipidemia,  carotid artery disease carotid bilateral  (-) pacemaker, past MI, angina, murmur, cardiac stents, DVT      Neuro/Psych  (+) numbness, psychiatric history Depression, poor historian.,     (-) seizures, TIA, CVA  GI/Hepatic/Renal/Endo    (+) obesity,   renal disease stones, diabetes mellitus type 1 poorly controlled, thyroid problem hypothyroidism    Musculoskeletal     Abdominal   (+) obese,    Substance History      OB/GYN negative ob/gyn ROS         Other   arthritis,      ROS/Med Hx Other: Last IguX3H=2.3      WBC=12, Glucose 189    Septsis w/ acute mental status changes. Talked to POA over the phone and obtained consent    Cardizen gtt 5mg/hr for a fib RVR but now rate is controlled since being on cardizem    TTE 10/15/2020:  · Left ventricular wall thickness is consistent with concentric hypertrophy.  · Left ventricular ejection fraction appears to be 51 - 55%.  · Mild mitral annular calcification is present.  · Left ventricular diastolic function is consistent with (grade I) impaired relaxation.  · The following left ventricular wall segments are hypokinetic: basal inferoseptal,  mid inferoseptal and basal inferoseptal.  · Mildly decreased posterior leaflet mobility.  · Trace mitral valve regurgitation is present.    Pt was not able to perform nuclear stress test in 10/2020 due to clostrophobia    CARDIAC SURGERY   02/21/2014     Critical stenosis in the RCA. Diffusely calcified LAD with 30-80% stenosis. OMB #3 with 60% to 70% stenosis. Preserved LV systolic function with EF of 55%.    B/L carotid U/S:   Equivocal 50-69% diameter reduction stenosis left internal  carotid artery.  Less than 50% diameter reduction stenosis right internal carotid  artery.      Hx of RA      Phys Exam Other: CXR clear    Make sure upper and lower dentures are out              Anesthesia Plan    ASA 4 - emergent     general and MAC   (Cardizen gtt 5mg/hr- rate controlled.    Talked to ALEXIS Maldonado obtained anesthesia consent    Be sure dentures are out)  intravenous induction     Anesthetic plan, all risks, benefits, and alternatives have been provided, discussed and informed consent has been obtained with: patient and healthcare power of .

## 2021-03-10 NOTE — PLAN OF CARE
Goal Outcome Evaluation:  Plan of Care Reviewed With: patient  Progress: declining   Patient c/o of chest pain. Prn pain medication successful in relieving pain. Pt rhythm changed. Dr. Cavanaugh made aware. New orders received. Pt Npo as of midnight for zandra with Dr. Brice.

## 2021-03-11 NOTE — PLAN OF CARE
Problem: Adult Inpatient Plan of Care  Goal: Plan of Care Review  Outcome: Ongoing, Progressing  Flowsheets (Taken 3/10/2021 1845)  Progress: no change  Plan of Care Reviewed With:   patient   durable power of   Outcome Summary: Patient had a cystoscopy with a L J stent placed. Lithotripsy to break up a stone in the L ureter. New rahman placed by Dr. Brice in surgery, red urine at this time   Goal Outcome Evaluation:  Plan of Care Reviewed With: patient, durable power of   Progress: no change  Outcome Summary: Patient had a cystoscopy with a L J stent placed. Lithotripsy to break up a stone in the L ureter. New rahman placed by Dr. Brice in surgery, red urine at this time

## 2021-03-11 NOTE — PROGRESS NOTES
TWO PATIENT IDENTIFIERS WERE USED. THE PATIENT WAS DRAPED AND PREPPED IN USUAL STERILE TECHNIQUE. ULTRASOUND WAS USED TO LOCALIZE THERIGHT BASILIC VEIN. AT THAT POINT, THE SKIN WAS ANESTHETIZED WITH 2% LIDOCAINE. A 21 GAUGE NEEDLE WAS INSERTED INTO THERIGHT BASILIC VEIN AT THAT POINT AN 0.018 WIRE WAS INSERTED THROUG THE NEEDLE AND THE NEEDLE WAS REMOVED. A 4FR. CATHETER WAS PLACED OVER THE WIRE INTO THE VEIN. THE WIRE WAS REMOVED. CATHETER WAS FLUSHED WITH NORMAL SALINE AND SECURED WITH A TEGADERM. THIS WAS DONE IN THE ANGIOSUITE. PATIENT TOLERATED PROCEDURE WELL.    IMPRESSION: SUCCESSFUL PLACEMENT OF MIDLINE      Ines Caal RN  3/11/2021  14:22 CST

## 2021-03-11 NOTE — PLAN OF CARE
Goal Outcome Evaluation:  Plan of Care Reviewed With: patient  Progress: no change  Outcome Summary: VSS, cardizem gtt continued, PRN meds given for pain, rahman in place.

## 2021-03-11 NOTE — PLAN OF CARE
Problem: Adult Inpatient Plan of Care  Goal: Plan of Care Review  Outcome: Ongoing, Progressing  Flowsheets (Taken 3/11/2021 1528)  Progress: improving  Plan of Care Reviewed With: patient  Outcome Summary: patient seems to be improving cognitively. Still receiving 4 hour IV Zosyn, patient has a new STEVE midline x2 placed, Cardizem frip dc'd and patient started on PO   Goal Outcome Evaluation:  Plan of Care Reviewed With: patient  Progress: improving  Outcome Summary: patient seems to be improving cognitively. Still receiving 4 hour IV Zosyn, patient has a new STEVE midline x2 placed, Cardizem frip dc'd and patient started on PO

## 2021-03-11 NOTE — PROGRESS NOTES
[facility] Medicine Services  INPATIENT PROGRESS NOTE    Length of Stay: 5  Date of Admission: 3/6/2021  Primary Care Physician: Arelis Laird MD    Subjective   Chief Complaint: Patient actually verbal today daughter at bedside marked improvement of the past 24 hours she states she is just a little weak    HPI:  : 65-year-old female who is into the ED and admitted on 3/6 carrying the following problem list     1.  Diabetes mellitus requiring insulin with metabolic syndrome  -Endorgan involvement with neuropathy  2.  RA  3.  Known atherosclerotic cardiovascular disease status post CABG  -Peripheral vascular disease  4.  Tobacco abuse     Patient was brought in and found to have urinary tract infection and evidence of early sepsis syndrome.  Was admitted for further evaluation and treatment.     Patient admitted with sepsis syndrome as well as acute kidney injury, with complicating left ureteral stone with hydronephrosis.  Patient's acute kidney injury with rehydration and treatment of underlying sepsis syndrome resolved to normal.  Her blood and urine cultures grew both urine  E. coli which is sensitive to Rocephin and continuing.  She does have a distal ureteral stone with urology following and status post cystoscopy with lithotripsy and placement of double-J stent in the left on 3/10/2021. Otherwise patient stable in terms of blood sugar and her blood pressure with diffuse vascular disease.  She is encephalopathic although nurses report some improvement hoping continued improvement.  We will follow-up overall clinically.  Patient went into A. fib overnight 3/9 and 3/10 started on Cardizem drip and has been rate controlled.  She is status post procedure need to consider anticoagulation.  She had an echocardiogram in October this past year without atrial dilatation she has had encephalopathy with daughter seen today and she has had marked improvement in terms of her  status over the past 24 hours       Review of Systems   Constitutional: Positive for appetite change.   HENT: Negative.    Eyes: Negative.    Respiratory: Negative.    Cardiovascular: Positive for palpitations.   Gastrointestinal: Negative.    Endocrine: Positive for cold intolerance.   Genitourinary: Negative.    Musculoskeletal: Positive for back pain.   Skin: Negative.    Allergic/Immunologic: Negative.    Neurological: Negative.    Hematological: Negative.    Psychiatric/Behavioral: Negative.         All pertinent negatives and positives are as above. All other systems have been reviewed and are negative unless otherwise stated.     Objective    Temp:  [96.6 °F (35.9 °C)-99 °F (37.2 °C)] 97.3 °F (36.3 °C)  Heart Rate:  [] 117  Resp:  [16-18] 16  BP: (132-176)/(54-92) 176/81    Physical Exam  Vitals and nursing note reviewed. Exam conducted with a chaperone present.   Constitutional:       Appearance: Normal appearance.   HENT:      Head: Normocephalic.      Nose: Nose normal.      Mouth/Throat:      Mouth: Mucous membranes are moist.   Eyes:      Extraocular Movements: Extraocular movements intact.      Pupils: Pupils are equal, round, and reactive to light.   Cardiovascular:      Rate and Rhythm: Normal rate. Rhythm irregular.      Heart sounds: Normal heart sounds.   Pulmonary:      Effort: Pulmonary effort is normal.      Breath sounds: Normal breath sounds.   Abdominal:      General: Abdomen is flat. Bowel sounds are normal.      Palpations: Abdomen is soft.   Musculoskeletal:         General: Normal range of motion.      Cervical back: Normal range of motion.   Skin:     General: Skin is warm and dry.      Capillary Refill: Capillary refill takes less than 2 seconds.   Neurological:      General: No focal deficit present.      Mental Status: She is alert.   Psychiatric:         Mood and Affect: Mood normal.           Results Review:  I have reviewed the labs, radiology results, and diagnostic  studies.    Laboratory Data:   Results from last 7 days   Lab Units 03/11/21  0650 03/10/21  0332 03/09/21  0637 03/06/21  1502   SODIUM mmol/L 137 139 138 139   POTASSIUM mmol/L 3.5 3.7 4.2 4.2   CHLORIDE mmol/L 108* 108* 110* 105   CO2 mmol/L 18.0* 20.0* 20.0* 23.0   BUN mg/dL 12 8 9 32*   CREATININE mg/dL 0.54* 0.60 0.54* 1.46*   GLUCOSE mg/dL 235* 227* 208* 182*   CALCIUM mg/dL 8.9 9.1 9.5 9.0   BILIRUBIN mg/dL  --  0.4 0.4 0.2   ALK PHOS U/L  --  114 139* 90   ALT (SGPT) U/L  --  11 13 13   AST (SGOT) U/L  --  16 26 36*   ANION GAP mmol/L 11.0 11.0 8.0 11.0     Estimated Creatinine Clearance: 81.4 mL/min (A) (by C-G formula based on SCr of 0.54 mg/dL (L)).  Results from last 7 days   Lab Units 03/11/21  0650 03/10/21  0332   MAGNESIUM mg/dL 2.0 1.5*         Results from last 7 days   Lab Units 03/11/21  0650 03/10/21  0339 03/09/21  0444 03/08/21  0611 03/07/21  0539   WBC 10*3/mm3 11.46* 12.00* 23.17* 21.79* 18.41*   HEMOGLOBIN g/dL 11.6* 11.6* 12.3 11.0* 12.4   HEMATOCRIT % 34.3 33.7* 37.7 32.3* 36.5   PLATELETS 10*3/mm3 225 199 184 181 168     Results from last 7 days   Lab Units 03/06/21  1502   INR  1.40*       Culture Data:   No results found for: BLOODCX  No results found for: URINECX  No results found for: RESPCX  No results found for: WOUNDCX  No results found for: STOOLCX  No components found for: BODYFLD    Radiology Data:   Imaging Results (Last 24 Hours)     ** No results found for the last 24 hours. **          I have reviewed the patient's current medications.     Assessment/Plan     Active Hospital Problems    Diagnosis    • **Sepsis with encephalopathy without septic shock (CMS/HCC)    • Tobacco abuse    • UTI (urinary tract infection)    • Ureteral stone with hydronephrosis      Added automatically from request for surgery [id number]     • Type 1 diabetes mellitus with diabetic polyneuropathy (CMS/HCC)    • Mixed diabetic hyperlipidemia associated with type 1 diabetes mellitus (CMS/HCC)    •  Hypertension associated with diabetes (CMS/[facility])    • Other specified rheumatoid arthritis, multiple sites (CMS/[facility])    • CAD (coronary artery disease)        Impression        1.  Sepsis  -Secondary to obstructive uropathy with distal left stone  -Admitting white count of 21,000 and actually stable between 20 and 22,000 and then decreasing on the 10th to 12,000 with patient status post removal of obstruction  -E. coli both in urine and blood susceptible to Zosyn day #5  -Repeat blood cultures on 38 -     2.    Nephrolithiasis  -Left ureteral obstruction  -Status post cystoscopy with lithotripsy and double-J stent placement on 3/1021 by Dr. Brice    3.  Acute kidney injury-resolved  -Admission creatinine 1.46 decrease within the first 24 hours to 1.35 and then 0.8 x 3/8 and stable subsequently.  Volume status appears stable without evidence of volume overload.  Electrolytes okay.  Acid-base status okay.  Metabolic bone status no phosphorus loading admission and hematocrit adequate.    4.  Diabetes mellitus with diabetic polyneuropathy  -Continues on Levemir  -Continue sliding scale  -Follow-up hopefully improving status with improving mental status and we can get better idea of sliding scale coverage     5.    Atrial fibrillation  -Rate controlled   -mixed in part secondary to poor control of diabetes as an outpatient with an A1c of 9.3 noted at presentation  -Changed to p.o. Cardizem  -Check echocardiogram  -Certainly may need to add anticoagulation with patient now postop stent     6.  Hypertension  -Continues on Coreg as well as isosorbide and Cardizem  -Follow-up with DC of Cardizem drip may need to add another agent     7.  Coronary disease post CABG  -Continues on aspirin statin beta-blocker nitrates        CODE STATUS patient is full code     Overall patient markedly improved over the past 36 hours with patient now status post lithotripsy with stone removal she is afebrile and her white count is now  essentially normalized.  She went in A. fib which appears to be a new thing for her with patient rate controlled and recent echocardiogram in October this past year without atrial dilatation and certainly need to consider adding chronic anticoagulation    Discussed with daughter at bedside and floor nursing      Daniel Hdz MD

## 2021-03-11 NOTE — H&P
[facility] Medicine Services  INPATIENT PROGRESS NOTE    Length of Stay: 4  Date of Admission: 3/6/2021  Primary Care Physician: Arelis Laird MD    Subjective   Chief Complaint: Patient not able to give chief complaint    HPI: 65-year-old female who is into the ED and admitted on 3/6 carrying the following problem list    1.  Diabetes mellitus requiring insulin with metabolic syndrome  -Endorgan involvement with neuropathy  2.  RA  3.  Known atherosclerotic cardiovascular disease status post CABG  -Peripheral vascular disease  4.  Tobacco abuse    Patient was brought in and found to have urinary tract infection and evidence of early sepsis syndrome.  Was admitted for further evaluation and treatment.    Patient admitted with sepsis syndrome as well as acute kidney injury.  Patient's acute kidney injury with rehydration and treatment of underlying sepsis syndrome resolved to normal.  Her cultures grew both urine and blood E. coli which is sensitive to Rocephin and continuing.  She does have a distal ureteral stone with urology following and planning for retrieval.  Otherwise patient stable in terms of blood sugar and her blood pressure with diffuse vascular disease.  She is encephalopathic although nurses report some improvement hoping continued improvement.  We will follow-up overall clinically      Review of Systems   Patient not able to give review of systems  All pertinent negatives and positives are as above. All other systems have been reviewed and are negative unless otherwise stated.     Objective    Temp:  [96.7 °F (35.9 °C)-99 °F (37.2 °C)] 99 °F (37.2 °C)  Heart Rate:  [] 124  Resp:  [16-22] 18  BP: (114-196)/() 164/90    Vent Settings:       Physical Exam  Vitals and nursing note reviewed. Exam conducted with a chaperone present.   Constitutional:       Comments: Patient appears chronically ill   HENT:      Head: Normocephalic and atraumatic.       Nose: Nose normal.      Mouth/Throat:      Mouth: Mucous membranes are dry.   Eyes:      Extraocular Movements: Extraocular movements intact.   Cardiovascular:      Rate and Rhythm: Normal rate and regular rhythm.      Pulses: Normal pulses.      Heart sounds: Normal heart sounds.   Pulmonary:      Effort: Pulmonary effort is normal.      Breath sounds: Normal breath sounds.   Abdominal:      General: Abdomen is flat. Bowel sounds are normal.      Palpations: Abdomen is soft.   Genitourinary:     General: Normal vulva.   Musculoskeletal:         General: Normal range of motion.      Cervical back: Normal range of motion.   Skin:     General: Skin is warm and dry.      Capillary Refill: Capillary refill takes less than 2 seconds.   Neurological:      Mental Status: She is disoriented.   Psychiatric:         Mood and Affect: Mood normal.       *      Results Review:  I have reviewed the labs, radiology results, and diagnostic studies.    Laboratory Data:   Results from last 7 days   Lab Units 03/10/21  0332 03/09/21  0637 03/08/21  0611 03/06/21  1502   SODIUM mmol/L 139 138 139 139   POTASSIUM mmol/L 3.7 4.2 4.0 4.2   CHLORIDE mmol/L 108* 110* 108* 105   CO2 mmol/L 20.0* 20.0* 24.0 23.0   BUN mg/dL 8 9 22 32*   CREATININE mg/dL 0.60 0.54* 0.58 1.46*   GLUCOSE mg/dL 227* 208* 252* 182*   CALCIUM mg/dL 9.1 9.5 9.4 9.0   BILIRUBIN mg/dL 0.4 0.4  --  0.2   ALK PHOS U/L 114 139*  --  90   ALT (SGPT) U/L 11 13  --  13   AST (SGOT) U/L 16 26  --  36*   ANION GAP mmol/L 11.0 8.0 7.0 11.0     Estimated Creatinine Clearance: 82.5 mL/min (by C-G formula based on SCr of 0.6 mg/dL).  Results from last 7 days   Lab Units 03/10/21  0332   MAGNESIUM mg/dL 1.5*         Results from last 7 days   Lab Units 03/10/21  0339 03/09/21  0444 03/08/21  0611 03/07/21  0539 03/06/21  1502   WBC 10*3/mm3 12.00* 23.17* 21.79* 18.41* 21.09*   HEMOGLOBIN g/dL 11.6* 12.3 11.0* 12.4 11.7*   HEMATOCRIT % 33.7* 37.7 32.3* 36.5 33.6*   PLATELETS  10*3/mm3 199 184 181 168 194     Results from last 7 days   Lab Units 03/06/21  1502   INR  1.40*       Culture Data:   Blood Culture   Date Value Ref Range Status   03/08/2021 No growth at 2 days  Preliminary   03/08/2021 No growth at 2 days  Preliminary     No results found for: URINECX  No results found for: RESPCX  No results found for: WOUNDCX  No results found for: STOOLCX  No components found for: BODYFLD    Radiology Data:   Imaging Results (Last 24 Hours)     Procedure Component Value Units Date/Time    FL Retrograde Pyelogram In OR [[id number]] Resulted: 03/10/21 1557     Updated: 03/10/21 1557          ABG:  Results from last 7 days   Lab Units 03/06/21  1345   PH, ARTERIAL pH units 7.409   PO2 ART mm Hg 76.2*   PCO2, ARTERIAL mm Hg 40.9   HCO3 ART mmol/L 25.8       I have reviewed the patient's current medications.     Assessment/Plan     Active Hospital Problems    Diagnosis    • **Sepsis with encephalopathy without septic shock (CMS/[facility])    • Tobacco abuse    • UTI (urinary tract infection)    • Ureteral stone with hydronephrosis      Added automatically from request for surgery [id number]     • Type 1 diabetes mellitus with diabetic polyneuropathy (CMS/[facility])    • Mixed diabetic hyperlipidemia associated with type 1 diabetes mellitus (CMS/[facility])    • Hypertension associated with diabetes (CMS/[facility])    • Other specified rheumatoid arthritis, multiple sites (CMS/[facility])    • CAD (coronary artery disease)          .  Sepsis  -Presume secondary UTI  -Noted to have stones  -E. coli urine and blood susceptible to Zosyn day #4    2.  Diabetes mellitus with diabetic polyneuropathy  -Continues on Levemir  -Continue sliding scale    3.  Dyslipidemia mixed    4.  Hypertension  -Continues on Coreg as well as isosorbide    5.  Coronary disease post CABG  -Continues on aspirin statin beta-blocker nitrates    6.  RA.  7.  Acute kidney injury resolved to baseline from sepsis syndrome    8.  Ureteral left stone with urinary  retention and infection  -Urologic procedure urology later today    CODE STATUS patient is full code    Appreciate urology follow-up awaiting improvement terms of mental status                  Daniel Hdz MD

## 2021-03-11 NOTE — PROGRESS NOTES
"   LOS: 5 days   Patient Care Team:  Arelis Laird MD as PCP - General  Francine Gallegos (Inactive) as Technician    Subjective     Subjective:  Symptoms:  Stable.  (Clear yellow urine, no catheter problems. ).        History taken from: patient chart RN    Objective     Vital Signs  Temp:  [96.6 °F (35.9 °C)-99 °F (37.2 °C)] 97.3 °F (36.3 °C)  Heart Rate:  [] 117  Resp:  [16-18] 16  BP: (144-176)/(71-92) 176/81    Objective:  General Appearance:  In no acute distress.    Vital signs: (most recent): Blood pressure 176/81, pulse 117, temperature 97.3 °F (36.3 °C), temperature source Temporal, resp. rate 16, height 175.3 cm (69\"), weight 89.8 kg (197 lb 14.4 oz), SpO2 95 %.  Vital signs are normal.  No fever.    Output: Producing urine (Grady).    Abdomen: Abdomen is soft and non-distended.  There is no suprapubic area tenderness.     Neurological: Patient is alert.    Pupils:  Pupils are equal, round, and reactive to light.    Skin:  Warm, dry and pale.              Results Review:    Lab Results (last 24 hours)     Procedure Component Value Units Date/Time    POC Glucose Once [[id number]]  (Abnormal) Collected: 03/11/21 1049    Specimen: Blood Updated: 03/11/21 1117     Glucose 312 mg/dL      Comment: RN NotifiedOperator: [id number] BHAVESH HENDERSONFERMeter ID: [id number]       Blood Culture - Blood, Hand, Right [[id number]] Collected: 03/08/21 1040    Specimen: Blood from Hand, Right Updated: 03/11/21 1100     Blood Culture No growth at 3 days    Blood Culture - Blood, Hand, Left [[id number]] Collected: 03/08/21 0958    Specimen: Blood from Hand, Left Updated: 03/11/21 1015     Blood Culture No growth at 3 days    Basic Metabolic Panel [[id number]]  (Abnormal) Collected: 03/11/21 0650    Specimen: Blood Updated: 03/11/21 0741     Glucose 235 mg/dL      BUN 12 mg/dL      Creatinine 0.54 mg/dL      Sodium 137 mmol/L      Potassium 3.5 mmol/L      Comment: Slight hemolysis detected by analyzer. Results may be " affected.        Chloride 108 mmol/L      CO2 18.0 mmol/L      Calcium 8.9 mg/dL      eGFR Non African Amer 113 mL/min/1.73      BUN/Creatinine Ratio 22.2     Anion Gap 11.0 mmol/L     Narrative:      GFR Normal >60  Chronic Kidney Disease <60  Kidney Failure <15      Magnesium [[id number]]  (Normal) Collected: 03/11/21 0650    Specimen: Blood Updated: 03/11/21 0741     Magnesium 2.0 mg/dL     CBC & Differential [[id number]]  (Abnormal) Collected: 03/11/21 0650    Specimen: Blood Updated: 03/11/21 0708    Narrative:      The following orders were created for panel order CBC & Differential.  Procedure                               Abnormality         Status                     ---------                               -----------         ------                     CBC Auto Differential[[id number]]        Abnormal            Final result                 Please view results for these tests on the individual orders.    CBC Auto Differential [[id number]]  (Abnormal) Collected: 03/11/21 0650    Specimen: Blood Updated: 03/11/21 0708     WBC 11.46 10*3/mm3      RBC 3.74 10*6/mm3      Hemoglobin 11.6 g/dL      Hematocrit 34.3 %      MCV 91.7 fL      MCH 31.0 pg      MCHC 33.8 g/dL      RDW 14.3 %      RDW-SD 48.1 fl      MPV 11.3 fL      Platelets 225 10*3/mm3      Neutrophil % 61.1 %      Lymphocyte % 22.8 %      Monocyte % 12.0 %      Eosinophil % 0.7 %      Basophil % 1.0 %      Immature Grans % 2.4 %      Neutrophils, Absolute 7.00 10*3/mm3      Lymphocytes, Absolute 2.61 10*3/mm3      Monocytes, Absolute 1.38 10*3/mm3      Eosinophils, Absolute 0.08 10*3/mm3      Basophils, Absolute 0.11 10*3/mm3      Immature Grans, Absolute 0.28 10*3/mm3      nRBC 0.0 /100 WBC     POC Glucose Once [[id number]]  (Abnormal) Collected: 03/11/21 0634    Specimen: Blood Updated: 03/11/21 0655     Glucose 217 mg/dL      Comment: RN NotifiedOperator: [id number] CUONG CROWMeter ID: [id number]       POC Glucose Once [[id number]]  (Abnormal)  Collected: 03/10/21 1947    Specimen: Blood Updated: 03/10/21 2106     Glucose 259 mg/dL      Comment: RN NotifiedOperator: [id number] CUONG Bill ID: [id number]            Imaging Results (Last 24 Hours)     Procedure Component Value Units Date/Time    US Guided Vascular Access [[id number]] Collected: 03/11/21 1409     Updated: 03/11/21 1444    Narrative:      PROCEDURE: Ultrasound Guidance Vascular Access    ORDERING PHYSICIAN(S): KETTY KLINE    CLINICAL INDICATION: Multiple IV Access, A41.9 Sepsis,  unspecified organism R65.20 Severe sepsis without septic shock  G93.40 Encephalopathy, unspecified N17.9 Acute kidney failure,  unspecified N13.2 Hydronephrosis with renal and ureteral  calculous obstruction    FINDINGS:  Realtime ultrasound was used to visualize needle entry into the  right basilic vein and a permanent image was stored for permanent  recording and reporting.       Impression:      Vascular access device placed under ultrasound guidance as  described above    Electronically signed by:  Boyd Beyer MD  3/11/2021 2:41 PM CST  Workstation: [id number]    IR Insert Midline Without Port Pump 5 Plus [[id number]] Resulted: 03/11/21 1426     Updated: 03/11/21 1426    Narrative:      This procedure was auto-finalized with no dictation required.           I reviewed the patient's new clinical results.  I reviewed the patient's new imaging results and agree with the interpretation.  I reviewed the patient's other test results and agree with the interpretation      Assessment/Plan       Sepsis with encephalopathy without septic shock (CMS/HCC)    Type 1 diabetes mellitus with diabetic polyneuropathy (CMS/HCC)    Mixed diabetic hyperlipidemia associated with type 1 diabetes mellitus (CMS/HCC)    Hypertension associated with diabetes (CMS/HCC)    CAD (coronary artery disease)    Other specified rheumatoid arthritis, multiple sites (CMS/HCC)    Tobacco abuse    UTI (urinary tract infection)     Ureteral stone with hydronephrosis      Assessment & Plan    POD #1 cystoscopy LEFT J-stent placement, findings: distal left ureter stone with hydronephrosis.   -WBC 21.79-->23.17-->12.00-->11.46  -Estimated Creatinine Clearance: 81.4 mL/min (A) (by C-G formula based on SCr of 0.54 mg/dL (L)).  -Urine culture 3/6/21 E.Coli, on Zosyn    Plan:  J-stent in place, continue Grady, complete antibiotic therapy for UTI.     Ed Krishnan, JESSIE  03/11/21  15:00 CST

## 2021-03-12 NOTE — PROGRESS NOTES
[facility] Medicine Services  INPATIENT PROGRESS NOTE    Length of Stay: 6  Date of Admission: 3/6/2021  Primary Care Physician: Arelis Laird MD    Subjective   Chief Complaint: Patient confused not able really to give answer she pulled out her line last night going for midline currently    HPI:  65-year-old female who is into the ED and admitted on 3/6 carrying the following problem list     1.  Diabetes mellitus requiring insulin with metabolic syndrome  -Endorgan involvement with neuropathy  2.  RA  3.  Known atherosclerotic cardiovascular disease status post CABG  -Peripheral vascular disease  4.  Tobacco abuse     Patient was brought in and found to have urinary tract infection and evidence of early sepsis syndrome.  Was admitted for further evaluation and treatment.     Patient admitted with sepsis syndrome as well as acute kidney injury, with complicating left ureteral stone with hydronephrosis.  Patient's acute kidney injury with rehydration and treatment of underlying sepsis syndrome resolved to normal.  Her blood and urine cultures grew both urine  E. coli which is sensitive to Rocephin and continuing.  She does have a distal ureteral stone with urology following and status post cystoscopy with lithotripsy and placement of double-J stent in the left on 3/10/2021. Otherwise patient stable in terms of blood sugar and her blood pressure with diffuse vascular disease.  She is encephalopathic although nurses report some improvement hoping continued improvement.  We will follow-up overall clinically.  Patient went into A. fib overnight 3/9 and 3/10 started on Cardizem drip and has been rate controlled.  She is status post procedure need to consider anticoagulation.  She had an echocardiogram in October this past year without atrial dilatation she has had encephalopathy with daughter seen today and she has had marked improvement in terms of her status over the  past 24 hours     3/12 patient did convert from A. fib to normal sinus rhythm and will hold on anticoagulation especially with her fall risk.  She continues to have issues in terms of confusion especially at night and may certainly have sundowning and early dementia that was not recognized that she has not been admitted prior.  He is going down for a midline currently and discussed with case management as they discussed with niece that unless she changes she may certainly need to be placed in a weight clinical improvement.  Appreciate urology follow-up postop day 2 cystoscopy with left J stent placement.  Patient remains on Zosyn with E. coli UTI, Grady remains in place.  They will follow-up for stone as an outpatient    Review of Systems   Difficult currently with mental status  All pertinent negatives and positives are as above. All other systems have been reviewed and are negative unless otherwise stated.     Objective    Temp:  [97.1 °F (36.2 °C)-98 °F (36.7 °C)] 97.2 °F (36.2 °C)  Heart Rate:  [] 80  Resp:  [16-18] 18  BP: (150-174)/(67-84) 168/72    Physical Exam  Vitals and nursing note reviewed. Exam conducted with a chaperone present.   Constitutional:       Comments: But confused   HENT:      Head: Normocephalic and atraumatic.      Nose: Nose normal.      Mouth/Throat:      Mouth: Mucous membranes are moist.      Pharynx: Oropharynx is clear.   Eyes:      Extraocular Movements: Extraocular movements intact.      Conjunctiva/sclera: Conjunctivae normal.      Pupils: Pupils are equal, round, and reactive to light.   Cardiovascular:      Rate and Rhythm: Normal rate and regular rhythm.   Pulmonary:      Effort: Pulmonary effort is normal.      Comments: Some decreased sounds at bases  Abdominal:      General: Abdomen is flat. Bowel sounds are normal.      Palpations: Abdomen is soft.   Musculoskeletal:         General: Normal range of motion.      Cervical back: Normal range of motion.   Skin:      General: Skin is warm and dry.      Capillary Refill: Capillary refill takes less than 2 seconds.   Neurological:      General: No focal deficit present.      Mental Status: She is alert. She is disoriented.             Results Review:  I have reviewed the labs, radiology results, and diagnostic studies.    Laboratory Data:   Results from last 7 days   Lab Units 03/12/21  0546 03/11/21  1659 03/11/21  0650 03/10/21  0332 03/09/21  0637 03/06/21  1502   SODIUM mmol/L 138  --  137 139 138 139   POTASSIUM mmol/L 3.9 4.7 3.5 3.7 4.2 4.2   CHLORIDE mmol/L 109*  --  108* 108* 110* 105   CO2 mmol/L 23.0  --  18.0* 20.0* 20.0* 23.0   BUN mg/dL 8  --  12 8 9 32*   CREATININE mg/dL 0.45*  --  0.54* 0.60 0.54* 1.46*   GLUCOSE mg/dL 298*  --  235* 227* 208* 182*   CALCIUM mg/dL 9.0  --  8.9 9.1 9.5 9.0   BILIRUBIN mg/dL  --   --   --  0.4 0.4 0.2   ALK PHOS U/L  --   --   --  114 139* 90   ALT (SGPT) U/L  --   --   --  11 13 13   AST (SGOT) U/L  --   --   --  16 26 36*   ANION GAP mmol/L 6.0  --  11.0 11.0 8.0 11.0     Estimated Creatinine Clearance: 83.5 mL/min (A) (by C-G formula based on SCr of 0.45 mg/dL (L)).  Results from last 7 days   Lab Units 03/12/21  0546 03/11/21  0650 03/10/21  0332   MAGNESIUM mg/dL 1.6 2.0 1.5*   PHOSPHORUS mg/dL 2.0*  --   --          Results from last 7 days   Lab Units 03/12/21  0546 03/11/21  0650 03/10/21  0339 03/09/21  0444 03/08/21  0611   WBC 10*3/mm3 11.11* 11.46* 12.00* 23.17* 21.79*   HEMOGLOBIN g/dL 10.2* 11.6* 11.6* 12.3 11.0*   HEMATOCRIT % 30.4* 34.3 33.7* 37.7 32.3*   PLATELETS 10*3/mm3 237 225 199 184 181     Results from last 7 days   Lab Units 03/06/21  1502   INR  1.40*       Culture Data:   No results found for: BLOODCX  No results found for: URINECX  No results found for: RESPCX  No results found for: WOUNDCX  No results found for: STOOLCX  No components found for: BODYFLD    Radiology Data:   Imaging Results (Last 24 Hours)     Procedure Component Value Units Date/Time     IR Insert Midline Without Port Pump 5 Plus [[id number]] Resulted: 03/12/21 1046     Updated: 03/12/21 1046    Narrative:      This procedure was auto-finalized with no dictation required.    US Guided Vascular Access [[id number]] Resulted: 03/12/21 1020     Updated: 03/12/21 1020          I have reviewed the patient's current medications.     Assessment/Plan     Active Hospital Problems    Diagnosis    • **Sepsis with encephalopathy without septic shock (CMS/[facility])    • Tobacco abuse    • UTI (urinary tract infection)    • Ureteral stone with hydronephrosis      Added automatically from request for surgery [id number]     • Type 1 diabetes mellitus with diabetic polyneuropathy (CMS/[facility])    • Mixed diabetic hyperlipidemia associated with type 1 diabetes mellitus (CMS/[facility])    • Hypertension associated with diabetes (CMS/[facility])    • Other specified rheumatoid arthritis, multiple sites (CMS/[facility])    • CAD (coronary artery disease)        Impression    1.  Sepsis  -Secondary to obstructive uropathy with distal left stone  -Admitting white count of 21,000 and actually stable between 20 and 22,000 and then decreasing on the 10th to 12,000 with patient status post removal of obstruction  -E. coli both in urine and blood susceptible to Zosyn day #6/10 but Grady remains in place  -Repeat blood cultures on 3/8 and negative     2.    Nephrolithiasis  -Left ureteral obstruction  -Status post cystoscopy with lithotripsy and double-J stent placement on 3/1021 by Dr. Brice     3.  Acute kidney injury-resolved  -Admission creatinine 1.46 decrease within the first 24 hours to 1.35 and then 0.8 x 3/8 and stable subsequently.  Volume status appears stable without evidence of volume overload.  Electrolytes okay.  Acid-base status okay.  Metabolic bone status no phosphorus loading admission and hematocrit adequate.     4.  Diabetes mellitus with diabetic polyneuropathy  -Continues on Levemir  -Continue sliding scale  -Hoping to get better  coverage    5.    Atrial fibrillation (converted) spontaneously  -Rate controlled   -mixed in part secondary to poor control of diabetes as an outpatient with an A1c of 9.3 noted at presentation  -Changed to p.o. Cardizem with patient converted to normal sinus rhythm on 3/11     6.  Hypertension  -Continues on Coreg as well as isosorbide and Cardizem  -Follow-up with DC of Cardizem drip may need to add another agent     7.  Coronary disease post CABG  -Continues on aspirin statin beta-blocker nitrates        CODE STATUS patient is full code     Appreciate urology follow-up.  Patient continues with Grady in place as well as UTI treatment day #6.  Hoping Grady out soon.  Urology to follow-up with stone as an outpatient she has a double-J stent in place.  Her leukocytosis has resolved however she continues with especially sundowning at night and confusion at night and cannot rule out some early dementia we will see if she is improving after family discussion there not able to give 24-hour coverage and may need to be placed we will follow-up over the weekend and certainly by the first the week.  Discussed with case management at length     Daniel Hdz MD

## 2021-03-12 NOTE — PROGRESS NOTES
"   LOS: 6 days   Patient Care Team:  Arelis Laird MD as PCP - General  Francine Gallegos (Inactive) as Technician    Subjective     Subjective:  Symptoms:  Stable.  (Grady in place with dark yellow urine, complains of some low back pain and intermittent chills with hot flashes. ).        History taken from: patient chart    Objective     Vital Signs  Temp:  [97.1 °F (36.2 °C)-98 °F (36.7 °C)] 97.2 °F (36.2 °C)  Heart Rate:  [] 97  Resp:  [16-18] 18  BP: (150-176)/(67-84) 166/84    Objective:  General Appearance:  In no acute distress.    Vital signs: (most recent): Blood pressure 166/84, pulse 97, temperature 97.2 °F (36.2 °C), temperature source Temporal, resp. rate 18, height 175.3 cm (69\"), weight 94.5 kg (208 lb 4.8 oz), SpO2 96 %.  Vital signs are normal.  No fever.    Output: Producing urine (Grady).    Abdomen: Abdomen is soft and non-distended.  There is no suprapubic area tenderness.     Neurological: Patient is alert.    Pupils:  Pupils are equal, round, and reactive to light.    Skin:  Warm, dry and pale.              Results Review:    Lab Results (last 24 hours)     Procedure Component Value Units Date/Time    POC Glucose Once [[id number]]  (Abnormal) Collected: 03/12/21 1008    Specimen: Blood Updated: 03/12/21 1040     Glucose 227 mg/dL      Comment: RN NotifiedOperator: [id number] BHAVESH HENDERSONAurora East HospitalMeter ID: [id number]       Blood Culture - Blood, Hand, Left [[id number]] Collected: 03/08/21 0958    Specimen: Blood from Hand, Left Updated: 03/12/21 1015     Blood Culture No growth at 4 days    Renal Function Panel [[id number]]  (Abnormal) Collected: 03/12/21 0546    Specimen: Blood Updated: 03/12/21 0632     Glucose 298 mg/dL      BUN 8 mg/dL      Creatinine 0.45 mg/dL      Sodium 138 mmol/L      Potassium 3.9 mmol/L      Chloride 109 mmol/L      CO2 23.0 mmol/L      Calcium 9.0 mg/dL      Albumin 2.60 g/dL      Phosphorus 2.0 mg/dL      Anion Gap 6.0 mmol/L      BUN/Creatinine Ratio 17.8     " eGFR Non African Amer 139 mL/min/1.73     Narrative:      GFR Normal >60  Chronic Kidney Disease <60  Kidney Failure <15      Magnesium [[id number]]  (Normal) Collected: 03/12/21 0546    Specimen: Blood Updated: 03/12/21 0613     Magnesium 1.6 mg/dL     CBC & Differential [[id number]]  (Abnormal) Collected: 03/12/21 0546    Specimen: Blood Updated: 03/12/21 0556    Narrative:      The following orders were created for panel order CBC & Differential.  Procedure                               Abnormality         Status                     ---------                               -----------         ------                     CBC Auto Differential[[id number]]        Abnormal            Final result                 Please view results for these tests on the individual orders.    CBC Auto Differential [[id number]]  (Abnormal) Collected: 03/12/21 0546    Specimen: Blood Updated: 03/12/21 0556     WBC 11.11 10*3/mm3      RBC 3.31 10*6/mm3      Hemoglobin 10.2 g/dL      Hematocrit 30.4 %      MCV 91.8 fL      MCH 30.8 pg      MCHC 33.6 g/dL      RDW 14.5 %      RDW-SD 48.3 fl      MPV 10.8 fL      Platelets 237 10*3/mm3      Neutrophil % 61.3 %      Lymphocyte % 19.0 %      Monocyte % 12.9 %      Eosinophil % 2.5 %      Basophil % 0.5 %      Immature Grans % 3.8 %      Neutrophils, Absolute 6.82 10*3/mm3      Lymphocytes, Absolute 2.11 10*3/mm3      Monocytes, Absolute 1.43 10*3/mm3      Eosinophils, Absolute 0.28 10*3/mm3      Basophils, Absolute 0.05 10*3/mm3      Immature Grans, Absolute 0.42 10*3/mm3      nRBC 0.0 /100 WBC     POC Glucose Once [[id number]]  (Abnormal) Collected: 03/11/21 2322    Specimen: Blood Updated: 03/11/21 2358     Glucose 355 mg/dL      Comment: RN NotifiedOperator: [id number] CUONG CROWMeter ID: [id number]       Potassium [[id number]]  (Normal) Collected: 03/11/21 1659    Specimen: Blood Updated: 03/11/21 1733     Potassium 4.7 mmol/L          Imaging Results (Last 24 Hours)     Procedure  Component Value Units Date/Time    IR Insert Midline Without Port Pump 5 Plus [[id number]] Resulted: 03/12/21 1046     Updated: 03/12/21 1046    Narrative:      This procedure was auto-finalized with no dictation required.    US Guided Vascular Access [[id number]] Resulted: 03/12/21 1020     Updated: 03/12/21 1020           I reviewed the patient's new clinical results.  I reviewed the patient's new imaging results and agree with the interpretation.  I reviewed the patient's other test results and agree with the interpretation      Assessment/Plan       Sepsis with encephalopathy without septic shock (CMS/HCC)    Type 1 diabetes mellitus with diabetic polyneuropathy (CMS/HCC)    Mixed diabetic hyperlipidemia associated with type 1 diabetes mellitus (CMS/HCC)    Hypertension associated with diabetes (CMS/HCC)    CAD (coronary artery disease)    Other specified rheumatoid arthritis, multiple sites (CMS/HCC)    Tobacco abuse    UTI (urinary tract infection)    Ureteral stone with hydronephrosis      Assessment & Plan    POD #2 cystoscopy LEFT J-stent placement, findings: distal left ureter stone with hydronephrosis.   -WBC 21.79-->23.17-->12.00-->11.46-->11.11  -Estimated Creatinine Clearance: 83.5 mL/min (A) (by C-G formula based on SCr of 0.45 mg/dL (L)).  -Urine culture 3/6/21 E.Coli, on Zosyn    Plan:  J-stent in place, continue Grady, complete antibiotic therapy for UTI.  Plan treatment of distal stone outpatient.     JESSIE Carrillo  03/12/21  10:57 CST

## 2021-03-12 NOTE — PLAN OF CARE
Goal Outcome Evaluation:  Plan of Care Reviewed With: patient  Progress: no change  Outcome Summary: pt became disoriented during shift and pulled out midline and peripheral IV. Pt converted to NSR earlier this shift, now on PO cardizem. VSS. PRN meds given for c/o pain

## 2021-03-12 NOTE — PLAN OF CARE
Goal Outcome Evaluation:  Plan of Care Reviewed With: patient, caregiver     Outcome Summary: OT eval on this date as co-eval with PT.  Pt was max of 2 for bed mob.  She was also max of 2 for sit to stand, 2 steps and stand to sit.  Pt was mod A for grooming and dependent for donning socks.  Pt could benefit from OT services to increase independence with ADLs and functional mob.  Rec cont therapy @ home or SNF when D/C from hospital.

## 2021-03-12 NOTE — CONSULTS
Adult Nutrition  Assessment    Patient Name:  Janelle Millard  YOB: 1955  MRN: [id number]  Admit Date:  3/6/2021    Assessment Date:  3/12/2021    Comments:  RN indicates pt is confused.  Intake 50% - 1x, 25% - 3x, 10% - 1x, 0% - 1x.  Blood glucose is elevated.  Levemir insulin, sliding scale novolog prescribed.  Nausea reported.  PRN Zofran prescribed.  Will add Boost Glucose Control to breakfast monitor acceptance, and make further recommendations as appropriate.      Reason for Assessment     Row Name 03/12/21 1756          Reason for Assessment    Reason For Assessment  follow-up protocol             Labs/Tests/Procedures/Meds     Row Name 03/12/21 1759          Labs/Procedures/Meds    Lab Results Reviewed  reviewed, pertinent        Medications    Pertinent Medications Reviewed  reviewed, pertinent             Nutrition Prescription Ordered     Row Name 03/12/21 1750          Nutrition Prescription PO    Current PO Diet  Soft Texture     Texture  Ground     Supplement  Milk     Supplement Frequency  3 times a day     Common Modifiers  Consistent Carbohydrate         Evaluation of Received Nutrient/Fluid Intake     Row Name 03/12/21 1756          PO Evaluation    Number of Meals  6     % PO Intake  50% - 1x, 25% - 3x, 10% - 1x, 0% - 1x               Electronically signed by:  Krista Rodriguez RD  03/12/21 17:57 CST

## 2021-03-12 NOTE — PROGRESS NOTES
TWO PATIENT IDENTIFIERS WERE USED. THE PATIENT WAS DRAPED WITH A FULL BODY DRAPE AND THE PATIENT'S RIGHT ARM WAS PREPPED WITH CHLORA PREP. ULTRASOUND WAS USED TO LOCALIZE THERIGHT BASILIC VEIN. SUBCUTANEOUS TISSUE AT THE CATHETER SITE WAS INFILTRATED WITH 2% LIDOCAINE. UNDER ULTRASOUND GUIDANCE, THE VEIN WAS ACCESSED WITH A 21 GAUGE  NEEDLE. AN 0.018 WIRE WAS THEN THREADED THROUGH THE NEEDLE. THE 21 GAUGE NEEDLE WAS REMOVED AND A 4 Sinhala SHEATH WAS PLACED OVER THE WIRE INTO THE VEIN.THE MIDLINE CATHETER WAS TRIMMED TO 20CM. THE MIDLINE CATHETER WAS THEN PLACED OVER THE WIRE INTO THE VEIN, THE SHEATH WAS PEELED AWAY, WIRE WAS REMOVED. CATHETER WAS FLUSHED WITH NORMAL SALINE AND CATHETER TIP APPLIED. BIOPATCH PLACED. CATHETER SECURED WITH STAT LOCK AND TEGADERM. PATIENT TOLERATED PROCEDURE WELL. THIS WAS DONE IN THE ANGIOSUITE      IMPRESSION:SUCCESSFUL PLACEMENT OF DUAL LUMEN MIDLINE.           Raysa Sparks  3/12/2021  10:45 CST

## 2021-03-12 NOTE — PLAN OF CARE
Goal Outcome Evaluation:         PT francesca completed this date. Patient is alert but confused and has increased difficulty with following commands. Niece reports patient has a hx of falls and neuropathy in her hands and feet. Pt has a caregiver for a couple of days during the week and requires assistance with ADLs but is able to ambulate household distances with rollater. Bed mobility: MaxAx2 rolling L. MaxAx2 with Anu of 3rd person supine>sit t/f. MaxAx2 sit>supine t/f. Pt required increased time with HOB elevated and bed rails d/t delayed mental processing and confusion. Transfers: MaxAx2 sit<>stand t/f. Pt presented with strong posterior trunk lean initially but able to maintain static balance. ModAx2 side stepping towards HOB. Pt mobility and function limited by pain in lower and upper extremities, impaired cognition, and mildly impaired sitting balance. PT recommends d/c to SNF vs home with 24/7 assist and HHPT.

## 2021-03-13 NOTE — THERAPY TREATMENT NOTE
Acute Care - Physical Therapy Treatment Note  [facility]     Patient Name: Janelle Millard  : 1955  MRN: [id number]  Today's Date: 3/13/2021           PT Assessment (last 12 hours)      PT Evaluation and Treatment     Row Name 21 1038          Physical Therapy Time and Intention    Subjective Information  complains of;pain B LE's  -BS     Document Type  therapy note (daily note)  -BS     Mode of Treatment  individual therapy;physical therapy  -BS     Patient Effort  poor  -BS     Symptoms Noted During/After Treatment  nausea;increased pain;dizziness  -BS     Comment  c/o dizzy and nausea assuming sitting at EOB with PT and RN assisting  -BS     Row Name 21 1038          General Information    Patient Profile Reviewed  yes  -BS     Row Name 21 1038          Cognition    Orientation Status (Cognition)  oriented to;person;place;time  -BS     Row Name 21 1038          Pain Scale: Numbers Pre/Post-Treatment    Pretreatment Pain Rating  9/10  -BS     Posttreatment Pain Rating  9/10  -BS     Pain Location - Side  Bilateral  -BS     Pain Location  other (see comments) LE's and back  -BS     Row Name 21 1038          Bed Mobility    Supine-Sit [place] (Bed Mobility)  maximum assist (25% patient effort);2 person assist  -BS     Sit-Supine [place] (Bed Mobility)  maximum assist (25% patient effort);2 person assist  -BS     Bed Mobility, Safety Issues  cognitive deficits limit understanding;decreased use of arms for pushing/pulling;decreased use of legs for bridging/pushing  -BS     Assistive Device (Bed Mobility)  bed rails;draw sheet;head of bed elevated  -BS     Comment (Bed Mobility)  RN assisted PT with supine <>sit   -BS     Row Name 21 1038          Transfers    Comment (Transfers)  unable to attempt standing as pt c/o increased nausea, wanting to lay back down  -BS     Sit-Stand [place] (Transfers)  not tested  -BS     Row Name 21 1038           Gait/Stairs (Locomotion)    Comment (Gait/Stairs)  non-ambulatory  -BS     Row Name 03/13/21 1038          Balance    Static Sitting Balance  mild impairment  -BS     Dynamic Sitting Balance  mild impairment  -BS     Comment, Balance  sat at EOB x 4' before requesting to lay back down due to nausea  -BS     Row Name 03/13/21 1038          Hip (Therapeutic Exercise)    Hip (Therapeutic Exercise)  AAROM (active assistive range of motion) B hip abd x 10  -BS     Row Name 03/13/21 1038          Knee (Therapeutic Exercise)    Knee (Therapeutic Exercise)  AAROM (active assistive range of motion)  -BS     Knee AAROM (Therapeutic Exercise)  bilateral heel slides x 10 reps each  -BS     Row Name 03/13/21 1038          Plan of Care Review    Plan of Care Reviewed With  patient  -BS     Progress  no change  -BS     Row Name 03/13/21 1038          Vital Signs    Pre Systolic BP Rehab  177  -BS     Pre Treatment Diastolic BP  83  -BS     Pretreatment Heart Rate (beats/min)  78  -BS     Pre SpO2 (%)  96  -BS     O2 Delivery Pre Treatment  room air  -BS     Pre Patient Position  Supine  -BS     Intra Patient Position  Sitting  -BS     Post Patient Position  Supine  -BS     Row Name 03/13/21 1038          Bed Mobility Goal 1 (PT)    Activity/Assistive Device (Bed Mobility Goal 1, PT)  sit to supine;supine to sit  -BS     [place] Level/Cues Needed (Bed Mobility Goal 1, PT)  minimum assist (75% or more patient effort);1 person assist  -BS     Time Frame (Bed Mobility Goal 1, PT)  by discharge  -BS     Progress/Outcomes (Bed Mobility Goal 1, PT)  goal not met  -BS     Row Name 03/13/21 1038          Transfer Goal 1 (PT)    Activity/Assistive Device (Transfer Goal 1, PT)  sit-to-stand/stand-to-sit;bed-to-chair/chair-to-bed;walker, rolling  -BS     [place] Level/Cues Needed (Transfer Goal 1, PT)  minimum assist (75% or more patient effort)  -BS     Time Frame (Transfer Goal 1, PT)  by discharge  -BS     Progress/Outcome  (Transfer Goal 1, PT)  goal not met  -BS     Row Name 03/13/21 1038          Gait Training Goal 1 (PT)    Activity/Assistive Device (Gait Training Goal 1, PT)  gait (walking locomotion);assistive device use;walker, rolling  -BS     [place] Level (Gait Training Goal 1, PT)  minimum assist (75% or more patient effort)  -BS     Distance (Gait Training Goal 1, PT)  10'x2  -BS     Time Frame (Gait Training Goal 1, PT)  by discharge  -BS     Progress/Outcome (Gait Training Goal 1, PT)  goal not met  -BS     Row Name 03/13/21 1038          Positioning and Restraints    Pre-Treatment Position  in bed  -BS     Post Treatment Position  bed  -BS     In Bed  notified nsg;fowlers;call light within reach;encouraged to call for assist;exit alarm on  -BS     Row Name 03/13/21 1038          Therapy Assessment/Plan (PT)    Rehab Potential (PT)  fair, will monitor progress closely  -BS     Criteria for Skilled Interventions Met (PT)  yes;skilled treatment is necessary;meets criteria  -BS       User Key  (r) = Recorded By, (t) = Taken By, (c) = Cosigned By    Initials Name Provider Type    BS Malick Huitron, PT Physical Therapist          PT Recommendation and Plan  Anticipated Discharge Disposition (PT): home with 24/7 care, skilled nursing facility (home with 24/7 vs SNF)  Therapy Frequency (PT):  (5-7 days/week)  Plan of Care Reviewed With: patient  Progress: no change  Outcome Measures     Row Name 03/12/21 0963             How much help from another is currently needed...    Putting on and taking off regular lower body clothing?  2  -RB      Bathing (including washing, rinsing, and drying)  2  -RB      Toileting (which includes using toilet bed pan or urinal)  2  -RB      Putting on and taking off regular upper body clothing  2  -RB      Taking care of personal grooming (such as brushing teeth)  2  -RB      Eating meals  3  -RB      AM-PAC 6 Clicks Score (OT)  13  -RB         Functional Assessment    Outcome Measure Options   AM-PAC 6 Clicks Daily Activity (OT)  -RB        User Key  (r) = Recorded By, (t) = Taken By, (c) = Cosigned By    Initials Name Provider Type    RB Baldomero Kwon, OT Occupational Therapist           Time Calculation:   PT Charges     Row Name 03/13/21 1038             Time Calculation    Start Time  1038  -BS      Stop Time  1107  -BS      Time Calculation (min)  29 min  -BS      PT Received On  03/13/21  -BS      PT Goal Re-Cert Due Date  03/25/21  -BS        User Key  (r) = Recorded By, (t) = Taken By, (c) = Cosigned By    Initials Name Provider Type    Malick Fairchild, PT Physical Therapist        Therapy Charges for Today     Code Description Service Date Service Provider Modifiers Qty    35255640063 HC PT EVAL MOD COMPLEXITY 3 3/12/2021 Malick Huitron, PT GP 1    14893403966 HC PT THERAPEUTIC ACT EA 15 MIN 3/13/2021 Malick Huitron, PT GP 1          PT G-Codes  Outcome Measure Options: AM-PAC 6 Clicks Daily Activity (OT)  AM-PAC 6 Clicks Score (PT): 11  AM-PAC 6 Clicks Score (OT): 13    Malick Huitron PT  3/13/2021

## 2021-03-13 NOTE — PLAN OF CARE
Goal Outcome Evaluation:     Progress: no change  Outcome Summary: PT treatment performed. Pt required maxA x 2 supine <> sit to EOB with RN assist. Sitting tolerance x 4' before nause prompted return back to bed. A &O x 3 ( name, location, year) today, more lucid than yesterday. Limited by pain, fatigue, and nausea. No goals met. Continue per plan.

## 2021-03-13 NOTE — SIGNIFICANT NOTE
03/13/21 1002   OTHER   Discipline occupational therapy assistant   Rehab Time/Intention   Session Not Performed patient/family declined, not feeling well

## 2021-03-13 NOTE — PLAN OF CARE
Goal Outcome Evaluation:  Plan of Care Reviewed With: other (see comments) (RN)  Progress: no change  Outcome Summary: Intake 50% - 1x, 25% - 3x, 10% - 1x, 0% - 1x.  Encourage intake of meals and supplement.

## 2021-03-13 NOTE — PLAN OF CARE
Problem: Adult Inpatient Plan of Care  Goal: Plan of Care Review  Outcome: Ongoing, Progressing  Flowsheets (Taken 3/13/2021 0023)  Progress: no change  Plan of Care Reviewed With: patient  Outcome Summary: pt vs stable, pt confused at times but seems to be aware that she gets mixed up at times, incontinent of bowel, no new events at this time.     Problem: Adult Inpatient Plan of Care  Goal: Patient-Specific Goal (Individualized)  Outcome: Ongoing, Progressing     Problem: Adult Inpatient Plan of Care  Goal: Absence of Hospital-Acquired Illness or Injury  Outcome: Ongoing, Progressing     Problem: Adult Inpatient Plan of Care  Goal: Absence of Hospital-Acquired Illness or Injury  Intervention: Identify and Manage Fall Risk  Recent Flowsheet Documentation  Taken 3/13/2021 0003 by Arlette Hamlin RN  Safety Promotion/Fall Prevention:   activity supervised   assistive device/personal items within reach   clutter free environment maintained   fall prevention program maintained   safety round/check completed   nonskid shoes/slippers when out of bed  Taken 3/12/2021 2112 by Arlette Hamlin, RN  Safety Promotion/Fall Prevention:   activity supervised   assistive device/personal items within reach   clutter free environment maintained   fall prevention program maintained   safety round/check completed   muscle strengthening facilitated  Taken 3/12/2021 2005 by Arlette Hamlin, RN  Safety Promotion/Fall Prevention:   activity supervised   assistive device/personal items within reach   clutter free environment maintained   fall prevention program maintained   safety round/check completed   nonskid shoes/slippers when out of bed  Taken 3/12/2021 1939 by Arlette Hamlin, RN  Safety Promotion/Fall Prevention: nonskid shoes/slippers when out of bed     Problem: Adult Inpatient Plan of Care  Goal: Absence of Hospital-Acquired Illness or Injury  Intervention: Prevent Skin Injury  Recent Flowsheet  Documentation  Taken 3/13/2021 0003 by Arlette Hamlin RN  Body Position: tilted, left  Taken 3/12/2021 2005 by Arlette Hamlin RN  Body Position: tilted, right     Problem: Adult Inpatient Plan of Care  Goal: Absence of Hospital-Acquired Illness or Injury  Intervention: Prevent Infection  Recent Flowsheet Documentation  Taken 3/13/2021 0003 by Arlette Hamlin RN  Infection Prevention: rest/sleep promoted  Taken 3/12/2021 2112 by Arlette Hamlin RN  Infection Prevention: rest/sleep promoted  Taken 3/12/2021 2005 by Arlette Hamlin RN  Infection Prevention: rest/sleep promoted  Taken 3/12/2021 1939 by Arlette Hamlin RN  Infection Prevention: rest/sleep promoted     Problem: Adult Inpatient Plan of Care  Goal: Optimal Comfort and Wellbeing  Outcome: Ongoing, Progressing     Problem: Adult Inpatient Plan of Care  Goal: Optimal Comfort and Wellbeing  Intervention: Provide Person-Centered Care  Recent Flowsheet Documentation  Taken 3/12/2021 1939 by Arlette Hamlin RN  Trust Relationship/Rapport: care explained     Problem: Adult Inpatient Plan of Care  Goal: Readiness for Transition of Care  Outcome: Ongoing, Progressing     Problem: Fall Injury Risk  Goal: Absence of Fall and Fall-Related Injury  Outcome: Ongoing, Progressing     Problem: Fall Injury Risk  Goal: Absence of Fall and Fall-Related Injury  Intervention: Promote Injury-Free Environment  Recent Flowsheet Documentation  Taken 3/13/2021 0003 by Arlette Hamlin RN  Safety Promotion/Fall Prevention:   activity supervised   assistive device/personal items within reach   clutter free environment maintained   fall prevention program maintained   safety round/check completed   nonskid shoes/slippers when out of bed  Taken 3/12/2021 2112 by Arlette Hamlin RN  Safety Promotion/Fall Prevention:   activity supervised   assistive device/personal items within reach   clutter free environment maintained   fall prevention program  maintained   safety round/check completed   muscle strengthening facilitated  Taken 3/12/2021 2005 by Arlette Hamlin RN  Safety Promotion/Fall Prevention:   activity supervised   assistive device/personal items within reach   clutter free environment maintained   fall prevention program maintained   safety round/check completed   nonskid shoes/slippers when out of bed  Taken 3/12/2021 1939 by Arlette Hamlin RN  Safety Promotion/Fall Prevention: nonskid shoes/slippers when out of bed     Problem: Skin Injury Risk Increased  Goal: Skin Health and Integrity  Outcome: Ongoing, Progressing     Problem: Skin Injury Risk Increased  Goal: Skin Health and Integrity  Intervention: Optimize Skin Protection  Recent Flowsheet Documentation  Taken 3/12/2021 1939 by Arlette Hamlin RN  Pressure Reduction Techniques: frequent weight shift encouraged  Pressure Reduction Devices: specialty bed utilized     Problem: Glycemic Control Impaired (Sepsis/Septic Shock)  Goal: Blood Glucose Level Within Desired Range  Outcome: Ongoing, Progressing     Problem: Infection (Sepsis/Septic Shock)  Goal: Absence of Infection Signs and Symptoms  Outcome: Ongoing, Progressing     Problem: Infection (Sepsis/Septic Shock)  Goal: Absence of Infection Signs and Symptoms  Intervention: Prevent or Manage Infection Progression  Recent Flowsheet Documentation  Taken 3/13/2021 0003 by Arlette Hamlin RN  Infection Prevention: rest/sleep promoted  Taken 3/12/2021 2112 by Arlette Hamlin RN  Infection Prevention: rest/sleep promoted  Taken 3/12/2021 2005 by Arlette Hamlin RN  Infection Prevention: rest/sleep promoted  Taken 3/12/2021 1939 by Arlette Hamlin RN  Infection Prevention: rest/sleep promoted     Problem: UTI (Urinary Tract Infection)  Goal: Improved Infection Symptoms  Outcome: Ongoing, Progressing     Problem: Arrhythmia/Dysrhythmia  Goal: Normalized Cardiac Rhythm  Outcome: Ongoing, Progressing   Goal Outcome  Evaluation:  Plan of Care Reviewed With: patient  Progress: no change  Outcome Summary: pt vs stable, pt confused at times but seems to be aware that she gets mixed up at times, incontinent of bowel, no new events at this time.

## 2021-03-13 NOTE — PROGRESS NOTES
LOS: 7 days     Patient Care Team:  Arelis Laird MD as PCP - General  Francine Gallegos (Inactive) as Technician      Subjective     UTI with ureteral stone with J stent in place, remarkable improvement of sensorium    Objective       Vital Signs  Temp:  [97.2 °F (36.2 °C)-98.8 °F (37.1 °C)] 98.7 °F (37.1 °C)  Heart Rate:  [80-92] 89  Resp:  [18] 18  BP: (140-176)/(68-84) 165/79    Physical Exam:        General Appearance:   Sitting up conversing well     Respiratory:    UNLABORED RESPIRATIONS.     Abdomen:     SOFT.       Genitourinary:  Voiding well     Rectal:     DEFERRED       Results Review:       Imaging Results (Last 24 Hours)     ** No results found for the last 24 hours. **        Lab Results (last 24 hours)     Procedure Component Value Units Date/Time    Magnesium [[id number]]  (Abnormal) Collected: 03/13/21 0632    Specimen: Blood Updated: 03/13/21 0730     Magnesium 1.5 mg/dL     POC Glucose Once [[id number]]  (Abnormal) Collected: 03/13/21 0612    Specimen: Blood Updated: 03/13/21 0644     Glucose 202 mg/dL      Comment: RN NotifiedOperator: [id number] PHOEBE Mercedesangy ID: [id number]       Extra Tubes [[id number]] Collected: 03/13/21 0530    Specimen: Blood, Venous Line Updated: 03/13/21 0630    Narrative:      The following orders were created for panel order Extra Tubes.  Procedure                               Abnormality         Status                     ---------                               -----------         ------                     Lavender Top[[id number]]                                     Final result                 Please view results for these tests on the individual orders.    Lavender Top [[id number]] Collected: 03/13/21 0530    Specimen: Blood Updated: 03/13/21 0630     Extra Tube hold for add-on     Comment: Auto resulted       POC Glucose Once [[id number]]  (Abnormal) Collected: 03/12/21 2011    Specimen: Blood Updated: 03/12/21 2114     Glucose 208 mg/dL      Comment: RN  NotifiedOperator: [id number] PHOEBE BAILEYMeter ID: [id number]       POC Glucose Once [[id number]]  (Abnormal) Collected: 03/12/21 0617    Specimen: Blood Updated: 03/12/21 1718     Glucose 304 mg/dL      Comment: RN NotifiedOperator: [id number] CUONG CROWMeter ID: [id number]       POC Glucose Once [[id number]]  (Abnormal) Collected: 03/12/21 1620    Specimen: Blood Updated: 03/12/21 1708     Glucose 190 mg/dL      Comment: RN NotifiedOperator: [id number] BHAVESH FISHERMeter ID: [id number]               I reviewed the patient's new clinical results.  I reviewed the patient's new imaging results and agree with the interpretation.  I reviewed the patient's other test results and agree with the interpretation        Assessment/Plan       Sepsis with encephalopathy without septic shock (CMS/HCC)    Type 1 diabetes mellitus with diabetic polyneuropathy (CMS/HCC)    Mixed diabetic hyperlipidemia associated with type 1 diabetes mellitus (CMS/HCC)    Hypertension associated with diabetes (CMS/HCC)    CAD (coronary artery disease)    Other specified rheumatoid arthritis, multiple sites (CMS/HCC)    Tobacco abuse    UTI (urinary tract infection)    Ureteral stone with hydronephrosis      Continue to treat UTI will treat stone as an outpatient      Cooper Brice MD  03/13/21  09:25 CST

## 2021-03-13 NOTE — PROGRESS NOTES
[facility] Medicine Services  INPATIENT PROGRESS NOTE    Length of Stay: 7  Date of Admission: 3/6/2021  Primary Care Physician: Arelis Laird MD    Subjective   Chief Complaint: Patient not able to give chief complaint discussed with nursing continues with nocturnal effusion will need to count placement of the first the week    HPI:   65-year-old female who is into the ED and admitted on 3/6 carrying the following problem list     1.  Diabetes mellitus requiring insulin with metabolic syndrome  -Endorgan involvement with neuropathy  2.  RA  3.  Known atherosclerotic cardiovascular disease status post CABG  -Peripheral vascular disease  4.  Tobacco abuse     Patient was brought in and found to have urinary tract infection and evidence of early sepsis syndrome.  Was admitted for further evaluation and treatment.     Patient admitted with sepsis syndrome as well as acute kidney injury, with complicating left ureteral stone with hydronephrosis.  Patient's acute kidney injury with rehydration and treatment of underlying sepsis syndrome resolved to normal.  Her blood and urine cultures grew both urine  E. coli which is sensitive to Rocephin and continuing.  She does have a distal ureteral stone with urology following and status post cystoscopy with lithotripsy and placement of double-J stent in the left on 3/10/2021. Otherwise patient stable in terms of blood sugar and her blood pressure with diffuse vascular disease.  She is encephalopathic although nurses report some improvement hoping continued improvement.  We will follow-up overall clinically.  Patient went into A. fib overnight 3/9 and 3/10 started on Cardizem drip and has been rate controlled.  She is status post procedure need to consider anticoagulation.  She had an echocardiogram in October this past year without atrial dilatation she has had encephalopathy with daughter seen today and she has had marked  improvement in terms of her status over the past 24 hours     3/12 patient did convert from A. fib to normal sinus rhythm and will hold on anticoagulation especially with her fall risk.  She continues to have issues in terms of confusion especially at night and may certainly have sundowning and early dementia that was not recognized that she has not been admitted prior.  He is going down for a midline currently and discussed with case management as they discussed with niece that unless she changes she may certainly need to be placed in a weight clinical improvement.  Appreciate urology follow-up postop day 2 cystoscopy with left J stent placement.  Patient remains on Zosyn with E. coli UTI, Grady remains in place.  Urology will follow-up for stone as an outpatient    3/13 patient still has nocturnal confusion somewhat better throughout the day but still not back to baseline.  Certainly cannot rule out underlying dementia worsened with placement and hospitalization.  She remains afebrile and white count stable.  Urology is to deal with stone as an outpatient with Grady remains in place.       Review of Systems   Attempted to perform difficult with mental status  All pertinent negatives and positives are as above. All other systems have been reviewed and are negative unless otherwise stated.     Objective    Temp:  [97.3 °F (36.3 °C)-98.8 °F (37.1 °C)] 98.7 °F (37.1 °C)  Heart Rate:  [83-92] 89  Resp:  [18] 18  BP: (140-176)/(68-84) 165/79    Physical Exam  Vitals and nursing note reviewed.   Constitutional:       Appearance: She is obese.   HENT:      Head: Normocephalic and atraumatic.      Mouth/Throat:      Mouth: Mucous membranes are moist.   Eyes:      Extraocular Movements: Extraocular movements intact.      Conjunctiva/sclera: Conjunctivae normal.      Pupils: Pupils are equal, round, and reactive to light.   Cardiovascular:      Rate and Rhythm: Normal rate and regular rhythm.      Pulses: Normal pulses.       Heart sounds: Normal heart sounds.   Pulmonary:      Effort: Pulmonary effort is normal.      Breath sounds: Normal breath sounds.   Abdominal:      General: Bowel sounds are normal.      Palpations: Abdomen is soft.   Musculoskeletal:         General: Normal range of motion.      Cervical back: Normal range of motion.   Skin:     General: Skin is warm and dry.   Neurological:      General: No focal deficit present.      Mental Status: She is alert. She is disoriented.   Psychiatric:         Behavior: Behavior normal.             Results Review:  I have reviewed the labs, radiology results, and diagnostic studies.    Laboratory Data:   Results from last 7 days   Lab Units 03/12/21  0546 03/11/21  1659 03/11/21  0650 03/10/21  0332 03/09/21  0637 03/06/21  1502   SODIUM mmol/L 138  --  137 139 138 139   POTASSIUM mmol/L 3.9 4.7 3.5 3.7 4.2 4.2   CHLORIDE mmol/L 109*  --  108* 108* 110* 105   CO2 mmol/L 23.0  --  18.0* 20.0* 20.0* 23.0   BUN mg/dL 8  --  12 8 9 32*   CREATININE mg/dL 0.45*  --  0.54* 0.60 0.54* 1.46*   GLUCOSE mg/dL 298*  --  235* 227* 208* 182*   CALCIUM mg/dL 9.0  --  8.9 9.1 9.5 9.0   BILIRUBIN mg/dL  --   --   --  0.4 0.4 0.2   ALK PHOS U/L  --   --   --  114 139* 90   ALT (SGPT) U/L  --   --   --  11 13 13   AST (SGOT) U/L  --   --   --  16 26 36*   ANION GAP mmol/L 6.0  --  11.0 11.0 8.0 11.0     Estimated Creatinine Clearance: 83.2 mL/min (A) (by C-G formula based on SCr of 0.45 mg/dL (L)).  Results from last 7 days   Lab Units 03/13/21  0632 03/12/21  0546 03/11/21  0650   MAGNESIUM mg/dL 1.5* 1.6 2.0   PHOSPHORUS mg/dL  --  2.0*  --          Results from last 7 days   Lab Units 03/12/21  0546 03/11/21  0650 03/10/21  0339 03/09/21  0444 03/08/21  0611   WBC 10*3/mm3 11.11* 11.46* 12.00* 23.17* 21.79*   HEMOGLOBIN g/dL 10.2* 11.6* 11.6* 12.3 11.0*   HEMATOCRIT % 30.4* 34.3 33.7* 37.7 32.3*   PLATELETS 10*3/mm3 237 225 199 184 181     Results from last 7 days   Lab Units 03/06/21  1502   INR   1.40*       Culture Data:   No results found for: BLOODCX  No results found for: URINECX  No results found for: RESPCX  No results found for: WOUNDCX  No results found for: STOOLCX  No components found for: BODYFLD    Radiology Data:   Imaging Results (Last 24 Hours)     ** No results found for the last 24 hours. **          I have reviewed the patient's current medications.     Assessment/Plan     Active Hospital Problems    Diagnosis    • **Sepsis with encephalopathy without septic shock (CMS/[facility])    • Tobacco abuse    • UTI (urinary tract infection)    • Ureteral stone with hydronephrosis      Added automatically from request for surgery [id number]     • Type 1 diabetes mellitus with diabetic polyneuropathy (CMS/[facility])    • Mixed diabetic hyperlipidemia associated with type 1 diabetes mellitus (CMS/[facility])    • Hypertension associated with diabetes (CMS/[facility])    • Other specified rheumatoid arthritis, multiple sites (CMS/[facility])    • CAD (coronary artery disease)        Impression     1.  Sepsis  -Secondary to obstructive uropathy with distal left stone  -Admitting white count of 21,000 and actually stable between 20 and 22,000 and then decreasing on the 10th to 12,000 with patient status post removal of obstruction  -E. coli both in urine and blood susceptible to Zosyn day #7/10 but Grady remains in place  -Repeated blood cultures on 3/8 and negative     2.    Nephrolithiasis  -Left ureteral obstruction  -Status post cystoscopy with lithotripsy and double-J stent placement on 3/1021 by Dr. Brice     3.  Acute kidney injury-resolved  -Admission creatinine 1.46 decrease within the first 24 hours to 1.35 and then 0.8 x 3/8 and stable subsequently.  Volume status appears stable without evidence of volume overload.  Electrolytes okay.  Acid-base status okay.  Metabolic bone status no phosphorus loading admission and hematocrit adequate.     4.  Diabetes mellitus with diabetic polyneuropathy  -Continues on Levemir  -Continue  sliding scale  -Hoping to get better coverage     5.    Atrial fibrillation (converted) spontaneously  -Rate controlled   -mixed in part secondary to poor control of diabetes as an outpatient with an A1c of 9.3 noted at presentation  -Changed to p.o. Cardizem with patient converted to normal sinus rhythm on 3/11     6.  Hypertension  -Continues on Coreg as well as isosorbide and Cardizem  -Follow-up with DC of Cardizem drip may need to add another agent     7.  Coronary disease post CABG  -Continues on aspirin statin beta-blocker nitrates        CODE STATUS patient is full code     After discussion with case management yesterday no improvement in mental status family will not be able to care for patient and looking for placement of the first the week.  She continues on IV antibiotics with white count stable and patient afebrile.  She did not have enough agitation to pull lines out last night and may have some improvement but certainly may have underlying dementia                                 Daniel Hdz MD

## 2021-03-14 NOTE — PLAN OF CARE
Problem: Adult Inpatient Plan of Care  Goal: Plan of Care Review  Outcome: Ongoing, Progressing  Flowsheets  Taken 3/14/2021 0451  Progress: no change  Outcome Summary: pt more oriented overnight, gave prn nausea meds, no new events at this time.  Taken 3/13/2021 0023  Plan of Care Reviewed With: patient     Problem: Adult Inpatient Plan of Care  Goal: Patient-Specific Goal (Individualized)  Outcome: Ongoing, Progressing     Problem: Adult Inpatient Plan of Care  Goal: Absence of Hospital-Acquired Illness or Injury  Outcome: Ongoing, Progressing     Problem: Adult Inpatient Plan of Care  Goal: Absence of Hospital-Acquired Illness or Injury  Intervention: Identify and Manage Fall Risk  Recent Flowsheet Documentation  Taken 3/14/2021 0415 by Arlette Hamlin RN  Safety Promotion/Fall Prevention:   activity supervised   assistive device/personal items within reach   clutter free environment maintained   fall prevention program maintained   safety round/check completed   nonskid shoes/slippers when out of bed  Taken 3/14/2021 0000 by Arlette Hamlin RN  Safety Promotion/Fall Prevention:   activity supervised   assistive device/personal items within reach   clutter free environment maintained   fall prevention program maintained   safety round/check completed   nonskid shoes/slippers when out of bed  Taken 3/13/2021 2210 by Arlette Hamlin, RN  Safety Promotion/Fall Prevention:   activity supervised   assistive device/personal items within reach   clutter free environment maintained   fall prevention program maintained   safety round/check completed   nonskid shoes/slippers when out of bed  Taken 3/13/2021 2102 by Arlette Hamlin, RN  Safety Promotion/Fall Prevention:   activity supervised   assistive device/personal items within reach   clutter free environment maintained   fall prevention program maintained   safety round/check completed   nonskid shoes/slippers when out of bed  Taken 3/13/2021 2005 by  Hamlin, Arlette L, RN  Safety Promotion/Fall Prevention:   activity supervised   assistive device/personal items within reach   clutter free environment maintained   fall prevention program maintained   safety round/check completed   muscle strengthening facilitated  Taken 3/13/2021 1907 by Arlette Hamlin RN  Safety Promotion/Fall Prevention:   activity supervised   assistive device/personal items within reach   clutter free environment maintained   fall prevention program maintained   safety round/check completed   nonskid shoes/slippers when out of bed     Problem: Adult Inpatient Plan of Care  Goal: Absence of Hospital-Acquired Illness or Injury  Intervention: Prevent Skin Injury  Recent Flowsheet Documentation  Taken 3/14/2021 0415 by Arlette Hamlin RN  Body Position: tilted, left  Taken 3/14/2021 0000 by Arlette Hamlin RN  Body Position:   position changed independently   supine  Taken 3/13/2021 2210 by Arlette Hamlin RN  Body Position: side-lying, left  Taken 3/13/2021 2005 by Arlette Hamlin RN  Body Position: supine     Problem: Adult Inpatient Plan of Care  Goal: Absence of Hospital-Acquired Illness or Injury  Intervention: Prevent and Manage VTE (venous thromboembolism) Risk  Recent Flowsheet Documentation  Taken 3/13/2021 1907 by Arlette Hamlin RN  VTE Prevention/Management: (lovenox) other (see comments)     Problem: Adult Inpatient Plan of Care  Goal: Absence of Hospital-Acquired Illness or Injury  Intervention: Prevent Infection  Recent Flowsheet Documentation  Taken 3/14/2021 0415 by Arlette Hamlin RN  Infection Prevention: rest/sleep promoted  Taken 3/13/2021 2102 by Arlette Hamlin RN  Infection Prevention: rest/sleep promoted  Taken 3/13/2021 2005 by Arlette Hamlin RN  Infection Prevention: rest/sleep promoted  Taken 3/13/2021 1907 by Arlette Hamlin RN  Infection Prevention: rest/sleep promoted     Problem: Adult Inpatient Plan of Care  Goal: Optimal  Comfort and Wellbeing  Outcome: Ongoing, Progressing     Problem: Adult Inpatient Plan of Care  Goal: Optimal Comfort and Wellbeing  Intervention: Provide Person-Centered Care  Recent Flowsheet Documentation  Taken 3/13/2021 1907 by Arlette Hamlin RN  Trust Relationship/Rapport:   care explained   questions answered   thoughts/feelings acknowledged     Problem: Adult Inpatient Plan of Care  Goal: Readiness for Transition of Care  Outcome: Ongoing, Progressing     Problem: Fall Injury Risk  Goal: Absence of Fall and Fall-Related Injury  Outcome: Ongoing, Progressing     Problem: Fall Injury Risk  Goal: Absence of Fall and Fall-Related Injury  Intervention: Promote Injury-Free Environment  Recent Flowsheet Documentation  Taken 3/14/2021 0415 by Arlette Hamlin RN  Safety Promotion/Fall Prevention:   activity supervised   assistive device/personal items within reach   clutter free environment maintained   fall prevention program maintained   safety round/check completed   nonskid shoes/slippers when out of bed  Taken 3/14/2021 0000 by Arlette Hamlin RN  Safety Promotion/Fall Prevention:   activity supervised   assistive device/personal items within reach   clutter free environment maintained   fall prevention program maintained   safety round/check completed   nonskid shoes/slippers when out of bed  Taken 3/13/2021 2210 by Arlette Hamlin RN  Safety Promotion/Fall Prevention:   activity supervised   assistive device/personal items within reach   clutter free environment maintained   fall prevention program maintained   safety round/check completed   nonskid shoes/slippers when out of bed  Taken 3/13/2021 2102 by Arlette Hamlin RN  Safety Promotion/Fall Prevention:   activity supervised   assistive device/personal items within reach   clutter free environment maintained   fall prevention program maintained   safety round/check completed   nonskid shoes/slippers when out of bed  Taken 3/13/2021  2005 by Arlette Hamlni RN  Safety Promotion/Fall Prevention:   activity supervised   assistive device/personal items within reach   clutter free environment maintained   fall prevention program maintained   safety round/check completed   muscle strengthening facilitated  Taken 3/13/2021 1907 by Arlette Hamlin RN  Safety Promotion/Fall Prevention:   activity supervised   assistive device/personal items within reach   clutter free environment maintained   fall prevention program maintained   safety round/check completed   nonskid shoes/slippers when out of bed     Problem: Skin Injury Risk Increased  Goal: Skin Health and Integrity  Outcome: Ongoing, Progressing     Problem: Skin Injury Risk Increased  Goal: Skin Health and Integrity  Intervention: Optimize Skin Protection  Recent Flowsheet Documentation  Taken 3/13/2021 1907 by Arlette Hamlin RN  Pressure Reduction Techniques:   frequent weight shift encouraged   weight shift assistance provided  Pressure Reduction Devices: specialty bed utilized     Problem: Glycemic Control Impaired (Sepsis/Septic Shock)  Goal: Blood Glucose Level Within Desired Range  Outcome: Ongoing, Progressing     Problem: Infection (Sepsis/Septic Shock)  Goal: Absence of Infection Signs and Symptoms  Outcome: Ongoing, Progressing     Problem: UTI (Urinary Tract Infection)  Goal: Improved Infection Symptoms  Outcome: Ongoing, Progressing     Problem: Arrhythmia/Dysrhythmia  Goal: Normalized Cardiac Rhythm  Outcome: Ongoing, Progressing     Problem: Arrhythmia/Dysrhythmia  Goal: Normalized Cardiac Rhythm  Intervention: Monitor and Manage Cardiac Rhythm Effects  Recent Flowsheet Documentation  Taken 3/13/2021 1907 by Arlette Hamlin RN  VTE Prevention/Management: (lovenox) other (see comments)   Goal Outcome Evaluation:  Plan of Care Reviewed With: patient  Progress: no change  Outcome Summary: pt more oriented overnight, gave prn nausea meds, no new events at this time.

## 2021-03-14 NOTE — THERAPY TREATMENT NOTE
Patient Name: Janelle Millard  : 1955    MRN: [id number]                              Today's Date: 3/14/2021       Admit Date: 3/6/2021    Visit Dx:     ICD-10-CM ICD-9-CM   1. Sepsis with encephalopathy without septic shock, due to unspecified organism (CMS/[facility])  A41.9 038.9    R65.20 995.91    G93.40 348.30   2. NAGI (acute kidney injury) (CMS/[facility])  N17.9 584.9   3. Ureteral stone with hydronephrosis  N13.2 592.1     591   4. Impaired functional mobility, balance, gait, and endurance  Z74.09 V49.89   5. Impaired mobility and activities of daily living  Z74.09 V49.89    Z78.9      Patient Active Problem List   Diagnosis   • Carpal tunnel syndrome of left wrist   • Type 1 diabetes mellitus with diabetic polyneuropathy (CMS/[facility])   • Mixed diabetic hyperlipidemia associated with type 1 diabetes mellitus (CMS/[facility])   • Hypertension associated with diabetes (CMS/[facility])   • Syncope and collapse   • CAD (coronary artery disease)   • Asthma   • Depressive disorder, not elsewhere classified   • Neuropathy   • Other specified rheumatoid arthritis, multiple sites (CMS/[facility])   • Carpal tunnel syndrome on both sides   • Bilateral carotid artery stenosis   • Overweight (BMI 25.0-29.9)   • Precordial pain   • Sepsis with encephalopathy without septic shock (CMS/[facility])   • Tobacco abuse   • UTI (urinary tract infection)   • Ureteral stone with hydronephrosis     Past Medical History:   Diagnosis Date   • Arthritis, rheumatoid (CMS/[facility])    • Arthropathy associated with neurological disorder    • Arthropathy of lumbar facet joint    • Asthma    • Benign hypertension    • Carpal tunnel syndrome    • Diabetes (CMS/[facility])    • Diabetic polyneuropathy (CMS/[facility])    • Dyslipidemia    • Fallen arches    • Hammer toe    • Heart disease    • Joint pain    • Mild depression (CMS/[facility])     Saddness post Surgery 7/10/14 improved after counseling.   • Multiple vessel coronary artery disease     post CABG      • Myofascial pain    • Neurologic  disorder associated with diabetes mellitus (CMS/HCC)     Neuropathy of chest      • Neuropathy    • Onychomycosis    • Peripheral vascular disease (CMS/HCC)    • Retinopathy    • Tobacco user    • Type 1 diabetes mellitus (CMS/HCC)      Past Surgical History:   Procedure Laterality Date   • CARDIAC SURGERY  02/21/2014    Critical stenosis in the RCA. Diffusely calcified LAD with 30-80% stenosis. OMB #3 with 60% to 70% stenosis. Preserved LV systolic function with EF of 55%.   • CARPAL TUNNEL RELEASE Right 10/15/2014    Right carpal tunnel release.   • CARPAL TUNNEL RELEASE     • CATARACT EXTRACTION, BILATERAL Bilateral    • CORONARY ARTERY BYPASS GRAFT  02/24/2014    CABG x 3 with LIMA to LAD, endarterectomy to LAD, SVG to OMB and SVG to distal RCA with endarterectomy to distal RCA. Endo-vein harvesting.   • CYSTECTOMY Left     Breast cycst   • EXCISION LESION      Remove breast lesion - cyst   • EYE SURGERY      Insert lens prosthesis   • FOOT SURGERY  10/18/2011    Exostectomy of the right foot. Preulcerative lesion with Charot deformity, right foot   • LUMBAR SPINE SURGERY  11/09/2015    Lumbosacral radiofrequency thermal coagulation.   • NERVE BLOCK  09/14/2015    Lumbar medial branch block.   • TOE NAIL AVULSION  09/03/2015    DEBRIDE NAIL 6 OR MORE 60835 (1)     General Information     Row Name 03/14/21 1342          OT Time and Intention    Document Type  therapy note (daily note)  -MARGARITO     Mode of Treatment  individual therapy;physical therapy  -     Row Name 03/14/21 3683          General Information    Patient Profile Reviewed  yes  -MARGARITO     Existing Precautions/Restrictions  fall  -     Row Name 03/14/21 1125          Cognition    Orientation Status (Cognition)  oriented to;person;place;time  -     Row Name 03/14/21 5413          Safety Issues, Functional Mobility    Impairments Affecting Function (Mobility)  balance;cognition;coordination;endurance/activity tolerance;strength;postural/trunk control   -       User Key  (r) = Recorded By, (t) = Taken By, (c) = Cosigned By    Initials Name Provider Type    Marti Morales OTA Occupational Therapy Assistant          Mobility/ADL's    No documentation.       Obj/Interventions     [facility] Name 03/14/21 1340          Range of Motion Comprehensive    General Range of Motion  upper extremity range of motion deficits identified;lower extremity range of motion deficits identified  -MARGARITO     Row Name 03/14/21 1340          Strength Comprehensive (MMT)    General Manual Muscle Testing (MMT) Assessment  upper extremity strength deficits identified  -MARGARITO     Row Name 03/14/21 1340          Shoulder (Therapeutic Exercise)    Shoulder (Therapeutic Exercise)  strengthening exercise  -     Shoulder Strengthening (Therapeutic Exercise)  bilateral;external rotation;internal rotation;1 lb free weight;supine;10 repetitions;flexion;extension  -MARGARITO     Row Name 03/14/21 1340          Elbow/Forearm (Therapeutic Exercise)    Elbow/Forearm (Therapeutic Exercise)  strengthening exercise  -     Elbow/Forearm Strengthening (Therapeutic Exercise)  bilateral;flexion;extension;supination;pronation;supine;10 repetitions  -MARGARITO     Row Name 03/14/21 1340          Wrist (Therapeutic Exercise)    Wrist (Therapeutic Exercise)  strengthening exercise  -     Wrist Strengthening (Therapeutic Exercise)  bilateral;flexion;extension  -MARGARITO     Row Name 03/14/21 1340          Therapeutic Exercise    Therapeutic Exercise  elbow/forearm;wrist;shoulder  -       User Key  (r) = Recorded By, (t) = Taken By, (c) = Cosigned By    Initials Name Provider Type    Marti Morales OTA Occupational Therapy Assistant        Goals/Plan    No documentation.       Clinical Impression     Row Name 03/14/21 1340          Pain Scale: Numbers Pre/Post-Treatment    Pretreatment Pain Rating  7/10  -MARGARITO     Posttreatment Pain Rating  7/10  -MARGARITO     Pain Intervention(s)  Medication (See MAR)  -MARGARITO     Row Name 03/14/21 1340           Therapy Assessment/Plan (OT)    Rehab Potential (OT)  fair, will monitor progress closely  -MARGARITO     Criteria for Skilled Therapeutic Interventions Met (OT)  yes;meets criteria  -MARGARITO     Therapy Frequency (OT)  other (see comments) 5-7 days/wk  -MARGARITO     Row Name 03/14/21 1340          Therapy Plan Review/Discharge Plan (OT)    Anticipated Discharge Disposition (OT)  home with assist;home with 24/7 care;skilled nursing facility  -MARGARITO     Row Name 03/14/21 1340          Vital Signs    Pre Systolic BP Rehab  161  -MARGARITO     Pre Treatment Diastolic BP  72  -MARGARITO     Pretreatment Heart Rate (beats/min)  79  -MARGARITO     Pre SpO2 (%)  97  -MARGARITO     O2 Delivery Pre Treatment  room air  -MARGARITO     Post SpO2 (%)  93  -MARGARITO     O2 Delivery Post Treatment  room air  -MARGARITO     Pre Patient Position  Supine  -MARGARITO     Intra Patient Position  Supine  -MARGARITO     Post Patient Position  Supine  -MARGARITO       User Key  (r) = Recorded By, (t) = Taken By, (c) = Cosigned By    Initials Name Provider Type    Marti Morales OTA Occupational Therapy Assistant        Outcome Measures    No documentation.       Occupational Therapy Education                 Title: PT OT SLP Therapies (In Progress)     Topic: Occupational Therapy (In Progress)     Point: ADL training (Done)     Description:   Instruct learner(s) on proper safety adaptation and remediation techniques during self care or transfers.   Instruct in proper use of assistive devices.              Learning Progress Summary           Patient Acceptance, EASHVIN DU by MARGARITO at 3/14/2021 1349    Comment: Pt and daughter edu on OT and POC. Pt and daughter educated on use of plate guard, built up handles, and two handled cup.   Family Acceptance, E, ASHVIN,EDUARDO by MARGARITO at 3/14/2021 1349    Comment: Pt and daughter edu on OT and POC. Pt and daughter educated on use of plate guard, built up handles, and two handled cup.                   Point: Home exercise program (Not Started)     Description:   Instruct learner(s) on  appropriate technique for monitoring, assisting and/or progressing therapeutic exercises/activities.              Learner Progress:  Not documented in this visit.          Point: Precautions (Done)     Description:   Instruct learner(s) on prescribed precautions during self-care and functional transfers.              Learning Progress Summary           Patient Acceptance, E, VU,NR by  at 3/12/2021 1526    Comment: Edu pt on use of gait belt and non skid socks when OOB and no OOB without assist.                   Point: Body mechanics (Not Started)     Description:   Instruct learner(s) on proper positioning and spine alignment during self-care, functional mobility activities and/or exercises.              Learner Progress:  Not documented in this visit.                      User Key     Initials Effective Dates Name Provider Type Discipline     07/24/19 -  Baldomero Kwon OT Occupational Therapist OT    MARGARITO 11/05/19 -  Marti Vo OTA Occupational Therapy Assistant OT              OT Recommendation and Plan  Therapy Frequency (OT): other (see comments) (5-7 days/wk)  Plan of Care Review  Outcome Summary: Pt agreed to tx. Pt performed BUE ther ex 1x10 while sup with 1 lb. HW. Pt and daughter edu on use of  built up handles, two handled cup, and plate guard.     Time Calculation:   Time Calculation- OT     Row Name 03/14/21 1353             Time Calculation- OT    OT Start Time  1300  -MARGARITO      OT Stop Time  1354  -MARGARITO      OT Time Calculation (min)  54 min  -MARGARITO      Total Timed Code Minutes- OT  54 minute(s)  -MARGARITO      OT Received On  03/14/21  -        User Key  (r) = Recorded By, (t) = Taken By, (c) = Cosigned By    Initials Name Provider Type    MARGARITO Marti Vo OTA Occupational Therapy Assistant        Therapy Charges for Today     Code Description Service Date Service Provider Modifiers Qty    42619965188 HC OT SELF CARE/MGMT/TRAIN EA 15 MIN 3/14/2021 Marti Vo OTA GO 2    33382238061 HC OT THER  PROC EA 15 MIN 3/14/2021 Marti Vo, HARRIETT GO 2               HARRIETT Carvalho  3/14/2021

## 2021-03-14 NOTE — PROGRESS NOTES
"DAILY PROGRESS NOTE  [facility]    Patient Identification:  Name: Janelle Millard  Age: 65 y.o.  Sex: female  :  1955  MRN: [id number]         Primary Care Physician: Arelis Laird MD    Subjective:  Interval History:She is still weak.    Objective:    Scheduled Meds:aspirin, 81 mg, Oral, Daily  atorvastatin, 20 mg, Oral, Nightly  carvedilol, 12.5 mg, Oral, BID  cefTRIAXone, 2 g, Intravenous, Q24H  chlorthalidone, 25 mg, Oral, Daily  dilTIAZem, 30 mg, Oral, Q6H  enoxaparin, 40 mg, Subcutaneous, Q24H  insulin aspart, 0-7 Units, Subcutaneous, TID AC  insulin detemir, 10 Units, Subcutaneous, Nightly  isosorbide mononitrate, 30 mg, Oral, Daily  levothyroxine, 25 mcg, Oral, Daily  PARoxetine, 20 mg, Oral, Nightly  sodium chloride, 10 mL, Intravenous, Q12H      Continuous Infusions:     Vital signs in last 24 hours:  Temp:  [97.1 °F (36.2 °C)-98.1 °F (36.7 °C)] 97.7 °F (36.5 °C)  Heart Rate:  [78-93] 85  Resp:  [18] 18  BP: (154-175)/(68-81) 155/68    Intake/Output:    Intake/Output Summary (Last 24 hours) at 3/14/2021 1534  Last data filed at 3/14/2021 1500  Gross per 24 hour   Intake 876 ml   Output 4550 ml   Net -3674 ml       Exam:  /68 (BP Location: Left arm, Patient Position: Lying)   Pulse 85   Temp 97.7 °F (36.5 °C) (Temporal)   Resp 18   Ht 175.3 cm (69\")   Wt 94.1 kg (207 lb 8 oz)   SpO2 94%   BMI 30.64 kg/m²     General Appearance:    Alert, cooperative, no distress   Head:    Normocephalic, without obvious abnormality, atraumatic   Eyes:       Throat:   Lips, tongue, gums normal   Neck:   Supple, symmetrical, trachea midline, no JVD   Lungs:     Clear to auscultation bilaterally, respirations unlabored   Chest Wall:    No tenderness or deformity    Heart:    Regular rate and rhythm, S1 and S2 normal, no murmur,no  Rub or gallop   Abdomen:     Soft, non-tender, bowel sounds active, no masses, no organomegaly    Extremities:   Extremities normal, atraumatic, no " cyanosis or edema   Pulses:      Skin:   Skin is warm and dry,  no rashes or palpable lesions   Neurologic:   no focal deficits noted      Lab Results (last 72 hours)     Procedure Component Value Units Date/Time    POC Glucose Once [[id number]]  (Abnormal) Collected: 03/14/21 1024    Specimen: Blood Updated: 03/14/21 1043     Glucose 383 mg/dL      Comment: RN NotifiedOperator: [id number] ABDIEL JAEGERFANYMeter ID: [id number]       Magnesium [[id number]]  (Normal) Collected: 03/14/21 0635    Specimen: Blood Updated: 03/14/21 0708     Magnesium 1.8 mg/dL     POC Glucose Once [[id number]]  (Abnormal) Collected: 03/14/21 0602    Specimen: Blood Updated: 03/14/21 0644     Glucose 251 mg/dL      Comment: RN NotifiedOperator: [id number] INGRID CHANEYENMeter ID: [id number]       POC Glucose Once [[id number]]  (Abnormal) Collected: 03/13/21 1934    Specimen: Blood Updated: 03/13/21 2052     Glucose 318 mg/dL      Comment: RN NotifiedOperator: [id number] INGRID CHANEYENMeter ID: [id number]       Blood Culture - Blood, Hand, Right [[id number]] Collected: 03/08/21 1040    Specimen: Blood from Hand, Right Updated: 03/13/21 1100     Blood Culture No growth at 5 days    POC Glucose Once [[id number]]  (Abnormal) Collected: 03/13/21 1011    Specimen: Blood Updated: 03/13/21 1027     Glucose 374 mg/dL      Comment: RN NotifiedOperator: [id number] ABDIEL JAEGERFANYMeter ID: [id number]       Blood Culture - Blood, Hand, Left [[id number]] Collected: 03/08/21 0958    Specimen: Blood from Hand, Left Updated: 03/13/21 1015     Blood Culture No growth at 5 days    Magnesium [[id number]]  (Abnormal) Collected: 03/13/21 0632    Specimen: Blood Updated: 03/13/21 0730     Magnesium 1.5 mg/dL     POC Glucose Once [[id number]]  (Abnormal) Collected: 03/13/21 0612    Specimen: Blood Updated: 03/13/21 0644     Glucose 202 mg/dL      Comment: RN NotifiedOperator: [id number] PHOEBE BAILEYMeter ID: [id number]       Extra Tubes [[id number]] Collected:  03/13/21 0530    Specimen: Blood, Venous Line Updated: 03/13/21 0630    Narrative:      The following orders were created for panel order Extra Tubes.  Procedure                               Abnormality         Status                     ---------                               -----------         ------                     Lavender Top[[id number]]                                     Final result                 Please view results for these tests on the individual orders.    Lavender Top [[id number]] Collected: 03/13/21 0530    Specimen: Blood Updated: 03/13/21 0630     Extra Tube hold for add-on     Comment: Auto resulted       POC Glucose Once [[id number]]  (Abnormal) Collected: 03/12/21 2011    Specimen: Blood Updated: 03/12/21 2114     Glucose 208 mg/dL      Comment: RN NotifiedOperator: [id number] PHOEBE BAILEYMeter ID: [id number]       POC Glucose Once [[id number]]  (Abnormal) Collected: 03/12/21 0617    Specimen: Blood Updated: 03/12/21 1718     Glucose 304 mg/dL      Comment: RN NotifiedOperator: [id number] CUONG GARCIALEYMeter ID: [id number]       POC Glucose Once [[id number]]  (Abnormal) Collected: 03/12/21 1620    Specimen: Blood Updated: 03/12/21 1708     Glucose 190 mg/dL      Comment: RN NotifiedOperator: [id number] BHAVESH HENDERSONFERMeter ID: [id number]       POC Glucose Once [[id number]]  (Abnormal) Collected: 03/12/21 1008    Specimen: Blood Updated: 03/12/21 1040     Glucose 227 mg/dL      Comment: RN NotifiedOperator: [id number] BHAVESH MIRAMONTESNIFERMeter ID: [id number]       Renal Function Panel [[id number]]  (Abnormal) Collected: 03/12/21 0546    Specimen: Blood Updated: 03/12/21 0632     Glucose 298 mg/dL      BUN 8 mg/dL      Creatinine 0.45 mg/dL      Sodium 138 mmol/L      Potassium 3.9 mmol/L      Chloride 109 mmol/L      CO2 23.0 mmol/L      Calcium 9.0 mg/dL      Albumin 2.60 g/dL      Phosphorus 2.0 mg/dL      Anion Gap 6.0 mmol/L      BUN/Creatinine Ratio 17.8     eGFR Non  Amer 139  mL/min/1.73     Narrative:      GFR Normal >60  Chronic Kidney Disease <60  Kidney Failure <15      Magnesium [[id number]]  (Normal) Collected: 03/12/21 0546    Specimen: Blood Updated: 03/12/21 0613     Magnesium 1.6 mg/dL     CBC & Differential [[id number]]  (Abnormal) Collected: 03/12/21 0546    Specimen: Blood Updated: 03/12/21 0556    Narrative:      The following orders were created for panel order CBC & Differential.  Procedure                               Abnormality         Status                     ---------                               -----------         ------                     CBC Auto Differential[[id number]]        Abnormal            Final result                 Please view results for these tests on the individual orders.    CBC Auto Differential [[id number]]  (Abnormal) Collected: 03/12/21 0546    Specimen: Blood Updated: 03/12/21 0556     WBC 11.11 10*3/mm3      RBC 3.31 10*6/mm3      Hemoglobin 10.2 g/dL      Hematocrit 30.4 %      MCV 91.8 fL      MCH 30.8 pg      MCHC 33.6 g/dL      RDW 14.5 %      RDW-SD 48.3 fl      MPV 10.8 fL      Platelets 237 10*3/mm3      Neutrophil % 61.3 %      Lymphocyte % 19.0 %      Monocyte % 12.9 %      Eosinophil % 2.5 %      Basophil % 0.5 %      Immature Grans % 3.8 %      Neutrophils, Absolute 6.82 10*3/mm3      Lymphocytes, Absolute 2.11 10*3/mm3      Monocytes, Absolute 1.43 10*3/mm3      Eosinophils, Absolute 0.28 10*3/mm3      Basophils, Absolute 0.05 10*3/mm3      Immature Grans, Absolute 0.42 10*3/mm3      nRBC 0.0 /100 WBC     POC Glucose Once [[id number]]  (Abnormal) Collected: 03/11/21 2322    Specimen: Blood Updated: 03/11/21 2358     Glucose 355 mg/dL      Comment: RN NotifiedOperator: [id number] CUONG Blil ID: [id number]           Data Review:  Results from last 7 days   Lab Units 03/12/21  0546 03/11/21  1659 03/11/21  0650 03/10/21  0332   SODIUM mmol/L 138  --  137 139   POTASSIUM mmol/L 3.9 4.7 3.5 3.7   CHLORIDE mmol/L 109*  --   108* 108*   CO2 mmol/L 23.0  --  18.0* 20.0*   BUN mg/dL 8  --  12 8   CREATININE mg/dL 0.45*  --  0.54* 0.60   GLUCOSE mg/dL 298*  --  235* 227*   CALCIUM mg/dL 9.0  --  8.9 9.1     Results from last 7 days   Lab Units 03/12/21  0546 03/11/21  0650 03/10/21  0339   WBC 10*3/mm3 11.11* 11.46* 12.00*   HEMOGLOBIN g/dL 10.2* 11.6* 11.6*   HEMATOCRIT % 30.4* 34.3 33.7*   PLATELETS 10*3/mm3 237 225 199             Lab Results   Lab Value Date/Time    TROPONINT <0.010 03/10/2021 0838    TROPONINT <0.010 03/10/2021 0642    TROPONINT <0.010 03/10/2021 0332    TROPONINT <0.010 03/06/2021 1502    TROPONINT <0.010 10/14/2020 1850    TROPONINT <0.010 10/14/2020 1216    TROPONINT <0.010 10/14/2020 1015    TROPONINT <0.010 07/23/2019 0814    TROPONINT <0.010 07/23/2019 0301         Results from last 7 days   Lab Units 03/10/21  0332 03/09/21  0637   ALK PHOS U/L 114 139*   BILIRUBIN mg/dL 0.4 0.4   ALT (SGPT) U/L 11 13   AST (SGOT) U/L 16 26             Glucose   Date/Time Value Ref Range Status   03/14/2021 1024 383 (H) 70 - 130 mg/dL Final     Comment:     RN NotifiedOperator: [id number] ABDIEL Goldberg ID: [id number]   03/14/2021 0602 251 (H) 70 - 130 mg/dL Final     Comment:     RN NotifiedOperator: [id number] INGRID Reed ID: [id number]   03/13/2021 1934 318 (H) 70 - 130 mg/dL Final     Comment:     RN NotifiedOperator: [id number] INGRID Reed ID: [id number]   03/13/2021 1011 374 (H) 70 - 130 mg/dL Final     Comment:     RN NotifiedOperator: [id number] ABDIEL Goldberg ID: [id number]   03/13/2021 0612 202 (H) 70 - 130 mg/dL Final     Comment:     RN NotifiedOperator: [id number] PHOEBE BAILEYMetangy ID: [id number]   03/12/2021 2011 208 (H) 70 - 130 mg/dL Final     Comment:     RN NotifiedOperator: [id number] PHOEBE BAILEYMetangy ID: [id number]   03/12/2021 1620 190 (H) 70 - 130 mg/dL Final     Comment:     RN NotifiedOperator: [id number] BHAVESH Munoz ID: [id number]   03/12/2021 1008 227  (H) 70 - 130 mg/dL Final     Comment:     RN NotifiedOperator: [id number] BHAVESH FISHERMeter ID: [id number]           Patient Active Problem List   Diagnosis Code   • Carpal tunnel syndrome of left wrist G56.02   • Type 1 diabetes mellitus with diabetic polyneuropathy (CMS/HCC) E10.42   • Mixed diabetic hyperlipidemia associated with type 1 diabetes mellitus (CMS/HCC) E10.69, E78.2   • Hypertension associated with diabetes (CMS/HCC) E11.59, I15.2   • Syncope and collapse R55   • CAD (coronary artery disease) I25.10   • Asthma J45.909   • Depressive disorder, not elsewhere classified F32.9   • Neuropathy G62.9   • Other specified rheumatoid arthritis, multiple sites (CMS/HCC) M06.89   • Carpal tunnel syndrome on both sides G56.03   • Bilateral carotid artery stenosis I65.23   • Overweight (BMI 25.0-29.9) E66.3   • Precordial pain R07.2   • Sepsis with encephalopathy without septic shock (CMS/HCC) A41.9, R65.20, G93.40   • Tobacco abuse Z72.0   • UTI (urinary tract infection) N39.0   • Ureteral stone with hydronephrosis N13.2       Assessment:  Active Hospital Problems    Diagnosis  POA   • **Sepsis with encephalopathy without septic shock (CMS/HCC) [A41.9, R65.20, G93.40]  Yes   • Tobacco abuse [Z72.0]  Unknown   • UTI (urinary tract infection) [N39.0]  Unknown   • Ureteral stone with hydronephrosis [N13.2]  Unknown   • Type 1 diabetes mellitus with diabetic polyneuropathy (CMS/HCC) [E10.42]  Yes   • Mixed diabetic hyperlipidemia associated with type 1 diabetes mellitus (CMS/HCC) [E10.69, E78.2]  Yes   • Hypertension associated with diabetes (CMS/HCC) [E11.59, I15.2]  Yes   • Other specified rheumatoid arthritis, multiple sites (CMS/[facility]) [M06.89]  Yes   • CAD (coronary artery disease) [I25.10]  Yes      Resolved Hospital Problems   No resolved problems to display.       Plan:  Continue with antibiotics. Follow up labs. Urology plans to do more with ureteral stone Wednesday.    Steven Pompa MD  3/14/2021  15:34  CDT

## 2021-03-14 NOTE — PLAN OF CARE
Problem: Adult Inpatient Plan of Care  Goal: Plan of Care Review  Outcome: Ongoing, Not Progressing  Flowsheets (Taken 3/14/2021 1351)  Outcome Summary: Pt agreed to tx. Pt performed BUE ther ex 1x10 while sup with 1 lb. HW. Pt and daughter edu on use of  built up handles, two handled cup, and plate guard.   Goal Outcome Evaluation:        Outcome Summary: Pt agreed to tx. Pt performed BUE ther ex 1x10 while sup with 1 lb. HW. Pt and daughter edu on use of  built up handles, two handled cup, and plate guard.

## 2021-03-14 NOTE — PROGRESS NOTES
LOS: 8 days     Patient Care Team:  Arelis Laird MD as PCP - General  Francine Gallegos (Inactive) as Technician      Subjective     I feel better    Objective       Vital Signs  Temp:  [97.1 °F (36.2 °C)-98.1 °F (36.7 °C)] 97.1 °F (36.2 °C)  Heart Rate:  [79-93] 93  Resp:  [17-18] 18  BP: (160-175)/(68-81) 170/80    Physical Exam:        General Appearance:   No distress     Respiratory:    UNLABORED RESPIRATIONS.     Abdomen:     SOFT.       Genitourinary:  Grady draining well     Rectal:     DEFERRED       Results Review:       Imaging Results (Last 24 Hours)     ** No results found for the last 24 hours. **        Lab Results (last 24 hours)     Procedure Component Value Units Date/Time    Magnesium [[id number]]  (Normal) Collected: 03/14/21 0635    Specimen: Blood Updated: 03/14/21 0708     Magnesium 1.8 mg/dL     POC Glucose Once [[id number]]  (Abnormal) Collected: 03/14/21 0602    Specimen: Blood Updated: 03/14/21 0644     Glucose 251 mg/dL      Comment: RN NotifiedOperator: [id number] INGRID PARKSTAMIKAENMeter ID: [id number]       POC Glucose Once [[id number]]  (Abnormal) Collected: 03/13/21 1934    Specimen: Blood Updated: 03/13/21 2052     Glucose 318 mg/dL      Comment: RN NotifiedOperator: [id number] INGRID [email or web address] ID: [id number]               I reviewed the patient's new clinical results.  I reviewed the patient's new imaging results and agree with the interpretation.  I reviewed the patient's other test results and agree with the interpretation        Assessment/Plan       Sepsis with encephalopathy without septic shock (CMS/HCC)    Type 1 diabetes mellitus with diabetic polyneuropathy (CMS/HCC)    Mixed diabetic hyperlipidemia associated with type 1 diabetes mellitus (CMS/HCC)    Hypertension associated with diabetes (CMS/HCC)    CAD (coronary artery disease)    Other specified rheumatoid arthritis, multiple sites (CMS/HCC)    Tobacco abuse    UTI (urinary tract infection)    Ureteral stone  with hydronephrosis      DC Grady continue to treat UTI plan to treat stone as an outpatient      Cooper Brice MD  03/14/21  08:39 CDT

## 2021-03-14 NOTE — PLAN OF CARE
Goal Outcome Evaluation:     Progress: no change  Outcome Summary: VSS at this time, will continue to monitor.

## 2021-03-15 NOTE — CONSULTS
Adult Nutrition  Assessment    Patient Name:  Janelle Millard  YOB: 1955  MRN: [id number]  Admit Date:  3/6/2021    Assessment Date:  3/15/2021    Comments:  Pt reports nausea resolved and therefore appetite/intake much improved, % at meals now. Pt does take the Boost GC at breakfast. Labs: Gl 329, phos 2.0, BUN 5, Alb 2.6. RD will cont to monitor and make recs as indicated.     Reason for Assessment     Row Name 03/15/21 1040          Reason for Assessment    Reason For Assessment  follow-up protocol         Nutrition/Diet History     Row Name 03/15/21 1040          Nutrition/Diet History    Typical Food/Fluid Intake  Pt says nausea is much improved and therefore intake is better. She likes the Boost GC particularly the strawberry flavor.     Supplemental Drinks/Foods/Additives  Boost GC @ B, milk BLD         Anthropometrics     Row Name 03/15/21 0459          Anthropometrics    Weight  89.5 kg (197 lb 5 oz)         Labs/Tests/Procedures/Meds     Row Name 03/15/21 1041          Labs/Procedures/Meds    Lab Results Reviewed  reviewed, pertinent     Lab Results Comments  Gl 329, Phos 2.0, BUN 5, Alb 2.6        Medications    Pertinent Medications Reviewed  reviewed             Nutrition Prescription Ordered     Row Name 03/15/21 1041          Nutrition Prescription PO    Current PO Diet  Soft Texture     Texture  Ground     Fluid Consistency  Thin     Supplement  Boost Glucose Control (Glucerna Shake);Milk     Common Modifiers  Consistent Carbohydrate         Evaluation of Received Nutrient/Fluid Intake     Row Name 03/15/21 1042          PO Evaluation    Number of Meals  4     % PO Intake  75% x 1, 100% x 3               Electronically signed by:  Kym Cohn RD  03/15/21 10:44 CDT

## 2021-03-15 NOTE — THERAPY TREATMENT NOTE
Acute Care - Physical Therapy Treatment Note  [facility]     Patient Name: Janelle Millard  : 1955  MRN: [id number]  Today's Date: 3/15/2021           PT Assessment (last 12 hours)      PT Evaluation and Treatment     Row Name 03/15/21 134          Physical Therapy Time and Intention    Subjective Information  no complaints  -     Document Type  therapy note (daily note)  -     Mode of Treatment  individual therapy;physical therapy  -     Patient Effort  adequate  -     Symptoms Noted During/After Treatment  nausea  -     Row Name 03/15/21 134          General Information    Patient Profile Reviewed  yes  -     Row Name 03/15/21 134          Cognition    Orientation Status (Cognition)  oriented to;person;place;time  -     Row Name 03/15/21 134          Pain Scale: Numbers Pre/Post-Treatment    Pretreatment Pain Rating  7/10  -     Posttreatment Pain Rating  7/10  -     Row Name 03/15/21 134          Bed Mobility    Supine-Sit [place] (Bed Mobility)  moderate assist (50% patient effort)  -     Sit-Supine [place] (Bed Mobility)  moderate assist (50% patient effort)  -     Bed Mobility, Safety Issues  cognitive deficits limit understanding;decreased use of arms for pushing/pulling;decreased use of legs for bridging/pushing  -     Assistive Device (Bed Mobility)  bed rails;draw sheet;head of bed elevated  -     Comment (Bed Mobility)  Pt. sat EOB CGA/Anu for balance; increase time for pt. to transfer to supine pt. would not attempt until family placed behind pt. then pt. performed this p.m.   -     Row Name 03/15/21 222          Transfers    Comment (Transfers)  Pt. stood at EOB then side-stepped to [facility] MinAx2   -     Sit-Stand [place] (Transfers)  minimum assist (75% patient effort);2 person assist  -     Stand-Sit [place] (Transfers)  minimum assist (75% patient effort);2 person assist  -[facility] Name 03/15/21 134          Sit-Stand Transfer     Assistive Device (Sit-Stand Transfers)  walker, front-wheeled  -     Row Name 03/15/21 1340          Stand-Sit Transfer    Assistive Device (Stand-Sit Transfers)  walker, front-wheeled  -POPPY     Row Name 03/15/21 1340          Gait/Stairs (Locomotion)    [place] Level (Gait)  minimum assist (75% patient effort);2 person assist  -     Assistive Device (Gait)  walker, front-wheeled  -     Distance in Feet (Gait)  side-step to HOB  -     Row Name 03/15/21 1340          Vital Signs    Pre Systolic BP Rehab  145  -JA     Pre Treatment Diastolic BP  65  -JA     Pretreatment Heart Rate (beats/min)  80  -JA     Pre Patient Position  Supine  -JA     Intra Patient Position  Standing  -JA     Post Patient Position  Supine  -     Row Name 03/15/21 1340          Bed Mobility Goal 1 (PT)    Activity/Assistive Device (Bed Mobility Goal 1, PT)  sit to supine;supine to sit  -     [place] Level/Cues Needed (Bed Mobility Goal 1, PT)  minimum assist (75% or more patient effort);1 person assist  -JA     Time Frame (Bed Mobility Goal 1, PT)  by discharge  -JA     Progress/Outcomes (Bed Mobility Goal 1, PT)  goal not met  -     Row Name 03/15/21 1340          Transfer Goal 1 (PT)    Activity/Assistive Device (Transfer Goal 1, PT)  sit-to-stand/stand-to-sit;bed-to-chair/chair-to-bed;walker, rolling  -JA     [place] Level/Cues Needed (Transfer Goal 1, PT)  minimum assist (75% or more patient effort)  -JA     Time Frame (Transfer Goal 1, PT)  by discharge  -JA     Progress/Outcome (Transfer Goal 1, PT)  goal not met  -     Row Name 03/15/21 1340          Gait Training Goal 1 (PT)    Activity/Assistive Device (Gait Training Goal 1, PT)  gait (walking locomotion);assistive device use;walker, rolling  -JA     [place] Level (Gait Training Goal 1, PT)  minimum assist (75% or more patient effort)  -JA     Distance (Gait Training Goal 1, PT)  10'x2  -JA     Time Frame (Gait Training Goal 1, PT)  by discharge   -POPPY     Progress/Outcome (Gait Training Goal 1, PT)  goal not met  -     Row Name 03/15/21 1340          Positioning and Restraints    Pre-Treatment Position  in bed  -POPPY     Post Treatment Position  bed  -POPPY     In Bed  supine;call light within reach;encouraged to call for assist;exit alarm on;with family/caregiver  -     Row Name 03/15/21 1340          Therapy Assessment/Plan (PT)    Rehab Potential (PT)  fair, will monitor progress closely  -POPPY     Criteria for Skilled Interventions Met (PT)  yes;skilled treatment is necessary;meets criteria  -     Row Name 03/15/21 1340          Progress Summary (PT)    Progress Toward Functional Goals (PT)  progress toward functional goals is gradual  -POPPY       User Key  (r) = Recorded By, (t) = Taken By, (c) = Cosigned By    Initials Name Provider Type    Simón Pope PTA Physical Therapy Assistant          PT Recommendation and Plan  Anticipated Discharge Disposition (PT): home with 24/7 care, skilled nursing facility (home with 24/7 vs SNF)  Progress Summary (PT)  Progress Toward Functional Goals (PT): progress toward functional goals is gradual  Plan of Care Reviewed With: patient  Progress: improving  Outcome Summary: Pt. transfers sup-sit ModA, sat EOB CGA/Anu for balance, sit-stand-sit MinAx2, side-step to HOB MinAx2 with RW, pt. transferred sit-sup ModAx1. Cont. to mobilize as pt. cognition/nausea allows       Time Calculation:   PT Charges     Row Name 03/15/21 1545             Time Calculation    Start Time  1340  -      Stop Time  1410  -      Time Calculation (min)  30 min  -         Time Calculation- PT    Total Timed Code Minutes- PT  30 minute(s)  -POPPY        User Key  (r) = Recorded By, (t) = Taken By, (c) = Cosigned By    Initials Name Provider Type    Simón Pope PTA Physical Therapy Assistant        Therapy Charges for Today     Code Description Service Date Service Provider Modifiers Qty    [id number]  PT THERAPEUTIC ACT  EA 15 MIN 3/15/2021 Simón Duque, ERNIE GP 2          PT G-Codes  Outcome Measure Options: AM-PAC 6 Clicks Daily Activity (OT)  AM-PAC 6 Clicks Score (PT): 11  AM-PAC 6 Clicks Score (OT): 12    Simón Duque PTA  3/15/2021

## 2021-03-15 NOTE — PROGRESS NOTES
"   LOS: 9 days   Patient Care Team:  Arelis Laird MD as PCP - General  Francine Gallegos (Inactive) as Technician    Subjective     Subjective:  Symptoms:  Stable.  (On bedpan, Grady removed yesterday).        History taken from: patient chart    Objective     Vital Signs  Temp:  [96.6 °F (35.9 °C)-97.7 °F (36.5 °C)] 96.9 °F (36.1 °C)  Heart Rate:  [78-85] 85  Resp:  [18] 18  BP: (126-180)/(60-90) 126/60    Objective:  General Appearance:  In no acute distress.    Vital signs: (most recent): Blood pressure 126/60, pulse 85, temperature 96.9 °F (36.1 °C), resp. rate 18, height 175.3 cm (69\"), weight 89.5 kg (197 lb 5 oz), SpO2 94 %.  Vital signs are normal.  No fever.    Output: Producing urine.    Abdomen: There is no suprapubic area tenderness.     Neurological: Patient is alert.    Pupils:  Pupils are equal, round, and reactive to light.    Skin:  Warm, dry and pale.              Results Review:    Lab Results (last 24 hours)     Procedure Component Value Units Date/Time    POC Glucose Once [[id number]]  (Abnormal) Collected: 03/15/21 1021    Specimen: Blood Updated: 03/15/21 1104     Glucose 344 mg/dL      Comment: RN NotifiedOperator: [id number] OSPINA JENNIFERMeter ID: [id number]       POC Glucose Once [[id number]]  (Abnormal) Collected: 03/13/21 1643    Specimen: Blood Updated: 03/15/21 0828     Glucose 348 mg/dL      Comment: RN NotifiedOperator: [id number] ABDIEL Goldberg ID: [id number]       POC Glucose Once [[id number]]  (Abnormal) Collected: 03/14/21 1934    Specimen: Blood Updated: 03/15/21 0802     Glucose 427 mg/dL      Comment: RN NotifiedOperator: [id number] JIMENEZ JENNIFERMeter ID: [id number]       POC Glucose Once [[id number]]  (Abnormal) Collected: 03/15/21 0630    Specimen: Blood Updated: 03/15/21 0659     Glucose 329 mg/dL      Comment: RN NotifiedOperator: [id number] BARBARA MIRAMONTESNIBanner Thunderbird Medical CenterMeter ID: [id number]       Basic Metabolic Panel [[id number]]  (Abnormal) Collected: 03/15/21 0538 "    Specimen: Blood Updated: 03/15/21 0654     Glucose 313 mg/dL      BUN 5 mg/dL      Creatinine 0.40 mg/dL      Sodium 136 mmol/L      Potassium 3.3 mmol/L      Comment: Slight hemolysis detected by analyzer. Results may be affected.        Chloride 96 mmol/L      CO2 32.0 mmol/L      Calcium 9.0 mg/dL      eGFR Non African Amer >150 mL/min/1.73      BUN/Creatinine Ratio 12.5     Anion Gap 8.0 mmol/L     Narrative:      GFR Normal >60  Chronic Kidney Disease <60  Kidney Failure <15      Magnesium [[id number]]  (Normal) Collected: 03/15/21 0538    Specimen: Blood Updated: 03/15/21 0649     Magnesium 1.7 mg/dL     CBC & Differential [[id number]]  (Abnormal) Collected: 03/15/21 0538    Specimen: Blood Updated: 03/15/21 0625    Narrative:      The following orders were created for panel order CBC & Differential.  Procedure                               Abnormality         Status                     ---------                               -----------         ------                     CBC Auto Differential[[id number]]        Abnormal            Final result                 Please view results for these tests on the individual orders.    CBC Auto Differential [[id number]]  (Abnormal) Collected: 03/15/21 0538    Specimen: Blood Updated: 03/15/21 0625     WBC 7.46 10*3/mm3      RBC 3.70 10*6/mm3      Hemoglobin 11.5 g/dL      Hematocrit 33.4 %      MCV 90.3 fL      MCH 31.1 pg      MCHC 34.4 g/dL      RDW 13.6 %      RDW-SD 44.1 fl      MPV 10.7 fL      Platelets 384 10*3/mm3      Neutrophil % 62.2 %      Lymphocyte % 22.3 %      Monocyte % 9.7 %      Eosinophil % 3.2 %      Basophil % 0.9 %      Immature Grans % 1.7 %      Neutrophils, Absolute 4.64 10*3/mm3      Lymphocytes, Absolute 1.66 10*3/mm3      Monocytes, Absolute 0.72 10*3/mm3      Eosinophils, Absolute 0.24 10*3/mm3      Basophils, Absolute 0.07 10*3/mm3      Immature Grans, Absolute 0.13 10*3/mm3      nRBC 0.0 /100 WBC     POC Glucose Once [[id number]]  (Abnormal)  Collected: 03/14/21 2035    Specimen: Blood Updated: 03/14/21 2053     Glucose 371 mg/dL      Comment: : [id number] ANGELINA CRISSYMeter ID: [id number]            Imaging Results (Last 24 Hours)     ** No results found for the last 24 hours. **           I reviewed the patient's new clinical results.  I reviewed the patient's new imaging results and agree with the interpretation.  I reviewed the patient's other test results and agree with the interpretation      Assessment/Plan       Sepsis with encephalopathy without septic shock (CMS/HCC)    Type 1 diabetes mellitus with diabetic polyneuropathy (CMS/HCC)    Mixed diabetic hyperlipidemia associated with type 1 diabetes mellitus (CMS/HCC)    Hypertension associated with diabetes (CMS/HCC)    CAD (coronary artery disease)    Other specified rheumatoid arthritis, multiple sites (CMS/HCC)    Tobacco abuse    UTI (urinary tract infection)    Ureteral stone with hydronephrosis      Assessment & Plan    Post op 3/10/21 cystoscopy LEFT J-stent placement, findings: distal left ureter stone with hydronephrosis.   -WBC 7.46  -Estimated Creatinine Clearance: 81.3 mL/min (A) (by C-G formula based on SCr of 0.4 mg/dL (L)).  -Urine culture 3/6/21 E.Coli, on Zosyn-->Rocephin    Plan:  J-stent in place, finish UTI treatment, follow up Urology 1 week from discharge.     JESSIE Carrillo  03/15/21  13:08 CDT

## 2021-03-15 NOTE — PLAN OF CARE
Goal Outcome Evaluation:  Plan of Care Reviewed With: patient  Progress: no change  Outcome Summary: Pt Mod A for UB bathing and dressing and dependent for LB ADL. Pt Min/Mod A for grooming tasks. Pt is Max A for rolling right and left. Pt requires increased time and effort for all tasks. Pt requires increased time for thought processes. Continue OYT POC.

## 2021-03-15 NOTE — PLAN OF CARE
Goal Outcome Evaluation:  Plan of Care Reviewed With: patient  Progress: improving  Outcome Summary: Pt. transfers sup-sit ModA, sat EOB CGA/Anu for balance, sit-stand-sit MinAx2, side-step to HOB MinAx2 with RW, pt. transferred sit-sup ModAx1. Cont. to mobilize as pt. cognition/nausea allows

## 2021-03-15 NOTE — PLAN OF CARE
Goal Outcome Evaluation:     Progress: no change  Outcome Summary: Pt VSS.  PRN pain and nausea medication given and effective.  K and Mg replaced on this shift per protocol.  Will continue to monitor.

## 2021-03-15 NOTE — DISCHARGE PLACEMENT REQUEST
"Referral sent by Betty CaballeroRN,[facility]  [phone]            Kiley Millard (65 y.o. Female)     Date of Birth Social Security Number Address Home Phone MRN    1955  [address] [address] [phone] [id number]    [employer] Marital Status          Jehovah's witness        Admission Date Admission Type Admitting Provider Attending Provider Department, Room/Bed    3/6/21 Emergency Steven Pompa MD Williams, Kevin L, MD [address], 306/1    Discharge Date Discharge Disposition Discharge Destination                       Attending Provider: Sukhjinder Trivedi MD    Allergies: Contrast Dye, Corticosteroids, Codeine, Ibuprofen    Isolation: None   Infection: None   Code Status: No CPR    Ht: 175.3 cm (69\")   Wt: 89.5 kg (197 lb 5 oz)    Admission Cmt: None   Principal Problem: Sepsis with encephalopathy without septic shock (CMS/[facility]) [A41.9,R65.20,G93.40]                 Active Insurance as of 3/6/2021     Primary Coverage     Payor Plan Insurance Group Employer/Plan Group    MEDICARE MEDICARE A & B      Payor Plan Address Payor Plan Phone Number Payor Plan Fax Number Effective Dates    [address] [phone]  11/1/2012 - None Entered    [facility] [id number]       Subscriber Name Subscriber Birth Date Member ID       KILEY MILLARD 1955 [id number]           Secondary Coverage     Payor Plan Insurance Group Employer/Plan Group    [employer]ET[employer] COMMERCIAL AETNA   SUPP [id number]     Payor Plan Address Payor Plan Phone Number Payor Plan Fax Number Effective Dates    [address] [phone]  10/1/2018 - None Entered    [facility] [id number]       Subscriber Name Subscriber Birth Date Member ID       KILEY MILLARD 1955 [id number]                 Emergency Contacts      (Rel.) Home Phone Work Phone Mobile Phone    GONZALESHOANG (POA/NIECE) (Relative) [phone] -- [phone]    LionelAlexey (Brother) [phone] -- --    "

## 2021-03-15 NOTE — PLAN OF CARE
Problem: Adult Inpatient Plan of Care  Goal: Plan of Care Review  Outcome: Ongoing, Progressing  Flowsheets  Taken 3/15/2021 0546 by Arlette Hamlin RN  Progress: no change  Outcome Summary: pt bp elevated gave scheduled cardizem, fellows following, no s/s of distress at this time  Taken 3/14/2021 1601 by Katya Harrington RN  Plan of Care Reviewed With: patient     Problem: Adult Inpatient Plan of Care  Goal: Patient-Specific Goal (Individualized)  Outcome: Ongoing, Progressing     Problem: Adult Inpatient Plan of Care  Goal: Absence of Hospital-Acquired Illness or Injury  Outcome: Ongoing, Progressing     Problem: Adult Inpatient Plan of Care  Goal: Absence of Hospital-Acquired Illness or Injury  Intervention: Identify and Manage Fall Risk  Recent Flowsheet Documentation  Taken 3/15/2021 0445 by Arlette Hamlin RN  Safety Promotion/Fall Prevention:   activity supervised   assistive device/personal items within reach   clutter free environment maintained   fall prevention program maintained   safety round/check completed   nonskid shoes/slippers when out of bed  Taken 3/15/2021 0244 by Arlette Hamlin RN  Safety Promotion/Fall Prevention:   activity supervised   assistive device/personal items within reach   clutter free environment maintained   fall prevention program maintained   safety round/check completed   nonskid shoes/slippers when out of bed  Taken 3/15/2021 0027 by Arlette Hamlin RN  Safety Promotion/Fall Prevention:   activity supervised   assistive device/personal items within reach   clutter free environment maintained   fall prevention program maintained   safety round/check completed   nonskid shoes/slippers when out of bed  Taken 3/14/2021 2232 by Arlette Hamlin RN  Safety Promotion/Fall Prevention:   activity supervised   assistive device/personal items within reach   clutter free environment maintained   fall prevention program maintained   safety round/check  completed   nonskid shoes/slippers when out of bed  Taken 3/14/2021 2103 by Arlette Hamlin RN  Safety Promotion/Fall Prevention:   activity supervised   assistive device/personal items within reach   clutter free environment maintained   fall prevention program maintained   safety round/check completed   nonskid shoes/slippers when out of bed  Taken 3/14/2021 2033 by Arlette Hamlin RN  Safety Promotion/Fall Prevention:   activity supervised   assistive device/personal items within reach   clutter free environment maintained   fall prevention program maintained   safety round/check completed   nonskid shoes/slippers when out of bed  Taken 3/14/2021 1936 by Arlette Hamlin RN  Safety Promotion/Fall Prevention:   activity supervised   assistive device/personal items within reach   clutter free environment maintained   fall prevention program maintained   safety round/check completed   nonskid shoes/slippers when out of bed     Problem: Adult Inpatient Plan of Care  Goal: Absence of Hospital-Acquired Illness or Injury  Intervention: Prevent Skin Injury  Recent Flowsheet Documentation  Taken 3/15/2021 0445 by Arlette Hamlin RN  Body Position: tilted, left  Taken 3/15/2021 0244 by Arlette Hamlin RN  Body Position: supine  Taken 3/15/2021 0027 by Arlette Hamlin RN  Body Position: patient/family refused  Taken 3/14/2021 2232 by Arlette Hamlin RN  Body Position: tilted, left  Taken 3/14/2021 2033 by Arlette Hamlin RN  Body Position: tilted, right     Problem: Adult Inpatient Plan of Care  Goal: Absence of Hospital-Acquired Illness or Injury  Intervention: Prevent and Manage VTE (venous thromboembolism) Risk  Recent Flowsheet Documentation  Taken 3/14/2021 1936 by Arlette Hamlin RN  VTE Prevention/Management: (lovenox) other (see comments)     Problem: Adult Inpatient Plan of Care  Goal: Absence of Hospital-Acquired Illness or Injury  Intervention: Prevent Infection  Recent  Flowsheet Documentation  Taken 3/15/2021 0445 by Arlette Hamlin RN  Infection Prevention: rest/sleep promoted  Taken 3/15/2021 0244 by Arlette Hamlin RN  Infection Prevention: rest/sleep promoted  Taken 3/15/2021 0027 by Arlette Hamlin RN  Infection Prevention: rest/sleep promoted  Taken 3/14/2021 2232 by Arlette Hamlin RN  Infection Prevention: rest/sleep promoted  Taken 3/14/2021 2103 by Arlette Hamlin RN  Infection Prevention: rest/sleep promoted  Taken 3/14/2021 2033 by Arlette Hamlin RN  Infection Prevention: rest/sleep promoted  Taken 3/14/2021 1936 by Arlette Hamlin RN  Infection Prevention: rest/sleep promoted     Problem: Adult Inpatient Plan of Care  Goal: Optimal Comfort and Wellbeing  Outcome: Ongoing, Progressing     Problem: Adult Inpatient Plan of Care  Goal: Optimal Comfort and Wellbeing  Intervention: Provide Person-Centered Care  Recent Flowsheet Documentation  Taken 3/14/2021 1936 by Arlette Hamlin RN  Trust Relationship/Rapport:   care explained   questions answered   thoughts/feelings acknowledged     Problem: Adult Inpatient Plan of Care  Goal: Readiness for Transition of Care  Outcome: Ongoing, Progressing     Problem: Fall Injury Risk  Goal: Absence of Fall and Fall-Related Injury  Outcome: Ongoing, Progressing     Problem: Fall Injury Risk  Goal: Absence of Fall and Fall-Related Injury  Intervention: Promote Injury-Free Environment  Recent Flowsheet Documentation  Taken 3/15/2021 0445 by Arlette Hamlin RN  Safety Promotion/Fall Prevention:   activity supervised   assistive device/personal items within reach   clutter free environment maintained   fall prevention program maintained   safety round/check completed   nonskid shoes/slippers when out of bed  Taken 3/15/2021 0244 by Arlette Hamlin RN  Safety Promotion/Fall Prevention:   activity supervised   assistive device/personal items within reach   clutter free environment maintained   fall  prevention program maintained   safety round/check completed   nonskid shoes/slippers when out of bed  Taken 3/15/2021 0027 by Arlette Hamlin RN  Safety Promotion/Fall Prevention:   activity supervised   assistive device/personal items within reach   clutter free environment maintained   fall prevention program maintained   safety round/check completed   nonskid shoes/slippers when out of bed  Taken 3/14/2021 2232 by Arlette Hamlin RN  Safety Promotion/Fall Prevention:   activity supervised   assistive device/personal items within reach   clutter free environment maintained   fall prevention program maintained   safety round/check completed   nonskid shoes/slippers when out of bed  Taken 3/14/2021 2103 by Arlette Hamlin RN  Safety Promotion/Fall Prevention:   activity supervised   assistive device/personal items within reach   clutter free environment maintained   fall prevention program maintained   safety round/check completed   nonskid shoes/slippers when out of bed  Taken 3/14/2021 2033 by Arlette Hamlin RN  Safety Promotion/Fall Prevention:   activity supervised   assistive device/personal items within reach   clutter free environment maintained   fall prevention program maintained   safety round/check completed   nonskid shoes/slippers when out of bed  Taken 3/14/2021 1936 by Arlette Hamlin RN  Safety Promotion/Fall Prevention:   activity supervised   assistive device/personal items within reach   clutter free environment maintained   fall prevention program maintained   safety round/check completed   nonskid shoes/slippers when out of bed     Problem: Skin Injury Risk Increased  Goal: Skin Health and Integrity  Outcome: Ongoing, Progressing     Problem: Skin Injury Risk Increased  Goal: Skin Health and Integrity  Intervention: Optimize Skin Protection  Recent Flowsheet Documentation  Taken 3/14/2021 1936 by Arlette Hamlin RN  Pressure Reduction Techniques:   frequent weight shift  encouraged   weight shift assistance provided  Pressure Reduction Devices: specialty bed utilized     Problem: Glycemic Control Impaired (Sepsis/Septic Shock)  Goal: Blood Glucose Level Within Desired Range  Outcome: Ongoing, Progressing     Problem: Infection (Sepsis/Septic Shock)  Goal: Absence of Infection Signs and Symptoms  Outcome: Ongoing, Progressing     Problem: Infection (Sepsis/Septic Shock)  Goal: Absence of Infection Signs and Symptoms  Intervention: Prevent or Manage Infection Progression  Recent Flowsheet Documentation  Taken 3/15/2021 0445 by Arlette Hamlin RN  Infection Prevention: rest/sleep promoted  Taken 3/15/2021 0244 by Arlette Hamlin RN  Infection Prevention: rest/sleep promoted  Taken 3/15/2021 0027 by Arlette Hamlin RN  Infection Prevention: rest/sleep promoted  Taken 3/14/2021 2232 by Arlette Hamlin RN  Infection Prevention: rest/sleep promoted  Taken 3/14/2021 2103 by Arlette Hamlin RN  Infection Prevention: rest/sleep promoted  Taken 3/14/2021 2033 by Arlette Hamlin RN  Infection Prevention: rest/sleep promoted  Taken 3/14/2021 1936 by Arlette Hamlin RN  Infection Prevention: rest/sleep promoted     Problem: UTI (Urinary Tract Infection)  Goal: Improved Infection Symptoms  Outcome: Ongoing, Progressing     Problem: Arrhythmia/Dysrhythmia  Goal: Normalized Cardiac Rhythm  Outcome: Ongoing, Progressing     Problem: Arrhythmia/Dysrhythmia  Goal: Normalized Cardiac Rhythm  Intervention: Monitor and Manage Cardiac Rhythm Effects  Recent Flowsheet Documentation  Taken 3/14/2021 1936 by Arlette Hamlin RN  VTE Prevention/Management: (lovenox) other (see comments)   Goal Outcome Evaluation:  Plan of Care Reviewed With: patient  Progress: no change  Outcome Summary: pt bp elevated gave scheduled cardizem, fellows following, no s/s of distress at this time

## 2021-03-15 NOTE — PROGRESS NOTES
[facility] Medicine Services  INPATIENT PROGRESS NOTE    Length of Stay: 9  Date of Admission: 3/6/2021  Primary Care Physician: Arelis Laird MD    Subjective   Chief Complaint: Urinary tract infection and sepsis  HPI: Patient states she is feeling some better today.  She states she improved slightly every day.    Review of Systems   Constitutional: Negative for appetite change, chills, fatigue, fever and unexpected weight change.   Respiratory: Negative for cough, choking, chest tightness, shortness of breath and wheezing.    Cardiovascular: Negative for chest pain, palpitations and leg swelling.   Gastrointestinal: Negative for abdominal pain, blood in stool, constipation, diarrhea, nausea and vomiting.   Genitourinary: Negative for dysuria, flank pain and hematuria.   Neurological: Negative for dizziness, seizures, syncope, speech difficulty, weakness, light-headedness, numbness and headaches.   Hematological: Does not bruise/bleed easily.   Psychiatric/Behavioral: Positive for confusion.        All pertinent negatives and positives are as above. All other systems have been reviewed and are negative unless otherwise stated.     Objective    Temp:  [96.6 °F (35.9 °C)-97.3 °F (36.3 °C)] 96.9 °F (36.1 °C)  Heart Rate:  [78-85] 81  Resp:  [18] 18  BP: (126-180)/(60-90) 138/72    Physical Exam  Vitals reviewed.   Constitutional:       Appearance: She is well-developed.   HENT:      Head: Normocephalic and atraumatic.   Eyes:      Pupils: Pupils are equal, round, and reactive to light.   Cardiovascular:      Rate and Rhythm: Normal rate and regular rhythm.      Heart sounds: Normal heart sounds. No murmur. No friction rub. No gallop.    Pulmonary:      Effort: Pulmonary effort is normal. No respiratory distress.      Breath sounds: Normal breath sounds. No wheezing or rales.   Chest:      Chest wall: No tenderness.   Abdominal:      General: Bowel sounds are normal.  There is no distension.      Palpations: Abdomen is soft.      Tenderness: There is no abdominal tenderness. There is no guarding.   Musculoskeletal:      Cervical back: Normal range of motion and neck supple.   Skin:     General: Skin is warm and dry.   Psychiatric:         Behavior: Behavior normal.         Thought Content: Thought content normal.             Results Review:  I have reviewed the labs, radiology results, and diagnostic studies.    Laboratory Data:   Results from last 7 days   Lab Units 03/15/21  0538 03/12/21  0546 03/11/21  1659 03/11/21  0650 03/10/21  0332 03/09/21  0637   SODIUM mmol/L 136 138  --  137 139 138   POTASSIUM mmol/L 3.3* 3.9 4.7 3.5 3.7 4.2   CHLORIDE mmol/L 96* 109*  --  108* 108* 110*   CO2 mmol/L 32.0* 23.0  --  18.0* 20.0* 20.0*   BUN mg/dL 5* 8  --  12 8 9   CREATININE mg/dL 0.40* 0.45*  --  0.54* 0.60 0.54*   GLUCOSE mg/dL 313* 298*  --  235* 227* 208*   CALCIUM mg/dL 9.0 9.0  --  8.9 9.1 9.5   BILIRUBIN mg/dL  --   --   --   --  0.4 0.4   ALK PHOS U/L  --   --   --   --  114 139*   ALT (SGPT) U/L  --   --   --   --  11 13   AST (SGOT) U/L  --   --   --   --  16 26   ANION GAP mmol/L 8.0 6.0  --  11.0 11.0 8.0     Estimated Creatinine Clearance: 81.3 mL/min (A) (by C-G formula based on SCr of 0.4 mg/dL (L)).  Results from last 7 days   Lab Units 03/15/21  0538 03/14/21  0635 03/13/21  0632 03/12/21  0546   MAGNESIUM mg/dL 1.7 1.8 1.5* 1.6   PHOSPHORUS mg/dL  --   --   --  2.0*         Results from last 7 days   Lab Units 03/15/21  0538 03/12/21  0546 03/11/21  0650 03/10/21  0339 03/09/21  0444   WBC 10*3/mm3 7.46 11.11* 11.46* 12.00* 23.17*   HEMOGLOBIN g/dL 11.5* 10.2* 11.6* 11.6* 12.3   HEMATOCRIT % 33.4* 30.4* 34.3 33.7* 37.7   PLATELETS 10*3/mm3 384 237 225 199 184           Culture Data:   No results found for: BLOODCX  No results found for: URINECX  No results found for: RESPCX  No results found for: WOUNDCX  No results found for: STOOLCX  No components found for:  [facility]    Radiology Data:   Imaging Results (Last 24 Hours)     ** No results found for the last 24 hours. **          I have reviewed the patient's current medications.     Assessment/Plan     Active Hospital Problems    Diagnosis    • **Sepsis with encephalopathy without septic shock (CMS/[facility])    • Tobacco abuse    • UTI (urinary tract infection)    • Ureteral stone with hydronephrosis      Added automatically from request for surgery [id number]     • Type 1 diabetes mellitus with diabetic polyneuropathy (CMS/[facility])    • Mixed diabetic hyperlipidemia associated with type 1 diabetes mellitus (CMS/[facility])    • Hypertension associated with diabetes (CMS/[facility])    • Other specified rheumatoid arthritis, multiple sites (CMS/[facility])    • CAD (coronary artery disease)        Plan:    1.  Urinary tract infection: E. coli.  Continue antibiotics.  2.  E. coli bacteremia: Continue antibiotics.  3.  Sepsis with encephalopathy: Secondary to E. coli bacteremia and urinary tract infection.  Improving.  Encephalopathy improving.  Continue supportive care.  4.  Diabetes mellitus: Continue current treatment.  5.  Ureteral stone with hydronephrosis: Urology following.  6.  Hypertension: Continue current treatment.  7.  Coronary artery disease: Continue current treatment.  8.  DVT prophylaxis: Lovenox.    The patient was evaluated during the global COVID-19 pandemic, and the diagnosis was suspected/considered upon their initial presentation.  Evaluation, treatment, and testing were consistent with current guidelines for patients who present with complaints or symptoms that may be related to COVID-19.    Discharge Planning: I expect patient to be discharged to skilled facility in 2-3 days.        This document has been electronically signed by Sukhjinder Trivedi MD on March 15, 2021 18:37 CDT

## 2021-03-15 NOTE — PLAN OF CARE
Goal Outcome Evaluation:  Plan of Care Reviewed With: patient  Progress: improving  Outcome Summary: Nausea resolved, appetite/po intake much improved. Pt takes supplement. RD monitoring.

## 2021-03-15 NOTE — THERAPY TREATMENT NOTE
Patient Name: Janelle Millard  : 1955    MRN: [id number]                              Today's Date: 3/15/2021       Admit Date: 3/6/2021    Visit Dx:     ICD-10-CM ICD-9-CM   1. Sepsis with encephalopathy without septic shock, due to unspecified organism (CMS/[facility])  A41.9 038.9    R65.20 995.91    G93.40 348.30   2. NAGI (acute kidney injury) (CMS/[facility])  N17.9 584.9   3. Ureteral stone with hydronephrosis  N13.2 592.1     591   4. Impaired functional mobility, balance, gait, and endurance  Z74.09 V49.89   5. Impaired mobility and activities of daily living  Z74.09 V49.89    Z78.9      Patient Active Problem List   Diagnosis   • Carpal tunnel syndrome of left wrist   • Type 1 diabetes mellitus with diabetic polyneuropathy (CMS/[facility])   • Mixed diabetic hyperlipidemia associated with type 1 diabetes mellitus (CMS/[facility])   • Hypertension associated with diabetes (CMS/[facility])   • Syncope and collapse   • CAD (coronary artery disease)   • Asthma   • Depressive disorder, not elsewhere classified   • Neuropathy   • Other specified rheumatoid arthritis, multiple sites (CMS/[facility])   • Carpal tunnel syndrome on both sides   • Bilateral carotid artery stenosis   • Overweight (BMI 25.0-29.9)   • Precordial pain   • Sepsis with encephalopathy without septic shock (CMS/[facility])   • Tobacco abuse   • UTI (urinary tract infection)   • Ureteral stone with hydronephrosis     Past Medical History:   Diagnosis Date   • Arthritis, rheumatoid (CMS/[facility])    • Arthropathy associated with neurological disorder    • Arthropathy of lumbar facet joint    • Asthma    • Benign hypertension    • Carpal tunnel syndrome    • Diabetes (CMS/[facility])    • Diabetic polyneuropathy (CMS/[facility])    • Dyslipidemia    • Fallen arches    • Hammer toe    • Heart disease    • Joint pain    • Mild depression (CMS/[facility])     Saddness post Surgery 7/10/14 improved after counseling.   • Multiple vessel coronary artery disease     post CABG      • Myofascial pain    • Neurologic  disorder associated with diabetes mellitus (CMS/[facility])     Neuropathy of chest      • Neuropathy    • Onychomycosis    • Peripheral vascular disease (CMS/[facility])    • Retinopathy    • Tobacco user    • Type 1 diabetes mellitus (CMS/[facility])      Past Surgical History:   Procedure Laterality Date   • CARDIAC SURGERY  2014    Critical stenosis in the RCA. Diffusely calcified LAD with 30-80% stenosis. OMB #3 with 60% to 70% stenosis. Preserved LV systolic function with EF of 55%.   • CARPAL TUNNEL RELEASE Right 10/15/2014    Right carpal tunnel release.   • CARPAL TUNNEL RELEASE     • CATARACT EXTRACTION, BILATERAL Bilateral    • CORONARY ARTERY BYPASS GRAFT  2014    CABG x 3 with LIMA to LAD, endarterectomy to LAD, SVG to OMB and SVG to distal RCA with endarterectomy to distal RCA. Endo-vein harvesting.   • CYSTECTOMY Left     Breast cycst   • EXCISION LESION      Remove breast lesion - cyst   • EYE SURGERY      Insert lens prosthesis   • FOOT SURGERY  10/18/2011    Exostectomy of the right foot. Preulcerative lesion with Charot deformity, right foot   • LUMBAR SPINE SURGERY  2015    Lumbosacral radiofrequency thermal coagulation.   • NERVE BLOCK  2015    Lumbar medial branch block.   • TOE NAIL AVULSION  2015    DEBRIDE NAIL 6 OR MORE 32782 (1)     General Information     Row Name 03/15/21 07          OT Time and Intention    Document Type  therapy note (daily note)  -BB     Mode of Treatment  individual therapy;occupational therapy  -BB     Row Name 03/15/21 0700          General Information    Patient Profile Reviewed  yes  -BB     Existing Precautions/Restrictions  fall  -BB     Row Name 03/15/21 0700          Cognition    Orientation Status (Cognition)  oriented to;person;place;time   -BB     Row Name 03/15/21 0700          Safety Issues, Functional Mobility    Impairments Affecting Function (Mobility)  balance;cognition;coordination;endurance/activity tolerance;strength;postural/trunk  control  -       User Key  (r) = Recorded By, (t) = Taken By, (c) = Cosigned By    Initials Name Provider Type     Zoey Carolina COTA/L Occupational Therapy Assistant          Mobility/ADL's     [facility] Name 03/15/21 00          Bed Mobility    Bed Mobility  supine-sit;sit-supine;rolling right  -BB     Rolling Left [place] (Bed Mobility)  maximum assist (25% patient effort);2 person assist  -BB     Rolling Right [place] (Bed Mobility)  maximum assist (25% patient effort);2 person assist  -BB     Supine-Sit [place] (Bed Mobility)  --  -BB     Sit-Supine [place] (Bed Mobility)  --  -BB     Bed Mobility, Safety Issues  cognitive deficits limit understanding;decreased use of arms for pushing/pulling;decreased use of legs for bridging/pushing  -     Assistive Device (Bed Mobility)  bed rails;draw sheet;head of bed elevated  -BB     Row Name 03/15/21 0700          Transfers    Sit-Stand [place] (Transfers)  not tested  -BB     Row Name 03/15/21 00          Sit-Stand Transfer    Assistive Device (Sit-Stand Transfers)  --  -BB     Row Name 03/15/21 [facility]          Functional Mobility    Functional Mobility- Ind. Level  --  -     Functional Mobility- Device  --  -BB     Row Name 03/15/21 0700          Activities of Daily Living    BADL Assessment/Intervention  bathing;upper body dressing;lower body dressing;grooming;toileting;feeding  -[facility] Name 03/15/21 00          Bathing Assessment/Intervention    [place] Level (Bathing)  upper body;moderate assist (50% patient effort);lower body;dependent (less than 25% patient effort);verbal cues;nonverbal cues (demo/gesture)  -     Position (Bathing)  sitting up in bed  -[facility] Name 03/15/21 0700          Upper Body Dressing Assessment/Training    [place] Level (Upper Body Dressing)  doff;don;verbal cues;moderate assist (50% patient effort);nonverbal cues (demo/gesture) hospital gown  -[facility] Name 03/15/21 0700           Lower Body Dressing Assessment/Training    [place] Level (Lower Body Dressing)  doff;don;socks;dependent (less than 25% patient effort)  -BB     Position (Lower Body Dressing)  sitting up in bed  -BB     Row Name 03/15/21 0700          Grooming Assessment/Training    [place] Level (Grooming)  hair care, combing/brushing;moderate assist (50% patient effort);wash face, hands;minimum assist (75% patient effort);verbal cues  -BB     Position (Grooming)  sitting up in bed  -BB     Row Name 03/15/21 0700          Toileting Assessment/Training    [place] Level (Toileting)  dependent (less than 25% patient effort)  -BB     Position (Toileting)  supine  -BB     Comment (Toileting)  Pt with incontinent episode (bowel) in the bed.  -BB     Row Name 03/15/21 0700          Self-Feeding Assessment/Training    [place] Level (Feeding)  liquids to mouth;standby assist;prepare tray/open items;maximum assist (25% patient effort);scoop food and bring to mouth;contact guard assist;minimum assist (75% patient effort) Pt with Mod spills  -BB     Position (Self-Feeding)  sitting up in bed  -BB       User Key  (r) = Recorded By, (t) = Taken By, (c) = Cosigned By    Initials Name Provider Type    Zoey Stover COTA/L Occupational Therapy Assistant        Obj/Interventions     Row Name 03/15/21 0700          Range of Motion Comprehensive    General Range of Motion  upper extremity range of motion deficits identified;lower extremity range of motion deficits identified  -BB     Row Name 03/15/21 0700          Strength Comprehensive (MMT)    General Manual Muscle Testing (MMT) Assessment  upper extremity strength deficits identified  -BB       User Key  (r) = Recorded By, (t) = Taken By, (c) = Cosigned By    Initials Name Provider Type    Zoey Stover COTA/L Occupational Therapy Assistant        Goals/Plan     Row Name 03/15/21 0700          Transfer Goal 1 (OT)    Activity/Assistive Device (Transfer Goal  1, OT)  transfers, all  -BB     [place] Level/Cues Needed (Transfer Goal 1, OT)  contact guard assist  -BB     Time Frame (Transfer Goal 1, OT)  long term goal (LTG)  -BB     Progress/Outcome (Transfer Goal 1, OT)  goal not met  -BB     Row Name 03/15/21 0700          Bathing Goal 1 (OT)    Activity/Device (Bathing Goal 1, OT)  upper body bathing  -BB     [place] Level/Cues Needed (Bathing Goal 1, OT)  supervision required  -BB     Time Frame (Bathing Goal 1, OT)  long term goal (LTG)  -BB     Progress/Outcomes (Bathing Goal 1, OT)  goal not met  -BB     Row Name 03/15/21 0700          Dressing Goal 1 (OT)    Activity/Device (Dressing Goal 1, OT)  dressing skills, all  -BB     [place]/Cues Needed (Dressing Goal 1, OT)  contact guard assist  -BB     Time Frame (Dressing Goal 1, OT)  long term goal (LTG)  -BB     Progress/Outcome (Dressing Goal 1, OT)  goal not met  -BB     Row Name 03/15/21 0700          Toileting Goal 1 (OT)    Activity/Device (Toileting Goal 1, OT)  toileting skills, all  -BB     [place] Level/Cues Needed (Toileting Goal 1, OT)  supervision required  -BB     Time Frame (Toileting Goal 1, OT)  long term goal (LTG)  -BB     Progress/Outcome (Toileting Goal 1, OT)  goal not met  -BB       User Key  (r) = Recorded By, (t) = Taken By, (c) = Cosigned By    Initials Name Provider Type    BB Zoey Carolina, JACQUIE/L Occupational Therapy Assistant        Clinical Impression     Row Name 03/15/21 0700          Pain Scale: Numbers Pre/Post-Treatment    Pretreatment Pain Rating  7/10  -BB     Posttreatment Pain Rating  7/10  -BB     Pain Location - Side  Left  -BB     Pain Location  -- lower extremity  -BB     Pain Intervention(s)  Medication (See MAR)  -BB     Row Name 03/15/21 0700          Plan of Care Review    Plan of Care Reviewed With  patient  -BB     Progress  no change  -BB     Outcome Summary  Pt Mod A for UB bathing and dressing and dependent for LB ADL. Pt Min/Mod A for  grooming tasks. Pt is Max A for rolling right and left. Pt is dependent for pericare. Pt requires increased time and effort for all tasks. Pt requires increased time for thought processing. Continue OT POC.  -BB     Row Name 03/15/21 0700          Therapy Assessment/Plan (OT)    Rehab Potential (OT)  fair, will monitor progress closely  -BB     Criteria for Skilled Therapeutic Interventions Met (OT)  yes;meets criteria  -BB     Therapy Frequency (OT)  other (see comments) 5-7 days/wk  -BB     Row Name 03/15/21 0700          Therapy Plan Review/Discharge Plan (OT)    Anticipated Discharge Disposition (OT)  home with assist;home with 24/7 care;skilled nursing facility  -BB     Row Name 03/15/21 0700          Vital Signs    Pre Systolic BP Rehab  176  -BB     Pre Treatment Diastolic BP  72  -BB     Pretreatment Heart Rate (beats/min)  76  -BB     Posttreatment Heart Rate (beats/min)  78  -BB     Pre SpO2 (%)  96  -BB     O2 Delivery Pre Treatment  room air  -BB     Post SpO2 (%)  95  -BB     O2 Delivery Post Treatment  room air  -BB     Pre Patient Position  Supine  -BB     Post Patient Position  -- long sitting  -BB     Row Name 03/15/21 0700          Positioning and Restraints    Pre-Treatment Position  in bed  -BB     Post Treatment Position  bed  -BB     In Bed  fowlers;call light within reach;encouraged to call for assist;exit alarm on  -BB       User Key  (r) = Recorded By, (t) = Taken By, (c) = Cosigned By    Initials Name Provider Type    BB Zoey Carolina COTA/L Occupational Therapy Assistant        Outcome Measures     Row Name 03/15/21 0700          How much help from another is currently needed...    Putting on and taking off regular lower body clothing?  1  -BB     Bathing (including washing, rinsing, and drying)  2  -BB     Toileting (which includes using toilet bed pan or urinal)  2  -BB     Putting on and taking off regular upper body clothing  2  -BB     Taking care of personal grooming (such as  brushing teeth)  2  -BB     Eating meals  3  -BB     AM-PAC 6 Clicks Score (OT)  12  -BB       User Key  (r) = Recorded By, (t) = Taken By, (c) = Cosigned By    Initials Name Provider Type    Zoey Stover COTA/L Occupational Therapy Assistant        Occupational Therapy Education                 Title: PT OT SLP Therapies (In Progress)     Topic: Occupational Therapy (In Progress)     Point: ADL training (Done)     Description:   Instruct learner(s) on proper safety adaptation and remediation techniques during self care or transfers.   Instruct in proper use of assistive devices.              Learning Progress Summary           Patient Acceptance, E, VU,DU by MARGARITO at 3/14/2021 1349    Comment: Pt and daughter edu on OT and POC. Pt and daughter educated on use of plate guard, built up handles, and two handled cup.   Family Acceptance, E, VU,DU by MARGARITO at 3/14/2021 1349    Comment: Pt and daughter edu on OT and POC. Pt and daughter educated on use of plate guard, built up handles, and two handled cup.                   Point: Home exercise program (Not Started)     Description:   Instruct learner(s) on appropriate technique for monitoring, assisting and/or progressing therapeutic exercises/activities.              Learner Progress:  Not documented in this visit.          Point: Precautions (Done)     Description:   Instruct learner(s) on prescribed precautions during self-care and functional transfers.              Learning Progress Summary           Patient Acceptance, E, VU,NR by RB at 3/12/2021 1526    Comment: Edu pt on use of gait belt and non skid socks when OOB and no OOB without assist.                   Point: Body mechanics (Not Started)     Description:   Instruct learner(s) on proper positioning and spine alignment during self-care, functional mobility activities and/or exercises.              Learner Progress:  Not documented in this visit.                      User Key     Initials Effective Dates  Name Provider Type Discipline    RB 07/24/19 -  Baldomero Kwon OT Occupational Therapist OT    MARGARITO 11/05/19 -  Marti Vo OTA Occupational Therapy Assistant OT              OT Recommendation and Plan  Therapy Frequency (OT): other (see comments) (5-7 days/wk)  Plan of Care Review  Plan of Care Reviewed With: patient  Progress: no change  Outcome Summary: Pt Mod A for UB bathing and dressing and dependent for LB ADL. Pt Min/Mod A for grooming tasks. Pt is Max A for rolling right and left. Pt is dependent for pericare. Pt requires increased time and effort for all tasks. Pt requires increased time for thought processing. Continue OT POC.     Time Calculation:   Time Calculation- OT     Row Name 03/15/21 1340             Time Calculation- OT    OT Start Time  0700  -BB      OT Stop Time  0840  -BB      OT Time Calculation (min)  100 min  -BB      Total Timed Code Minutes- OT  100 minute(s)  -BB      OT Received On  03/15/21  -BB        User Key  (r) = Recorded By, (t) = Taken By, (c) = Cosigned By    Initials Name Provider Type    BB Zoey Carolina COTA/L Occupational Therapy Assistant        Therapy Charges for Today     Code Description Service Date Service Provider Modifiers Qty    15809402199 HC OT SELF CARE/MGMT/TRAIN EA 15 MIN 3/15/2021 Zoey Carolina COTA/L GO 7               CORINNE Maciel  3/15/2021

## 2021-03-16 NOTE — PLAN OF CARE
Problem: Adult Inpatient Plan of Care  Goal: Plan of Care Review  Flowsheets  Taken 3/16/2021 1232  Plan of Care Reviewed With: patient  Taken 3/16/2021 0789  Progress: no change  Plan of Care Reviewed With: patient  Outcome Summary: Pt is Mod A for UB bathing and dressing and dependent for LB bathing and dressing. Pt is Max A to roll L and R. Pt defers EOB this am stating she does not feel well and c/o nausea. No new goals met. Continue OT POC   Goal Outcome Evaluation:  Plan of Care Reviewed With: patient  Progress: no change  Outcome Summary: Pt is Mod A for UB bathing and dressing and dependent for LB bathing and dressing. Pt is Max A to roll L and R. Pt defers EOB this am stating she does not feel well and c/o nausea. No new goals met. Continue OT POC

## 2021-03-16 NOTE — NURSING NOTE
Notified POA that patient will be going to surgery today with Dr. Brice.   
 Patient refused SCD's.  
Lab stat glucose 484. Dr. Trivedi notified.  
Notified Dr. Trivedi of pt blood sugar of 484. Order for 15 units for now and he will assess for need of other order changes. We are giving 10 units of levemir which matches home dose and pt report but she does have confusion as well.  
Patient is incontinent of urine. She has skin breakdown coccyx fold. It measures 4 X 0.5 X    0.1 cm Wound bed a combination of granulation and yellow slough. This is not from pressure but excoriation. Needs wound care, offloading and protection.   
Pt blood sugar 510 at this time.  MD notified.  20 units of Novolog ordered at this time.  Will continue to monitor.  
Rechecked fsbs 501. Another stat glucose ordered since pt did eat 100% dinner    
no

## 2021-03-16 NOTE — THERAPY TREATMENT NOTE
Patient Name: Janelle Millard  : 1955    MRN: [id number]                              Today's Date: 3/16/2021       Admit Date: 3/6/2021    Visit Dx:     ICD-10-CM ICD-9-CM   1. Sepsis with encephalopathy without septic shock, due to unspecified organism (CMS/[facility])  A41.9 038.9    R65.20 995.91    G93.40 348.30   2. NAGI (acute kidney injury) (CMS/[facility])  N17.9 584.9   3. Ureteral stone with hydronephrosis  N13.2 592.1     591   4. Impaired functional mobility, balance, gait, and endurance  Z74.09 V49.89   5. Impaired mobility and activities of daily living  Z74.09 V49.89    Z78.9      Patient Active Problem List   Diagnosis   • Carpal tunnel syndrome of left wrist   • Type 1 diabetes mellitus with diabetic polyneuropathy (CMS/[facility])   • Mixed diabetic hyperlipidemia associated with type 1 diabetes mellitus (CMS/[facility])   • Hypertension associated with diabetes (CMS/[facility])   • Syncope and collapse   • CAD (coronary artery disease)   • Asthma   • Depressive disorder, not elsewhere classified   • Neuropathy   • Other specified rheumatoid arthritis, multiple sites (CMS/[facility])   • Carpal tunnel syndrome on both sides   • Bilateral carotid artery stenosis   • Overweight (BMI 25.0-29.9)   • Precordial pain   • Sepsis with encephalopathy without septic shock (CMS/[facility])   • Tobacco abuse   • UTI (urinary tract infection)   • Ureteral stone with hydronephrosis     Past Medical History:   Diagnosis Date   • Arthritis, rheumatoid (CMS/[facility])    • Arthropathy associated with neurological disorder    • Arthropathy of lumbar facet joint    • Asthma    • Benign hypertension    • Carpal tunnel syndrome    • Diabetes (CMS/[facility])    • Diabetic polyneuropathy (CMS/[facility])    • Dyslipidemia    • Fallen arches    • Hammer toe    • Heart disease    • Joint pain    • Mild depression (CMS/[facility])     Saddness post Surgery 7/10/14 improved after counseling.   • Multiple vessel coronary artery disease     post CABG      • Myofascial pain    • Neurologic  disorder associated with diabetes mellitus (CMS/[facility])     Neuropathy of chest      • Neuropathy    • Onychomycosis    • Peripheral vascular disease (CMS/[facility])    • Retinopathy    • Tobacco user    • Type 1 diabetes mellitus (CMS/[facility])      Past Surgical History:   Procedure Laterality Date   • CARDIAC SURGERY  02/21/2014    Critical stenosis in the RCA. Diffusely calcified LAD with 30-80% stenosis. OMB #3 with 60% to 70% stenosis. Preserved LV systolic function with EF of 55%.   • CARPAL TUNNEL RELEASE Right 10/15/2014    Right carpal tunnel release.   • CARPAL TUNNEL RELEASE     • CATARACT EXTRACTION, BILATERAL Bilateral    • CORONARY ARTERY BYPASS GRAFT  02/24/2014    CABG x 3 with LIMA to LAD, endarterectomy to LAD, SVG to OMB and SVG to distal RCA with endarterectomy to distal RCA. Endo-vein harvesting.   • CYSTECTOMY Left     Breast cycst   • EXCISION LESION      Remove breast lesion - cyst   • EYE SURGERY      Insert lens prosthesis   • FOOT SURGERY  10/18/2011    Exostectomy of the right foot. Preulcerative lesion with Charot deformity, right foot   • LUMBAR SPINE SURGERY  11/09/2015    Lumbosacral radiofrequency thermal coagulation.   • NERVE BLOCK  09/14/2015    Lumbar medial branch block.   • TOE NAIL AVULSION  09/03/2015    DEBRIDE NAIL 6 OR MORE 58545 (1)     General Information     Row Name 03/16/21 0743          OT Time and Intention    Document Type  therapy note (daily note)  -BB     Mode of Treatment  individual therapy;occupational therapy  -BB     Row Name 03/16/21 0743          General Information    Patient Profile Reviewed  yes  -BB     Existing Precautions/Restrictions  fall  -BB     Row Name 03/16/21 0743          Cognition    Orientation Status (Cognition)  oriented to;person;place;time  -BB     Row Name 03/16/21 0743          Safety Issues, Functional Mobility    Impairments Affecting Function (Mobility)  balance;cognition;coordination;endurance/activity tolerance;strength;postural/trunk  control  -BB       User Key  (r) = Recorded By, (t) = Taken By, (c) = Cosigned By    Initials Name Provider Type    BB Zoey Carolina COTA/L Occupational Therapy Assistant          Mobility/ADL's     Row Name 03/16/21 0743          Bed Mobility    Rolling Left [place] (Bed Mobility)  maximum assist (25% patient effort)  -BB     Rolling Right [place] (Bed Mobility)  maximum assist (25% patient effort)  -BB     Bed Mobility, Safety Issues  cognitive deficits limit understanding;decreased use of arms for pushing/pulling;decreased use of legs for bridging/pushing  -BB     Assistive Device (Bed Mobility)  bed rails;draw sheet;head of bed elevated  -BB     Comment (Bed Mobility)  Pt defers EOB due to not feeling well this morning.  -     Row Name 03/16/21 0743          Bathing Assessment/Intervention    [place] Level (Bathing)  upper body;moderate assist (50% patient effort);lower body;dependent (less than 25% patient effort);verbal cues;nonverbal cues (demo/gesture)  -BB     Position (Bathing)  sitting up in bed  -     Row Name 03/16/21 0743          Upper Body Dressing Assessment/Training    [place] Level (Upper Body Dressing)  doff;don;verbal cues;moderate assist (50% patient effort);nonverbal cues (demo/gesture) hospital gown  -     Row Name 03/16/21 0743          Lower Body Dressing Assessment/Training    [place] Level (Lower Body Dressing)  doff;don;socks;dependent (less than 25% patient effort)  -BB     Position (Lower Body Dressing)  sitting up in bed  -     Row Name 03/16/21 0743          Grooming Assessment/Training    [place] Level (Grooming)  hair care, combing/brushing;moderate assist (50% patient effort);verbal cues;wash face, hands;contact guard assist  -BB     Position (Grooming)  sitting up in bed  -BB     Row Name 03/16/21 0743          Toileting Assessment/Training    [place] Level (Toileting)  dependent (less than 25% patient effort)  -BB     Position  (Toileting)  supine  -BB     Comment (Toileting)  Pt's bed is wet and needs changing  -BB     Row Name 03/16/21 0743          Self-Feeding Assessment/Training    [place] Level (Feeding)  -- Pt with Mod spills  -BB     Position (Self-Feeding)  --  -BB       User Key  (r) = Recorded By, (t) = Taken By, (c) = Cosigned By    Initials Name Provider Type    Zoey Stover COTA/L Occupational Therapy Assistant        Obj/Interventions     Row Name 03/16/21 0743          Range of Motion Comprehensive    General Range of Motion  upper extremity range of motion deficits identified;lower extremity range of motion deficits identified  -BB     Row Name 03/16/21 0743          Strength Comprehensive (MMT)    General Manual Muscle Testing (MMT) Assessment  upper extremity strength deficits identified  -BB       User Key  (r) = Recorded By, (t) = Taken By, (c) = Cosigned By    Initials Name Provider Type    Zoey Stover COTA/L Occupational Therapy Assistant        Goals/Plan     Row Name 03/16/21 0743          Transfer Goal 1 (OT)    Activity/Assistive Device (Transfer Goal 1, OT)  transfers, all  -BB     [place] Level/Cues Needed (Transfer Goal 1, OT)  contact guard assist  -BB     Time Frame (Transfer Goal 1, OT)  long term goal (LTG)  -BB     Progress/Outcome (Transfer Goal 1, OT)  goal not met  -BB     Row Name 03/16/21 0743          Bathing Goal 1 (OT)    Activity/Device (Bathing Goal 1, OT)  upper body bathing  -BB     [place] Level/Cues Needed (Bathing Goal 1, OT)  supervision required  -BB     Time Frame (Bathing Goal 1, OT)  long term goal (LTG)  -BB     Progress/Outcomes (Bathing Goal 1, OT)  goal not met  -BB     Row Name 03/16/21 0743          Dressing Goal 1 (OT)    Activity/Device (Dressing Goal 1, OT)  dressing skills, all  -BB     [place]/Cues Needed (Dressing Goal 1, OT)  contact guard assist  -BB     Time Frame (Dressing Goal 1, OT)  long term goal (LTG)  -BB      Progress/Outcome (Dressing Goal 1, OT)  goal not met  -BB     Row Name 03/16/21 0743          Toileting Goal 1 (OT)    Activity/Device (Toileting Goal 1, OT)  toileting skills, all  -BB     [place] Level/Cues Needed (Toileting Goal 1, OT)  supervision required  -BB     Time Frame (Toileting Goal 1, OT)  long term goal (LTG)  -BB     Progress/Outcome (Toileting Goal 1, OT)  goal not met  -BB       User Key  (r) = Recorded By, (t) = Taken By, (c) = Cosigned By    Initials Name Provider Type    BB Zoey Carolina COTA/L Occupational Therapy Assistant        Clinical Impression     Row Name 03/16/21 0743          Pain Scale: Numbers Pre/Post-Treatment    Pretreatment Pain Rating  7/10  -BB     Posttreatment Pain Rating  7/10  -BB     Pain Location  abdomen  -BB     Pain Intervention(s)  Other (Comment) nsg notified  -BB     Row Name 03/16/21 0743          Plan of Care Review    Plan of Care Reviewed With  patient  -BB     Progress  no change  -BB     Outcome Summary  Pt is Mod A for UB bathing and dressing and dependent for LB bathing and dressing. Pt is Max A to roll L and R. Pt defers EOB this am stating she does not feel well and c/o nausea. No new goals met. Continue OT POC  -BB     Row Name 03/16/21 0743          Therapy Assessment/Plan (OT)    Rehab Potential (OT)  fair, will monitor progress closely  -BB     Criteria for Skilled Therapeutic Interventions Met (OT)  yes;meets criteria  -BB     Therapy Frequency (OT)  other (see comments) 5-7 days/wk  -BB     Row Name 03/16/21 0743          Therapy Plan Review/Discharge Plan (OT)    Anticipated Discharge Disposition (OT)  home with assist;home with 24/7 care;skilled nursing facility  -BB     Row Name 03/16/21 0743          Vital Signs    Pre Systolic BP Rehab  168  -BB     Pre Treatment Diastolic BP  75  -BB     Posttreatment Heart Rate (beats/min)  88  -BB     Pre SpO2 (%)  98  -BB     O2 Delivery Pre Treatment  room air  -BB     Pre Patient Position   Supine  -BB     Post Patient Position  Supine  -BB     Row Name 03/16/21 0743          Positioning and Restraints    Pre-Treatment Position  in bed  -BB     Post Treatment Position  bed  -BB     In Bed  fowlers;call light within reach;encouraged to call for assist;exit alarm on  -BB       User Key  (r) = Recorded By, (t) = Taken By, (c) = Cosigned By    Initials Name Provider Type    Zoey Stover COTA/L Occupational Therapy Assistant        Outcome Measures     Row Name 03/16/21 0743          How much help from another is currently needed...    Putting on and taking off regular lower body clothing?  1  -BB     Bathing (including washing, rinsing, and drying)  1  -BB     Toileting (which includes using toilet bed pan or urinal)  1  -BB     Putting on and taking off regular upper body clothing  2  -BB     Taking care of personal grooming (such as brushing teeth)  2  -BB     Eating meals  3  -BB     AM-PAC 6 Clicks Score (OT)  10  -BB       User Key  (r) = Recorded By, (t) = Taken By, (c) = Cosigned By    Initials Name Provider Type    Zoey Stover COTA/L Occupational Therapy Assistant        Occupational Therapy Education                 Title: PT OT SLP Therapies (In Progress)     Topic: Occupational Therapy (In Progress)     Point: ADL training (In Progress)     Description:   Instruct learner(s) on proper safety adaptation and remediation techniques during self care or transfers.   Instruct in proper use of assistive devices.              Learning Progress Summary           Patient Acceptance, E, NR by BB at 3/16/2021 0902    Acceptance, E, VU,DU by MARGARITO at 3/14/2021 1349    Comment: Pt and daughter edu on OT and POC. Pt and daughter educated on use of plate guard, built up handles, and two handled cup.   Family Acceptance, E, VU,DU by MARGARITO at 3/14/2021 1349    Comment: Pt and daughter edu on OT and POC. Pt and daughter educated on use of plate guard, built up handles, and two handled cup.                    Point: Home exercise program (Not Started)     Description:   Instruct learner(s) on appropriate technique for monitoring, assisting and/or progressing therapeutic exercises/activities.              Learner Progress:  Not documented in this visit.          Point: Precautions (Done)     Description:   Instruct learner(s) on prescribed precautions during self-care and functional transfers.              Learning Progress Summary           Patient Acceptance, E, VU,NR by RB at 3/12/2021 1526    Comment: Edu pt on use of gait belt and non skid socks when OOB and no OOB without assist.                   Point: Body mechanics (In Progress)     Description:   Instruct learner(s) on proper positioning and spine alignment during self-care, functional mobility activities and/or exercises.              Learning Progress Summary           Patient Acceptance, E, NR by BB at 3/16/2021 0902                               User Key     Initials Effective Dates Name Provider Type Discipline    RB 07/24/19 -  Baldomero Kwon OT Occupational Therapist OT    BB 03/07/18 -  Zoey Carolina COTA/L Occupational Therapy Assistant OT    MARGARITO 11/05/19 -  Marti Vo OTA Occupational Therapy Assistant OT              OT Recommendation and Plan  Therapy Frequency (OT): other (see comments) (5-7 days/wk)  Plan of Care Review  Plan of Care Reviewed With: patient  Progress: no change  Outcome Summary: Pt is Mod A for UB bathing and dressing and dependent for LB bathing and dressing. Pt is Max A to roll L and R. Pt defers EOB this am stating she does not feel well and c/o nausea. No new goals met. Continue OT POC     Time Calculation:   Time Calculation- OT     Row Name 03/16/21 1232             Time Calculation- OT    OT Start Time  0743  -BB      OT Stop Time  0910  -BB      OT Time Calculation (min)  87 min  -BB      Total Timed Code Minutes- OT  87 minute(s)  -BB      OT Received On  03/16/21  -        User Key  (r) = Recorded  By, (t) = Taken By, (c) = Cosigned By    Initials Name Provider Type    BB Zoey Carolnia COTA/L Occupational Therapy Assistant        Therapy Charges for Today     Code Description Service Date Service Provider Modifiers Qty    36391223220 HC OT SELF CARE/MGMT/TRAIN EA 15 MIN 3/15/2021 Zoey Carolina COTA/L GO 7    59686075424 HC OT SELF CARE/MGMT/TRAIN EA 15 MIN 3/16/2021 Zoey Carolina COTA/L GO 6               CORINNE Maciel  3/16/2021

## 2021-03-16 NOTE — PROGRESS NOTES
[facility] Medicine Services  INPATIENT PROGRESS NOTE    Length of Stay: 10  Date of Admission: 3/6/2021  Primary Care Physician: Arelis Laird MD    Subjective   Chief Complaint: Urinary tract infection and sepsis  HPI: Patient states she is feeling some better today.  She states she improved slightly every day.    Review of Systems   Constitutional: Positive for fatigue. Negative for appetite change, chills, fever and unexpected weight change.   Respiratory: Negative for cough, choking, chest tightness, shortness of breath and wheezing.    Cardiovascular: Negative for chest pain, palpitations and leg swelling.   Gastrointestinal: Positive for nausea. Negative for abdominal pain, blood in stool, constipation, diarrhea and vomiting.   Genitourinary: Negative for dysuria, flank pain and hematuria.   Neurological: Negative for dizziness, seizures, syncope, speech difficulty, weakness, light-headedness, numbness and headaches.   Hematological: Does not bruise/bleed easily.   Psychiatric/Behavioral: Positive for confusion.        All pertinent negatives and positives are as above. All other systems have been reviewed and are negative unless otherwise stated.     Objective    Temp:  [96.5 °F (35.8 °C)-97.3 °F (36.3 °C)] 97.1 °F (36.2 °C)  Heart Rate:  [77-93] 93  Resp:  [18] 18  BP: (138-183)/(60-80) 142/60    Physical Exam  Vitals reviewed.   Constitutional:       Appearance: She is well-developed.   HENT:      Head: Normocephalic and atraumatic.   Eyes:      Pupils: Pupils are equal, round, and reactive to light.   Cardiovascular:      Rate and Rhythm: Normal rate and regular rhythm.      Heart sounds: Normal heart sounds. No murmur. No friction rub. No gallop.    Pulmonary:      Effort: Pulmonary effort is normal. No respiratory distress.      Breath sounds: Normal breath sounds. No wheezing or rales.   Chest:      Chest wall: No tenderness.   Abdominal:      General:  Bowel sounds are normal. There is no distension.      Palpations: Abdomen is soft.      Tenderness: There is no abdominal tenderness. There is no guarding.   Musculoskeletal:      Cervical back: Normal range of motion and neck supple.   Skin:     General: Skin is warm and dry.   Psychiatric:         Behavior: Behavior normal.         Thought Content: Thought content normal.         Results Review:  I have reviewed the labs, radiology results, and diagnostic studies.    Laboratory Data:   Results from last 7 days   Lab Units 03/15/21  2120 03/15/21  0538 03/12/21  0546 03/11/21 0650 03/10/21  0332   SODIUM mmol/L  --  136 138 137 139   POTASSIUM mmol/L 4.0 3.3* 3.9 3.5 3.7   CHLORIDE mmol/L  --  96* 109* 108* 108*   CO2 mmol/L  --  32.0* 23.0 18.0* 20.0*   BUN mg/dL  --  5* 8 12 8   CREATININE mg/dL  --  0.40* 0.45* 0.54* 0.60   GLUCOSE mg/dL  --  313* 298* 235* 227*   CALCIUM mg/dL  --  9.0 9.0 8.9 9.1   BILIRUBIN mg/dL  --   --   --   --  0.4   ALK PHOS U/L  --   --   --   --  114   ALT (SGPT) U/L  --   --   --   --  11   AST (SGOT) U/L  --   --   --   --  16   ANION GAP mmol/L  --  8.0 6.0 11.0 11.0     Estimated Creatinine Clearance: 81.3 mL/min (A) (by C-G formula based on SCr of 0.4 mg/dL (L)).  Results from last 7 days   Lab Units 03/15/21  0538 03/14/21  0635 03/13/21  0632 03/12/21  0546   MAGNESIUM mg/dL 1.7 1.8 1.5* 1.6   PHOSPHORUS mg/dL  --   --   --  2.0*         Results from last 7 days   Lab Units 03/15/21  0538 03/12/21  0546 03/11/21  0650 03/10/21  0339   WBC 10*3/mm3 7.46 11.11* 11.46* 12.00*   HEMOGLOBIN g/dL 11.5* 10.2* 11.6* 11.6*   HEMATOCRIT % 33.4* 30.4* 34.3 33.7*   PLATELETS 10*3/mm3 384 237 225 199           Culture Data:   No results found for: BLOODCX  No results found for: URINECX  No results found for: RESPCX  No results found for: WOUNDCX  No results found for: STOOLCX  No components found for: BODYFLD    Radiology Data:   Imaging Results (Last 24 Hours)     ** No results found for  the last 24 hours. **          I have reviewed the patient's current medications.     Assessment/Plan     Active Hospital Problems    Diagnosis    • **Sepsis with encephalopathy without septic shock (CMS/[facility])    • Tobacco abuse    • UTI (urinary tract infection)    • Ureteral stone with hydronephrosis      Added automatically from request for surgery [id number]     • Type 1 diabetes mellitus with diabetic polyneuropathy (CMS/[facility])    • Mixed diabetic hyperlipidemia associated with type 1 diabetes mellitus (CMS/[facility])    • Hypertension associated with diabetes (CMS/[facility])    • Other specified rheumatoid arthritis, multiple sites (CMS/[facility])    • CAD (coronary artery disease)        Plan:    1.  Urinary tract infection: E. coli.  Continue antibiotics based on sensitivities.  2.  E. coli bacteremia: Continue antibiotics based on sensitivities.  3.  Sepsis with encephalopathy: Secondary to E. coli bacteremia and urinary tract infection.  Improving.  Encephalopathy improving.  Continue supportive care.  4.  Diabetes mellitus: Continue current treatment.  5.  Ureteral stone with hydronephrosis: Urology following.  6.  Hypertension: Continue current treatment.  7.  Coronary artery disease: Continue current treatment.  8.  Deconditioning: PT/OT.  9.  DVT prophylaxis: Lovenox.    The patient was evaluated during the global COVID-19 pandemic, and the diagnosis was suspected/considered upon their initial presentation.  Evaluation, treatment, and testing were consistent with current guidelines for patients who present with complaints or symptoms that may be related to COVID-19.    Discharge Planning: I expect patient to be discharged to skilled facility in 2-3 days.        This document has been electronically signed by Sukhjinder Trivedi MD on March 16, 2021 13:01 CDT

## 2021-03-16 NOTE — THERAPY TREATMENT NOTE
Acute Care - Physical Therapy Treatment Note  [facility]     Patient Name: Janelle Millard  : 1955  MRN: [id number]  Today's Date: 3/16/2021           PT Assessment (last 12 hours)      PT Evaluation and Treatment     Row Name 21 1313          Physical Therapy Time and Intention    Subjective Information  no complaints  -     Document Type  therapy note (daily note)  -JA     Mode of Treatment  individual therapy;physical therapy  -     Patient Effort  poor  -JA     Comment  Attempted treatment pt. allowed signee to assess VS, but after assessment pt. deferred mobility. Pt. educated on benefits of OOB & LE strengthening this p.m.    -     Row Name 21 1313          General Information    Patient Profile Reviewed  yes  -     Row Name 21 1313          Pain Scale: Numbers Pre/Post-Treatment    Pretreatment Pain Rating  7/10  -     Pain Location - Orientation  generalized  -     Row Name             Wound 21 1141 Other (See comments) midline gluteal Pressure Injury    Wound - Properties Group Placement Date: 21  - Placement Time: 1141  -FH Present on Hospital Admission: N  -FH Side: Other (See comments)  -FH, down gluteal crest  Orientation: midline  -FH Location: gluteal  -FH Primary Wound Type: Pressure inj  -FH Stage, Pressure Injury : --  -FH, consult placed for wound care. open     Retired Wound - Properties Group Date first assessed: 21  - Time first assessed: 1141  -FH Present on Hospital Admission: N  -FH Side: Other (See comments)  -FH, down gluteal crest  Location: gluteal  -FH Primary Wound Type: Pressure inj  -FH    Row Name 21 1313          Vital Signs    Pre Systolic BP Rehab  143  -JA     Pre Treatment Diastolic BP  63  -JA     Pretreatment Heart Rate (beats/min)  85  -JA     Pre Patient Position  Supine  -JA     Intra Patient Position  Supine  -JA     Post Patient Position  Supine  -     Row Name 21 1313          Bed Mobility  Goal 1 (PT)    Activity/Assistive Device (Bed Mobility Goal 1, PT)  sit to supine;supine to sit  -JA     Leslie Level/Cues Needed (Bed Mobility Goal 1, PT)  minimum assist (75% or more patient effort);1 person assist  -JA     Time Frame (Bed Mobility Goal 1, PT)  by discharge  -JA     Progress/Outcomes (Bed Mobility Goal 1, PT)  goal not met  -     Row Name 03/16/21 1313          Transfer Goal 1 (PT)    Activity/Assistive Device (Transfer Goal 1, PT)  sit-to-stand/stand-to-sit;bed-to-chair/chair-to-bed;walker, rolling  -JA     Leslie Level/Cues Needed (Transfer Goal 1, PT)  minimum assist (75% or more patient effort)  -JA     Time Frame (Transfer Goal 1, PT)  by discharge  -JA     Progress/Outcome (Transfer Goal 1, PT)  goal not met  -     Row Name 03/16/21 1313          Gait Training Goal 1 (PT)    Activity/Assistive Device (Gait Training Goal 1, PT)  gait (walking locomotion);assistive device use;walker, rolling  -JA     Leslie Level (Gait Training Goal 1, PT)  minimum assist (75% or more patient effort)  -JA     Distance (Gait Training Goal 1, PT)  10'x2  -JA     Time Frame (Gait Training Goal 1, PT)  by discharge  -JA     Progress/Outcome (Gait Training Goal 1, PT)  goal not met  -     Row Name 03/16/21 1313          Positioning and Restraints    Pre-Treatment Position  in bed  -     Post Treatment Position  bed  -     Row Name 03/16/21 1313          Therapy Assessment/Plan (PT)    Rehab Potential (PT)  fair, will monitor progress closely  -     Criteria for Skilled Interventions Met (PT)  yes;skilled treatment is necessary;meets criteria  -     Row Name 03/16/21 1313          Progress Summary (PT)    Progress Toward Functional Goals (PT)  progress toward functional goals is gradual  -       User Key  (r) = Recorded By, (t) = Taken By, (c) = Cosigned By    Initials Name Provider Type    Anni Sim, RN Registered Nurse    Simón Pope, PTA Physical Therapy  Assistant        Physical Therapy Education                 Title: PT OT SLP Therapies (In Progress)     Topic: Physical Therapy (In Progress)     Point: Mobility training (In Progress)     Learning Progress Summary           Patient Refuses, E, NR by POPPY at 3/16/2021 1529    Comment: Pt. educated on benefits of OOB & LE strengthening this p.m.                   Point: Home exercise program (Not Started)     Learner Progress:  Not documented in this visit.          Point: Body mechanics (Not Started)     Learner Progress:  Not documented in this visit.          Point: Precautions (In Progress)     Learning Progress Summary           Patient Refuses, E, NR by POPPY at 3/16/2021 1529    Comment: Pt. educated on benefits of OOB & LE strengthening this p.m.                               User Key     Initials Effective Dates Name Provider Type [facility] 03/07/18 -  Simón Duque PTA Physical Therapy Assistant PT              PT Recommendation and Plan  Anticipated Discharge Disposition (PT): home with 24/7 care, skilled nursing facility (home with 24/7 vs SNF)  Progress Summary (PT)  Progress Toward Functional Goals (PT): progress toward functional goals is gradual  Plan of Care Reviewed With: patient  Progress: declining  Outcome Summary: PT attempted this p.m. Pt. agreeable to assessment of VS this p.m., but after assessment pt. defers any mobility. Pt. educated on benefits of mobility & LE strengthening, but pt. cont'd to defer this p.m. Will check back tomorrow for treatment       Time Calculation:   PT Charges     Row Name 03/16/21 1529             Time Calculation    Start Time  1313  -POPPY      Stop Time  1328  -POPPY      Time Calculation (min)  15 min  -POPPY         Time Calculation- PT    Total Timed Code Minutes- PT  15 minute(s)  -POPPY        User Key  (r) = Recorded By, (t) = Taken By, (c) = Cosigned By    Initials Name Provider Type    Simón Pope PTA Physical Therapy Assistant        Therapy  Charges for Today     Code Description Service Date Service Provider Modifiers Qty    86667687683 HC PT THERAPEUTIC ACT EA 15 MIN 3/15/2021 Simón Duque, PTA GP 2    80504808360 HC PT SELF CARE/MGMT/TRAIN EA 15 MIN 3/16/2021 Simón Duque, ERNIE GP 1          PT G-Codes  Outcome Measure Options: AM-PAC 6 Clicks Daily Activity (OT)  AM-PAC 6 Clicks Score (PT): 11  AM-PAC 6 Clicks Score (OT): 10    Simón Duque PTA  3/16/2021

## 2021-03-16 NOTE — PLAN OF CARE
Goal Outcome Evaluation:  Plan of Care Reviewed With: patient  Progress: declining  Outcome Summary: PT attempted this p.m. Pt. agreeable to assessment of VS this p.m., but after assessment pt. defers any mobility. Pt. educated on benefits of mobility & LE strengthening, but pt. cont'd to defer this p.m. Will check back tomorrow for treatment

## 2021-03-16 NOTE — PLAN OF CARE
Problem: Adult Inpatient Plan of Care  Goal: Plan of Care Review  Outcome: Ongoing, Progressing  Flowsheets  Taken 3/16/2021 0333 by Cassie Buckley, RN  Outcome Summary: VSS, one complaint of nausea, zofran given and effective. K 4.0 and therapeutic, no complaints of pain, will continue to monitor.  Taken 3/15/2021 1801 by Lorie Sparks, RN  Progress: no change  Taken 3/15/2021 1340 by Simón Duque PTA  Plan of Care Reviewed With: patient   Goal Outcome Evaluation:        Outcome Summary: VSS, one complaint of nausea, zofran given and effective. K 4.0 and therapeutic, no complaints of pain, will continue to monitor.

## 2021-03-16 NOTE — PROGRESS NOTES
"   LOS: 10 days   Patient Care Team:  Arelis Laird MD as PCP - General  Francine Gallegos (Inactive) as Technician    Subjective     Subjective:  Symptoms:  Stable.  (No fever. Good urine output. No pain at present. ).        History taken from: patient chart    Objective     Vital Signs  Temp:  [96.5 °F (35.8 °C)-97.3 °F (36.3 °C)] 97.1 °F (36.2 °C)  Heart Rate:  [77-93] 93  Resp:  [18] 18  BP: (138-183)/(60-80) 142/60    Objective:  General Appearance:  In no acute distress.    Vital signs: (most recent): Blood pressure 142/60, pulse 93, temperature 97.1 °F (36.2 °C), temperature source Oral, resp. rate 18, height 175.3 cm (69\"), weight 89.5 kg (197 lb 5 oz), SpO2 94 %.  Vital signs are normal.  No fever.    Output: Producing urine.    Lungs:  Normal effort.    Heart: Normal rate.    Chest: Symmetric chest wall expansion.   Abdomen: Abdomen is soft and non-distended.  There is no suprapubic area tenderness.     Neurological: Patient is alert.    Pupils:  Pupils are equal, round, and reactive to light.    Skin:  Warm, dry and pale.              Results Review:    Lab Results (last 24 hours)     Procedure Component Value Units Date/Time    POC Glucose Once [[id number]]  (Abnormal) Collected: 03/16/21 0617    Specimen: Blood Updated: 03/16/21 0653     Glucose 223 mg/dL      Comment: RN NotifiedOperator: [id number] WistiaMeter ID: [id number]       POC Glucose Once [[id number]]  (Abnormal) Collected: 03/15/21 2147    Specimen: Blood Updated: 03/15/21 2159     Glucose 322 mg/dL      Comment: RN NotifiedOperator: [id number] discoapiNISYMIC BIOMEDICALMeter ID: [id number]       POC Glucose Once [[id number]]  (Abnormal) Collected: 03/15/21 2105    Specimen: Blood Updated: 03/15/21 2155     Glucose 361 mg/dL      Comment: RN NotifiedOperator: [id number] discoapiNISYMIC BIOMEDICALMeter ID: [id number]       Potassium [[id number]]  (Normal) Collected: 03/15/21 2120    Specimen: Blood Updated: 03/15/21 2150     Potassium 4.0 mmol/L  "         Imaging Results (Last 24 Hours)     ** No results found for the last 24 hours. **           I reviewed the patient's new clinical results.  I reviewed the patient's new imaging results and agree with the interpretation.  I reviewed the patient's other test results and agree with the interpretation      Assessment/Plan       Sepsis with encephalopathy without septic shock (CMS/HCC)    Type 1 diabetes mellitus with diabetic polyneuropathy (CMS/HCC)    Mixed diabetic hyperlipidemia associated with type 1 diabetes mellitus (CMS/HCC)    Hypertension associated with diabetes (CMS/HCC)    CAD (coronary artery disease)    Other specified rheumatoid arthritis, multiple sites (CMS/HCC)    Tobacco abuse    UTI (urinary tract infection)    Ureteral stone with hydronephrosis      Assessment & Plan    Post op 3/10/21 cystoscopy LEFT J-stent placement, findings: distal left ureter stone with hydronephrosis.   -Estimated Creatinine Clearance: 81.3 mL/min (A) (by C-G formula based on SCr of 0.4 mg/dL (L)).  -Urine culture 3/6/21 E.Coli, on Zosyn-->Rocephin    Plan:  J-stent in place, finish UTI treatment, follow up Urology 1 week from discharge. Urology will sign off, call if needed.     JESSIE Carrillo  03/16/21  14:15 CDT

## 2021-03-17 NOTE — SIGNIFICANT NOTE
03/17/21 1445   OTHER   Discipline physical therapy assistant   Rehab Time/Intention   Session Not Performed patient/family declined treatment  (Pt. encouraged to participate with PT this p.m., but pt. defers any mobility/ or therex this p.m. with max encouragement & family present)

## 2021-03-17 NOTE — PROGRESS NOTES
[facility] Medicine Services  INPATIENT PROGRESS NOTE    Length of Stay: 11  Date of Admission: 3/6/2021  Primary Care Physician: Arelis Laird MD    Subjective   Chief Complaint: Urinary tract infection and sepsis  HPI: Patient states that she is feeling some better.  She is reluctant to work with therapy because she feels like it is hard and she does not see any benefit from it.    Review of Systems   Constitutional: Positive for fatigue. Negative for appetite change, chills, fever and unexpected weight change.   Respiratory: Negative for cough, choking, chest tightness, shortness of breath and wheezing.    Cardiovascular: Negative for chest pain, palpitations and leg swelling.   Gastrointestinal: Positive for nausea. Negative for abdominal pain, blood in stool, constipation, diarrhea and vomiting.   Genitourinary: Negative for dysuria, flank pain and hematuria.   Neurological: Negative for dizziness, seizures, syncope, speech difficulty, weakness, light-headedness, numbness and headaches.   Hematological: Does not bruise/bleed easily.   Psychiatric/Behavioral: Positive for confusion.        All pertinent negatives and positives are as above. All other systems have been reviewed and are negative unless otherwise stated.     Objective    Temp:  [97.3 °F (36.3 °C)-98.6 °F (37 °C)] 97.3 °F (36.3 °C)  Heart Rate:  [78-98] 88  Resp:  [16-18] 18  BP: (137-165)/(62-88) 162/72    Physical Exam  Vitals reviewed.   Constitutional:       Appearance: She is well-developed.   HENT:      Head: Normocephalic and atraumatic.   Eyes:      Pupils: Pupils are equal, round, and reactive to light.   Cardiovascular:      Rate and Rhythm: Normal rate and regular rhythm.      Heart sounds: Normal heart sounds. No murmur heard.   No friction rub. No gallop.    Pulmonary:      Effort: Pulmonary effort is normal. No respiratory distress.      Breath sounds: Normal breath sounds. No wheezing  or rales.   Chest:      Chest wall: No tenderness.   Abdominal:      General: Bowel sounds are normal. There is no distension.      Palpations: Abdomen is soft.      Tenderness: There is no abdominal tenderness. There is no guarding.   Musculoskeletal:      Cervical back: Normal range of motion and neck supple.   Skin:     General: Skin is warm and dry.   Psychiatric:         Behavior: Behavior normal.         Thought Content: Thought content normal.         Results Review:  I have reviewed the labs, radiology results, and diagnostic studies.    Laboratory Data:   Results from last 7 days   Lab Units 03/16/21  1908 03/16/21  1742 03/15/21  2120 03/15/21  0538 03/12/21  0546 03/11/21  0650   SODIUM mmol/L  --   --   --  136 138 137   POTASSIUM mmol/L  --   --  4.0 3.3* 3.9 3.5   CHLORIDE mmol/L  --   --   --  96* 109* 108*   CO2 mmol/L  --   --   --  32.0* 23.0 18.0*   BUN mg/dL  --   --   --  5* 8 12   CREATININE mg/dL  --   --   --  0.40* 0.45* 0.54*   GLUCOSE mg/dL 535* 484*  --  313* 298* 235*   CALCIUM mg/dL  --   --   --  9.0 9.0 8.9   ANION GAP mmol/L  --   --   --  8.0 6.0 11.0     Estimated Creatinine Clearance: 81.3 mL/min (A) (by C-G formula based on SCr of 0.4 mg/dL (L)).  Results from last 7 days   Lab Units 03/15/21  0538 03/14/21  0635 03/13/21  0632 03/12/21  0546   MAGNESIUM mg/dL 1.7 1.8 1.5* 1.6   PHOSPHORUS mg/dL  --   --   --  2.0*         Results from last 7 days   Lab Units 03/15/21  0538 03/12/21  0546 03/11/21  0650   WBC 10*3/mm3 7.46 11.11* 11.46*   HEMOGLOBIN g/dL 11.5* 10.2* 11.6*   HEMATOCRIT % 33.4* 30.4* 34.3   PLATELETS 10*3/mm3 384 237 225           Culture Data:   No results found for: BLOODCX  No results found for: URINECX  No results found for: RESPCX  No results found for: WOUNDCX  No results found for: STOOLCX  No components found for: BODYFLD    Radiology Data:   Imaging Results (Last 24 Hours)     ** No results found for the last 24 hours. **          I have reviewed the  patient's current medications.     Assessment/Plan     Active Hospital Problems    Diagnosis    • **Sepsis with encephalopathy without septic shock (CMS/[facility])    • Tobacco abuse    • UTI (urinary tract infection)    • Ureteral stone with hydronephrosis      Added automatically from request for surgery [id number]     • Type 1 diabetes mellitus with diabetic polyneuropathy (CMS/[facility])    • Mixed diabetic hyperlipidemia associated with type 1 diabetes mellitus (CMS/[facility])    • Hypertension associated with diabetes (CMS/[facility])    • Other specified rheumatoid arthritis, multiple sites (CMS/[facility])    • CAD (coronary artery disease)        Plan:    1.  Urinary tract infection: E. coli.  Continue antibiotics based on sensitivities.  2.  E. coli bacteremia: Continue antibiotics based on sensitivities.  3.  Sepsis with encephalopathy: Secondary to E. coli bacteremia and urinary tract infection.  Improving.  Encephalopathy improving.  Continue supportive care.  4.  Diabetes mellitus: Continue current treatment.  5.  Ureteral stone with hydronephrosis: Urology following.  6.  Hypertension: Continue current treatment.  7.  Coronary artery disease: Continue current treatment.  8.  Deconditioning: PT/OT.  Encouraged patient to participate.  9.  DVT prophylaxis: Lovenox.    The patient was evaluated during the global COVID-19 pandemic, and the diagnosis was suspected/considered upon their initial presentation.  Evaluation, treatment, and testing were consistent with current guidelines for patients who present with complaints or symptoms that may be related to COVID-19.    Discharge Planning: I expect patient to be discharged to skilled facility in 1-2 days.        This document has been electronically signed by Sukhjinder Trivedi MD on March 17, 2021 16:32 CDT

## 2021-03-17 NOTE — PLAN OF CARE
Problem: Adult Inpatient Plan of Care  Goal: Plan of Care Review  Outcome: Ongoing, Progressing  Flowsheets  Taken 3/17/2021 0338 by Cassie Buckley, RN  Progress: no change  Outcome Summary: VSS, patient disoriented to place time and situation, COVID negative, excoriation on bottom covered with nystatin and polymem. blood sugar at beginning of shiftin 500s one time dose of 10 units of novolog ordered adnd given.  Taken 3/16/2021 1313 by Simón Duque PTA  Plan of Care Reviewed With: patient   Goal Outcome Evaluation:     Progress: no change  Outcome Summary: VSS, patient disoriented to place time and situation, COVID negative, excoriation on bottom covered with nystatin and polymem. blood sugar at beginning of shiftin 500s one time dose of 10 units of novolog ordered adnd given.

## 2021-03-17 NOTE — PLAN OF CARE
Problem: Adult Inpatient Plan of Care  Goal: Plan of Care Review  Flowsheets  Taken 3/17/2021 1015  Progress: no change  Plan of Care Reviewed With: patient  Taken 3/17/2021 0927  Progress: no change  Plan of Care Reviewed With: patient  Outcome Summary: Pt is Mod A for UB bathing and dressing. Pt is dependent for LB bathing and dressing. Pt with increased movement of LEs, however pt does not want to atempt to wash them. Pt states she has a caregiver at home that assists with bathing. No new goals met this tx.   Goal Outcome Evaluation:  Plan of Care Reviewed With: patient  Progress: no change  Outcome Summary: Pt is Mod A for UB bathing and dressing. Pt is dependent for LB bathing and dressing. Pt with increased movement of LEs, however pt does not want to atempt to wash them. Pt states she has a caregiver at home that assists with bathing. No new goals met this tx.

## 2021-03-17 NOTE — THERAPY TREATMENT NOTE
Patient Name: Janelle Millard  : 1955    MRN: [id number]                              Today's Date: 3/17/2021       Admit Date: 3/6/2021    Visit Dx:     ICD-10-CM ICD-9-CM   1. Sepsis with encephalopathy without septic shock, due to unspecified organism (CMS/[facility])  A41.9 038.9    R65.20 995.91    G93.40 348.30   2. NAGI (acute kidney injury) (CMS/[facility])  N17.9 584.9   3. Ureteral stone with hydronephrosis  N13.2 592.1     591   4. Impaired functional mobility, balance, gait, and endurance  Z74.09 V49.89   5. Impaired mobility and activities of daily living  Z74.09 V49.89    Z78.9      Patient Active Problem List   Diagnosis   • Carpal tunnel syndrome of left wrist   • Type 1 diabetes mellitus with diabetic polyneuropathy (CMS/[facility])   • Mixed diabetic hyperlipidemia associated with type 1 diabetes mellitus (CMS/[facility])   • Hypertension associated with diabetes (CMS/[facility])   • Syncope and collapse   • CAD (coronary artery disease)   • Asthma   • Depressive disorder, not elsewhere classified   • Neuropathy   • Other specified rheumatoid arthritis, multiple sites (CMS/[facility])   • Carpal tunnel syndrome on both sides   • Bilateral carotid artery stenosis   • Overweight (BMI 25.0-29.9)   • Precordial pain   • Sepsis with encephalopathy without septic shock (CMS/[facility])   • Tobacco abuse   • UTI (urinary tract infection)   • Ureteral stone with hydronephrosis     Past Medical History:   Diagnosis Date   • Arthritis, rheumatoid (CMS/[facility])    • Arthropathy associated with neurological disorder    • Arthropathy of lumbar facet joint    • Asthma    • Benign hypertension    • Carpal tunnel syndrome    • Diabetes (CMS/[facility])    • Diabetic polyneuropathy (CMS/[facility])    • Dyslipidemia    • Fallen arches    • Hammer toe    • Heart disease    • Joint pain    • Mild depression (CMS/[facility])     Saddness post Surgery 7/10/14 improved after counseling.   • Multiple vessel coronary artery disease     post CABG      • Myofascial pain    • Neurologic  disorder associated with diabetes mellitus (CMS/HCC)     Neuropathy of chest      • Neuropathy    • Onychomycosis    • Peripheral vascular disease (CMS/HCC)    • Retinopathy    • Tobacco user    • Type 1 diabetes mellitus (CMS/HCC)      Past Surgical History:   Procedure Laterality Date   • CARDIAC SURGERY  02/21/2014    Critical stenosis in the RCA. Diffusely calcified LAD with 30-80% stenosis. OMB #3 with 60% to 70% stenosis. Preserved LV systolic function with EF of 55%.   • CARPAL TUNNEL RELEASE Right 10/15/2014    Right carpal tunnel release.   • CARPAL TUNNEL RELEASE     • CATARACT EXTRACTION, BILATERAL Bilateral    • CORONARY ARTERY BYPASS GRAFT  02/24/2014    CABG x 3 with LIMA to LAD, endarterectomy to LAD, SVG to OMB and SVG to distal RCA with endarterectomy to distal RCA. Endo-vein harvesting.   • CYSTECTOMY Left     Breast cycst   • EXCISION LESION      Remove breast lesion - cyst   • EYE SURGERY      Insert lens prosthesis   • FOOT SURGERY  10/18/2011    Exostectomy of the right foot. Preulcerative lesion with Charot deformity, right foot   • LUMBAR SPINE SURGERY  11/09/2015    Lumbosacral radiofrequency thermal coagulation.   • NERVE BLOCK  09/14/2015    Lumbar medial branch block.   • TOE NAIL AVULSION  09/03/2015    DEBRIDE NAIL 6 OR MORE 23547 (1)     General Information     Row Name 03/17/21 0927          OT Time and Intention    Document Type  therapy note (daily note)  -BB     Mode of Treatment  individual therapy;occupational therapy  -BB     Row Name 03/17/21 0927          General Information    Patient Profile Reviewed  yes  -BB     Existing Precautions/Restrictions  fall  -BB     Row Name 03/17/21 0927          Cognition    Orientation Status (Cognition)  oriented to;person;place;time  -BB     Row Name 03/17/21 0927          Safety Issues, Functional Mobility    Impairments Affecting Function (Mobility)  balance;cognition;coordination;endurance/activity tolerance;strength;postural/trunk  control  -       User Key  (r) = Recorded By, (t) = Taken By, (c) = Cosigned By    Initials Name Provider Type    BB Zoey Carolina COTA/L Occupational Therapy Assistant          Mobility/ADL's     Row Name 03/17/21 0927          Bed Mobility    Bed Mobility  supine-sit;sit-supine;rolling right  -BB     Rolling Left [place] (Bed Mobility)  maximum assist (25% patient effort)  -BB     Rolling Right [place] (Bed Mobility)  maximum assist (25% patient effort)  -BB     Bed Mobility, Safety Issues  cognitive deficits limit understanding;decreased use of arms for pushing/pulling;decreased use of legs for bridging/pushing  -BB     Assistive Device (Bed Mobility)  bed rails;draw sheet;head of bed elevated  -BB     Comment (Bed Mobility)  Pt states she will try to do more this afternoon.  -     Row Name 03/17/21 0927          Bathing Assessment/Intervention    [place] Level (Bathing)  upper body;moderate assist (50% patient effort);lower body;dependent (less than 25% patient effort);verbal cues;nonverbal cues (demo/gesture)  -BB     Position (Bathing)  sitting up in bed  -     Row Name 03/17/21 0927          Upper Body Dressing Assessment/Training    [place] Level (Upper Body Dressing)  doff;don;verbal cues;moderate assist (50% patient effort);nonverbal cues (demo/gesture) hospital gown  -     Row Name 03/17/21 0927          Lower Body Dressing Assessment/Training    [place] Level (Lower Body Dressing)  doff;don;socks;dependent (less than 25% patient effort)  -     Position (Lower Body Dressing)  sitting up in bed  -     Row Name 03/17/21 0927          Grooming Assessment/Training    [place] Level (Grooming)  verbal cues;wash face, hands;contact guard assist;oral care regimen;hair care, combing/brushing;moderate assist (50% patient effort)  -     Position (Grooming)  sitting up in bed  -     Row Name 03/17/21 0927          Toileting Assessment/Training    [place] Level  (Toileting)  dependent (less than 25% patient effort)  -BB     Position (Toileting)  supine  -BB     Row Name 03/17/21 0927          Self-Feeding Assessment/Training    [place] Level (Feeding)  -- Pt with Mod spills  -BB     Position (Self-Feeding)  sitting up in bed  -BB       User Key  (r) = Recorded By, (t) = Taken By, (c) = Cosigned By    Initials Name Provider Type    Zoey Stover COTA/L Occupational Therapy Assistant        Obj/Interventions     Row Name 03/17/21 0927          Range of Motion Comprehensive    General Range of Motion  upper extremity range of motion deficits identified;lower extremity range of motion deficits identified  -BB     Row Name 03/17/21 0927          Strength Comprehensive (MMT)    General Manual Muscle Testing (MMT) Assessment  upper extremity strength deficits identified  -BB       User Key  (r) = Recorded By, (t) = Taken By, (c) = Cosigned By    Initials Name Provider Type    Zoey Stover COTA/L Occupational Therapy Assistant        Goals/Plan     Row Name 03/17/21 0927          Transfer Goal 1 (OT)    Activity/Assistive Device (Transfer Goal 1, OT)  transfers, all  -BB     [place] Level/Cues Needed (Transfer Goal 1, OT)  contact guard assist  -BB     Time Frame (Transfer Goal 1, OT)  long term goal (LTG)  -BB     Progress/Outcome (Transfer Goal 1, OT)  goal not met  -BB     Row Name 03/17/21 0927          Bathing Goal 1 (OT)    Activity/Device (Bathing Goal 1, OT)  upper body bathing  -BB     [place] Level/Cues Needed (Bathing Goal 1, OT)  supervision required  -BB     Time Frame (Bathing Goal 1, OT)  long term goal (LTG)  -BB     Progress/Outcomes (Bathing Goal 1, OT)  goal not met  -BB     Row Name 03/17/21 0927          Dressing Goal 1 (OT)    Activity/Device (Dressing Goal 1, OT)  dressing skills, all  -BB     [place]/Cues Needed (Dressing Goal 1, OT)  contact guard assist  -BB     Time Frame (Dressing Goal 1, OT)  long term goal (LTG)   -BB     Progress/Outcome (Dressing Goal 1, OT)  goal not met  -BB     Row Name 03/17/21 0927          Toileting Goal 1 (OT)    Activity/Device (Toileting Goal 1, OT)  toileting skills, all  -BB     [place] Level/Cues Needed (Toileting Goal 1, OT)  supervision required  -BB     Time Frame (Toileting Goal 1, OT)  long term goal (LTG)  -BB     Progress/Outcome (Toileting Goal 1, OT)  goal not met  -BB       User Key  (r) = Recorded By, (t) = Taken By, (c) = Cosigned By    Initials Name Provider Type    BB Zoey Carolina COTA/L Occupational Therapy Assistant        Clinical Impression     Row Name 03/17/21 0927          Pain Scale: Numbers Pre/Post-Treatment    Pretreatment Pain Rating  7/10  -BB     Posttreatment Pain Rating  6/10  -BB     Pain Location  abdomen  -BB     Pain Intervention(s)  Repositioned  -BB     Row Name 03/17/21 0927          Plan of Care Review    Plan of Care Reviewed With  patient  -BB     Progress  no change  -BB     Outcome Summary  Pt is Mod A for UB bathing and dressing. Pt is dependent for LB bathing and dressing. Pt with increased movement of LEs, however pt does not want to atempt to wash them. Pt states she has a caregiver at home that assists with bathing. No new goals met this tx.  -BB     Row Name 03/17/21 0927          Therapy Assessment/Plan (OT)    Rehab Potential (OT)  fair, will monitor progress closely  -BB     Criteria for Skilled Therapeutic Interventions Met (OT)  yes;meets criteria  -BB     Therapy Frequency (OT)  other (see comments) 5-7 days/wk  -BB     Row Name 03/17/21 0927          Therapy Plan Review/Discharge Plan (OT)    Anticipated Discharge Disposition (OT)  home with assist;home with 24/7 care;skilled nursing facility  -BB     Row Name 03/17/21 0927          Vital Signs    Posttreatment Heart Rate (beats/min)  83  -BB     Pre SpO2 (%)  97  -BB     O2 Delivery Pre Treatment  room air  -BB     Pre Patient Position  Supine  -BB     Row Name 03/17/21 0927           Positioning and Restraints    Pre-Treatment Position  in bed  -BB     Post Treatment Position  bed  -BB     In Bed  fowlers;call light within reach;encouraged to call for assist;exit alarm on  -BB       User Key  (r) = Recorded By, (t) = Taken By, (c) = Cosigned By    Initials Name Provider Type    Zoey Stover COTA/L Occupational Therapy Assistant        Outcome Measures     Row Name 03/17/21 0927          How much help from another is currently needed...    Putting on and taking off regular lower body clothing?  1  -BB     Bathing (including washing, rinsing, and drying)  1  -BB     Toileting (which includes using toilet bed pan or urinal)  1  -BB     Putting on and taking off regular upper body clothing  2  -BB     Taking care of personal grooming (such as brushing teeth)  2  -BB     Eating meals  3  -BB     AM-PAC 6 Clicks Score (OT)  10  -BB       User Key  (r) = Recorded By, (t) = Taken By, (c) = Cosigned By    Initials Name Provider Type    Zoey Stover COTA/L Occupational Therapy Assistant        Occupational Therapy Education                 Title: PT OT SLP Therapies (In Progress)     Topic: Occupational Therapy (In Progress)     Point: ADL training (In Progress)     Description:   Instruct learner(s) on proper safety adaptation and remediation techniques during self care or transfers.   Instruct in proper use of assistive devices.              Learning Progress Summary           Patient Acceptance, E, NR by BB at 3/17/2021 1015    Acceptance, E, NR by BB at 3/16/2021 0902    Acceptance, E, VU,DU by MARGARITO at 3/14/2021 1349    Comment: Pt and daughter edu on OT and POC. Pt and daughter educated on use of plate guard, built up handles, and two handled cup.   Family Acceptance, E, VU,DU by MARGARITO at 3/14/2021 1349    Comment: Pt and daughter edu on OT and POC. Pt and daughter educated on use of plate guard, built up handles, and two handled cup.                   Point: Home exercise  program (Not Started)     Description:   Instruct learner(s) on appropriate technique for monitoring, assisting and/or progressing therapeutic exercises/activities.              Learner Progress:  Not documented in this visit.          Point: Precautions (Done)     Description:   Instruct learner(s) on prescribed precautions during self-care and functional transfers.              Learning Progress Summary           Patient Acceptance, E,TB, VU,NR,NL by SB at 3/17/2021 0341    Acceptance, E, VU,NR by RB at 3/12/2021 1526    Comment: Edu pt on use of gait belt and non skid socks when OOB and no OOB without assist.                   Point: Body mechanics (In Progress)     Description:   Instruct learner(s) on proper positioning and spine alignment during self-care, functional mobility activities and/or exercises.              Learning Progress Summary           Patient Acceptance, E, NR by BB at 3/17/2021 1015    Acceptance, E, NR by BB at 3/16/2021 0902                               User Key     Initials Effective Dates Name Provider Type Discipline    RB 07/24/19 -  Baldomero Kwon OT Occupational Therapist OT    BB 03/07/18 -  Zoey Carolina COTA/L Occupational Therapy Assistant OT    MARGARITO 11/05/19 -  Marti Vo OTA Occupational Therapy Assistant OT    SB 08/24/20 -  Cassie Buckley, RN Registered Nurse Nurse              OT Recommendation and Plan  Therapy Frequency (OT): other (see comments) (5-7 days/wk)  Plan of Care Review  Plan of Care Reviewed With: patient  Progress: no change  Outcome Summary: Pt is Mod A for UB bathing and dressing. Pt is dependent for LB bathing and dressing. Pt with increased movement of LEs, however pt does not want to atempt to wash them. Pt states she has a caregiver at home that assists with bathing. No new goals met this tx.     Time Calculation:   Time Calculation- OT     Row Name 03/17/21 1254             Time Calculation- OT    OT Start Time  0927  -BB      OT Stop Time   1022  -      OT Time Calculation (min)  55 min  -NEW      Total Timed Code Minutes- OT  55 minute(s)  -BB      OT Received On  03/17/21  -NEW        User Key  (r) = Recorded By, (t) = Taken By, (c) = Cosigned By    Initials Name Provider Type    Zoey Stover COTA/L Occupational Therapy Assistant        Therapy Charges for Today     Code Description Service Date Service Provider Modifiers Qty    10616818417 HC OT SELF CARE/MGMT/TRAIN EA 15 MIN 3/16/2021 Zoey Carolina COTA/L GO 6    03231875049 HC OT SELF CARE/MGMT/TRAIN EA 15 MIN 3/17/2021 Zoey Carolina COTA/L GO 4               CORINNE Maciel  3/17/2021

## 2021-03-18 NOTE — PLAN OF CARE
Problem: Adult Inpatient Plan of Care  Goal: Plan of Care Review  Outcome: Ongoing, Progressing  Flowsheets  Taken 3/18/2021 0348 by Cassie Buckley, RN  Progress: no change  Outcome Summary: VSS, no complaints of pain, resting comfortably in bed, ready for DC to SNF, will continue to monitor  Taken 3/17/2021 1015 by Zoey Carolina COTA/L  Plan of Care Reviewed With: patient   Goal Outcome Evaluation:     Progress: no change  Outcome Summary: VSS, no complaints of pain, resting comfortably in bed, ready for DC to SNF, will continue to monitor

## 2021-03-18 NOTE — PROGRESS NOTES
[facility] Medicine Services  INPATIENT PROGRESS NOTE    Length of Stay: 12  Date of Admission: 3/6/2021  Primary Care Physician: Arelis Laird MD    Subjective   Chief Complaint: Urinary tract infection and sepsis  HPI: Patient states that she feels poorly today.  As of breath is improved.  She has some nausea but no vomiting at this point.    Review of Systems   Constitutional: Positive for fatigue. Negative for appetite change, chills, fever and unexpected weight change.   Respiratory: Negative for cough, choking, chest tightness, shortness of breath and wheezing.    Cardiovascular: Negative for chest pain, palpitations and leg swelling.   Gastrointestinal: Positive for nausea. Negative for abdominal pain, blood in stool, constipation, diarrhea and vomiting.   Genitourinary: Negative for dysuria, flank pain and hematuria.   Neurological: Negative for dizziness, seizures, syncope, speech difficulty, weakness, light-headedness, numbness and headaches.   Hematological: Does not bruise/bleed easily.   Psychiatric/Behavioral: Positive for confusion.        All pertinent negatives and positives are as above. All other systems have been reviewed and are negative unless otherwise stated.     Objective    Temp:  [96.7 °F (35.9 °C)-98.3 °F (36.8 °C)] 96.7 °F (35.9 °C)  Heart Rate:  [76-88] 85  Resp:  [16-18] 16  BP: (124-178)/(58-81) 124/58    Physical Exam  Vitals reviewed.   Constitutional:       Appearance: She is well-developed.   HENT:      Head: Normocephalic and atraumatic.   Eyes:      Pupils: Pupils are equal, round, and reactive to light.   Cardiovascular:      Rate and Rhythm: Normal rate and regular rhythm.      Heart sounds: Normal heart sounds. No murmur heard.   No friction rub. No gallop.    Pulmonary:      Effort: Pulmonary effort is normal. No respiratory distress.      Breath sounds: Normal breath sounds. No wheezing or rales.   Chest:      Chest wall:  No tenderness.   Abdominal:      General: Bowel sounds are normal. There is no distension.      Palpations: Abdomen is soft.      Tenderness: There is no abdominal tenderness. There is no guarding.   Musculoskeletal:      Cervical back: Normal range of motion and neck supple.   Skin:     General: Skin is warm and dry.   Psychiatric:         Behavior: Behavior normal.         Thought Content: Thought content normal.       Results Review:  I have reviewed the labs, radiology results, and diagnostic studies.    Laboratory Data:   Results from last 7 days   Lab Units 03/16/21  1908 03/16/21  1742 03/15/21  2120 03/15/21  0538 03/12/21  0546   SODIUM mmol/L  --   --   --  136 138   POTASSIUM mmol/L  --   --  4.0 3.3* 3.9   CHLORIDE mmol/L  --   --   --  96* 109*   CO2 mmol/L  --   --   --  32.0* 23.0   BUN mg/dL  --   --   --  5* 8   CREATININE mg/dL  --   --   --  0.40* 0.45*   GLUCOSE mg/dL 535* 484*  --  313* 298*   CALCIUM mg/dL  --   --   --  9.0 9.0   ANION GAP mmol/L  --   --   --  8.0 6.0     Estimated Creatinine Clearance: 81.3 mL/min (A) (by C-G formula based on SCr of 0.4 mg/dL (L)).  Results from last 7 days   Lab Units 03/15/21  0538 03/14/21  0635 03/13/21  0632 03/12/21  0546   MAGNESIUM mg/dL 1.7 1.8 1.5* 1.6   PHOSPHORUS mg/dL  --   --   --  2.0*         Results from last 7 days   Lab Units 03/15/21  0538 03/12/21  0546   WBC 10*3/mm3 7.46 11.11*   HEMOGLOBIN g/dL 11.5* 10.2*   HEMATOCRIT % 33.4* 30.4*   PLATELETS 10*3/mm3 384 237           Culture Data:   No results found for: BLOODCX  No results found for: URINECX  No results found for: RESPCX  No results found for: WOUNDCX  No results found for: STOOLCX  No components found for: BODYFLD    Radiology Data:   Imaging Results (Last 24 Hours)     ** No results found for the last 24 hours. **          I have reviewed the patient's current medications.     Assessment/Plan     Active Hospital Problems    Diagnosis    • **Sepsis with encephalopathy without  septic shock (CMS/[facility])    • Tobacco abuse    • UTI (urinary tract infection)    • Ureteral stone with hydronephrosis      Added automatically from request for surgery [id number]     • Type 1 diabetes mellitus with diabetic polyneuropathy (CMS/[facility])    • Mixed diabetic hyperlipidemia associated with type 1 diabetes mellitus (CMS/[facility])    • Hypertension associated with diabetes (CMS/[facility])    • Other specified rheumatoid arthritis, multiple sites (CMS/[facility])    • CAD (coronary artery disease)        Plan:    1.  Urinary tract infection: E. coli.  Continue antibiotics based on sensitivities.  2.  E. coli bacteremia: Continue antibiotics based on sensitivities.  3.  Sepsis with encephalopathy: Secondary to E. coli bacteremia and urinary tract infection.  Improving.  Encephalopathy improving.  Continue supportive care.  4.  Diabetes mellitus: Continue current treatment.  5.  Ureteral stone with hydronephrosis: Urology following.  6.  Hypertension: Continue current treatment.  7.  Coronary artery disease: Continue current treatment.  8.  Deconditioning: PT/OT.  Encouraged patient to participate.  9.  DVT prophylaxis: Lovenox.    The patient was evaluated during the global COVID-19 pandemic, and the diagnosis was suspected/considered upon their initial presentation.  Evaluation, treatment, and testing were consistent with current guidelines for patients who present with complaints or symptoms that may be related to COVID-19.    Discharge Planning: I expect patient to be discharged to skilled facility in 1-2 days.        This document has been electronically signed by Sukhjinder Trivedi MD on March 18, 2021 12:13 CDT

## 2021-03-18 NOTE — PLAN OF CARE
Problem: Adult Inpatient Plan of Care  Goal: Plan of Care Review  Flowsheets  Taken 3/18/2021 1409  Progress: no change  Plan of Care Reviewed With: patient  Taken 3/18/2021 1015  Progress: no change  Plan of Care Reviewed With: patient  Outcome Summary: Pt is Mod A for UB bathing and dressing. Pt defers LB ADL at this time. Continue OT POC   Goal Outcome Evaluation:  Plan of Care Reviewed With: patient  Progress: no change  Outcome Summary: Pt is Mod A for UB bathing and dressing. Pt is dependent for LB bathing and dressing. Pt with increased movement of LEs, however pt does not want to atempt to wash them. Pt states she has a caregiver at home that assists with bathing. No new goals met this tx.

## 2021-03-18 NOTE — THERAPY TREATMENT NOTE
Patient Name: Janelle Millard  : 1955    MRN: [id number]                              Today's Date: 3/18/2021       Admit Date: 3/6/2021    Visit Dx:     ICD-10-CM ICD-9-CM   1. Sepsis with encephalopathy without septic shock, due to unspecified organism (CMS/[facility])  A41.9 038.9    R65.20 995.91    G93.40 348.30   2. NAGI (acute kidney injury) (CMS/[facility])  N17.9 584.9   3. Ureteral stone with hydronephrosis  N13.2 592.1     591   4. Impaired functional mobility, balance, gait, and endurance  Z74.09 V49.89   5. Impaired mobility and activities of daily living  Z74.09 V49.89    Z78.9      Patient Active Problem List   Diagnosis   • Carpal tunnel syndrome of left wrist   • Type 1 diabetes mellitus with diabetic polyneuropathy (CMS/[facility])   • Mixed diabetic hyperlipidemia associated with type 1 diabetes mellitus (CMS/[facility])   • Hypertension associated with diabetes (CMS/[facility])   • Syncope and collapse   • CAD (coronary artery disease)   • Asthma   • Depressive disorder, not elsewhere classified   • Neuropathy   • Other specified rheumatoid arthritis, multiple sites (CMS/[facility])   • Carpal tunnel syndrome on both sides   • Bilateral carotid artery stenosis   • Overweight (BMI 25.0-29.9)   • Precordial pain   • Sepsis with encephalopathy without septic shock (CMS/[facility])   • Tobacco abuse   • UTI (urinary tract infection)   • Ureteral stone with hydronephrosis     Past Medical History:   Diagnosis Date   • Arthritis, rheumatoid (CMS/[facility])    • Arthropathy associated with neurological disorder    • Arthropathy of lumbar facet joint    • Asthma    • Benign hypertension    • Carpal tunnel syndrome    • Diabetes (CMS/[facility])    • Diabetic polyneuropathy (CMS/[facility])    • Dyslipidemia    • Fallen arches    • Hammer toe    • Heart disease    • Joint pain    • Mild depression (CMS/[facility])     Saddness post Surgery 7/10/14 improved after counseling.   • Multiple vessel coronary artery disease     post CABG      • Myofascial pain    • Neurologic  disorder associated with diabetes mellitus (CMS/HCC)     Neuropathy of chest      • Neuropathy    • Onychomycosis    • Peripheral vascular disease (CMS/HCC)    • Retinopathy    • Tobacco user    • Type 1 diabetes mellitus (CMS/HCC)      Past Surgical History:   Procedure Laterality Date   • CARDIAC SURGERY  02/21/2014    Critical stenosis in the RCA. Diffusely calcified LAD with 30-80% stenosis. OMB #3 with 60% to 70% stenosis. Preserved LV systolic function with EF of 55%.   • CARPAL TUNNEL RELEASE Right 10/15/2014    Right carpal tunnel release.   • CARPAL TUNNEL RELEASE     • CATARACT EXTRACTION, BILATERAL Bilateral    • CORONARY ARTERY BYPASS GRAFT  02/24/2014    CABG x 3 with LIMA to LAD, endarterectomy to LAD, SVG to OMB and SVG to distal RCA with endarterectomy to distal RCA. Endo-vein harvesting.   • CYSTECTOMY Left     Breast cycst   • EXCISION LESION      Remove breast lesion - cyst   • EYE SURGERY      Insert lens prosthesis   • FOOT SURGERY  10/18/2011    Exostectomy of the right foot. Preulcerative lesion with Charot deformity, right foot   • LUMBAR SPINE SURGERY  11/09/2015    Lumbosacral radiofrequency thermal coagulation.   • NERVE BLOCK  09/14/2015    Lumbar medial branch block.   • TOE NAIL AVULSION  09/03/2015    DEBRIDE NAIL 6 OR MORE 24774 (1)     General Information     Row Name 03/18/21 1015          OT Time and Intention    Document Type  therapy note (daily note)  -BB     Mode of Treatment  individual therapy;occupational therapy  -BB     Row Name 03/18/21 1015          General Information    Patient Profile Reviewed  yes  -BB     Existing Precautions/Restrictions  fall  -BB     Row Name 03/18/21 1015          Cognition    Orientation Status (Cognition)  oriented to;person;place;time  -BB     Row Name 03/18/21 1015          Safety Issues, Functional Mobility    Impairments Affecting Function (Mobility)  balance;cognition;coordination;endurance/activity tolerance;strength;postural/trunk  control  -       User Key  (r) = Recorded By, (t) = Taken By, (c) = Cosigned By    Initials Name Provider Type    BB Zoey Carolina COTA/L Occupational Therapy Assistant          Mobility/ADL's     Row Name 03/18/21 1015          Bed Mobility    Bed Mobility  supine-sit;sit-supine;rolling right  -     Supine-Sit [place] (Bed Mobility)  moderate assist (50% patient effort)  -     Sit-Supine [place] (Bed Mobility)  moderate assist (50% patient effort)  -     Bed Mobility, Safety Issues  cognitive deficits limit understanding;decreased use of arms for pushing/pulling;decreased use of legs for bridging/pushing  -     Assistive Device (Bed Mobility)  bed rails;draw sheet;head of bed elevated  -     Row Name 03/18/21 1015          Transfers    Sit-Stand [place] (Transfers)  minimum assist (75% patient effort);2 person assist  -     Row Name 03/18/21 1015          Sit-Stand Transfer    Assistive Device (Sit-Stand Transfers)  walker, front-wheeled  -     Row Name 03/18/21 1015          Functional Mobility    Functional Mobility- Ind. Level  maximum assist (25% patient effort)  -     Functional Mobility- Device  rolling walker  -     Row Name 03/18/21 1015          Bathing Assessment/Intervention    [place] Level (Bathing)  upper body;moderate assist (50% patient effort);verbal cues  -     Position (Bathing)  --  -     Row Name 03/18/21 1015          Upper Body Dressing Assessment/Training    [place] Level (Upper Body Dressing)  doff;don;verbal cues;moderate assist (50% patient effort);nonverbal cues (demo/gesture) hospital gown  -     Row Name 03/18/21 1015          Lower Body Dressing Assessment/Training    [place] Level (Lower Body Dressing)  --  -BB     Position (Lower Body Dressing)  --  -BB     Row Name 03/18/21 1015          Grooming Assessment/Training    [place] Level (Grooming)  verbal cues;wash face, hands;contact guard assist;oral care  regimen;hair care, combing/brushing;moderate assist (50% patient effort)  -BB     Position (Grooming)  sitting up in bed  -BB     [facility] Name 03/18/21 1015          Toileting Assessment/Training    [place] Level (Toileting)  dependent (less than 25% patient effort)  -BB     Position (Toileting)  supine  -BB     Row Name 03/18/21 1015          Self-Feeding Assessment/Training    [place] Level (Feeding)  -- Pt with Mod spills  -BB     Position (Self-Feeding)  sitting up in bed  -BB       User Key  (r) = Recorded By, (t) = Taken By, (c) = Cosigned By    Initials Name Provider Type    Zoey Stover COTA/L Occupational Therapy Assistant        Obj/Interventions     [facility] Name 03/18/21 1015          Range of Motion Comprehensive    General Range of Motion  upper extremity range of motion deficits identified;lower extremity range of motion deficits identified  -BB     Row Name 03/18/21 1015          Strength Comprehensive (MMT)    General Manual Muscle Testing (MMT) Assessment  upper extremity strength deficits identified  -BB       User Key  (r) = Recorded By, (t) = Taken By, (c) = Cosigned By    Initials Name Provider Type    Zoey Stover COTA/L Occupational Therapy Assistant        Goals/Plan     Row Name 03/18/21 1015          Transfer Goal 1 (OT)    Activity/Assistive Device (Transfer Goal 1, OT)  transfers, all  -BB     [place] Level/Cues Needed (Transfer Goal 1, OT)  contact guard assist  -BB     Time Frame (Transfer Goal 1, OT)  long term goal (LTG)  -BB     Progress/Outcome (Transfer Goal 1, OT)  goal not met  -BB     Row Name 03/18/21 1015          Bathing Goal 1 (OT)    Activity/Device (Bathing Goal 1, OT)  upper body bathing  -BB     [place] Level/Cues Needed (Bathing Goal 1, OT)  supervision required  -BB     Time Frame (Bathing Goal 1, OT)  long term goal (LTG)  -BB     Progress/Outcomes (Bathing Goal 1, OT)  goal not met  -BB     Row Name 03/18/21 1015          Dressing  Goal 1 (OT)    Activity/Device (Dressing Goal 1, OT)  dressing skills, all  -BB     [place]/Cues Needed (Dressing Goal 1, OT)  contact guard assist  -BB     Time Frame (Dressing Goal 1, OT)  long term goal (LTG)  -BB     Progress/Outcome (Dressing Goal 1, OT)  goal not met  -BB     Row Name 03/18/21 1015          Toileting Goal 1 (OT)    Activity/Device (Toileting Goal 1, OT)  toileting skills, all  -BB     [place] Level/Cues Needed (Toileting Goal 1, OT)  supervision required  -BB     Time Frame (Toileting Goal 1, OT)  long term goal (LTG)  -BB     Progress/Outcome (Toileting Goal 1, OT)  goal not met  -BB       User Key  (r) = Recorded By, (t) = Taken By, (c) = Cosigned By    Initials Name Provider Type    BB Zoey Carolina COTA/L Occupational Therapy Assistant        Clinical Impression     Row Name 03/18/21 1015          Pain Scale: Numbers Pre/Post-Treatment    Pretreatment Pain Rating  6/10  -BB     Posttreatment Pain Rating  6/10  -BB     Pain Location  abdomen  -BB     Pain Intervention(s)  Medication (See MAR)  -BB     Row Name 03/18/21 1015          Plan of Care Review    Plan of Care Reviewed With  patient  -BB     Progress  no change  -BB     Outcome Summary  Pt is Mod A for UB bathing and dressing. Pt defers LB ADL at this time. Continue OT POC  -BB     Row Name 03/18/21 1015          Therapy Assessment/Plan (OT)    Rehab Potential (OT)  fair, will monitor progress closely  -BB     Criteria for Skilled Therapeutic Interventions Met (OT)  yes;meets criteria  -BB     Therapy Frequency (OT)  other (see comments) 5-7 days/wk  -BB     Row Name 03/18/21 1015          Therapy Plan Review/Discharge Plan (OT)    Anticipated Discharge Disposition (OT)  home with assist;home with 24/7 care;skilled nursing facility  -BB     Row Name 03/18/21 1015          Vital Signs    Posttreatment Heart Rate (beats/min)  78  -BB     Intratreatment Resp Rate (breaths/min)  83  -BB     Pre SpO2 (%)  98  -BB     O2  Delivery Pre Treatment  room air  -BB     Post SpO2 (%)  97  -BB     O2 Delivery Post Treatment  room air  -BB     Pre Patient Position  Supine  -BB     Post Patient Position  Supine  -BB     Row Name 03/18/21 1015          Positioning and Restraints    Pre-Treatment Position  in bed  -BB     Post Treatment Position  bed  -BB     In Bed  fowlers;call light within reach;encouraged to call for assist;exit alarm on  -BB       User Key  (r) = Recorded By, (t) = Taken By, (c) = Cosigned By    Initials Name Provider Type    Zoey Stover COTA/L Occupational Therapy Assistant        Outcome Measures     Row Name 03/18/21 1015          How much help from another is currently needed...    Putting on and taking off regular lower body clothing?  1  -BB     Bathing (including washing, rinsing, and drying)  1  -BB     Toileting (which includes using toilet bed pan or urinal)  1  -BB     Putting on and taking off regular upper body clothing  2  -BB     Taking care of personal grooming (such as brushing teeth)  2  -BB     Eating meals  3  -BB     AM-PAC 6 Clicks Score (OT)  10  -BB       User Key  (r) = Recorded By, (t) = Taken By, (c) = Cosigned By    Initials Name Provider Type    Zoey Stover COTA/L Occupational Therapy Assistant        Occupational Therapy Education                 Title: PT OT SLP Therapies (In Progress)     Topic: Occupational Therapy (In Progress)     Point: ADL training (In Progress)     Description:   Instruct learner(s) on proper safety adaptation and remediation techniques during self care or transfers.   Instruct in proper use of assistive devices.              Learning Progress Summary           Patient Acceptance, E, NR by BB at 3/18/2021 1409    Acceptance, E, NR by BB at 3/17/2021 1015    Acceptance, E, NR by BB at 3/16/2021 0902    Acceptance, E, VU,DU by MARGARITO at 3/14/2021 1349    Comment: Pt and daughter edu on OT and POC. Pt and daughter educated on use of plate guard, built  up handles, and two handled cup.   Family Acceptance, E, VU,DU by MARGARITO at 3/14/2021 1349    Comment: Pt and daughter edu on OT and POC. Pt and daughter educated on use of plate guard, built up handles, and two handled cup.                   Point: Home exercise program (Not Started)     Description:   Instruct learner(s) on appropriate technique for monitoring, assisting and/or progressing therapeutic exercises/activities.              Learner Progress:  Not documented in this visit.          Point: Precautions (Done)     Description:   Instruct learner(s) on prescribed precautions during self-care and functional transfers.              Learning Progress Summary           Patient Acceptance, E,TB, VU,NR,NL by SB at 3/17/2021 0341    Acceptance, E, VU,NR by RB at 3/12/2021 1526    Comment: Edu pt on use of gait belt and non skid socks when OOB and no OOB without assist.                   Point: Body mechanics (In Progress)     Description:   Instruct learner(s) on proper positioning and spine alignment during self-care, functional mobility activities and/or exercises.              Learning Progress Summary           Patient Acceptance, E, NR by BB at 3/17/2021 1015    Acceptance, E, NR by BB at 3/16/2021 0902                               User Key     Initials Effective Dates Name Provider Type Discipline    RB 07/24/19 -  Baldomero Kwon OT Occupational Therapist OT    BB 03/07/18 -  Zoye Carolina COTA/L Occupational Therapy Assistant OT    MARGARITO 11/05/19 -  Marti Vo OTA Occupational Therapy Assistant OT    SB 08/24/20 -  Cassie Buckley, RN Registered Nurse Nurse              OT Recommendation and Plan  Therapy Frequency (OT): other (see comments) (5-7 days/wk)  Plan of Care Review  Plan of Care Reviewed With: patient  Progress: no change  Outcome Summary: Pt is Mod A for UB bathing and dressing. Pt defers LB ADL at this time. Continue OT POC     Time Calculation:   Time Calculation- OT     Row Name  03/18/21 1412             Time Calculation- OT    OT Start Time  1015  -BB      OT Stop Time  1040  -BB      OT Time Calculation (min)  25 min  -      Total Timed Code Minutes- OT  25 minute(s)  -      OT Received On  03/18/21  -        User Key  (r) = Recorded By, (t) = Taken By, (c) = Cosigned By    Initials Name Provider Type     Zoey Carolina COTA/L Occupational Therapy Assistant        Therapy Charges for Today     Code Description Service Date Service Provider Modifiers Qty    79343571421 HC OT SELF CARE/MGMT/TRAIN EA 15 MIN 3/17/2021 Zoey Carolina COTA/L GO 4    36964680142 HC OT SELF CARE/MGMT/TRAIN EA 15 MIN 3/18/2021 Zoey Carolina COTA/L GO 2               CORINNE Maciel  3/18/2021

## 2021-03-18 NOTE — SIGNIFICANT NOTE
"   03/18/21 4185   OTHER   Discipline physical therapy assistant   Rehab Time/Intention   Session Not Performed patient/family declined treatment  (Pt. states, \" I don't want too\" Pt. allowed PTA to assist with scooting up to HOB, but after deferred any further treatment)     "

## 2021-03-19 NOTE — PLAN OF CARE
Goal Outcome Evaluation:  Plan of Care Reviewed With: patient  Progress: no change  Outcome Summary: Pt lacks motivation while working with OT. Pt encouraged twice during OT tx to sit EOB, however she continues to decline. Pt Mod A for UB bathing and dressing. Pt does not want to participate in LB ADL. No goals met this tx.

## 2021-03-19 NOTE — DISCHARGE PLACEMENT REQUEST
"New admission to [facility]  Room 109B  In room 306 at [facility] negative  Betty Caballero RN,[facility] [phone]                Kiley Millard (65 y.o. Female)     Date of Birth Social Security Number Address Home Phone MRN    1955  [address]  [facility] [address] [phone] [id number]    [place] Marital Status          Christianity        Admission Date Admission Type Admitting Provider Attending Provider Department, Room/Bed    3/6/21 Emergency Steven Pompa MD Williams, Kevin L, MD [address], 306/1    Discharge Date Discharge Disposition Discharge Destination         Skilled Nursing Facility (DC - External)              Attending Provider: Sukhjinder Trivedi MD    Allergies: Contrast Dye, Corticosteroids, Codeine, Ibuprofen    Isolation: None   Infection: None   Code Status: No CPR    Ht: 175.3 cm (69\")   Wt: 89.5 kg (197 lb 5 oz)    Admission Cmt: None   Principal Problem: Sepsis with encephalopathy without septic shock (CMS/[facility]) [A41.9,R65.20,G93.40]                 Active Insurance as of 3/6/2021     Primary Coverage     Payor Plan Insurance Group Employer/Plan Group    MEDICARE MEDICARE A & B      Payor Plan Address Payor Plan Phone Number Payor Plan Fax Number Effective Dates    [address] [phone]  11/1/2012 - None Entered    [facility] [address]       Subscriber Name Subscriber Birth Date Member ID       KILEY MILLARD 1955 [id number]           Secondary Coverage     Payor Plan Insurance Group Employer/Plan Group    [employer]ETNA COMMERCIAL AETNA   SUPP [id number]     Payor Plan Address Payor Plan Phone Number Payor Plan Fax Number Effective Dates    [address] [phone]  10/1/2018 - None Entered    [place] [address]       Subscriber Name Subscriber Birth Date Member ID       KILEY MILLARD 1955 [id number]                 Emergency Contacts      (Rel.) Home Phone Work Phone Mobile Phone    HOANG GONZALES " [place] (POA/NIPALMIRA) (Relative) [phone] -- [phone]    Alexey Flowers (Brother) [phone] -- --

## 2021-03-19 NOTE — DISCHARGE SUMMARY
[facility] Medicine Services  DISCHARGE SUMMARY       Date of Admission: 3/6/2021  Date of Discharge:  3/19/2021  Primary Care Physician: Arelis Laird MD    Presenting Problem/History of Present Illness:  NAGI (acute kidney injury) (CMS/[facility]) [N17.9]  Sepsis with encephalopathy without septic shock, due to unspecified organism (CMS/[facility]) [A41.9, R65.20, G93.40]       Final Discharge Diagnoses:  Active Hospital Problems    Diagnosis    • **Sepsis with encephalopathy without septic shock (CMS/[facility])    • Tobacco abuse    • UTI (urinary tract infection)    • Ureteral stone with hydronephrosis      Added automatically from request for surgery [id number]     • Type 1 diabetes mellitus with diabetic polyneuropathy (CMS/[facility])    • Mixed diabetic hyperlipidemia associated with type 1 diabetes mellitus (CMS/[facility])    • Hypertension associated with diabetes (CMS/[facility])    • Other specified rheumatoid arthritis, multiple sites (CMS/[facility])    • CAD (coronary artery disease)        Consults:   Consults     Date and Time Order Name Status Description    3/9/2021  6:10 PM Inpatient Urology Consult      3/6/2021  3:48 PM Hospitalist (on-call MD unless specified)            Procedures Performed: Procedure(s):  , LEFT RETROGRADE PYELOGRAM, STENT INSERTION LEFT URETER                Pertinent Test Results:   Lab Results (most recent)     Procedure Component Value Units Date/Time    POC Glucose Once [[id number]]  (Abnormal) Collected: 03/17/21 1927    Specimen: Blood Updated: 03/19/21 1359     Glucose 529 mg/dL      Comment: RN NotifiedOperator: [id number] ROBERT Larson ID: [id number]       POC Glucose Once [[id number]]  (Abnormal) Collected: 03/17/21 1608    Specimen: Blood Updated: 03/19/21 1359     Glucose 510 mg/dL      Comment: RN NotifiedOperator: [id number] APRIL Conway ID: [id number]       COVID PRE-OP / PRE-PROCEDURE SCREENING ORDER (NO ISOLATION) - Swab, Nasopharynx  [[id number]]  (Normal) Collected: 03/19/21 1000    Specimen: Swab from Nasopharynx Updated: 03/19/21 1038    Narrative:      The following orders were created for panel order COVID PRE-OP / PRE-PROCEDURE SCREENING ORDER (NO ISOLATION) - Swab, Nasopharynx.  Procedure                               Abnormality         Status                     ---------                               -----------         ------                     COVID-19, [facility] IN-HOUS...[[id number]]  Normal              Final result                 Please view results for these tests on the individual orders.    COVID-19, [facility] IN-HOUSE, NP SWAB IN TRANSPORT MEDIA 8-10 HR TAT - Swab, Nasopharynx [[id number]]  (Normal) Collected: 03/19/21 1000    Specimen: Swab from Nasopharynx Updated: 03/19/21 1038     COVID19 Not Detected    Narrative:      Testing performed by Real Time RT-PCR  This test has not been approved by the [facility] but is authorized under the Emergency Use Act (EUA)    Fact sheet for providers: https://www.fda.gov/media/109415/download    Fact sheet for patients: https://www.fda.gov/media/655618/download        CBC & Differential [[id number]]  (Abnormal) Collected: 03/19/21 0947    Specimen: Blood Updated: 03/19/21 1026    Narrative:      The following orders were created for panel order CBC & Differential.  Procedure                               Abnormality         Status                     ---------                               -----------         ------                     Scan Slide[[id number]]                                       Final result               CBC Auto Differential[[id number]]        Abnormal            Final result                 Please view results for these tests on the individual orders.    Scan Slide [[id number]] Collected: 03/19/21 0947    Specimen: Blood Updated: 03/19/21 1026     RBC Morphology Normal     WBC Morphology Normal     Platelet Estimate Adequate    Basic Metabolic Panel [[id number]]  (Abnormal) Collected:  03/19/21 0947    Specimen: Blood Updated: 03/19/21 1008     Glucose 368 mg/dL      BUN 11 mg/dL      Creatinine 0.50 mg/dL      Sodium 135 mmol/L      Potassium 4.5 mmol/L      Chloride 96 mmol/L      CO2 30.0 mmol/L      Calcium 9.8 mg/dL      eGFR Non African Amer 123 mL/min/1.73      BUN/Creatinine Ratio 22.0     Anion Gap 9.0 mmol/L     Narrative:      GFR Normal >60  Chronic Kidney Disease <60  Kidney Failure <15      CBC Auto Differential [[id number]]  (Abnormal) Collected: 03/19/21 0947    Specimen: Blood Updated: 03/19/21 1007     WBC 7.71 10*3/mm3      RBC 4.16 10*6/mm3      Hemoglobin 13.0 g/dL      Hematocrit 38.6 %      MCV 92.8 fL      MCH 31.3 pg      MCHC 33.7 g/dL      RDW 14.4 %      RDW-SD 48.4 fl      MPV 11.5 fL      Platelets 327 10*3/mm3      Neutrophil % 53.4 %      Lymphocyte % 28.0 %      Monocyte % 12.7 %      Eosinophil % 2.6 %      Basophil % 1.7 %      Immature Grans % 1.6 %      Neutrophils, Absolute 4.12 10*3/mm3      Lymphocytes, Absolute 2.16 10*3/mm3      Monocytes, Absolute 0.98 10*3/mm3      Eosinophils, Absolute 0.20 10*3/mm3      Basophils, Absolute 0.13 10*3/mm3      Immature Grans, Absolute 0.12 10*3/mm3      nRBC 0.0 /100 WBC     COVID PRE-OP / PRE-PROCEDURE SCREENING ORDER (NO ISOLATION) - Swab, Nasopharynx [[id number]]  (Normal) Collected: 03/16/21 1848    Specimen: Swab from Nasopharynx Updated: 03/16/21 2057    Narrative:      The following orders were created for panel order COVID PRE-OP / PRE-PROCEDURE SCREENING ORDER (NO ISOLATION) - Swab, Nasopharynx.  Procedure                               Abnormality         Status                     ---------                               -----------         ------                     COVID-19, [facility] IN-HOUS...[[id number]]  Normal              Final result                 Please view results for these tests on the individual orders.    YULIA JAIME IN-HOUSE, NP SWAB IN TRANSPORT MEDIA 8-10 HR TAT - Swab, Nasopharynx [[id number]]   (Normal) Collected: 03/16/21 1848    Specimen: Swab from Nasopharynx Updated: 03/16/21 2057     COVID19 Not Detected    Narrative:      Testing performed by Real Time RT-PCR  This test has not been approved by the [facility] but is authorized under the Emergency Use Act (EUA)    Fact sheet for providers: https://www.fda.gov/media/502023/download    Fact sheet for patients: https://www.fda.gov/media/795758/download        Glucose, Random [[id number]]  (Abnormal) Collected: 03/16/21 1908    Specimen: Blood Updated: 03/16/21 1932     Glucose 535 mg/dL     Glucose, Random [[id number]]  (Abnormal) Collected: 03/16/21 1742    Specimen: Blood Updated: 03/16/21 1804     Glucose 484 mg/dL     Potassium [[id number]]  (Normal) Collected: 03/15/21 2120    Specimen: Blood Updated: 03/15/21 2150     Potassium 4.0 mmol/L     Basic Metabolic Panel [[id number]]  (Abnormal) Collected: 03/15/21 0538    Specimen: Blood Updated: 03/15/21 0654     Glucose 313 mg/dL      BUN 5 mg/dL      Creatinine 0.40 mg/dL      Sodium 136 mmol/L      Potassium 3.3 mmol/L      Comment: Slight hemolysis detected by analyzer. Results may be affected.        Chloride 96 mmol/L      CO2 32.0 mmol/L      Calcium 9.0 mg/dL      eGFR Non African Amer >150 mL/min/1.73      BUN/Creatinine Ratio 12.5     Anion Gap 8.0 mmol/L     Narrative:      GFR Normal >60  Chronic Kidney Disease <60  Kidney Failure <15      Magnesium [[id number]]  (Normal) Collected: 03/15/21 0538    Specimen: Blood Updated: 03/15/21 0649     Magnesium 1.7 mg/dL     CBC & Differential [[id number]]  (Abnormal) Collected: 03/15/21 0538    Specimen: Blood Updated: 03/15/21 0625    Narrative:      The following orders were created for panel order CBC & Differential.  Procedure                               Abnormality         Status                     ---------                               -----------         ------                     CBC Auto Differential[[id number]]        Abnormal            Final  result                 Please view results for these tests on the individual orders.    CBC Auto Differential [[id number]]  (Abnormal) Collected: 03/15/21 0538    Specimen: Blood Updated: 03/15/21 0625     WBC 7.46 10*3/mm3      RBC 3.70 10*6/mm3      Hemoglobin 11.5 g/dL      Hematocrit 33.4 %      MCV 90.3 fL      MCH 31.1 pg      MCHC 34.4 g/dL      RDW 13.6 %      RDW-SD 44.1 fl      MPV 10.7 fL      Platelets 384 10*3/mm3      Neutrophil % 62.2 %      Lymphocyte % 22.3 %      Monocyte % 9.7 %      Eosinophil % 3.2 %      Basophil % 0.9 %      Immature Grans % 1.7 %      Neutrophils, Absolute 4.64 10*3/mm3      Lymphocytes, Absolute 1.66 10*3/mm3      Monocytes, Absolute 0.72 10*3/mm3      Eosinophils, Absolute 0.24 10*3/mm3      Basophils, Absolute 0.07 10*3/mm3      Immature Grans, Absolute 0.13 10*3/mm3      nRBC 0.0 /100 WBC     Magnesium [[id number]]  (Normal) Collected: 03/14/21 0635    Specimen: Blood Updated: 03/14/21 0708     Magnesium 1.8 mg/dL     Blood Culture - Blood, Hand, Right [[id number]] Collected: 03/08/21 1040    Specimen: Blood from Hand, Right Updated: 03/13/21 1100     Blood Culture No growth at 5 days    Blood Culture - Blood, Hand, Left [[id number]] Collected: 03/08/21 0958    Specimen: Blood from Hand, Left Updated: 03/13/21 1015     Blood Culture No growth at 5 days    Extra Tubes [[id number]] Collected: 03/13/21 0530    Specimen: Blood, Venous Line Updated: 03/13/21 0630    Narrative:      The following orders were created for panel order Extra Tubes.  Procedure                               Abnormality         Status                     ---------                               -----------         ------                     Lavender Top[[id number]]                                     Final result                 Please view results for these tests on the individual orders.    Lavender Top [[id number]] Collected: 03/13/21 0530    Specimen: Blood Updated: 03/13/21 0630     Extra Tube hold for  add-on     Comment: Auto resulted       Renal Function Panel [[id number]]  (Abnormal) Collected: 03/12/21 0546    Specimen: Blood Updated: 03/12/21 0632     Glucose 298 mg/dL      BUN 8 mg/dL      Creatinine 0.45 mg/dL      Sodium 138 mmol/L      Potassium 3.9 mmol/L      Chloride 109 mmol/L      CO2 23.0 mmol/L      Calcium 9.0 mg/dL      Albumin 2.60 g/dL      Phosphorus 2.0 mg/dL      Anion Gap 6.0 mmol/L      BUN/Creatinine Ratio 17.8     eGFR Non African Amer 139 mL/min/1.73     Narrative:      GFR Normal >60  Chronic Kidney Disease <60  Kidney Failure <15      Potassium [[id number]]  (Normal) Collected: 03/11/21 1659    Specimen: Blood Updated: 03/11/21 1733     Potassium 4.7 mmol/L     Troponin [[id number]]  (Normal) Collected: 03/10/21 0838    Specimen: Blood Updated: 03/10/21 0859     Troponin T <0.010 ng/mL     Narrative:      Troponin T Reference Range:  <= 0.03 ng/mL-   Negative for AMI  >0.03 ng/mL-     Abnormal for myocardial necrosis.  Clinicians would have to utilize clinical acumen, EKG, Troponin and serial changes to determine if it is an Acute Myocardial Infarction or myocardial injury due to an underlying chronic condition.       Results may be falsely decreased if patient taking Biotin.      Extra Tubes [[id number]] Collected: 03/10/21 0642    Specimen: Blood, Venous Line Updated: 03/10/21 0746    Narrative:      The following orders were created for panel order Extra Tubes.  Procedure                               Abnormality         Status                     ---------                               -----------         ------                     Lavender Top[[id number]]                                     Final result                 Please view results for these tests on the individual orders.    Lavender Top [[id number]] Collected: 03/10/21 0642    Specimen: Blood Updated: 03/10/21 0746     Extra Tube hold for add-on     Comment: Auto resulted       Troponin [[id number]]  (Normal) Collected:  03/10/21 0642    Specimen: Blood Updated: 03/10/21 0718     Troponin T <0.010 ng/mL     Narrative:      Troponin T Reference Range:  <= 0.03 ng/mL-   Negative for AMI  >0.03 ng/mL-     Abnormal for myocardial necrosis.  Clinicians would have to utilize clinical acumen, EKG, Troponin and serial changes to determine if it is an Acute Myocardial Infarction or myocardial injury due to an underlying chronic condition.       Results may be falsely decreased if patient taking Biotin.      Comprehensive Metabolic Panel [[id number]]  (Abnormal) Collected: 03/10/21 0332    Specimen: Blood Updated: 03/10/21 0442     Glucose 227 mg/dL      BUN 8 mg/dL      Creatinine 0.60 mg/dL      Sodium 139 mmol/L      Potassium 3.7 mmol/L      Chloride 108 mmol/L      CO2 20.0 mmol/L      Calcium 9.1 mg/dL      Total Protein 6.0 g/dL      Albumin 2.60 g/dL      ALT (SGPT) 11 U/L      AST (SGOT) 16 U/L      Alkaline Phosphatase 114 U/L      Total Bilirubin 0.4 mg/dL      eGFR Non African Amer 100 mL/min/1.73      Globulin 3.4 gm/dL      A/G Ratio 0.8 g/dL      BUN/Creatinine Ratio 13.3     Anion Gap 11.0 mmol/L     Narrative:      GFR Normal >60  Chronic Kidney Disease <60  Kidney Failure <15      C-reactive Protein [[id number]]  (Abnormal) Collected: 03/10/21 0332    Specimen: Blood Updated: 03/10/21 0442     C-Reactive Protein 6.51 mg/dL     Lactic Acid, Plasma [[id number]]  (Normal) Collected: 03/10/21 0339    Specimen: Blood Updated: 03/10/21 0406     Lactate 1.1 mmol/L     Urinalysis, Microscopic Only - Urine, Catheter [[id number]]  (Abnormal) Collected: 03/09/21 2125    Specimen: Urine, Catheter Updated: 03/09/21 2152     RBC, UA 0-2 /HPF      WBC, UA 3-5 /HPF      Bacteria, UA Trace /HPF      Squamous Epithelial Cells, UA 0-2 /HPF      Yeast, UA Large/3+ Budding Yeast /HPF      Hyaline Casts, UA 7-12 /LPF      Methodology Manual Light Microscopy    Urinalysis With Culture If Indicated - Urine, Catheter [[id number]]  (Abnormal) Collected:  "03/09/21 2125    Specimen: Urine, Catheter Updated: 03/09/21 2145     Color, UA Yellow     Appearance, UA Cloudy     pH, UA 5.5     Specific Gravity, UA 1.017     Glucose,  mg/dL (1+)     Ketones, UA 40 mg/dL (2+)     Bilirubin, UA Negative     Blood, UA Trace     Protein, UA Negative     Leuk Esterase, UA Trace     Nitrite, UA Negative     Urobilinogen, UA 0.2 E.U./dL    Procalcitonin [[id number]]  (Abnormal) Collected: 03/09/21 0637    Specimen: Blood Updated: 03/09/21 0751     Procalcitonin 3.08 ng/mL     Narrative:      As a Marker for Sepsis (Non-Neonates):   1. <0.5 ng/mL represents a low risk of severe sepsis and/or septic shock.  1. >2 ng/mL represents a high risk of severe sepsis and/or septic shock.    As a Marker for Lower Respiratory Tract Infections that require antibiotic therapy:  PCT on Admission     Antibiotic Therapy             6-12 Hrs later  > 0.5                Strongly Recommended            >0.25 - <0.5         Recommended  0.1 - 0.25           Discouraged                   Remeasure/reassess PCT  <0.1                 Strongly Discouraged          Remeasure/reassess PCT      As 28 day mortality risk marker: \"Change in Procalcitonin Result\" (> 80 % or <=80 %) if Day 0 (or Day 1) and Day 4 values are available. Refer to http://www.[employer]-pct-calculator.com/   Change in PCT <=80 %   A decrease of PCT levels below or equal to 80 % defines a positive change in PCT test result representing a higher risk for 28-day all-cause mortality of patients diagnosed with severe sepsis or septic shock.  Change in PCT > 80 %   A decrease of PCT levels of more than 80 % defines a negative change in PCT result representing a lower risk for 28-day all-cause mortality of patients diagnosed with severe sepsis or septic shock.                Results may be falsely decreased if patient taking Biotin.     Comprehensive Metabolic Panel [[id number]]  (Abnormal) Collected: 03/09/21 0637    Specimen: Blood Updated: " 03/09/21 0711     Glucose 208 mg/dL      BUN 9 mg/dL      Creatinine 0.54 mg/dL      Sodium 138 mmol/L      Potassium 4.2 mmol/L      Comment: Slight hemolysis detected by analyzer. Results may be affected.        Chloride 110 mmol/L      CO2 20.0 mmol/L      Calcium 9.5 mg/dL      Total Protein 6.3 g/dL      Albumin 2.80 g/dL      ALT (SGPT) 13 U/L      AST (SGOT) 26 U/L      Alkaline Phosphatase 139 U/L      Total Bilirubin 0.4 mg/dL      eGFR Non African Amer 113 mL/min/1.73      Globulin 3.5 gm/dL      A/G Ratio 0.8 g/dL      BUN/Creatinine Ratio 16.7     Anion Gap 8.0 mmol/L     Narrative:      GFR Normal >60  Chronic Kidney Disease <60  Kidney Failure <15      C-reactive Protein [[id number]]  (Abnormal) Collected: 03/09/21 0444    Specimen: Blood Updated: 03/09/21 0530     C-Reactive Protein 8.88 mg/dL     Lactic Acid, Plasma [[id number]]  (Normal) Collected: 03/08/21 1230    Specimen: Blood Updated: 03/08/21 1254     Lactate 1.4 mmol/L     Manual Differential [[id number]]  (Abnormal) Collected: 03/08/21 0611    Specimen: Blood Updated: 03/08/21 0719     Neutrophil % 60.0 %      Lymphocyte % 11.0 %      Monocyte % 4.0 %      Basophil % 1.0 %      Bands %  24.0 %      Neutrophils Absolute 18.30 10*3/mm3      Lymphocytes Absolute 2.40 10*3/mm3      Monocytes Absolute 0.87 10*3/mm3      Basophils Absolute 0.22 10*3/mm3      Anisocytosis Slight/1+     WBC Morphology Normal     Toxic Granulation --     Comment: Slight toxic granulation observed in a few cells        Platelet Estimate Adequate    Urine Culture - Urine, Urine, Clean Catch [[id number]]  (Abnormal)  (Susceptibility) Collected: 03/06/21 1740    Specimen: Urine, Clean Catch Updated: 03/08/21 0207     Urine Culture >100,000 CFU/mL Escherichia coli    Susceptibility      Escherichia coli      JAMMIE      Ampicillin Resistant      Ampicillin + Sulbactam Intermediate      Cefazolin Susceptible      Cefepime Susceptible      Ceftazidime Susceptible      Ceftriaxone  Susceptible      Gentamicin Susceptible      Levofloxacin Susceptible      Nitrofurantoin Susceptible      Piperacillin + Tazobactam Susceptible      Tetracycline Susceptible      Trimethoprim + Sulfamethoxazole Susceptible               Linear View                   Hemoglobin A1c [[id number]]  (Abnormal) Collected: 03/07/21 0539    Specimen: Blood Updated: 03/07/21 1242     Hemoglobin A1C 9.30 %     Narrative:      Hemoglobin A1C Ranges:    Increased Risk for Diabetes  5.7% to 6.4%  Diabetes                     >= 6.5%  Diabetic Goal                < 7.0%    Blood Culture ID, PCR - Blood, Arm, Right [[id number]]  (Abnormal) Collected: 03/06/21 1502    Specimen: Blood from Arm, Right Updated: 03/07/21 0623     BCID, PCR Escherichia coli. Identification by BCID PCR.    Narrative:      Sensitivity to Follow    Urinalysis, Microscopic Only - Urine, Clean Catch [[id number]]  (Abnormal) Collected: 03/06/21 1740    Specimen: Urine, Clean Catch Updated: 03/06/21 1811     RBC, UA 6-12 /HPF      WBC, UA 13-20 /HPF      Bacteria, UA 4+ /HPF      Squamous Epithelial Cells, UA 6-12 /HPF      Yeast, UA Small/1+ Yeast /HPF      Hyaline Casts, UA 13-20 /LPF      Methodology Manual Light Microscopy    Urinalysis With Microscopic If Indicated (No Culture) - Urine, Clean Catch [[id number]]  (Abnormal) Collected: 03/06/21 1740    Specimen: Urine, Clean Catch Updated: 03/06/21 1749     Color, UA Dark Yellow     Appearance, UA Cloudy     pH, UA <=5.0     Specific Gravity, UA 1.019     Glucose, UA Negative     Ketones, UA Trace     Bilirubin, UA Small (1+)     Blood, UA Moderate (2+)     Protein, UA Trace     Leuk Esterase, UA Moderate (2+)     Nitrite, UA Negative     Urobilinogen, UA 1.0 E.U./dL    COVID-19 and FLU A/B PCR - Swab, Nasopharynx [[id number]]  (Normal) Collected: 03/06/21 1559    Specimen: Swab from Nasopharynx Updated: 03/06/21 1651     COVID19 Not Detected     Influenza A PCR Not Detected     Influenza B PCR Not Detected     Narrative:      Fact sheet for providers: https://www.fda.gov/media/271645/download    Fact sheet for patients: https://www.fda.gov/media/538907/download    Test performed by PCR.    Gold Top - SST [[id number]] Collected: 03/06/21 1502    Specimen: Blood from Arm, Right Updated: 03/06/21 1615     Extra Tube Hold for add-ons.     Comment: Auto resulted.       Manual Differential [[id number]]  (Abnormal) Collected: 03/06/21 1502    Specimen: Blood Updated: 03/06/21 1535     Neutrophil % 73.0 %      Lymphocyte % 6.0 %      Monocyte % 8.0 %      Bands %  13.0 %      Neutrophils Absolute 18.14 10*3/mm3      Lymphocytes Absolute 1.27 10*3/mm3      Monocytes Absolute 1.69 10*3/mm3      RBC Morphology Normal     WBC Morphology Normal     Platelet Morphology Normal    TSH+Free T4 [[id number]]  (Abnormal) Collected: 03/06/21 1502    Specimen: Blood Updated: 03/06/21 1534     TSH 1.120 uIU/mL      Free T4 0.90 ng/dL      Comment: T4 results may be falsely increased if patient taking Biotin.       Lipase [[id number]]  (Abnormal) Collected: 03/06/21 1502    Specimen: Blood Updated: 03/06/21 1528     Lipase 5 U/L     aPTT [[id number]]  (Normal) Collected: 03/06/21 1502    Specimen: Blood Updated: 03/06/21 1525     PTT 37.0 seconds     Narrative:      The recommended Heparin therapeutic range is 68-97 seconds.    Protime-INR [[id number]]  (Abnormal) Collected: 03/06/21 1502    Specimen: Blood Updated: 03/06/21 1524     Protime 17.9 Seconds      INR 1.40    Narrative:      Therapeutic range for most indications is 2.0-3.0 INR,  or 2.5-3.5 for mechanical heart valves.    Blood Gas, Arterial - [[id number]]  (Abnormal) Collected: 03/06/21 1345    Specimen: Arterial Blood Updated: 03/06/21 1359     Site Right Brachial     Addy's Test N/A     pH, Arterial 7.409 pH units      pCO2, Arterial 40.9 mm Hg      pO2, Arterial 76.2 mm Hg      Comment: 84 Value below reference range        HCO3, Arterial 25.8 mmol/L      Base Excess, Arterial 1.1  mmol/L      O2 Saturation, Arterial 93.8 %      Comment: 84 Value below reference range        Barometric Pressure for Blood Gas 755 mmHg      Modality Room Air     Flow Rate 21.0 lpm      Ventilator Mode NA     Collected by MM     Comment: Meter: [id number]     :  [id number]           Imaging Results (Most Recent)     Procedure Component Value Units Date/Time    US Guided Vascular Access [[id number]] Collected: 03/12/21 1020     Updated: 03/12/21 1239    Narrative:      PROCEDURE: Ultrasound Guidance Vascular Access      Ordering physician(s): KETTY KLINE    Clinical Indication: Ultrasound guidance for IV access.         Findings:    The vessel was sonographically evaluated and determined to be  patent. Concurrent realtime ultrasound was used to visualize  needle entry into the right basilic     vein    and a permanent  image was obtained    for permanent recording and reporting.         Impression:      Impression:     Uneventful ultrasound-guided venous access, right basilic vein.    Electronically signed by:  Deejay Hidalgo MD  3/12/2021 12:37 PM CST  Workstation: [email or web address]    IR Insert Midline Without Port Pump 5 Plus [[id number]] Resulted: 03/12/21 1046     Updated: 03/12/21 1046    Narrative:      This procedure was auto-finalized with no dictation required.    US Guided Vascular Access [[id number]] Collected: 03/11/21 1409     Updated: 03/11/21 1444    Narrative:      PROCEDURE: Ultrasound Guidance Vascular Access    ORDERING PHYSICIAN(S): KETTY KLINE    CLINICAL INDICATION: Multiple IV Access, A41.9 Sepsis,  unspecified organism R65.20 Severe sepsis without septic shock  G93.40 Encephalopathy, unspecified N17.9 Acute kidney failure,  unspecified N13.2 Hydronephrosis with renal and ureteral  calculous obstruction    FINDINGS:  Realtime ultrasound was used to visualize needle entry into the  right basilic vein and a permanent image was stored for permanent  recording and reporting.        Impression:      Vascular access device placed under ultrasound guidance as  described above    Electronically signed by:  Boyd Beyer MD  3/11/2021 2:41 PM CST  Workstation: [id number]    IR Insert Midline Without Port Pump 5 Plus [[id number]] Resulted: 03/11/21 1426     Updated: 03/11/21 1426    Narrative:      This procedure was auto-finalized with no dictation required.    FL Retrograde Pyelogram In OR [[id number]] Resulted: 03/10/21 1557     Updated: 03/10/21 1557    CT Abdomen Pelvis Without Contrast [[id number]] Collected: 03/09/21 1544     Updated: 03/09/21 1646    Narrative:      EXAM:  CT ABDOMEN PELVIS WITHOUT IV CONTRAST    ORDERING PROVIDER:  WILLIAMS CERDA    CLINICAL HISTORY:  Infection, abscess    COMPARISON:  3/6/2021    TECHNIQUE:   CT abdomen and pelvis performed without IV or oral contrast,  reformatted in the sagittal and coronal planes.     This examination was performed according to our departmental dose  optimization program which includes automated exposure control,  adjustment of the MA and kV according to patient size, and/or use  of iterative reconstruction technique.    FINDINGS:    BASILAR CHEST: Mild bibasilar atelectasis versus infiltrate with  improvement since prior.    LIVER: No mass, enlargement or abnormal density.    BILIARY TRACT: Unremarkable gallbladder.    SPLEEN: No mass or enlargement. Multiple calcified granuloma.    PANCREAS: No mass or inflammatory process. Normal pancreatic duct    ADRENAL GLANDS: Unremarkable. No mass.    URINARY SYSTEM: New left distal ureteral stone at the UVJ  measuring 5.9 mm with mild to moderate left hydroureteronephrosis  new since prior, concerning for obstructive uropathy. Mild left  perinephric infiltration. Bilateral renal vascular calcifications  in the renal hilum. Kidneys are normal in size. No obstructing  stone, hydronephrosis, or mass in the right kidney. Normal right  ureter.  Bladder is normal without mass or stone.      GI TRACT:  No mass, dilation, or wall thickening.  No inflamed  diverticula.  No hernia.  Appendix is not well visualized. There  is new perirectal infiltration concerning for proctitis.    REPRODUCTIVE SYSTEM: Unremarkable    PERITONEAL SPACE:No free air, free fluid, mass or adenopathy.    RETROPERITONEAL SPACE:  No adenopathy, mass, aneurysm or  significant vascular abnormality.     BONES AND EXTRA-ABDOMINAL SOFT TISSUES: Stable L1 compression  fracture deformity..  No inguinal adenopathy or hernia.      Impression:      New left distal ureteral stone at the UVJ measuring 5.9 mm with  mild to moderate left hydroureteronephrosis new since prior,  concerning for obstructive uropathy.   Mild left perinephric infiltration.   Bilateral renal vascular calcifications in the renal hilum.    There is new perirectal infiltration concerning for proctitis.  Stable L1 compression fracture deformity..    Mild bibasilar atelectasis versus infiltrate with improvement  since prior.    Electronically signed by:  Gabe Quinteros MD  3/9/2021 4:44 PM CST  Workstation: [id number]     Gallbladder [[id number]] Collected: 03/08/21 1453     Updated: 03/08/21 1612    Narrative:      EXAMINATION:  ultrasounds abdomen, limited (RUQ)    CLINICAL INDICATION / HISTORY:  RUQ pain and nausea, A41.9  Sepsis, unspecified organism R65.20 Severe sepsis without septic  shock G93.40 Encephalopathy, unspecified N17.9 Acute kidney  failure, unspecified    COMPARISON:  none    TECHNIQUE:  ultrasound, limited, right upper quadrant    FULL RESULTS / FINDINGS:    Liver:      size: limited evaluation, grossly negative      echotexture wnl      no focal mass or intrahepatic biliary ductal dilatation     Biliary:    Gall bladder:  no cholelithiasis                            no wall thickening or pericholecystic  fluid      Common bile duct:  normal caliber      Pancreas (visualized portions):    Cannot see the pancreas well enough to evaluate.    Kidney, right (limited):       size:  normal, measuring 11.5 x 5.7 x 6.5 cm    echotexture:  normal    no nephrolithiasis, solid mass, or collecting system dilation        Impression:      CONCLUSION:   1.  Cannot see the pancreas well enough to evaluate.  2.  Otherwise negative ultrasound right upper abdominal quadrant.    Electronically signed by:  Malick Watson MD  3/8/2021 3:08 PM CST  Workstation: [id number]    CT Abdomen Pelvis Without Contrast [[id number]] Collected: 03/06/21 1512     Updated: 03/06/21 1640    Narrative:        CT Abdomen and Pelvis Without Contrast    History: Vomiting    Axials spiral scans of the abdomen and pelvis were obtained  without contrast.  Coronal and sagital reconstructions were  preformed.      This exam was performed according to our departmental  dose-optimization program, which includes automated exposure  control, adjustment of the mA and/or kV according to patient size  and/or use of iterative reconstruction technique.    DLP: 453.50    Comparison: None    Findings:   Scans of the lung bases demonstrate minimal atelectasis.  Coronary artery bypass and coronary artery calcifications.  Cardiomegaly.    No acute osseous abnormality.  Degenerative changes and degenerative disc disease thoracic and  lumbar spine.    The liver is unremarkable.  The gallbladder is somewhat distended.  The spleen is unremarkable.  The pancreas is unremarkable.  The adrenal glands are unremarkable.    No renal or ureteral calculi.  No renal mass.  No hydronephrosis.    Unremarkable bladder.  No pelvic mass.    Extensive atherosclerotic calcifications.  No abdominal aortic aneurysm.  No adenopathy.    No bowel obstruction.  Large amount retained feces in the colon.  No free air.  No free fluid.    No hernia.      Impression:      Conclusion:  Coronary artery bypass and coronary artery calcifications.  Cardiomegaly.  The gallbladder is somewhat distended.  Extensive atherosclerotic calcifications.  Large amount retained feces in the  colon.    [id number]    Electronically signed by:  Darius Kruger MD  3/6/2021 4:38 PM CST  Workstation: [id number]    CT Head Without Contrast [[id number]] Collected: 03/06/21 1510     Updated: 03/06/21 1536    Narrative:        CT Head Without Contrast    History: Altered mental status    Axial scans of the brain were obtained without intravenous  contrast.  Coronal and sagital reconstructions were preformed.    This exam was performed according to our departmental  dose-optimization program, which includes automated exposure  control, adjustment of the mA and/or kV according to patient size  and/or use of iterative reconstruction technique.    DLP: 955.30    Comparison: July 23, 2019    Findings:  Bone windows are unremarkable.  The visualized paranasal sinuses are unremarkable.    No acute process.  Minimal cerebral atrophy.  No hemorrhage.  No mass.  No abnormal areas of increased or decreased attenuation.  No midline shift.  No abnormal extra-axial fluid collections.      Impression:      CONCLUSION:  No acute process.  Minimal cerebral atrophy.    [id number]    Electronically signed by:  Darius Kruger MD  3/6/2021 3:35 PM CST  Workstation: [id number]    XR Chest 1 View [[id number]] Collected: 03/06/21 1326     Updated: 03/06/21 1523    Narrative:        PORTABLE CHEST    HISTORY: Coronary artery disease    Portable AP film of the chest was obtained at 1:54 PM.  COMPARISON: October 14, 2020    FINDINGS:   EKG leads.  Linear atelectasis lung bases.  Coronary artery bypass.  The heart is not enlarged.  The pulmonary vasculature is not increased.  No pleural effusion.  No pneumothorax.  No acute osseous abnormality.  Degenerative changes are present in the thoracic spine.      Impression:      CONCLUSION:  Linear atelectasis lung bases.  Coronary artery bypass.    [id number]    Electronically signed by:  Darius Kruger MD  3/6/2021 3:22 PM CST  Workstation: [id number]          Chief Complaint on Day of Discharge:  "Fatigue, nausea, and general weakness    Hospital Course:  The patient is a 65 y.o. female who presented to [facility] with altered mental status, nausea and vomiting.  She was found to have sepsis secondary to urinary tract infection with metabolic encephalopathy also secondary to urinary tract infection.  She was started on broad-spectrum antibiotics and cultures were obtained.  Urine and blood cultures both grew E. coli.  Antibiotics were deescalated from Zosyn to ceftriaxone.  Patient had poorly controlled sugars and glucose regimen was adjusted several times to attempt to obtain better control.  Patient was found to have a left ureteral stone with hydronephrosis and urinary retention most likely as a cause of her urinary tract infection.  Urology was consulted.  She had a cystoscopy with a left J stent placement.  Her encephalopathy slowly cleared.  Physical therapy and Occupational Therapy were consulted.  Decision was made for patient to go to a skilled nursing facility with the goal of rehab to home.  Patient will require strong encouragement to work with physical therapy.  She was discharged to the skilled nursing facility.  She will follow-up with urology in a week for treatment of her ureteral stone.  She will follow-up with her PCP in 1 to 2 weeks.    Condition on Discharge: Stable    Physical Exam on Discharge:  /90 (BP Location: Left arm, Patient Position: Lying)   Pulse 86   Temp 98.1 °F (36.7 °C) (Axillary)   Resp 18   Ht 175.3 cm (69\")   Wt 89.5 kg (197 lb 5 oz)   SpO2 97%   BMI 29.14 kg/m²   Physical Exam  Vitals reviewed.   Constitutional:       Appearance: She is well-developed.   HENT:      Head: Normocephalic and atraumatic.   Eyes:      Pupils: Pupils are equal, round, and reactive to light.   Cardiovascular:      Rate and Rhythm: Normal rate and regular rhythm.      Heart sounds: Normal heart sounds. No murmur heard.   No friction rub. No gallop.    Pulmonary:    "   Effort: Pulmonary effort is normal. No respiratory distress.      Breath sounds: Normal breath sounds. No wheezing or rales.   Chest:      Chest wall: No tenderness.   Abdominal:      General: Bowel sounds are normal. There is no distension.      Palpations: Abdomen is soft.      Tenderness: There is no abdominal tenderness. There is no guarding.   Musculoskeletal:      Cervical back: Normal range of motion and neck supple.   Skin:     General: Skin is warm and dry.   Psychiatric:         Behavior: Behavior normal.         Thought Content: Thought content normal.       Discharge Disposition:  Skilled Nursing Facility (DC - External)    Discharge Medications:     Discharge Medications      New Medications      Instructions Start Date   cefTRIAXone 1 g injection  Commonly known as: ROCEPHIN   1 g, Intramuscular, Every 24 Hours      chlorthalidone 25 MG tablet  Commonly known as: HYGROTON   25 mg, Oral, Daily      dilTIAZem 30 MG tablet  Commonly known as: CARDIZEM   30 mg, Oral, Every 6 Hours Scheduled         Continue These Medications      Instructions Start Date   acetaminophen 325 MG tablet  Commonly known as: TYLENOL   650 mg, Oral, 3 Times Daily      aspirin 81 MG EC tablet   81 mg, Oral, Daily      atorvastatin 20 MG tablet  Commonly known as: LIPITOR   20 mg, Oral, Nightly      B-D ULTRAFINE III SHORT PEN 31G X 8 MM misc  Generic drug: Insulin Pen Needle   No dose, route, or frequency recorded.      carvedilol 12.5 MG tablet  Commonly known as: COREG   TAKE 1 TABLET BY MOUTH TWICE DAILY      Centrum Silver 50+Women tablet tablet  Generic drug: multivitamin with minerals   1 tablet, Oral, Daily      Dexcom G6 Sensor   Does not apply, As Needed, Every 10 daysDexcom G6 Sensor Kit [address]-16 Use as directed for continuous glucose monitoring      glucose blood test strip   Livongot test strips use to test sugar 4 times per day. E 10.9      insulin aspart 100 UNIT/ML solution pen-injector sc pen  Commonly known  as: novoLOG FLEXPEN   150-200=2 units, 201-250=3 units, 251-300=5 units, 301-350=7 units, 351-400=9 units, 401-450=12 units, >450=15 units       isosorbide mononitrate 30 MG 24 hr tablet  Commonly known as: IMDUR   30 mg, Oral, Daily      melatonin 5 MG tablet tablet   5 mg, Oral, Nightly      nitroglycerin 0.2 MG/HR patch  Commonly known as: NITRODUR   1 patch, Transdermal, Daily      PARoxetine 20 MG tablet  Commonly known as: PAXIL   20 mg, Oral, Nightly      Synthroid 25 MCG tablet  Generic drug: levothyroxine   25 mcg, Oral, Daily      Toujeo Max SoloStar 300 UNIT/ML solution pen-injector injection  Generic drug: Insulin Glargine (2 Unit Dial)   28 Units, Subcutaneous, Nightly      TRUEplus Lancets 33G misc   No dose, route, or frequency recorded.      Vitamin D (Cholecalciferol) 50 MCG (2000 UT) capsule   1 capsule, Oral, Daily         Stop These Medications    gabapentin 600 MG tablet  Commonly known as: NEURONTIN     lisinopril 10 MG tablet  Commonly known as: PRINIVIL,ZESTRIL     promethazine 25 MG tablet  Commonly known as: PHENERGAN     tiZANidine 4 MG tablet  Commonly known as: ZANAFLEX            Discharge Diet:   Diet Instructions     Advance Diet As Tolerated            Activity at Discharge:   Activity Instructions     Activity as Tolerated      Other Activity Instructions      Activity Instructions: PT/OT/ST          Follow-up Appointments:   Future Appointments   Date Time Provider Department Center   4/30/2021  4:15 PM Dominic Willard MD [facility] None           This document has been electronically signed by Sukhjinder Trivedi MD on March 19, 2021 18:03 CDT      Time:  35 min

## 2021-03-19 NOTE — PROGRESS NOTES
"Physicians Statement of Medical Necessity for  Ambulance Transportation    GENERAL INFORMATION     Name: Janelle Millard  YOB: 1955  Medicare #: [id number]  Transport Date: 3/19/2021 (Valid for round trips this date, or for scheduled repetitive trips for 60 days from the date signed below.)  Origin: [facility]  Destination: [facility]  Is the Patient's stay covered under Medicare Part A (PPS/DRG?)Yes  Closest appropriate facility? Yes  If this a hosp-hosp transfer? No  Is this a hospice patient? No    MEDICAL NECESSITY QUESTIONAIRE    Ambulance Transportation is medically necessary only if other means of transportation are contraindicated or would be potentially harmful to the patient.  To meet this requirement, the patient must be either \"bed confined\" or suffer from a condition such that transport by means other than an ambulance is contraindicated by the patient's condition.  The following questions must be answered by the healthcare professional signing below for this form to be valid:     1) Describe the MEDICAL CONDITION (physical and/or mental) of this patient AT THE TIME OF AMBULANCE TRANSPORT that requires the patient to be transported in an ambulance, and why transport by other means is contraindicated by the patient's condition:   Hospitalization related to sepsis with encephalopathy which has not resulted. Still confused. Impaired functional mobility, balance, gait and endurance.   Has nausea which worsens with movement                2) Is this patient \"bed confined\" as defined below?Yes   To be \"bed confined\" the patient must satisfy all three of the following criteria:  (1) unable to get up from bed without assistance; AND (2) unable to ambulate;  AND (3) unable to sit in a chair or wheelchair.  3) Can this patient safely be transported by car or wheelchair van (I.e., may safely sit during transport, without an attendant or monitoring?)No   4. In " addition to completing questions 1-3 above, please check any of the following conditions that apply*:          *Note: supporting documentation for any boxes checked must be maintained in the patient's medical records Patient is confused and Unable to tolerate seated position for time needed to transport      SIGNATURE OF PHYSICIAN OR OTHER AUTHORIZED HEALTHCARE PROFESSIONAL    I certify that the above information is true and correct based on my evaluation of this patient, and represent that the patient requires transport by ambulance and that other forms of transport are contraindicated.  I understand that this information will be used by the Centers for Medicare and Medicaid Services (CMS) to support the determiniation of medical necessity for ambulance services, and I represent that I have personal knowledge of the patient's condition at the time of transport.       If this box is checked, I also certify that the patient is physically or mentally incapable of signing the ambulance service's claim form and that the institution with which I am affiliated has furnished care, services or assistance to the patient.  My signature below is made on behalf of the patient pursuant to 42 .36(b)(4). In accordance with 42 .37, the specific reason(s) that the patient is physically or mentally incapable of signing the claim for is as follows:     Signature of Physician or Healthcare Professional   Betty Caballero RN,[facility] Date/Time:   3/19/2021 11:00     (For Scheduled repetitive transport, this form is not valid for transports performed more than 60 days after this date).                                                                                                                                            --------------------------------------------------------------------------------------------  Printed Name and Credentials of Physician or Authorized Healthcare Professional     *Form must be signed by patient's  attending physician for scheduled, repetitive transports,.  For non-repetitive ambulance transports, if unable to obtain the signature of the attending physician, any of the following may sign (please select below):     Physician  Clinical Nurse Specialist X Registered Nurse     Physician Assistant  Discharge Planner  Licensed Practical Nurse     Nurse Practitioner X

## 2021-03-19 NOTE — THERAPY TREATMENT NOTE
Patient Name: Janelle Millard  : 1955    MRN: [id number]                              Today's Date: 3/19/2021       Admit Date: 3/6/2021    Visit Dx:     ICD-10-CM ICD-9-CM   1. Sepsis with encephalopathy without septic shock, due to unspecified organism (CMS/Formerly [facility])  A41.9 038.9    R65.20 995.91    G93.40 348.30   2. NAGI (acute kidney injury) (CMS/Formerly [facility])  N17.9 584.9   3. Ureteral stone with hydronephrosis  N13.2 592.1     591   4. Impaired functional mobility, balance, gait, and endurance  Z74.09 V49.89   5. Impaired mobility and activities of daily living  Z74.09 V49.89    Z78.9      Patient Active Problem List   Diagnosis   • Carpal tunnel syndrome of left wrist   • Type 1 diabetes mellitus with diabetic polyneuropathy (CMS/[facility])   • Mixed diabetic hyperlipidemia associated with type 1 diabetes mellitus (CMS/[facility]ly [facility])   • Hypertension associated with diabetes (CMS/[facility])   • Syncope and collapse   • CAD (coronary artery disease)   • Asthma   • Depressive disorder, not elsewhere classified   • Neuropathy   • Other specified rheumatoid arthritis, multiple sites (CMS/[facility])   • Carpal tunnel syndrome on both sides   • Bilateral carotid artery stenosis   • Overweight (BMI 25.0-29.9)   • Precordial pain   • Sepsis with encephalopathy without septic shock (CMS/[facility])   • Tobacco abuse   • UTI (urinary tract infection)   • Ureteral stone with hydronephrosis     Past Medical History:   Diagnosis Date   • Arthritis, rheumatoid (CMS/[facility])    • Arthropathy associated with neurological disorder    • Arthropathy of lumbar facet joint    • Asthma    • Benign hypertension    • Carpal tunnel syndrome    • Diabetes (CMS/[facility])    • Diabetic polyneuropathy (CMS/[facility])    • Dyslipidemia    • Fallen arches    • Hammer toe    • Heart disease    • Joint pain    • Mild depression (CMS/[facility])     Saddness post Surgery 7/10/14 improved after counseling.   • Multiple vessel coronary artery disease     post CABG      • Myofascial pain    • Neurologic  disorder associated with diabetes mellitus (CMS/HCC)     Neuropathy of chest      • Neuropathy    • Onychomycosis    • Peripheral vascular disease (CMS/HCC)    • Retinopathy    • Tobacco user    • Type 1 diabetes mellitus (CMS/HCC)      Past Surgical History:   Procedure Laterality Date   • CARDIAC SURGERY  02/21/2014    Critical stenosis in the RCA. Diffusely calcified LAD with 30-80% stenosis. OMB #3 with 60% to 70% stenosis. Preserved LV systolic function with EF of 55%.   • CARPAL TUNNEL RELEASE Right 10/15/2014    Right carpal tunnel release.   • CARPAL TUNNEL RELEASE     • CATARACT EXTRACTION, BILATERAL Bilateral    • CORONARY ARTERY BYPASS GRAFT  02/24/2014    CABG x 3 with LIMA to LAD, endarterectomy to LAD, SVG to OMB and SVG to distal RCA with endarterectomy to distal RCA. Endo-vein harvesting.   • CYSTECTOMY Left     Breast cycst   • CYSTOSCOPY, URETEROSCOPY, RETROGRADE PYELOGRAM, STENT INSERTION Left 3/10/2021    Procedure: , LEFT RETROGRADE PYELOGRAM, STENT INSERTION LEFT URETER;  Surgeon: Hemet, Cooper NOLAN MD;  Location: [facility];  Service: Urology;  Laterality: Left;   • EXCISION LESION      Remove breast lesion - cyst   • EYE SURGERY      Insert lens prosthesis   • FOOT SURGERY  10/18/2011    Exostectomy of the right foot. Preulcerative lesion with Charot deformity, right foot   • LUMBAR SPINE SURGERY  11/09/2015    Lumbosacral radiofrequency thermal coagulation.   • NERVE BLOCK  09/14/2015    Lumbar medial branch block.   • TOE NAIL AVULSION  09/03/2015    DEBRIDE NAIL 6 OR MORE 89079 (1)     General Information     Row Name 03/19/21 0900          OT Time and Intention    Document Type  therapy note (daily note)  -BB     Mode of Treatment  individual therapy;occupational therapy  -BB     Row Name 03/19/21 0900          General Information    Patient Profile Reviewed  yes  -BB     Existing Precautions/Restrictions  fall  -BB     Row Name 03/19/21 0900          Cognition    Orientation Status  (Cognition)  oriented to;person;place;time  -BB     Row Name 03/19/21 0900          Safety Issues, Functional Mobility    Impairments Affecting Function (Mobility)  balance;cognition;coordination;endurance/activity tolerance;strength;postural/trunk control  -BB       User Key  (r) = Recorded By, (t) = Taken By, (c) = Cosigned By    Initials Name Provider Type    BB Zoey Carolina COTA/L Occupational Therapy Assistant          Mobility/ADL's     Row Name 03/19/21 0900          Bed Mobility    Bed Mobility  supine-sit;sit-supine;rolling right  -BB     Bed Mobility, Safety Issues  cognitive deficits limit understanding;decreased use of arms for pushing/pulling;decreased use of legs for bridging/pushing  -BB     Assistive Device (Bed Mobility)  bed rails;draw sheet;head of bed elevated  -BB     Comment (Bed Mobility)  Pt agreed to EOB this am and then declined. Pt encouraged and explained importance of getting up, however pt continues to defer.  -BB     Row Name 03/19/21 0900          Bathing Assessment/Intervention    [place] Level (Bathing)  upper body;moderate assist (50% patient effort);verbal cues  -BB     Position (Bathing)  sitting up in bed  -BB     Comment (Bathing)  Pt requires encouragement to participate in her bath. Pt states she has a caregiver at home that assists her with her bath.  -BB     Row Name 03/19/21 0900          Upper Body Dressing Assessment/Training    [place] Level (Upper Body Dressing)  doff;don;verbal cues;moderate assist (50% patient effort);nonverbal cues (demo/gesture) hospital gown  -BB     Row Name 03/19/21 0900          Grooming Assessment/Training    [place] Level (Grooming)  verbal cues;wash face, hands;contact guard assist;oral care regimen;moderate assist (50% patient effort)  -BB     Position (Grooming)  sitting up in bed  -BB     Row Name 03/19/21 0900          Toileting Assessment/Training    [place] Level (Toileting)  dependent (less than 25% patient  effort)  -BB     Position (Toileting)  supine  -BB     Row Name 03/19/21 0900          Self-Feeding Assessment/Training    [place] Level (Feeding)  -- Pt with Mod spills  -BB     Position (Self-Feeding)  sitting up in bed  -BB       User Key  (r) = Recorded By, (t) = Taken By, (c) = Cosigned By    Initials Name Provider Type    Zoey Stover COTA/L Occupational Therapy Assistant        Obj/Interventions     Row Name 03/19/21 0900          Range of Motion Comprehensive    General Range of Motion  upper extremity range of motion deficits identified;lower extremity range of motion deficits identified  -BB     Row Name 03/19/21 0900          Strength Comprehensive (MMT)    General Manual Muscle Testing (MMT) Assessment  upper extremity strength deficits identified  -BB       User Key  (r) = Recorded By, (t) = Taken By, (c) = Cosigned By    Initials Name Provider Type    Zoey Stover COTA/L Occupational Therapy Assistant        Goals/Plan     Row Name 03/19/21 0900          Transfer Goal 1 (OT)    Activity/Assistive Device (Transfer Goal 1, OT)  transfers, all  -BB     [place] Level/Cues Needed (Transfer Goal 1, OT)  contact guard assist  -BB     Time Frame (Transfer Goal 1, OT)  long term goal (LTG)  -BB     Progress/Outcome (Transfer Goal 1, OT)  goal not met  -BB     Row Name 03/19/21 0900          Bathing Goal 1 (OT)    Activity/Device (Bathing Goal 1, OT)  upper body bathing  -BB     [place] Level/Cues Needed (Bathing Goal 1, OT)  supervision required  -BB     Time Frame (Bathing Goal 1, OT)  long term goal (LTG)  -BB     Progress/Outcomes (Bathing Goal 1, OT)  goal not met  -BB     Row Name 03/19/21 0900          Dressing Goal 1 (OT)    Activity/Device (Dressing Goal 1, OT)  dressing skills, all  -BB     [place]/Cues Needed (Dressing Goal 1, OT)  contact guard assist  -BB     Time Frame (Dressing Goal 1, OT)  long term goal (LTG)  -BB     Progress/Outcome (Dressing Goal 1,  OT)  goal not met  -BB     Row Name 03/19/21 0900          Toileting Goal 1 (OT)    Activity/Device (Toileting Goal 1, OT)  toileting skills, all  -BB     [place] Level/Cues Needed (Toileting Goal 1, OT)  supervision required  -BB     Time Frame (Toileting Goal 1, OT)  long term goal (LTG)  -BB     Progress/Outcome (Toileting Goal 1, OT)  goal not met  -BB       User Key  (r) = Recorded By, (t) = Taken By, (c) = Cosigned By    Initials Name Provider Type    BB Zoey Carolina COTA/L Occupational Therapy Assistant        Clinical Impression     Row Name 03/19/21 0900          Pain Scale: Numbers Pre/Post-Treatment    Pretreatment Pain Rating  5/10  -BB     Posttreatment Pain Rating  5/10  -BB     Pain Location  abdomen  -BB     Pain Intervention(s)  Medication (See MAR)  -BB     Row Name 03/19/21 0900          Plan of Care Review    Plan of Care Reviewed With  patient  -BB     Progress  no change  -BB     Outcome Summary  Pt lacks motivation while working with OT. Pt encouraged twice during OT tx to sit EOB, however she continues to decline. Pt Mod A for UB bathing and dressing. Pt does not want to participate in LB ADL. No goals met this tx.  -BB     Row Name 03/19/21 0900          Therapy Assessment/Plan (OT)    Rehab Potential (OT)  fair, will monitor progress closely  -BB     Criteria for Skilled Therapeutic Interventions Met (OT)  yes;meets criteria  -BB     Therapy Frequency (OT)  other (see comments) 5-7 days/wk  -BB     Row Name 03/19/21 0900          Therapy Plan Review/Discharge Plan (OT)    Anticipated Discharge Disposition (OT)  home with assist;home with 24/7 care;skilled nursing facility  -BB     Row Name 03/19/21 0900          Vital Signs    Posttreatment Heart Rate (beats/min)  73  -BB     Pre SpO2 (%)  97  -BB     O2 Delivery Pre Treatment  room air  -BB     Pre Patient Position  Supine  -BB     Row Name 03/19/21 0900          Positioning and Restraints    Pre-Treatment Position  in bed   -BB     Post Treatment Position  bed  -BB     In Bed  fowlers;call light within reach;encouraged to call for assist;exit alarm on  -BB       User Key  (r) = Recorded By, (t) = Taken By, (c) = Cosigned By    Initials Name Provider Type    Zoey Stover COTA/L Occupational Therapy Assistant        Outcome Measures     Row Name 03/19/21 0900          How much help from another is currently needed...    Putting on and taking off regular lower body clothing?  1  -BB     Bathing (including washing, rinsing, and drying)  1  -BB     Toileting (which includes using toilet bed pan or urinal)  1  -BB     Putting on and taking off regular upper body clothing  2  -BB     Taking care of personal grooming (such as brushing teeth)  2  -BB     Eating meals  3  -BB     AM-PAC 6 Clicks Score (OT)  10  -BB       User Key  (r) = Recorded By, (t) = Taken By, (c) = Cosigned By    Initials Name Provider Type    Zoey Stover COTA/L Occupational Therapy Assistant        Occupational Therapy Education                 Title: PT OT SLP Therapies (In Progress)     Topic: Occupational Therapy (In Progress)     Point: ADL training (In Progress)     Description:   Instruct learner(s) on proper safety adaptation and remediation techniques during self care or transfers.   Instruct in proper use of assistive devices.              Learning Progress Summary           Patient Acceptance, E, NR by BB at 3/19/2021 1311    Acceptance, E, NR by BB at 3/18/2021 1409    Acceptance, E, NR by BB at 3/17/2021 1015    Acceptance, E, NR by BB at 3/16/2021 0902    Acceptance, E, VU,DU by MARGARITO at 3/14/2021 1349    Comment: Pt and daughter edu on OT and POC. Pt and daughter educated on use of plate guard, built up handles, and two handled cup.   Family Acceptance, E, VU,DU by MARGARITO at 3/14/2021 1349    Comment: Pt and daughter edu on OT and POC. Pt and daughter educated on use of plate guard, built up handles, and two handled cup.                    Point: Home exercise program (Not Started)     Description:   Instruct learner(s) on appropriate technique for monitoring, assisting and/or progressing therapeutic exercises/activities.              Learner Progress:  Not documented in this visit.          Point: Precautions (Done)     Description:   Instruct learner(s) on prescribed precautions during self-care and functional transfers.              Learning Progress Summary           Patient Acceptance, E,TB, VU,NR,NL by SB at 3/17/2021 0341    Acceptance, E, VU,NR by RB at 3/12/2021 1526    Comment: Edu pt on use of gait belt and non skid socks when OOB and no OOB without assist.                   Point: Body mechanics (In Progress)     Description:   Instruct learner(s) on proper positioning and spine alignment during self-care, functional mobility activities and/or exercises.              Learning Progress Summary           Patient Acceptance, E, NR by BB at 3/17/2021 1015    Acceptance, E, NR by BB at 3/16/2021 0902                               User Key     Initials Effective Dates Name Provider Type Discipline    RB 07/24/19 -  Baldomero Kwon OT Occupational Therapist OT    BB 03/07/18 -  Zoey Carolina COTA/L Occupational Therapy Assistant OT    MARGARITO 11/05/19 -  Marti Vo OTA Occupational Therapy Assistant OT    SB 08/24/20 -  Cassie Buckley, RN Registered Nurse Nurse              OT Recommendation and Plan  Therapy Frequency (OT): other (see comments) (5-7 days/wk)  Plan of Care Review  Plan of Care Reviewed With: patient  Progress: no change  Outcome Summary: Pt lacks motivation while working with OT. Pt encouraged twice during OT tx to sit EOB, however she continues to decline. Pt Mod A for UB bathing and dressing. Pt does not want to participate in LB ADL. No goals met this tx.     Time Calculation:   Time Calculation- OT     Row Name 03/19/21 1312             Time Calculation- OT    OT Start Time  0900  -BB      OT Stop Time  0924 -BB       OT Time Calculation (min)  24 min  -NEW      Total Timed Code Minutes- OT  24 minute(s)  -BB      OT Received On  03/19/21  -BB        User Key  (r) = Recorded By, (t) = Taken By, (c) = Cosigned By    Initials Name Provider Type    Zoey Stover COTA/L Occupational Therapy Assistant        Therapy Charges for Today     Code Description Service Date Service Provider Modifiers Qty    39327173097 HC OT SELF CARE/MGMT/TRAIN EA 15 MIN 3/18/2021 Zoey Carolina COTA/L GO 2    [id number] HC OT SELF CARE/MGMT/TRAIN EA 15 MIN 3/19/2021 Zoey Carolina COTA/L GO 2               CORINNE Maciel  3/19/2021

## 2021-03-28 PROBLEM — R07.9 CHEST PAIN: Status: ACTIVE | Noted: 2021-01-01

## 2021-03-28 PROBLEM — R07.9 CHEST PAIN, RULE OUT ACUTE MYOCARDIAL INFARCTION: Status: ACTIVE | Noted: 2021-01-01

## 2021-12-02 PROBLEM — R07.9 CHEST PAIN WITH HIGH RISK FOR CARDIAC ETIOLOGY: Status: ACTIVE | Noted: 2021-01-01

## 2021-12-02 NOTE — H&P
[facility] HOSPITALIST HISTORY AND PHYSICAL    Patient Identification:  Name:  Janelle Millard  Age:  66 y.o.  Sex:  female  :  1955  MRN:  [id number]   Visit Number:  [id number]  Primary Care Physician:  Yosef Casey MD     Chief complaint: Substernal chest pain 1 day duration    History of presenting illness:  66 y.o. female, nursing home resident with history of rheumatoid arthritis, coronary artery disease, diabetes mellitus, depression and short-term memory disorder presents to the emergency room with sudden onset of substernal chest pain which she described as sharp 8/10 intensity with no radiation which started while she was having lunch at about 2 PM.  She denied any associated nausea vomiting or diaphoresis.  She admits to mild shortness of breath.  She told me she is having angina and it is not new for her.  On the average according to her she has this once in a week.  The only difference today is that she is fearful and scared because she felt somebody was going to kill her.  When probed further patient denies any threats to her life in the nursing home.  At baseline patient is bedbound.  She also has history of recurrent urinary tract infection.  When asked if she is having any urinary symptom patient said no.  History [place] very reliable.  She told me she is been taking good care of her but she is just fearful.  In the emergency room her initial troponin was normal, chest x-ray did not show any acute findings.  EKG also did not show any acute findings.  She has elevated D-dimer.  Patient will be placed in observation status to rule out ACS.  She has already received aspirin and has nitro patch in place.  ---------------------------------------------------------------------------------------------------------------------   Review of Systems   Constitutional: Negative.    HENT: Negative.    Eyes: Negative.    Respiratory: Positive for shortness of breath.  Negative for cough and wheezing.    Cardiovascular: Positive for chest pain and leg swelling.   Gastrointestinal: Negative.    Endocrine: Negative.    Genitourinary: Negative.    Musculoskeletal: Positive for gait problem.   Skin: Negative.    Psychiatric/Behavioral: Positive for confusion. The patient is nervous/anxious.       ---------------------------------------------------------------------------------------------------------------------   Past Medical History:   Diagnosis Date   • Arthritis, rheumatoid (HCC)    • Arthropathy associated with neurological disorder    • Arthropathy of lumbar facet joint    • Asthma    • Benign hypertension    • Carpal tunnel syndrome    • Diabetes (HCC)    • Diabetic polyneuropathy (HCC)    • Dyslipidemia    • Fallen arches    • Hammer toe    • Heart disease    • Joint pain    • Mild depression (HCC)     Saddness post Surgery 7/10/14 improved after counseling.   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 Diffusely calcified LAD with 30-80% stenosis. OMB #3 with 60% to 70% stenosis. Preserved LV systolic function with EF of 55%.   • CARPAL TUNNEL RELEASE Right 10/15/2014    Right carpal tunnel release.   • CARPAL TUNNEL RELEASE     • CATARACT EXTRACTION, BILATERAL Bilateral    • CORONARY ARTERY BYPASS GRAFT  02/24/2014    CABG x 3 with LIMA to LAD, endarterectomy to LAD, SVG to OMB and SVG to distal RCA with endarterectomy to distal RCA. Endo-vein harvesting.   • CYSTECTOMY Left     Breast cycst   • CYSTOSCOPY, URETEROSCOPY, RETROGRADE PYELOGRAM, STENT INSERTION Left 3/10/2021    Procedure: ,  LEFT RETROGRADE PYELOGRAM, STENT INSERTION LEFT URETER;  Surgeon: Houston, Cooper NOLAN MD;  Location: [facility];  Service: Urology;  Laterality: Left;   • EXCISION LESION      Remove breast lesion - cyst   • EYE SURGERY      Insert lens prosthesis   • FOOT SURGERY  10/18/2011    Exostectomy of the right foot. Preulcerative lesion with Charot deformity, right foot   • LUMBAR SPINE SURGERY  11/09/2015    Lumbosacral radiofrequency thermal coagulation.   • NERVE BLOCK  09/14/2015    Lumbar medial branch block.   • TOE NAIL AVULSION  09/03/2015    DEBRIDE NAIL 6 OR MORE 36508 (1)     Family History   Problem Relation Age of Onset   • Asthma Other    • Coronary artery disease Other    • Diabetes Other    • Hyperlipidemia Other    • Hypertension Other    • Osteoporosis Other    • Cancer Other    • Osteoporosis Mother    • Heart disease Mother    • Heart disease Father    • Diabetes Father    • Heart disease Sister    • Diabetes Sister      Social History     Socioeconomic History   • Marital status:    Tobacco Use   • Smoking status: Former Smoker     Types: Cigarettes   • Smokeless tobacco: Never Used   Substance and Sexual Activity   • Alcohol use: No   • Drug use: No   • Sexual activity: Defer     ---------------------------------------------------------------------------------------------------------------------   Allergies:  Contrast dye, Corticosteroids, Codeine, Aspirin, and Ibuprofen  ---------------------------------------------------------------------------------------------------------------------   Prior to Admission Medications     Prescriptions Last Dose Informant Patient Reported? Taking?    acetaminophen (TYLENOL) 325 MG tablet   Yes No    Take 650 mg by mouth 3 (Three) Times a Day.    aspirin 81 MG EC tablet   Yes No    Take 81 mg by mouth Daily.    atorvastatin (LIPITOR) 20 MG tablet   No No    Take 1 tablet by mouth Every Night.    B-D ULTRAFINE III SHORT PEN 31G X 8 MM misc   Yes No    carvedilol  (COREG) 12.5 MG tablet   Yes No    TAKE 1 TABLET BY MOUTH TWICE DAILY    chlorthalidone (HYGROTON) 25 MG tablet   No No    Take 1 tablet by mouth Daily.    Continuous Blood Gluc Sensor (DEXCOM G6 SENSOR)   No No    As Needed (glucose control). Every 10 daysDexcom G6 Sensor Kit [address]-70 Use as directed for continuous glucose monitoring    dilTIAZem (CARDIZEM) 30 MG tablet   No No    Take 1 tablet by mouth Every 6 (Six) Hours.    glucose blood test strip   No No    Livongot test strips use to test sugar 4 times per day. E 10.9    insulin aspart (novoLOG FLEXPEN) 100 UNIT/ML solution pen-injector sc pen   No No    150-200=2 units, 201-250=3 units, 251-300=5 units, 301-350=7 units, 351-400=9 units, 401-450=12 units, >450=15 units    Insulin Glargine, 2 Unit Dial, (Toujeo Max SoloStar) 300 UNIT/ML solution pen-injector injection   No No    Inject 28 Units under the skin into the appropriate area as directed Every Night.    isosorbide mononitrate (IMDUR) 30 MG 24 hr tablet   Yes No    Take 30 mg by mouth Daily.    levothyroxine (SYNTHROID) 25 MCG tablet   Yes No    Take 25 mcg by mouth Daily.    lidocaine (LIDODERM) 5 %   No No    Place 1 patch on the skin as directed by provider Daily. Remove & Discard patch within 12 hours or as directed by MD    melatonin 5 MG tablet tablet   Yes No    Take 5 mg by mouth Every Night.    Multiple Vitamins-Minerals (Centrum Silver 50+Women) tablet   Yes No    Take 1 tablet by mouth Daily.    nitroglycerin (NITRODUR) 0.2 MG/HR patch   Yes No    Place 1 patch on the skin as directed by provider Daily.    PARoxetine (PAXIL) 20 MG tablet   Yes No    Take 20 mg by mouth Every Night.    traMADol (ULTRAM) 50 MG tablet   No No    Take 1 tablet by mouth Every 6 (Six) Hours As Needed for Moderate Pain .    TRUEPLUS LANCETS 33G misc   Yes No    Vitamin D, Cholecalciferol, 50 MCG (2000 UT) capsule   Yes No    Take 1 capsule by mouth Daily.         ---------------------------------------------------------------------------------------------------------------------   Vital Signs:  Temp:  [97.4 °F (36.3 °C)] 97.4 °F (36.3 °C)  Heart Rate:  [68-78] 78  Resp:  [19] 19  BP: (126-155)/(58-70) 155/70      12/02/21  1344   Weight: 86.2 kg (190 lb)     Body mass index is 28.06 kg/m².  ---------------------------------------------------------------------------------------------------------------------   Physical Exam:  Constitutional: Middle aged lady, tearful intermittently but not in acute distress.  No respiratory distress.      HENT:  Head: Normocephalic and atraumatic.  Mouth:  Moist mucous membranes.    Eyes:  Conjunctivae and EOM are normal.  Pupils are equal, round, and reactive to light.  No scleral icterus.  Neck:  Neck supple.  No JVD present.    Cardiovascular:  Normal rate, regular rhythm and normal heart sounds with no murmur.  Pulmonary/Chest:  No respiratory distress, no wheezes, no crackles, with normal breath sounds and good air movement.  Abdominal:  Soft.  Bowel sounds are normal.  No distension and no tenderness.   Musculoskeletal: 1+ bilateral lower extremity edema, no tenderness, and deformity of the right MCP joint.  No red or swollen joints anywhere.    Neurological:  Alert and oriented to person, seemed confused.  No cranial nerve deficit.  No tongue deviation.  No facial droop.  No slurred speech.   Skin:  Skin is warm and dry.  No rash noted.  No pallor.   Psychiatric:  Normal mood and affect.  Behavior is normal.  Judgment and thought content normal.   Peripheral vascular: 1+ edema edema and strong pulses on all 4 extremities.  Genitourinary: Deferred  ---------------------------------------------------------------------------------------------------------------------  EKG:     ECHO 10/20  · Left ventricular wall thickness is consistent with concentric hypertrophy.  · Left ventricular ejection fraction appears to be 51 - 55%.  · Mild  mitral annular calcification is present.  · Left ventricular diastolic function is consistent with (grade I) impaired relaxation.  · The following left ventricular wall segments are hypokinetic: basal inferoseptal, mid inferoseptal and basal inferoseptal.  · Mildly decreased posterior leaflet mobility.  · Trace mitral valve regurgitation is present.           ---------------------------------------------------------------------------------------------------------------------   Results from last 7 days   Lab Units 12/02/21  1347   WBC 10*3/mm3 6.70   HEMOGLOBIN g/dL 10.2*   HEMATOCRIT % 31.0*   MCV fL 87.8   MCHC g/dL 32.9   PLATELETS 10*3/mm3 317   INR  1.23*         Results from last 7 days   Lab Units 12/02/21  1347   SODIUM mmol/L 139   POTASSIUM mmol/L 3.9   CHLORIDE mmol/L 103   CO2 mmol/L 30.0*   BUN mg/dL 17   CREATININE mg/dL 0.68   EGFR IF NONAFRICN AM mL/min/1.73 86   CALCIUM mg/dL 9.8   GLUCOSE mg/dL 88   ALBUMIN g/dL 3.50   BILIRUBIN mg/dL 0.3   ALK PHOS U/L 121*   AST (SGOT) U/L 21   ALT (SGPT) U/L 12   Estimated Creatinine Clearance: 78.8 mL/min (by C-G formula based on SCr of 0.68 mg/dL).  No results found for: AMMONIA  Results from last 7 days   Lab Units 12/02/21  1347   TROPONIN T ng/mL <0.010     Results from last 7 days   Lab Units 12/02/21  1347   PROBNP pg/mL 698.2     Lab Results   Component Value Date    HGBA1C 9.30 (H) 03/07/2021     Lab Results   Component Value Date    TSH 1.120 03/06/2021    FREET4 0.90 (L) 03/06/2021     No results found for: PREGTESTUR, PREGSERUM, HCG, HCGQUANT  Pain Management Panel     Pain Management Panel Latest Ref Rng & Units 8/3/2016 8/3/2016    CREATININE UR mg/dl 90.2 89.6        No results found for: BLOODCX  No results found for: URINECX  No results found for: WOUNDCX  No results found for: STOOLCX        I have personally looked at the labs and they are stated  above.  ---------------------------------------------------------------------------------------------------------------------  Imaging Results (Last 7 Days)     Procedure Component Value Units Date/Time    XR Chest 1 View [[id number]] Collected: 12/02/21 1344     Updated: 12/02/21 1411    Narrative:      EXAM DESCRIPTION:     XR CHEST 1 VW    CLINICAL HISTORY:     66 years  Female  Chest Pain triage protocol  Chest Pain Triage Protocol    COMPARISON:     March 28, 2021    TECHNIQUE:     One view-AP  erect radiograph of the chest    FINDINGS:     There is chronic elevation the left hemidiaphragm with mild  compressive atelectasis in the left lung base. The right lung is  clear.    There is evidence of prior median sternotomy. There is no  evidence of acute congestive heart failure.      Impression:          1. Mild chronic elevation of the left hemidiaphragm with mild  left basilar atelectasis.          Electronically signed by:  Carole Granda MD  12/2/2021 2:16 PM  [facility] Workstation: [id number]          I have personally reviewed the radiology images and read the final radiology report.  ---------------------------------------------------------------------------------------------------------------------  Assessment:  Chest pain rule out acute coronary syndrome  Diabetes mellitus  Hypertension  Depression  Short-term memory loss  History of coronary artery disease  Rheumatoid arthritis  Elevated D-dimer with no evidence of hypoxia or tachycardia.  Bilateral lower extremity edema  Normochromic anemia    Plan:  Place in observation status.  Diet: Cardiac/carbohydrate consistent diet.  Place patient on basal/sliding insulin scale.  Obtain serial troponin  Check hemoglobin A1c/TSH  2D echo  Check bilateral lower extremity extremity to rule out DVT  Resume patient's home medications    Prophylaxis:  DVT-Heparin 5000 units every 12 hours    Disposition:  Observation status.  Patient is DO NOT RESUSCITATE  Ms. Violet Maldonado is her  medical power of .  Anticipate patient will be discharged back to the nursing home in the next 24 hours if she is pain-free.  Plan of care was discussed with the patient and her POA    Marcus Jimenes MD  12/02/21  16:51 CST     Dragon disclaimer:  Part of this note may be an electronic transcription/translation of spoken language to printed text using the Dragon Dictation System.

## 2021-12-02 NOTE — ED NOTES
Brent blood from pt's L PICC line. Pt tolerated well.      Jose Manuel Solomon, RN  12/02/21 6450

## 2021-12-02 NOTE — ED PROVIDER NOTES
Subjective   65yo female nursing home resident/DNR status, pmh significant htn/hyperlipidemia/dm2/cad/hypothyroid presents ED c/o acute onset left sided chest pain 1200hrs/radiating thru to back/neg exac or relieve factors/associated soa; pt rates chest pain 7/10 at time of exam.      History provided by:  Patient  Chest Pain  Pain location:  L chest  Pain radiates to:  Upper back  Onset quality:  Sudden  Duration:  1 day  Associated symptoms: shortness of breath    Associated symptoms: no cough and no palpitations        Review of Systems   Constitutional: Negative.    HENT: Negative.    Eyes: Negative for redness.   Respiratory: Positive for shortness of breath. Negative for cough.    Cardiovascular: Positive for chest pain. Negative for palpitations and leg swelling.   Gastrointestinal: Negative.    Genitourinary: Negative.    Musculoskeletal: Negative.    Neurological: Negative for syncope.   All other systems reviewed and are negative.      Past Medical History:   Diagnosis Date   • Arthritis, rheumatoid (HCC)    • Arthropathy associated with neurological disorder    • Arthropathy of lumbar facet joint    • Asthma    • Benign hypertension    • Carpal tunnel syndrome    • Diabetes (HCC)    • Diabetic polyneuropathy (HCC)    • Dyslipidemia    • Fallen arches    • Hammer toe    • Heart disease    • Joint pain    • Mild depression (HCC)     Saddness post Surgery 7/10/14 improved after counseling.   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 Diffusely calcified LAD with 30-80% stenosis. OMB #3 with 60% to 70% stenosis. Preserved LV systolic function with EF of 55%.   • CARPAL TUNNEL RELEASE Right 10/15/2014    Right carpal tunnel release.   • CARPAL TUNNEL RELEASE     • CATARACT EXTRACTION, BILATERAL Bilateral    • CORONARY ARTERY BYPASS GRAFT  02/24/2014    CABG x 3 with LIMA to LAD, endarterectomy to LAD, SVG to OMB and SVG to distal RCA with endarterectomy to distal RCA. Endo-vein harvesting.   • CYSTECTOMY Left     Breast cycst   • CYSTOSCOPY, URETEROSCOPY, RETROGRADE PYELOGRAM, STENT INSERTION Left 3/10/2021    Procedure: , LEFT RETROGRADE PYELOGRAM, STENT INSERTION LEFT URETER;  Surgeon: Babs, Cooper NOLAN MD;  Location: [facility];  Service: Urology;  Laterality: Left;   • EXCISION LESION      Remove breast lesion - cyst   • EYE SURGERY      Insert lens prosthesis   • FOOT SURGERY  10/18/2011    Exostectomy of the right foot. Preulcerative lesion with Charot deformity, right foot   • LUMBAR SPINE SURGERY  11/09/2015    Lumbosacral radiofrequency thermal coagulation.   • NERVE BLOCK  09/14/2015    Lumbar medial branch block.   • TOE NAIL AVULSION  09/03/2015    DEBRIDE NAIL 6 OR MORE 15303 (1)       Family History   Problem Relation Age of Onset   • Asthma Other    • Coronary artery disease Other    • Diabetes Other    • Hyperlipidemia Other    • Hypertension Other    • Osteoporosis Other    • Cancer Other    • Osteoporosis Mother    • Heart disease Mother    • Heart disease Father    • Diabetes Father    • Heart disease Sister    • Diabetes Sister        Social History     Socioeconomic History   • Marital status:    Tobacco Use   • Smoking status: Former Smoker     Types: Cigarettes   • Smokeless tobacco: Never Used   Substance and Sexual Activity   • Alcohol use: No   • Drug use: No   • Sexual activity: Defer            Objective   Physical Exam  Vitals and nursing note reviewed.   Constitutional:       Appearance: Normal appearance.   HENT:      Head: Normocephalic.      Mouth/Throat:      Mouth: Mucous membranes are moist.   Eyes:      Pupils: Pupils are equal, round, and reactive to light.   Cardiovascular:      Rate and Rhythm: Normal rate and regular rhythm.      Pulses: Normal pulses.      Heart sounds: S1 normal and S2 normal. Murmur heard.    Systolic murmur is present with a grade of 2/6.  No friction rub. No gallop.    Pulmonary:      Effort: Pulmonary effort is normal. No respiratory distress.      Breath sounds: Normal breath sounds. No wheezing, rhonchi or rales.   Abdominal:      General: Abdomen is flat. Bowel sounds are normal. There is no distension.      Palpations: Abdomen is soft.      Tenderness: There is no abdominal tenderness. There is no guarding or rebound.   Musculoskeletal:         General: No deformity.      Cervical back: Normal range of motion and neck supple. No rigidity.      Right lower leg: No edema.      Left lower leg: No edema.   Lymphadenopathy:      Cervical: No cervical adenopathy.   Skin:     General: Skin is warm and dry.   Neurological:      General: No focal deficit present.      Mental Status: She is alert. Mental status is at baseline.      GCS: GCS eye subscore is 4. GCS verbal subscore is 4. GCS motor subscore is 6.      Sensory: Sensation is intact.      Motor: Motor function is intact.         ECG 12 Lead      Date/Time: 12/2/2021 3:11 PM  Performed by: Lamont Rivera MD  Authorized by: Lamont Rivera MD   Interpreted by physician  Rhythm: sinus rhythm  Rate: normal  BPM: 72  QRS axis: normal  Conduction: conduction normal  ST Segments: ST segments normal  T Waves: T waves normal  Other findings: LVH and PRWP  Q waves: III and aVF  Clinical impression: abnormal ECG                 ED Course  ED Course as of 12/02/21 1540   Thu Dec 02, 2021   1539 Dr. Xin rondon [SD]       ED Course User Index  [SD] Lamont Rivera MD      Labs Reviewed   COMPREHENSIVE METABOLIC PANEL - Abnormal; Notable for the following components:       Result Value    CO2 30.0 (*)     Alkaline Phosphatase 121 (*)     All other components within normal limits    Narrative:     GFR Normal >60  Chronic Kidney Disease <60  Kidney Failure <15     CBC WITH AUTO DIFFERENTIAL - Abnormal; Notable for the following components:    RBC 3.53 (*)     Hemoglobin 10.2 (*)     Hematocrit 31.0 (*)     RDW 17.9 (*)     RDW-SD 57.1 (*)     All other components within normal limits   PROTIME-INR - Abnormal; Notable for the following components:    INR 1.23 (*)     All other components within normal limits    Narrative:     Therapeutic range for most indications is 2.0-3.0 INR,  or 2.5-3.5 for mechanical heart valves.   D-DIMER, QUANTITATIVE - Abnormal; Notable for the following components:    D-Dimer, Quantitative 2,611 (*)     All other components within normal limits    Narrative:     Dimer values <500 ng/ml FEU are FDA approved as aid in diagnosis of deep venous thrombosis and pulmonary embolism.  This test should not be used in an exclusion strategy with pretest probability alone.    A recent guideline regarding diagnosis for pulmonary thromboembolism recommends an adjusted exclusion criterion of age x 10 ng/ml FEU for patients >50 years of age ([employer] Intern Med 2015; 163: 701-711).     URINALYSIS W/ MICROSCOPIC IF INDICATED (NO CULTURE) - Abnormal; Notable for the following components:    Blood, UA Moderate (2+) (*)     Protein, UA 30 mg/dL (1+) (*)     Leuk Esterase, UA Moderate (2+) (*)     All other components within normal limits   URINALYSIS, MICROSCOPIC ONLY - Abnormal; Notable for the following components:    RBC, UA Too Numerous to Count (*)     WBC, UA 21-30 (*)     Squamous Epithelial Cells, UA 3-5 (*)     All other components within normal limits   COVID-19 AND FLU A/B, NP SWAB IN TRANSPORT MEDIA 8-12 HR TAT -  Normal    Narrative:     Fact sheet for providers: https://www.fda.gov/media/490276/download    Fact sheet for patients: https://www.fda.gov/media/154703/download    Test performed by PCR.   TROPONIN (IN-HOUSE) - Normal    Narrative:     Troponin T Reference Range:  <= 0.03 ng/mL-   Negative for AMI  >0.03 ng/mL-     Abnormal for myocardial necrosis.  Clinicians would have to utilize clinical acumen, EKG, Troponin and serial changes to determine if it is an Acute Myocardial Infarction or myocardial injury due to an underlying chronic condition.       Results may be falsely decreased if patient taking Biotin.     BNP (IN-HOUSE) - Normal    Narrative:     Among patients with dyspnea, NT-proBNP is highly sensitive for the detection of acute congestive heart failure. In addition NT-proBNP of <300 pg/ml effectively rules out acute congestive heart failure with 99% negative predictive value.    Results may be falsely decreased if patient taking Biotin.     APTT - Normal    Narrative:     The recommended Heparin therapeutic range is 68-97 seconds.   RAINBOW DRAW    Narrative:     The following orders were created for panel order [place] Draw.  Procedure                               Abnormality         Status                     ---------                               -----------         ------                     Green Top (Gel)[[id number]]                                  Final result               Lavender Top[[id number]]                                     Final result               Gold Top - SST[[id number]]                                   Final result               Light Blue Top[[id number]]                                   Final result                 Please view results for these tests on the individual orders.   TROPONIN (IN-HOUSE)   TROPONIN (IN-HOUSE)   CBC AND DIFFERENTIAL    Narrative:     The following orders were created for panel order CBC & Differential.  Procedure                               Abnormality          Status                     ---------                               -----------         ------                     CBC Auto Differential[[id number]]        Abnormal            Final result                 Please view results for these tests on the individual orders.   GREEN TOP   LAVENDER TOP   GOLD TOP - SST   LIGHT BLUE TOP   EXTRA TUBES    Narrative:     The following orders were created for panel order Extra Tubes.  Procedure                               Abnormality         Status                     ---------                               -----------         ------                     [place]itt Top[[id number]]                                         In process                   Please view results for these tests on the individual orders.   GRAY TOP     XR Chest 1 View    Result Date: 12/2/2021  Narrative: EXAM DESCRIPTION:  XR CHEST 1 VW CLINICAL HISTORY: 66 years  Female  Chest Pain triage protocol Chest Pain Triage Protocol COMPARISON: March 28, 2021 TECHNIQUE: One view-AP  erect radiograph of the chest FINDINGS: There is chronic elevation the left hemidiaphragm with mild compressive atelectasis in the left lung base. The right lung is clear. There is evidence of prior median sternotomy. There is no evidence of acute congestive heart failure.     Impression: 1. Mild chronic elevation of the left hemidiaphragm with mild left basilar atelectasis. Electronically signed by:  Carole Granda MD  12/2/2021 2:16 PM [facility] Workstation: [id number]                                               [facility]    Final diagnoses:   Chest pain with high risk for cardiac etiology   Anemia, unspecified type   Positive D dimer   UTI (urinary tract infection), bacterial       ED Disposition  ED Disposition     ED Disposition Condition Comment    Decision to Admit  Level of Care: Telemetry [5]   Admitting Physician: JOHNNIE BUSCH [[id number]]   Attending Physician: JOHNNIE BUSCH [[id number]]   Patient Class:  Observation [104]            No follow-up provider specified.       Medication List      No changes were made to your prescriptions during this visit.          Lamont Rivera MD  12/02/21 1512       Lamont Rivera MD  12/02/21 1541

## 2021-12-03 NOTE — PLAN OF CARE
Problem: Fall Injury Risk  Goal: Absence of Fall and Fall-Related Injury  Outcome: Ongoing, Progressing  Intervention: Identify and Manage Contributors to Fall Injury Risk  Recent Flowsheet Documentation  Taken 12/2/2021 2000 by Hailey Joseph RN  Medication Review/Management: medications reviewed  Intervention: Promote Injury-Free Environment  Recent Flowsheet Documentation  Taken 12/3/2021 0000 by Hailey Joseph RN  Safety Promotion/Fall Prevention:   safety round/check completed   nonskid shoes/slippers when out of bed   clutter free environment maintained   assistive device/personal items within reach   activity supervised  Taken 12/2/2021 2200 by Hailey Joseph RN  Safety Promotion/Fall Prevention:   safety round/check completed   nonskid shoes/slippers when out of bed   clutter free environment maintained   assistive device/personal items within reach   activity supervised  Taken 12/2/2021 2100 by Hailey Joseph RN  Safety Promotion/Fall Prevention:   safety round/check completed   nonskid shoes/slippers when out of bed   clutter free environment maintained   assistive device/personal items within reach   activity supervised  Taken 12/2/2021 2000 by Hailey Joseph RN  Safety Promotion/Fall Prevention:   safety round/check completed   nonskid shoes/slippers when out of bed   clutter free environment maintained   assistive device/personal items within reach   activity supervised  Taken 12/2/2021 1900 by Hailey Joseph RN  Safety Promotion/Fall Prevention:   safety round/check completed   nonskid shoes/slippers when out of bed   clutter free environment maintained   assistive device/personal items within reach   activity supervised     Problem: Skin Injury Risk Increased  Goal: Skin Health and Integrity  Outcome: Ongoing, Progressing  Intervention: Optimize Skin Protection  Recent Flowsheet Documentation  Taken 12/3/2021 0000 by Hailey Joseph RN  Head of Bed (HOB): HOB elevated  Taken 12/2/2021 2200 by Opal  LINO Hart  Head of Bed (hospitals): HOB elevated  Taken 12/2/2021 2000 by Hailey Joseph RN  Head of Bed (hospitals): HOB elevated  Taken 12/2/2021 1900 by Hailey Joseph RN  Head of Bed (hospitals): HOB elevated     Problem: Chest Pain  Goal: Resolution of Chest Pain Symptoms  Outcome: Ongoing, Progressing

## 2021-12-03 NOTE — NURSING NOTE
"Physicians Statement of Medical Necessity for  Ambulance Transportation    GENERAL INFORMATION     Name: Janelle Millard  YOB: 1955  Medicare #: [id number]  Transport Date: 12/3/2021 (Valid for round trips this date, or for scheduled repetitive trips for 60 days from the date signed below.)  Origin: [facility]  Destination: [place]  Is the Patient's stay covered under Medicare Part A (PPS/DRG?)Yes  Closest appropriate facility? Yes  If this a hosp-hosp transfer? No  Is this a hospice patient? No    MEDICAL NECESSITY QUESTIONAIRE    Ambulance Transportation is medically necessary only if other means of transportation are contraindicated or would be potentially harmful to the patient.  To meet this requirement, the patient must be either \"bed confined\" or suffer from a condition such that transport by means other than an ambulance is contraindicated by the patient's condition.  The following questions must be answered by the healthcare professional signing below for this form to be valid:     1) Describe the MEDICAL CONDITION (physical and/or mental) of this patient AT THE TIME OF AMBULANCE TRANSPORT that requires the patient to be transported in an ambulance, and why transport by other means is contraindicated by the patient's condition: Rheumatoid arthritis, bedbound        2) Is this patient \"bed confined\" as defined below?Yes   To be \"bed confined\" the patient must satisfy all three of the following criteria:  (1) unable to get up from bed without assistance; AND (2) unable to ambulate;  AND (3) unable to sit in a chair or wheelchair.  3) Can this patient safely be transported by car or wheelchair van (I.e., may safely sit during transport, without an attendant or monitoring?)Yes  4. In addition to completing questions 1-3 above, please check any of the following conditions that apply*:          *Note: supporting documentation for any boxes checked must be maintained in the patient's medical " records Moderate/severe pain on movement and Unable to tolerate seated position for time needed to transport      SIGNATURE OF PHYSICIAN OR OTHER AUTHORIZED HEALTHCARE PROFESSIONAL    I certify that the above information is true and correct based on my evaluation of this patient, and represent that the patient requires transport by ambulance and that other forms of transport are contraindicated.  I understand that this information will be used by the Centers for Medicare and Medicaid Services (CMS) to support the determiniation of medical necessity for ambulance services, and I represent that I have personal knowledge of the patient's condition at the time of transport.       If this box is checked, I also certify that the patient is physically or mentally incapable of signing the ambulance service's claim form and that the institution with which I am affiliated has furnished care, services or assistance to the patient.  My signature below is made on behalf of the patient pursuant to 42 .36(b)(4). In accordance with 42 .37, the specific reason(s) that the patient is physically or mentally incapable of signing the claim for is as follows:     Signature of Physician or Healthcare Professional   Judy Vera RN Date/Time:   12/3/2021 1234     (For Scheduled repetitive transport, this form is not valid for transports performed more than 60 days after this date).                                                                                                                                            --------------------------------------------------------------------------------------------  Printed Name and Credentials of Physician or Authorized Healthcare Professional     *Form must be signed by patient's attending physician for scheduled, repetitive transports,.  For non-repetitive ambulance transports, if unable to obtain the signature of the attending physician, any of the following may sign (please  select below):     Physician  Clinical Nurse Specialist  Registered Nurse     Physician Assistant  Discharge Planner  Licensed Practical Nurse     Nurse Practitioner x

## 2021-12-03 NOTE — DISCHARGE SUMMARY
[facility] HOSPITALIST MEDICINE DISCHARGE SUMMARY    Patient Identification:  Name:  Janelle Millard  Age:  66 y.o.  Sex:  female  :  1955  MRN:  [id number]  Visit Number:  [id number]    Date of Admission: 2021  Date of Discharge:  12/3/2021     PCP: Yosef Casey MD  ADMITTING DIAGNOSIS  Chest pain rule out acute coronary syndrome  Diabetes mellitus  Hypertension  Depression  Short-term memory loss  History of coronary artery disease  Rheumatoid arthritis  Elevated D-dimer with no evidence of hypoxia or tachycardia.  Bilateral lower extremity edema  Normochromic anemia    DISCHARGE DIAGNOSIS  Stable angina ACS ruled out  Diabetes mellitus  Hypertension  Depression  Short-term memory loss  History of coronary artery disease  Rheumatoid arthritis  Elevated D-dimer  Bilateral lower extremity edema  Normochromic anemia    CONSULTS   None    PROCEDURES PERFORMED  None    HOSPITAL COURSE  Patient is a 66 y.o. female presented to [facility] complaining of left-sided chest pain.  Patient is a nursing home resident bedbound most of the time due to rheumatoid arthritis, she  has history of coronary artery disease, diabetes mellitus, anxiety and depression.  She presented with left-sided chest pain which she described as sharp, sudden onset, 8/10 intensity with no radiation.  Her EKG did not show any acute changes and she had normal troponin.  At time of admission patient was actually pain-free.  Her D-dimer was elevated but she was not short of breath and she was not tachypneic.  Bilateral Doppler ultrasound of the lower extremities did not show any DVT.  Of note patient has history of recurrent UTI.  Her urine however did not show any pyuria and there was no bacteria seen.  She received a dose of Rocephin in the ED but this was not continued.  She has remained pain-free and will be discharged back to the nursing home..      VITAL SIGNS:      21  1344  12/03/21  0949   Weight: 86.2 kg (190 lb) 86.2 kg (190 lb 0.6 oz)     Vital Signs (last 24 hours)       12/02 0700  12/03 0659 12/03 0700 12/03 1146   Most Recent      Temp (°F) 97.4 -  98.4    96.6 -  97.5     96.6 (35.9) 12/03 1141    Heart Rate 68 -  94    85 -  96     88 12/03 1141    Resp 18 -  19      18     18 12/03 1141    /58 -  194/83    151/67 -  168/72     151/67 12/03 1141    SpO2 (%) 93 -  96      95     95 12/03 1141          Body mass index is 28.05 kg/m².    PHYSICAL EXAM:  Constitutional: Middle aged lady, tearful intermittently but not in acute distress.  No respiratory distress.      HENT:  Head: Normocephalic and atraumatic.  Mouth:  Moist mucous membranes.    Eyes:  Conjunctivae and EOM are normal.  Pupils are equal, round, and reactive to light.  No scleral icterus.  Neck:  Neck supple.  No JVD present.    Cardiovascular:  Normal rate, regular rhythm and normal heart sounds with no murmur.  Pulmonary/Chest:  No respiratory distress, no wheezes, no crackles, with normal breath sounds and good air movement.  Abdominal:  Soft.  Bowel sounds are normal.  No distension and no tenderness.   Musculoskeletal: 1+ bilateral lower extremity edema, no tenderness, and deformity of the right MCP joint.  No red or swollen joints anywhere.    Neurological:  Alert and oriented to person, seemed confused.  No cranial nerve deficit.  No tongue deviation.  No facial droop.  No slurred speech.   Skin:  Skin is warm and dry.  No rash noted.  No pallor.   Psychiatric:  Normal mood and affect.  Behavior is normal.  Judgment and thought content normal.   Peripheral vascular: 1+ edema edema and strong pulses on all 4 extremities.  Genitourinary: Deferred    LPABS:  Lab Results (last 48 hours)     Procedure Component Value Units Date/Time    Basic Metabolic Panel [[id number]]  (Abnormal) Collected: 12/03/21 0436    Specimen: Blood Updated: 12/03/21 0609     Glucose 270 mg/dL      BUN 14 mg/dL      Creatinine 0.56  mg/dL      Sodium 136 mmol/L      Potassium 4.4 mmol/L      Comment: Slight hemolysis detected by analyzer. Results may be affected.        Chloride 101 mmol/L      CO2 23.0 mmol/L      Calcium 9.7 mg/dL      eGFR Non African Amer 108 mL/min/1.73      BUN/Creatinine Ratio 25.0     Anion Gap 12.0 mmol/L     Narrative:      GFR Normal >60  Chronic Kidney Disease <60  Kidney Failure <15      TSH [[id number]]  (Normal) Collected: 12/03/21 0436    Specimen: Blood Updated: 12/03/21 0600     TSH 2.200 uIU/mL     Basic Metabolic Panel [[id number]]  (Abnormal) Collected: 12/02/21 2015    Specimen: Blood Updated: 12/02/21 2039     Glucose 152 mg/dL      BUN 15 mg/dL      Creatinine 0.54 mg/dL      Sodium 140 mmol/L      Potassium 3.8 mmol/L      Chloride 105 mmol/L      CO2 26.0 mmol/L      Calcium 9.5 mg/dL      eGFR Non African Amer 112 mL/min/1.73      BUN/Creatinine Ratio 27.8     Anion Gap 9.0 mmol/L     Narrative:      GFR Normal >60  Chronic Kidney Disease <60  Kidney Failure <15      Troponin [[id number]]  (Normal) Collected: 12/02/21 2015    Specimen: Blood Updated: 12/02/21 2039     Troponin T <0.010 ng/mL     Narrative:      Troponin T Reference Range:  <= 0.03 ng/mL-   Negative for AMI  >0.03 ng/mL-     Abnormal for myocardial necrosis.  Clinicians would have to utilize clinical acumen, EKG, Troponin and serial changes to determine if it is an Acute Myocardial Infarction or myocardial injury due to an underlying chronic condition.       Results may be falsely decreased if patient taking Biotin.      Extra Tubes [[id number]] Collected: 12/02/21 1350    Specimen: Blood, Venous Line Updated: 12/02/21 1800    Narrative:      The following orders were created for panel order Extra Tubes.  Procedure                               Abnormality         Status                     ---------                               -----------         ------                     [place] Top[[id number]]                                          Final result                 Please view results for these tests on the individual orders.    Gray Top [[id number]] Collected: 12/02/21 1350    Specimen: Blood Updated: 12/02/21 1800     Extra Tube Hold for add-ons.     Comment: Auto resulted.       Hemoglobin A1c [[id number]]  (Abnormal) Collected: 12/02/21 1347    Specimen: Blood Updated: 12/02/21 1744     Hemoglobin A1C 7.90 %     Narrative:      Hemoglobin A1C Ranges:    Increased Risk for Diabetes  5.7% to 6.4%  Diabetes                     >= 6.5%  Diabetic Goal                < 7.0%    Urinalysis, Microscopic Only - Urine, Catheter [[id number]]  (Abnormal) Collected: 12/02/21 1529    Specimen: Urine, Catheter Updated: 12/02/21 1537     RBC, UA Too Numerous to Count /HPF      WBC, UA 21-30 /HPF      Bacteria, UA None Seen /HPF      Squamous Epithelial Cells, UA 3-5 /HPF      Hyaline Casts, UA 3-6 /LPF      Methodology Automated Microscopy    Urinalysis With Microscopic If Indicated (No Culture) - Urine, Catheter [[id number]]  (Abnormal) Collected: 12/02/21 1529    Specimen: Urine, Catheter Updated: 12/02/21 1534     Color, UA Yellow     Appearance, UA Clear     pH, UA 7.0     Specific Gravity, UA 1.015     Glucose, UA Negative     Ketones, UA Negative     Bilirubin, UA Negative     Blood, UA Moderate (2+)     Protein, UA 30 mg/dL (1+)     Leuk Esterase, UA Moderate (2+)     Nitrite, UA Negative     Urobilinogen, UA 0.2 E.U./dL    COVID-19 and FLU A/B PCR - Swab, Nasopharynx [[id number]]  (Normal) Collected: 12/02/21 1439    Specimen: Swab from Nasopharynx Updated: 12/02/21 1526     COVID19 Not Detected     Influenza A PCR Not Detected     Influenza B PCR Not Detected    Narrative:      Fact sheet for providers: https://www.fda.gov/media/228865/download    Fact sheet for patients: https://www.fda.gov/media/116571/download    Test performed by PCR.    [place] Draw [[id number]] Collected: 12/02/21 1347    Specimen: Blood Updated: 12/02/21 1500    Narrative:      The  following orders were created for panel order [place] Draw.  Procedure                               Abnormality         Status                     ---------                               -----------         ------                     Green Top (Gel)[[id number]]                                  Final result               Lavender Top[[id number]]                                     Final result               Gold Top - SST[[id number]]                                   Final result               Light Blue Top[[id number]]                                   Final result                 Please view results for these tests on the individual orders.    Lavender Top [[id number]] Collected: 12/02/21 1347    Specimen: Blood Updated: 12/02/21 1500     Extra Tube hold for add-on     Comment: Auto resulted       Green Top (Gel) [[id number]] Collected: 12/02/21 1347    Specimen: Blood Updated: 12/02/21 1500     Extra Tube Hold for add-ons.     Comment: Auto resulted.       Light Blue Top [[id number]] Collected: 12/02/21 1347    Specimen: Blood Updated: 12/02/21 1500     Extra Tube hold for add-on     Comment: Auto resulted       Gold Top - SST [[id number]] Collected: 12/02/21 1347    Specimen: Blood Updated: 12/02/21 1500     Extra Tube Hold for add-ons.     Comment: Auto resulted.       D-dimer, Quantitative [[id number]]  (Abnormal) Collected: 12/02/21 1347    Specimen: Blood Updated: 12/02/21 1429     D-Dimer, Quantitative 2,611 ng/mL (FEU)     Narrative:      Dimer values <500 ng/ml FEU are FDA approved as aid in diagnosis of deep venous thrombosis and pulmonary embolism.  This test should not be used in an exclusion strategy with pretest probability alone.    A recent guideline regarding diagnosis for pulmonary thromboembolism recommends an adjusted exclusion criterion of age x 10 ng/ml FEU for patients >50 years of age ([employer] Intern Med 2015; 163: 701-711).      aPTT [[id number]]  (Normal) Collected: 12/02/21 1347    Specimen: Blood  Updated: 12/02/21 1428     PTT 30.2 seconds     Narrative:      The recommended Heparin therapeutic range is 68-97 seconds.    Protime-INR [[id number]]  (Abnormal) Collected: 12/02/21 1347    Specimen: Blood Updated: 12/02/21 1428     Protime 15.3 Seconds      INR 1.23    Narrative:      Therapeutic range for most indications is 2.0-3.0 INR,  or 2.5-3.5 for mechanical heart valves.    Troponin [[id number]]  (Normal) Collected: 12/02/21 1347    Specimen: Blood Updated: 12/02/21 1412     Troponin T <0.010 ng/mL     Narrative:      Troponin T Reference Range:  <= 0.03 ng/mL-   Negative for AMI  >0.03 ng/mL-     Abnormal for myocardial necrosis.  Clinicians would have to utilize clinical acumen, EKG, Troponin and serial changes to determine if it is an Acute Myocardial Infarction or myocardial injury due to an underlying chronic condition.       Results may be falsely decreased if patient taking Biotin.      Comprehensive Metabolic Panel [[id number]]  (Abnormal) Collected: 12/02/21 1347    Specimen: Blood Updated: 12/02/21 1412     Glucose 88 mg/dL      BUN 17 mg/dL      Creatinine 0.68 mg/dL      Sodium 139 mmol/L      Potassium 3.9 mmol/L      Chloride 103 mmol/L      CO2 30.0 mmol/L      Calcium 9.8 mg/dL      Total Protein 7.1 g/dL      Albumin 3.50 g/dL      ALT (SGPT) 12 U/L      AST (SGOT) 21 U/L      Alkaline Phosphatase 121 U/L      Total Bilirubin 0.3 mg/dL      eGFR Non African Amer 86 mL/min/1.73      Globulin 3.6 gm/dL      A/G Ratio 1.0 g/dL      BUN/Creatinine Ratio 25.0     Anion Gap 6.0 mmol/L     Narrative:      GFR Normal >60  Chronic Kidney Disease <60  Kidney Failure <15      BNP [[id number]]  (Normal) Collected: 12/02/21 1347    Specimen: Blood Updated: 12/02/21 1410     proBNP 698.2 pg/mL     Narrative:      Among patients with dyspnea, NT-proBNP is highly sensitive for the detection of acute congestive heart failure. In addition NT-proBNP of <300 pg/ml effectively rules out acute congestive heart  failure with 99% negative predictive value.    Results may be falsely decreased if patient taking Biotin.      CBC & Differential [[id number]]  (Abnormal) Collected: 12/02/21 1347    Specimen: Blood Updated: 12/02/21 1353    Narrative:      The following orders were created for panel order CBC & Differential.  Procedure                               Abnormality         Status                     ---------                               -----------         ------                     CBC Auto Differential[[id number]]        Abnormal            Final result                 Please view results for these tests on the individual orders.    CBC Auto Differential [[id number]]  (Abnormal) Collected: 12/02/21 1347    Specimen: Blood Updated: 12/02/21 1353     WBC 6.70 10*3/mm3      RBC 3.53 10*6/mm3      Hemoglobin 10.2 g/dL      Hematocrit 31.0 %      MCV 87.8 fL      MCH 28.9 pg      MCHC 32.9 g/dL      RDW 17.9 %      RDW-SD 57.1 fl      MPV 10.1 fL      Platelets 317 10*3/mm3      Neutrophil % 57.3 %      Lymphocyte % 24.8 %      Monocyte % 11.5 %      Eosinophil % 5.4 %      Basophil % 0.9 %      Immature Grans % 0.1 %      Neutrophils, Absolute 3.84 10*3/mm3      Lymphocytes, Absolute 1.66 10*3/mm3      Monocytes, Absolute 0.77 10*3/mm3      Eosinophils, Absolute 0.36 10*3/mm3      Basophils, Absolute 0.06 10*3/mm3      Immature Grans, Absolute 0.01 10*3/mm3      nRBC 0.0 /100 WBC         IMAGING  Imaging Results (All)     Procedure Component Value Units Date/Time    US Venous Doppler Lower Extremity Bilateral (duplex) [[id number]] Collected: 12/02/21 1717     Updated: 12/02/21 1816    Narrative:        PROCEDURE: US LOWER EXTREMITY VEINS BILATERAL, 12/2/2021 5:17 PM  CST    COMPARISON: None available.    CLINICAL INDICATION:    Hieu. leg swelling, R07.9 Chest pain,  unspecified D64.9 Anemia, unspecified R79.89 Other specified  abnormal findings of blood chemistry N39.0 Urinary tract  infection, site not specified A49.9  Bacterial infection,  unspecified.    TECHNIQUE:  Multiple gray-scale, color Doppler, and spectral  waveform sonographic images were obtained of the bilateral lower  extremities.    FINDINGS:    Examination was limited secondary to patient difficulty  tolerating probe pressure per the sonographer.    Given this limitation, no definite evidence of thrombus  identified within the bilateral common femoral veins, superficial  femoral veins, and popliteal veins where imaged.     No popliteal fossa cysts identified.        Impression:        No definite evidence for bilateral lower extremity deep venous  thrombosis identified, where visualized, as above.     Note was made that examination was limited secondary to patient  difficulty tolerating probe pressure per the sonographer.  Consider repeat examination if/as clinically warranted.    Electronically signed by:  Mansoor Parsons MD  12/2/2021 6:15 PM  CST Workstation: [id number]    XR Chest 1 View [[id number]] Collected: 12/02/21 1344     Updated: 12/02/21 1417    Narrative:      EXAM DESCRIPTION:     XR CHEST 1 VW    CLINICAL HISTORY:     66 years  Female  Chest Pain triage protocol  Chest Pain Triage Protocol    COMPARISON:     March 28, 2021    TECHNIQUE:     One view-AP  erect radiograph of the chest    FINDINGS:     There is chronic elevation the left hemidiaphragm with mild  compressive atelectasis in the left lung base. The right lung is  clear.    There is evidence of prior median sternotomy. There is no  evidence of acute congestive heart failure.      Impression:          1. Mild chronic elevation of the left hemidiaphragm with mild  left basilar atelectasis.          Electronically signed by:  Carole Granda MD  12/2/2021 2:16 PM  CST Workstation: [id number]        ECG      ECHO - Result pending    DISCHARGE DISPOSITION   Patient condition at time of discharge stable  She will be discharge back to the skilled nursing facility    DISCHARGE MEDICATIONS:     Discharge  Medications      Continue These Medications      Instructions Start Date   acetaminophen 325 MG tablet  Commonly known as: TYLENOL   650 mg, Oral, 3 Times Daily      aspirin 81 MG EC tablet   81 mg, Oral, Daily      atorvastatin 20 MG tablet  Commonly known as: LIPITOR   20 mg, Oral, Nightly      B-D ULTRAFINE III SHORT PEN 31G X 8 MM misc  Generic drug: Insulin Pen Needle   No dose, route, or frequency recorded.      carvedilol 12.5 MG tablet  Commonly known as: COREG   TAKE 1 TABLET BY MOUTH TWICE DAILY      Centrum Silver 50+Women tablet tablet  Generic drug: multivitamin with minerals   1 tablet, Oral, Daily      chlorthalidone 25 MG tablet  Commonly known as: HYGROTON   25 mg, Oral, Daily      Dexcom G6 Sensor   Does not apply, As Needed, Every 10 daysDexcom G6 Sensor Kit [address]-98 Use as directed for continuous glucose monitoring      dilTIAZem 30 MG tablet  Commonly known as: CARDIZEM   30 mg, Oral, Every 6 Hours Scheduled      glucose blood test strip   Livongot test strips use to test sugar 4 times per day. E 10.9      insulin aspart 100 UNIT/ML solution pen-injector sc pen  Commonly known as: novoLOG FLEXPEN   150-200=2 units, 201-250=3 units, 251-300=5 units, 301-350=7 units, 351-400=9 units, 401-450=12 units, >450=15 units       isosorbide mononitrate 30 MG 24 hr tablet  Commonly known as: IMDUR   30 mg, Oral, Daily      lidocaine 5 %  Commonly known as: LIDODERM   1 patch, Transdermal, Every 24 Hours, Remove & Discard patch within 12 hours or as directed by MD      melatonin 5 MG tablet tablet   5 mg, Oral, Nightly      nitroglycerin 0.2 MG/HR patch  Commonly known as: NITRODUR   1 patch, Transdermal, Daily      PARoxetine 20 MG tablet  Commonly known as: PAXIL   20 mg, Oral, Nightly      Synthroid 25 MCG tablet  Generic drug: levothyroxine   25 mcg, Oral, Daily      Toujeo Max SoloStar 300 UNIT/ML solution pen-injector injection  Generic drug: Insulin Glargine (2 Unit Dial)   28 Units,  Subcutaneous, Nightly      traMADol 50 MG tablet  Commonly known as: ULTRAM   50 mg, Oral, Every 6 Hours PRN      TRUEplus Lancets 33G misc   No dose, route, or frequency recorded.      Vitamin D (Cholecalciferol) 50 MCG (2000 UT) capsule   1 capsule, Oral, Daily             Diet Instructions     Diet: Consistent Carbohydrate, Cardiac      Discharge Diet:  Consistent Carbohydrate  Cardiac             Activity Instructions     Activity as Tolerated          Additional Instructions for the Follow-ups that You Need to Schedule     Discharge Follow-up with PCP   As directed       Currently Documented PCP:    Yosef Casey MD    PCP Phone Number:    [phone]     Follow Up Details: Post admission f/up in 1 week            Follow-up Information     Yosef Casey MD .    Specialty: Family Medicine  Why: Post admission f/up in 1 week  Contact information:  [address] [address]  [phone]                         TEST  RESULTS PENDING AT DISCHARGE  ECHOCARDIOGRAM     Marcus Jimenes MD  12/03/21  11:45 CST    Please note that this discharge summary required more than 30 minutes to complete.    Please send a copy of this dictation to the following providers:  Yosef Casey MD Dragon disclaimer:  Part of this note may be an electronic transcription/translation of spoken language to printed text using the Dragon Dictation System.

## 2021-12-03 NOTE — DISCHARGE PLACEMENT REQUEST
"Kiley Millard (66 y.o. Female)             Date of Birth Social Security Number Address Home Phone MRN    1955  [id number]  [place] [address] [phone] [id number]    Buddhism Marital Status             Moravian        Admission Date Admission Type Admitting Provider Attending Provider Department, Room/Bed    12/2/21 Emergency Marcus Jimenes MD Popescu, Tudor, MD [address], [address]    Discharge Date Discharge Disposition Discharge Destination           Skilled Nursing Facility (DC - External)              Attending Provider: Narciso Nowak MD    Allergies: Contrast Dye, Corticosteroids, Codeine, Aspirin, Ibuprofen    Isolation: None   Infection: None   Code Status: No CPR   Advance Care Planning Activity    Ht: 175.3 cm (69.02\")   Wt: 86.2 kg (190 lb 0.6 oz)    Admission Cmt: None   Principal Problem: None                Active Insurance as of 12/2/2021     Primary Coverage     Payor Plan Insurance Group Employer/Plan Group    MEDICARE MEDICARE A & B      Payor Plan Address Payor Plan Phone Number Payor Plan Fax Number Effective Dates    [address] [phone]  11/1/2012 - None Entered    [facility] [id number]       Subscriber Name Subscriber Birth Date Member ID       KILEY MILLARD 1955 [id number]           Secondary Coverage     Payor Plan Insurance Group Employer/Plan Group    AETNA COMMERCIAL AETNA  [employer] SUPP [id number]     Payor Plan Address Payor Plan Phone Number Payor Plan Fax Number Effective Dates    [address] [phone]  10/1/2018 - None Entered    [facility] [id number]       Subscriber Name Subscriber Birth Date Member ID       KILEY MILLARD 1955 [id number]                 Emergency Contacts      (Rel.) Home Phone Work Phone Mobile Phone    GONZALESHOANG (POA/NIECE) (Relative) [phone] -- [phone]    LionelAlexey shepherd (Brother) [phone] -- --            Insurance " Information                MEDICARE/MEDICARE A & B Phone: [phone]    Subscriber: Janelle Millard Subscriber#: [id number]    Group#: -- Precert#: --        AETNA COMMERCIAL/AETNA  [facility] Phone: [phone]    Subscriber: Janelle Millard Subscriber#: [id number]    Group#: [id number] Precert#: --

## 2021-12-05 PROBLEM — A41.9 SEPSIS (HCC): Status: ACTIVE | Noted: 2021-01-01

## 2021-12-05 NOTE — ED NOTES
CCU called for report, nurses are in report/ in huddle and unable to take report at this time.      Norbert Lindsay RN  12/05/21 2004

## 2021-12-05 NOTE — H&P
[facility] Medicine Admission      Date of Admission: 12/5/2021      Primary Care Physician: Yosef Casey MD      Chief Complaint: Altered mental status    HPI:  Patient is a multimorbid 66-year-old female resident of a skilled nursing facility debilitated, bed and chair bounded, with known history of diabetes mellitus diabetic neuropathy rheumatoid arthritis, hypertension multivessel coronary artery disease, retinopathy, recent hospitalization for chest pain, has been in nursing home on antibiotic to left upper extremity PICC line for reported urinary tract infection with history of recurrent urinary tract infection was sent from skilled nursing facility to ED concerning altered mental status.  Hospitalist service was called for admission of the patient concerning sepsis, potential pneumonia and urinary tract infection.  ED attending history of present illness note pending at the time of this dictation.  I have seen and examined patient in ED room 3.  Her niece Violet Maldonado who is patient's healthcare proxy is at bedside.  Patient does not provide any information, she is tachypneic and on intermittently ask for water, does not provide any specific information.  Per Violet evening nurse around 6 PM checked on the patient and noticed that patient was generally weak and had altered mental status.  Through the night her condition deteriorated and patient was brought subsequently to ED.  She may had fever.  No other specific information is available at this time.    Concurrent Medical History:  has a past medical history of Arthritis, rheumatoid (HCC), Arthropathy associated with neurological disorder, Arthropathy of lumbar facet joint, Asthma, Benign hypertension, Carpal tunnel syndrome, Diabetes (HCC), Diabetic polyneuropathy (HCC), Dyslipidemia, Fallen arches, Hammer toe, Heart disease, Joint pain, Mild depression (HCC), Multiple vessel coronary artery disease,  Myofascial pain, Neurologic disorder associated with diabetes mellitus (HCC), Neuropathy, Onychomycosis, Peripheral vascular disease (HCC), Retinopathy, Tobacco user, and Type 1 diabetes mellitus (HCC).    Past Surgical History:  has a past surgical history that includes Carpal tunnel release (Right, 10/15/2014); Cardiac surgery (02/21/2014); Cystectomy (Left); Cataract extraction, bilateral (Bilateral); Carpal tunnel release; Coronary artery bypass graft (02/24/2014); Toe Nail Avulsion (09/03/2015); Foot surgery (10/18/2011); Nerve Block (09/14/2015); Eye surgery; Lumbar spine surgery (11/09/2015); Excision Lesion; and cystoscopy, ureteroscopy, retrograde pyelogram, stent insertion (Left, 3/10/2021).    Family History: family history includes Asthma in an other family member; Cancer in an other family member; Coronary artery disease in an other family member; Diabetes in her father, sister, and another family member; Heart disease in her father, mother, and sister; Hyperlipidemia in an other family member; Hypertension in an other family member; Osteoporosis in her mother and another family member.     Social History:  reports that she has quit smoking. Her smoking use included cigarettes. She has never used smokeless tobacco. She reports that she does not drink alcohol and does not use drugs.    Allergies:   Allergies   Allergen Reactions   • Contrast Dye Shortness Of Breath and Itching   • Corticosteroids Other (See Comments)     Pt diabetic makes sugar go up    • Codeine Itching   • Aspirin Unknown - Low Severity   • Ibuprofen Unknown - Low Severity       Medications:   Prior to Admission medications    Medication Sig Start Date End Date Taking? Authorizing Provider   acetaminophen (TYLENOL) 325 MG tablet Take 650 mg by mouth 3 (Three) Times a Day.    Provider, MD Christiano   aspirin 81 MG EC tablet Take 81 mg by mouth Daily.    Provider, MD Christiano   atorvastatin (LIPITOR) 20 MG tablet Take 1 tablet by  mouth Every Night. 9/16/19   Jorden Sanz MD   B-D ULTRAFINE III SHORT PEN 31G X 8 MM misc  8/8/16   Christiano Coates MD   carvedilol (COREG) 12.5 MG tablet TAKE 1 TABLET BY MOUTH TWICE DAILY 4/27/20   Christiano Coates MD   chlorthalidone (HYGROTON) 25 MG tablet Take 1 tablet by mouth Daily. 3/19/21   Sukhjinder Trivedi MD   Continuous Blood Gluc Sensor (DEXCOM G6 SENSOR) As Needed (glucose control). Every 10 daysDexcom G6 Sensor Kit [address]-16 Use as directed for continuous glucose monitoring 10/21/19   Dominic Willard MD   dilTIAZem (CARDIZEM) 30 MG tablet Take 1 tablet by mouth Every 6 (Six) Hours. 3/19/21   Sukhjinder Trivedi MD   glucose blood test strip Livongot test strips use to test sugar 4 times per day. E 10.9 12/7/20   Sophia Padron APRN   insulin aspart (novoLOG FLEXPEN) 100 UNIT/ML solution pen-injector sc pen 150-200=2 units, 201-250=3 units, 251-300=5 units, 301-350=7 units, 351-400=9 units, 401-450=12 units, >450=15 units 12/7/20   Sophia Padron APRN   Insulin Glargine, 2 Unit Dial, (Toujeo Max SoloStar) 300 UNIT/ML solution pen-injector injection Inject 28 Units under the skin into the appropriate area as directed Every Night. 12/7/20   Sophia Padron APRN   isosorbide mononitrate (IMDUR) 30 MG 24 hr tablet Take 30 mg by mouth Daily. 7/18/16   Christiano Coates MD   levothyroxine (SYNTHROID) 25 MCG tablet Take 25 mcg by mouth Daily.    Christiano Coates MD   lidocaine (LIDODERM) 5 % Place 1 patch on the skin as directed by provider Daily. Remove & Discard patch within 12 hours or as directed by MD 3/29/21   Meghna Reinoso APRN   melatonin 5 MG tablet tablet Take 5 mg by mouth Every Night.    Christiano Coates MD   Multiple Vitamins-Minerals (Centrum Silver 50+Women) tablet Take 1 tablet by mouth Daily.    Christiano Coates MD   nitroglycerin (NITRODUR) 0.2 MG/HR patch Place 1 patch on the skin as directed by provider Daily. 6/23/16   Provider,  MD Christiano   PARoxetine (PAXIL) 20 MG tablet Take 20 mg by mouth Every Night. 8/30/16   Christiano Coates MD   traMADol (ULTRAM) 50 MG tablet Take 1 tablet by mouth Every 6 (Six) Hours As Needed for Moderate Pain . 3/29/21   Meghna Reinoso APRN   TRUEPLUS LANCETS 33G misc  7/6/16   Christiano Coates MD   Vitamin D, Cholecalciferol, 50 MCG (2000 UT) capsule Take 1 capsule by mouth Daily.    Christiano Coates MD       Review of Systems:  Review of Systems   Review of systems not obtainable secondary to patient's altered mental status.    Physical Exam:   Temp:  [98.4 °F (36.9 °C)] 98.4 °F (36.9 °C)  Heart Rate:  [130-132] 130  Resp:  [22-30] 28  BP: (165-213)/() 165/79  Physical Exam  Constitutional:       General: She is not in acute distress.     Appearance: She is normal weight. She is ill-appearing. She is not toxic-appearing or diaphoretic.      Comments: Acute on chronically ill looking female   HENT:      Head: Normocephalic and atraumatic.      Right Ear: External ear normal.      Left Ear: External ear normal.      Nose: Nose normal.      Mouth/Throat:      Mouth: Mucous membranes are dry.      Pharynx: Oropharynx is clear.   Eyes:      Extraocular Movements: Extraocular movements intact.      Conjunctiva/sclera: Conjunctivae normal.      Pupils: Pupils are equal, round, and reactive to light.   Cardiovascular:      Rate and Rhythm: Regular rhythm. Tachycardia present.      Heart sounds: No murmur heard.  No friction rub. No gallop.    Pulmonary:      Effort: No respiratory distress.      Breath sounds: No stridor. Rhonchi present. No wheezing or rales.      Comments: Tachypneic  Chest:      Chest wall: No tenderness.   Abdominal:      General: Abdomen is flat. There is no distension.      Palpations: Abdomen is soft.      Tenderness: There is no abdominal tenderness. There is no guarding or rebound.   Musculoskeletal:         General: No swelling or tenderness.      Cervical back: No  rigidity or tenderness.      Right lower leg: No edema.      Left lower leg: No edema.      Comments: Range of movement and muscular strength examination not obtainable secondary to patient, lack of compliance.  Left upper extremity PICC line present.   Lymphadenopathy:      Cervical: No cervical adenopathy.   Skin:     General: Skin is warm and dry.      Coloration: Skin is not jaundiced.      Findings: No erythema.   Neurological:      Mental Status: She is alert.      Sensory: No sensory deficit.      Motor: No weakness.      Coordination: Coordination normal.      Comments: Awake, does not follow commands.    Psychiatric:      Comments: Not obtainable at this time secondary to altered mental status.           Results Reviewed:  I have personally reviewed current lab, radiology, and data and agree with results.  Lab Results (last 24 hours)     Procedure Component Value Units Date/Time    Urinalysis With Culture If Indicated - Urine, Catheter [[id number]]  (Abnormal) Collected: 12/05/21 0447    Specimen: Urine, Catheter Updated: 12/05/21 0522     Color, UA Yellow     Appearance, UA Clear     pH, UA 6.0     Specific Gravity, UA 1.025     Glucose,  mg/dL (1+)     Ketones, UA 40 mg/dL (2+)     Bilirubin, UA Negative     Blood, UA Large (3+)     Protein,  mg/dL (2+)     Leuk Esterase, UA Small (1+)     Nitrite, UA Negative     Urobilinogen, UA 0.2 E.U./dL    Urinalysis, Microscopic Only - Urine, Catheter [[id number]] Collected: 12/05/21 0447    Specimen: Urine, Catheter Updated: 12/05/21 0521    [place] Draw [[id number]] Collected: 12/05/21 0337    Specimen: Blood Updated: 12/05/21 0445    Narrative:      The following orders were created for panel order [place] Draw.  Procedure                               Abnormality         Status                     ---------                               -----------         ------                     Green Top (Gel)[[id number]]                                  Final  result               Lavender Top[[id number]]                                     Final result               Gold Top - SST[[id number]]                                   Final result               Light Blue Top[[id number]]                                   Final result                 Please view results for these tests on the individual orders.    Light Blue Top [[id number]] Collected: 12/05/21 0337    Specimen: Blood Updated: 12/05/21 0445     Extra Tube hold for add-on     Comment: Auto resulted       Lavender Top [[id number]] Collected: 12/05/21 0337    Specimen: Blood Updated: 12/05/21 0445     Extra Tube hold for add-on     Comment: Auto resulted       Green Top (Gel) [[id number]] Collected: 12/05/21 0337    Specimen: Blood Updated: 12/05/21 0445     Extra Tube Hold for add-ons.     Comment: Auto resulted.       Gold Top - SST [[id number]] Collected: 12/05/21 0337    Specimen: Blood Updated: 12/05/21 0445     Extra Tube Hold for add-ons.     Comment: Auto resulted.       COVID-19 and FLU A/B PCR - Swab, Nasopharynx [[id number]]  (Normal) Collected: 12/05/21 0352    Specimen: Swab from Nasopharynx Updated: 12/05/21 0439     COVID19 Not Detected     Influenza A PCR Not Detected     Influenza B PCR Not Detected    Narrative:      Fact sheet for providers: https://www.fda.gov/media/803363/download    Fact sheet for patients: https://www.fda.gov/media/218798/download    Test performed by PCR.    Comprehensive Metabolic Panel [[id number]]  (Abnormal) Collected: 12/05/21 0337    Specimen: Blood Updated: 12/05/21 0426     Glucose 384 mg/dL      BUN 36 mg/dL      Creatinine 0.84 mg/dL      Sodium 135 mmol/L      Potassium 4.2 mmol/L      Comment: Slight hemolysis detected by analyzer. Results may be affected.        Chloride 99 mmol/L      CO2 20.0 mmol/L      Calcium 11.4 mg/dL      Total Protein 8.2 g/dL      Albumin 4.20 g/dL      ALT (SGPT) 152 U/L      AST (SGOT) 378 U/L      Comment: Slight hemolysis detected by  analyzer. Results may be affected.        Alkaline Phosphatase 190 U/L      Total Bilirubin 0.5 mg/dL      eGFR Non African Amer 67 mL/min/1.73      Globulin 4.0 gm/dL      A/G Ratio 1.1 g/dL      BUN/Creatinine Ratio 42.9     Anion Gap 16.0 mmol/L     Narrative:      GFR Normal >60  Chronic Kidney Disease <60  Kidney Failure <15      Blood Culture - Blood, Hand, Right [[id number]] Collected: 12/05/21 0352    Specimen: Blood from Hand, Right Updated: 12/05/21 0414    Lactic Acid, Plasma [[id number]]  (Abnormal) Collected: 12/05/21 0337    Specimen: Blood Updated: 12/05/21 0402     Lactate 2.6 mmol/L     CBC & Differential [[id number]]  (Abnormal) Collected: 12/05/21 0337    Specimen: Blood Updated: 12/05/21 0348    Narrative:      The following orders were created for panel order CBC & Differential.  Procedure                               Abnormality         Status                     ---------                               -----------         ------                     CBC Auto Differential[[id number]]        Abnormal            Final result                 Please view results for these tests on the individual orders.    CBC Auto Differential [[id number]]  (Abnormal) Collected: 12/05/21 0337    Specimen: Blood Updated: 12/05/21 0348     WBC 28.68 10*3/mm3      RBC 4.63 10*6/mm3      Hemoglobin 13.7 g/dL      Hematocrit 40.4 %      MCV 87.3 fL      MCH 29.6 pg      MCHC 33.9 g/dL      RDW 17.5 %      RDW-SD 54.4 fl      MPV 10.8 fL      Platelets 426 10*3/mm3      Neutrophil % 85.5 %      Lymphocyte % 7.6 %      Monocyte % 5.5 %      Eosinophil % 0.0 %      Basophil % 0.3 %      Immature Grans % 1.1 %      Neutrophils, Absolute 24.52 10*3/mm3      Lymphocytes, Absolute 2.17 10*3/mm3      Monocytes, Absolute 1.58 10*3/mm3      Eosinophils, Absolute 0.00 10*3/mm3      Basophils, Absolute 0.09 10*3/mm3      Immature Grans, Absolute 0.32 10*3/mm3      nRBC 0.0 /100 WBC     POC Glucose Once [[id number]]  (Abnormal) Collected:  12/05/21 0330    Specimen: Blood Updated: 12/05/21 0344     Glucose 367 mg/dL      Comment: Notify DoctorOperator: [id number] NEY NICKMeter ID: [id number]       Blood Culture - Blood, Hand, Right [[id number]] Collected: 12/05/21 0337    Specimen: Blood from Hand, Right Updated: 12/05/21 0342        Imaging Results (Last 24 Hours)     Procedure Component Value Units Date/Time    CT Chest Without Contrast Diagnostic [[id number]] Collected: 12/05/21 0420     Updated: 12/05/21 0517    Narrative:      CT chest without contrast on  12/5/2021     CLINICAL INDICATION: Hypoxia, tachycardia    TECHNIQUE: Multiple axial images are obtained throughout the  chest without the administration of contrast. This exam was  performed according to our departmental dose-optimization  program, which includes automated exposure control, adjustment of  the mA and/or kV according to patient size and/or use of  iterative reconstruction technique.  Total DLP is 700 mGy*cm.     COMPARISON:  CT chest from 3/19/2014 and CT abdomen and lower  chest from 3/9/2021    FINDINGS: There are very small bilateral pleural effusions. The  patient is status post median sternotomy and CABG. Coronary  artery calcifications and other extensive vascular calcifications  are noted. Left ureteral stent is partially imaged with its  proximal aspect in the left renal pelvis. Limited visualized  unenhanced upper abdomen is otherwise unremarkable. There is no  pericardial effusion. There are several small mediastinal lymph  nodes are likely reactive. There is extensive bilateral septal  thickening most consistent with pulmonary edema. There is some  bronchial wall thickening suggesting bronchitis as well. There is  mild bibasilar atelectasis. Degenerative changes are noted in the  spine. There is again noted an old compression fracture of the  superior endplate of L1.      Impression:      1.  Very small bilateral pleural effusions with extensive  bilateral septal  thickening most consistent with pulmonary edema.  2.  Bronchial wall thickening suggesting bronchitis as well.    Electronically signed by:  Calderon Lubin  12/5/2021 5:15 AM  CST Workstation: [id number]    CT Head Without Contrast [[id number]] Collected: 12/05/21 0420     Updated: 12/05/21 0451    Narrative:      EXAM: CT HEAD WITHOUT IV CONTRAST    INDICATION:altered mental status    TECHNIQUE: Helical CT of the head was obtained without  intravenous contrast. Axial, coronal and sagittal images were  created.    Dose reduction technique was used including one or more of the  following: automated exposure control, adjustment of mA and kV  according to patient size, and/or iterative reconstruction.    COMPARISON: CTs most recent dated 3/6/2021     FINDINGS:    Quality: Average.  No unusual artifacts beyond the limitations of  routine CT.    Hemorrhage: None.    Brain parenchyma: Parenchymal attenuation characteristics  unchanged.  No new findings.    Ventricles: Unchanged in size and configuration from the most  recent prior study.    Mass effect: None.    Vascular: Atherosclerotic calcifications.    Visualized orbits: The imaged portions appear normal.    Paranasal sinuses and mastoid air cells: The imaged portions  demonstrate minimal mucosal thickening and/or rentention cyst  formation.    Bony structures: No depressed or displaced skull fractures.    Other: Noncontributory.        Impression:        1.   No acute intracranial findings.    If there is high clinical suspicion for acute infarct consider  MRI for further evaluation.     Electronically signed by:  Simón Becker MD  12/5/2021 4:50 AM  CST Workstation: [id number]    XR Chest 1 View [[id number]] Collected: 12/05/21 0344     Updated: 12/05/21 0447    Narrative:      EXAM: Single portable XR view of the chest  INDICATION: Simple Sepsis Triage Protocol  COMPARISON: Multiple radiographs most recent dated 12/2/2021    FINDINGS:  Multiple intact sternotomy  wires. Left-sided PICC is stable in  position.  The cardiomediastinal silhouette is stable.   Increased bilateral interstitial markings/prominence. Trace  bilateral pleural effusions. No visible pneumothorax.      Impression:        Increased bilateral interstitial opacities likely representing  pulmonary edema with trace bilateral pleural effusions.  Superimposed infectious process not excluded.    Electronically signed by:  Simón Becker MD  12/5/2021 4:46 AM  CST Workstation: [id number]            Assessment:    Active Hospital Problems    Diagnosis    • Sepsis (HCC)      # Sepsis sepsis present on admission  # Diabetes mellitus reported type I with diabetic neuropathy and retinopathy hyperglycemia, cannot exclude DKA at this time  # Metabolic acidosis  # Abnormal EKG with potential ischemic ST-T changes, acute myocardial infarction cannot be excluded at this time  # Possible pneumonia, healthcare associated pneumonia cannot be excluded  # Acute hypoxemic respiratory failure  # Recent urinary tract infection patient has been on antibiotic in skilled nursing facility through left upper extremity PICC line  # Elevated lactic acid level  # Severe leukocytosis   # Dehydration   # History of coronary artery disease  # CTA chest reported as potential pulmonary edema, patient does not show any clinical signs symptoms of  fluid overload at this time and on exam she is dehydrated looking. She had December 3, 2021 echocardiogram which showed preserved left ventricular ejection fraction grossly, with mild to moderate left ventricular hypertrophy.         Addendum:  Repeat EKG and troponin level reviewed.  Obtain echocardiogram.  I contacted cardiology service Dr. Calderón for cardiology consult and discussed with him.  His input appreciated.    Plan:  Placed on telemetry  Obtain further laboratory work-up  Obtain  stat troponin level, obtain BNP   Repeat EKG. Stat.  Follow troponin and EKGs  Blood cultures was obtained in  ED  Patient had a negative urine culture on 12 02 21.  Add urine culture to patient's current UA.  Obtain ABG and acetone level to avoid missing at DKA.  Give IV insulin.  Initiate DKA protocol for immediate care.  Placed on broad-spectrum IV antibiotic, IV fluid for immediate care, supportive therapy.  Concerning presence of DKA give 10 units IV insulin and place on place on insulin drip for immediate care.  Comorbidities will be treated appropriately.  Please see orders for comprehensive plan.  Prognosis is guarded.    I confirmed that the patient's Advance Care Plan is present, code status is documented, or surrogate decision maker is listed in the patient's medical record.   Patient is unable to participate in this discussion.  Per her niece at bedside Violet Maldonado, patient wished to be DNR/DNI.  Violet stated that they discuss code the status in the past and patient did not wish to be resuscitated or be on life-sustaining machines.  Violet decided for DNR/DNI.    I have utilized all available immediate resources to obtain, update, or review the patient's current medications.     I discussed the patient's findings and my recommendations with: Her healthcare proxy Violet, and she agreed with above plan of care.       Saeid Behroozi, MD   12/05/21   05:59 CST

## 2021-12-05 NOTE — ED PROVIDER NOTES
Subjective   66-year-old female presents to the emergency department via EMS from her nursing home just 1 day after discharge with reported new onset of altered mental status.  She is found to have tachycardia and hypoxia with some respiratory difficulty.  Reportedly at baseline she nightly cannot appropriately and feeds her self but has been altered over the last day.  Recently treated for UTI with antibiotics at the nursing home through PICC line.  Recently admitted for chest discomfort which family reports was felt to be a panic attack.  Patient cannot provide much history review of systems.    Family history, surgical history, social history, current medications and allergies are reviewed with the patient's medical record and family; and triage documentation and vitals are reviewed.      History provided by:  Medical records, EMS personnel, nursing home and relative  History limited by:  Mental status change   used: No        Review of Systems   Unable to perform ROS: Mental status change       Past Medical History:   Diagnosis Date   • Arthritis, rheumatoid (HCC)    • Arthropathy associated with neurological disorder    • Arthropathy of lumbar facet joint    • Asthma    • Benign hypertension    • Carpal tunnel syndrome    • Diabetes (HCC)    • Diabetic polyneuropathy (HCC)    • Dyslipidemia    • Fallen arches    • Hammer toe    • Heart disease    • Joint pain    • Mild depression (HCC)     Saddness post Surgery 7/10/14 improved after counseling.   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 Diffusely calcified LAD with 30-80% stenosis. OMB #3 with 60% to 70% stenosis. Preserved LV systolic function with EF of 55%.   • CARPAL TUNNEL RELEASE Right 10/15/2014    Right carpal tunnel release.   • CARPAL TUNNEL RELEASE     • CATARACT EXTRACTION, BILATERAL Bilateral    • CORONARY ARTERY BYPASS GRAFT  02/24/2014    CABG x 3 with LIMA to LAD, endarterectomy to LAD, SVG to OMB and SVG to distal RCA with endarterectomy to distal RCA. Endo-vein harvesting.   • CYSTECTOMY Left     Breast cycst   • CYSTOSCOPY, URETEROSCOPY, RETROGRADE PYELOGRAM, STENT INSERTION Left 3/10/2021    Procedure: , LEFT RETROGRADE PYELOGRAM, STENT INSERTION LEFT URETER;  Surgeon: Babs, Cooper NOLAN MD;  Location: [facility];  Service: Urology;  Laterality: Left;   • EXCISION LESION      Remove breast lesion - cyst   • EYE SURGERY      Insert lens prosthesis   • FOOT SURGERY  10/18/2011    Exostectomy of the right foot. Preulcerative lesion with Charot deformity, right foot   • LUMBAR SPINE SURGERY  11/09/2015    Lumbosacral radiofrequency thermal coagulation.   • NERVE BLOCK  09/14/2015    Lumbar medial branch block.   • TOE NAIL AVULSION  09/03/2015    DEBRIDE NAIL 6 OR MORE 23971 (1)       Family History   Problem Relation Age of Onset   • Asthma Other    • Coronary artery disease Other    • Diabetes Other    • Hyperlipidemia Other    • Hypertension Other    • Osteoporosis Other    • Cancer Other    • Osteoporosis Mother    • Heart disease Mother    • Heart disease Father    • Diabetes Father    • Heart disease Sister    • Diabetes Sister        Social History     Socioeconomic History   • Marital status:    Tobacco Use   • Smoking status: Former Smoker     Types: Cigarettes   • Smokeless  tobacco: Never Used   Substance and Sexual Activity   • Alcohol use: No   • Drug use: No   • Sexual activity: Defer           Objective   Physical Exam  Vitals and nursing note reviewed.   Constitutional:       Appearance: She is overweight. She is ill-appearing.   HENT:      Head: Normocephalic and atraumatic.      Mouth/Throat:      Mouth: Mucous membranes are dry.   Eyes:      General: No scleral icterus.     Conjunctiva/sclera: Conjunctivae normal.      Pupils: Pupils are equal, round, and reactive to light.   Cardiovascular:      Rate and Rhythm: Regular rhythm. Tachycardia present.      Heart sounds: No murmur heard.      Pulmonary:      Effort: Tachypnea present.      Breath sounds: Wheezing and rhonchi present.   Abdominal:      General: Bowel sounds are normal.      Palpations: Abdomen is soft.      Tenderness: There is no abdominal tenderness. There is no guarding or rebound.   Musculoskeletal:         General: Normal range of motion.      Cervical back: Normal range of motion.      Right lower leg: No edema.      Left lower leg: No edema.   Skin:     General: Skin is warm and dry.      Capillary Refill: Capillary refill takes less than 2 seconds.      Coloration: Skin is pale.   Neurological:      Mental Status: She is disoriented.      Motor: No weakness.   Psychiatric:         Behavior: Behavior is uncooperative.         Procedures  none         ED Course      Labs Reviewed   COMPREHENSIVE METABOLIC PANEL - Abnormal; Notable for the following components:       Result Value    Glucose 384 (*)     BUN 36 (*)     Sodium 135 (*)     CO2 20.0 (*)     Calcium 11.4 (*)     ALT (SGPT) 152 (*)     AST (SGOT) 378 (*)     Alkaline Phosphatase 190 (*)     BUN/Creatinine Ratio 42.9 (*)     Anion Gap 16.0 (*)     All other components within normal limits    Narrative:     GFR Normal >60  Chronic Kidney Disease <60  Kidney Failure <15     LACTIC ACID, PLASMA - Abnormal; Notable for the following components:    Lactate  2.6 (*)     All other components within normal limits   CBC WITH AUTO DIFFERENTIAL - Abnormal; Notable for the following components:    WBC 28.68 (*)     RDW 17.5 (*)     RDW-SD 54.4 (*)     Neutrophil % 85.5 (*)     Lymphocyte % 7.6 (*)     Eosinophil % 0.0 (*)     Immature Grans % 1.1 (*)     Neutrophils, Absolute 24.52 (*)     Monocytes, Absolute 1.58 (*)     Immature Grans, Absolute 0.32 (*)     All other components within normal limits   URINALYSIS W/ CULTURE IF INDICATED - Abnormal; Notable for the following components:    Glucose,  mg/dL (1+) (*)     Ketones, UA 40 mg/dL (2+) (*)     Blood, UA Large (3+) (*)     Protein,  mg/dL (2+) (*)     Leuk Esterase, UA Small (1+) (*)     All other components within normal limits   POCT GLUCOSE FINGERSTICK - Abnormal; Notable for the following components:    Glucose 367 (*)     All other components within normal limits   COVID-19 AND FLU A/B, NP SWAB IN TRANSPORT MEDIA 8-12 HR TAT - Normal    Narrative:     Fact sheet for providers: https://www.fda.gov/media/352963/download    Fact sheet for patients: https://www.fda.gov/media/625320/download    Test performed by PCR.   BLOOD CULTURE   BLOOD CULTURE   RAINBOW DRAW    Narrative:     The following orders were created for panel order [place] Draw.  Procedure                               Abnormality         Status                     ---------                               -----------         ------                     Green Top (Gel)[[id number]]                                  Final result               Lavender Top[[id number]]                                     Final result               Gold Top - SST[[id number]]                                   Final result               Light Blue Top[[id number]]                                   Final result                 Please view results for these tests on the individual orders.   LACTIC ACID, REFLEX   URINALYSIS, MICROSCOPIC ONLY   POCT GLUCOSE FINGERSTICK   CBC AND  DIFFERENTIAL    Narrative:     The following orders were created for panel order CBC & Differential.  Procedure                               Abnormality         Status                     ---------                               -----------         ------                     CBC Auto Differential[[id number]]        Abnormal            Final result                 Please view results for these tests on the individual orders.   GREEN TOP   LAVENDER TOP   GOLD TOP - SST   LIGHT BLUE TOP     Adult Transthoracic Echo Complete W/ Cont if Necessary Per Protocol    Result Date: 12/3/2021  Narrative: · Left ventricular wall thickness is consistent with mild to moderate concentric hypertrophy. · Left ventricular ejection fraction appears to be 56 - 60%. · There is calcification of the aortic valve mainly affecting the non-coronary, left coronary and right coronary cusp(s). · Estimated right ventricular systolic pressure from tricuspid regurgitation is moderately elevated (45-55 mmHg). · The right atrial cavity is borderline dilated. · Left ventricular diastolic function was normal.      CT Head Without Contrast    Result Date: 12/5/2021  Narrative: EXAM: CT HEAD WITHOUT IV CONTRAST INDICATION:altered mental status TECHNIQUE: Helical CT of the head was obtained without intravenous contrast. Axial, coronal and sagittal images were created. Dose reduction technique was used including one or more of the following: automated exposure control, adjustment of mA and kV according to patient size, and/or iterative reconstruction. COMPARISON: CTs most recent dated 3/6/2021 FINDINGS: Quality: Average.  No unusual artifacts beyond the limitations of routine CT. Hemorrhage: None. Brain parenchyma: Parenchymal attenuation characteristics unchanged.  No new findings. Ventricles: Unchanged in size and configuration from the most recent prior study. Mass effect: None. Vascular: Atherosclerotic calcifications. Visualized orbits: The imaged  portions appear normal. Paranasal sinuses and mastoid air cells: The imaged portions demonstrate minimal mucosal thickening and/or rentention cyst formation. Bony structures: No depressed or displaced skull fractures. Other: Noncontributory.     Impression: 1.   No acute intracranial findings. If there is high clinical suspicion for acute infarct consider MRI for further evaluation. Electronically signed by:  Simón Becker MD  12/5/2021 4:50 AM CST Workstation: [id number]    CT Chest Without Contrast Diagnostic    Result Date: 12/5/2021  Narrative: CT chest without contrast on  12/5/2021 CLINICAL INDICATION: Hypoxia, tachycardia TECHNIQUE: Multiple axial images are obtained throughout the chest without the administration of contrast. This exam was performed according to our departmental dose-optimization program, which includes automated exposure control, adjustment of the mA and/or kV according to patient size and/or use of iterative reconstruction technique. Total DLP is 700 mGy*cm. COMPARISON:  CT chest from 3/19/2014 and CT abdomen and lower chest from 3/9/2021 FINDINGS: There are very small bilateral pleural effusions. The patient is status post median sternotomy and CABG. Coronary artery calcifications and other extensive vascular calcifications are noted. Left ureteral stent is partially imaged with its proximal aspect in the left renal pelvis. Limited visualized unenhanced upper abdomen is otherwise unremarkable. There is no pericardial effusion. There are several small mediastinal lymph nodes are likely reactive. There is extensive bilateral septal thickening most consistent with pulmonary edema. There is some bronchial wall thickening suggesting bronchitis as well. There is mild bibasilar atelectasis. Degenerative changes are noted in the spine. There is again noted an old compression fracture of the superior endplate of L1.     Impression: 1.  Very small bilateral pleural effusions with extensive  bilateral septal thickening most consistent with pulmonary edema. 2.  Bronchial wall thickening suggesting bronchitis as well. Electronically signed by:  Calderon Lubin  12/5/2021 5:15 AM CST Workstation: [id number]    XR Chest 1 View    Result Date: 12/5/2021  Narrative: EXAM: Single portable XR view of the chest INDICATION: Simple Sepsis Triage Protocol COMPARISON: Multiple radiographs most recent dated 12/2/2021 FINDINGS: Multiple intact sternotomy wires. Left-sided PICC is stable in position. The cardiomediastinal silhouette is stable. Increased bilateral interstitial markings/prominence. Trace bilateral pleural effusions. No visible pneumothorax.     Impression: Increased bilateral interstitial opacities likely representing pulmonary edema with trace bilateral pleural effusions. Superimposed infectious process not excluded. Electronically signed by:  Simón Becker MD  12/5/2021 4:46 AM CST Workstation: [id number]    XR Chest 1 View    Result Date: 12/2/2021  Narrative: EXAM DESCRIPTION:  XR CHEST 1 VW CLINICAL HISTORY: 66 years  Female  Chest Pain triage protocol Chest Pain Triage Protocol COMPARISON: March 28, 2021 TECHNIQUE: One view-AP  erect radiograph of the chest FINDINGS: There is chronic elevation the left hemidiaphragm with mild compressive atelectasis in the left lung base. The right lung is clear. There is evidence of prior median sternotomy. There is no evidence of acute congestive heart failure.     Impression: 1. Mild chronic elevation of the left hemidiaphragm with mild left basilar atelectasis. Electronically signed by:  Carole Granda MD  12/2/2021 2:16 PM CST Workstation: [id number]    US Venous Doppler Lower Extremity Bilateral (duplex)    Result Date: 12/2/2021  Narrative: PROCEDURE: US LOWER EXTREMITY VEINS BILATERAL, 12/2/2021 5:17 PM CST COMPARISON: None available. CLINICAL INDICATION:    Hieu. leg swelling, R07.9 Chest pain, unspecified D64.9 Anemia, unspecified R79.89 Other specified  abnormal findings of blood chemistry N39.0 Urinary tract infection, site not specified A49.9 Bacterial infection, unspecified. TECHNIQUE:  Multiple gray-scale, color Doppler, and spectral waveform sonographic images were obtained of the bilateral lower extremities. FINDINGS: Examination was limited secondary to patient difficulty tolerating probe pressure per the sonographer. Given this limitation, no definite evidence of thrombus identified within the bilateral common femoral veins, superficial femoral veins, and popliteal veins where imaged. No popliteal fossa cysts identified.     Impression: No definite evidence for bilateral lower extremity deep venous thrombosis identified, where visualized, as above. Note was made that examination was limited secondary to patient difficulty tolerating probe pressure per the sonographer. Consider repeat examination if/as clinically warranted. Electronically signed by:  Mansoor Parsons MD  12/2/2021 6:15 PM CST Workstation: [id number]    EKG December 5, 2021 at 0 319 reveals sinus tachycardia rate of 129 bpm.  T wave flattening in V4 through V6 without inversion.  No ST elevation or depression.  Baseline artifact with no obvious acute ischemia.  .          MDM  Number of Diagnoses or Management Options     Amount and/or Complexity of Data Reviewed  Clinical lab tests: reviewed  Tests in the radiology section of CPT®: reviewed  Tests in the medicine section of CPT®: reviewed  Decide to obtain previous medical records or to obtain history from someone other than the patient: yes  Discuss the patient with other providers: yes    Patient Progress  Patient progress: stable    Patient with altered mental status with unremarkable CT of the head.  Found to be with sepsis from possible pneumonia or UTI.  Concern for aspiration given her presenting mental status.  Covid negative.  Started on broad-spectrum antibiotic coverage.  Increased bilateral opacities on chest x-ray.  Discussed  with the hospitalist who is agreeable to admission.    Final diagnoses:   Sepsis without acute organ dysfunction, due to unspecified organism (HCC)   Pneumonia of both lower lobes due to infectious organism   Acute cystitis with hematuria   Leukocytosis, unspecified type   Altered mental status, unspecified altered mental status type       ED Disposition  ED Disposition     ED Disposition Condition Comment    Decision to Admit  Level of Care: Telemetry [5]   Diagnosis: Sepsis (HCC) [[id number]]   Admitting Physician: BEHROOZI, SAEID [[id number]]   Isolate for COVID?: No [0]   Certification: I Certify That Inpatient Hospital Services Are Medically Necessary For Greater Than 2 Midnights            No follow-up provider specified.       Medication List      No changes were made to your prescriptions during this visit.          Yosi Trivedi, DO  12/05/21 0635

## 2021-12-05 NOTE — PROGRESS NOTES
MEDICINE DAILY PROGRESS NOTE  NAME: Janelle Millard  : 1955  MRN: [id number]     LOS: 0 days     PROVIDER OF SERVICE: Riley May MD    Chief Complaint: Altered mental status    Subjective:   HPI: Patient is a multimorbid 66-year-old female resident of a skilled nursing facility debilitated, bed and chair bounded, with known history of diabetes mellitus diabetic neuropathy rheumatoid arthritis, hypertension multivessel coronary artery disease, retinopathy, recent hospitalization for chest pain, has been in nursing home on antibiotic to left upper extremity PICC line for reported urinary tract infection with history of recurrent urinary tract infection was sent from skilled nursing facility to ED concerning altered mental status.  Hospitalist service was called for admission of the patient concerning sepsis, potential pneumonia and urinary tract infection.  ED attending history of present illness note pending at the time of this dictation.  I have seen and examined patient in ED room 3.  Her niece Violet Maldonado who is patient's healthcare proxy is at bedside.  Patient does not provide any information, she is tachypneic and on intermittently ask for water, does not provide any specific information.  Per Violet evening nurse around 6 PM checked on the patient and noticed that patient was generally weak and had altered mental status.  Through the night her condition deteriorated and patient was brought subsequently to ED.  She may had fever.  No other specific information is available at this time.    Interval History:  History taken from: chart RN  . Patient was just admitted. She is resting in the CCU. She is ill appearing but non toxic.    Review of Systems:   Review of Systems   Unable to perform ROS: Mental status change       Objective:     Vital Signs  Vitals:    21 0701 21 0900 21 1054 21 1201   BP: 162/60      BP Location:       Patient Position:       Pulse: (!) 122  (!)  "126    Resp:       Temp:  98 °F (36.7 °C)     TempSrc:       SpO2: 98%  99%    Weight:    86.2 kg (190 lb 0.6 oz)   Height:    175.3 cm (69.02\")       Physical Exam  Physical Exam  Vitals and nursing note reviewed.   HENT:      Head: Normocephalic and atraumatic.      Right Ear: External ear normal.      Left Ear: External ear normal.   Cardiovascular:      Rate and Rhythm: Tachycardia present.   Pulmonary:      Effort: Pulmonary effort is normal.   Abdominal:      General: Abdomen is flat.      Palpations: Abdomen is soft.   Skin:     General: Skin is warm and dry.   Neurological:      Mental Status: She is alert.      Comments: Patient with some confusion. Unable to assess.   Psychiatric:      Comments: Patient with some confusion. Unable to assess.         Medication Review    Current Facility-Administered Medications:   •  [START ON 12/7/2021] !Vancomycin Peak Level Needed, , Does not apply, Once, Behroozi, Saeid, MD  •  [START ON 12/7/2021] !Vancomycin Trough Level Needed, , Does not apply, Once, Behroozi, Saeid, MD  •  aspirin EC tablet 81 mg, 81 mg, Oral, Daily, Behroozi, Saeid, MD  •  atorvastatin (LIPITOR) tablet 20 mg, 20 mg, Oral, Nightly, Behroozi, Saeid, MD  •  carvedilol (COREG) tablet 12.5 mg, 12.5 mg, Oral, BID, Behroozi, Saeid, MD  •  dextrose (D50W) (25 g/50 mL) IV injection 12.5 g, 12.5 g, Intravenous, PRN, Behroozi, Saeid, MD  •  dextrose (D50W) (25 g/50 mL) IV injection 25 g, 25 g, Intravenous, Q15 Min PRN, Behroozi, Saeid, MD  •  dextrose (GLUTOSE) oral gel 15 g, 15 g, Oral, Q15 Min PRN, Behroozi, Saeid, MD  •  dextrose 5 % and sodium chloride 0.45 % infusion, 150 mL/hr, Intravenous, Continuous PRN, Behroozi, Saeid, MD  •  dextrose 5 % and sodium chloride 0.45 % with KCl 20 mEq/L infusion, 150 mL/hr, Intravenous, Continuous PRN, Behroozi, Saeid, MD  •  dextrose 5 % and sodium chloride 0.45 % with KCl 40 mEq/L infusion, 150 mL/hr, Intravenous, Continuous PRN, Behroozi, Saeid, MD  •  dextrose 5 % " and sodium chloride 0.9 % infusion, 150 mL/hr, Intravenous, Continuous PRN, Behroozi, Saeid, MD  •  dextrose 5 % and sodium chloride 0.9 % with KCl 20 mEq/L infusion, 150 mL/hr, Intravenous, Continuous PRN, Behroozi, Saeid, MD  •  dextrose 5 % and sodium chloride 0.9 % with KCl 40 mEq/L infusion, 150 mL/hr, Intravenous, Continuous PRN, Behroozi, Saeid, MD  •  glucagon (human recombinant) (GLUCAGEN DIAGNOSTIC) injection 1 mg, 1 mg, Subcutaneous, Q15 Min PRN, Behroozi, Saeid, MD  •  heparin (porcine) 5000 UNIT/ML injection 4,000 Units, 4,000 Units, Intravenous, Once **AND** heparin 26281 units/250 mL (100 units/mL) in 0.45 % NaCl infusion, 12 Units/kg/hr, Intravenous, Titrated, Last Rate: 10.34 mL/hr at 12/05/21 0850, 12 Units/kg/hr at 12/05/21 0850 **AND** heparin (porcine) 5000 UNIT/ML injection 4,000 Units, 4,000 Units, Intravenous, PRN **AND** heparin (porcine) 5000 UNIT/ML injection 2,600 Units, 30 Units/kg, Intravenous, PRN, Behroozi, Saeid, MD  •  insulin regular (HumuLIN R,NovoLIN R) 100 Units in sodium chloride 0.9 % 100 mL (1 Units/mL) infusion, 8 Units/hr, Intravenous, Titrated, Behroozi, Saeid, MD, Last Rate: 14 mL/hr at 12/05/21 1209, 14 Units/hr at 12/05/21 1209  •  insulin regular (humuLIN R,novoLIN R) injection 10 Units, 10 Units, Intravenous, Once, Behroozi, Saeid, MD  •  isosorbide mononitrate (IMDUR) 24 hr tablet 30 mg, 30 mg, Oral, Daily, Behroozi, Saeid, MD  •  levothyroxine (SYNTHROID, LEVOTHROID) tablet 25 mcg, 25 mcg, Oral, Daily, Behroozi, Saeid, MD  •  nitroglycerin (NITRODUR) 0.2 MG/HR patch 1 patch, 1 patch, Transdermal, Daily, Behroozi, Saeid, MD  •  PARoxetine (PAXIL) tablet 20 mg, 20 mg, Oral, Nightly, Behroozi, Saeid, MD  •  Pharmacy to dose vancomycin, , Does not apply, Continuous PRN, Behroozi, Saeid, MD  •  piperacillin-tazobactam (ZOSYN) 3.375 g/100 mL 0.9% NS IVPB (mbp), 3.375 g, Intravenous, Once, Behroozi, Saeid, MD  •  piperacillin-tazobactam (ZOSYN) 3.375 g/100 mL 0.9% NS IVPB  (mbp), 3.375 g, Intravenous, Q8H, Behroozi, Saeid, MD  •  sodium chloride 0.45 % 1,000 mL with potassium chloride 40 mEq infusion, 250 mL/hr, Intravenous, Continuous PRN, Behroozi, Saeid, MD  •  sodium chloride 0.45 % infusion, 250 mL/hr, Intravenous, Continuous PRN, Behroozi, Saeid, MD  •  sodium chloride 0.45 % with KCl 20 mEq/L infusion, 250 mL/hr, Intravenous, Continuous PRN, Behroozi, Saeid, MD  •  sodium chloride 0.9 % flush 10 mL, 10 mL, Intravenous, PRN, Yosi Trivedi DO  •  sodium chloride 0.9 % flush 10 mL, 10 mL, Intravenous, Q12H, Behroozi, Saeid, MD  •  sodium chloride 0.9 % flush 10 mL, 10 mL, Intravenous, PRN, Behroozi, Saeid, MD  •  sodium chloride 0.9 % flush 10 mL, 10 mL, Intravenous, PRN, Behroozi, Saeid, MD  •  sodium chloride 0.9 % flush 10 mL, 10 mL, Intravenous, Once PRN, Behroozi, Saeid, MD  •  sodium chloride 0.9 % flush 10 mL, 10 mL, Intravenous, Q12H, Behroozi, Saeid, MD  •  sodium chloride 0.9 % flush 10 mL, 10 mL, Intravenous, PRN, Behroozi, Saeid, MD  •  sodium chloride 0.9 % flush 3 mL, 3 mL, Intravenous, Q12H, Behroozi, Saeid, MD  •  sodium chloride 0.9 % infusion, 100 mL/hr, Intravenous, Continuous, Behroozi, Saeid, MD, Last Rate: 100 mL/hr at 12/05/21 0922, 100 mL/hr at 12/05/21 0922  •  sodium chloride 0.9 % infusion, 250 mL/hr, Intravenous, Continuous PRN, Behroozi, Saeid, MD  •  sodium chloride 0.9 % infusion, 10 mL/hr, Intravenous, Continuous PRN, Behroozi, Saeid, MD  •  sodium chloride 0.9 % with KCl 20 mEq/L infusion, 250 mL/hr, Intravenous, Continuous PRN, Behroozi, Saeid, MD  •  sodium chloride 0.9 % with KCl 40 mEq/L infusion, 250 mL/hr, Intravenous, Continuous PRN, Behroozi, Saeid, MD  •  [START ON 12/6/2021] vancomycin 1 g/250 mL 0.9% NS (vial-mate), 1,000 mg, Intravenous, Q12H, Behroozi, Saeid, MD     Diagnostic Data    Lab Results (last 24 hours)     Procedure Component Value Units Date/Time    Phosphorus [[id number]]  (Normal) Collected: 12/05/21 1225    Specimen:  Blood Updated: 12/05/21 1303     Phosphorus 2.6 mg/dL     Basic Metabolic Panel [[id number]]  (Abnormal) Collected: 12/05/21 1225    Specimen: Blood Updated: 12/05/21 1302     Glucose 413 mg/dL      BUN 43 mg/dL      Creatinine 1.08 mg/dL      Sodium 134 mmol/L      Potassium 4.7 mmol/L      Comment: Slight hemolysis detected by analyzer. Results may be affected.        Chloride 105 mmol/L      CO2 13.0 mmol/L      Calcium 10.4 mg/dL      eGFR Non African Amer 50 mL/min/1.73      BUN/Creatinine Ratio 39.8     Anion Gap 16.0 mmol/L     Narrative:      GFR Normal >60  Chronic Kidney Disease <60  Kidney Failure <15      Magnesium [[id number]]  (Normal) Collected: 12/05/21 1225    Specimen: Blood Updated: 12/05/21 1252     Magnesium 1.9 mg/dL     POC Glucose Once [[id number]]  (Abnormal) Collected: 12/05/21 1004    Specimen: Blood Updated: 12/05/21 1125     Glucose 476 mg/dL      Comment: : [id number] RAY COURTNEYMeter ID: [id number]       POC Glucose Once [[id number]]  (Abnormal) Collected: 12/05/21 0817    Specimen: Blood Updated: 12/05/21 1125     Glucose 517 mg/dL      Comment: : [id number] RAY COURTNEYMeter ID: [id number]       POC Glucose Once [[id number]]  (Abnormal) Collected: 12/05/21 0622    Specimen: Blood Updated: 12/05/21 1124     Glucose 441 mg/dL      Comment: RN NotifiedOperator: [id number] WALKER DUSTINMeter ID: [id number]       Osmolality, Serum [[id number]]  (Abnormal) Collected: 12/05/21 1008    Specimen: Blood Updated: 12/05/21 1119     Osmolality 330 mOsm/kg     aPTT [[id number]]  (Abnormal) Collected: 12/05/21 1008    Specimen: Blood Updated: 12/05/21 1110     PTT >240.0 seconds     Narrative:      The recommended Heparin therapeutic range is 68-97 seconds.    Comprehensive Metabolic Panel [[id number]]  (Abnormal) Collected: 12/05/21 1008    Specimen: Blood Updated: 12/05/21 1052     Glucose 560 mg/dL      BUN 43 mg/dL      Creatinine 1.07 mg/dL      Sodium 134 mmol/L       Potassium 4.5 mmol/L      Chloride 102 mmol/L      CO2 13.0 mmol/L      Calcium 9.8 mg/dL      Total Protein 6.7 g/dL      Albumin 3.30 g/dL      ALT (SGPT) 143 U/L      AST (SGOT) 263 U/L      Alkaline Phosphatase 138 U/L      Total Bilirubin 0.3 mg/dL      eGFR Non African Amer 51 mL/min/1.73      Globulin 3.4 gm/dL      A/G Ratio 1.0 g/dL      BUN/Creatinine Ratio 40.2     Anion Gap 19.0 mmol/L     Narrative:      GFR Normal >60  Chronic Kidney Disease <60  Kidney Failure <15      Troponin [[id number]]  (Abnormal) Collected: 12/05/21 1008    Specimen: Blood Updated: 12/05/21 1052     Troponin T 1.580 ng/mL     Narrative:      Troponin T Reference Range:  <= 0.03 ng/mL-   Negative for AMI  >0.03 ng/mL-     Abnormal for myocardial necrosis.  Clinicians would have to utilize clinical acumen, EKG, Troponin and serial changes to determine if it is an Acute Myocardial Infarction or myocardial injury due to an underlying chronic condition.       Results may be falsely decreased if patient taking Biotin.      Vancomycin, Random [[id number]]  (Abnormal) Collected: 12/05/21 1008    Specimen: Blood Updated: 12/05/21 1050     Vancomycin Random <4.00 mcg/mL     Protime-INR [[id number]]  (Abnormal) Collected: 12/05/21 1008    Specimen: Blood Updated: 12/05/21 1042     Protime 20.8 Seconds      INR 1.83    Narrative:      Therapeutic range for most indications is 2.0-3.0 INR,  or 2.5-3.5 for mechanical heart valves.    T4, Free [[id number]]  (Normal) Collected: 12/05/21 1008    Specimen: Blood Updated: 12/05/21 1039     Free T4 1.26 ng/dL     Narrative:      Results may be falsely increased if patient taking Biotin.      Phosphorus [[id number]]  (Normal) Collected: 12/05/21 1008    Specimen: Blood Updated: 12/05/21 1034     Phosphorus 3.7 mg/dL     Magnesium [[id number]]  (Normal) Collected: 12/05/21 1008    Specimen: Blood Updated: 12/05/21 1034     Magnesium 1.9 mg/dL     Acetone [[id number]]  (Abnormal) Collected: 12/05/21 1008     Specimen: Blood Updated: 12/05/21 1031     Acetone Moderate    CBC & Differential [[id number]]  (Abnormal) Collected: 12/05/21 1008    Specimen: Blood Updated: 12/05/21 1015    Narrative:      The following orders were created for panel order CBC & Differential.  Procedure                               Abnormality         Status                     ---------                               -----------         ------                     CBC Auto Differential[[id number]]        Abnormal            Final result                 Please view results for these tests on the individual orders.    CBC Auto Differential [[id number]]  (Abnormal) Collected: 12/05/21 1008    Specimen: Blood Updated: 12/05/21 1015     WBC 22.64 10*3/mm3      RBC 3.88 10*6/mm3      Hemoglobin 11.4 g/dL      Hematocrit 35.2 %      MCV 90.7 fL      MCH 29.4 pg      MCHC 32.4 g/dL      RDW 18.0 %      RDW-SD 59.0 fl      MPV 10.5 fL      Platelets 337 10*3/mm3      Neutrophil % 89.3 %      Lymphocyte % 4.1 %      Monocyte % 5.7 %      Eosinophil % 0.0 %      Basophil % 0.2 %      Immature Grans % 0.7 %      Neutrophils, Absolute 20.20 10*3/mm3      Lymphocytes, Absolute 0.93 10*3/mm3      Monocytes, Absolute 1.30 10*3/mm3      Eosinophils, Absolute 0.00 10*3/mm3      Basophils, Absolute 0.05 10*3/mm3      Immature Grans, Absolute 0.16 10*3/mm3      nRBC 0.0 /100 WBC     Extra Tubes [[id number]] Collected: 12/05/21 1014    Specimen: Blood, Venous Line Updated: 12/05/21 1014    Narrative:      The following orders were created for panel order Extra Tubes.  Procedure                               Abnormality         Status                     ---------                               -----------         ------                     Pruitt Top[[id number]]                                         In process                   Please view results for these tests on the individual orders.    Gray Top [[id number]] Collected: 12/05/21 1014    Specimen: Blood Updated: 12/05/21  1014    CRE Screen by PCR - Swab, Large Intestine, Rectum [[id number]] Collected: 12/05/21 0805    Specimen: Swab from Large Intestine, Rectum Updated: 12/05/21 0923     CRE SCREEN Not Detected     Comment: Test performed by real-time polymerase chain reaction (qPCR).        OXA 48 Strain Not Detected     IMP STRAIN Not Detected     VIM STRAIN Not Detected     NDM Strain Not Detected     KPC Strain Not Detected    PHAM AURIS SCREEN - Swab, Axilla Right, Axilla Left and Groin [[id number]] Collected: 12/05/21 0806    Specimen: Swab from Axilla Right, Axilla Left and Groin Updated: 12/05/21 0806    STAT Lactic Acid, Reflex [[id number]]  (Abnormal) Collected: 12/05/21 0652    Specimen: Blood Updated: 12/05/21 0717     Lactate 2.6 mmol/L     Urinalysis, Microscopic Only - Urine, Catheter [[id number]]  (Abnormal) Collected: 12/05/21 0447    Specimen: Urine, Catheter Updated: 12/05/21 0701     RBC, UA Too Numerous to Count /HPF      WBC, UA 31-50 /HPF      Bacteria, UA Trace /HPF      Squamous Epithelial Cells, UA None Seen /HPF      Yeast, UA Small/1+ Budding Yeast /HPF      Hyaline Casts, UA 0-2 /LPF      Methodology Manual Light Microscopy    Urine Culture - Urine, Urine, Catheter [[id number]] Collected: 12/05/21 0447    Specimen: Urine, Catheter Updated: 12/05/21 0701    Troponin [[id number]]  (Abnormal) Collected: 12/05/21 0337    Specimen: Blood Updated: 12/05/21 0656     Troponin T 2.530 ng/mL     Narrative:      Troponin T Reference Range:  <= 0.03 ng/mL-   Negative for AMI  >0.03 ng/mL-     Abnormal for myocardial necrosis.  Clinicians would have to utilize clinical acumen, EKG, Troponin and serial changes to determine if it is an Acute Myocardial Infarction or myocardial injury due to an underlying chronic condition.       Results may be falsely decreased if patient taking Biotin.      BNP [[id number]]  (Abnormal) Collected: 12/05/21 0337    Specimen: Blood Updated: 12/05/21 0648     proBNP 12,381.0 pg/mL      Narrative:      Among patients with dyspnea, NT-proBNP is highly sensitive for the detection of acute congestive heart failure. In addition NT-proBNP of <300 pg/ml effectively rules out acute congestive heart failure with 99% negative predictive value.    Results may be falsely decreased if patient taking Biotin.      Hemoglobin A1c [[id number]]  (Abnormal) Collected: 12/05/21 0337    Specimen: Blood Updated: 12/05/21 0648     Hemoglobin A1C 8.00 %     Narrative:      Hemoglobin A1C Ranges:    Increased Risk for Diabetes  5.7% to 6.4%  Diabetes                     >= 6.5%  Diabetic Goal                < 7.0%    Phosphorus [[id number]]  (Normal) Collected: 12/05/21 0337    Specimen: Blood Updated: 12/05/21 0646     Phosphorus 3.0 mg/dL     Magnesium [[id number]]  (Normal) Collected: 12/05/21 0337    Specimen: Blood Updated: 12/05/21 0646     Magnesium 2.0 mg/dL     Blood Gas, Arterial - [[id number]]  (Abnormal) Collected: 12/05/21 0635    Specimen: Arterial Blood Updated: 12/05/21 0641     Site Left Radial     Addy's Test N/A     pH, Arterial 7.252 pH units      Comment: 84 Value below reference range        pCO2, Arterial 39.0 mm Hg      pO2, Arterial 95.2 mm Hg      HCO3, Arterial 17.2 mmol/L      Comment: 84 Value below reference range        Base Excess, Arterial -9.4 mmol/L      Comment: 84 Value below reference range        O2 Saturation, Arterial 96.4 %      Barometric Pressure for Blood Gas 749 mmHg      Modality Nasal Cannula     Flow Rate 2.0 lpm      Ventilator Mode NA     Collected by      Comment: Meter: [id number]     :  [id number]       Urinalysis With Culture If Indicated - Urine, Catheter [[id number]]  (Abnormal) Collected: 12/05/21 0447    Specimen: Urine, Catheter Updated: 12/05/21 0522     Color, UA Yellow     Appearance, UA Clear     pH, UA 6.0     Specific Gravity, UA 1.025     Glucose,  mg/dL (1+)     Ketones, UA 40 mg/dL (2+)     Bilirubin, UA Negative     Blood, UA Large (3+)      Protein,  mg/dL (2+)     Leuk Esterase, UA Small (1+)     Nitrite, UA Negative     Urobilinogen, UA 0.2 E.U./dL    [place] Draw [[id number]] Collected: 12/05/21 0337    Specimen: Blood Updated: 12/05/21 0445    Narrative:      The following orders were created for panel order [place] Draw.  Procedure                               Abnormality         Status                     ---------                               -----------         ------                     Green Top (Gel)[[id number]]                                  Final result               Lavender Top[[id number]]                                     Final result               Gold Top - SST[[id number]]                                   Final result               Light Blue Top[[id number]]                                   Final result                 Please view results for these tests on the individual orders.    Light Blue Top [[id number]] Collected: 12/05/21 0337    Specimen: Blood Updated: 12/05/21 0445     Extra Tube hold for add-on     Comment: Auto resulted       Lavender Top [[id number]] Collected: 12/05/21 0337    Specimen: Blood Updated: 12/05/21 0445     Extra Tube hold for add-on     Comment: Auto resulted       Green Top (Gel) [[id number]] Collected: 12/05/21 0337    Specimen: Blood Updated: 12/05/21 0445     Extra Tube Hold for add-ons.     Comment: Auto resulted.       Gold Top - SST [[id number]] Collected: 12/05/21 0337    Specimen: Blood Updated: 12/05/21 0445     Extra Tube Hold for add-ons.     Comment: Auto resulted.       COVID-19 and FLU A/B PCR - Swab, Nasopharynx [[id number]]  (Normal) Collected: 12/05/21 0352    Specimen: Swab from Nasopharynx Updated: 12/05/21 0439     COVID19 Not Detected     Influenza A PCR Not Detected     Influenza B PCR Not Detected    Narrative:      Fact sheet for providers: https://www.fda.gov/media/645928/download    Fact sheet for patients: https://www.fda.gov/media/344762/download    Test performed by  PCR.    Comprehensive Metabolic Panel [[id number]]  (Abnormal) Collected: 12/05/21 0337    Specimen: Blood Updated: 12/05/21 0426     Glucose 384 mg/dL      BUN 36 mg/dL      Creatinine 0.84 mg/dL      Sodium 135 mmol/L      Potassium 4.2 mmol/L      Comment: Slight hemolysis detected by analyzer. Results may be affected.        Chloride 99 mmol/L      CO2 20.0 mmol/L      Calcium 11.4 mg/dL      Total Protein 8.2 g/dL      Albumin 4.20 g/dL      ALT (SGPT) 152 U/L      AST (SGOT) 378 U/L      Comment: Slight hemolysis detected by analyzer. Results may be affected.        Alkaline Phosphatase 190 U/L      Total Bilirubin 0.5 mg/dL      eGFR Non African Amer 67 mL/min/1.73      Globulin 4.0 gm/dL      A/G Ratio 1.1 g/dL      BUN/Creatinine Ratio 42.9     Anion Gap 16.0 mmol/L     Narrative:      GFR Normal >60  Chronic Kidney Disease <60  Kidney Failure <15      Blood Culture - Blood, Hand, Right [[id number]] Collected: 12/05/21 0352    Specimen: Blood from Hand, Right Updated: 12/05/21 0414    Lactic Acid, Plasma [[id number]]  (Abnormal) Collected: 12/05/21 0337    Specimen: Blood Updated: 12/05/21 0402     Lactate 2.6 mmol/L     CBC & Differential [[id number]]  (Abnormal) Collected: 12/05/21 0337    Specimen: Blood Updated: 12/05/21 0348    Narrative:      The following orders were created for panel order CBC & Differential.  Procedure                               Abnormality         Status                     ---------                               -----------         ------                     CBC Auto Differential[[id number]]        Abnormal            Final result                 Please view results for these tests on the individual orders.    CBC Auto Differential [[id number]]  (Abnormal) Collected: 12/05/21 0337    Specimen: Blood Updated: 12/05/21 0348     WBC 28.68 10*3/mm3      RBC 4.63 10*6/mm3      Hemoglobin 13.7 g/dL      Hematocrit 40.4 %      MCV 87.3 fL      MCH 29.6 pg      MCHC 33.9 g/dL      RDW 17.5  %      RDW-SD 54.4 fl      MPV 10.8 fL      Platelets 426 10*3/mm3      Neutrophil % 85.5 %      Lymphocyte % 7.6 %      Monocyte % 5.5 %      Eosinophil % 0.0 %      Basophil % 0.3 %      Immature Grans % 1.1 %      Neutrophils, Absolute 24.52 10*3/mm3      Lymphocytes, Absolute 2.17 10*3/mm3      Monocytes, Absolute 1.58 10*3/mm3      Eosinophils, Absolute 0.00 10*3/mm3      Basophils, Absolute 0.09 10*3/mm3      Immature Grans, Absolute 0.32 10*3/mm3      nRBC 0.0 /100 WBC     POC Glucose Once [[id number]]  (Abnormal) Collected: 12/05/21 0330    Specimen: Blood Updated: 12/05/21 0344     Glucose 367 mg/dL      Comment: Notify DoctorOperator: [id number] NEY NICKMeter ID: [id number]       Blood Culture - Blood, Hand, Right [[id number]] Collected: 12/05/21 0337    Specimen: Blood from Hand, Right Updated: 12/05/21 0342          I reviewed the patient's new clinical results.    Assessment/Plan:     Active Hospital Problems    Diagnosis  POA   • Sepsis (HCC) [A41.9]  Yes       #. Sepsis. Pan culture pending. Vanc/Zosyn.  #. DKA. Insulin drip and protocol.  #. Metabolic encephalopathy. Secondary to Sepsis and DKA.  #. Elevated trop. Peaked and coming down. Cards consulted.  Results from last 7 days   Lab Units 12/05/21  1008 12/05/21  0337 12/02/21 2015 12/02/21  1347   TROPONIN T ng/mL 1.580* 2.530* <0.010 <0.010   #. Hypothyroidism. Synthroid.      Will monitor patient's hospital course and adjust treatment as hospital course dictates.    DVT prophylaxis: Heparin gtt  Code status is   Code Status and Medical Interventions:   Ordered at: 12/05/21 0618     Medical Intervention Limits:    NO intubation (DNI)    Other     Level Of Support Discussed With:    Next of Kin (If No Surrogate)    Health Care Surrogate     Code Status (Patient has no pulse and is not breathing):    No CPR (Do Not Attempt to Resuscitate)     Medical Interventions (Patient has pulse or is breathing):    Limited Support     Additional Medical  Interventions Limits:    medical care.  Maintain DNR/DNI.       Plan for disposition:Where: SNF and When:  2-5 days      Time:           This document has been electronically signed by Riley May MD on December 5, 2021 13:13 CST

## 2021-12-05 NOTE — CONSULTS
Cardiology Consultation Note.        Patient Name: Janelle Millard  Age/Sex: 66 y.o. female  : 1955  MRN: [id number]    Date of consultation: 2021  Consulting Physician: Papito Calderón MD  Primary care Physician: Yosef Casey MD  Requesting Physician:  Behroozi, Saeid, MD     Reason for consultation: Altered mental status history of atherosclerotic coronary artery disease with previous coronary artery bypass grafting      Subjective:       Chief Complaint: Altered mental status    History of Present Illness:  Janelle Millard is a 66 y.o. female     Body mass index is 28.05 kg/m². with a past medical history significant for atherosclerotic coronary artery disease with a non-Q myocardial infarction in 2014 with subsequent coronary angiogram which had revealed critical stenosis in the right coronary artery and diffusely calcified left anterior  Descending artery with stenosis in the obtuse marginal branch, with subsequent coronary artery bypass grafting with LIMA to the LAD and a SVG to the obtuse marginal branch and a saphenous vein graft to the right coronary artery with the right coronary artery endarterectomy, history of arterial hypertension, hypertensive heart disease, concentric left ventricular hypertrophy with diastolic dysfunction, mild mitral and mild tricuspid regurgitation, aortic valve thickening with sclerosis, left-sided pleural effusion noted post coronary artery bypass grafting, anemia requiring packed red blood cell transfusion, Insulin-requiring diabetes, rheumatoid arthritis, chronic obstructive lung disease, and carpal tunnel syndrome.       Patient is currently a resident at the nursing home at [facility].    Patient was recently hospitalized and was subsequently discharged to the nursing home with negative venous duplex for elevated D-dimer and negative troponin on the last hospitalization.    Patient after being discharged from the hospital had altered  mental status at [facility] and was subsequently sent to the emergency room.    Patient on initial evaluation had elevated troponin and cardiology was consulted.    Patient had not complained of having any symptoms of chest pain though patient had altered mental status.    Patient underwent a repeat transthoracic echocardiogram which revealed left ventricular systolic dysfunction with an ejection fraction of 25 to 30% which was new compared to the previous echocardiogram.    Patient had not complained of having any symptoms of palpitation or chest pain on discharge from the last hospitalization.  Patient does have history of documented coronary artery disease with coronary artery bypass grafting done in 2014.    Patient was found to have urinary tract infection.  Patient is responding to verbal questioning.  Patient denies any symptoms of chest pain.  Patient appears to be restless.  Patient had an elevated white blood cell count of 22,000.    Due to the patient elevated troponin of 1.5 patient was started on intravenous heparin.    Patient 10 point review of system except for what stated in the history of present illness and negative      Concurrent  Medical History:  1.  Elevated troponin.  2.  Atherosclerotic CAD.  3.  Status post coronary angiogram done in February 2014 revealed:  A.  Critical stenosis in the RCA of up to 80% with calcified 60% to 70% stenosis in the mid portion of the RCA.  B.  Diffusely calcified left anterior descending artery with up to 80% stenosis in the proximal portion and 90% stenosis in the subtotally occluded 1st diagonal branch.  C.  70% to 80% stenosis in the obtuse marginal branch #3.  4.  Status post CABG with:  A.  LIMA to LAD.  B.  SVG to OM branch.  C.  SVG to RCA.  5.  Left sided pleural effusion.  6.  Arterial hypertension.  7.  Hypertensive heart disease .  8.  Concentric left ventricular hypertrophy with left ventricular systolic dysfunction with an  ejection fraction of 25 to 30%.    9.  Mild mitral and mild tricuspid regurgitation.  10. Anemia requiring packed red blood cell transfusion.  11. Rheumatoid arthritis.  12. Insulin requiring diabetes.  13. COPD.  14. Hyperlipidemia.  15. Carpal tunnel syndrome.       Past Surgical History:  1.  Coronary angiogram.  2.  Coronary artery bypass grafting.  A.  LIMA to LAD.  B.  SVG to OM branch.  C.  SVG to RCA.  3.  Left breast cysts surgical resection.  4.  Carpal tunnel release on the right side.  5.  Cataract surgery bilaterally  6.  Cystoscopy.  7.  Lumbar spine surgery.  8.  Toenail avulsions.  9.  Lumbar medial branch block.      Family History: Family history significant for father who had coronary artery disease and 2 sisters who has coronary artery disease      Social History: Patient has previous history of tobacco abuse denies any current tobacco alcohol intake       Cardiac Risk Factors:   1. Postmenopausal.  2. Arterial hypertension.  3. Hyperlipidemia.  4. Diabetes.  5. Tobacco abuse.  6. Family history of CAD.    Allergies:  Allergies   Allergen Reactions   • Contrast Dye Shortness Of Breath and Itching   • Corticosteroids Other (See Comments)     Pt diabetic makes sugar go up    • Codeine Itching   • Aspirin Unknown - Low Severity   • Ibuprofen Unknown - Low Severity       Medication:  Medications Prior to Admission   Medication Sig Dispense Refill Last Dose   • acetaminophen (TYLENOL) 325 MG tablet Take 650 mg by mouth 3 (Three) Times a Day.      • aspirin 81 MG EC tablet Take 81 mg by mouth Daily.      • atorvastatin (LIPITOR) 20 MG tablet Take 1 tablet by mouth Every Night. 90 tablet 4    • B-D ULTRAFINE III SHORT PEN 31G X 8 MM misc       • carvedilol (COREG) 12.5 MG tablet TAKE 1 TABLET BY MOUTH TWICE DAILY      • chlorthalidone (HYGROTON) 25 MG tablet Take 1 tablet by mouth Daily. 30 tablet 0    • Continuous Blood Gluc Sensor (DEXCOM G6 SENSOR) As Needed (glucose control). Every 10 daysDexcom G6  Sensor Kit [address]-06 Use as directed for continuous glucose monitoring 9 each 3    • dilTIAZem (CARDIZEM) 30 MG tablet Take 1 tablet by mouth Every 6 (Six) Hours. 120 tablet 0    • glucose blood test strip Livongot test strips use to test sugar 4 times per day. E 10.9 200 each 11    • insulin aspart (novoLOG FLEXPEN) 100 UNIT/ML solution pen-injector sc pen 150-200=2 units, 201-250=3 units, 251-300=5 units, 301-350=7 units, 351-400=9 units, 401-450=12 units, >450=15 units 4 pen 11    • Insulin Glargine, 2 Unit Dial, (Toujeo Max SoloStar) 300 UNIT/ML solution pen-injector injection Inject 28 Units under the skin into the appropriate area as directed Every Night. 2 pen 11    • isosorbide mononitrate (IMDUR) 30 MG 24 hr tablet Take 30 mg by mouth Daily.      • levothyroxine (SYNTHROID) 25 MCG tablet Take 25 mcg by mouth Daily.      • lidocaine (LIDODERM) 5 % Place 1 patch on the skin as directed by provider Daily. Remove & Discard patch within 12 hours or as directed by MD 30 each 0    • melatonin 5 MG tablet tablet Take 5 mg by mouth Every Night.      • Multiple Vitamins-Minerals (Centrum Silver 50+Women) tablet Take 1 tablet by mouth Daily.      • nitroglycerin (NITRODUR) 0.2 MG/HR patch Place 1 patch on the skin as directed by provider Daily.      • PARoxetine (PAXIL) 20 MG tablet Take 20 mg by mouth Every Night.      • traMADol (ULTRAM) 50 MG tablet Take 1 tablet by mouth Every 6 (Six) Hours As Needed for Moderate Pain . 12 tablet 0    • TRUEPLUS LANCETS 33G misc       • Vitamin D, Cholecalciferol, 50 MCG (2000 UT) capsule Take 1 capsule by mouth Daily.              Review of Systems: Unable to be assessed secondary to altered mental status          Objective:     Objective:  Temp:  [98 °F (36.7 °C)-101.4 °F (38.6 °C)] 101.4 °F (38.6 °C)  Heart Rate:  [122-132] 128  Resp:  [22-30] 29  BP: (124-213)/() 150/71      Body mass index is 28.05 kg/m².           Physical Exam:   General Appearance:   Episodes of  confusion.   Head:    Normocephalic, atraumatic, without obvious abnormality.   Eyes:           LILLIAN.  Lids and lashes normal, conjunctivae and sclerae normal, no icterus, no pallor.   Ears:    Ears appear intact with no abnormalities noted.   Throat:   Mucous membranes pink and moist.   Neck:   Supple, trachea midline, no carotid bruit, no organomegaly or JVD.   Lungs:     Clear to auscultation and percussion, respirations regular, even and unlabored. No wheezes, rales or rhonchi.    Heart:    Regular rhythm and normal rate, normal S1 and S2, no murmur, no gallop, no rub, no click.   Abdomen:     Soft, nontender, nondistended, no guarding, no rebound tenderness, normal bowel sounds in all four quadrants, no masses, liver and spleen nonpalpable.   Genitalia:    Deferred.   Extremities:   Moves all extremities well, no edema, no cyanosis, no  redness, no clubbing.   Pulses:   Pulses palpable and equal bilaterally.   Skin:   Moist and warm. No bleeding, bruising or rash.   Neurologic/Psychiatric:  Episodes of confusion though moves all 4 extremities there is no involuntary movement noted       Medication Review:   Current Facility-Administered Medications   Medication Dose Route Frequency Provider Last Rate Last Admin   • [START ON 12/7/2021] !Vancomycin Peak Level Needed   Does not apply Once Behroozi, Saeid, MD       • [START ON 12/7/2021] !Vancomycin Trough Level Needed   Does not apply Once Behroozi, Saeid, MD       • aspirin EC tablet 81 mg  81 mg Oral Daily Behroozi, Saeid, MD       • atorvastatin (LIPITOR) tablet 20 mg  20 mg Oral Nightly Behroozi, Saeid, MD       • carvedilol (COREG) tablet 12.5 mg  12.5 mg Oral BID Behroozi, Saeid, MD       • dextrose (D50W) (25 g/50 mL) IV injection 12.5 g  12.5 g Intravenous PRN Behroozi, Saeid, MD       • dextrose (D50W) (25 g/50 mL) IV injection 25 g  25 g Intravenous Q15 Min PRN Behroozi, Saeid, MD       • dextrose (GLUTOSE) oral gel 15 g  15 g Oral Q15 Min PRN  Behroozi, Saeid, MD       • dextrose 5 % and sodium chloride 0.45 % infusion  150 mL/hr Intravenous Continuous PRN Behroozi, Saeid, MD       • dextrose 5 % and sodium chloride 0.45 % with KCl 20 mEq/L infusion  150 mL/hr Intravenous Continuous PRN Behroozi, Saeid, MD       • dextrose 5 % and sodium chloride 0.45 % with KCl 40 mEq/L infusion  150 mL/hr Intravenous Continuous PRN Behroozi, Saeid, MD       • dextrose 5 % and sodium chloride 0.9 % infusion  150 mL/hr Intravenous Continuous PRN Behroozi, Saeid, MD       • dextrose 5 % and sodium chloride 0.9 % with KCl 20 mEq/L infusion  150 mL/hr Intravenous Continuous PRN Behroozi, Saeid, MD       • dextrose 5 % and sodium chloride 0.9 % with KCl 40 mEq/L infusion  150 mL/hr Intravenous Continuous PRN Behroozi, Saeid, MD       • glucagon (human recombinant) (GLUCAGEN DIAGNOSTIC) injection 1 mg  1 mg Subcutaneous Q15 Min PRN Behroozi, Saeid, MD       • heparin (porcine) 5000 UNIT/ML injection 4,000 Units  4,000 Units Intravenous Once Behroozi, Saeid, MD        And   • heparin 19810 units/250 mL (100 units/mL) in 0.45 % NaCl infusion  12 Units/kg/hr Intravenous Titrated Behroozi, Saeid, MD 10.34 mL/hr at 12/05/21 0850 12 Units/kg/hr at 12/05/21 0850    And   • heparin (porcine) 5000 UNIT/ML injection 4,000 Units  4,000 Units Intravenous PRN Behroozi, Saeid, MD        And   • heparin (porcine) 5000 UNIT/ML injection 2,600 Units  30 Units/kg Intravenous PRN Behroozi, Saeid, MD       • insulin regular (HumuLIN R,NovoLIN R) 100 Units in sodium chloride 0.9 % 100 mL (1 Units/mL) infusion  8 Units/hr Intravenous Titrated Behroozi, Saeid, MD 15 mL/hr at 12/05/21 1312 15 Units/hr at 12/05/21 1312   • insulin regular (humuLIN R,novoLIN R) injection 10 Units  10 Units Intravenous Once Behroozi, Saeid, MD       • isosorbide mononitrate (IMDUR) 24 hr tablet 30 mg  30 mg Oral Daily Behroozi, Saeid, MD       • levothyroxine (SYNTHROID, LEVOTHROID) tablet 25 mcg  25 mcg Oral Daily  Behroozi, Saeid, MD       • nitroglycerin (NITRODUR) 0.2 MG/HR patch 1 patch  1 patch Transdermal Daily Behroozi, Saeid, MD   1 patch at 12/05/21 1315   • PARoxetine (PAXIL) tablet 20 mg  20 mg Oral Nightly Behroozi, Saeid, MD       • Pharmacy to dose vancomycin   Does not apply Continuous PRN Behroozi, Saeid, MD       • piperacillin-tazobactam (ZOSYN) 3.375 g/100 mL 0.9% NS IVPB (mbp)  3.375 g Intravenous Q8H Behroozi, Saeid, MD       • sodium chloride 0.45 % 1,000 mL with potassium chloride 40 mEq infusion  250 mL/hr Intravenous Continuous PRN Behroozi, Saeid, MD       • sodium chloride 0.45 % infusion  250 mL/hr Intravenous Continuous PRN Behroozi, Saeid, MD       • sodium chloride 0.45 % with KCl 20 mEq/L infusion  250 mL/hr Intravenous Continuous PRN Behroozi, Saeid, MD       • sodium chloride 0.9 % flush 10 mL  10 mL Intravenous PRN Yosi Trivedi DO       • sodium chloride 0.9 % flush 10 mL  10 mL Intravenous Q12H Behroozi, Saeid, MD       • sodium chloride 0.9 % flush 10 mL  10 mL Intravenous PRN Behroozi, Saeid, MD       • sodium chloride 0.9 % flush 10 mL  10 mL Intravenous PRN Behroozi, Saeid, MD       • sodium chloride 0.9 % flush 10 mL  10 mL Intravenous Once PRN Behroozi, Saeid, MD       • sodium chloride 0.9 % flush 10 mL  10 mL Intravenous Q12H Behroozi, Saeid, MD       • sodium chloride 0.9 % flush 10 mL  10 mL Intravenous PRN Behroozi, Saeid, MD       • sodium chloride 0.9 % flush 3 mL  3 mL Intravenous Q12H Behroozi, Saeid, MD       • sodium chloride 0.9 % infusion  100 mL/hr Intravenous Continuous Behroozi, Saeid,  mL/hr at 12/05/21 0922 100 mL/hr at 12/05/21 0922   • sodium chloride 0.9 % infusion  250 mL/hr Intravenous Continuous PRN Behroozi, Saeid, MD       • sodium chloride 0.9 % infusion  10 mL/hr Intravenous Continuous PRN Behroozi, Saeid, MD       • sodium chloride 0.9 % with KCl 20 mEq/L infusion  250 mL/hr Intravenous Continuous PRN Behroozi, Saeid, MD       • sodium chloride  0.9 % with KCl 40 mEq/L infusion  250 mL/hr Intravenous Continuous PRN Behroozi, Saeid, MD       • [START ON 12/6/2021] vancomycin 1 g/250 mL 0.9% NS (vial-mate)  1,000 mg Intravenous Q12H Behroozi, Saeid, MD           Lab Review:     Results from last 7 days   Lab Units 12/05/21  1225 12/05/21  1008 12/05/21  1008   SODIUM mmol/L 134*   < > 134*   POTASSIUM mmol/L 4.7   < > 4.5   CHLORIDE mmol/L 105   < > 102   CO2 mmol/L 13.0*   < > 13.0*   BUN mg/dL 43*   < > 43*   CREATININE mg/dL 1.08*   < > 1.07*   CALCIUM mg/dL 10.4   < > 9.8   BILIRUBIN mg/dL  --   --  0.3   ALK PHOS U/L  --   --  138*   ALT (SGPT) U/L  --   --  143*   AST (SGOT) U/L  --   --  263*   GLUCOSE mg/dL 413*   < > 560*    < > = values in this interval not displayed.     Results from last 7 days   Lab Units 12/05/21  1008 12/05/21  0337 12/02/21 2015   TROPONIN T ng/mL 1.580* 2.530* <0.010         Results from last 7 days   Lab Units 12/05/21  1008   WBC 10*3/mm3 22.64*   HEMOGLOBIN g/dL 11.4*   HEMATOCRIT % 35.2   PLATELETS 10*3/mm3 337     Results from last 7 days   Lab Units 12/05/21  1008 12/02/21  1347   INR  1.83* 1.23*   APTT seconds >240.0* 30.2     Results from last 7 days   Lab Units 12/05/21  1225   MAGNESIUM mg/dL 1.9         Results from last 7 days   Lab Units 12/05/21  1008 12/03/21  0436   TSH uIU/mL  --  2.200   FREE T4 ng/dL 1.26  --            EKG:   ECG/EMG Results (last 24 hours)     Procedure Component Value Units Date/Time    ECG 12 Lead [[id number]] Collected: 12/05/21 0319     Updated: 12/05/21 0707    ECG 12 Lead [[id number]] Collected: 12/05/21 0712     Updated: 12/05/21 0815    Adult Transthoracic Echo Limited W/ Cont if Necessary Per Protocol [[id number]] Resulted: 12/05/21 1202     Updated: 12/05/21 1202     BSA 2.0 m^2      LVLd ap4 8.2 cm      EDV(MOD-sp4) 106.0 ml      LVLs ap4 7.6 cm      ESV(MOD-sp4) 75.8 ml      EF(MOD-sp4) 28.5 %      LVLd ap2 8.5 cm      EDV(MOD-sp2) 111.0 ml      LVLs ap2 7.7 cm       ESV(MOD-sp2) 72.7 ml      EF(MOD-sp2) 34.5 %      SV(MOD-sp4) 30.2 ml      SI(MOD-sp4) 14.9 ml/m^2      SV(MOD-sp2) 38.3 ml      SI(MOD-sp2) 18.9 ml/m^2      LV Donato Vol (BSA corrected) 52.4 ml/m^2      LV Sys Vol (BSA corrected) 37.5 ml/m^2      Ao pk riaz 168.0 cm/sec      Ao max PG 11.3 mmHg      MR max riaz 361.0 cm/sec      MR max PG 52.1 mmHg      TR max riaz 339.0 cm/sec      RVSP(TR) 56.0 mmHg      RAP systole 10.0 mmHg      BH CV ECHO SASHA - BZI_BMI 28.1 kilograms/m^2      BH CV ECHO SASHA - BSA(HAYCOCK) 2.1 m^2      BH CV ECHO SASHA - BZI_METRIC_WEIGHT 86.2 kg       CV ECHO SASHA - BZI_METRIC_HEIGHT 175.3 cm      Target HR (85%) 129 bpm      Max. Pred. HR (100%) 152 bpm           ECHO:  Results for orders placed during the hospital encounter of 12/02/21    Adult Transthoracic Echo Complete W/ Cont if Necessary Per Protocol    Interpretation Summary  · Left ventricular wall thickness is consistent with mild to moderate concentric hypertrophy.  · Left ventricular ejection fraction appears to be 56 - 60%.  · There is calcification of the aortic valve mainly affecting the non-coronary, left coronary and right coronary cusp(s).  · Estimated right ventricular systolic pressure from tricuspid regurgitation is moderately elevated (45-55 mmHg).  · The right atrial cavity is borderline dilated.  · Left ventricular diastolic function was normal.       Imaging:  Imaging Results (Last 24 Hours)     Procedure Component Value Units Date/Time    CT Chest Without Contrast Diagnostic [[id number]] Collected: 12/05/21 0420     Updated: 12/05/21 0517    Narrative:      CT chest without contrast on  12/5/2021     CLINICAL INDICATION: Hypoxia, tachycardia    TECHNIQUE: Multiple axial images are obtained throughout the  chest without the administration of contrast. This exam was  performed according to our departmental dose-optimization  program, which includes automated exposure control, adjustment of  the mA and/or kV according to  patient size and/or use of  iterative reconstruction technique.  Total DLP is 700 mGy*cm.     COMPARISON:  CT chest from 3/19/2014 and CT abdomen and lower  chest from 3/9/2021    FINDINGS: There are very small bilateral pleural effusions. The  patient is status post median sternotomy and CABG. Coronary  artery calcifications and other extensive vascular calcifications  are noted. Left ureteral stent is partially imaged with its  proximal aspect in the left renal pelvis. Limited visualized  unenhanced upper abdomen is otherwise unremarkable. There is no  pericardial effusion. There are several small mediastinal lymph  nodes are likely reactive. There is extensive bilateral septal  thickening most consistent with pulmonary edema. There is some  bronchial wall thickening suggesting bronchitis as well. There is  mild bibasilar atelectasis. Degenerative changes are noted in the  spine. There is again noted an old compression fracture of the  superior endplate of L1.      Impression:      1.  Very small bilateral pleural effusions with extensive  bilateral septal thickening most consistent with pulmonary edema.  2.  Bronchial wall thickening suggesting bronchitis as well.    Electronically signed by:  Calderon Lubin  12/5/2021 5:15 AM  CST Workstation: [id number]    CT Head Without Contrast [[id number]] Collected: 12/05/21 0420     Updated: 12/05/21 0451    Narrative:      EXAM: CT HEAD WITHOUT IV CONTRAST    INDICATION:altered mental status    TECHNIQUE: Helical CT of the head was obtained without  intravenous contrast. Axial, coronal and sagittal images were  created.    Dose reduction technique was used including one or more of the  following: automated exposure control, adjustment of mA and kV  according to patient size, and/or iterative reconstruction.    COMPARISON: CTs most recent dated 3/6/2021     FINDINGS:    Quality: Average.  No unusual artifacts beyond the limitations of  routine CT.    Hemorrhage:  None.    Brain parenchyma: Parenchymal attenuation characteristics  unchanged.  No new findings.    Ventricles: Unchanged in size and configuration from the most  recent prior study.    Mass effect: None.    Vascular: Atherosclerotic calcifications.    Visualized orbits: The imaged portions appear normal.    Paranasal sinuses and mastoid air cells: The imaged portions  demonstrate minimal mucosal thickening and/or rentention cyst  formation.    Bony structures: No depressed or displaced skull fractures.    Other: Noncontributory.        Impression:        1.   No acute intracranial findings.    If there is high clinical suspicion for acute infarct consider  MRI for further evaluation.     Electronically signed by:  Simón Becker MD  12/5/2021 4:50 AM  CST Workstation: [id number]    XR Chest 1 View [[id number]] Collected: 12/05/21 0344     Updated: 12/05/21 0447    Narrative:      EXAM: Single portable XR view of the chest  INDICATION: Simple Sepsis Triage Protocol  COMPARISON: Multiple radiographs most recent dated 12/2/2021    FINDINGS:  Multiple intact sternotomy wires. Left-sided PICC is stable in  position.  The cardiomediastinal silhouette is stable.   Increased bilateral interstitial markings/prominence. Trace  bilateral pleural effusions. No visible pneumothorax.      Impression:        Increased bilateral interstitial opacities likely representing  pulmonary edema with trace bilateral pleural effusions.  Superimposed infectious process not excluded.    Electronically signed by:  Simón Becker MD  12/5/2021 4:46 AM  CST Workstation: [id number]          I personally viewed and interpreted the patient's EKG/Telemetry data.    Assessment:   1.  Elevated troponin.  2.  Atherosclerotic coronary artery disease.  3.  Coronary artery bypass grafting done in 2014.  4.  Altered mental status with sepsis probably urinary tract infection.  5.  Left ventricular systolic dysfunction with an ejection fraction of 25 to  30%          Plan:   1.  Elevated troponin.  Patient has elevated troponin suggestive for non-Q myocardial infarction.  Patient on the last hospitalization had complained of having symptoms of chest pain with elevated troponin.  Patient troponin during the last hospitalization was negative.  Patient on presentation had a troponin of 2.5 which is trending down.  Patient now has left ventricular systolic dysfunction.  Patient does have history of documented coronary artery disease with previous coronary artery bypass grafting done in 2014.  Patient is currently on anticoagulation with heparin.  Were discussed with the family further invasive evaluation.patient CODE STATUS is no CPR.    2.  Atherosclerotic coronary artery disease.  Patient does have history of documented coronary artery disease with previous coronary artery bypass grafting done in 2014 with three-vessel bypass grafting.  Patient had elevated troponin on admission which is trending down.    3.  Left ventricular systolic dysfunction with an ejection fraction of 25 to 30%.  Patient at the present time would be treated medically.  Patient had an elevated BNP level on admission.  Patient would be administered gentle diuresis and would be continued on the present dose of the carvedilol.  Patient would be started on Entresto if the blood pressure can tolerate.    4.  Hyperlipidemia.  Patient would be continued on the present dose of the Lipitor.    5.  Sepsis with urinary tract infection.  Patient is currently on antibiotics and has been followed by the hospitalist.    Thank you for the consultation.    The above plan of management were discussed with the patient          Time: Time spent in face-to-face evaluation of greater than 55 minutes interacting, formulating, examining and discussing the plan with the patient with 50% of greater time spent in face-to-face interaction.    Electronically signed by Papito Calderón MD, 12/05/21, 3:10 PM CST.      Dictated  utilizing Dragon dictation.

## 2021-12-05 NOTE — PROGRESS NOTES
Pharmacokinetics by Pharmacy - Vancomycin    Janelle Millard is a 66 y.o. female receiving vancomycin (day 1) for sepsis.  Patient is also receiving zosyn.    Objective:    Temp Readings from Last 1 Encounters:   12/05/21 98.4 °F (36.9 °C) (Oral)     WBC   Date Value Ref Range Status   12/05/2021 28.68 (H) 3.40 - 10.80 10*3/mm3 Final   12/02/2021 6.70 3.40 - 10.80 10*3/mm3 Final      Lactate   Date Value Ref Range Status   12/05/2021 2.6 (C) 0.5 - 2.0 mmol/L Final   12/05/2021 2.6 (C) 0.5 - 2.0 mmol/L Final      Creatinine   Date Value Ref Range Status   12/05/2021 0.84 0.57 - 1.00 mg/dL Final   12/03/2021 0.56 (L) 0.57 - 1.00 mg/dL Final   12/02/2021 0.54 (L) 0.57 - 1.00 mg/dL Final       No results found for: VANCOPEAK, VANCOTROUGH, VANCORANDOM    Culture Results:  Microbiology Results (last 10 days)     Procedure Component Value - Date/Time    COVID-19 and FLU A/B PCR - Swab, Nasopharynx [[id number]]  (Normal) Collected: 12/05/21 0352    Lab Status: Final result Specimen: Swab from Nasopharynx Updated: 12/05/21 0439     COVID19 Not Detected     Influenza A PCR Not Detected     Influenza B PCR Not Detected    Narrative:      Fact sheet for providers: https://www.fda.gov/media/465608/download    Fact sheet for patients: https://www.fda.gov/media/135250/download    Test performed by PCR.    COVID-19 and FLU A/B PCR - Swab, Nasopharynx [[id number]]  (Normal) Collected: 12/02/21 1439    Lab Status: Final result Specimen: Swab from Nasopharynx Updated: 12/02/21 1526     COVID19 Not Detected     Influenza A PCR Not Detected     Influenza B PCR Not Detected    Narrative:      Fact sheet for providers: https://www.fda.gov/media/748945/download    Fact sheet for patients: https://www.fda.gov/media/415909/download    Test performed by PCR.    Urine Culture - Urine, Urine, Clean Catch [[id number]] Collected: 12/02/21 1102    Lab Status: Final result Specimen: Urine, Clean Catch Updated: 12/04/21 0108     Urine Culture Final  "report     Result 1 Comment     Comment: Mixed urogenital reagan  10,000-25,000 colony forming units per mL       Narrative:      Performed at:  [address] [place] [address], [place], [place]  [id number]  : Abdiaziz Munoz PhD, Phone:  [phone]        No results found for: RESPCX    [Ht: 175.3 cm (69\"); Wt: 86.2 kg (190 lb)]   Body mass index is 28.06 kg/m².  Ideal body weight: 66.2 kg (145 lb 15.1 oz)  Adjusted ideal body weight: 74.2 kg (163 lb 9.1 oz)      Assessment:  • WBCs elevated, afebrile  • Renal function appears stable with an Estimated Creatinine Clearance: 75.1 mL/min (by C-G formula based on SCr of 0.84 mg/dL).  • Cultures  o 12/5 blood cultures pending   o 12/5 urine culture pending   • Vancomycin random ordered due to concern patient was already receiving vancomycin prior to arrival but the random came back undetectable.   • AUC and trough goals are 400-600 and 10-20 mcg/mL, respectively.     Plan:  1. Give Vancomycin 1750 mg IV x1 at noon for a loading dose then start Vancomycin 1000 mg IV q12hrs tonight at midnight, estimated AUC and trough of 497 and 14 mcg/mL, respectively.   2. Vancomycin peak and trough to be collected prior to the 5th dose on 12/7 @0200 and 12/7 @1130, respectively (Currently ordered). Consulted until 12/12.  3. Pharmacy will monitor renal function and adjust dose accordingly.    Thank you for this consult.     Grady Lawton [facility]   12/05/21 08:31 CST    "

## 2021-12-06 NOTE — CONSULTS
Adult Nutrition  Assessment    Patient Name:  Janelle Millard  YOB: 1955  MRN: [id number]  Admit Date:  12/5/2021    Assessment Date:  12/6/2021    Comments:  Pt admitted from SNF with AMS.  Dxed with Sepsis and DKA with Metabolic Acidosis and Metabolic Encephalopathy.  Cardiology following for elevated troponin.  Pt was on an Insulin drip but gap has closed and drip has been discontinued.  Sepsis most likely related to recurrent UTI.  Decreased alertness at this time.  SLP eval ordered as she choked on a mouth swap.  RD will monitor tx plans and make recommendations as appropriate.  Of note--she appears well nourished and her wt is relatively stable based on wt hx.  Will monitor.      Reason for Assessment     Row Name 12/06/21 1205          Reason for Assessment    Reason For Assessment identified at risk by screening criteria; diagnosis/disease state     Diagnosis diabetes diagnosis/complications; infection/sepsis     Identified At Risk by Screening Criteria difficulty chewing/swallowing                Nutrition/Diet History     Row Name 12/06/21 1205          Nutrition/Diet History    Typical Food/Fluid Intake Pt sleeping. Would not wake to answer questions.  Per staff Gap is closed.  IV insulin has been discontinued.  SLP eval ordered because pt choked on mouth swap.                Anthropometrics     Row Name 12/06/21 0544          Anthropometrics    Weight 86.1 kg (189 lb 13.8 oz)                Labs/Tests/Procedures/Meds     Row Name 12/06/21 1203          Labs/Procedures/Meds    Lab Results Reviewed reviewed, pertinent            Diagnostic Tests/Procedures    Diagnostic Test/Procedure Reviewed reviewed, pertinent            Medications    Pertinent Medications Reviewed reviewed, pertinent                Physical Findings     Row Name 12/06/21 120          Physical Findings    Overall Physical Appearance on oxygen therapy                Estimated/Assessed Needs     Row Name 12/06/21 1201           Calculation Measurements    Weight Used For Calculations 65.8 kg (145 lb)  IBW            Estimated/Assessed Needs    Additional Documentation Additional Requirements (Group); Fluid Requirements (Group); Ochiltree-St. Jeor Equation (Group); Protein Requirements (Group); Calorie Requirements (Group); KCAL/KG (Group)            Calorie Requirements    Estimated Calorie Need Method Ochiltree-St Jeor            Ochiltree-St. Jeor Equation    RMR (Ochiltree-St. Jeor Equation) 1252.349     Ochiltree-St. Jeor Activity Factors 1.2     Activity Factors (Ochiltree-St. Jeor) 1502.8165            Protein Requirements    Weight Used For Protein Calculations 65.8 kg (145 lb)     Est Protein Requirement Amount (gms/kg) 1.2 gm protein     Estimated Protein Requirements (gms/day) 78.93            Fluid Requirements    Fluid Requirements (mL/day) 1500     Estimated Fluid Requirement Method RDA Method     RDA Method (mL) 1500                Nutrition Prescription Ordered     Row Name 12/06/21 1206          Nutrition Prescription PO    Current PO Diet NPO                       Electronically signed by:  Anay Acharya RD  12/06/21 12:12 CST

## 2021-12-06 NOTE — PLAN OF CARE
Goal Outcome Evaluation:  Plan of Care Reviewed With: caregiver        Progress: no change  Outcome Summary: Pt remains NPO.  Gap closed and Insulin drip discontinued but pt with ? difficulty swallowing with decreased alertness  SLP eval ordered. Will monitor.

## 2021-12-06 NOTE — PLAN OF CARE
Goal Outcome Evaluation:  Plan of Care Reviewed With: caregiver        Progress: declining  Outcome Summary: insulin gtt. heparin gtt. Cardizem. Dig admin per order. Tachycardic. Tachypneic.

## 2021-12-06 NOTE — PROGRESS NOTES
"  Pharmacokinetics by Pharmacy - Vancomycin    Janelle Millard is a 66 y.o. female  [Ht: 175.3 cm (69.02\"); Wt: 86.1 kg (189 lb 13.8 oz)] is being treated for sepsis, day 2 of therapy.    Estimated Creatinine Clearance: 78.8 mL/min (by C-G formula based on SCr of 0.72 mg/dL).   Creatinine   Date Value Ref Range Status   12/06/2021 0.72 0.57 - 1.00 mg/dL Final   12/06/2021 0.74 0.57 - 1.00 mg/dL Final   12/05/2021 0.77 0.57 - 1.00 mg/dL Final   08/06/2019 0.5 (L) 0.6 - 1.3 mg/dL Final      Lab Results   Component Value Date    WBC 22.80 (H) 12/06/2021    WBC 22.64 (H) 12/05/2021    WBC 28.68 (H) 12/05/2021     C-Reactive Protein   Date Value Ref Range Status   03/10/2021 6.51 (H) 0.00 - 0.50 mg/dL Final   03/09/2021 8.88 (H) 0.00 - 0.50 mg/dL Final   08/06/2019 1.1 (H) 0.0 - 0.9 mg/dL Final     Lactate   Date Value Ref Range Status   12/06/2021 1.2 0.5 - 2.0 mmol/L Final   12/05/2021 2.6 (C) 0.5 - 2.0 mmol/L Final   12/05/2021 2.6 (C) 0.5 - 2.0 mmol/L Final       Temp Readings from Last 1 Encounters:   12/06/21 98.5 °F (36.9 °C) (Axillary)            Culture Results:  Microbiology Results (last 10 days)       Procedure Component Value - Date/Time    CANDIDA AURIS SCREEN - Swab, Axilla Right, Axilla Left and Groin [[id number]]  (Normal) Collected: 12/05/21 0806    Lab Status: Preliminary result Specimen: Swab from Axilla Right, Axilla Left and Groin Updated: 12/06/21 0815     Candida Auris Screen Culture No Candida auris isolated at 24 hours    CRE Screen by PCR - Swab, Large Intestine, Rectum [[id number]] Collected: 12/05/21 0805    Lab Status: Final result Specimen: Swab from Large Intestine, Rectum Updated: 12/05/21 0923     CRE SCREEN Not Detected     Comment: Test performed by real-time polymerase chain reaction (qPCR).        OXA 48 Strain Not Detected     IMP STRAIN Not Detected     VIM STRAIN Not Detected     NDM Strain Not Detected     KPC Strain Not Detected    Urine Culture - Urine, Urine, Catheter " [[id number]]  (Normal) Collected: 12/05/21 0447    Lab Status: Preliminary result Specimen: Urine, Catheter Updated: 12/06/21 1218     Urine Culture Culture in progress    Blood Culture - Blood, Hand, Right [[id number]]  (Normal) Collected: 12/05/21 0352    Lab Status: Preliminary result Specimen: Blood from Hand, Right Updated: 12/06/21 0415     Blood Culture No growth at 24 hours    COVID-19 and FLU A/B PCR - Swab, Nasopharynx [[id number]]  (Normal) Collected: 12/05/21 0352    Lab Status: Final result Specimen: Swab from Nasopharynx Updated: 12/05/21 0439     COVID19 Not Detected     Influenza A PCR Not Detected     Influenza B PCR Not Detected    Narrative:      Fact sheet for providers: https://www.fda.gov/media/686506/download    Fact sheet for patients: https://www.fda.gov/media/490032/download    Test performed by PCR.    Blood Culture - Blood, Hand, Right [[id number]]  (Normal) Collected: 12/05/21 0337    Lab Status: Preliminary result Specimen: Blood from Hand, Right Updated: 12/06/21 0345     Blood Culture No growth at 24 hours    COVID-19 and FLU A/B PCR - Swab, Nasopharynx [[id number]]  (Normal) Collected: 12/02/21 1439    Lab Status: Final result Specimen: Swab from Nasopharynx Updated: 12/02/21 1526     COVID19 Not Detected     Influenza A PCR Not Detected     Influenza B PCR Not Detected    Narrative:      Fact sheet for providers: https://www.fda.gov/media/881847/download    Fact sheet for patients: https://www.fda.gov/media/679696/download    Test performed by PCR.    Urine Culture - Urine, Urine, Clean Catch [[id number]] Collected: 12/02/21 1102    Lab Status: Final result Specimen: Urine, Clean Catch Updated: 12/04/21 0108     Urine Culture Final report     Result 1 Comment     Comment: Mixed urogenital reagan  10,000-25,000 colony forming units per mL       Narrative:      Performed at:  [address] [place] [address]  [id number]  : Abdiaziz Munoz PhD, Phone:  [phone]           No results found for: RESPCX    Indication for use: sepsis      Current Vancomycin Dose:  1000 mg IVPB every 12 hours, day 2 of therapy.     Pt is also receiving Zosyn    Assessment/Plan:  Reviewed above labs and cultures.   BC-0 No growth x 24 hours  Urine culture is in progress  Vancomycin peak and trough levels are ordered for tomorrow. WBC is elevated at 22.8. Cr is 0.72, stable. Will continue current dose.  Pharmacy will continue to monitor renal function and adjust dose accordingly.    Tammy Hayes, PharmD   12/06/21 14:09 CST

## 2021-12-06 NOTE — PLAN OF CARE
Goal Outcome Evaluation:      Pt resting in room on 2L NC. VSS. Grady with adequate output. Heparin gtt infusing. Cardizem gtt infusing. S&S eval done today and pt remains NPO. PRN pain med given during shift. Restraints remain in place. Continuing to monitor.

## 2021-12-06 NOTE — THERAPY EVALUATION
ccu/sdu - Speech Language Pathology   Swallow Initial Evaluation [facility]     Patient Name: Janelle Millard  : 1955  MRN: [id number]  Today's Date: 2021               Admit Date: 2021     Pt seen for skilld ST services this date to assess swallow function d/t reports of pt having difficulty w/pills prior to admission.  Pt was very lethargic; however, agreeable to PO intake.  Pt would respond and attempt to open eyes and would accept bolus from SLP.  Pt consumed ice chips w/little to no oral awareness/bolus manipulation.  No swallow was initiated or attempted w/all ice chip trials.  SLP presented thin liquids via spoon 3 cc w/anteior loss of bolus and no swallow or attempt to initiate swallow.  SLP recommends continued NPO at this time w/consideration of alternative nutrition.  SLP spoke w/pt's niece who is POA who stated they did not wish for alternative nutrition to be used.  SLP discussed results and recommendations w/pts nieces who was in agreement.  SLP will attempt PO trials next date.    Goal:  Pt will safely tolerate trials of PO solids w/no overt s/s of aspiration to establish safe PO diet:      Visit Dx:     ICD-10-CM ICD-9-CM   1. Sepsis without acute organ dysfunction, due to unspecified organism ([facility])  A41.9 038.9     995.91   2. Pneumonia of both lower lobes due to infectious organism  J18.9 486   3. Acute cystitis with hematuria  N30.01 595.0   4. Leukocytosis, unspecified type  D72.829 288.60   5. Altered mental status, unspecified altered mental status type  R41.82 780.97   6. Elevated liver enzymes  R74.8 790.5   7. Atherosclerosis of native coronary artery of native heart without angina pectoris  I25.10 414.01   8. Elevated troponin  R77.8 790.6   9. 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 Oropharyngeal dysphagia  R13.12 787.22     Patient Active Problem List   Diagnosis   •  Carpal tunnel syndrome of left wrist   • Type 1 diabetes mellitus with diabetic polyneuropathy (HCC)   • Mixed diabetic hyperlipidemia associated with type 1 diabetes mellitus (HCC)   • Hypertension associated with diabetes (HCC)   • Syncope and collapse   • CAD (coronary artery disease)   • Asthma   • Depressive disorder, not elsewhere classified   • Neuropathy   • Other specified rheumatoid arthritis, multiple sites (HCC)   • Carpal tunnel syndrome on both sides   • Bilateral carotid artery stenosis   • Overweight (BMI 25.0-29.9)   • Precordial pain   • Sepsis with encephalopathy without septic shock (HCC)   • Tobacco abuse   • UTI (urinary tract infection)   • Ureteral stone with hydronephrosis   • Chest pain, rule out acute myocardial infarction   • Chest pain   • Chest pain with high risk for cardiac etiology   • Sepsis (HCC)     Past Medical History:   Diagnosis Date   • Arthritis, rheumatoid (HCC)    • Arthropathy associated with neurological disorder    • Arthropathy of lumbar facet joint    • Asthma    • Benign hypertension    • Carpal tunnel syndrome    • Diabetes (HCC)    • Diabetic polyneuropathy (HCC)    • Dyslipidemia    • Fallen arches    • Hammer toe    • Heart disease    • Joint pain    • Mild depression (HCC)     Saddness post Surgery 7/10/14 improved after counseling.   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 Diffusely calcified LAD with 30-80% stenosis. OMB #3 with 60% to 70% stenosis. Preserved LV systolic function with EF of 55%.   • CARPAL TUNNEL RELEASE Right 10/15/2014    Right carpal tunnel release.    • CARPAL TUNNEL RELEASE     • CATARACT EXTRACTION, BILATERAL Bilateral    • CORONARY ARTERY BYPASS GRAFT  02/24/2014    CABG x 3 with LIMA to LAD, endarterectomy to LAD, SVG to OMB and SVG to distal RCA with endarterectomy to distal RCA. Endo-vein harvesting.   • CYSTECTOMY Left     Breast cycst   • CYSTOSCOPY, URETEROSCOPY, RETROGRADE PYELOGRAM, STENT INSERTION Left 3/10/2021    Procedure: , LEFT RETROGRADE PYELOGRAM, STENT INSERTION LEFT URETER;  Surgeon: [place], Cooper NOLAN MD;  Location: [facility];  Service: Urology;  Laterality: Left;   • EXCISION LESION      Remove breast lesion - cyst   • EYE SURGERY      Insert lens prosthesis   • FOOT SURGERY  10/18/2011    Exostectomy of the right foot. Preulcerative lesion with Charot deformity, right foot   • LUMBAR SPINE SURGERY  11/09/2015    Lumbosacral radiofrequency thermal coagulation.   • NERVE BLOCK  09/14/2015    Lumbar medial branch block.   • TOE NAIL AVULSION  09/03/2015    DEBRIDE NAIL 6 OR MORE 84912 (1)       SLP Recommendation and Plan  SLP Swallowing Diagnosis: severe, oral dysphagia, suspected pharyngeal dysphagia (12/06/21 1200)  SLP Diet Recommendation: NPO, temporary alternate methods of nutrition/hydration (12/06/21 1200)     SLP Rec. for Method of Medication Administration: meds via alternate route (12/06/21 1200)           Swallow Criteria for Skilled Therapeutic Interventions Met: demonstrates skilled criteria (12/06/21 1200)  Anticipated Discharge Disposition (SLP): skilled nursing facility (12/06/21 1200)  Rehab Potential/Prognosis, Swallowing: re-evaluate goals as necessary (12/06/21 1200)  Therapy Frequency (Swallow): 3 days per week, 5 days per week (12/06/21 1200)  Predicted Duration Therapy Intervention (Days): until discharge (12/06/21 1200)                         Plan of Care Reviewed With: durable power of , family  Outcome Summary: Pt seen for skilld ST services this date to assess swallow function d/t reports of pt having  difficulty w/pills prior to admission.  Pt was very lethargic; however, agreeable to PO intake.  Pt would respond and attempt to open eyes and would accept bolus from SLP.  Pt consumed ice chips w/little to no oral awareness/bolus manipulation.  No swallow was initiated or attempted w/all ice chip trials.  SLP presented thin liquids via spoon 3 cc w/anteior loss of bolus and no swallow or attempt to initiate swallow.  SLP recommends continued NPO at this time w/consideration of alternative nutrition.  SLP spoke w/pt's niece who is POA who stated they did not wish for alternative nutrition to be used.  SLP discussed results and recommendations w/pts nieces who was in agreement.  SLP will attempt PO trials next date.      SWALLOW EVALUATION (last 72 hours)     SLP Adult Swallow Evaluation     Row Name 12/06/21 1200                   Rehab Evaluation    Document Type evaluation  -CK        Total Evaluation Minutes, SLP 33  -CK        Subjective Information fatigue  -CK        Patient/Family/Caregiver Comments/Observations Pt's niece at bedside  -CK                  General Information    Patient Profile Reviewed yes  -CK        Pertinent History Of Current Problem Pt's niece reports that pt possibly had difficulty swallowing pills vs not taking them prior to hospital admission and was on regular solids/thin liquids  -CK        Current Method of Nutrition NPO  -CK        Prior Level of Function-Swallowing no diet consistency restrictions  -CK        Plans/Goals Discussed with family; agreed upon  -CK        Barriers to Rehab cognitive status; previous functional deficit  -CK        Patient's Goals for Discharge patient could not state  -CK        Family Goals for Discharge patient able to return to PO diet  -CK                  Pain    Additional Documentation Pain Scale: FACES Pre/Post-Treatment (Group)  -CK                  Pain Scale: FACES Pre/Post-Treatment    Pain: FACES Scale, Pretreatment 0-->no hurt  -CK         Posttreatment Pain Rating 0-->no hurt  -CK                  Oral Motor Structure and Function    Dentition Assessment edentulous  -CK        Secretion Management WNL/WFL  -CK        Mucosal Quality dry  -CK        Volitional Swallow unable to elicit  -CK        Volitional Cough unable to elicit  -CK                  General Eating/Swallowing Observations    Respiratory Support Currently in Use nasal cannula  -CK        Eating/Swallowing Skills fed by SLP  -CK        Positioning During Eating upright 90 degree; upright in bed  -CK        Utensils Used spoon  -CK        Consistencies Trialed ice chips; thin liquids  -CK        Pre SpO2 (%) 99  -CK        Post SpO2 (%) 99  -CK                  Clinical Swallow Eval    Oral Prep Phase impaired  -CK        Oral Transit impaired  -CK        Pharyngeal Phase suspected pharyngeal impairment  -CK        Clinical Swallow Evaluation Summary Pt seen for skilld ST services this date to assess swallow function d/t reports of pt having difficulty w/pills prior to admission.  Pt was very lethargic; however, agreeable to PO intake.  Pt would respond and attempt to open eyes and would accept bolus from SLP.  Pt consumed ice chips w/little to no oral awareness/bolus manipulation.  No swallow was initiated or attempted w/all ice chip trials.  SLP presented thin liquids via spoon 3 cc w/anteior loss of bolus and no swallow or attempt to initiate swallow.  SLP recommends continued NPO at this time w/consideration of alternative nutrition.  SLP spoke w/pt's niece who is POA who stated they did not wish for alternative nutrition to be used.  SLP discussed results and recommendations w/pts nieces who was in agreement.  SLP will attempt PO trials next date.  -CK                  Oral Prep Concerns    Oral Prep Concerns incomplete bolus preparation; anterior loss; reduced lip opening; incomplete or weak lip closure around spoon  -CK        Reduced Lip Opening thin  -CK        Incomplete or  Weak Lip Closure Around Spoon thin  -CK        Anterior Loss thin  -CK        Incomplete Bolus Preparation thin  -CK                  Oral Transit Concerns    Oral Transit Concerns unable to initiate oral transit; delayed initiation of bolus transit  -CK        Delayed Intiation of Bolus Transit thin  -CK                  Pharyngeal Phase Concerns    Pharyngeal Phase Concerns other (see comments)  absent swallow  -CK                  Clinical Impression    SLP Swallowing Diagnosis severe; oral dysphagia; suspected pharyngeal dysphagia  -CK        Functional Impact risk of aspiration/pneumonia; risk of malnutrition; risk of dehydration  -CK        Rehab Potential/Prognosis, Swallowing re-evaluate goals as necessary  -CK        Swallow Criteria for Skilled Therapeutic Interventions Met demonstrates skilled criteria  -CK                  SLP Treatment Clinical Impressions    Treatment Assessment (SLP) oropharyngeal dysphagia  -CK        Barriers to Overall Progress (SLP) Lethargy; Fatigue; Cognitive status; Baseline deficits  -CK        Care Plan Review evaluation/treatment results reviewed; care plan/treatment goals reviewed; risks/benefits reviewed; current/potential barriers reviewed; patient/other agree to care plan  -CK        Care Plan Review, Other Participant(s) family; durable/healthcare power of   -CK                  Recommendations    Therapy Frequency (Swallow) 3 days per week; 5 days per week  -CK        Predicted Duration Therapy Intervention (Days) until discharge  -CK        SLP Diet Recommendation NPO; temporary alternate methods of nutrition/hydration  -CK        Oral Care Recommendations Oral Care BID/PRN  -CK        SLP Rec. for Method of Medication Administration meds via alternate route  -CK        Anticipated Discharge Disposition (SLP) skilled nursing facility  -CK                  Swallow Goals (SLP)    Oral Nutrition/Hydration Goal Selection (SLP) oral nutrition/hydration, SLP goal 1   -CK                  Oral Nutrition/Hydration Goal 1 (SLP)    Oral Nutrition/Hydration Goal 1, SLP Pt will safely tolerate trials of PO solids/liquids w/no overt s/s of aspiration for establishment of safe PO diet.  -CK        Time Frame (Oral Nutrition/Hydration Goal 1, SLP) by discharge  -CK        Barriers (Oral Nutrition/Hydration Goal 1, SLP) lethargy; absent swallow  -CK              User Key  (r) = Recorded By, (t) = Taken By, (c) = Cosigned By    Initials Name Effective Dates    Kacey Gambino MS CCC-SLP 06/16/21 -                 EDUCATION  The patient has been educated in the following areas:   Oral Care/Hydration NPO rationale.        SLP GOALS     Row Name 12/06/21 1200             Oral Nutrition/Hydration Goal 1 (SLP)    Oral Nutrition/Hydration Goal 1, SLP Pt will safely tolerate trials of PO solids/liquids w/no overt s/s of aspiration for establishment of safe PO diet.  -CK      Time Frame (Oral Nutrition/Hydration Goal 1, SLP) by discharge  -CK      Barriers (Oral Nutrition/Hydration Goal 1, SLP) lethargy; absent swallow  -CK            User Key  (r) = Recorded By, (t) = Taken By, (c) = Cosigned By    Initials Name Provider Type    Kacey Gambino MS CCC-SLP Speech and Language Pathologist                   Time Calculation:    Time Calculation- SLP     Row Name 12/06/21 1233             Time Calculation- SLP    SLP Start Time 1200  -CK      SLP Stop Time 1233  -CK      SLP Time Calculation (min) 33 min  -CK      Total Timed Code Minutes- SLP 33 minute(s)  -CK      SLP Received On 12/06/21  -CK      SLP Goal Re-Cert Due Date 12/20/21  -CK              Untimed Charges    SLP Eval/Re-eval  ST Eval Oral Pharyng Swallow - 73564  -CK      [id number] Eval Oral Pharyng Swallow Minutes 33  -CK              Total Minutes    Untimed Charges Total Minutes 33  -CK       Total Minutes 33  -CK            User Key  (r) = Recorded By, (t) = Taken By, (c) = Cosigned By    Initials Name Provider Type     CK Kacey Pop, MS CCC-SLP Speech and Language Pathologist                Therapy Charges for Today     Code Description Service Date Service Provider Modifiers Qty    [id number] HC ST EVAL ORAL PHARYNG SWALLOW 2 12/6/2021 Kacey Pop, MS CCC-SLP GN 1               Kacey Pop MS CCC-SLP  12/6/2021

## 2021-12-06 NOTE — PLAN OF CARE
Goal Outcome Evaluation:  Plan of Care Reviewed With: durable power of , family           Outcome Summary: Pt seen for skilld ST services this date to assess swallow function d/t reports of pt having difficulty w/pills prior to admission.  Pt was very lethargic; however, agreeable to PO intake.  Pt would respond and attempt to open eyes and would accept bolus from SLP.  Pt consumed ice chips w/little to no oral awareness/bolus manipulation.  No swallow was initiated or attempted w/all ice chip trials.  SLP presented thin liquids via spoon 3 cc w/anteior loss of bolus and no swallow or attempt to initiate swallow.  SLP recommends continued NPO at this time w/consideration of alternative nutrition.  SLP spoke w/pt's niece who is POA who stated they did not wish for alternative nutrition to be used.  SLP discussed results and recommendations w/pts nieces who was in agreement.  SLP will attempt PO trials next date.

## 2021-12-06 NOTE — PLAN OF CARE
Goal Outcome Evaluation:  Plan of Care Reviewed With: patient        Progress: improving  Outcome Summary: Pt VSS and UOP adequate.  Gap closed and DKA resolved at 0330.  Heparin and Cardizem gtt's continue.  Phos replacement continues with interim Phos 2.1.  Additional 4 hrs on Phos gtt until complete. Pt is restless and confused and restrained to protect existing (limited) IV access.  Will continue to monitor.

## 2021-12-07 NOTE — PLAN OF CARE
Goal Outcome Evaluation:  Plan of Care Reviewed With: caregiver        Progress: improving     Patient is more alert and appropriate in response, patient urine output is adequate, still on cardizem, still on heparin.  Patient vital signs are stable, her sugars have been in range, febrile to 99.2

## 2021-12-07 NOTE — PROGRESS NOTES
"Pharmacokinetics by Pharmacy - Vancomycin    Janelle Millard is a 66 y.o. female receiving vancomycin 1000mg IV Q12H day 3 for sepsis  Patient is also receiving zosyn    Objective:  [Ht: 175.3 cm (69.02\"); Wt: 88.7 kg (195 lb 8.8 oz)]     WBC   Date Value Ref Range Status   12/07/2021 17.73 (H) 3.40 - 10.80 10*3/mm3 Final   12/06/2021 22.80 (H) 3.40 - 10.80 10*3/mm3 Final   12/05/2021 22.64 (H) 3.40 - 10.80 10*3/mm3 Final   10/08/2019 7.3 4.0 - 11.0 10*3/uL Final   08/06/2019 7.3 4.8 - 10.8 10*9/L Final   08/20/2018 5 4.0 - 11.0 10*3/uL Final      C-Reactive Protein   Date Value Ref Range Status   03/10/2021 6.51 (H) 0.00 - 0.50 mg/dL Final   03/09/2021 8.88 (H) 0.00 - 0.50 mg/dL Final   08/06/2019 1.1 (H) 0.0 - 0.9 mg/dL Final     Lactate   Date Value Ref Range Status   12/06/2021 1.2 0.5 - 2.0 mmol/L Final   12/05/2021 2.6 (C) 0.5 - 2.0 mmol/L Final   12/05/2021 2.6 (C) 0.5 - 2.0 mmol/L Final      Temp Readings from Last 1 Encounters:   12/07/21 99.1 °F (37.3 °C) (Axillary)     Estimated Creatinine Clearance: 79.9 mL/min (by C-G formula based on SCr of 0.64 mg/dL).   Creatinine   Date Value Ref Range Status   12/07/2021 0.64 0.57 - 1.00 mg/dL Final   12/06/2021 0.72 0.57 - 1.00 mg/dL Final   12/06/2021 0.74 0.57 - 1.00 mg/dL Final   08/06/2019 0.5 (L) 0.6 - 1.3 mg/dL Final       Vancomycin Peak   Date Value Ref Range Status   12/07/2021 44.90 (C) 20.00 - 40.00 mcg/mL Final     Vancomycin Trough   Date Value Ref Range Status   12/07/2021 15.20 5.00 - 20.00 mcg/mL Final   11/07/2021 6.30 5.00 - 20.00 mcg/mL Final     Vancomycin Random   Date Value Ref Range Status   12/05/2021 <4.00 (L) 5.00 - 40.00 mcg/mL Final       Culture Results:  Microbiology Results (last 10 days)       Procedure Component Value - Date/Time    CANDIDA AURIS SCREEN - Swab, Axilla Right, Axilla Left and Groin [[id number]]  (Normal) Collected: 12/05/21 0806    Lab Status: Preliminary result Specimen: Swab from Axilla Right, Axilla Left and " Groin Updated: 12/07/21 0815     Candida Auris Screen Culture No Candida auris isolated at 2 days    CRE Screen by PCR - Swab, Large Intestine, Rectum [[id number]] Collected: 12/05/21 0805    Lab Status: Final result Specimen: Swab from Large Intestine, Rectum Updated: 12/05/21 0923     CRE SCREEN Not Detected     Comment: Test performed by real-time polymerase chain reaction (qPCR).        OXA 48 Strain Not Detected     IMP STRAIN Not Detected     VIM STRAIN Not Detected     NDM Strain Not Detected     KPC Strain Not Detected    Urine Culture - Urine, Urine, Catheter [[id number]]  (Abnormal) Collected: 12/05/21 0447    Lab Status: Final result Specimen: Urine, Catheter Updated: 12/06/21 2104     Urine Culture Yeast isolated    Narrative:      No further workup    Blood Culture - Blood, Hand, Right [[id number]]  (Normal) Collected: 12/05/21 0352    Lab Status: Preliminary result Specimen: Blood from Hand, Right Updated: 12/07/21 0415     Blood Culture No growth at 2 days    COVID-19 and FLU A/B PCR - Swab, Nasopharynx [[id number]]  (Normal) Collected: 12/05/21 0352    Lab Status: Final result Specimen: Swab from Nasopharynx Updated: 12/05/21 0439     COVID19 Not Detected     Influenza A PCR Not Detected     Influenza B PCR Not Detected    Narrative:      Fact sheet for providers: https://www.fda.gov/media/020100/download    Fact sheet for patients: https://www.fda.gov/media/588009/download    Test performed by PCR.    Blood Culture - Blood, Hand, Right [[id number]]  (Normal) Collected: 12/05/21 0337    Lab Status: Preliminary result Specimen: Blood from Hand, Right Updated: 12/07/21 0345     Blood Culture No growth at 2 days    COVID-19 and FLU A/B PCR - Swab, Nasopharynx [[id number]]  (Normal) Collected: 12/02/21 1439    Lab Status: Final result Specimen: Swab from Nasopharynx Updated: 12/02/21 1526     COVID19 Not Detected     Influenza A PCR Not Detected     Influenza B PCR Not Detected    Narrative:      Fact sheet  for providers: https://www.fda.gov/media/829816/download    Fact sheet for patients: https://www.fda.gov/media/180135/download    Test performed by PCR.    Urine Culture - Urine, Urine, Clean Catch [[id number]] Collected: 12/02/21 1102    Lab Status: Final result Specimen: Urine, Clean Catch Updated: 12/04/21 0108     Urine Culture Final report     Result 1 Comment     Comment: Mixed urogenital reagan  10,000-25,000 colony forming units per mL       Narrative:      Performed at:  [address]  [id number]  : Abdiaziz Munoz PhD, Phone:  [phone]          No results found for: RESPCX      Assessment:  WBC 17.73 and down from yesterday, afebrile  Scr 0.64, stable    Bcx: NG2D    Vancomycin peak level was elevated at 44.9 and trough was in range at 15.2. Will decrease dose to vancomycin 750 mg q12h.     Plan:  1. Change vancomycin 750mg IV Q12H.  2. Will order vancomycin peak for 12/9 at 0400 and trough for 12/9 at 1330.  3. Pharmacy will monitor renal function and adjust dose accordingly.      Caitlin Trevino RPH   12/07/21 13:10 CST

## 2021-12-07 NOTE — PROGRESS NOTES
Cardiology Progress Note     LOS: 1 day   Patient Care Team:  Yosef Casey MD as PCP - General (Family Medicine)  Francine Gallegos (Inactive) as Technician    Subjective:   Chart reviewed. Patient seen and examined. Patient has altered mental status.  Patient has elevated blood pressure.  Patient is currently on IV Cardizem.  Patient has not had any febrile episode and is currently on antibiotics.  Patient had mild elevation in the troponin.  Patient remained tachycardic and is currently on IV Cardizem      Objective:  Temp:  [98.1 °F (36.7 °C)-99.1 °F (37.3 °C)] 98.9 °F (37.2 °C)  Heart Rate:  [] 102  Resp:  [20-30] 25  BP: (121-164)/(60-83) 155/80  Arterial Line BP: (120-164)/() 145/95    Intake/Output Summary (Last 24 hours) at 12/6/2021 1937  Last data filed at 12/6/2021 1800  Gross per 24 hour   Intake 3939.17 ml   Output 1305 ml   Net 2634.17 ml       Physical Exam:   General Appearance:   Arousable though disoriented.   Head:    Normocephalic, atraumatic, without obvious abnormality   Eyes:             LILLIAN. Lids and lashes normal, conjunctivae and sclerae normal, no icterus, no pallor.   Ears:    Ears appear intact with no abnormalities noted.   Throat:   Mucous membranes pink and moist.   Neck:  Supple, trachea midline, no carotid bruit, no organomegaly or JVD.   Lungs:    Clear to auscultation and percussion.  Respirations regular, even and unlabored. No wheezes, rales, or rhonchi.    Heart:   Tachycardic S1 and S2 with a soft systolic murmur best heard at the apex.   Abdomen:    Soft, nontender, nondistended, no guarding, no rebound tenderness. Normal bowel sounds in all four quadrants, no masses, liver and spleen nonpalpable.    Genitalia:    Deferred.   Extremities:   Moves all extremities well, no edema, no cyanosis, no       redness, no clubbing.   Pulses:   Pulses palpable and equal bilaterally.   Skin:   Moist and warm. No bleeding, bruising or rash.   Neurologic/Psychiatric:   Moves all 4 extremity arousable.          Results Review:    Results from last 7 days   Lab Units 12/06/21  0352   SODIUM mmol/L 141   POTASSIUM mmol/L 4.1   CHLORIDE mmol/L 114*   CO2 mmol/L 19.0*   BUN mg/dL 32*   CREATININE mg/dL 0.72   CALCIUM mg/dL 10.2   BILIRUBIN mg/dL 0.4   ALK PHOS U/L 152*   ALT (SGPT) U/L 134*   AST (SGOT) U/L 169*   GLUCOSE mg/dL 236*     Results from last 7 days   Lab Units 12/05/21  1008 12/05/21  0337 12/02/21 2015   TROPONIN T ng/mL 1.580* 2.530* <0.010         Results from last 7 days   Lab Units 12/06/21  0352   WBC 10*3/mm3 22.80*   HEMOGLOBIN g/dL 12.5   HEMATOCRIT % 37.7   PLATELETS 10*3/mm3 335     Results from last 7 days   Lab Units 12/06/21  1832 12/06/21  1107 12/06/21  0352 12/05/21  1509 12/05/21  1008 12/02/21  1347 12/02/21  1347   INR   --   --   --   --  1.83*  --  1.23*   APTT seconds 58.4* 69.1* 84.1*   < > >240.0*   < > 30.2    < > = values in this interval not displayed.     Results from last 7 days   Lab Units 12/06/21  0755   MAGNESIUM mg/dL 2.0         Results from last 7 days   Lab Units 12/06/21  0352 12/05/21  1008 12/03/21  0436   TSH uIU/mL 0.578  --    < >   FREE T4 ng/dL  --  1.26  --     < > = values in this interval not displayed.           ECHO:  Results for orders placed during the hospital encounter of 12/05/21    Adult Transthoracic Echo Limited W/ Cont if Necessary Per Protocol    Interpretation Summary  · The right atrial cavity is borderline dilated.  · Left ventricular ejection fraction appears to be 26 - 30%.  · Left ventricular diastolic function was normal.  · Moderately decreased posterior leaflet mobility.  · Mild mitral valve stenosis is present.  · The following left ventricular wall segments are hypokinetic: mid anterior, apical anterior, basal anterolateral, mid anterolateral, apical lateral, basal inferolateral, mid inferolateral, apical inferior, mid inferior, apical septal, basal inferoseptal, mid inferoseptal, apex hypokinetic,  mid anteroseptal, basal anterior, basal inferior and basal inferoseptal.      ECG 12 Lead   Preliminary Result   Test Reason : Non-Q myocardial infarct   Blood Pressure :   */*   mmHG   Vent. Rate : 108 BPM     Atrial Rate : 108 BPM      P-R Int : 164 ms          QRS Dur :  80 ms       QT Int : 354 ms       P-R-T Axes :  44  62 142 degrees      QTc Int : 474 ms      Sinus tachycardia   Low voltage QRS   Cannot rule out Anterior infarct (cited on or before 11-MAY-2016)   T wave abnormality, consider inferolateral ischemia   Abnormal ECG   When compared with ECG of 05-DEC-2021 07:12,   T wave inversion now evident in Anterior leads      Referred By:            Confirmed By:       ECG 12 Lead         ECG 12 Lead         SCANNED EKG   Final Result      SCANNED EKG   Final Result      SCANNED EKG   Final Result      ECG 12 Lead    (Results Pending)        Medication Review:   Current Facility-Administered Medications   Medication Dose Route Frequency Provider Last Rate Last Admin   • [START ON 12/7/2021] !Vancomycin Peak Level Needed   Does not apply Once Behroozi, Saeid, MD       • [START ON 12/7/2021] !Vancomycin Trough Level Needed   Does not apply Once Behroozi, Saeid, MD       • aspirin EC tablet 81 mg  81 mg Oral Daily Behroozi, Saeid, MD       • atorvastatin (LIPITOR) tablet 20 mg  20 mg Oral Nightly Behroozi, Saeid, MD       • carvedilol (COREG) tablet 12.5 mg  12.5 mg Oral BID Behroozi, Saeid, MD       • dextrose (D50W) (25 g/50 mL) IV injection 12.5 g  12.5 g Intravenous PRN Behroozi, Saeid, MD   12.5 g at 12/05/21 2104   • dextrose (D50W) (25 g/50 mL) IV injection 25 g  25 g Intravenous Q15 Min PRN Behroozi, Saeid, MD       • dextrose (GLUTOSE) oral gel 15 g  15 g Oral Q15 Min PRN Behroozi, Saeid, MD       • dilTIAZem (CARDIZEM) 100 mg in 100 mL NS infusion (ADV)  10 mg/hr Intravenous Continuous Papito Calderón MD 10 mL/hr at 12/06/21 1043 10 mg/hr at 12/06/21 1043   • glucagon (human recombinant) (GLUCAGEN  DIAGNOSTIC) injection 1 mg  1 mg Subcutaneous Q15 Min PRN Behroozi, Saeid, MD       • heparin (porcine) 5000 UNIT/ML injection 4,000 Units  4,000 Units Intravenous Once Behroozi, Saeid, MD        And   • heparin 26615 units/250 mL (100 units/mL) in 0.45 % NaCl infusion  18 Units/kg/hr Intravenous Titrated Behroozi, Saeid, MD 17.24 mL/hr at 12/06/21 1916 20 Units/kg/hr at 12/06/21 1916    And   • heparin (porcine) 5000 UNIT/ML injection 4,000 Units  4,000 Units Intravenous PRN Behroozi, Saeid, MD   4,000 Units at 12/05/21 2204    And   • heparin (porcine) 5000 UNIT/ML injection 2,600 Units  30 Units/kg Intravenous PRN Behroozi, Saeid, MD   2,600 Units at 12/06/21 1920   • insulin aspart (novoLOG) injection 0-9 Units  0-9 Units Subcutaneous TID AC Behroozi, Saeid, MD   2 Units at 12/06/21 1638   • insulin detemir (LEVEMIR) injection 15 Units  15 Units Subcutaneous Q12H Behroozi, Saeid, MD   15 Units at 12/06/21 0814   • insulin regular (humuLIN R,novoLIN R) injection 10 Units  10 Units Intravenous Once Behroozi, Saeid, MD       • isosorbide mononitrate (IMDUR) 24 hr tablet 30 mg  30 mg Oral Daily Behroozi, Saeid, MD       • levothyroxine (SYNTHROID, LEVOTHROID) tablet 25 mcg  25 mcg Oral Daily Behroozi, Saeid, MD       • morphine injection 1 mg  1 mg Intravenous Q4H PRN Sukhjinder Trivedi MD   1 mg at 12/06/21 1627   • nitroglycerin (NITRODUR) 0.2 MG/HR patch 1 patch  1 patch Transdermal Daily Behroozi, Saeid, MD   1 patch at 12/06/21 0816   • nystatin (MYCOSTATIN) powder   Topical Q12H Sukhjinder Trivedi MD   Given at 12/06/21 1446   • PARoxetine (PAXIL) tablet 20 mg  20 mg Oral Nightly Behroozi, Saeid, MD       • Pharmacy to dose vancomycin   Does not apply Continuous PRN Behroozi, Saeid, MD       • piperacillin-tazobactam (ZOSYN) 3.375 g/100 mL 0.9% NS IVPB (mbp)  3.375 g Intravenous Q8H Behroozi, Saeid, MD   3.375 g at 12/06/21 1933   • potassium phosphate 45 mmol in sodium chloride 0.9 % 500 mL infusion  45 mmol  Intravenous PRN Behroozi, Saeid, MD        Or   • potassium phosphate 30 mmol in sodium chloride 0.9 % 250 mL infusion  30 mmol Intravenous PRN Behroozi, Saeid, MD 31.3 mL/hr at 12/05/21 2345 30 mmol at 12/05/21 2345    Or   • Potassium Phosphates 15 mmol in sodium chloride 0.9 % 100 mL infusion  15 mmol Intravenous PRN Behroozi, Saeid, MD        Or   • sodium phosphates 45 mmol in sodium chloride 0.9 % 500 mL IVPB  45 mmol Intravenous PRN Behroozi, Saeid, MD        Or   • sodium phosphates 30 mmol in sodium chloride 0.9 % 250 mL IVPB  30 mmol Intravenous PRN Behroozi, Saeid, MD        Or   • sodium phosphates 15 mmol in sodium chloride 0.9 % 250 mL IVPB  15 mmol Intravenous PRN Behroozi, Saeid, MD       • sacubitril-valsartan (ENTRESTO) 24-26 MG tablet 1 tablet  1 tablet Oral Q12H Papito Calderón MD       • sodium chloride 0.9 % flush 10 mL  10 mL Intravenous PRN Yosi Trivedi DO       • sodium chloride 0.9 % flush 10 mL  10 mL Intravenous Q12H Behroozi, Saeid, MD       • sodium chloride 0.9 % flush 10 mL  10 mL Intravenous PRN Behroozi, Saeid, MD       • sodium chloride 0.9 % flush 10 mL  10 mL Intravenous PRN Behroozi, Saeid, MD       • sodium chloride 0.9 % flush 10 mL  10 mL Intravenous Once PRN Behroozi, Saeid, MD       • sodium chloride 0.9 % flush 10 mL  10 mL Intravenous Q12H Behroozi, Saeid, MD       • sodium chloride 0.9 % flush 10 mL  10 mL Intravenous PRN Behroozi, Saeid, MD       • sodium chloride 0.9 % flush 3 mL  3 mL Intravenous Q12H Behroozi, Saeid, MD       • sodium chloride 0.9 % infusion  50 mL/hr Intravenous Continuous Behroozi, Saeid, MD 50 mL/hr at 12/06/21 0243 50 mL/hr at 12/06/21 0243   • sodium chloride 0.9 % infusion  10 mL/hr Intravenous Continuous PRN Behroozi, Saeid, MD       • vancomycin 1 g/250 mL 0.9% NS (vial-mate)  1,000 mg Intravenous Q12H Behroozi, Saeid, MD 0 mL/hr at 12/06/21 0236 1,000 mg at 12/06/21 1150       Assessment and Plan:      Sepsis (HCC)  1.  Sepsis.   Patient does have urinary tract infection.  Patient has not had any febrile episode.  Patient is currently on antibiotics.  If the patient symptom complex does not improve with IV antibiotics due to the patient recent hospitalization with administration of antibiotics were discussed with the primary team consideration for meningitis in the differential diagnosis.    2.  Positive troponin.  Patient has history of documented atherosclerotic coronary artery disease with previous coronary artery bypass grafting.  Had discussed with the patient family finding of positive troponin.  Patient at the present time would not be subjected to any invasive evaluation.  Patient would be treated medically.    3.  Paroxysmal atrial fibrillation with elevated blood pressure.  Patient would be continued on IV Cardizem.  Patient is currently not taking oral medication due to the patient altered mental status.    4.  Arterial hypertension.  Patient will be continued on the carvedilol.  Patient is on Entresto for the left ventricular systolic dysfunction.    5.  Left ventricular systolic dysfunction.  Patient would be administered Lasix on a as needed basis.    6.  Febrile episode with a T-max of 101.  Patient is on Zosyn and vancomycin.    Above plan of management were discussed with the nursing staff                Electronically signed by Papito Calderón MD, 12/06/21, 7:37 PM CST.      Time: Time spent on face-to-face interaction 20 minutes

## 2021-12-07 NOTE — PROGRESS NOTES
[facility] Medicine Services  INPATIENT PROGRESS NOTE    Length of Stay: 2  Date of Admission: 12/5/2021  Primary Care Physician: Yosef Casey MD    Subjective   Chief Complaint: Altered mental status  HPI: Patient has essentially unresponsive.    Review of Systems   Unable to perform ROS: Mental status change        All pertinent negatives and positives are as above. All other systems have been reviewed and are negative unless otherwise stated.     Objective    Temp:  [97.4 °F (36.3 °C)-99.2 °F (37.3 °C)] 99.1 °F (37.3 °C)  Heart Rate:  [] 81  Resp:  [24-33] 24  BP: (113-195)/() 113/57  Arterial Line BP: (117-187)/() 132/52    Physical Exam  Vitals reviewed.   Constitutional:       Appearance: She is well-developed.   HENT:      Head: Normocephalic and atraumatic.   Eyes:      Pupils: Pupils are equal, round, and reactive to light.   Cardiovascular:      Rate and Rhythm: Normal rate and regular rhythm.      Heart sounds: Normal heart sounds. No murmur heard.  No friction rub. No gallop.    Pulmonary:      Effort: Pulmonary effort is normal. No respiratory distress.      Breath sounds: Normal breath sounds. No wheezing or rales.   Chest:      Chest wall: No tenderness.   Abdominal:      General: Bowel sounds are normal. There is no distension.      Palpations: Abdomen is soft.      Tenderness: There is no abdominal tenderness. There is no guarding.   Musculoskeletal:      Cervical back: Normal range of motion and neck supple.   Skin:     General: Skin is warm and dry.   Neurological:      Mental Status: She is unresponsive.       Results Review:  I have reviewed the labs, radiology results, and diagnostic studies.    Laboratory Data:   Results from last 7 days   Lab Units 12/07/21  0518 12/06/21  0352 12/06/21  0006 12/05/21  1225 12/05/21  1008   SODIUM mmol/L 146* 141 144   < > 134*   POTASSIUM mmol/L 3.3* 4.1 4.2   < > 4.5   CHLORIDE mmol/L  116* 114* 117*   < > 102   CO2 mmol/L 22.0 19.0* 20.0*   < > 13.0*   BUN mg/dL 23 32* 33*   < > 43*   CREATININE mg/dL 0.64 0.72 0.74   < > 1.07*   GLUCOSE mg/dL 273* 236* 187*   < > 560*   CALCIUM mg/dL 9.9 10.2 10.4   < > 9.8   BILIRUBIN mg/dL 0.7 0.4  --   --  0.3   ALK PHOS U/L 126* 152*  --   --  138*   ALT (SGPT) U/L 124* 134*  --   --  143*   AST (SGOT) U/L 109* 169*  --   --  263*   ANION GAP mmol/L 8.0 8.0 7.0   < > 19.0*    < > = values in this interval not displayed.     Estimated Creatinine Clearance: 79.9 mL/min (by C-G formula based on SCr of 0.64 mg/dL).  Results from last 7 days   Lab Units 12/07/21  0518 12/06/21  0755 12/06/21  0352 12/06/21  0006 12/06/21  0006   MAGNESIUM mg/dL 1.9 2.0 2.0   < > 2.0   PHOSPHORUS mg/dL  --  2.6 2.1*  --  1.8*    < > = values in this interval not displayed.         Results from last 7 days   Lab Units 12/07/21  0518 12/06/21  0352 12/05/21  1008 12/05/21  0337 12/02/21  1347   WBC 10*3/mm3 17.73* 22.80* 22.64* 28.68* 6.70   HEMOGLOBIN g/dL 11.9* 12.5 11.4* 13.7 10.2*   HEMATOCRIT % 36.2 37.7 35.2 40.4 31.0*   PLATELETS 10*3/mm3 259 335 337 426 317     Results from last 7 days   Lab Units 12/05/21  1008 12/02/21  1347   INR  1.83* 1.23*       Culture Data:   Blood Culture   Date Value Ref Range Status   12/05/2021 No growth at 2 days  Preliminary   12/05/2021 No growth at 2 days  Preliminary     Urine Culture   Date Value Ref Range Status   12/05/2021 Yeast isolated (A)  Final     No results found for: RESPCX  No results found for: WOUNDCX  No results found for: STOOLCX  No components found for: BODYFLD    Radiology Data:   Imaging Results (Last 24 Hours)     Procedure Component Value Units Date/Time    XR Chest 1 View [[id number]] Collected: 12/06/21 2226     Updated: 12/06/21 2300    Narrative:      EXAM DESCRIPTION:  Site:  XR CHEST 1 VW  RP: XR CHEST 1 VIEW     CLINICAL HISTORY:  66 years  Female;  sob, A41.9 Sepsis, unspecified organism J18.9  Pneumonia,  unspecified organism N30.01 Acute cystitis with  hematuria D72.829 Elevated white blood cell count, unspecified  R41.82 Altered mental status, unspecified R74.8 Abnormal levels  of other serum enzymes I25.10 Atherosclerotic heart disease of  native coronary artery without angina pectoris R77.8 Other  specified abnormalities of plasma proteins I25    FINDINGS:    Since yesterday, diffuse bilateral interstitial edema has  slightly increased. No pneumothorax or significant pleural  effusion.  Left PICC line remains in place, tip in the SVC.  Mediastinum is unchanged. Sternal wires are again noted.      Impression:      Increased diffuse bilateral interstitial edema since yesterday    Electronically signed by:  Ozzie Srivastava MD  12/6/2021 10:59 PM CST  Workstation: [id number]          I have reviewed the patient's current medications.     Assessment/Plan     Active Hospital Problems    Diagnosis    • Sepsis (HCC)        Plan:  1.  DKA: Weaned off insulin drip.  Doing better.  2.  Sepsis: Cultures remain pending.  Continue empiric antibiotics.  White blood cell count trending down.  3.  Metabolic encephalopathy: No better.  Likely secondary to sepsis and DKA.  4.  Elevated troponin: Trending down.  Dr. Calderón following.  On heparin infusion.  5.  Atrial fibrillation: On Cardizem infusion.  6.  Hypothyroidism: Continue Synthroid.  7.  Dysphagia: Speech and swallow eval. Remains NPO.  Per power of , will not pursue feeding tube.  8.  DVT prophylaxis: Heparin drip.    Lengthy discussion with patient's POA.  Due to patient's condition, and her previously stated wishes, we will continue current plan of care for another 24 hours.  If patient does not improve in the next 24 hours we will transition to comfort measures.    The patient was evaluated during the global COVID-19 pandemic, and the diagnosis was suspected/considered upon their initial presentation.  Evaluation, treatment, and testing were consistent with  current guidelines for patients who present with complaints or symptoms that may be related to COVID-19.    I confirmed that the patient's Advance Care Plan is present, code status is documented, or surrogate decision maker is listed in the patient's medical record.       Discharge Planning: I expect patient to be discharged to skilled facility in 3-4 days.        This document has been electronically signed by Sukhjinder Trivedi MD on December 7, 2021 15:25 CST

## 2021-12-07 NOTE — PROGRESS NOTES
[facility] Medicine Services  INPATIENT PROGRESS NOTE    Length of Stay: 1  Date of Admission: 12/5/2021  Primary Care Physician: Yosef Casey MD    Subjective   Chief Complaint: Altered mental status  HPI: Patient did respond to me minimally during my interaction, but did not answer all questions.  Patient's niece, her POA, states that is the first response she had had all day.    Review of Systems   Unable to perform ROS: Mental status change        All pertinent negatives and positives are as above. All other systems have been reviewed and are negative unless otherwise stated.     Objective    Temp:  [98.1 °F (36.7 °C)-99.1 °F (37.3 °C)] 98.9 °F (37.2 °C)  Heart Rate:  [] 102  Resp:  [20-30] 25  BP: (121-164)/(60-83) 155/80  Arterial Line BP: (120-164)/() 145/95    Physical Exam  Vitals reviewed.   Constitutional:       Appearance: She is well-developed.   HENT:      Head: Normocephalic and atraumatic.   Eyes:      Pupils: Pupils are equal, round, and reactive to light.   Cardiovascular:      Rate and Rhythm: Normal rate and regular rhythm.      Heart sounds: Normal heart sounds. No murmur heard.  No friction rub. No gallop.    Pulmonary:      Effort: Pulmonary effort is normal. No respiratory distress.      Breath sounds: Normal breath sounds. No wheezing or rales.   Chest:      Chest wall: No tenderness.   Abdominal:      General: Bowel sounds are normal. There is no distension.      Palpations: Abdomen is soft.      Tenderness: There is no abdominal tenderness. There is no guarding.   Musculoskeletal:      Cervical back: Normal range of motion and neck supple.   Skin:     General: Skin is warm and dry.   Neurological:      Mental Status: She is disoriented and confused.      Comments: Does not follow commands or answer questions   Psychiatric:         Behavior: Behavior normal.         Thought Content: Thought content normal.              Results Review:  I have reviewed the labs, radiology results, and diagnostic studies.    Laboratory Data:   Results from last 7 days   Lab Units 12/06/21  0352 12/06/21  0006 12/05/21  2106 12/05/21  1225 12/05/21  1008 12/05/21  0337 12/05/21  0337   SODIUM mmol/L 141 144 141   < > 134*   < > 135*   POTASSIUM mmol/L 4.1 4.2 3.5   < > 4.5   < > 4.2   CHLORIDE mmol/L 114* 117* 113*   < > 102   < > 99   CO2 mmol/L 19.0* 20.0* 20.0*   < > 13.0*   < > 20.0*   BUN mg/dL 32* 33* 36*   < > 43*   < > 36*   CREATININE mg/dL 0.72 0.74 0.77   < > 1.07*   < > 0.84   GLUCOSE mg/dL 236* 187* 99   < > 560*   < > 384*   CALCIUM mg/dL 10.2 10.4 10.6*   < > 9.8   < > 11.4*   BILIRUBIN mg/dL 0.4  --   --   --  0.3  --  0.5   ALK PHOS U/L 152*  --   --   --  138*  --  190*   ALT (SGPT) U/L 134*  --   --   --  143*  --  152*   AST (SGOT) U/L 169*  --   --   --  263*  --  378*   ANION GAP mmol/L 8.0 7.0 8.0   < > 19.0*   < > 16.0*    < > = values in this interval not displayed.     Estimated Creatinine Clearance: 78.8 mL/min (by C-G formula based on SCr of 0.72 mg/dL).  Results from last 7 days   Lab Units 12/06/21  0755 12/06/21  0352 12/06/21  0006   MAGNESIUM mg/dL 2.0 2.0 2.0   PHOSPHORUS mg/dL 2.6 2.1* 1.8*         Results from last 7 days   Lab Units 12/06/21  0352 12/05/21  1008 12/05/21  0337 12/02/21  1347   WBC 10*3/mm3 22.80* 22.64* 28.68* 6.70   HEMOGLOBIN g/dL 12.5 11.4* 13.7 10.2*   HEMATOCRIT % 37.7 35.2 40.4 31.0*   PLATELETS 10*3/mm3 335 337 426 317     Results from last 7 days   Lab Units 12/05/21  1008 12/02/21  1347   INR  1.83* 1.23*       Culture Data:   Blood Culture   Date Value Ref Range Status   12/05/2021 No growth at 24 hours  Preliminary   12/05/2021 No growth at 24 hours  Preliminary     Urine Culture   Date Value Ref Range Status   12/05/2021 Culture in progress  Preliminary     No results found for: RESPCX  No results found for: WOUNDCX  No results found for: STOOLCX  No components found for:  BODYFLD    Radiology Data:   Imaging Results (Last 24 Hours)     ** No results found for the last 24 hours. **          I have reviewed the patient's current medications.     Assessment/Plan     Active Hospital Problems    Diagnosis    • Sepsis (HCC)        Plan:  1.  DKA: Weaned off insulin drip.  Doing better.  2.  Sepsis: Cultures remain pending.  Continue empiric antibiotics.  3.  Metabolic encephalopathy: No better.  Likely secondary to sepsis and DKA.  4.  Elevated troponin: Trending down.  Dr. Body following.  On heparin infusion.  5.  Atrial fibrillation: On Cardizem infusion.  6.  Hypothyroidism: Continue Synthroid.  7.  Dysphagia: Speech and swallow eval.  Patient's power of  states that they want no alternative nutrition if she is unable to swallow.  8.  DVT prophylaxis: Heparin drip.          The patient was evaluated during the global COVID-19 pandemic, and the diagnosis was suspected/considered upon their initial presentation.  Evaluation, treatment, and testing were consistent with current guidelines for patients who present with complaints or symptoms that may be related to COVID-19.    I confirmed that the patient's Advance Care Plan is present, code status is documented, or surrogate decision maker is listed in the patient's medical record.       Discharge Planning: I expect patient to be discharged to skilled facility in 3-4 days.        This document has been electronically signed by Sukhjinder Trivedi MD on December 6, 2021 19:42 CST

## 2021-12-07 NOTE — PLAN OF CARE
Goal Outcome Evaluation:  Plan of Care Reviewed With: family        Progress: improving  Outcome Summary: Pt seen for skilled ST services this date to address oropharyngeal dysphagia.  Pt was lethargic, but initially opened eyes and responded to SLP when ice chips were offered.  However, once PO trials were started pt would fall asleep and would drop head.  SLP would have to hold head until pt was able to hold head independently.  Pt was able to swallow x2 w/weak throat clear following each swallow.  SLP continues to recommend NPO at this time w/consideration for alternative nutrition.  Pt's sister-in-law was educated on pt's progress from this session and previous and verbalized understanding.  Nsg aware of progress and continued NPO recommendations.

## 2021-12-07 NOTE — THERAPY TREATMENT NOTE
CCU/SDU - Speech Language Pathology   Swallow Treatment Note [facility]     Patient Name: Janelle Millard  : 1955  MRN: [id number]  Today's Date: 2021               Admit Date: 2021     Goal:  Pt will safely tolerate trials of PO solids w/no overt s/s of aspiration to establish safe PO diet:  Pt seen for skilled ST services this date to address oropharyngeal dysphagia.  Pt was lethargic, but initially opened eyes and responded to SLP when ice chips were offered.  However, once PO trials were started pt would fall asleep and would drop head.  SLP would have to hold head until pt was able to hold head independently.  Pt was able to swallow x2 w/weak throat clear following each swallow.  SLP continues to recommend NPO at this time w/consideration for alternative nutrition.  Pt's sister-in-law was educated on pt's progress from this session and previous and verbalized understanding.  Nsg aware of progress and continued NPO recommendations.    Visit Dx:     ICD-10-CM ICD-9-CM   1. Sepsis without acute organ dysfunction, due to unspecified organism (Formerly [facility])  A41.9 038.9     995.91   2. Pneumonia of both lower lobes due to infectious organism  J18.9 486   3. Acute cystitis with hematuria  N30.01 595.0   4. Leukocytosis, unspecified type  D72.829 288.60   5. Altered mental status, unspecified altered mental status type  R41.82 780.97   6. Elevated liver enzymes  R74.8 790.5   7. Atherosclerosis of native coronary artery of native heart without angina pectoris  I25.10 414.01   8. Elevated troponin  R77.8 790.6   9. Atherosclerosis of autologous vein coronary artery bypass graft with unstable angina pectoris (Formerly [facility])  I25.710 414.02     411.1   10. Oropharyngeal dysphagia  R13.12 787.22     Patient Active Problem List   Diagnosis   • Carpal tunnel syndrome of left wrist   • Type 1 diabetes mellitus with diabetic polyneuropathy (HCC)   • Mixed diabetic hyperlipidemia associated with type 1 diabetes mellitus  (HCC)   • Hypertension associated with diabetes (HCC)   • Syncope and collapse   • CAD (coronary artery disease)   • Asthma   • Depressive disorder, not elsewhere classified   • Neuropathy   • Other specified rheumatoid arthritis, multiple sites (HCC)   • Carpal tunnel syndrome on both sides   • Bilateral carotid artery stenosis   • Overweight (BMI 25.0-29.9)   • Precordial pain   • Sepsis with encephalopathy without septic shock (HCC)   • Tobacco abuse   • UTI (urinary tract infection)   • Ureteral stone with hydronephrosis   • Chest pain, rule out acute myocardial infarction   • Chest pain   • Chest pain with high risk for cardiac etiology   • Sepsis (HCC)     Past Medical History:   Diagnosis Date   • Arthritis, rheumatoid (HCC)    • Arthropathy associated with neurological disorder    • Arthropathy of lumbar facet joint    • Asthma    • Benign hypertension    • Carpal tunnel syndrome    • Diabetes (HCC)    • Diabetic polyneuropathy (HCC)    • Dyslipidemia    • Fallen arches    • Hammer toe    • Heart disease    • Joint pain    • Mild depression ([facility])     Saddness post Surgery 7/10/14 improved after counseling.   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 Diffusely calcified LAD with 30-80% stenosis. OMB #3 with 60% to 70% stenosis. Preserved LV systolic function with EF of 55%.   • CARPAL TUNNEL RELEASE Right 10/15/2014    Right carpal tunnel release.   • CARPAL TUNNEL RELEASE     • CATARACT EXTRACTION, BILATERAL Bilateral    • CORONARY ARTERY BYPASS GRAFT  02/24/2014    CABG x 3 with LIMA to LAD, endarterectomy to LAD, SVG to  OMB and SVG to distal RCA with endarterectomy to distal RCA. Endo-vein harvesting.   • CYSTECTOMY Left     Breast cycst   • CYSTOSCOPY, URETEROSCOPY, RETROGRADE PYELOGRAM, STENT INSERTION Left 3/10/2021    Procedure: , LEFT RETROGRADE PYELOGRAM, STENT INSERTION LEFT URETER;  Surgeon: Babs, Cooper NOLAN MD;  Location: [facility];  Service: Urology;  Laterality: Left;   • EXCISION LESION      Remove breast lesion - cyst   • EYE SURGERY      Insert lens prosthesis   • FOOT SURGERY  10/18/2011    Exostectomy of the right foot. Preulcerative lesion with Charot deformity, right foot   • LUMBAR SPINE SURGERY  11/09/2015    Lumbosacral radiofrequency thermal coagulation.   • NERVE BLOCK  09/14/2015    Lumbar medial branch block.   • TOE NAIL AVULSION  09/03/2015    DEBRIDE NAIL 6 OR MORE 26344 (1)       SLP Recommendation and Plan  SLP Swallowing Diagnosis: severe, oral dysphagia, suspected pharyngeal dysphagia (12/07/21 1005)  SLP Diet Recommendation: NPO, temporary alternate methods of nutrition/hydration (12/07/21 1005)     SLP Rec. for Method of Medication Administration: meds via alternate route (12/07/21 1005)           Swallow Criteria for Skilled Therapeutic Interventions Met: demonstrates skilled criteria (12/07/21 1005)  Anticipated Discharge Disposition (SLP): skilled nursing facility (12/07/21 1005)  Rehab Potential/Prognosis, Swallowing: re-evaluate goals as necessary (12/07/21 1005)  Therapy Frequency (Swallow): 3 days per week, 5 days per week (12/07/21 1005)  Predicted Duration Therapy Intervention (Days): until discharge (12/07/21 1005)     Daily Summary of Progress (SLP): progress toward functional goals is gradual (12/07/21 1005)                   Plan of Care Reviewed With: family  Progress: improving  Outcome Summary: Pt seen for skilled ST services this date to address oropharyngeal dysphagia.  Pt was lethargic, but initially opened eyes and responded to SLP when ice chips were offered.  However, once PO  trials were started pt would fall asleep and would drop head.  SLP would have to hold head until pt was able to hold head independently.  Pt was able to swallow x2 w/weak throat clear following each swallow.  SLP continues to recommend NPO at this time w/consideration for alternative nutrition.  Pt's sister-in-law was educated on pt's progress from this session and previous and verbalized understanding.  Nsg aware of progress and continued NPO recommendations.      SWALLOW EVALUATION (last 72 hours)     SLP Adult Swallow Evaluation     Row Name 12/07/21 1005 12/06/21 1200                Rehab Evaluation    Document Type therapy note (daily note)  -CK evaluation  -CK       Total Evaluation Minutes, SLP 24  -CK 33  -CK       Subjective Information fatigue  -CK fatigue  -CK       Patient Observations lethargic  -CK --       Patient/Family/Caregiver Comments/Observations Pt had multiple family members rotate in and out during session  -CK Pt's niece at bedside  -CK       Patient Effort fair  -CK --                General Information    Patient Profile Reviewed yes  -CK yes  -CK       Pertinent History Of Current Problem -- Pt's niece reports that pt possibly had difficulty swallowing pills vs not taking them prior to hospital admission and was on regular solids/thin liquids  -CK       Current Method of Nutrition NPO  -CK NPO  -CK       Prior Level of Function-Swallowing no diet consistency restrictions  -CK no diet consistency restrictions  -CK       Plans/Goals Discussed with family; agreed upon  -CK family; agreed upon  -CK       Barriers to Rehab cognitive status; previous functional deficit  -CK cognitive status; previous functional deficit  -CK       Patient's Goals for Discharge -- patient could not state  -CK       Family Goals for Discharge -- patient able to return to PO diet  -CK                Pain    Additional Documentation -- Pain Scale: FACES Pre/Post-Treatment (Group)  -CK                Pain Scale:  FACES Pre/Post-Treatment    Pain: FACES Scale, Pretreatment 0-->no hurt  -CK 0-->no hurt  -CK       Posttreatment Pain Rating 0-->no hurt  -CK 0-->no hurt  -CK                Oral Motor Structure and Function    Dentition Assessment edentulous  -CK edentulous  -CK       Secretion Management WNL/WFL  -CK WNL/WFL  -CK       Mucosal Quality dry  -CK dry  -CK       Volitional Swallow unable to elicit  -CK unable to elicit  -CK       Volitional Cough unable to elicit  -CK unable to elicit  -CK                General Eating/Swallowing Observations    Respiratory Support Currently in Use nasal cannula  -CK nasal cannula  -CK       Eating/Swallowing Skills fed by SLP  -CK fed by SLP  -CK       Positioning During Eating upright 90 degree; upright in bed  -CK upright 90 degree; upright in bed  -CK       Utensils Used spoon  -CK spoon  -CK       Consistencies Trialed ice chips  -CK ice chips; thin liquids  -CK       Pre SpO2 (%) -- 99  -CK       Post SpO2 (%) -- 99  -CK                Clinical Swallow Eval    Oral Prep Phase impaired  -CK impaired  -CK       Oral Transit impaired  -CK impaired  -CK       Pharyngeal Phase suspected pharyngeal impairment  -CK suspected pharyngeal impairment  -CK       Clinical Swallow Evaluation Summary Pt seen for skilled ST services this date to address oropharyngeal dysphagia.  Pt was lethargic, but initially opened eyes and responded to SLP when ice chips were offered.  However, once PO trials were started pt would fall asleep and would drop head.  SLP would have to hold head until pt was able to hold head independently.  Pt was able to swallow x2 w/weak throat clear following each swallow.  SLP continues to recommend NPO at this time w/consideration for alternative nutrition.  Pt's sister-in-law was educated on pt's progress from this session and previous and verbalized understanding.  Nsg aware of progress and continued NPO recommendations.  -CK Pt seen for skilld ST services this date to  assess swallow function d/t reports of pt having difficulty w/pills prior to admission.  Pt was very lethargic; however, agreeable to PO intake.  Pt would respond and attempt to open eyes and would accept bolus from SLP.  Pt consumed ice chips w/little to no oral awareness/bolus manipulation.  No swallow was initiated or attempted w/all ice chip trials.  SLP presented thin liquids via spoon 3 cc w/anteior loss of bolus and no swallow or attempt to initiate swallow.  SLP recommends continued NPO at this time w/consideration of alternative nutrition.  SLP spoke w/pt's niece who is POA who stated they did not wish for alternative nutrition to be used.  SLP discussed results and recommendations w/pts nieces who was in agreement.  SLP will attempt PO trials next date.  -CK                Oral Prep Concerns    Oral Prep Concerns incomplete bolus preparation; reduced lip opening; incomplete or weak lip closure around spoon; bolus removed from mouth manually  -CK incomplete bolus preparation; anterior loss; reduced lip opening; incomplete or weak lip closure around spoon  -CK       Reduced Lip Opening thin  -CK thin  -CK       Incomplete or Weak Lip Closure Around Spoon thin  -CK thin  -CK       Anterior Loss -- thin  -CK       Incomplete Bolus Preparation thin  -CK thin  -CK       Bolus Removed from Mouth Manually other (see comments)  ice chip  -CK --                Oral Transit Concerns    Oral Transit Concerns unable to initiate oral transit; delayed initiation of bolus transit  -CK unable to initiate oral transit; delayed initiation of bolus transit  -CK       Delayed Intiation of Bolus Transit thin  -CK thin  -CK                Pharyngeal Phase Concerns    Pharyngeal Phase Concerns throat clear; other (see comments)  absent swallow  -CK other (see comments)  absent swallow  -CK       Throat Clear thin  -CK --                Clinical Impression    SLP Swallowing Diagnosis severe; oral dysphagia; suspected pharyngeal  dysphagia  -CK severe; oral dysphagia; suspected pharyngeal dysphagia  -CK       Functional Impact risk of aspiration/pneumonia; risk of malnutrition; risk of dehydration  -CK risk of aspiration/pneumonia; risk of malnutrition; risk of dehydration  -CK       Rehab Potential/Prognosis, Swallowing re-evaluate goals as necessary  -CK re-evaluate goals as necessary  -CK       Swallow Criteria for Skilled Therapeutic Interventions Met demonstrates skilled criteria  -CK demonstrates skilled criteria  -CK                SLP Treatment Clinical Impressions    Treatment Assessment (SLP) oropharyngeal dysphagia  -CK oropharyngeal dysphagia  -CK       Daily Summary of Progress (SLP) progress toward functional goals is gradual  -CK --       Barriers to Overall Progress (SLP) Fatigue; Cognitive status  -CK Lethargy; Fatigue; Cognitive status; Baseline deficits  -CK       Plan for Continued Treatment (SLP) continue treatment per plan of care  -CK --       Care Plan Review evaluation/treatment results reviewed; care plan/treatment goals reviewed; risks/benefits reviewed; current/potential barriers reviewed; patient/other agree to care plan  -CK evaluation/treatment results reviewed; care plan/treatment goals reviewed; risks/benefits reviewed; current/potential barriers reviewed; patient/other agree to care plan  -CK       Care Plan Review, Other Participant(s) family  -CK family; durable/healthcare power of   -CK                Recommendations    Therapy Frequency (Swallow) 3 days per week; 5 days per week  -CK 3 days per week; 5 days per week  -CK       Predicted Duration Therapy Intervention (Days) until discharge  -CK until discharge  -CK       SLP Diet Recommendation NPO; temporary alternate methods of nutrition/hydration  -CK NPO; temporary alternate methods of nutrition/hydration  -CK       Oral Care Recommendations Oral Care BID/PRN  -CK Oral Care BID/PRN  -CK       SLP Rec. for Method of Medication Administration  meds via alternate route  -CK meds via alternate route  -CK       Anticipated Discharge Disposition (SLP) skilled nursing facility  -CK skilled nursing facility  -CK                Swallow Goals (SLP)    Oral Nutrition/Hydration Goal Selection (SLP) oral nutrition/hydration, SLP goal 1  -CK oral nutrition/hydration, SLP goal 1  -CK                Oral Nutrition/Hydration Goal 1 (SLP)    Oral Nutrition/Hydration Goal 1, SLP Pt will safely tolerate trials of PO solids/liquids w/no overt s/s of aspiration for establishment of safe PO diet.  -CK Pt will safely tolerate trials of PO solids/liquids w/no overt s/s of aspiration for establishment of safe PO diet.  -CK       Time Frame (Oral Nutrition/Hydration Goal 1, SLP) by discharge  -CK by discharge  -CK       Barriers (Oral Nutrition/Hydration Goal 1, SLP) lethargy; absent swallow  -CK lethargy; absent swallow  -CK       Progress/Outcomes (Oral Nutrition/Hydration Goal 1, SLP) goal ongoing  -CK --             User Key  (r) = Recorded By, (t) = Taken By, (c) = Cosigned By    Initials Name Effective Dates    CK Kacey Pop MS CCC-SLP 06/16/21 -                 EDUCATION  The patient has been educated in the following areas:   Dysphagia (Swallowing Impairment) Oral Care/Hydration NPO rationale.        SLP GOALS     Row Name 12/07/21 1005 12/06/21 1200          Oral Nutrition/Hydration Goal 1 (SLP)    Oral Nutrition/Hydration Goal 1, SLP Pt will safely tolerate trials of PO solids/liquids w/no overt s/s of aspiration for establishment of safe PO diet.  -CK Pt will safely tolerate trials of PO solids/liquids w/no overt s/s of aspiration for establishment of safe PO diet.  -CK     Time Frame (Oral Nutrition/Hydration Goal 1, SLP) by discharge  -CK by discharge  -CK     Barriers (Oral Nutrition/Hydration Goal 1, SLP) lethargy; absent swallow  -CK lethargy; absent swallow  -CK     Progress/Outcomes (Oral Nutrition/Hydration Goal 1, SLP) goal ongoing  -CK --            User Key  (r) = Recorded By, (t) = Taken By, (c) = Cosigned By    Initials Name Provider Type    Kacey Gambino MS CCC-SLP Speech and Language Pathologist                   Time Calculation:    Time Calculation- SLP     Row Name 12/07/21 1029             Time Calculation- SLP    SLP Start Time 1005  -CK      SLP Stop Time 1029  -CK      SLP Time Calculation (min) 24 min  -CK      Total Timed Code Minutes- SLP 24 minute(s)  -CK      SLP Received On 12/07/21  -CK      SLP Goal Re-Cert Due Date 12/20/21  -CK              Untimed Charges    28556-QX Treatment Swallow Minutes 24  -CK              Total Minutes    Untimed Charges Total Minutes 24  -CK       Total Minutes 24  -CK            User Key  (r) = Recorded By, (t) = Taken By, (c) = Cosigned By    Initials Name Provider Type    Kacey Gambino MS CCC-SLP Speech and Language Pathologist                Therapy Charges for Today     Code Description Service Date Service Provider Modifiers Qty    50777862547 HC ST EVAL ORAL PHARYNG SWALLOW 2 12/6/2021 Kacey Pop MS CCC-SLP GN 1    00072830286 HC ST TREATMENT SWALLOW 2 12/7/2021 Kacey Pop, MS DEGROOT-SLP GN 1               MS CAITLIN Burt  12/7/2021

## 2021-12-08 NOTE — CONSULTS
Adult Nutrition  Assessment    Patient Name:  Janelle Millard  YOB: 1955  MRN: [id number]  Admit Date:  12/5/2021    Assessment Date:  12/8/2021    Comments:  RD f/u. Pt continues abx tx r/t sepsis. SLP swallow eval this am, pt now has order for pureed diet w/thin liquids. Per nursing, pt has had applesauce and pudding w/good tolerance. Labs: Na 153, Gl 153, K 2.6, Alb 3.2. Wt 192#, stable. Will cont to monitor course and make recs prn.     Reason for Assessment     Row Name 12/08/21 1233          Reason for Assessment    Reason For Assessment follow-up protocol                Nutrition/Diet History     Row Name 12/08/21 1233          Nutrition/Diet History    Typical Food/Fluid Intake Spoke w/RN who reports pt has taken applesauce and pudding w/o difficulty since po diet ordered.                Anthropometrics     Row Name 12/08/21 0551          Anthropometrics    Weight 87.3 kg (192 lb 7.4 oz)                Labs/Tests/Procedures/Meds     Row Name 12/08/21 1234          Labs/Procedures/Meds    Lab Results Reviewed reviewed, pertinent     Lab Results Comments Na 153, Gl 153, K 2.6, ALb 3.2            Medications    Pertinent Medications Reviewed reviewed                    Nutrition Prescription Ordered     Row Name 12/08/21 1234          Nutrition Prescription PO    Current PO Diet Pureed     Fluid Consistency Thin                Evaluation of Received Nutrient/Fluid Intake     Row Name 12/08/21 1234          PO Evaluation    Number of Days PO Intake Evaluated Insufficient Data                     Electronically signed by:  Kym Cohn RD  12/08/21 12:36 CST

## 2021-12-08 NOTE — PROGRESS NOTES
Pharmacokinetics by Pharmacy - Vancomycin    Janelle Millard is a 66 y.o. female receiving vancomycin (day 4) for sepsis.  Patient is also receiving zosyn.    Objective:    Temp Readings from Last 1 Encounters:   12/08/21 99.3 °F (37.4 °C) (Axillary)     WBC   Date Value Ref Range Status   12/08/2021 14.85 (H) 3.40 - 10.80 10*3/mm3 Final   12/07/2021 17.73 (H) 3.40 - 10.80 10*3/mm3 Final   12/06/2021 22.80 (H) 3.40 - 10.80 10*3/mm3 Final      Lactate   Date Value Ref Range Status   12/06/2021 1.2 0.5 - 2.0 mmol/L Final      Creatinine   Date Value Ref Range Status   12/08/2021 0.62 0.57 - 1.00 mg/dL Final   12/07/2021 0.64 0.57 - 1.00 mg/dL Final   12/06/2021 0.72 0.57 - 1.00 mg/dL Final       Vancomycin Peak   Date Value Ref Range Status   12/07/2021 44.90 (C) 20.00 - 40.00 mcg/mL Final     Vancomycin Trough   Date Value Ref Range Status   12/07/2021 15.20 5.00 - 20.00 mcg/mL Final       Culture Results:  Microbiology Results (last 10 days)     Procedure Component Value - Date/Time    CANDIDA AURIS SCREEN - Swab, Axilla Right, Axilla Left and Groin [[id number]]  (Normal) Collected: 12/05/21 0806    Lab Status: Preliminary result Specimen: Swab from Axilla Right, Axilla Left and Groin Updated: 12/08/21 0815     Candida Auris Screen Culture No Candida auris isolated at 3 days    CRE Screen by PCR - Swab, Large Intestine, Rectum [[id number]] Collected: 12/05/21 0805    Lab Status: Final result Specimen: Swab from Large Intestine, Rectum Updated: 12/05/21 0923     CRE SCREEN Not Detected     Comment: Test performed by real-time polymerase chain reaction (qPCR).        OXA 48 Strain Not Detected     IMP STRAIN Not Detected     VIM STRAIN Not Detected     NDM Strain Not Detected     KPC Strain Not Detected    Urine Culture - Urine, Urine, Catheter [[id number]]  (Abnormal) Collected: 12/05/21 0447    Lab Status: Final result Specimen: Urine, Catheter Updated: 12/06/21 2104     Urine Culture Yeast isolated    Narrative:    "   No further workup    Blood Culture - Blood, Hand, Right [[id number]]  (Normal) Collected: 12/05/21 0352    Lab Status: Preliminary result Specimen: Blood from Hand, Right Updated: 12/08/21 0415     Blood Culture No growth at 3 days    COVID-19 and FLU A/B PCR - Swab, Nasopharynx [[id number]]  (Normal) Collected: 12/05/21 0352    Lab Status: Final result Specimen: Swab from Nasopharynx Updated: 12/05/21 0439     COVID19 Not Detected     Influenza A PCR Not Detected     Influenza B PCR Not Detected    Narrative:      Fact sheet for providers: https://www.fda.gov/media/279314/download    Fact sheet for patients: https://www.fda.gov/media/288448/download    Test performed by PCR.    Blood Culture - Blood, Hand, Right [[id number]]  (Normal) Collected: 12/05/21 0337    Lab Status: Preliminary result Specimen: Blood from Hand, Right Updated: 12/08/21 0345     Blood Culture No growth at 3 days    COVID-19 and FLU A/B PCR - Swab, Nasopharynx [[id number]]  (Normal) Collected: 12/02/21 1439    Lab Status: Final result Specimen: Swab from Nasopharynx Updated: 12/02/21 1526     COVID19 Not Detected     Influenza A PCR Not Detected     Influenza B PCR Not Detected    Narrative:      Fact sheet for providers: https://www.fda.gov/media/890889/download    Fact sheet for patients: https://www.fda.gov/media/987368/download    Test performed by PCR.    Urine Culture - Urine, Urine, Clean Catch [[id number]] Collected: 12/02/21 1102    Lab Status: Final result Specimen: Urine, Clean Catch Updated: 12/04/21 0108     Urine Culture Final report     Result 1 Comment     Comment: Mixed urogenital reagan  10,000-25,000 colony forming units per mL       Narrative:      Performed at:  [address] [place] [address]  [id number]  : Abdiaziz Munoz PhD, Phone:  [phone]        No results found for: RESPCX    [Ht: 175.3 cm (69.02\"); Wt: 87.3 kg (192 lb 7.4 oz)]   Body mass index is 28.41 kg/m².  Ideal body weight: 66.2 " kg (146 lb 0.4 oz)  Adjusted ideal body weight: 74.7 kg (164 lb 9.6 oz)      Assessment:  • WBCs elevated but trending down, afebrile  • Renal function appears stable with an Estimated Creatinine Clearance: 79.3 mL/min (by C-G formula based on SCr of 0.62 mg/dL).  • Cultures  o 12/5 blood cultures NGD3  o 12/5 urine culture NGTD  • AUC and trough goals are 400-600 and 10-20 mcg/mL, respectively.     Plan:  1. Continue Vancomycin 750 mg IV q12hrs   2. Vancomycin peak and trough ordered for 12/9 @ 0400 and 12/9 @ 1330, respectively. Consulted until 12/12.  3. Pharmacy will monitor renal function and adjust dose accordingly.    Thank you for this consult.     Grady [facility]   12/08/21 14:09 CST

## 2021-12-08 NOTE — THERAPY TREATMENT NOTE
Acute Care - Speech Language Pathology   Swallow Treatment Note [facility]     Patient Name: Janelle Millard  : 1955  MRN: [id number]  Today's Date: 2021               Admit Date: 2021    Visit Dx:     ICD-10-CM ICD-9-CM   1. Sepsis without acute organ dysfunction, due to unspecified organism ([facility])  A41.9 038.9     995.91   2. Pneumonia of both lower lobes due to infectious organism  J18.9 486   3. Acute cystitis with hematuria  N30.01 595.0   4. Leukocytosis, unspecified type  D72.829 288.60   5. Altered mental status, unspecified altered mental status type  R41.82 780.97   6. Elevated liver enzymes  R74.8 790.5   7. Atherosclerosis of native coronary artery of native heart without angina pectoris  I25.10 414.01   8. Elevated troponin  R77.8 790.6   9. Atherosclerosis of autologous vein coronary artery bypass graft with unstable angina pectoris ([facility])  I25.710 414.02     411.1   10. Oropharyngeal dysphagia  R13.12 787.22     Patient Active Problem List   Diagnosis   • Carpal tunnel syndrome of left wrist   • Type 1 diabetes mellitus with diabetic polyneuropathy ([facility])   • Mixed diabetic hyperlipidemia associated with type 1 diabetes mellitus ([facility])   • Hypertension associated with diabetes ([facility])   • Syncope and collapse   • CAD (coronary artery disease)   • Asthma   • Depressive disorder, not elsewhere classified   • Neuropathy   • Other specified rheumatoid arthritis, multiple sites ([facility])   • Carpal tunnel syndrome on both sides   • Bilateral carotid artery stenosis   • Overweight (BMI 25.0-29.9)   • Precordial pain   • Sepsis with encephalopathy without septic shock ([facility])   • Tobacco abuse   • UTI (urinary tract infection)   • Ureteral stone with hydronephrosis   • Chest pain, rule out acute myocardial infarction   • Chest pain   • Chest pain with high risk for cardiac etiology   • Sepsis (HCC)     Past Medical History:   Diagnosis Date   • Arthritis, rheumatoid (HCC)    • Arthropathy  associated with neurological disorder    • Arthropathy of lumbar facet joint    • Asthma    • Benign hypertension    • Carpal tunnel syndrome    • Diabetes (HCC)    • Diabetic polyneuropathy (HCC)    • Dyslipidemia    • Fallen arches    • Hammer toe    • Heart disease    • Joint pain    • Mild depression (HCC)     Saddness post Surgery 7/10/14 improved after counseling.   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 Diffusely calcified LAD with 30-80% stenosis. OMB #3 with 60% to 70% stenosis. Preserved LV systolic function with EF of 55%.   • CARPAL TUNNEL RELEASE Right 10/15/2014    Right carpal tunnel release.   • CARPAL TUNNEL RELEASE     • CATARACT EXTRACTION, BILATERAL Bilateral    • CORONARY ARTERY BYPASS GRAFT  02/24/2014    CABG x 3 with LIMA to LAD, endarterectomy to LAD, SVG to OMB and SVG to distal RCA with endarterectomy to distal RCA. Endo-vein harvesting.   • CYSTECTOMY Left     Breast cycst   • CYSTOSCOPY, URETEROSCOPY, RETROGRADE PYELOGRAM, STENT INSERTION Left 3/10/2021    Procedure: , LEFT RETROGRADE PYELOGRAM, STENT INSERTION LEFT URETER;  Surgeon: Baltimore, Cooper NOLAN MD;  Location: [facility];  Service: Urology;  Laterality: Left;   • EXCISION LESION      Remove breast lesion - cyst   • EYE SURGERY      Insert lens prosthesis   • FOOT SURGERY  10/18/2011    Exostectomy of the right foot. Preulcerative lesion with Charot deformity, right foot   • LUMBAR SPINE SURGERY  11/09/2015    Lumbosacral radiofrequency thermal coagulation.   • NERVE BLOCK  09/14/2015    Lumbar medial branch block.   • TOE NAIL AVULSION   09/03/2015    DEBRIDE NAIL 6 OR MORE 03544 (1)       SLP Recommendation and Plan  SLP Swallowing Diagnosis: functional oral phase, functional pharyngeal phase (12/08/21 0812)  SLP Diet Recommendation: puree, thin liquids (12/08/21 0812)     SLP Rec. for Method of Medication Administration: meds via alternate route (12/08/21 0812)           Swallow Criteria for Skilled Therapeutic Interventions Met: demonstrates skilled criteria (12/08/21 0812)  Anticipated Discharge Disposition (SLP): skilled nursing facility (12/08/21 0812)  Rehab Potential/Prognosis, Swallowing: good, to achieve stated therapy goals (12/08/21 0812)  Therapy Frequency (Swallow): 3 days per week, 5 days per week (12/08/21 0812)  Predicted Duration Therapy Intervention (Days): until discharge (12/08/21 0812)     Daily Summary of Progress (SLP): progress toward functional goals is good (12/08/21 0812)                   Plan of Care Reviewed With: patient  Progress: improving  Outcome Summary: ST dysphagia treatment provided this date.  pt more alert and appropriate for PO.  she safely consumed thin liquids and puree.  recommend advance diet and f/u for chewable solids next visit.      SWALLOW EVALUATION (last 72 hours)     SLP Adult Swallow Evaluation     Row Name 12/08/21 0812 12/07/21 1005 12/06/21 1200             Rehab Evaluation    Document Type therapy note (daily note)  -EC therapy note (daily note)  -CK evaluation  -CK      Total Evaluation Minutes, SLP -- 24  -CK 33  -CK      Subjective Information no complaints  -EC fatigue  -CK fatigue  -CK      Patient Observations cooperative; alert; agree to therapy  -EC lethargic  -CK --      Patient/Family/Caregiver Comments/Observations jo ann  -EC Pt had multiple family members rotate in and out during session  -CK Pt's niece at bedside  -CK      Patient Effort fair  -EC fair  -CK --              General Information    Patient Profile Reviewed yes  -EC yes  -CK yes  -CK      Pertinent History Of Current  Problem -- -- Pt's niece reports that pt possibly had difficulty swallowing pills vs not taking them prior to hospital admission and was on regular solids/thin liquids  -CK      Current Method of Nutrition NPO  -EC NPO  -CK NPO  -CK      Prior Level of Function-Swallowing no diet consistency restrictions  -EC no diet consistency restrictions  -CK no diet consistency restrictions  -CK      Plans/Goals Discussed with family; agreed upon  -EC family; agreed upon  -CK family; agreed upon  -CK      Barriers to Rehab cognitive status; previous functional deficit  -EC cognitive status; previous functional deficit  -CK cognitive status; previous functional deficit  -CK      Patient's Goals for Discharge -- -- patient could not state  -CK      Family Goals for Discharge -- -- patient able to return to PO diet  -CK              Pain    Additional Documentation -- -- Pain Scale: FACES Pre/Post-Treatment (Group)  -CK              Pain Scale: FACES Pre/Post-Treatment    Pain: FACES Scale, Pretreatment 0-->no hurt  -EC 0-->no hurt  -CK 0-->no hurt  -CK      Posttreatment Pain Rating 0-->no hurt  -EC 0-->no hurt  -CK 0-->no hurt  -CK              Oral Motor Structure and Function    Dentition Assessment edentulous; missing teeth  -EC edentulous  -CK edentulous  -CK      Secretion Management WNL/WFL  -EC WNL/WFL  -CK WNL/WFL  -CK      Mucosal Quality dry  -EC dry  -CK dry  -CK      Volitional Swallow WFL  -EC unable to elicit  -CK unable to elicit  -CK      Volitional Cough weak  -EC unable to elicit  -CK unable to elicit  -CK              General Eating/Swallowing Observations    Respiratory Support Currently in Use nasal cannula  -EC nasal cannula  -CK nasal cannula  -CK      Eating/Swallowing Skills fed by SLP  -EC fed by SLP  -CK fed by SLP  -CK      Positioning During Eating upright 90 degree; upright in bed  -EC upright 90 degree; upright in bed  -CK upright 90 degree; upright in bed  -CK      Utensils Used spoon; cup  -EC  spoon  -CK spoon  -CK      Consistencies Trialed thin liquids; pureed  -EC ice chips  -CK ice chips; thin liquids  -CK      Pre SpO2 (%) -- -- 99  -CK      Post SpO2 (%) -- -- 99  -CK              Clinical Swallow Eval    Oral Prep Phase WFL  -EC impaired  -CK impaired  -CK      Oral Transit WFL  -EC impaired  -CK impaired  -CK      Oral Residue WFL  -EC -- --      Pharyngeal Phase no overt signs/symptoms of pharyngeal impairment  -EC suspected pharyngeal impairment  -CK suspected pharyngeal impairment  -CK      Clinical Swallow Evaluation Summary pt tolerated 200cc water from cup rim/spoon and 1/2 cup of applesauce with no overt s/s of aspirtion.  -EC Pt seen for skilled ST services this date to address oropharyngeal dysphagia.  Pt was lethargic, but initially opened eyes and responded to SLP when ice chips were offered.  However, once PO trials were started pt would fall asleep and would drop head.  SLP would have to hold head until pt was able to hold head independently.  Pt was able to swallow x2 w/weak throat clear following each swallow.  SLP continues to recommend NPO at this time w/consideration for alternative nutrition.  Pt's sister-in-law was educated on pt's progress from this session and previous and verbalized understanding.  Nsg aware of progress and continued NPO recommendations.  -CK Pt seen for skilld ST services this date to assess swallow function d/t reports of pt having difficulty w/pills prior to admission.  Pt was very lethargic; however, agreeable to PO intake.  Pt would respond and attempt to open eyes and would accept bolus from SLP.  Pt consumed ice chips w/little to no oral awareness/bolus manipulation.  No swallow was initiated or attempted w/all ice chip trials.  SLP presented thin liquids via spoon 3 cc w/anteior loss of bolus and no swallow or attempt to initiate swallow.  SLP recommends continued NPO at this time w/consideration of alternative nutrition.  SLP spoke w/pt's niece who  is POA who stated they did not wish for alternative nutrition to be used.  SLP discussed results and recommendations w/pts nieces who was in agreement.  SLP will attempt PO trials next date.  -CK              Oral Prep Concerns    Oral Prep Concerns -- incomplete bolus preparation; reduced lip opening; incomplete or weak lip closure around spoon; bolus removed from mouth manually  -CK incomplete bolus preparation; anterior loss; reduced lip opening; incomplete or weak lip closure around spoon  -CK      Reduced Lip Opening -- thin  -CK thin  -CK      Incomplete or Weak Lip Closure Around Spoon -- thin  -CK thin  -CK      Anterior Loss -- -- thin  -CK      Incomplete Bolus Preparation -- thin  -CK thin  -CK      Bolus Removed from Mouth Manually -- other (see comments)  ice chip  -CK --              Oral Transit Concerns    Oral Transit Concerns -- unable to initiate oral transit; delayed initiation of bolus transit  -CK unable to initiate oral transit; delayed initiation of bolus transit  -CK      Delayed Intiation of Bolus Transit -- thin  -CK thin  -CK              Pharyngeal Phase Concerns    Pharyngeal Phase Concerns -- throat clear; other (see comments)  absent swallow  -CK other (see comments)  absent swallow  -CK      Throat Clear -- thin  -CK --              Clinical Impression    SLP Swallowing Diagnosis functional oral phase; functional pharyngeal phase  -EC severe; oral dysphagia; suspected pharyngeal dysphagia  -CK severe; oral dysphagia; suspected pharyngeal dysphagia  -CK      Functional Impact risk of aspiration/pneumonia  -EC risk of aspiration/pneumonia; risk of malnutrition; risk of dehydration  -CK risk of aspiration/pneumonia; risk of malnutrition; risk of dehydration  -CK      Rehab Potential/Prognosis, Swallowing good, to achieve stated therapy goals  -EC re-evaluate goals as necessary  -CK re-evaluate goals as necessary  -CK      Swallow Criteria for Skilled Therapeutic Interventions Met  demonstrates skilled criteria  -EC demonstrates skilled criteria  -CK demonstrates skilled criteria  -CK              SLP Treatment Clinical Impressions    Treatment Assessment (SLP) ST provided skilled intervention for dysphagia.  pt is alert and calling out at intervals.  she recieved pain meds , but is alert for PO trials.  oral care provided and pt sat upright.  improved oral control with sips of water via spoon with no s/s of aspiration.  pt advanced to cup rim where she took consecutive sips with good toleration.  she agreed to applesauce and consumed it in cyclic fashion with water via cup rim.  there were no s/s of aspiration, but overall, pt is confused and requires simple statements and questions.  -EC oropharyngeal dysphagia  -CK oropharyngeal dysphagia  -CK      Daily Summary of Progress (SLP) progress toward functional goals is good  -EC progress toward functional goals is gradual  -CK --      Barriers to Overall Progress (SLP) Cognitive status  -EC Fatigue; Cognitive status  -CK Lethargy; Fatigue; Cognitive status; Baseline deficits  -CK      Plan for Continued Treatment (SLP) continue treatment per plan of care  -EC continue treatment per plan of care  -CK --      Care Plan Review care plan/treatment goals reviewed; risks/benefits reviewed; current/potential barriers reviewed; patient/other agree to care plan  -EC evaluation/treatment results reviewed; care plan/treatment goals reviewed; risks/benefits reviewed; current/potential barriers reviewed; patient/other agree to care plan  -CK evaluation/treatment results reviewed; care plan/treatment goals reviewed; risks/benefits reviewed; current/potential barriers reviewed; patient/other agree to care plan  -CK      Care Plan Review, Other Participant(s) -- family  -CK family; durable/healthcare power of   -CK              Recommendations    Therapy Frequency (Swallow) 3 days per week; 5 days per week  -EC 3 days per week; 5 days per week  -CK 3  days per week; 5 days per week  -CK      Predicted Duration Therapy Intervention (Days) until discharge  -EC until discharge  -CK until discharge  -CK      SLP Diet Recommendation puree; thin liquids  -EC NPO; temporary alternate methods of nutrition/hydration  -CK NPO; temporary alternate methods of nutrition/hydration  -CK      Oral Care Recommendations Oral Care BID/PRN  -EC Oral Care BID/PRN  -CK Oral Care BID/PRN  -CK      SLP Rec. for Method of Medication Administration meds via alternate route  -EC meds via alternate route  -CK meds via alternate route  -CK      Anticipated Discharge Disposition (SLP) skilled nursing facility  -EC skilled nursing facility  -CK skilled nursing facility  -CK              Swallow Goals (SLP)    Oral Nutrition/Hydration Goal Selection (SLP) oral nutrition/hydration, SLP goal 1  -EC oral nutrition/hydration, SLP goal 1  -CK oral nutrition/hydration, SLP goal 1  -CK              Oral Nutrition/Hydration Goal 1 (SLP)    Oral Nutrition/Hydration Goal 1, SLP Pt will safely tolerate trials of PO solids/liquids w/no overt s/s of aspiration for establishment of safe PO diet.  -EC Pt will safely tolerate trials of PO solids/liquids w/no overt s/s of aspiration for establishment of safe PO diet.  -CK Pt will safely tolerate trials of PO solids/liquids w/no overt s/s of aspiration for establishment of safe PO diet.  -CK      Time Frame (Oral Nutrition/Hydration Goal 1, SLP) by discharge  -EC by discharge  -CK by discharge  -CK      Barriers (Oral Nutrition/Hydration Goal 1, SLP) cognition  -EC lethargy; absent swallow  -CK lethargy; absent swallow  -CK      Progress/Outcomes (Oral Nutrition/Hydration Goal 1, SLP) goal ongoing  -EC goal ongoing  -CK --            User Key  (r) = Recorded By, (t) = Taken By, (c) = Cosigned By    Initials Name Effective Dates    EC Genet Christensen CCC-SLP 06/16/21 -     CK Kacey Pop MS CCC-SLP 06/16/21 -                 EDUCATION  The patient  has been educated in the following areas:   Dysphagia (Swallowing Impairment) Modified Diet Instruction.        SLP GOALS     Row Name 12/08/21 0812 12/07/21 1005 12/06/21 1200       Oral Nutrition/Hydration Goal 1 (SLP)    Oral Nutrition/Hydration Goal 1, SLP Pt will safely tolerate trials of PO solids/liquids w/no overt s/s of aspiration for establishment of safe PO diet.  -EC Pt will safely tolerate trials of PO solids/liquids w/no overt s/s of aspiration for establishment of safe PO diet.  -CK Pt will safely tolerate trials of PO solids/liquids w/no overt s/s of aspiration for establishment of safe PO diet.  -CK    Time Frame (Oral Nutrition/Hydration Goal 1, SLP) by discharge  -EC by discharge  -CK by discharge  -CK    Barriers (Oral Nutrition/Hydration Goal 1, SLP) cognition  -EC lethargy; absent swallow  -CK lethargy; absent swallow  -CK    Progress/Outcomes (Oral Nutrition/Hydration Goal 1, SLP) goal ongoing  -EC goal ongoing  -CK --          User Key  (r) = Recorded By, (t) = Taken By, (c) = Cosigned By    Initials Name Provider Type    Genet Henderson CCC-SLP Speech and Language Pathologist    CK Kacey Pop MS CCC-SLP Speech and Language Pathologist                   Time Calculation:    Time Calculation- SLP     Row Name 12/08/21 0950             Time Calculation- SLP    SLP Start Time 0912  -EC      SLP Stop Time 0950  -EC      SLP Time Calculation (min) 38 min  -EC      Total Timed Code Minutes- SLP 38 minute(s)  -EC      SLP Received On 12/08/21  -EC      SLP Goal Re-Cert Due Date 12/20/21  -EC              Untimed Charges    [id number] Treatment Swallow Minutes 38  -EC              Total Minutes    Untimed Charges Total Minutes 38  -EC       Total Minutes 38  -EC            User Key  (r) = Recorded By, (t) = Taken By, (c) = Cosigned By    Initials Name Provider Type    Genet Henderson CCC-SLP Speech and Language Pathologist                Therapy Charges for Today     Code  Description Service Date Service Provider Modifiers Qty    [id number]  ST TREATMENT SWALLOW 3 12/8/2021 Genet Christensen, ZANE-SLP GN 1               Genet Christensen CCC-ELIE  12/8/2021

## 2021-12-08 NOTE — PLAN OF CARE
Goal Outcome Evaluation:  Plan of Care Reviewed With: caregiver        Progress: improving  Outcome Summary: Pt now w/po diet order (pureed/thin liq) following SLP eval this am. RD will monitor po intake/labs/wt and make recs as indicated.

## 2021-12-08 NOTE — PLAN OF CARE
Goal Outcome Evaluation:  Plan of Care Reviewed With: patient        Progress: improving  Outcome Summary: ST dysphagia treatment provided this date.  pt more alert and appropriate for PO.  she safely consumed thin liquids and puree.  recommend advance diet and f/u for chewable solids next visit.

## 2021-12-08 NOTE — PROGRESS NOTES
[facility]  INPATIENT PROGRESS NOTE    Length of Stay: 3  Date of Admission: 12/5/2021  Primary Care Physician: Yosef Casey MD    Subjective   Chief Complaint: Altered mental status  HPI: Much more alert and oriented today.  Able to tolerate diet with speech therapy.    Review of Systems   Constitutional: Negative for appetite change, chills, fatigue, fever and unexpected weight change.   Respiratory: Negative for cough, choking, chest tightness, shortness of breath and wheezing.    Cardiovascular: Negative for chest pain, palpitations and leg swelling.   Gastrointestinal: Negative for abdominal pain, blood in stool, constipation, diarrhea, nausea and vomiting.   Genitourinary: Negative for dysuria, flank pain and hematuria.   Neurological: Negative for dizziness, seizures, syncope, speech difficulty, weakness, light-headedness, numbness and headaches.   Hematological: Does not bruise/bleed easily.        All pertinent negatives and positives are as above. All other systems have been reviewed and are negative unless otherwise stated.     Objective    Temp:  [98.6 °F (37 °C)-99.4 °F (37.4 °C)] 98.6 °F (37 °C)  Heart Rate:  [] 67  Resp:  [20-22] 20  BP: (113-188)/(57-84) 129/62  Arterial Line BP: (124-194)/(46-80) 147/65    Physical Exam  Vitals reviewed.   Constitutional:       Appearance: She is well-developed.   HENT:      Head: Normocephalic and atraumatic.   Eyes:      Pupils: Pupils are equal, round, and reactive to light.   Cardiovascular:      Rate and Rhythm: Normal rate and regular rhythm.      Heart sounds: Normal heart sounds. No murmur heard.  No friction rub. No gallop.    Pulmonary:      Effort: Pulmonary effort is normal. No respiratory distress.      Breath sounds: Normal breath sounds. No wheezing or rales.   Chest:      Chest wall: No tenderness.   Abdominal:      General: Bowel sounds are normal. There is no distension.       Palpations: Abdomen is soft.      Tenderness: There is no abdominal tenderness. There is no guarding.   Musculoskeletal:      Cervical back: Normal range of motion and neck supple.   Skin:     General: Skin is warm and dry.   Neurological:      Mental Status: She is alert.      Comments: Mostly oriented.  Is able to ask where her niece is.  Answers questions appropriately.       Results Review:  I have reviewed the labs, radiology results, and diagnostic studies.    Laboratory Data:   Results from last 7 days   Lab Units 12/08/21  0503 12/07/21  0518 12/06/21  0352   SODIUM mmol/L 153* 146* 141   POTASSIUM mmol/L 2.6* 3.3* 4.1   CHLORIDE mmol/L 122* 116* 114*   CO2 mmol/L 24.0 22.0 19.0*   BUN mg/dL 21 23 32*   CREATININE mg/dL 0.62 0.64 0.72   GLUCOSE mg/dL 172* 273* 236*   CALCIUM mg/dL 10.3 9.9 10.2   BILIRUBIN mg/dL 0.6 0.7 0.4   ALK PHOS U/L 123* 126* 152*   ALT (SGPT) U/L 124* 124* 134*   AST (SGOT) U/L 98* 109* 169*   ANION GAP mmol/L 7.0 8.0 8.0     Estimated Creatinine Clearance: 79.3 mL/min (by C-G formula based on SCr of 0.62 mg/dL).  Results from last 7 days   Lab Units 12/08/21  0503 12/07/21  0518 12/06/21  0755 12/06/21  0352 12/06/21  0352 12/06/21  0006 12/06/21  0006   MAGNESIUM mg/dL 2.0 1.9 2.0   < > 2.0   < > 2.0   PHOSPHORUS mg/dL  --   --  2.6  --  2.1*  --  1.8*    < > = values in this interval not displayed.         Results from last 7 days   Lab Units 12/08/21  0503 12/07/21  0518 12/06/21  0352 12/05/21  1008 12/05/21  0337   WBC 10*3/mm3 14.85* 17.73* 22.80* 22.64* 28.68*   HEMOGLOBIN g/dL 11.5* 11.9* 12.5 11.4* 13.7   HEMATOCRIT % 35.6 36.2 37.7 35.2 40.4   PLATELETS 10*3/mm3 278 259 335 337 426     Results from last 7 days   Lab Units 12/08/21  0502 12/05/21  1008 12/02/21  1347   INR  1.40* 1.83* 1.23*       Culture Data:   No results found for: BLOODCX  No results found for: URINECX  No results found for: RESPCX  No results found for: WOUNDCX  No results found for: STOOLCX  No  components found for: BODYFLD    Radiology Data:   Imaging Results (Last 24 Hours)     ** No results found for the last 24 hours. **          I have reviewed the patient's current medications.     Assessment/Plan     Active Hospital Problems    Diagnosis    • Sepsis (HCC)        Plan:  1.  DKA: Weaned off insulin drip.  Doing better.  2.  Sepsis: Cultures remain pending.  Continue empiric antibiotics.  White blood cell count continues to trend down.  3.  Metabolic encephalopathy: Significantly better today.  Awake and alert.  Mostly oriented.  4.  Elevated troponin: Trending down.  Dr. Calderón following.  On heparin infusion.  5.  Atrial fibrillation: On Cardizem infusion.  Titrate per Dr. Calderón's instructions.  6.  Hypothyroidism: Continue Synthroid.  7.  Dysphagia: Improving.  Safe for puréed solids and thin liquids.  8.  DVT prophylaxis: Heparin drip.    Lengthy discussion with patient's POA.  Due to patient's condition, and her previously stated wishes, we will continue current plan of care for another 24 hours.  If patient does not improve in the next 24 hours we will transition to comfort measures.    The patient was evaluated during the global COVID-19 pandemic, and the diagnosis was suspected/considered upon their initial presentation.  Evaluation, treatment, and testing were consistent with current guidelines for patients who present with complaints or symptoms that may be related to COVID-19.    I confirmed that the patient's Advance Care Plan is present, code status is documented, or surrogate decision maker is listed in the patient's medical record.       Discharge Planning: I expect patient to be discharged to skilled facility in 3-4 days.        This document has been electronically signed by Sukhjinder Trivedi MD on December 8, 2021 12:50 CST

## 2021-12-09 NOTE — PROGRESS NOTES
Pharmacokinetics by Pharmacy - Vancomycin    Janelle Millard is a 66 y.o. female receiving vancomycin (day 5) for sepsis.  Patient is also receiving zosyn.    Objective:    Temp Readings from Last 1 Encounters:   12/09/21 98.7 °F (37.1 °C) (Temporal)     WBC   Date Value Ref Range Status   12/09/2021 12.91 (H) 3.40 - 10.80 10*3/mm3 Final   12/08/2021 14.85 (H) 3.40 - 10.80 10*3/mm3 Final   12/07/2021 17.73 (H) 3.40 - 10.80 10*3/mm3 Final      No results found for: CRP, LACTATE   Creatinine   Date Value Ref Range Status   12/09/2021 0.55 (L) 0.57 - 1.00 mg/dL Final   12/08/2021 0.62 0.57 - 1.00 mg/dL Final   12/07/2021 0.64 0.57 - 1.00 mg/dL Final       Vancomycin Peak   Date Value Ref Range Status   12/09/2021 22.10 20.00 - 40.00 mcg/mL Final   12/07/2021 44.90 (C) 20.00 - 40.00 mcg/mL Final     Vancomycin Trough   Date Value Ref Range Status   12/07/2021 15.20 5.00 - 20.00 mcg/mL Final       Culture Results:  Microbiology Results (last 10 days)     Procedure Component Value - Date/Time    CANDIDA AURIS SCREEN - Swab, Axilla Right, Axilla Left and Groin [[id number]]  (Normal) Collected: 12/05/21 0806    Lab Status: Preliminary result Specimen: Swab from Axilla Right, Axilla Left and Groin Updated: 12/09/21 0815     Candida Auris Screen Culture No Candida auris isolated at 4 days    CRE Screen by PCR - Swab, Large Intestine, Rectum [[id number]] Collected: 12/05/21 0805    Lab Status: Final result Specimen: Swab from Large Intestine, Rectum Updated: 12/05/21 0923     CRE SCREEN Not Detected     Comment: Test performed by real-time polymerase chain reaction (qPCR).        OXA 48 Strain Not Detected     IMP STRAIN Not Detected     VIM STRAIN Not Detected     NDM Strain Not Detected     KPC Strain Not Detected    Urine Culture - Urine, Urine, Catheter [[id number]]  (Abnormal) Collected: 12/05/21 9367    Lab Status: Final result Specimen: Urine, Catheter Updated: 12/06/21 2104     Urine Culture Yeast isolated     "Narrative:      No further workup    Blood Culture - Blood, Hand, Right [[id number]]  (Normal) Collected: 12/05/21 0352    Lab Status: Preliminary result Specimen: Blood from Hand, Right Updated: 12/09/21 0415     Blood Culture No growth at 4 days    COVID-19 and FLU A/B PCR - Swab, Nasopharynx [[id number]]  (Normal) Collected: 12/05/21 0352    Lab Status: Final result Specimen: Swab from Nasopharynx Updated: 12/05/21 0439     COVID19 Not Detected     Influenza A PCR Not Detected     Influenza B PCR Not Detected    Narrative:      Fact sheet for providers: https://www.fda.gov/media/241712/download    Fact sheet for patients: https://www.fda.gov/media/077044/download    Test performed by PCR.    Blood Culture - Blood, Hand, Right [[id number]]  (Normal) Collected: 12/05/21 0337    Lab Status: Preliminary result Specimen: Blood from Hand, Right Updated: 12/09/21 0346     Blood Culture No growth at 4 days    COVID-19 and FLU A/B PCR - Swab, Nasopharynx [[id number]]  (Normal) Collected: 12/02/21 1439    Lab Status: Final result Specimen: Swab from Nasopharynx Updated: 12/02/21 1526     COVID19 Not Detected     Influenza A PCR Not Detected     Influenza B PCR Not Detected    Narrative:      Fact sheet for providers: https://www.fda.gov/media/206745/download    Fact sheet for patients: https://www.fda.gov/media/567458/download    Test performed by PCR.    Urine Culture - Urine, Urine, Clean Catch [[id number]] Collected: 12/02/21 1102    Lab Status: Final result Specimen: Urine, Clean Catch Updated: 12/04/21 0108     Urine Culture Final report     Result 1 Comment     Comment: Mixed urogenital reagan  10,000-25,000 colony forming units per mL       Narrative:      Performed at:  [address] [place] [address]  [id number]  : Abdiaziz Munoz PhD, Phone:  [phone]        No results found for: RESPCX    [Ht: 175.3 cm (69.02\"); Wt: 89.5 kg (197 lb 5 oz)]   Body mass index is 29.12 kg/m².  Ideal body " weight: 66.2 kg (146 lb 0.4 oz)  Adjusted ideal body weight: 75.5 kg (166 lb 8.6 oz)      Assessment:  • WBCs elevated but trending down, afebrile  • Renal function appears stable with an Estimated Creatinine Clearance: 80.2 mL/min (A) (by C-G formula based on SCr of 0.55 mg/dL (L)).  • Cultures  o 12/5 blood cultures NGD4  o 12/5 urine culture no growth besides yeast   • Vancomycin peak and trough resulted at 22.1 mcg/mL and 14.9 mcg/mL, respectively. This gives a calculated AUC and trough of 446 and 14.6 mcg/mL, respectively. These are within goal range.    • AUC and trough goals are 400-600 and 10-20 mcg/mL, respectively.     Plan:  1. Continue Vancomycin 750 mg IV q12hrs   2. Vancomycin levels when clinically indicated. Consulted until 12/12.  3. Pharmacy will monitor renal function and adjust dose accordingly.    Thank you for this consult.     Grady Lawton [facility]   12/09/21 08:27 CST

## 2021-12-09 NOTE — PLAN OF CARE
Goal Outcome Evaluation:  Plan of Care Reviewed With: patient        Progress: no change  Outcome Summary: transferred from ICU

## 2021-12-09 NOTE — PROGRESS NOTES
[facility] Medicine Services  INPATIENT PROGRESS NOTE    Length of Stay: 4  Date of Admission: 12/5/2021  Primary Care Physician: Yosef Casey MD    Subjective   Chief Complaint: Altered mental status  HPI: More alert and oriented today.  Doing well.  Tolerating oral medications.    Review of Systems   Constitutional: Negative for appetite change, chills, fatigue, fever and unexpected weight change.   Respiratory: Negative for cough, choking, chest tightness, shortness of breath and wheezing.    Cardiovascular: Negative for chest pain, palpitations and leg swelling.   Gastrointestinal: Negative for abdominal pain, blood in stool, constipation, diarrhea, nausea and vomiting.   Genitourinary: Negative for dysuria, flank pain and hematuria.   Neurological: Negative for dizziness, seizures, syncope, speech difficulty, weakness, light-headedness, numbness and headaches.   Hematological: Does not bruise/bleed easily.        All pertinent negatives and positives are as above. All other systems have been reviewed and are negative unless otherwise stated.     Objective    Temp:  [98.1 °F (36.7 °C)-99.6 °F (37.6 °C)] 98.1 °F (36.7 °C)  Heart Rate:  [69-91] 70  Resp:  [17-22] 21  BP: ()/(54-80) 111/56  Arterial Line BP: (104-172)/(47-78) 104/47    Physical Exam  Vitals reviewed.   Constitutional:       Appearance: She is well-developed.   HENT:      Head: Normocephalic and atraumatic.   Eyes:      Pupils: Pupils are equal, round, and reactive to light.   Cardiovascular:      Rate and Rhythm: Normal rate and regular rhythm.      Heart sounds: Normal heart sounds. No murmur heard.  No friction rub. No gallop.    Pulmonary:      Effort: Pulmonary effort is normal. No respiratory distress.      Breath sounds: Normal breath sounds. No wheezing or rales.   Chest:      Chest wall: No tenderness.   Abdominal:      General: Bowel sounds are normal. There is no distension.       Palpations: Abdomen is soft.      Tenderness: There is no abdominal tenderness. There is no guarding.   Musculoskeletal:      Cervical back: Normal range of motion and neck supple.   Skin:     General: Skin is warm and dry.   Neurological:      Mental Status: She is alert.      Comments: Much more oriented today.       Results Review:  I have reviewed the labs, radiology results, and diagnostic studies.    Laboratory Data:   Results from last 7 days   Lab Units 12/09/21  0409 12/09/21  0002 12/08/21  0503 12/07/21  0518 12/07/21  0518 12/06/21 0352 12/06/21  0352   SODIUM mmol/L 144  --  153*  --  146*   < > 141   POTASSIUM mmol/L 3.9 3.5 2.6*   < > 3.3*   < > 4.1   CHLORIDE mmol/L 115*  --  122*  --  116*   < > 114*   CO2 mmol/L 23.0  --  24.0  --  22.0   < > 19.0*   BUN mg/dL 19  --  21  --  23   < > 32*   CREATININE mg/dL 0.55*  --  0.62  --  0.64   < > 0.72   GLUCOSE mg/dL 215*  --  172*  --  273*   < > 236*   CALCIUM mg/dL 9.4  --  10.3  --  9.9   < > 10.2   BILIRUBIN mg/dL  --   --  0.6  --  0.7  --  0.4   ALK PHOS U/L  --   --  123*  --  126*  --  152*   ALT (SGPT) U/L  --   --  124*  --  124*  --  134*   AST (SGOT) U/L  --   --  98*  --  109*  --  169*   ANION GAP mmol/L 6.0  --  7.0  --  8.0   < > 8.0    < > = values in this interval not displayed.     Estimated Creatinine Clearance: 80.2 mL/min (A) (by C-G formula based on SCr of 0.55 mg/dL (L)).  Results from last 7 days   Lab Units 12/08/21  0503 12/07/21  0518 12/06/21  0755 12/06/21  0352 12/06/21  0352 12/06/21  0006 12/06/21  0006   MAGNESIUM mg/dL 2.0 1.9 2.0   < > 2.0   < > 2.0   PHOSPHORUS mg/dL  --   --  2.6  --  2.1*  --  1.8*    < > = values in this interval not displayed.         Results from last 7 days   Lab Units 12/09/21  0409 12/08/21  0503 12/07/21  0518 12/06/21  0352 12/05/21  1008   WBC 10*3/mm3 12.91* 14.85* 17.73* 22.80* 22.64*   HEMOGLOBIN g/dL 10.5* 11.5* 11.9* 12.5 11.4*   HEMATOCRIT % 32.6* 35.6 36.2 37.7 35.2   PLATELETS  10*3/mm3 218 278 259 335 337     Results from last 7 days   Lab Units 12/08/21  0502 12/05/21  1008 12/02/21  1347   INR  1.40* 1.83* 1.23*       Culture Data:   No results found for: BLOODCX  No results found for: URINECX  No results found for: RESPCX  No results found for: WOUNDCX  No results found for: STOOLCX  No components found for: BODYFLD    Radiology Data:   Imaging Results (Last 24 Hours)     ** No results found for the last 24 hours. **          I have reviewed the patient's current medications.     Assessment/Plan     Active Hospital Problems    Diagnosis    • Sepsis (HCC)        Plan:  1.  DKA: Weaned off insulin drip.  Doing better.  2.  Sepsis: Cultures remain pending.  Continue empiric antibiotics.  White blood cell count continues to trend down.  3.  Metabolic encephalopathy: Significantly better today.  Awake and alert.  Mostly oriented.  4.  Elevated troponin: Trending down.  Dr. Calderón following.  On heparin infusion.  5.  Atrial fibrillation: Transition to oral Cardizem.  Rate controlled.  6.  Hypothyroidism: Continue Synthroid.  7.  Dysphagia: Improving.  Safe for puréed solids and thin liquids.  8.  DVT prophylaxis: Heparin.    To floor today.    Lengthy discussion with patient's POA.  Due to patient's condition, and her previously stated wishes, we will continue current plan of care for another 24 hours.  If patient does not improve in the next 24 hours we will transition to comfort measures.    The patient was evaluated during the global COVID-19 pandemic, and the diagnosis was suspected/considered upon their initial presentation.  Evaluation, treatment, and testing were consistent with current guidelines for patients who present with complaints or symptoms that may be related to COVID-19.    I confirmed that the patient's Advance Care Plan is present, code status is documented, or surrogate decision maker is listed in the patient's medical record.       Discharge Planning: I expect patient  to be discharged to skilled facility in 3-4 days.        This document has been electronically signed by Sukhjinder Trivedi MD on December 9, 2021 13:13 CST

## 2021-12-09 NOTE — THERAPY TREATMENT NOTE
Acute Care - Speech Language Pathology   Swallow Treatment Note [facility]     Patient Name: Janelle Millard  : 1955  MRN: [id number]  Today's Date: 2021               Admit Date: 2021    Visit Dx:     ICD-10-CM ICD-9-CM   1. Sepsis without acute organ dysfunction, due to unspecified organism ([facility])  A41.9 038.9     995.91   2. Pneumonia of both lower lobes due to infectious organism  J18.9 486   3. Acute cystitis with hematuria  N30.01 595.0   4. Leukocytosis, unspecified type  D72.829 288.60   5. Altered mental status, unspecified altered mental status type  R41.82 780.97   6. Elevated liver enzymes  R74.8 790.5   7. Atherosclerosis of native coronary artery of native heart without angina pectoris  I25.10 414.01   8. Elevated troponin  R77.8 790.6   9. Atherosclerosis of autologous vein coronary artery bypass graft with unstable angina pectoris ([facility])  I25.710 414.02     411.1   10. Oropharyngeal dysphagia  R13.12 787.22     Patient Active Problem List   Diagnosis   • Carpal tunnel syndrome of left wrist   • Type 1 diabetes mellitus with diabetic polyneuropathy ([facility])   • Mixed diabetic hyperlipidemia associated with type 1 diabetes mellitus ([facility])   • Hypertension associated with diabetes ([facility])   • Syncope and collapse   • CAD (coronary artery disease)   • Asthma   • Depressive disorder, not elsewhere classified   • Neuropathy   • Other specified rheumatoid arthritis, multiple sites ([facility])   • Carpal tunnel syndrome on both sides   • Bilateral carotid artery stenosis   • Overweight (BMI 25.0-29.9)   • Precordial pain   • Sepsis with encephalopathy without septic shock ([facility])   • Tobacco abuse   • UTI (urinary tract infection)   • Ureteral stone with hydronephrosis   • Chest pain, rule out acute myocardial infarction   • Chest pain   • Chest pain with high risk for cardiac etiology   • Sepsis (HCC)     Past Medical History:   Diagnosis Date   • Arthritis, rheumatoid (HCC)    • Arthropathy  associated with neurological disorder    • Arthropathy of lumbar facet joint    • Asthma    • Benign hypertension    • Carpal tunnel syndrome    • Diabetes (HCC)    • Diabetic polyneuropathy (HCC)    • Dyslipidemia    • Fallen arches    • Hammer toe    • Heart disease    • Joint pain    • Mild depression (HCC)     Saddness post Surgery 7/10/14 improved after counseling.   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 Diffusely calcified LAD with 30-80% stenosis. OMB #3 with 60% to 70% stenosis. Preserved LV systolic function with EF of 55%.   • CARPAL TUNNEL RELEASE Right 10/15/2014    Right carpal tunnel release.   • CARPAL TUNNEL RELEASE     • CATARACT EXTRACTION, BILATERAL Bilateral    • CORONARY ARTERY BYPASS GRAFT  02/24/2014    CABG x 3 with LIMA to LAD, endarterectomy to LAD, SVG to OMB and SVG to distal RCA with endarterectomy to distal RCA. Endo-vein harvesting.   • CYSTECTOMY Left     Breast cycst   • CYSTOSCOPY, URETEROSCOPY, RETROGRADE PYELOGRAM, STENT INSERTION Left 3/10/2021    Procedure: , LEFT RETROGRADE PYELOGRAM, STENT INSERTION LEFT URETER;  Surgeon: [place], Cooper NOLAN MD;  Location: [facility];  Service: Urology;  Laterality: Left;   • EXCISION LESION      Remove breast lesion - cyst   • EYE SURGERY      Insert lens prosthesis   • FOOT SURGERY  10/18/2011    Exostectomy of the right foot. Preulcerative lesion with Charot deformity, right foot   • LUMBAR SPINE SURGERY  11/09/2015    Lumbosacral radiofrequency thermal coagulation.   • NERVE BLOCK  09/14/2015    Lumbar medial branch block.   • TOE NAIL AVULSION   09/03/2015    DEBRIDE NAIL 6 OR MORE 42299 (1)       SLP Recommendation and Plan  SLP Swallowing Diagnosis: mild-moderate, oral dysphagia, suspected pharyngeal dysphagia (12/09/21 1200)  SLP Diet Recommendation: puree, thin liquids (12/09/21 1200)     SLP Rec. for Method of Medication Administration: meds via alternate route (12/09/21 1200)           Swallow Criteria for Skilled Therapeutic Interventions Met: demonstrates skilled criteria (12/09/21 1200)  Anticipated Discharge Disposition (SLP): skilled nursing facility (12/09/21 1200)  Rehab Potential/Prognosis, Swallowing: adequate, monitor progress closely (12/09/21 1200)  Therapy Frequency (Swallow): 3 days per week, 5 days per week (12/09/21 1200)  Predicted Duration Therapy Intervention (Days): until discharge (12/09/21 1200)     Daily Summary of Progress (SLP): progress toward functional goals is gradual (12/09/21 1200)                   Plan of Care Reviewed With: patient, family  Progress: no change  Outcome Summary: ST dysphagia treatment provided this date.  pt with decreased alertness and inability to hold head upright.  pt taking thin liquids via straw and magic cup (deferred all chewable solids).  there is oral reisude bilateral sulcus with some anterior spillage.      SWALLOW EVALUATION (last 72 hours)     SLP Adult Swallow Evaluation     Row Name 12/09/21 1200 12/08/21 0812 12/07/21 1005             Rehab Evaluation    Document Type therapy note (daily note)  -EC therapy note (daily note)  -EC therapy note (daily note)  -CK      Total Evaluation Minutes, SLP -- -- 24  -CK      Subjective Information fatigue  unable to hold head upright  -EC no complaints  -EC fatigue  -CK      Patient Observations cooperative; agree to therapy; lethargic  -EC cooperative; alert; agree to therapy  -EC lethargic  -CK      Patient/Family/Caregiver Comments/Observations female relative  -EC jo ann  -EC Pt had multiple family members rotate in and out during session  -CK       Patient Effort fair  -EC fair  -EC fair  -CK              General Information    Patient Profile Reviewed yes  -EC yes  -EC yes  -CK      Current Method of Nutrition pureed; thin liquids  -EC NPO  -EC NPO  -CK      Prior Level of Function-Swallowing no diet consistency restrictions  -EC no diet consistency restrictions  -EC no diet consistency restrictions  -CK      Plans/Goals Discussed with family; agreed upon  -EC family; agreed upon  -EC family; agreed upon  -CK      Barriers to Rehab cognitive status; previous functional deficit  -EC cognitive status; previous functional deficit  -EC cognitive status; previous functional deficit  -CK              Pain Scale: FACES Pre/Post-Treatment    Pain: FACES Scale, Pretreatment 0-->no hurt  -EC 0-->no hurt  -EC 0-->no hurt  -CK      Posttreatment Pain Rating 0-->no hurt  -EC 0-->no hurt  -EC 0-->no hurt  -CK              Oral Motor Structure and Function    Dentition Assessment edentulous; missing teeth  -EC edentulous; missing teeth  -EC edentulous  -CK      Secretion Management anterior loss; wet vocal quality; problems swallowing secretions  -EC WNL/WFL  -EC WNL/WFL  -CK      Mucosal Quality moist, healthy  -EC dry  -EC dry  -CK      Volitional Swallow delayed; weak  -EC WFL  -EC unable to elicit  -CK      Volitional Cough weak  -EC weak  -EC unable to elicit  -CK              General Eating/Swallowing Observations    Respiratory Support Currently in Use nasal cannula  -EC nasal cannula  -EC nasal cannula  -CK      Eating/Swallowing Skills fed by SLP  -EC fed by SLP  -EC fed by SLP  -CK      Positioning During Eating upright 90 degree; upright in bed  -EC upright 90 degree; upright in bed  -EC upright 90 degree; upright in bed  -CK      Utensils Used spoon; straw  -EC spoon; cup  -EC spoon  -CK      Consistencies Trialed thin liquids; pureed  pt declined all chewable options  -EC thin liquids; pureed  -EC ice chips  -CK      Pre SpO2 (%) 100  -EC -- --               Clinical Swallow Eval    Oral Prep Phase impaired  -EC WFL  -EC impaired  -CK      Oral Transit impaired  -EC WFL  -EC impaired  -CK      Oral Residue impaired  -EC WFL  -EC --      Pharyngeal Phase suspected pharyngeal impairment  -EC no overt signs/symptoms of pharyngeal impairment  -EC suspected pharyngeal impairment  -CK      Clinical Swallow Evaluation Summary pt with difficulty clearing oral cavity with anterior spillage and lateral pooling of liquid noted during trials. pt lethargic and requires assistance holding head upright  -EC pt tolerated 200cc water from cup rim/spoon and 1/2 cup of applesauce with no overt s/s of aspirtion.  -EC Pt seen for skilled ST services this date to address oropharyngeal dysphagia.  Pt was lethargic, but initially opened eyes and responded to SLP when ice chips were offered.  However, once PO trials were started pt would fall asleep and would drop head.  SLP would have to hold head until pt was able to hold head independently.  Pt was able to swallow x2 w/weak throat clear following each swallow.  SLP continues to recommend NPO at this time w/consideration for alternative nutrition.  Pt's sister-in-law was educated on pt's progress from this session and previous and verbalized understanding.  Nsg aware of progress and continued NPO recommendations.  -CK              Oral Prep Concerns    Oral Prep Concerns reduced lip opening; incomplete or weak lip closure around spoon; anterior loss  -EC -- incomplete bolus preparation; reduced lip opening; incomplete or weak lip closure around spoon; bolus removed from mouth manually  -CK      Reduced Lip Opening all consistencies  -EC -- thin  -CK      Incomplete or Weak Lip Closure Around Spoon all consistencies  -EC -- thin  -CK      Anterior Loss thin; pudding  -EC -- --      Incomplete Bolus Preparation -- -- thin  -CK      Bolus Removed from Mouth Manually -- -- other (see comments)  ice chip  -CK      Oral Prep Concerns, Comment  pooling of residue, saliva with anterior spillage d/t head forward position  -EC -- --              Oral Transit Concerns    Oral Transit Concerns increased oral transit time  -EC -- unable to initiate oral transit; delayed initiation of bolus transit  -CK      Delayed Intiation of Bolus Transit thin; pudding  -EC -- thin  -CK      Increased Oral Transit Time thin; pudding  -EC -- --              Oral Residue Concerns    Oral Residue Concerns sulci residue, bilateral  -EC -- --              Pharyngeal Phase Concerns    Pharyngeal Phase Concerns wet vocal quality; multiple swallows; throat clear; cough  -EC -- throat clear; other (see comments)  absent swallow  -CK      Wet Vocal Quality thin; pudding; other (see comments)  saliva  -EC -- --      Multiple Swallows pudding; thin  -EC -- --      Cough thin; pudding  -EC -- --      Throat Clear thin; pudding  -EC -- thin  -CK              Clinical Impression    SLP Swallowing Diagnosis mild-moderate; oral dysphagia; suspected pharyngeal dysphagia  -EC functional oral phase; functional pharyngeal phase  -EC severe; oral dysphagia; suspected pharyngeal dysphagia  -CK      Functional Impact risk of aspiration/pneumonia; risk of dehydration; risk of malnutrition  -EC risk of aspiration/pneumonia  -EC risk of aspiration/pneumonia; risk of malnutrition; risk of dehydration  -CK      Rehab Potential/Prognosis, Swallowing adequate, monitor progress closely  -EC good, to achieve stated therapy goals  -EC re-evaluate goals as necessary  -CK      Swallow Criteria for Skilled Therapeutic Interventions Met demonstrates skilled criteria  -EC demonstrates skilled criteria  -EC demonstrates skilled criteria  -CK              SLP Treatment Clinical Impressions    Treatment Assessment (SLP) pt with decreased alertness and inability to hold head upright.  pt taking thin liquids via straw and magic cup (deferred all chewable solids).  there is oral reisude bilateral sulcus with some  anterior spillage.  -EC ST provided skilled intervention for dysphagia.  pt is alert and calling out at intervals.  she recieved pain meds , but is alert for PO trials.  oral care provided and pt sat upright.  improved oral control with sips of water via spoon with no s/s of aspiration.  pt advanced to cup rim where she took consecutive sips with good toleration.  she agreed to applesauce and consumed it in cyclic fashion with water via cup rim.  there were no s/s of aspiration, but overall, pt is confused and requires simple statements and questions.  -EC oropharyngeal dysphagia  -CK      Daily Summary of Progress (SLP) progress toward functional goals is gradual  -EC progress toward functional goals is good  -EC progress toward functional goals is gradual  -CK      Barriers to Overall Progress (SLP) Cognitive status  -EC Cognitive status  -EC Fatigue; Cognitive status  -CK      Plan for Continued Treatment (SLP) continue treatment per plan of care  -EC continue treatment per plan of care  -EC continue treatment per plan of care  -CK      Care Plan Review care plan/treatment goals reviewed; risks/benefits reviewed; current/potential barriers reviewed; patient/other agree to care plan  -EC care plan/treatment goals reviewed; risks/benefits reviewed; current/potential barriers reviewed; patient/other agree to care plan  -EC evaluation/treatment results reviewed; care plan/treatment goals reviewed; risks/benefits reviewed; current/potential barriers reviewed; patient/other agree to care plan  -CK      Care Plan Review, Other Participant(s) family  -EC -- family  -CK              Recommendations    Therapy Frequency (Swallow) 3 days per week; 5 days per week  -EC 3 days per week; 5 days per week  -EC 3 days per week; 5 days per week  -CK      Predicted Duration Therapy Intervention (Days) until discharge  -EC until discharge  -EC until discharge  -CK      SLP Diet Recommendation puree; thin liquids  -EC puree; thin  liquids  -EC NPO; temporary alternate methods of nutrition/hydration  -CK      Oral Care Recommendations Oral Care BID/PRN  -EC Oral Care BID/PRN  -EC Oral Care BID/PRN  -CK      SLP Rec. for Method of Medication Administration meds via alternate route  -EC meds via alternate route  -EC meds via alternate route  -CK      Anticipated Discharge Disposition (SLP) skilled nursing facility  -EC skilled nursing facility  -EC skilled nursing facility  -CK              Swallow Goals (SLP)    Oral Nutrition/Hydration Goal Selection (SLP) oral nutrition/hydration, SLP goal 1  -EC oral nutrition/hydration, SLP goal 1  -EC oral nutrition/hydration, SLP goal 1  -CK              Oral Nutrition/Hydration Goal 1 (SLP)    Oral Nutrition/Hydration Goal 1, SLP Pt will safely tolerate trials of PO solids/liquids w/no overt s/s of aspiration for establishment of safe PO diet.  -EC Pt will safely tolerate trials of PO solids/liquids w/no overt s/s of aspiration for establishment of safe PO diet.  -EC Pt will safely tolerate trials of PO solids/liquids w/no overt s/s of aspiration for establishment of safe PO diet.  -CK      Time Frame (Oral Nutrition/Hydration Goal 1, SLP) by discharge  -EC by discharge  -EC by discharge  -CK      Barriers (Oral Nutrition/Hydration Goal 1, SLP) cognition  -EC cognition  -EC lethargy; absent swallow  -CK      Progress/Outcomes (Oral Nutrition/Hydration Goal 1, SLP) goal ongoing  -EC goal ongoing  -EC goal ongoing  -CK            User Key  (r) = Recorded By, (t) = Taken By, (c) = Cosigned By    Initials Name Effective Dates    Genet Henderson CCC-SLP 06/16/21 -     CK Kacey Pop MS CCC-SLP 06/16/21 -                 EDUCATION  The patient has been educated in the following areas:   Dysphagia (Swallowing Impairment) Modified Diet Instruction.        SLP GOALS     Row Name 12/09/21 1200 12/08/21 0812 12/07/21 1005       Oral Nutrition/Hydration Goal 1 (SLP)    Oral Nutrition/Hydration Goal  1, SLP Pt will safely tolerate trials of PO solids/liquids w/no overt s/s of aspiration for establishment of safe PO diet.  -EC Pt will safely tolerate trials of PO solids/liquids w/no overt s/s of aspiration for establishment of safe PO diet.  -EC Pt will safely tolerate trials of PO solids/liquids w/no overt s/s of aspiration for establishment of safe PO diet.  -CK    Time Frame (Oral Nutrition/Hydration Goal 1, SLP) by discharge  -EC by discharge  -EC by discharge  -CK    Barriers (Oral Nutrition/Hydration Goal 1, SLP) cognition  -EC cognition  -EC lethargy; absent swallow  -CK    Progress/Outcomes (Oral Nutrition/Hydration Goal 1, SLP) goal ongoing  -EC goal ongoing  -EC goal ongoing  -CK          User Key  (r) = Recorded By, (t) = Taken By, (c) = Cosigned By    Initials Name Provider Type    Genet Henderson CCC-SLP Speech and Language Pathologist    CK Kacey Pop, MS CCC-SLP Speech and Language Pathologist                   Time Calculation:    Time Calculation- SLP     Row Name 12/09/21 1324             Time Calculation- SLP    SLP Start Time 1200  -EC      SLP Stop Time 1240  -EC      SLP Time Calculation (min) 40 min  -EC      Total Timed Code Minutes- SLP 40 minute(s)  -EC      SLP Received On 12/09/21  -EC      SLP Goal Re-Cert Due Date 12/20/21  -EC              Untimed Charges    49910-EI Treatment Swallow Minutes 40  -EC              Total Minutes    Untimed Charges Total Minutes 40  -EC       Total Minutes 40  -EC            User Key  (r) = Recorded By, (t) = Taken By, (c) = Cosigned By    Initials Name Provider Type    EC Genet Christensen CCC-SLP Speech and Language Pathologist                Therapy Charges for Today     Code Description Service Date Service Provider Modifiers Qty    66220377411 HC ST TREATMENT SWALLOW 3 12/8/2021 Genet Christensen CCC-SLP GN 1    [id number] HC ST TREATMENT SWALLOW 3 12/9/2021 Genet Christensen CCC-ELIE GN 1               Genet ROBERTS  ZANE Christensen-SLP  12/9/2021

## 2021-12-09 NOTE — PROGRESS NOTES
Cardiology Progress Note     LOS: 3 days   Patient Care Team:  Yosef Casey MD as PCP - General (Family Medicine)  Francine Gallegos (Inactive) as Technician    Subjective:  Chart reviewed. Patient seen and examined. Patient mental status has improved.  Patient is more alert and oriented.  Patient is able to tolerate oral.  Patient complains of feeling tired and fatigue.      Objective:  Temp:  [98.6 °F (37 °C)-99.6 °F (37.6 °C)] 99.6 °F (37.6 °C)  Heart Rate:  [] 81  Resp:  [20-22] 20  BP: (125-188)/(60-84) 146/74  Arterial Line BP: (124-194)/(46-80) 172/72    Intake/Output Summary (Last 24 hours) at 12/8/2021 1925  Last data filed at 12/8/2021 1800  Gross per 24 hour   Intake 2077 ml   Output 1630 ml   Net 447 ml       Physical Exam:   General Appearance:    Alert, oriented, cooperative, in no acute distress.   Head:    Normocephalic, atraumatic, without obvious abnormality   Eyes:             LILLIAN. Lids and lashes normal, conjunctivae and sclerae normal, no icterus, no pallor.   Ears:    Ears appear intact with no abnormalities noted.   Throat:   Mucous membranes pink and moist.   Neck:  Supple, trachea midline, no carotid bruit, no organomegaly or JVD.   Lungs:    Clear to auscultation and percussion.  Respirations regular, even and unlabored. No wheezes, rales, or rhonchi.    Heart:    Regular rhythm and normal rate, normal S1 and S2, no      murmur, no gallop, no rub, no click.   Abdomen:    Soft, nontender, nondistended, no guarding, no rebound tenderness. Normal bowel sounds in all four quadrants, no masses, liver and spleen nonpalpable.    Genitalia:    Deferred.   Extremities:   Moves all extremities well, no edema, no cyanosis, no       redness, no clubbing.   Pulses:   Pulses palpable and equal bilaterally.   Skin:   Moist and warm. No bleeding, bruising or rash.   Neurologic/Psychiatric:   Alert and oriented to person, place, and time.  Motor, power and tone in upper and lower  extremities are grossly intact.  No focal neurological deficits. Normal cognitive function. No psychomotor reaction or tangential thought. No depression, homicidal ideations and suicidal ideations.          Results Review:    Results from last 7 days   Lab Units 12/08/21  0503   SODIUM mmol/L 153*   POTASSIUM mmol/L 2.6*   CHLORIDE mmol/L 122*   CO2 mmol/L 24.0   BUN mg/dL 21   CREATININE mg/dL 0.62   CALCIUM mg/dL 10.3   BILIRUBIN mg/dL 0.6   ALK PHOS U/L 123*   ALT (SGPT) U/L 124*   AST (SGOT) U/L 98*   GLUCOSE mg/dL 172*     Results from last 7 days   Lab Units 12/05/21  1008 12/05/21  0337 12/02/21 2015   TROPONIN T ng/mL 1.580* 2.530* <0.010         Results from last 7 days   Lab Units 12/08/21  0503   WBC 10*3/mm3 14.85*   HEMOGLOBIN g/dL 11.5*   HEMATOCRIT % 35.6   PLATELETS 10*3/mm3 278     Results from last 7 days   Lab Units 12/08/21  0502 12/07/21  2226 12/07/21  1355 12/05/21  1509 12/05/21  1008 12/02/21  1347 12/02/21  1347   INR  1.40*  --   --   --  1.83*  --  1.23*   APTT seconds 27.7 47.1* 100.4*   < > >240.0*   < > 30.2    < > = values in this interval not displayed.     Results from last 7 days   Lab Units 12/08/21  0503   MAGNESIUM mg/dL 2.0         Results from last 7 days   Lab Units 12/06/21  0352 12/05/21  1008 12/03/21  0436   TSH uIU/mL 0.578  --    < >   FREE T4 ng/dL  --  1.26  --     < > = values in this interval not displayed.           ECHO:  Results for orders placed during the hospital encounter of 12/05/21    Adult Transthoracic Echo Limited W/ Cont if Necessary Per Protocol    Interpretation Summary  · The right atrial cavity is borderline dilated.  · Left ventricular ejection fraction appears to be 26 - 30%.  · Left ventricular diastolic function was normal.  · Moderately decreased posterior leaflet mobility.  · Mild mitral valve stenosis is present.  · The following left ventricular wall segments are hypokinetic: mid anterior, apical anterior, basal anterolateral, mid  anterolateral, apical lateral, basal inferolateral, mid inferolateral, apical inferior, mid inferior, apical septal, basal inferoseptal, mid inferoseptal, apex hypokinetic, mid anteroseptal, basal anterior, basal inferior and basal inferoseptal.      ECG 12 Lead   Preliminary Result   Test Reason : Non-Q myocardial infarct   Blood Pressure :   */*   mmHG   Vent. Rate : 108 BPM     Atrial Rate : 108 BPM      P-R Int : 164 ms          QRS Dur :  80 ms       QT Int : 354 ms       P-R-T Axes :  44  62 142 degrees      QTc Int : 474 ms      Sinus tachycardia   Low voltage QRS   Cannot rule out Anterior infarct (cited on or before 11-MAY-2016)   T wave abnormality, consider inferolateral ischemia   Abnormal ECG   When compared with ECG of 05-DEC-2021 07:12,   T wave inversion now evident in Anterior leads      Referred By:            Confirmed By:       ECG 12 Lead         ECG 12 Lead         SCANNED EKG   Final Result      SCANNED EKG   Final Result      SCANNED EKG   Final Result      SCANNED EKG   Final Result      SCANNED EKG   Final Result      ECG 12 Lead    (Results Pending)        Medication Review:   Current Facility-Administered Medications   Medication Dose Route Frequency Provider Last Rate Last Admin   • [START ON 12/9/2021] ! Vancomycin Peak Level to be drawn   Does not apply Once Behroozi, Saeid, MD       • [START ON 12/9/2021] ! Vancomycin Trough Level to be drawn   Does not apply Once Behroozi, Saeid, MD       • aspirin EC tablet 81 mg  81 mg Oral Daily Behroozi, Saeid, MD       • atorvastatin (LIPITOR) tablet 20 mg  20 mg Oral Nightly Behroozi, Saeid, MD       • carvedilol (COREG) tablet 12.5 mg  12.5 mg Oral BID Behroozi, Saeid, MD       • dextrose (D50W) (25 g/50 mL) IV injection 12.5 g  12.5 g Intravenous PRN Behroozi, Saeid, MD   12.5 g at 12/05/21 2104   • dextrose (D50W) (25 g/50 mL) IV injection 25 g  25 g Intravenous Q15 Min PRN Behroozi, Saeid, MD       • dextrose (GLUTOSE) oral gel 15 g  15 g  Oral Q15 Min PRN Behroozi, Saeid, MD       • diazePAM (VALIUM) tablet 2 mg  2 mg Oral BID Sukhjinder Trivedi MD       • dilTIAZem (CARDIZEM) 100 mg in 100 mL NS infusion (ADV)  5-15 mg/hr Intravenous Continuous Papito Calderón MD   Held at 12/08/21 1312   • donepezil (ARICEPT) tablet 2.5 mg  2.5 mg Oral Nightly Sukhjinder Trivedi MD       • glucagon (human recombinant) (GLUCAGEN DIAGNOSTIC) injection 1 mg  1 mg Subcutaneous Q15 Min PRN Behroozi, Saeid, MD       • heparin (porcine) 5000 UNIT/ML injection 4,000 Units  4,000 Units Intravenous Once Behroozi, Saeid, MD        And   • heparin 04285 units/250 mL (100 units/mL) in 0.45 % NaCl infusion  17.2 Units/kg/hr Intravenous Titrated Riley May MD   Stopped at 12/08/21 0237    And   • heparin (porcine) 5000 UNIT/ML injection 4,000 Units  4,000 Units Intravenous PRN Riley May MD   4,000 Units at 12/05/21 2204    And   • heparin (porcine) 5000 UNIT/ML injection 2,600 Units  30 Units/kg Intravenous PRN Riley May MD   2,600 Units at 12/07/21 2331   • hydrOXYzine (ATARAX) tablet 25 mg  25 mg Oral Nightly PRN Sukhjinder Trivedi MD       • insulin aspart (novoLOG) injection 0-9 Units  0-9 Units Subcutaneous TID AC Behroozi, Saeid, MD   4 Units at 12/08/21 1714   • insulin detemir (LEVEMIR) injection 15 Units  15 Units Subcutaneous Q12H Behroozi, Saeid, MD   15 Units at 12/08/21 0827   • insulin regular (humuLIN R,novoLIN R) injection 10 Units  10 Units Intravenous Once Behroozi, Saeid, MD       • ipratropium-albuterol (DUO-NEB) nebulizer solution 3 mL  3 mL Nebulization Q6H PRN Behroozi, Saeid, MD   3 mL at 12/06/21 2248   • isosorbide mononitrate (IMDUR) 24 hr tablet 30 mg  30 mg Oral Daily Behroozi, Saeid, MD       • levothyroxine (SYNTHROID, LEVOTHROID) tablet 25 mcg  25 mcg Oral Daily Behroozi, Saeid, MD       • metoprolol tartrate (LOPRESSOR) injection 5 mg  5 mg Intravenous Q12H Papito Calderón MD   5 mg at 12/08/21 1120   • morphine injection 1 mg  1  mg Intravenous Q4H PRN Sukhjinder Trivedi MD   1 mg at 12/08/21 1714   • nitroglycerin (NITRODUR) 0.2 MG/HR patch 1 patch  1 patch Transdermal Daily Behroozi, Saeid, MD   1 patch at 12/08/21 0816   • nystatin (MYCOSTATIN) powder   Topical Q12H Sukhjinder Trivedi MD   Given at 12/08/21 0816   • OLANZapine (zyPREXA) tablet 5 mg  5 mg Oral Nightly Sukhjinder Trivedi MD       • PARoxetine (PAXIL) tablet 20 mg  20 mg Oral Nightly Behroozi, Saeid, MD       • Pharmacy to dose vancomycin   Does not apply Continuous PRN Behroozi, Saeid, MD       • piperacillin-tazobactam (ZOSYN) 3.375 g/100 mL 0.9% NS IVPB (mbp)  3.375 g Intravenous Q8H Behroozi, Saeid, MD   3.375 g at 12/08/21 1120   • potassium chloride 10 mEq in 100 mL IVPB  10 mEq Intravenous Q1H PRN Behroozi, Saeid,  mL/hr at 12/08/21 1821 10 mEq at 12/08/21 1821   • potassium phosphate 45 mmol in sodium chloride 0.9 % 500 mL infusion  45 mmol Intravenous PRN Behroozi, Saeid, MD        Or   • potassium phosphate 30 mmol in sodium chloride 0.9 % 250 mL infusion  30 mmol Intravenous PRN Behroozi, Saeid, MD 31.3 mL/hr at 12/05/21 2345 30 mmol at 12/05/21 2345    Or   • Potassium Phosphates 15 mmol in sodium chloride 0.9 % 100 mL infusion  15 mmol Intravenous PRN Behroozi, Saeid, MD        Or   • sodium phosphates 45 mmol in sodium chloride 0.9 % 500 mL IVPB  45 mmol Intravenous PRN Behroozi, Saeid, MD        Or   • sodium phosphates 30 mmol in sodium chloride 0.9 % 250 mL IVPB  30 mmol Intravenous PRN Behroozi, Saeid, MD        Or   • sodium phosphates 15 mmol in sodium chloride 0.9 % 250 mL IVPB  15 mmol Intravenous PRN Behroozi, Saeid, MD       • sacubitril-valsartan (ENTRESTO) 24-26 MG tablet 1 tablet  1 tablet Oral Q12H Papito Calderón MD       • sodium chloride 0.9 % flush 10 mL  10 mL Intravenous PRN Yosi Trivedi DO       • sodium chloride 0.9 % flush 10 mL  10 mL Intravenous Q12H Behroozi, Saeid, MD   10 mL at 12/08/21 0833   • sodium chloride 0.9 %  flush 10 mL  10 mL Intravenous PRN Behroozi, Saeid, MD       • sodium chloride 0.9 % flush 10 mL  10 mL Intravenous PRN Behroozi, Saeid, MD       • sodium chloride 0.9 % flush 10 mL  10 mL Intravenous Once PRN Behroozi, Saeid, MD       • sodium chloride 0.9 % flush 10 mL  10 mL Intravenous Q12H Behroozi, Saeid, MD   10 mL at 12/06/21 2104   • sodium chloride 0.9 % flush 10 mL  10 mL Intravenous PRN Behroozi, Saeid, MD       • sodium chloride 0.9 % flush 3 mL  3 mL Intravenous Q12H Behroozi, Saeid, MD       • sodium chloride 0.9 % infusion  10 mL/hr Intravenous Continuous PRN Behroozi, Saeid, MD       • vancomycin 750 mg/250 mL 0.9% NS IVPB ([facility])  750 mg Intravenous Q12H Behroozi, Saeid, MD   750 mg at 12/08/21 1613       Assessment and Plan:      Sepsis (HCC)  1.  Ischemic cardiomyopathy.  Patient has left ventricular systolic dysfunction.  Patient is currently on Entresto and carvedilol.  Patient would be continued with gentle diuresis.  Patient potassium was on the low side.  Patient would receive potassium supplement.  Patient would received diuretics on a as needed basis.    2.  Hypokalemia with a potassium of 2.6.  Patient would receive potassium supplement and would repeat the potassium level.    3.  Atherosclerotic coronary artery disease.  Patient did not complain of having symptoms of severe substernal chest pain suggestive of angina.  Patient has had previous history of coronary artery bypass grafting.  Patient at the present time would be treated medically and not subjected to any invasive evaluation from the cardiac standpoint.    4.  Arterial hypertension.  Patient blood pressure has been stable.  Patient is currently on carvedilol and Entresto.    5.  Altered mental status.  Patient mental status is improved.  Patient has not had any febrile episode.  Patient was evaluated by speech therapist.  Patient is able to tolerate oral feeding.    6.  Anemia with a hemoglobin of 11.5 is noted.  Patient has not  had any hemoptysis hematuria vaginal blood per rectum.    The above plan of management were discussed with the patient            Electronically signed by Papito Calderón MD, 12/08/21, 7:25 PM CST.      Time: Time spent on face-to-face interaction 20 minutes    Dictated utilizing Dragon dictation.

## 2021-12-09 NOTE — PLAN OF CARE
Goal Outcome Evaluation:  Plan of Care Reviewed With: patient, family        Progress: no change  Outcome Summary: ST dysphagia treatment provided this date.  pt with decreased alertness and inability to hold head upright.  pt taking thin liquids via straw and magic cup (deferred all chewable solids).  there is oral reisude bilateral sulcus with some anterior spillage. Recommend continue current diet with PO only if alert for decrased risk of aspiration.  Pt was on regular solids prior to admission to hospital.  SLP to cont to rtx to return to PLOF.

## 2021-12-09 NOTE — PLAN OF CARE
Goal Outcome Evaluation:  Plan of Care Reviewed With: patient           Outcome Summary: Pt oriented to person and place at this time. Pt still calls out and does not track conversation but is less restless and anxious tonight.  VSS, off cardizem gtt. Occasssional coughing when drinking water but otherwise tolerating PO diet.

## 2021-12-09 NOTE — PLAN OF CARE
Goal Outcome Evaluation:   Pt resting in room on 2L NC. VSS. Grady with adequate output. Potassium replacement done today. Cardizem on hold. Heparin gtt stopped this shift per MD. Diet advanced today, pureed diet with thin liquids. No complaints at this time, continuing to monitor.

## 2021-12-10 NOTE — NURSING NOTE
Patient has an opening the the coccyx fold from moisture with excoriation. It measures 3 x 0.4 x 0.1 cm. Wound bed large granulation small yellow slough. POA Needs wound care, offloading and protection.

## 2021-12-10 NOTE — NURSING NOTE
Patient has a healing stage 2 pressure injury left buttock. It measures 3 x 2.5 x 0.1 cm Wound bed granulated. Needs wound care, offloading and protection. POA

## 2021-12-10 NOTE — PROGRESS NOTES
"  Pharmacokinetics by Pharmacy - Vancomycin    Janelle Millard is a 66 y.o. female  [Ht: 175.3 cm (69.02\"); Wt: 90.2 kg (198 lb 12.8 oz)] is being treated for sepsis, day 6 of therapy.    Estimated Creatinine Clearance: 80.5 mL/min (A) (by C-G formula based on SCr of 0.55 mg/dL (L)).   Creatinine   Date Value Ref Range Status   12/10/2021 0.55 (L) 0.57 - 1.00 mg/dL Final   12/09/2021 0.55 (L) 0.57 - 1.00 mg/dL Final   12/08/2021 0.62 0.57 - 1.00 mg/dL Final   08/06/2019 0.5 (L) 0.6 - 1.3 mg/dL Final      Lab Results   Component Value Date    WBC 13.67 (H) 12/10/2021    WBC 12.91 (H) 12/09/2021    WBC 14.85 (H) 12/08/2021     C-Reactive Protein   Date Value Ref Range Status   03/10/2021 6.51 (H) 0.00 - 0.50 mg/dL Final   03/09/2021 8.88 (H) 0.00 - 0.50 mg/dL Final   08/06/2019 1.1 (H) 0.0 - 0.9 mg/dL Final     Lactate   Date Value Ref Range Status   12/06/2021 1.2 0.5 - 2.0 mmol/L Final   12/05/2021 2.6 (C) 0.5 - 2.0 mmol/L Final   12/05/2021 2.6 (C) 0.5 - 2.0 mmol/L Final       Temp Readings from Last 1 Encounters:   12/10/21 97.1 °F (36.2 °C) (Temporal)      Lab Results   Component Value Date    VANCOPEAK 22.10 12/09/2021    VANCOTROUGH 14.90 12/09/2021    VANCORANDOM <4.00 (L) 12/05/2021         Culture Results:  Microbiology Results (last 10 days)       Procedure Component Value - Date/Time    CANDIDA AURIS SCREEN - Swab, Axilla Right, Axilla Left and Groin [[id number]]  (Normal) Collected: 12/05/21 0806    Lab Status: Final result Specimen: Swab from Axilla Right, Axilla Left and Groin Updated: 12/10/21 0815     Candida Auris Screen Culture No Candida auris isolated at 5 days    CRE Screen by PCR - Swab, Large Intestine, Rectum [[id number]] Collected: 12/05/21 0805    Lab Status: Final result Specimen: Swab from Large Intestine, Rectum Updated: 12/05/21 0923     CRE SCREEN Not Detected     Comment: Test performed by real-time polymerase chain reaction (qPCR).        OXA 48 Strain Not Detected     IMP STRAIN Not " Detected     VIM STRAIN Not Detected     NDM Strain Not Detected     KPC Strain Not Detected    Urine Culture - Urine, Urine, Catheter [[id number]]  (Abnormal) Collected: 12/05/21 0447    Lab Status: Final result Specimen: Urine, Catheter Updated: 12/06/21 2104     Urine Culture Yeast isolated    Narrative:      No further workup    Blood Culture - Blood, Hand, Right [[id number]]  (Normal) Collected: 12/05/21 0352    Lab Status: Final result Specimen: Blood from Hand, Right Updated: 12/10/21 0415     Blood Culture No growth at 5 days    COVID-19 and FLU A/B PCR - Swab, Nasopharynx [[id number]]  (Normal) Collected: 12/05/21 0352    Lab Status: Final result Specimen: Swab from Nasopharynx Updated: 12/05/21 0439     COVID19 Not Detected     Influenza A PCR Not Detected     Influenza B PCR Not Detected    Narrative:      Fact sheet for providers: https://www.fda.gov/media/738086/download    Fact sheet for patients: https://www.fda.gov/media/912174/download    Test performed by PCR.    Blood Culture - Blood, Hand, Right [[id number]]  (Normal) Collected: 12/05/21 0337    Lab Status: Final result Specimen: Blood from Hand, Right Updated: 12/10/21 0345     Blood Culture No growth at 5 days    COVID-19 and FLU A/B PCR - Swab, Nasopharynx [[id number]]  (Normal) Collected: 12/02/21 1439    Lab Status: Final result Specimen: Swab from Nasopharynx Updated: 12/02/21 1526     COVID19 Not Detected     Influenza A PCR Not Detected     Influenza B PCR Not Detected    Narrative:      Fact sheet for providers: https://www.fda.gov/media/069052/download    Fact sheet for patients: https://www.fda.gov/media/641091/download    Test performed by PCR.    Urine Culture - Urine, Urine, Clean Catch [[id number]] Collected: 12/02/21 1102    Lab Status: Final result Specimen: Urine, Clean Catch Updated: 12/04/21 0108     Urine Culture Final report     Result 1 Comment     Comment: Mixed urogenital reagan  10,000-25,000 colony forming units per mL        Narrative:      Performed at:  [address]  [id number]  : Abdiaziz Munoz PhD, Phone:  [phone]          No results found for: RESPCX    Indication for use: sepsis      Current Vancomycin Dose:  750 mg IVPB every 12 hours, day 6 of therapy.     Pt is also receiving Zosyn    Assessment/Plan:  Reviewed above labs and cultures.   .BC no growth x 5 days.   WBC is 13.67. Cr is 0.55. Vancomycin levels are in goal range. Will continue current dose. From provider note:  Due to patient's condition, and her previously stated wishes, we will continue current plan of care for another 24 hours.  If patient does not improve in the next 24 hours we will transition to comfort measures. Pharmacy will continue to monitor renal function and adjust dose accordingly.    Tammy Hayes, PharmD   12/10/21 13:42 CST

## 2021-12-10 NOTE — DISCHARGE PLACEMENT REQUEST
"Kiley Millard (66 y.o. Female)             Date of Birth Social Security Number Address [place] Phone MRN    [id number]  [id number] [facility] [facility] [address] [phone] [id number]    Mu-ism Marital Status             [facility]        Admission Date Admission Type Admitting Provider Attending Provider Department, Room/Bed    12/5/21 Emergency Behroozi, Saeid, MD Behroozi, Saeid, MD [address], [address]    Discharge Date Discharge Disposition Discharge Destination                         Attending Provider: Behroozi, Saeid, MD    Allergies: Contrast Dye, Corticosteroids, Codeine, Aspirin, Ibuprofen    Isolation: Contact   Infection: Candida Auris (rule out) (12/05/21)   Code Status: No CPR   Advance Care Planning Activity    Ht: 175.3 cm (69.02\")   Wt: 90.2 kg (198 lb 12.8 oz)    Admission Cmt: None   Principal Problem: None                Active Insurance as of 12/5/2021     Primary Coverage     Payor Plan Insurance Group Employer/Plan Group    MEDICARE MEDICARE A & B      Payor Plan Address Payor Plan Phone Number Payor Plan Fax Number Effective Dates    [address] [phone]  11/1/2012 - None Entered    [facility] [id number]       Subscriber Name Subscriber Birth Date Member ID       KILEY MILLARD 1955 [id number]           Secondary Coverage     Payor Plan Insurance Group Employer/Plan Group    [employer]ET[employer] COMMERCIAL [employer]  [employer] SU[employer] [id number]     Payor Plan Address Payor Plan Phone Number Payor Plan Fax Number Effective Dates    [address] [phone]  10/1/2018 - None Entered    [facility] [id number]       Subscriber Name Subscriber Birth Date Member ID       KILEY MILLARD 1955 [id number]                 Emergency Contacts      (Rel.) Home Phone Work Phone Mobile Phone    HOANG GONZALES (POA/NIECE) (Relative) [phone] -- [phone]    LionelAlexey pena (Brother) [phone] -- --            Emergency Contact Information  "    Name Relation Home Work Mobile    HOANG GONZALES (POA/NIECE) Relative [phone]    Alexey Flowers [phone]            Insurance Information                MEDICARE/MEDICARE A & B Phone: [phone]    Subscriber: Janelle Millard Subscriber#: [id number]    Group#: -- Precert#: --        HA FRIAS/HA  [facility] Phone: [phone]    Subscriber: Janelle Millard Subscriber#: [id number]    Group#: [id number] Precert#: --

## 2021-12-10 NOTE — PLAN OF CARE
Goal Outcome Evaluation:  Plan of Care Reviewed With: patient        Progress: improving  Outcome Summary: ST dysphagia treatment provided this date.  pt is more alert this date and willing to attempt chewable solids.  pt with increased oral prep time with chewable solids.  demonstrates weak labial seal with some anterior spillage of liquids noted.  pt also with wet vocal quality and increased cough with thin liquids via straw or in conscutive sips.  nsg educated for small sips from cup rim and advancment to mech soft solids with no mixed consistancies.  pt appetite remains poor with only taking 4 bites.

## 2021-12-10 NOTE — PLAN OF CARE
Goal Outcome Evaluation:  Plan of Care Reviewed With: caregiver        Progress: improving  Outcome Summary: Po diet started and upgraded following SLP eval.  Inadequate oral intake at this time.  Will add supplements and monitor.

## 2021-12-10 NOTE — NURSING NOTE
"Patient has history of lower leg edema. Has joe seen in the wound center in the past. She states \" My legs are much smaller than they were yesterday.  I don't think they need to be wrapped. \" I asked her about compression stockings and she said she had some [facility] made for her. I told her she needed to put them on first things in the morning and remove them at bedtime and wash and moisture her feet and legs. Understanding voiced.   "

## 2021-12-10 NOTE — CONSULTS
Adult Nutrition  Assessment    Patient Name:  Janelle Millard  YOB: 1955  MRN: [id number]  Admit Date:  12/5/2021    Assessment Date:  12/10/2021    Comments:  Pt has been lethargic off and on today per staff.  Po diet started and upgraded following SLP eval.  Oral intake inadequate at this time, ~25% average.  DKA resolved.  Sepsis cultures are pending.  Elevated troponin & AFib being followed by Cardiology.  Rd will monitor and add milk with meals.  Staff reports that she drank her milk today.  She did not like the Boost GC or the magic cup--we tried those when she was in CCU.  Will also add ADA restrictions to diet order and ice cream twice daily.  Monitor POC     Reason for Assessment     Row Name 12/10/21 1621          Reason for Assessment    Reason For Assessment follow-up protocol                Nutrition/Diet History     Row Name 12/10/21 1621          Nutrition/Diet History    Typical Food/Fluid Intake Pt sleeping.  Nsg in room.  SHe said that pt isn't eating much.  She did drink her milk.  She has been lethargic a big part of the day.  SLP did upgrade diet to Mech soft                  Labs/Tests/Procedures/Meds     Row Name 12/10/21 1622          Labs/Procedures/Meds    Lab Results Reviewed reviewed, pertinent     Lab Results Comments K+ 3.1; Creat 0.55;            Diagnostic Tests/Procedures    Diagnostic Test/Procedure Reviewed reviewed, pertinent     Diagnostic Test/Procedures Comments SLP eval; Abx; Cardiology            Medications    Pertinent Medications Reviewed reviewed, pertinent     Pertinent Medications Comments Coreg; VAlium; Aricept; SSI; LEvemir; Novolin R; Zosyn; Vanc                Physical Findings     Row Name 12/10/21 1624          Physical Findings    Overall Physical Appearance on oxygen therapy                  Nutrition Prescription Ordered     Row Name 12/10/21 1625          Nutrition Prescription PO    Current PO Diet Soft Texture     Texture Ground     Fluid  Consistency Thin                Evaluation of Received Nutrient/Fluid Intake     Row Name 12/10/21 1625          PO Evaluation    Number of Meals 4     % PO Intake 25% average                     Electronically signed by:  Anay Acharya RD  12/10/21 16:39 CST

## 2021-12-10 NOTE — THERAPY EVALUATION
Patient Name: Janelle Millard  : 1955    MRN: [id number]                              Today's Date: 12/10/2021       Admit Date: 2021    Visit Dx:     ICD-10-CM ICD-9-CM   1. Sepsis without acute organ dysfunction, due to unspecified organism ([facility])  A41.9 038.9     995.91   2. Pneumonia of both lower lobes due to infectious organism  J18.9 486   3. Acute cystitis with hematuria  N30.01 595.0   4. Leukocytosis, unspecified type  D72.829 288.60   5. Altered mental status, unspecified altered mental status type  R41.82 780.97   6. Elevated liver enzymes  R74.8 790.5   7. Atherosclerosis of native coronary artery of native heart without angina pectoris  I25.10 414.01   8. Elevated troponin  R77.8 790.6   9. Atherosclerosis of autologous vein coronary artery bypass graft with unstable angina pectoris ([facility])  I25.710 414.02     411.1   10. Oropharyngeal dysphagia  R13.12 787.22   11. Impaired functional mobility, balance, gait, and endurance  Z74.09 V49.89   12. Impaired mobility and ADLs  Z74.09 V49.89    Z78.9      Patient Active Problem List   Diagnosis   • Carpal tunnel syndrome of left wrist   • Type 1 diabetes mellitus with diabetic polyneuropathy ([facility])   • Mixed diabetic hyperlipidemia associated with type 1 diabetes mellitus ([facility])   • Hypertension associated with diabetes ([facility])   • Syncope and collapse   • CAD (coronary artery disease)   • Asthma   • Depressive disorder, not elsewhere classified   • Neuropathy   • Other specified rheumatoid arthritis, multiple sites ([facility])   • Carpal tunnel syndrome on both sides   • Bilateral carotid artery stenosis   • Overweight (BMI 25.0-29.9)   • Precordial pain   • Sepsis with encephalopathy without septic shock ([facility])   • Tobacco abuse   • UTI (urinary tract infection)   • Ureteral stone with hydronephrosis   • Chest pain, rule out acute myocardial infarction   • Chest pain   • Chest pain with high risk for cardiac etiology   • Sepsis ([facility])     Past Medical  History:   Diagnosis Date   • Arthritis, rheumatoid (HCC)    • Arthropathy associated with neurological disorder    • Arthropathy of lumbar facet joint    • Asthma    • Benign hypertension    • Carpal tunnel syndrome    • Diabetes (HCC)    • Diabetic polyneuropathy (HCC)    • Dyslipidemia    • Fallen arches    • Hammer toe    • Heart disease    • Joint pain    • Mild depression (HCC)     Saddness post Surgery 7/10/14 improved after counseling.   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 Diffusely calcified LAD with 30-80% stenosis. OMB #3 with 60% to 70% stenosis. Preserved LV systolic function with EF of 55%.   • CARPAL TUNNEL RELEASE Right 10/15/2014    Right carpal tunnel release.   • CARPAL TUNNEL RELEASE     • CATARACT EXTRACTION, BILATERAL Bilateral    • CORONARY ARTERY BYPASS GRAFT  02/24/2014    CABG x 3 with LIMA to LAD, endarterectomy to LAD, SVG to OMB and SVG to distal RCA with endarterectomy to distal RCA. Endo-vein harvesting.   • CYSTECTOMY Left     Breast cycst   • CYSTOSCOPY, URETEROSCOPY, RETROGRADE PYELOGRAM, STENT INSERTION Left 3/10/2021    Procedure: , LEFT RETROGRADE PYELOGRAM, STENT INSERTION LEFT URETER;  Surgeon: Ipswich, Cooper NOLAN MD;  Location: [facility];  Service: Urology;  Laterality: Left;   • EXCISION LESION      Remove breast lesion - cyst   • EYE SURGERY      Insert lens prosthesis   • FOOT SURGERY  10/18/2011    Exostectomy of the right foot. Preulcerative lesion with Charot deformity, right foot   • LUMBAR SPINE SURGERY  11/09/2015    Lumbosacral radiofrequency thermal coagulation.   • NERVE  BLOCK  09/14/2015    Lumbar medial branch block.   • TOE NAIL AVULSION  09/03/2015    DEBRIDE NAIL 6 OR MORE 82645 (1)      General Information     Row Name 12/10/21 [facility]8          OT Time and Intention    Document Type evaluation  -     Mode of Treatment co-treatment; physical therapy; occupational therapy  -     Row Name 12/10/21 1248          General Information    Patient Profile Reviewed yes  -     Prior Level of Function min assist:; transfer; mod assist:; ADL's; dressing; bathing  -     Existing Precautions/Restrictions fall  -     Barriers to Rehab medically complex; previous functional deficit; cognitive status  -     Row Name 12/10/21 1248          Living Environment    Lives With facility resident  -     Row Name 12/10/21 1248          Stairs Within Home, Primary    Stairs, Within Home, Primary has a rollator, w/c, reports getting therapy at [facility], was getting assist for all ADLs  -     Row Name 12/10/21 1248          Cognition    Orientation Status (Cognition) oriented to; person; place  -[facility] Name 12/10/21 1248          Safety Issues, Functional Mobility    Safety Issues Affecting Function (Mobility) safety precaution awareness; insight into deficits/self-awareness; safety precautions follow-through/compliance; problem-solving  -     Impairments Affecting Function (Mobility) balance; endurance/activity tolerance; range of motion (ROM); strength; pain; motor planning; grasp; postural/trunk control; sensation/sensory awareness; coordination  -           User Key  (r) = Recorded By, (t) = Taken By, (c) = Cosigned By    Initials Name Provider Type     Tomi Burdick OT Occupational Therapist                 Mobility/ADL's     Row Name 12/10/21 1248          Bed Mobility    Bed Mobility supine-sit; sit-supine; scooting/bridging  -     All Activities, [place] (Bed Mobility) maximum assist (25% patient effort); 2 person assist  -     Scooting/Bridging [place] (Bed  Mobility) maximum assist (25% patient effort); 2 person assist  -     Supine-Sit [place] (Bed Mobility) 2 person assist; maximum assist (25% patient effort)  -     Sit-Supine [place] (Bed Mobility) maximum assist (25% patient effort); 2 person assist  -     Bed Mobility, Safety Issues cognitive deficits limit understanding; decreased use of arms for pushing/pulling; decreased use of legs for bridging/pushing  -[facility] Name 12/10/21 1248          Activities of Daily Living    BADL Assessment/Intervention lower body dressing  -[facility] Name 12/10/21 1248          Lower Body Dressing Assessment/Training    [place] Level (Lower Body Dressing) doff; don; socks; dependent (less than 25% patient effort)  -     Position (Lower Body Dressing) supine  -           User Key  (r) = Recorded By, (t) = Taken By, (c) = Cosigned By    Initials Name Provider Type     Tomi Burdick OT Occupational Therapist               Obj/Interventions     [facility] Name 12/10/21 1248          Sensory Assessment (Somatosensory)    Sensory Assessment (Somatosensory) other (see comments)  -[facility] 12/10/21 1248          Sensory Interventions    Comment, Sensory Intervention patient with decreased sensation to light touch localization in bilateral UE's  -[facility] Name 12/10/21 1248          Range of Motion Comprehensive    Comment, General Range of Motion [place] shoulder flexion to 40 degrees PROM, AAROM to 20 degrees, elbow flexion/extension WFL, R hand in flexion contracture, L hand WFL  -[facility] Name 12/10/21 1248          Strength Comprehensive (MMT)    Comment, General Manual Muscle Testing (MMT) Assessment Hieu shoulders 2+/5, hieu elbows 3+/5, L hand gross grasp 3+/5 grossly, R hand gross grasp 2/5  -           User Key  (r) = Recorded By, (t) = Taken By, (c) = Cosigned By    Initials Name Provider Type     Tomi Burdick OT Occupational Therapist               Goals/Plan     Row Name 12/10/21 1240           Grooming Goal 1 (OT)    Activity/Device (Grooming Goal 1, OT) oral care; wash face, hands; built-up handle items  -     [place] (Grooming Goal 1, OT) minimum assist (75% or more patient effort)  -SJ     Time Frame (Grooming Goal 1, OT) long term goal (LTG); by discharge  -     Progress/Outcome (Grooming Goal 1, OT) goal not met  -     Row Name 12/10/21 1248          Self-Feeding Goal 1 (OT)    Activity/Device (Self-Feeding Goal 1, OT) self-feeding skills, all  -SJ     [place] Level/Cues Needed (Self-Feeding Goal 1, OT) minimum assist (75% or more patient effort)  -SJ     Time Frame (Self-Feeding Goal 1, OT) long term goal (LTG); by discharge  -     Progress/Outcomes (Self-Feeding Goal 1, OT) goal not met  -     Row Name 12/10/21 1243          Problem Specific Goal 1 (OT)    Problem Specific Goal 1 (OT) Patient to participate in BUE HEP with min A to increase strength and endurance for ADLs and EOB sitting tolerance.  -     Time Frame (Problem Specific Goal 1, OT) long term goal (LTG); by discharge  -     Progress/Outcome (Problem Specific Goal 1, OT) goal not met  -     Row Name 12/10/21 1241          Therapy Assessment/Plan (OT)    Planned Therapy Interventions (OT) activity tolerance training; adaptive equipment training; BADL retraining; cognitive/visual perception retraining; edema control/reduction; functional balance retraining; IADL retraining; manual therapy/joint mobilization; neuromuscular control/coordination retraining; occupation/activity based interventions; passive ROM/stretching; patient/caregiver education/training; ROM/therapeutic exercise; strengthening exercise; transfer/mobility retraining  -           User Key  (r) = Recorded By, (t) = Taken By, (c) = Cosigned By    Initials Name Provider Type     Tomi Burdick OT Occupational Therapist               Clinical Impression     Row Name 12/10/21 5525          Pain Assessment    Additional Documentation  Pain Scale: Numbers Pre/Post-Treatment (Group)  -     Row Name 12/10/21 1248          Pain Scale: Numbers Pre/Post-Treatment    Pretreatment Pain Rating 6/10  -SJ     Posttreatment Pain Rating 6/10  -SJ     Pain Location - Side Bilateral  -SJ     Pain Location - Orientation lower  -SJ     Pain Location back; extremity  -     Pain Intervention(s) Repositioned  -     Row Name 12/10/21 1248          Plan of Care Review    Plan of Care Reviewed With patient  -     Outcome Summary OT eval complete, co-eval with PT. Supine<>sit with max A x 2 person. Patient able to tolerate static sitting with SBA, dynamic sitting with min A. Patient unable to tolerate EOB sitting due to pain in back, did sit EOB ~5 mins this eval. Patient also with rhemautic hand deformity with very weak thenar eminance of R hand. Bilateral feet with increased swelling. Recommend return to SNF, cont inpatient OT to maximize independence in ADLs and upright tolerance.  -     Row Name 12/10/21 1248          Therapy Assessment/Plan (OT)    Patient/Family Therapy Goal Statement (OT) to be independent as possible  -     Rehab Potential (OT) good, to achieve stated therapy goals  -     Criteria for Skilled Therapeutic Interventions Met (OT) yes; skilled treatment is necessary  -     Therapy Frequency (OT) other (see comments)  3-5 d/wk  -     Predicted Duration of Therapy Intervention (OT) until discharge or all goals met  -     Row Name 12/10/21 1248          Therapy Plan Review/Discharge Plan (OT)    Anticipated Discharge Disposition (OT) skilled nursing facility  -     Row Name 12/10/21 1248          Vital Signs    Pre Patient Position Supine  -SJ     Post Patient Position Supine  -     Row Name 12/10/21 1248          Positioning and Restraints    Pre-Treatment Position in bed  -SJ     Post Treatment Position bed  -SJ     In Bed notified nsg; supine; call light within reach; encouraged to call for assist; exit alarm on  -            User Key  (r) = Recorded By, (t) = Taken By, (c) = Cosigned By    Initials Name Provider Type    Tomi Garces OT Occupational Therapist               Outcome Measures     Row Name 12/10/21 1248          How much help from another is currently needed...    Putting on and taking off regular lower body clothing? 1  -SJ     Bathing (including washing, rinsing, and drying) 1  -SJ     Toileting (which includes using toilet bed pan or urinal) 1  -SJ     Putting on and taking off regular upper body clothing 1  -SJ     Taking care of personal grooming (such as brushing teeth) 2  -SJ     Eating meals 2  -SJ     AM-PAC 6 Clicks Score (OT) 8  -SJ     Row Name 12/10/21 1328          How much help from another person do you currently need...    Turning from your back to your side while in flat bed without using bedrails? 1  -KW     Moving from lying on back to sitting on the side of a flat bed without bedrails? 1  -KW     Moving to and from a bed to a chair (including a wheelchair)? 1  -KW     Standing up from a chair using your arms (e.g., wheelchair, bedside chair)? 1  -KW     Climbing 3-5 steps with a railing? 1  -KW     To walk in hospital room? 1  -KW     AM-PAC 6 Clicks Score (PT) 6  -KW     Row Name 12/10/21 1328 12/10/21 1248       Functional Assessment    Outcome Measure Options AM-PAC 6 Clicks Basic Mobility (PT)  -KW AM-PAC 6 Clicks Daily Activity (OT)  -          User Key  (r) = Recorded By, (t) = Taken By, (c) = Cosigned By    Initials Name Provider Type    Анна Marquez, PT Physical Therapist     Tomi Burdick OT Occupational Therapist                Occupational Therapy Education                 Title: PT OT SLP Therapies (In Progress)     Topic: Occupational Therapy (In Progress)     Point: ADL training (Not Started)     Description:   Instruct learner(s) on proper safety adaptation and remediation techniques during self care or transfers.   Instruct in proper use of assistive devices.               Learner Progress:  Not documented in this visit.          Point: Home exercise program (Not Started)     Description:   Instruct learner(s) on appropriate technique for monitoring, assisting and/or progressing therapeutic exercises/activities.              Learner Progress:  Not documented in this visit.          Point: Precautions (In Progress)     Description:   Instruct learner(s) on prescribed precautions during self-care and functional transfers.              Learning Progress Summary           Patient Acceptance, E,TB, NR by  at 12/10/2021 1332    Comment: POC, role of OT, goals                   Point: Body mechanics (In Progress)     Description:   Instruct learner(s) on proper positioning and spine alignment during self-care, functional mobility activities and/or exercises.              Learning Progress Summary           Patient Acceptance, E,TB, NR by  at 12/10/2021 1332    Comment: POC, role of OT, goals                               User Key     Initials Effective Dates Name Provider Type Discipline     06/14/21 -  Tomi Burdick OT Occupational Therapist OT              OT Recommendation and Plan  Planned Therapy Interventions (OT): activity tolerance training, adaptive equipment training, BADL retraining, cognitive/visual perception retraining, edema control/reduction, functional balance retraining, IADL retraining, manual therapy/joint mobilization, neuromuscular control/coordination retraining, occupation/activity based interventions, passive ROM/stretching, patient/caregiver education/training, ROM/therapeutic exercise, strengthening exercise, transfer/mobility retraining  Therapy Frequency (OT): other (see comments) (3-5 d/wk)  Plan of Care Review  Plan of Care Reviewed With: patient  Outcome Summary: OT eval complete, co-eval with PT. Supine<>sit with max A x 2 person. Patient able to tolerate static sitting with SBA, dynamic sitting with min A. Patient unable to tolerate EOB  sitting due to pain in back, did sit EOB ~5 mins this eval. Patient also with rhemautic hand deformity with very weak thenar eminance of R hand. Bilateral feet with increased swelling. Recommend return to SNF, cont inpatient OT to maximize independence in ADLs and upright tolerance.     Time Calculation:    Time Calculation- OT     Row Name 12/10/21 1334             Time Calculation- OT    OT Start Time 1248  -      OT Stop Time 1315  -      OT Time Calculation (min) 27 min  -      OT Received On 12/10/21  -      OT Goal Re-Cert Due Date 12/23/21  -              Untimed Charges    OT Eval/Re-eval Minutes 27  -SJ              Total Minutes    Untimed Charges Total Minutes 27  -SJ       Total Minutes 27  -SJ            User Key  (r) = Recorded By, (t) = Taken By, (c) = Cosigned By    Initials Name Provider Type     Tomi Burdick OT Occupational Therapist              Therapy Charges for Today     Code Description Service Date Service Provider Modifiers Qty    09497932792 HC OT EVAL MOD COMPLEXITY 2 12/10/2021 Tomi Burdick OT GO 1               Tomi Burdick OT  12/10/2021

## 2021-12-10 NOTE — THERAPY EVALUATION
Patient Name: Janelle Millard  : 1955    MRN: [id number]                              Today's Date: 12/10/2021       Admit Date: 2021    Visit Dx:     ICD-10-CM ICD-9-CM   1. Sepsis without acute organ dysfunction, due to unspecified organism ([facility])  A41.9 038.9     995.91   2. Pneumonia of both lower lobes due to infectious organism  J18.9 486   3. Acute cystitis with hematuria  N30.01 595.0   4. Leukocytosis, unspecified type  D72.829 288.60   5. Altered mental status, unspecified altered mental status type  R41.82 780.97   6. Elevated liver enzymes  R74.8 790.5   7. Atherosclerosis of native coronary artery of native heart without angina pectoris  I25.10 414.01   8. Elevated troponin  R77.8 790.6   9. Atherosclerosis of autologous vein coronary artery bypass graft with unstable angina pectoris ([facility])  I25.710 414.02     411.1   10. Oropharyngeal dysphagia  R13.12 787.22   11. Impaired functional mobility, balance, gait, and endurance  Z74.09 V49.89     Patient Active Problem List   Diagnosis   • Carpal tunnel syndrome of left wrist   • Type 1 diabetes mellitus with diabetic polyneuropathy (HCC)   • Mixed diabetic hyperlipidemia associated with type 1 diabetes mellitus (HCC)   • Hypertension associated with diabetes (HCC)   • Syncope and collapse   • CAD (coronary artery disease)   • Asthma   • Depressive disorder, not elsewhere classified   • Neuropathy   • Other specified rheumatoid arthritis, multiple sites ([facility])   • Carpal tunnel syndrome on both sides   • Bilateral carotid artery stenosis   • Overweight (BMI 25.0-29.9)   • Precordial pain   • Sepsis with encephalopathy without septic shock ([facility])   • Tobacco abuse   • UTI (urinary tract infection)   • Ureteral stone with hydronephrosis   • Chest pain, rule out acute myocardial infarction   • Chest pain   • Chest pain with high risk for cardiac etiology   • Sepsis (HCC)     Past Medical History:   Diagnosis Date   • Arthritis, rheumatoid (HCC)     • Arthropathy associated with neurological disorder    • Arthropathy of lumbar facet joint    • Asthma    • Benign hypertension    • Carpal tunnel syndrome    • Diabetes (HCC)    • Diabetic polyneuropathy (HCC)    • Dyslipidemia    • Fallen arches    • Hammer toe    • Heart disease    • Joint pain    • Mild depression (HCC)     Saddness post Surgery 7/10/14 improved after counseling.   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 Diffusely calcified LAD with 30-80% stenosis. OMB #3 with 60% to 70% stenosis. Preserved LV systolic function with EF of 55%.   • CARPAL TUNNEL RELEASE Right 10/15/2014    Right carpal tunnel release.   • CARPAL TUNNEL RELEASE     • CATARACT EXTRACTION, BILATERAL Bilateral    • CORONARY ARTERY BYPASS GRAFT  02/24/2014    CABG x 3 with LIMA to LAD, endarterectomy to LAD, SVG to OMB and SVG to distal RCA with endarterectomy to distal RCA. Endo-vein harvesting.   • CYSTECTOMY Left     Breast cycst   • CYSTOSCOPY, URETEROSCOPY, RETROGRADE PYELOGRAM, STENT INSERTION Left 3/10/2021    Procedure: , LEFT RETROGRADE PYELOGRAM, STENT INSERTION LEFT URETER;  Surgeon: Babs, Cooper NOLAN MD;  Location: [facility];  Service: Urology;  Laterality: Left;   • EXCISION LESION      Remove breast lesion - cyst   • EYE SURGERY      Insert lens prosthesis   • FOOT SURGERY  10/18/2011    Exostectomy of the right foot. Preulcerative lesion with Charot deformity, right foot   • LUMBAR SPINE SURGERY  11/09/2015    Lumbosacral radiofrequency thermal coagulation.   • NERVE BLOCK  09/14/2015    Lumbar medial branch block.   • TOE NAIL  AVULSION  09/03/2015    DEBRIDE NAIL 6 OR MORE 19680 (1)      General Information     Row Name 12/10/21 1304          Physical Therapy Time and Intention    Document Type evaluation  -KW     Mode of Treatment co-treatment; physical therapy; occupational therapy  -     Row Name 12/10/21 1304          General Information    Patient Profile Reviewed yes  -KW     Prior Level of Function min assist:; transfer; gait; mod assist:; ADL's  -KW     Existing Precautions/Restrictions fall  -KW     Barriers to Rehab previous functional deficit; medically complex; cognitive status  -     Row Name 12/10/21 1304          Living Environment    Lives With facility resident  -     Row Name 12/10/21 1304          Home Main Entrance    Number of Stairs, Main Entrance none  -     Row Name 12/10/21 1304          Stairs Within Home, Primary    Stairs, Within Home, Primary has rollator, w/c, reports getting therapy at SNF  -     Row Name 12/10/21 1304          Cognition    Orientation Status (Cognition) oriented to; person; place  -     Row Name 12/10/21 1304          Safety Issues, Functional Mobility    Safety Issues Affecting Function (Mobility) awareness of need for assistance; insight into deficits/self-awareness; safety precaution awareness  -KW     Impairments Affecting Function (Mobility) balance; endurance/activity tolerance  -           User Key  (r) = Recorded By, (t) = Taken By, (c) = Cosigned By    Initials Name Provider Type    KW Анна Mata PT Physical Therapist               Mobility     Row Name 12/10/21 1324          Bed Mobility    Bed Mobility supine-sit; sit-supine; scooting/bridging  -KW     All Activities, [place] (Bed Mobility) maximum assist (25% patient effort); 2 person assist  -KW     Scooting/Bridging [place] (Bed Mobility) maximum assist (25% patient effort); 2 person assist  -KW     Supine-Sit [place] (Bed Mobility) 2 person assist; maximum assist (25% patient effort)  -KW      "Sit-Supine [place] (Bed Mobility) maximum assist (25% patient effort); 2 person assist  -KW     Row Name 12/10/21 1324          Transfers    Comment (Transfers) NT; pt deferring and concerns for safety sitting EOB  -KW           User Key  (r) = Recorded By, (t) = Taken By, (c) = Cosigned By    Initials Name Provider Type    Анна Marquez, PT Physical Therapist               Obj/Interventions     Row Name 12/10/21 1324          Range of Motion Comprehensive    Comment, General Range of Motion BLE AAROM WFL  -KW     Row Name 12/10/21 1324          Strength Comprehensive (MMT)    Comment, General Manual Muscle Testing (MMT) Assessment BLE grossly 2/5  -KW     Row Name 12/10/21 1324          Balance    Balance Assessment sitting static balance; sitting dynamic balance  -KW     Static Sitting Balance WFL; unsupported; sitting, edge of bed  -KW     Dynamic Sitting Balance mild impairment; unsupported; sitting, edge of bed  -KW     Row Name 12/10/21 1324          Sensory Assessment (Somatosensory)    Sensory Assessment (Somatosensory) unable/difficult to assess  BLE light touch assessed; pt reporting that it's \"different\" on one side but does not explain further  -KW           User Key  (r) = Recorded By, (t) = Taken By, (c) = Cosigned By    Initials Name Provider Type    Анна Marquez PT Physical Therapist               Goals/Plan     Row Name 12/10/21 1327          Bed Mobility Goal 1 (PT)    Activity/Assistive Device (Bed Mobility Goal 1, PT) sit to supine; supine to sit  -KW     [place] Level/Cues Needed (Bed Mobility Goal 1, PT) contact guard assist  -KW     Time Frame (Bed Mobility Goal 1, PT) 1 week  -KW     Progress/Outcomes (Bed Mobility Goal 1, PT) goal not met  -KW     Row Name 12/10/21 1327          Transfer Goal 1 (PT)    Activity/Assistive Device (Transfer Goal 1, PT) bed-to-chair/chair-to-bed; walker, rolling  -KW     [place] Level/Cues Needed (Transfer Goal 1, PT) minimum assist (75% " or more patient effort)  -KW     Time Frame (Transfer Goal 1, PT) 1 week  -KW     Progress/Outcome (Transfer Goal 1, PT) goal not met  -KW     Row Name 12/10/21 1327          Problem Specific Goal 1 (PT)    Problem Specific Goal 1 (PT) Pt will sit EOB for 5 mins with SBA and no LOB.  -KW     Time Frame (Problem Specific Goal 1, PT) by discharge  -KW     Progress/Outcome (Problem Specific Goal 1, PT) goal not met  -KW           User Key  (r) = Recorded By, (t) = Taken By, (c) = Cosigned By    Initials Name Provider Type    Анна Marquez, PT Physical Therapist               Clinical Impression     Row Name 12/10/21 1326          Pain Scale: FACES Pre/Post-Treatment    Pain: FACES Scale, Pretreatment 0-->no hurt  -KW     Posttreatment Pain Rating 0-->no hurt  -KW     Row Name 12/10/21 1326          Therapy Assessment/Plan (PT)    Rehab Potential (PT) fair, will monitor progress closely  -KW     Criteria for Skilled Interventions Met (PT) yes; meets criteria  -KW     Predicted Duration of Therapy Intervention (PT) until goals met or d/c from acute care  -KW     Row Name 12/10/21 1326          Positioning and Restraints    Pre-Treatment Position in bed  -KW     Post Treatment Position bed  -KW     In Bed notified nsg; fowlers; call light within reach; encouraged to call for assist; exit alarm on; side rails up x2  -KW           User Key  (r) = Recorded By, (t) = Taken By, (c) = Cosigned By    Initials Name Provider Type    Анна Marquez, PT Physical Therapist               Outcome Measures     Row Name 12/10/21 1328          How much help from another person do you currently need...    Turning from your back to your side while in flat bed without using bedrails? 1  -KW     Moving from lying on back to sitting on the side of a flat bed without bedrails? 1  -KW     Moving to and from a bed to a chair (including a wheelchair)? 1  -KW     Standing up from a chair using your arms (e.g., wheelchair, bedside chair)? 1   -KW     Climbing 3-5 steps with a railing? 1  -KW     To walk in hospital room? 1  -KW     AM-PAC 6 Clicks Score (PT) 6  -KW     Row Name 12/10/21 1328 12/10/21 1248       Functional Assessment    Outcome Measure Options AM-PAC 6 Clicks Basic Mobility (PT)  -KW AM-PAC 6 Clicks Daily Activity (OT)  -          User Key  (r) = Recorded By, (t) = Taken By, (c) = Cosigned By    Initials Name Provider Type    KW Анна Mata, PT Physical Therapist    SJ Tomi Burdick, OT Occupational Therapist                             Physical Therapy Education                 Title: PT OT SLP Therapies (In Progress)     Topic: Physical Therapy (In Progress)     Point: Mobility training (In Progress)     Learning Progress Summary           Patient Acceptance, E, NL by  at 12/10/2021 1328    Comment: Role of PT, POC                   Point: Home exercise program (Not Started)     Learner Progress:  Not documented in this visit.          Point: Body mechanics (Not Started)     Learner Progress:  Not documented in this visit.          Point: Precautions (In Progress)     Learning Progress Summary           Patient Acceptance, E, NL by  at 12/10/2021 1328    Comment: Role of PT, POC                               User Key     Initials Effective Dates Name Provider Type Discipline     06/16/21 -  Анна Mata, PT Physical Therapist PT              PT Recommendation and Plan  Planned Therapy Interventions (PT): balance training, bed mobility training, gait training, home exercise program, transfer training, patient/family education, stretching, stair training, strengthening  Plan of Care Reviewed With: patient  Outcome Summary: PT eval completed this date as co-eval with OT. Pt agreeable to eval but reporting fatigue. Pt performing sit<>supine with maxAx2 and sitting EOB statically with SBA. Pt requiring Julian to maintain balance in sitting with MMT. Pt demos AAROM WFL; overall limited by weakness. Pt deferring further attempts at  mobility at this time. Pt would benefit from further skilled PT to increase functional mobility, endurance, strength, balance and to progress towards PLOF. Upon d/c from acute care, recommend return to SNF.     Time Calculation:    PT Charges     Row Name 12/10/21 1331             Time Calculation    Start Time 1247  -KW      Stop Time 1315  -KW      Time Calculation (min) 28 min  -KW      PT Received On 12/10/21  -KW      PT Goal Re-Cert Due Date 12/23/21  -KW            User Key  (r) = Recorded By, (t) = Taken By, (c) = Cosigned By    Initials Name Provider Type    Анна Marquez PT Physical Therapist              Therapy Charges for Today     Code Description Service Date Service Provider Modifiers Qty    94358933850 HC PT EVAL MOD COMPLEXITY 2 12/10/2021 Анна Mata, MARCUS GP 1          PT G-Codes  Outcome Measure Options: AM-PAC 6 Clicks Basic Mobility (PT)  AM-PAC 6 Clicks Score (PT): 6  AM-PAC 6 Clicks Score (OT): 8    Анна Mata PT  12/10/2021

## 2021-12-10 NOTE — PLAN OF CARE
Goal Outcome Evaluation:  Plan of Care Reviewed With: patient        Progress: no change  Outcome Summary: VSS overnight. pt had 2 BMs. HR remained NSR per telemetry.

## 2021-12-10 NOTE — THERAPY TREATMENT NOTE
Acute Care - Speech Language Pathology   Swallow Treatment Note [facility]     Patient Name: Janelle Millard  : 1955  MRN: [id number]  Today's Date: 12/10/2021               Admit Date: 2021    Visit Dx:     ICD-10-CM ICD-9-CM   1. Sepsis without acute organ dysfunction, due to unspecified organism ([facility])  A41.9 038.9     995.91   2. Pneumonia of both lower lobes due to infectious organism  J18.9 486   3. Acute cystitis with hematuria  N30.01 595.0   4. Leukocytosis, unspecified type  D72.829 288.60   5. Altered mental status, unspecified altered mental status type  R41.82 780.97   6. Elevated liver enzymes  R74.8 790.5   7. Atherosclerosis of native coronary artery of native heart without angina pectoris  I25.10 414.01   8. Elevated troponin  R77.8 790.6   9. Atherosclerosis of autologous vein coronary artery bypass graft with unstable angina pectoris ([facility])  I25.710 414.02     411.1   10. Oropharyngeal dysphagia  R13.12 787.22     Patient Active Problem List   Diagnosis   • Carpal tunnel syndrome of left wrist   • Type 1 diabetes mellitus with diabetic polyneuropathy ([facility])   • Mixed diabetic hyperlipidemia associated with type 1 diabetes mellitus ([facility])   • Hypertension associated with diabetes ([facility])   • Syncope and collapse   • CAD (coronary artery disease)   • Asthma   • Depressive disorder, not elsewhere classified   • Neuropathy   • Other specified rheumatoid arthritis, multiple sites ([facility])   • Carpal tunnel syndrome on both sides   • Bilateral carotid artery stenosis   • Overweight (BMI 25.0-29.9)   • Precordial pain   • Sepsis with encephalopathy without septic shock ([facility])   • Tobacco abuse   • UTI (urinary tract infection)   • Ureteral stone with hydronephrosis   • Chest pain, rule out acute myocardial infarction   • Chest pain   • Chest pain with high risk for cardiac etiology   • Sepsis (HCC)     Past Medical History:   Diagnosis Date   • Arthritis, rheumatoid (HCC)    • Arthropathy  associated with neurological disorder    • Arthropathy of lumbar facet joint    • Asthma    • Benign hypertension    • Carpal tunnel syndrome    • Diabetes (HCC)    • Diabetic polyneuropathy (HCC)    • Dyslipidemia    • Fallen arches    • Hammer toe    • Heart disease    • Joint pain    • Mild depression (HCC)     Saddness post Surgery 7/10/14 improved after counseling.   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 Diffusely calcified LAD with 30-80% stenosis. OMB #3 with 60% to 70% stenosis. Preserved LV systolic function with EF of 55%.   • CARPAL TUNNEL RELEASE Right 10/15/2014    Right carpal tunnel release.   • CARPAL TUNNEL RELEASE     • CATARACT EXTRACTION, BILATERAL Bilateral    • CORONARY ARTERY BYPASS GRAFT  02/24/2014    CABG x 3 with LIMA to LAD, endarterectomy to LAD, SVG to OMB and SVG to distal RCA with endarterectomy to distal RCA. Endo-vein harvesting.   • CYSTECTOMY Left     Breast cycst   • CYSTOSCOPY, URETEROSCOPY, RETROGRADE PYELOGRAM, STENT INSERTION Left 3/10/2021    Procedure: , LEFT RETROGRADE PYELOGRAM, STENT INSERTION LEFT URETER;  Surgeon: [place], Cooper NOLAN MD;  Location: [facility];  Service: Urology;  Laterality: Left;   • EXCISION LESION      Remove breast lesion - cyst   • EYE SURGERY      Insert lens prosthesis   • FOOT SURGERY  10/18/2011    Exostectomy of the right foot. Preulcerative lesion with Charot deformity, right foot   • LUMBAR SPINE SURGERY  11/09/2015    Lumbosacral radiofrequency thermal coagulation.   • NERVE BLOCK  09/14/2015    Lumbar medial branch block.   • TOE NAIL AVULSION   09/03/2015    DEBRIDE NAIL 6 OR MORE 98161 (1)       SLP Recommendation and Plan  SLP Swallowing Diagnosis: mild-moderate, oral dysphagia, suspected pharyngeal dysphagia (12/10/21 0745)  SLP Diet Recommendation: thin liquids, mechanical soft with no mixed consistencies (12/10/21 0745)     SLP Rec. for Method of Medication Administration: as tolerated (12/10/21 0745)           Swallow Criteria for Skilled Therapeutic Interventions Met: demonstrates skilled criteria (12/10/21 0745)  Anticipated Discharge Disposition (SLP): skilled nursing facility (12/10/21 0745)  Rehab Potential/Prognosis, Swallowing: adequate, monitor progress closely (12/10/21 0745)  Therapy Frequency (Swallow): 3 days per week, 5 days per week (12/10/21 0745)  Predicted Duration Therapy Intervention (Days): until discharge (12/10/21 0745)     Daily Summary of Progress (SLP): progress toward functional goals as expected (12/10/21 0745)                   Plan of Care Reviewed With: patient  Progress: improving  Outcome Summary: ST dysphagia treatment provided this date.  pt is more alert this date and willing to attempt chewable solids.  pt with increased oral prep time with chewable solids.  demonstrates weak labial seal with some anterior spillage of liquids noted.  pt also with wet vocal quality and increased cough with thin liquids via straw or in conscutive sips.  nsg educated for small sips from cup rim and advancment to mech soft solids with no mixed consistancies.  pt appetite remains poor with only taking 4 bites.      SWALLOW EVALUATION (last 72 hours)     SLP Adult Swallow Evaluation     Row Name 12/10/21 0745 12/09/21 1200 12/08/21 0812 12/07/21 1005          Rehab Evaluation    Document Type therapy note (daily note)  -EC therapy note (daily note)  -EC therapy note (daily note)  -EC therapy note (daily note)  -CK     Total Evaluation Minutes, SLP -- -- -- 24  -CK     Subjective Information fatigue  -EC fatigue  unable to hold head  upright  -EC no complaints  -EC fatigue  -CK     Patient Observations alert; cooperative; agree to therapy  -EC cooperative; agree to therapy; lethargic  -EC cooperative; alert; agree to therapy  -EC lethargic  -CK     Patient/Family/Caregiver Comments/Observations -- female relative  -EC jo ann  -EC Pt had multiple family members rotate in and out during session  -CK     Patient Effort adequate  -EC fair  -EC fair  -EC fair  -CK            General Information    Patient Profile Reviewed yes  -EC yes  -EC yes  -EC yes  -CK     Current Method of Nutrition pureed; thin liquids  -EC pureed; thin liquids  -EC NPO  -EC NPO  -CK     Prior Level of Function-Swallowing no diet consistency restrictions  -EC no diet consistency restrictions  -EC no diet consistency restrictions  -EC no diet consistency restrictions  -CK     Plans/Goals Discussed with family; agreed upon  -EC family; agreed upon  -EC family; agreed upon  -EC family; agreed upon  -CK     Barriers to Rehab cognitive status; previous functional deficit  -EC cognitive status; previous functional deficit  -EC cognitive status; previous functional deficit  -EC cognitive status; previous functional deficit  -CK            Pain Scale: FACES Pre/Post-Treatment    Pain: FACES Scale, Pretreatment 0-->no hurt  -EC 0-->no hurt  -EC 0-->no hurt  -EC 0-->no hurt  -CK     Posttreatment Pain Rating 0-->no hurt  -EC 0-->no hurt  -EC 0-->no hurt  -EC 0-->no hurt  -CK            Oral Motor Structure and Function    Dentition Assessment edentulous; missing teeth; teeth are in poor condition  -EC edentulous; missing teeth  -EC edentulous; missing teeth  -EC edentulous  -CK     Secretion Management anterior loss; wet vocal quality  -EC anterior loss; wet vocal quality; problems swallowing secretions  -EC WNL/WFL  -EC WNL/WFL  -CK     Mucosal Quality moist, healthy  -EC moist, healthy  -EC dry  -EC dry  -CK     Volitional Swallow delayed; weak  -EC delayed; weak  -EC WFL  -EC unable to  "elicit  -CK     Volitional Cough weak  -EC weak  -EC weak  -EC unable to elicit  -CK            General Eating/Swallowing Observations    Respiratory Support Currently in Use nasal cannula  -EC nasal cannula  -EC nasal cannula  -EC nasal cannula  -CK     Eating/Swallowing Skills fed by SLP  -EC fed by SLP  -EC fed by SLP  -EC fed by SLP  -CK     Positioning During Eating upright 90 degree; upright in bed  -EC upright 90 degree; upright in bed  -EC upright 90 degree; upright in bed  -EC upright 90 degree; upright in bed  -CK     Utensils Used spoon; straw; cup  -EC spoon; straw  -EC spoon; cup  -EC spoon  -CK     Consistencies Trialed thin liquids; pureed; soft textures  cheerios with milk drained, water/cup/straw  -EC thin liquids; pureed  pt declined all chewable options  -EC thin liquids; pureed  -EC ice chips  -CK     Pre SpO2 (%) -- 100  -EC -- --            Clinical Swallow Eval    Oral Prep Phase impaired  -EC impaired  -EC WFL  -EC impaired  -CK     Oral Transit impaired  -EC impaired  -EC WFL  -EC impaired  -CK     Oral Residue impaired  -EC impaired  -EC WFL  -EC --     Pharyngeal Phase suspected pharyngeal impairment  -EC suspected pharyngeal impairment  -EC no overt signs/symptoms of pharyngeal impairment  -EC suspected pharyngeal impairment  -CK     Clinical Swallow Evaluation Summary pt with increased oral prep time and decreased attention to task when chewing.  benefits from full feed assist and VC to \"swallow it all\" increased wet voice and cough noted with thin liquids via straw as well as consecutive sips from cup  -EC pt with difficulty clearing oral cavity with anterior spillage and lateral pooling of liquid noted during trials. pt lethargic and requires assistance holding head upright  -EC pt tolerated 200cc water from cup rim/spoon and 1/2 cup of applesauce with no overt s/s of aspirtion.  -EC Pt seen for skilled ST services this date to address oropharyngeal dysphagia.  Pt was lethargic, but " initially opened eyes and responded to SLP when ice chips were offered.  However, once PO trials were started pt would fall asleep and would drop head.  SLP would have to hold head until pt was able to hold head independently.  Pt was able to swallow x2 w/weak throat clear following each swallow.  SLP continues to recommend NPO at this time w/consideration for alternative nutrition.  Pt's sister-in-law was educated on pt's progress from this session and previous and verbalized understanding.  Nsg aware of progress and continued NPO recommendations.  -CK            Oral Prep Concerns    Oral Prep Concerns reduced lip opening; incomplete or weak lip closure around spoon; anterior loss  -EC reduced lip opening; incomplete or weak lip closure around spoon; anterior loss  -EC -- incomplete bolus preparation; reduced lip opening; incomplete or weak lip closure around spoon; bolus removed from mouth manually  -CK     Reduced Lip Opening all consistencies  -EC all consistencies  -EC -- thin  -CK     Incomplete or Weak Lip Closure Around Spoon all consistencies  -EC all consistencies  -EC -- thin  -CK     Anterior Loss thin  -EC thin; pudding  -EC -- --     Incomplete Bolus Preparation -- -- -- thin  -CK     Bolus Removed from Mouth Manually -- -- -- other (see comments)  ice chip  -CK     Oral Prep Concerns, Comment -- pooling of residue, saliva with anterior spillage d/t head forward position  -EC -- --            Oral Transit Concerns    Oral Transit Concerns increased oral transit time  -EC increased oral transit time  -EC -- unable to initiate oral transit; delayed initiation of bolus transit  -CK     Delayed Intiation of Bolus Transit -- thin; pudding  -EC -- thin  -CK     Increased Oral Transit Time mechanical soft  -EC thin; pudding  -EC -- --            Oral Residue Concerns    Oral Residue Concerns sulci residue, bilateral  -EC sulci residue, bilateral  -EC -- --     Sulci Residue, Bilateral thin  -EC -- -- --      Oral Residue Concerns, Comment anterior spillage, poor labial seal  -EC -- -- --            Pharyngeal Phase Concerns    Pharyngeal Phase Concerns wet vocal quality; throat clear; cough  -EC wet vocal quality; multiple swallows; throat clear; cough  -EC -- throat clear; other (see comments)  absent swallow  -CK     Wet Vocal Quality thin  -EC thin; pudding; other (see comments)  saliva  -EC -- --     Multiple Swallows -- pudding; thin  -EC -- --     Cough thin  -EC thin; pudding  -EC -- --     Throat Clear thin  -EC thin; pudding  -EC -- thin  -CK     Pharyngeal Phase Concerns, Comment improved with small sips from cup rim.  -EC -- -- --            Clinical Impression    SLP Swallowing Diagnosis mild-moderate; oral dysphagia; suspected pharyngeal dysphagia  -EC mild-moderate; oral dysphagia; suspected pharyngeal dysphagia  -EC functional oral phase; functional pharyngeal phase  -EC severe; oral dysphagia; suspected pharyngeal dysphagia  -CK     Functional Impact risk of aspiration/pneumonia; risk of dehydration; risk of malnutrition  -EC risk of aspiration/pneumonia; risk of dehydration; risk of malnutrition  -EC risk of aspiration/pneumonia  -EC risk of aspiration/pneumonia; risk of malnutrition; risk of dehydration  -CK     Rehab Potential/Prognosis, Swallowing adequate, monitor progress closely  -EC adequate, monitor progress closely  -EC good, to achieve stated therapy goals  -EC re-evaluate goals as necessary  -CK     Swallow Criteria for Skilled Therapeutic Interventions Met demonstrates skilled criteria  -EC demonstrates skilled criteria  -EC demonstrates skilled criteria  -EC demonstrates skilled criteria  -CK            SLP Treatment Clinical Impressions    Treatment Assessment (SLP) pt is more alert this date and willing to attempt chewable solids.  pt with increased oral prep time with chewable solids.  demonstrates weak labial seal with some anterior spillage of liquids noted.  pt also with wet vocal  quality and increased cough with thin liquids via straw or in conscutive sips.  nsg educated for small sips from cup rim and advancment to [facility] soft solids with no mixed consistancies.  pt appetite remains poor with only taking 4 bites.  -EC pt with decreased alertness and inability to hold head upright.  pt taking thin liquids via straw and magic cup (deferred all chewable solids).  there is oral reisude bilateral sulcus with some anterior spillage.  -EC ST provided skilled intervention for dysphagia.  pt is alert and calling out at intervals.  she recieved pain meds , but is alert for PO trials.  oral care provided and pt sat upright.  improved oral control with sips of water via spoon with no s/s of aspiration.  pt advanced to cup rim where she took consecutive sips with good toleration.  she agreed to applesauce and consumed it in cyclic fashion with water via cup rim.  there were no s/s of aspiration, but overall, pt is confused and requires simple statements and questions.  -EC oropharyngeal dysphagia  -CK     Daily Summary of Progress (SLP) progress toward functional goals as expected  -EC progress toward functional goals is gradual  -EC progress toward functional goals is good  -EC progress toward functional goals is gradual  -CK     Barriers to Overall Progress (SLP) Fatigue  -EC Cognitive status  -EC Cognitive status  -EC Fatigue; Cognitive status  -CK     Plan for Continued Treatment (SLP) continue treatment per plan of care  -EC continue treatment per plan of care  -EC continue treatment per plan of care  -EC continue treatment per plan of care  -CK     Care Plan Review care plan/treatment goals reviewed; risks/benefits reviewed; current/potential barriers reviewed; patient/other agree to care plan  -EC care plan/treatment goals reviewed; risks/benefits reviewed; current/potential barriers reviewed; patient/other agree to care plan  -EC care plan/treatment goals reviewed; risks/benefits reviewed;  current/potential barriers reviewed; patient/other agree to care plan  -EC evaluation/treatment results reviewed; care plan/treatment goals reviewed; risks/benefits reviewed; current/potential barriers reviewed; patient/other agree to care plan  -CK     Care Plan Review, Other Participant(s) -- family  -EC -- family  -CK            Recommendations    Therapy Frequency (Swallow) 3 days per week; 5 days per week  -EC 3 days per week; 5 days per week  -EC 3 days per week; 5 days per week  -EC 3 days per week; 5 days per week  -CK     Predicted Duration Therapy Intervention (Days) until discharge  -EC until discharge  -EC until discharge  -EC until discharge  -CK     SLP Diet Recommendation thin liquids; mechanical soft with no mixed consistencies  -EC puree; thin liquids  -EC puree; thin liquids  -EC NPO; temporary alternate methods of nutrition/hydration  -CK     Oral Care Recommendations Oral Care BID/PRN  -EC Oral Care BID/PRN  -EC Oral Care BID/PRN  -EC Oral Care BID/PRN  -CK     SLP Rec. for Method of Medication Administration as tolerated  -EC meds via alternate route  -EC meds via alternate route  -EC meds via alternate route  -CK     Anticipated Discharge Disposition (SLP) skilled nursing facility  -EC skilled nursing facility  -EC skilled nursing facility  -EC skilled nursing facility  -CK            Swallow Goals (SLP)    Oral Nutrition/Hydration Goal Selection (SLP) oral nutrition/hydration, SLP goal 1  -EC oral nutrition/hydration, SLP goal 1  -EC oral nutrition/hydration, SLP goal 1  -EC oral nutrition/hydration, SLP goal 1  -CK            Oral Nutrition/Hydration Goal 1 (SLP)    Oral Nutrition/Hydration Goal 1, SLP Pt will safely tolerate trials of PO solids/liquids w/no overt s/s of aspiration for establishment of safe PO diet.  -EC Pt will safely tolerate trials of PO solids/liquids w/no overt s/s of aspiration for establishment of safe PO diet.  -EC Pt will safely tolerate trials of PO solids/liquids  w/no overt s/s of aspiration for establishment of safe PO diet.  -EC Pt will safely tolerate trials of PO solids/liquids w/no overt s/s of aspiration for establishment of safe PO diet.  -CK     Time Frame (Oral Nutrition/Hydration Goal 1, SLP) by discharge  -EC by discharge  -EC by discharge  -EC by discharge  -CK     Barriers (Oral Nutrition/Hydration Goal 1, SLP) cognition  -EC cognition  -EC cognition  -EC lethargy; absent swallow  -CK     Progress/Outcomes (Oral Nutrition/Hydration Goal 1, SLP) goal ongoing  -EC goal ongoing  -EC goal ongoing  -EC goal ongoing  -CK           User Key  (r) = Recorded By, (t) = Taken By, (c) = Cosigned By    Initials Name Effective Dates    EC Genet Christensen, CCC-SLP 06/16/21 -     Kacey Gambino MS CCC-SLP 06/16/21 -                 EDUCATION  The patient has been educated in the following areas:   Dysphagia (Swallowing Impairment) Modified Diet Instruction.        SLP GOALS     Row Name 12/10/21 0745 12/09/21 1200 12/08/21 0812       Oral Nutrition/Hydration Goal 1 (SLP)    Oral Nutrition/Hydration Goal 1, SLP Pt will safely tolerate trials of PO solids/liquids w/no overt s/s of aspiration for establishment of safe PO diet.  -EC Pt will safely tolerate trials of PO solids/liquids w/no overt s/s of aspiration for establishment of safe PO diet.  -EC Pt will safely tolerate trials of PO solids/liquids w/no overt s/s of aspiration for establishment of safe PO diet.  -EC    Time Frame (Oral Nutrition/Hydration Goal 1, SLP) by discharge  -EC by discharge  -EC by discharge  -EC    Barriers (Oral Nutrition/Hydration Goal 1, SLP) cognition  -EC cognition  -EC cognition  -EC    Progress/Outcomes (Oral Nutrition/Hydration Goal 1, SLP) goal ongoing  -EC goal ongoing  -EC goal ongoing  -EC    Row Name 12/07/21 1005             Oral Nutrition/Hydration Goal 1 (SLP)    Oral Nutrition/Hydration Goal 1, SLP Pt will safely tolerate trials of PO solids/liquids w/no overt s/s of  aspiration for establishment of safe PO diet.  -CK      Time Frame (Oral Nutrition/Hydration Goal 1, SLP) by discharge  -CK      Barriers (Oral Nutrition/Hydration Goal 1, SLP) lethargy; absent swallow  -CK      Progress/Outcomes (Oral Nutrition/Hydration Goal 1, SLP) goal ongoing  -CK            User Key  (r) = Recorded By, (t) = Taken By, (c) = Cosigned By    Initials Name Provider Type    Genet Henderson CCC-SLP Speech and Language Pathologist    CK Kacey Pop, MS CCC-SLP Speech and Language Pathologist                   Time Calculation:    Time Calculation- SLP     Row Name 12/10/21 0901             Time Calculation- SLP    SLP Start Time 0745  -EC      SLP Stop Time 0814  -EC      SLP Time Calculation (min) 29 min  -EC      Total Timed Code Minutes- SLP 29 minute(s)  -EC      SLP Received On 12/10/21  -EC      SLP Goal Re-Cert Due Date 12/20/21  -EC              Untimed Charges    53830-OR Treatment Swallow Minutes 29  -EC              Total Minutes    Untimed Charges Total Minutes 29  -EC       Total Minutes 29  -EC            User Key  (r) = Recorded By, (t) = Taken By, (c) = Cosigned By    Initials Name Provider Type    EC Genet Christensen CCC-SLP Speech and Language Pathologist                Therapy Charges for Today     Code Description Service Date Service Provider Modifiers Qty    [id number] HC ST TREATMENT SWALLOW 3 12/9/2021 Genet Christensen CCC-SLP GN 1    [id number] HC ST TREATMENT SWALLOW 2 12/10/2021 Genet Christensen CCC-SLP GN 1               CAITLIN Shrestha  12/10/2021

## 2021-12-10 NOTE — PLAN OF CARE
Goal Outcome Evaluation:  Plan of Care Reviewed With: patient           Outcome Summary: PT eval completed this date as co-eval with OT. Pt agreeable to eval but reporting fatigue. Pt performing sit<>supine with maxAx2 and sitting EOB statically with SBA. Pt requiring Julian to maintain balance in sitting with MMT. Pt demos AAROM WFL; overall limited by weakness. Pt deferring further attempts at mobility at this time. Pt would benefit from further skilled PT to increase functional mobility, endurance, strength, balance and to progress towards PLOF. Upon d/c from acute care, recommend return to SNF.

## 2021-12-10 NOTE — PROGRESS NOTES
[facility] Medicine Services  INPATIENT PROGRESS NOTE    Length of Stay: 5  Date of Admission: 12/5/2021  Primary Care Physician: Yosef Casey MD    Subjective   Chief Complaint: Altered mental status  HPI: Patient continues to do well.  Still with some confusion.    Review of Systems   Constitutional: Negative for appetite change, chills, fatigue, fever and unexpected weight change.   Respiratory: Negative for cough, choking, chest tightness, shortness of breath and wheezing.    Cardiovascular: Negative for chest pain, palpitations and leg swelling.   Gastrointestinal: Negative for abdominal pain, blood in stool, constipation, diarrhea, nausea and vomiting.   Genitourinary: Negative for dysuria, flank pain and hematuria.   Neurological: Negative for dizziness, seizures, syncope, speech difficulty, weakness, light-headedness, numbness and headaches.   Hematological: Does not bruise/bleed easily.        All pertinent negatives and positives are as above. All other systems have been reviewed and are negative unless otherwise stated.     Objective    Temp:  [96.8 °F (36 °C)-98.4 °F (36.9 °C)] 96.8 °F (36 °C)  Heart Rate:  [70-80] 76  Resp:  [18-20] 18  BP: (104-140)/(56-80) 140/65    Physical Exam  Vitals reviewed.   Constitutional:       Appearance: She is well-developed.   HENT:      Head: Normocephalic and atraumatic.   Eyes:      Pupils: Pupils are equal, round, and reactive to light.   Cardiovascular:      Rate and Rhythm: Normal rate and regular rhythm.      Heart sounds: Normal heart sounds. No murmur heard.  No friction rub. No gallop.    Pulmonary:      Effort: Pulmonary effort is normal. No respiratory distress.      Breath sounds: Normal breath sounds. No wheezing or rales.   Chest:      Chest wall: No tenderness.   Abdominal:      General: Bowel sounds are normal. There is no distension.      Palpations: Abdomen is soft.      Tenderness: There is no  abdominal tenderness. There is no guarding.   Musculoskeletal:      Cervical back: Normal range of motion and neck supple.   Skin:     General: Skin is warm and dry.   Neurological:      Mental Status: She is alert.      Comments: Remains somewhat confused, but much more oriented than she has been.       Results Review:  I have reviewed the labs, radiology results, and diagnostic studies.    Laboratory Data:   Results from last 7 days   Lab Units 12/10/21  0631 12/09/21  0409 12/09/21  0002 12/08/21  0503 12/08/21  0503 12/07/21  0518 12/07/21  0518 12/06/21  0352 12/06/21  0352   SODIUM mmol/L 142 144  --   --  153*   < > 146*   < > 141   POTASSIUM mmol/L 3.1* 3.9 3.5   < > 2.6*   < > 3.3*   < > 4.1   CHLORIDE mmol/L 111* 115*  --   --  122*   < > 116*   < > 114*   CO2 mmol/L 24.0 23.0  --   --  24.0   < > 22.0   < > 19.0*   BUN mg/dL 15 19  --   --  21   < > 23   < > 32*   CREATININE mg/dL 0.55* 0.55*  --   --  0.62   < > 0.64   < > 0.72   GLUCOSE mg/dL 115* 215*  --   --  172*   < > 273*   < > 236*   CALCIUM mg/dL 8.9 9.4  --   --  10.3   < > 9.9   < > 10.2   BILIRUBIN mg/dL  --   --   --   --  0.6  --  0.7  --  0.4   ALK PHOS U/L  --   --   --   --  123*  --  126*  --  152*   ALT (SGPT) U/L  --   --   --   --  124*  --  124*  --  134*   AST (SGOT) U/L  --   --   --   --  98*  --  109*  --  169*   ANION GAP mmol/L 7.0 6.0  --   --  7.0   < > 8.0   < > 8.0    < > = values in this interval not displayed.     Estimated Creatinine Clearance: 80.5 mL/min (A) (by C-G formula based on SCr of 0.55 mg/dL (L)).  Results from last 7 days   Lab Units 12/08/21  0503 12/07/21  0518 12/06/21  0755 12/06/21  0352 12/06/21  0352 12/06/21  0006 12/06/21  0006   MAGNESIUM mg/dL 2.0 1.9 2.0   < > 2.0   < > 2.0   PHOSPHORUS mg/dL  --   --  2.6  --  2.1*  --  1.8*    < > = values in this interval not displayed.         Results from last 7 days   Lab Units 12/10/21  0631 12/09/21  0409 12/08/21  0503 12/07/21  0518 12/06/21  0352   WBC  10*3/mm3 13.67* 12.91* 14.85* 17.73* 22.80*   HEMOGLOBIN g/dL 11.9* 10.5* 11.5* 11.9* 12.5   HEMATOCRIT % 36.7 32.6* 35.6 36.2 37.7   PLATELETS 10*3/mm3 227 218 278 259 335     Results from last 7 days   Lab Units 12/08/21  0502 12/05/21  1008   INR  1.40* 1.83*       Culture Data:   No results found for: BLOODCX  No results found for: URINECX  No results found for: RESPCX  No results found for: WOUNDCX  No results found for: STOOLCX  No components found for: BODYFLD    Radiology Data:   Imaging Results (Last 24 Hours)     ** No results found for the last 24 hours. **          I have reviewed the patient's current medications.     Assessment/Plan     Active Hospital Problems    Diagnosis    • Sepsis (HCC)        Plan:  1.  DKA: Resolved.  2.  Sepsis: Cultures remain pending.  Continue empiric antibiotics.  White blood cell count continues to trend down.  3.  Metabolic encephalopathy: Significantly better today.  Awake and alert.  Mostly oriented.  4.  Elevated troponin: Trending down.  Dr. Calderón following.  On heparin infusion.  5.  Atrial fibrillation: Transition to oral Cardizem.  Rate controlled.  6.  Hypothyroidism: Continue Synthroid.  7.  Dysphagia: Improving.  Safe for puréed solids and thin liquids.  8.  DVT prophylaxis: Heparin.    Lengthy discussion with patient's POA.  Due to patient's condition, and her previously stated wishes, we will continue current plan of care for another 24 hours.  If patient does not improve in the next 24 hours we will transition to comfort measures.    The patient was evaluated during the global COVID-19 pandemic, and the diagnosis was suspected/considered upon their initial presentation.  Evaluation, treatment, and testing were consistent with current guidelines for patients who present with complaints or symptoms that may be related to COVID-19.    I confirmed that the patient's Advance Care Plan is present, code status is documented, or surrogate decision maker is listed  in the patient's medical record.       Discharge Planning: I expect patient to be discharged to skilled facility in 3-4 days.        This document has been electronically signed by Sukhjinder Trivedi MD on December 10, 2021 10:49 CST

## 2021-12-11 NOTE — NURSING NOTE
BG on lower end this morning (83), AM levemir held. BG 66 after breakfast, pt drank 4 oz apple juice and BG now 62. 8oz apple juice and pudding given.    1042 BG now 77. Will continue to monitor and encourage PO intake

## 2021-12-11 NOTE — PROGRESS NOTES
[facility] Medicine Services  INPATIENT PROGRESS NOTE    Length of Stay: 6  Date of Admission: 12/5/2021  Primary Care Physician: Yosef Casey MD    Subjective   Chief Complaint: Altered mental status  HPI: Resting comfortably.  Mental status seems most likely near baseline.      Review of Systems   Constitutional: Negative for appetite change, chills, fatigue, fever and unexpected weight change.   Respiratory: Negative for cough, choking, chest tightness, shortness of breath and wheezing.    Cardiovascular: Negative for chest pain, palpitations and leg swelling.   Gastrointestinal: Negative for abdominal pain, blood in stool, constipation, diarrhea, nausea and vomiting.   Genitourinary: Negative for dysuria, flank pain and hematuria.   Neurological: Negative for dizziness, seizures, syncope, speech difficulty, weakness, light-headedness, numbness and headaches.   Hematological: Does not bruise/bleed easily.        All pertinent negatives and positives are as above. All other systems have been reviewed and are negative unless otherwise stated.     Objective    Temp:  [96.6 °F (35.9 °C)-97.2 °F (36.2 °C)] 97.2 °F (36.2 °C)  Heart Rate:  [76-90] 86  Resp:  [16-20] 16  BP: (113-149)/(59-68) 149/68    Physical Exam  Vitals reviewed.   Constitutional:       Appearance: She is well-developed.   HENT:      Head: Normocephalic and atraumatic.   Eyes:      Pupils: Pupils are equal, round, and reactive to light.   Cardiovascular:      Rate and Rhythm: Normal rate and regular rhythm.      Heart sounds: Normal heart sounds. No murmur heard.  No friction rub. No gallop.    Pulmonary:      Effort: Pulmonary effort is normal. No respiratory distress.      Breath sounds: Normal breath sounds. No wheezing or rales.   Chest:      Chest wall: No tenderness.   Abdominal:      General: Bowel sounds are normal. There is no distension.      Palpations: Abdomen is soft.       Tenderness: There is no abdominal tenderness. There is no guarding.   Musculoskeletal:      Cervical back: Normal range of motion and neck supple.   Skin:     General: Skin is warm and dry.   Neurological:      Mental Status: She is alert.      Comments: Remains somewhat confused, likely near baseline.       Results Review:  I have reviewed the labs, radiology results, and diagnostic studies.    Laboratory Data:   Results from last 7 days   Lab Units 12/11/21  0557 12/10/21  2050 12/10/21  0631 12/09/21  0409 12/09/21  0409 12/09/21  0002 12/08/21  0503 12/07/21  0518 12/07/21  0518 12/06/21  0352 12/06/21  0352   SODIUM mmol/L 148*  --  142  --  144  --  153*   < > 146*   < > 141   POTASSIUM mmol/L 3.3* 3.9 3.1*   < > 3.9   < > 2.6*   < > 3.3*   < > 4.1   CHLORIDE mmol/L 118*  --  111*  --  115*  --  122*   < > 116*   < > 114*   CO2 mmol/L 24.0  --  24.0  --  23.0  --  24.0   < > 22.0   < > 19.0*   BUN mg/dL 9  --  15  --  19  --  21   < > 23   < > 32*   CREATININE mg/dL 0.52*  --  0.55*  --  0.55*  --  0.62   < > 0.64   < > 0.72   GLUCOSE mg/dL 91  --  115*  --  215*  --  172*   < > 273*   < > 236*   CALCIUM mg/dL 8.8  --  8.9  --  9.4  --  10.3   < > 9.9   < > 10.2   BILIRUBIN mg/dL  --   --   --   --   --   --  0.6  --  0.7  --  0.4   ALK PHOS U/L  --   --   --   --   --   --  123*  --  126*  --  152*   ALT (SGPT) U/L  --   --   --   --   --   --  124*  --  124*  --  134*   AST (SGOT) U/L  --   --   --   --   --   --  98*  --  109*  --  169*   ANION GAP mmol/L 6.0  --  7.0  --  6.0  --  7.0   < > 8.0   < > 8.0    < > = values in this interval not displayed.     Estimated Creatinine Clearance: 80.5 mL/min (A) (by C-G formula based on SCr of 0.52 mg/dL (L)).  Results from last 7 days   Lab Units 12/11/21  0557 12/08/21  0503 12/07/21  0518 12/06/21  0755 12/06/21  0352 12/06/21  0352   MAGNESIUM mg/dL  --  2.0 1.9 2.0   < > 2.0   PHOSPHORUS mg/dL 2.5  --   --  2.6  --  2.1*    < > = values in this interval not  displayed.         Results from last 7 days   Lab Units 12/11/21  0557 12/10/21  0631 12/09/21  0409 12/08/21  0503 12/07/21  0518   WBC 10*3/mm3 15.56* 13.67* 12.91* 14.85* 17.73*   HEMOGLOBIN g/dL 11.1* 11.9* 10.5* 11.5* 11.9*   HEMATOCRIT % 33.9* 36.7 32.6* 35.6 36.2   PLATELETS 10*3/mm3 235 227 218 278 259     Results from last 7 days   Lab Units 12/08/21  0502 12/05/21  1008   INR  1.40* 1.83*       Culture Data:   No results found for: BLOODCX  No results found for: URINECX  No results found for: RESPCX  No results found for: WOUNDCX  No results found for: STOOLCX  No components found for: BODYFLD    Radiology Data:   Imaging Results (Last 24 Hours)     ** No results found for the last 24 hours. **          I have reviewed the patient's current medications.     Assessment/Plan     Active Hospital Problems    Diagnosis    • Sepsis (HCC)        Plan:  1.  DKA: Resolved.  2.  Sepsis: Cultures remain pending.  Continue empiric antibiotics.  White blood cell count trending up slightly.  Monitor.  3.  Metabolic encephalopathy: Significantly better today.  Awake and alert.  Mostly oriented.  Likely near baseline.  4.  Elevated troponin: Trending down.  Dr. Calderón following.    5.  Atrial fibrillation: Transition to oral Cardizem.  Rate controlled.  6.  Hypothyroidism: Continue Synthroid.  7.  Dysphagia: Improving.  Safe for puréed solids and thin liquids.  Speech therapy following.  8.  DVT prophylaxis: Heparin.    Lengthy discussion with patient's POA.  Due to patient's condition, and her previously stated wishes, we will continue current plan of care for another 24 hours.  If patient does not improve in the next 24 hours we will transition to comfort measures.    The patient was evaluated during the global COVID-19 pandemic, and the diagnosis was suspected/considered upon their initial presentation.  Evaluation, treatment, and testing were consistent with current guidelines for patients who present with complaints  or symptoms that may be related to COVID-19.    I confirmed that the patient's Advance Care Plan is present, code status is documented, or surrogate decision maker is listed in the patient's medical record.       Discharge Planning: I expect patient to be discharged to skilled facility in 3-4 days.        This document has been electronically signed by Sukhjinder Trivedi MD on December 11, 2021 10:04 CST

## 2021-12-11 NOTE — PLAN OF CARE
Goal Outcome Evaluation:  Plan of Care Reviewed With: patient        Progress: improving  Outcome Summary: Pt seen for skilled ST servicecs this date to address orophargyneal dysphagia.  Pt only consumed limited amount of PO solids this date.  Pt consumed mech soft solids (scrambled eggs, blueberry muffin) w/increased oral prep time d/t prolonged mastication and mild oral residue.  Pt was unable to wear dentures as they were ill fitting once SLP placed and then had to remove.  Pt consumed thin liquids via cup rim w/single sip w/no overt s/s of aspiration.  Pt consumed thin liquids via straw w/single sip w/throat clearing.  SLP recommends continued mech soft solids w/thin liquids via cup rim and feeding assist.  SLP discussed need for increased PO intake for adequate nutrition w/pt; however, limited learning noted.

## 2021-12-11 NOTE — PLAN OF CARE
Goal Outcome Evaluation:  Plan of Care Reviewed With: patient        Progress: improving  Outcome Summary: pt pleasant and cooperative. potassium rechecked at 2100- 3.9. v/s are stable. c/o abdominal and back pain- prn ultram given. no s/s of distress noted or observed at this time. will continue to monitor.

## 2021-12-11 NOTE — PLAN OF CARE
Goal Outcome Evaluation:  Plan of Care Reviewed With: patient     Progress: improving  Outcome Summary: Pt much more aware today, oriented x3 - slight disorientation to reason she is hospitalized. Pt frustrated with inability to stay awake, may consider holding AM dose of valium tomorrow. Pt had one hypoglycemic event, BG came up with juice and pudding. Potassium and phos replaced today. Decreased appetite lately, pt more likely to eat when RN stays in room and assists/encourages her.

## 2021-12-11 NOTE — PROGRESS NOTES
"  Pharmacokinetics by Pharmacy - Vancomycin    Janelle Millard is a 66 y.o. female  [Ht: 175.3 cm (69.02\"); Wt: 90.2 kg (198 lb 12.7 oz)] is being treated for sepsis, day 7 of therapy.    Estimated Creatinine Clearance: 80.5 mL/min (A) (by C-G formula based on SCr of 0.52 mg/dL (L)).   Creatinine   Date Value Ref Range Status   12/11/2021 0.52 (L) 0.57 - 1.00 mg/dL Final   12/10/2021 0.55 (L) 0.57 - 1.00 mg/dL Final   12/09/2021 0.55 (L) 0.57 - 1.00 mg/dL Final   08/06/2019 0.5 (L) 0.6 - 1.3 mg/dL Final      Lab Results   Component Value Date    WBC 15.56 (H) 12/11/2021    WBC 13.67 (H) 12/10/2021    WBC 12.91 (H) 12/09/2021     C-Reactive Protein   Date Value Ref Range Status   03/10/2021 6.51 (H) 0.00 - 0.50 mg/dL Final   03/09/2021 8.88 (H) 0.00 - 0.50 mg/dL Final   08/06/2019 1.1 (H) 0.0 - 0.9 mg/dL Final     Lactate   Date Value Ref Range Status   12/06/2021 1.2 0.5 - 2.0 mmol/L Final   12/05/2021 2.6 (C) 0.5 - 2.0 mmol/L Final   12/05/2021 2.6 (C) 0.5 - 2.0 mmol/L Final       Temp Readings from Last 1 Encounters:   12/11/21 97.2 °F (36.2 °C) (Temporal)      Lab Results   Component Value Date    VANCOPEAK 22.10 12/09/2021    VANCOTROUGH 14.90 12/09/2021    VANCORANDOM <4.00 (L) 12/05/2021         Culture Results:  Microbiology Results (last 10 days)       Procedure Component Value - Date/Time    CANDIDA AURIS SCREEN - Swab, Axilla Right, Axilla Left and Groin [[id number]]  (Normal) Collected: 12/05/21 0806    Lab Status: Final result Specimen: Swab from Axilla Right, Axilla Left and Groin Updated: 12/10/21 0815     Candida Auris Screen Culture No Candida auris isolated at 5 days    CRE Screen by PCR - Swab, Large Intestine, Rectum [[id number]] Collected: 12/05/21 0805    Lab Status: Final result Specimen: Swab from Large Intestine, Rectum Updated: 12/05/21 0923     CRE SCREEN Not Detected     Comment: Test performed by real-time polymerase chain reaction (qPCR).        OXA 48 Strain Not Detected     IMP STRAIN " Not Detected     VIM STRAIN Not Detected     NDM Strain Not Detected     KPC Strain Not Detected    Urine Culture - Urine, Urine, Catheter [[id number]]  (Abnormal) Collected: 12/05/21 0447    Lab Status: Final result Specimen: Urine, Catheter Updated: 12/06/21 2104     Urine Culture Yeast isolated    Narrative:      No further workup    Blood Culture - Blood, Hand, Right [[id number]]  (Normal) Collected: 12/05/21 0352    Lab Status: Final result Specimen: Blood from Hand, Right Updated: 12/10/21 0415     Blood Culture No growth at 5 days    COVID-19 and FLU A/B PCR - Swab, Nasopharynx [[id number]]  (Normal) Collected: 12/05/21 0352    Lab Status: Final result Specimen: Swab from Nasopharynx Updated: 12/05/21 0439     COVID19 Not Detected     Influenza A PCR Not Detected     Influenza B PCR Not Detected    Narrative:      Fact sheet for providers: https://www.fda.gov/media/404052/download    Fact sheet for patients: https://www.fda.gov/media/519014/download    Test performed by PCR.    Blood Culture - Blood, Hand, Right [[id number]]  (Normal) Collected: 12/05/21 0337    Lab Status: Final result Specimen: Blood from Hand, Right Updated: 12/10/21 0345     Blood Culture No growth at 5 days    COVID-19 and FLU A/B PCR - Swab, Nasopharynx [[id number]]  (Normal) Collected: 12/02/21 1439    Lab Status: Final result Specimen: Swab from Nasopharynx Updated: 12/02/21 1526     COVID19 Not Detected     Influenza A PCR Not Detected     Influenza B PCR Not Detected    Narrative:      Fact sheet for providers: https://www.fda.gov/media/370560/download    Fact sheet for patients: https://www.fda.gov/media/416625/download    Test performed by PCR.    Urine Culture - Urine, Urine, Clean Catch [[id number]] Collected: 12/02/21 1102    Lab Status: Final result Specimen: Urine, Clean Catch Updated: 12/04/21 0108     Urine Culture Final report     Result 1 Comment     Comment: Mixed urogenital reagan  10,000-25,000 colony forming units per mL        Narrative:      Performed at:  [address]  [id number]  : Abdiaziz Munoz PhD, Phone:  [phone]          No results found for: RESPCX    Indication for use: sepsis      Current Vancomycin Dose:  750 mg IVPB every 12 hours, day 7 of therapy.     Pt is also receiving Zosyn    Assessment/Plan:  Reviewed above labs and cultures.   Provider states to continue care. Vancomycin consult ends tomorrow.  WBC is 15.56, increasing. Cr is 0.52. Will continue current dose.  Pharmacy will continue to monitor renal function and adjust dose accordingly.    Tammy Hayes, PharmD   12/11/21 13:26 CST

## 2021-12-11 NOTE — THERAPY TREATMENT NOTE
Acute Care - Speech Language Pathology   Swallow Treatment Note [facility]     Patient Name: Janelle Millard  : 1955  MRN: [id number]  Today's Date: 2021               Admit Date: 2021    Goal:  Patient will safely tolerate least restricted diet w/no overt s/s of aspiration for adequate nutrition and hydration:  Pt seen for skilled [facility] this date to address orophargyneal dysphagia.  Pt only consumed limited amount of PO solids this date.  Pt consumed mech soft solids (scrambled eggs, blueberry muffin) w/increased oral prep time d/t prolonged mastication and mild oral residue.  Pt was unable to wear dentures as they were ill fitting once SLP placed and then had to remove.  Pt consumed thin liquids via cup rim w/single sip w/no overt s/s of aspiration.  Pt consumed thin liquids via straw w/single sip w/throat clearing.  SLP recommends continued mech soft solids w/thin liquids via cup rim and feeding assist.  SLP discussed need for increased PO intake for adequate nutrition w/pt; however, limited learning noted.  Visit Dx:     ICD-10-CM ICD-9-CM   1. Sepsis without acute organ dysfunction, due to unspecified organism (HCC)  A41.9 038.9     995.91   2. Pneumonia of both lower lobes due to infectious organism  J18.9 486   3. Acute cystitis with hematuria  N30.01 595.0   4. Leukocytosis, unspecified type  D72.829 288.60   5. Altered mental status, unspecified altered mental status type  R41.82 780.97   6. Elevated liver enzymes  R74.8 790.5   7. Atherosclerosis of native coronary artery of native heart without angina pectoris  I25.10 414.01   8. Elevated troponin  R77.8 790.6   9. 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 Oropharyngeal dysphagia  R13.12 787.22   11. Impaired functional mobility, balance, gait, and endurance  Z74.09 V49.89   12. Impaired mobility and ADLs  Z74.09 V49.89    Z78.9       Patient Active Problem List   Diagnosis   • Carpal tunnel syndrome of left wrist   • Type 1 diabetes mellitus with diabetic polyneuropathy (HCC)   • Mixed diabetic hyperlipidemia associated with type 1 diabetes mellitus (HCC)   • Hypertension associated with diabetes (HCC)   • Syncope and collapse   • CAD (coronary artery disease)   • Asthma   • Depressive disorder, not elsewhere classified   • Neuropathy   • Other specified rheumatoid arthritis, multiple sites (HCC)   • Carpal tunnel syndrome on both sides   • Bilateral carotid artery stenosis   • Overweight (BMI 25.0-29.9)   • Precordial pain   • Sepsis with encephalopathy without septic shock (HCC)   • Tobacco abuse   • UTI (urinary tract infection)   • Ureteral stone with hydronephrosis   • Chest pain, rule out acute myocardial infarction   • Chest pain   • Chest pain with high risk for cardiac etiology   • Sepsis (HCC)     Past Medical History:   Diagnosis Date   • Arthritis, rheumatoid (HCC)    • Arthropathy associated with neurological disorder    • Arthropathy of lumbar facet joint    • Asthma    • Benign hypertension    • Carpal tunnel syndrome    • Diabetes (HCC)    • Diabetic polyneuropathy (HCC)    • Dyslipidemia    • Fallen arches    • Hammer toe    • Heart disease    • Joint pain    • Mild depression (HCC)     Saddness post Surgery 7/10/14 improved after counseling.   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 Diffusely calcified LAD with 30-80% stenosis. OMB #3 with 60% to 70% stenosis. Preserved LV systolic function with EF of 55%.   • CARPAL TUNNEL RELEASE  Right 10/15/2014    Right carpal tunnel release.   • CARPAL TUNNEL RELEASE     • CATARACT EXTRACTION, BILATERAL Bilateral    • CORONARY ARTERY BYPASS GRAFT  02/24/2014    CABG x 3 with LIMA to LAD, endarterectomy to LAD, SVG to OMB and SVG to distal RCA with endarterectomy to distal RCA. Endo-vein harvesting.   • CYSTECTOMY Left     Breast cycst   • CYSTOSCOPY, URETEROSCOPY, RETROGRADE PYELOGRAM, STENT INSERTION Left 3/10/2021    Procedure: , LEFT RETROGRADE PYELOGRAM, STENT INSERTION LEFT URETER;  Surgeon: Ute, Cooper NOLAN MD;  Location: [facility];  Service: Urology;  Laterality: Left;   • EXCISION LESION      Remove breast lesion - cyst   • EYE SURGERY      Insert lens prosthesis   • FOOT SURGERY  10/18/2011    Exostectomy of the right foot. Preulcerative lesion with Charot deformity, right foot   • LUMBAR SPINE SURGERY  11/09/2015    Lumbosacral radiofrequency thermal coagulation.   • NERVE BLOCK  09/14/2015    Lumbar medial branch block.   • TOE NAIL AVULSION  09/03/2015    DEBRIDE NAIL 6 OR MORE 26951 (1)       SLP Recommendation and Plan  SLP Swallowing Diagnosis: mild-moderate, oral dysphagia, suspected pharyngeal dysphagia (12/11/21 0800)  SLP Diet Recommendation: thin liquids, mechanical soft with no mixed consistencies (12/11/21 0800)     SLP Rec. for Method of Medication Administration: as tolerated (12/11/21 0800)           Swallow Criteria for Skilled Therapeutic Interventions Met: demonstrates skilled criteria (12/11/21 0800)  Anticipated Discharge Disposition (SLP): skilled nursing facility (12/11/21 0800)  Rehab Potential/Prognosis, Swallowing: adequate, monitor progress closely (12/11/21 0800)  Therapy Frequency (Swallow): 3 days per week, 5 days per week (12/11/21 0800)  Predicted Duration Therapy Intervention (Days): until discharge (12/11/21 0800)     Daily Summary of Progress (SLP): progress towards functional goals is fair (12/11/21 0800)                   Plan of Care Reviewed With:  patient  Progress: improving  Outcome Summary: Pt seen for [facility] this date to address orophargyneal dysphagia.  Pt only consumed limited amount of PO solids this date.  Pt consumed mech soft solids (scrambled eggs, blueberry muffin) w/increased oral prep time d/t prolonged mastication and mild oral residue.  Pt was unable to wear dentures as they were ill fitting once SLP placed and then had to remove.  Pt consumed thin liquids via cup rim w/single sip w/no overt s/s of aspiration.  Pt consumed thin liquids via straw w/single sip w/throat clearing.  SLP recommends continued mech soft solids w/thin liquids via cup rim and feeding assist.  SLP discussed need for increased PO intake for adequate nutrition w/pt; however, limited learning noted.      SWALLOW EVALUATION (last 72 hours)     SLP Adult Swallow Evaluation     Row Name 12/11/21 0800 12/10/21 0745 12/09/21 1200             Rehab Evaluation    Document Type therapy note (daily note)  -CK therapy note (daily note)  -EC therapy note (daily note)  -EC      Total Evaluation Minutes, SLP 29  -CK -- --      Subjective Information fatigue  -CK fatigue  -EC fatigue  unable to hold head upright  -EC      Patient Observations lethargic; cooperative  -CK alert; cooperative; agree to therapy  -EC cooperative; agree to therapy; lethargic  -EC      Patient/Family/Caregiver Comments/Observations -- -- female relative  -EC      Patient Effort adequate  -CK adequate  -EC fair  -EC              General Information    Patient Profile Reviewed yes  -CK yes  -EC yes  -EC      Current Method of Nutrition thin liquids; soft textures; ground  -CK pureed; thin liquids  -EC pureed; thin liquids  -EC      Prior Level of Function-Swallowing no diet consistency restrictions  -CK no diet consistency restrictions  -EC no diet consistency restrictions  -EC      Plans/Goals Discussed with family; agreed upon  -CK family; agreed upon  -EC family; agreed upon  -EC      Barriers to  Rehab cognitive status; previous functional deficit  -CK cognitive status; previous functional deficit  -EC cognitive status; previous functional deficit  -EC              Pain Scale: FACES Pre/Post-Treatment    Pain: FACES Scale, Pretreatment 0-->no hurt  -CK 0-->no hurt  -EC 0-->no hurt  -EC      Posttreatment Pain Rating 0-->no hurt  -CK 0-->no hurt  -EC 0-->no hurt  -EC              Oral Motor Structure and Function    Dentition Assessment edentulous; missing teeth; teeth are in poor condition  -CK edentulous; missing teeth; teeth are in poor condition  -EC edentulous; missing teeth  -EC      Secretion Management anterior loss; wet vocal quality  -CK anterior loss; wet vocal quality  -EC anterior loss; wet vocal quality; problems swallowing secretions  -EC      Mucosal Quality moist, healthy  -CK moist, healthy  -EC moist, healthy  -EC      Volitional Swallow delayed; weak  -CK delayed; weak  -EC delayed; weak  -EC      Volitional Cough weak  -CK weak  -EC weak  -EC              General Eating/Swallowing Observations    Respiratory Support Currently in Use nasal cannula  -CK nasal cannula  -EC nasal cannula  -EC      Eating/Swallowing Skills fed by SLP  -CK fed by SLP  -EC fed by SLP  -EC      Positioning During Eating upright 90 degree; upright in bed  -CK upright 90 degree; upright in bed  -EC upright 90 degree; upright in bed  -EC      Utensils Used spoon; straw; cup  -CK spoon; straw; cup  -EC spoon; straw  -EC      Consistencies Trialed thin liquids; pureed; soft textures  cheerios with milk drained, water/cup/straw  -CK thin liquids; pureed; soft textures  cheerios with milk drained, water/cup/straw  -EC thin liquids; pureed  pt declined all chewable options  -EC      Pre SpO2 (%) -- -- 100  -EC              Clinical Swallow Eval    Oral Prep Phase impaired  -CK impaired  -EC impaired  -EC      Oral Transit impaired  -CK impaired  -EC impaired  -EC      Oral Residue impaired  -CK impaired  -EC impaired   "-EC      Pharyngeal Phase suspected pharyngeal impairment  -CK suspected pharyngeal impairment  -EC suspected pharyngeal impairment  -EC      Clinical Swallow Evaluation Summary Pt seen for [facility] this date to address orophargyneal dysphagia.  Pt only consumed limited amount of PO solids this date.  Pt consumed mech soft solids (scrambled eggs, blueberry muffin) w/increased oral prep time d/t prolonged mastication and mild oral residue.  Pt was unable to wear dentures as they were ill fitting once SLP placed and then had to remove.  Pt consumed thin liquids via cup rim w/single sip w/no overt s/s of aspiration.  Pt consumed thin liquids via straw w/single sip w/throat clearing.  SLP recommends continued mech soft solids w/thin liquids via cup rim and feeding assist.  SLP discussed need for increased PO intake for adequate nutrition w/pt; however, limited learning noted.  -CK pt with increased oral prep time and decreased attention to task when chewing.  benefits from full feed assist and VC to \"swallow it all\" increased wet voice and cough noted with thin liquids via straw as well as consecutive sips from cup  -EC pt with difficulty clearing oral cavity with anterior spillage and lateral pooling of liquid noted during trials. pt lethargic and requires assistance holding head upright  -EC              Oral Prep Concerns    Oral Prep Concerns prolonged mastication; increased prep time  -CK reduced lip opening; incomplete or weak lip closure around spoon; anterior loss  -EC reduced lip opening; incomplete or weak lip closure around spoon; anterior loss  -EC      Prolonged Mastication mechanical soft  -CK -- --      Reduced Lip Opening --  -CK all consistencies  -EC all consistencies  -EC      Incomplete or Weak Lip Closure Around Spoon --  -CK all consistencies  -EC all consistencies  -EC      Anterior Loss --  -CK thin  -EC thin; pudding  -EC      Increased Prep Time mechanical soft  -CK -- --      Oral " Prep Concerns, Comment -- -- pooling of residue, saliva with anterior spillage d/t head forward position  -EC              Oral Transit Concerns    Oral Transit Concerns increased oral transit time  -CK increased oral transit time  -EC increased oral transit time  -EC      Delayed Intiation of Bolus Transit -- -- thin; pudding  -EC      Increased Oral Transit Time mechanical soft  -CK mechanical soft  -EC thin; pudding  -EC              Oral Residue Concerns    Oral Residue Concerns sulci residue, bilateral  -CK sulci residue, bilateral  -EC sulci residue, bilateral  -EC      Sulci Residue, Bilateral mechanical soft  -CK thin  -EC --      Oral Residue Concerns, Comment -- anterior spillage, poor labial seal  -EC --              Pharyngeal Phase Concerns    Pharyngeal Phase Concerns throat clear  -CK wet vocal quality; throat clear; cough  -EC wet vocal quality; multiple swallows; throat clear; cough  -EC      Wet Vocal Quality --  -CK thin  -EC thin; pudding; other (see comments)  saliva  -EC      Multiple Swallows -- -- pudding; thin  -EC      Cough --  -CK thin  -EC thin; pudding  -EC      Throat Clear thin  -CK thin  -EC thin; pudding  -EC      Pharyngeal Phase Concerns, Comment -- improved with small sips from cup rim.  -EC --              Clinical Impression    SLP Swallowing Diagnosis mild-moderate; oral dysphagia; suspected pharyngeal dysphagia  -CK mild-moderate; oral dysphagia; suspected pharyngeal dysphagia  -EC mild-moderate; oral dysphagia; suspected pharyngeal dysphagia  -EC      Functional Impact risk of aspiration/pneumonia; risk of dehydration; risk of malnutrition  -CK risk of aspiration/pneumonia; risk of dehydration; risk of malnutrition  -EC risk of aspiration/pneumonia; risk of dehydration; risk of malnutrition  -EC      Rehab Potential/Prognosis, Swallowing adequate, monitor progress closely  -CK adequate, monitor progress closely  -EC adequate, monitor progress closely  -EC      Swallow  Criteria for Skilled Therapeutic Interventions Met demonstrates skilled criteria  -CK demonstrates skilled criteria  -EC demonstrates skilled criteria  -EC              SLP Treatment Clinical Impressions    Treatment Assessment (SLP) Oropharyngeal dysphagia  -CK pt is more alert this date and willing to attempt chewable solids.  pt with increased oral prep time with chewable solids.  demonstrates weak labial seal with some anterior spillage of liquids noted.  pt also with wet vocal quality and increased cough with thin liquids via straw or in conscutive sips.  nsg educated for small sips from cup rim and advancment to [facility] soft solids with no mixed consistancies.  pt appetite remains poor with only taking 4 bites.  -EC pt with decreased alertness and inability to hold head upright.  pt taking thin liquids via straw and magic cup (deferred all chewable solids).  there is oral reisude bilateral sulcus with some anterior spillage.  -EC      Daily Summary of Progress (SLP) progress towards functional goals is fair  -CK progress toward functional goals as expected  -EC progress toward functional goals is gradual  -EC      Barriers to Overall Progress (SLP) Fatigue  -CK Fatigue  -EC Cognitive status  -EC      Plan for Continued Treatment (SLP) continue treatment per plan of care  -CK continue treatment per plan of care  -EC continue treatment per plan of care  -EC      Care Plan Review evaluation/treatment results reviewed; care plan/treatment goals reviewed; risks/benefits reviewed; current/potential barriers reviewed; patient/other agree to care plan  -CK care plan/treatment goals reviewed; risks/benefits reviewed; current/potential barriers reviewed; patient/other agree to care plan  -EC care plan/treatment goals reviewed; risks/benefits reviewed; current/potential barriers reviewed; patient/other agree to care plan  -EC      Care Plan Review, Other Participant(s) -- -- family  -EC              Recommendations     Therapy Frequency (Swallow) 3 days per week; 5 days per week  -CK 3 days per week; 5 days per week  -EC 3 days per week; 5 days per week  -EC      Predicted Duration Therapy Intervention (Days) until discharge  -CK until discharge  -EC until discharge  -EC      SLP Diet Recommendation thin liquids; mechanical soft with no mixed consistencies  -CK thin liquids; mechanical soft with no mixed consistencies  -EC puree; thin liquids  -EC      Oral Care Recommendations Oral Care BID/PRN  -CK Oral Care BID/PRN  -EC Oral Care BID/PRN  -EC      SLP Rec. for Method of Medication Administration as tolerated  -CK as tolerated  -EC meds via alternate route  -EC      Anticipated Discharge Disposition (SLP) skilled nursing facility  -CK skilled nursing facility  -EC skilled nursing facility  -EC              Swallow Goals (SLP)    Oral Nutrition/Hydration Goal Selection (SLP) oral nutrition/hydration, SLP goal 1  -CK oral nutrition/hydration, SLP goal 1  -EC oral nutrition/hydration, SLP goal 1  -EC              Oral Nutrition/Hydration Goal 1 (SLP)    Oral Nutrition/Hydration Goal 1, SLP Pt will safely tolerate trials of PO solids/liquids w/no overt s/s of aspiration for establishment of safe PO diet.  -CK Pt will safely tolerate trials of PO solids/liquids w/no overt s/s of aspiration for establishment of safe PO diet.  -EC Pt will safely tolerate trials of PO solids/liquids w/no overt s/s of aspiration for establishment of safe PO diet.  -EC      Time Frame (Oral Nutrition/Hydration Goal 1, SLP) by discharge  -CK by discharge  -EC by discharge  -EC      Barriers (Oral Nutrition/Hydration Goal 1, SLP) cognition  -CK cognition  -EC cognition  -EC      Progress/Outcomes (Oral Nutrition/Hydration Goal 1, SLP) goal ongoing  -CK goal ongoing  -EC goal ongoing  -EC            User Key  (r) = Recorded By, (t) = Taken By, (c) = Cosigned By    Initials Name Effective Dates    Genet Henderson CCC-SLP 06/16/21 -     CHRISTOS Pop  Kacey MEDINA MS CCC-SLP 06/16/21 -                 EDUCATION  The patient has been educated in the following areas:   Dysphagia (Swallowing Impairment) Modified Diet Instruction.        SLP GOALS     Row Name 12/11/21 0800 12/10/21 0745 12/09/21 1200       Oral Nutrition/Hydration Goal 1 (SLP)    Oral Nutrition/Hydration Goal 1, SLP Pt will safely tolerate trials of PO solids/liquids w/no overt s/s of aspiration for establishment of safe PO diet.  -CK Pt will safely tolerate trials of PO solids/liquids w/no overt s/s of aspiration for establishment of safe PO diet.  -EC Pt will safely tolerate trials of PO solids/liquids w/no overt s/s of aspiration for establishment of safe PO diet.  -EC    Time Frame (Oral Nutrition/Hydration Goal 1, SLP) by discharge  -CK by discharge  -EC by discharge  -EC    Barriers (Oral Nutrition/Hydration Goal 1, SLP) cognition  -CK cognition  -EC cognition  -EC    Progress/Outcomes (Oral Nutrition/Hydration Goal 1, SLP) goal ongoing  -CK goal ongoing  -EC goal ongoing  -EC          User Key  (r) = Recorded By, (t) = Taken By, (c) = Cosigned By    Initials Name Provider Type    Genet Henderson CCC-SLP Speech and Language Pathologist    Kacey Gambino MS CCC-SLP Speech and Language Pathologist                   Time Calculation:    Time Calculation- SLP     Row Name 12/11/21 0829             Time Calculation- SLP    SLP Start Time 0800  -CK      SLP Stop Time 0829  -CK      SLP Time Calculation (min) 29 min  -CK      Total Timed Code Minutes- SLP 29 minute(s)  -CK      SLP Received On 12/11/21  -CK      SLP Goal Re-Cert Due Date 12/20/21  -CK              Untimed Charges    03383-ID Treatment Swallow Minutes 29  -CK              Total Minutes    Untimed Charges Total Minutes 29  -CK       Total Minutes 29  -CK            User Key  (r) = Recorded By, (t) = Taken By, (c) = Cosigned By    Initials Name Provider Type    Kacey Gambino MS CCC-SLP Speech and Language  Pathologist                Therapy Charges for Today     Code Description Service Date Service Provider Modifiers Qty    [id number]  ST TREATMENT SWALLOW 2 12/11/2021 Kacey Pop, MS CCC-SLP GN 1               Kacey Pop MS CCC-SLP  12/11/2021

## 2021-12-11 NOTE — PROGRESS NOTES
Cardiology Progress Note     LOS: 5 days   Patient Care Team:  Yosef Casey MD as PCP - General (Family Medicine)  Francine Gallegos (Inactive) as Technician    Subjective:  Chart reviewed. Patient seen and examined. Patient mental status has improved though has episodes of some confusion.  Patient complains of feeling tired and fatigued.  Patient telemetry monitor has not revealed of any evidence of any arrhythmia.      Objective:  Temp:  [96.6 °F (35.9 °C)-97.6 °F (36.4 °C)] 96.6 °F (35.9 °C)  Heart Rate:  [72-84] 80  Resp:  [18-20] 18  BP: (113-140)/(59-66) 134/59    Intake/Output Summary (Last 24 hours) at 12/10/2021 2225  Last data filed at 12/10/2021 1250  Gross per 24 hour   Intake 590 ml   Output 200 ml   Net 390 ml       Physical Exam:   General Appearance:    Alert, oriented, cooperative, in no acute distress.   Head:    Normocephalic, atraumatic, without obvious abnormality   Eyes:             LILLIAN. Lids and lashes normal, conjunctivae and sclerae normal, no icterus, no pallor.   Ears:    Ears appear intact with no abnormalities noted.   Throat:   Mucous membranes pink and moist.   Neck:  Supple, trachea midline, no carotid bruit, no organomegaly or JVD.   Lungs:    Clear to auscultation and percussion.  Respirations regular, even and unlabored. No wheezes, rales, or rhonchi.    Heart:    Regular rhythm and normal rate, normal S1 and S2, no      murmur, no gallop, no rub, no click.   Abdomen:    Soft, nontender, nondistended, no guarding, no rebound tenderness. Normal bowel sounds in all four quadrants, no masses, liver and spleen nonpalpable.    Genitalia:    Deferred.   Extremities:   Moves all extremities well, no edema, no cyanosis, no       redness, no clubbing.   Pulses:   Pulses palpable and equal bilaterally.   Skin:   Moist and warm. No bleeding, bruising or rash.   Neurologic/Psychiatric:   Alert and oriented to person, place, and time.  Motor, power and tone in upper and lower  extremities are grossly intact.  No focal neurological deficits. Normal cognitive function. No psychomotor reaction or tangential thought. No depression, homicidal ideations and suicidal ideations.          Results Review:    Results from last 7 days   Lab Units 12/10/21  2050 12/10/21  0631 12/10/21  0631 12/09/21  0002 12/08/21  0503   SODIUM mmol/L  --   --  142   < > 153*   POTASSIUM mmol/L 3.9   < > 3.1*   < > 2.6*   CHLORIDE mmol/L  --   --  111*   < > 122*   CO2 mmol/L  --   --  24.0   < > 24.0   BUN mg/dL  --   --  15   < > 21   CREATININE mg/dL  --   --  0.55*   < > 0.62   CALCIUM mg/dL  --   --  8.9   < > 10.3   BILIRUBIN mg/dL  --   --   --   --  0.6   ALK PHOS U/L  --   --   --   --  123*   ALT (SGPT) U/L  --   --   --   --  124*   AST (SGOT) U/L  --   --   --   --  98*   GLUCOSE mg/dL  --   --  115*   < > 172*    < > = values in this interval not displayed.     Results from last 7 days   Lab Units 12/05/21  1008 12/05/21  0337   TROPONIN T ng/mL 1.580* 2.530*         Results from last 7 days   Lab Units 12/10/21  0631   WBC 10*3/mm3 13.67*   HEMOGLOBIN g/dL 11.9*   HEMATOCRIT % 36.7   PLATELETS 10*3/mm3 227     Results from last 7 days   Lab Units 12/08/21  0502 12/07/21  2226 12/07/21  1355 12/05/21  1509 12/05/21  1008   INR  1.40*  --   --   --  1.83*   APTT seconds 27.7 47.1* 100.4*   < > >240.0*    < > = values in this interval not displayed.     Results from last 7 days   Lab Units 12/08/21  0503   MAGNESIUM mg/dL 2.0         Results from last 7 days   Lab Units 12/06/21  0352 12/05/21  1008   TSH uIU/mL 0.578  --    FREE T4 ng/dL  --  1.26           ECHO:  Results for orders placed during the hospital encounter of 12/05/21    Adult Transthoracic Echo Limited W/ Cont if Necessary Per Protocol    Interpretation Summary  · The right atrial cavity is borderline dilated.  · Left ventricular ejection fraction appears to be 26 - 30%.  · Left ventricular diastolic function was normal.  · Moderately  decreased posterior leaflet mobility.  · Mild mitral valve stenosis is present.  · The following left ventricular wall segments are hypokinetic: mid anterior, apical anterior, basal anterolateral, mid anterolateral, apical lateral, basal inferolateral, mid inferolateral, apical inferior, mid inferior, apical septal, basal inferoseptal, mid inferoseptal, apex hypokinetic, mid anteroseptal, basal anterior, basal inferior and basal inferoseptal.      ECG 12 Lead   Preliminary Result   Test Reason : Non-Q myocardial infarct   Blood Pressure :   */*   mmHG   Vent. Rate : 108 BPM     Atrial Rate : 108 BPM      P-R Int : 164 ms          QRS Dur :  80 ms       QT Int : 354 ms       P-R-T Axes :  44  62 142 degrees      QTc Int : 474 ms      Sinus tachycardia   Low voltage QRS   Cannot rule out Anterior infarct (cited on or before 11-MAY-2016)   T wave abnormality, consider inferolateral ischemia   Abnormal ECG   When compared with ECG of 05-DEC-2021 07:12,   T wave inversion now evident in Anterior leads      Referred By:            Confirmed By:       ECG 12 Lead         ECG 12 Lead         SCANNED EKG   Final Result      SCANNED EKG   Final Result      SCANNED EKG   Final Result      SCANNED EKG   Final Result      SCANNED EKG   Final Result      SCANNED EKG   Final Result      ECG 12 Lead    (Results Pending)        Medication Review:   Current Facility-Administered Medications   Medication Dose Route Frequency Provider Last Rate Last Admin   • aspirin EC tablet 81 mg  81 mg Oral Daily Behroozi, Saeid, MD   81 mg at 12/10/21 0844   • atorvastatin (LIPITOR) tablet 20 mg  20 mg Oral Nightly Behroozi, Saeid, MD   20 mg at 12/10/21 2053   • carvedilol (COREG) tablet 12.5 mg  12.5 mg Oral BID Behroozi, Saeid, MD   12.5 mg at 12/10/21 2053   • dextrose (D50W) (25 g/50 mL) IV injection 12.5 g  12.5 g Intravenous PRN Behroozi, Saeid, MD   12.5 g at 12/05/21 2104   • dextrose (D50W) (25 g/50 mL) IV injection 25 g  25 g  Intravenous Q15 Min PRN Behroozi, Saeid, MD       • dextrose (GLUTOSE) oral gel 15 g  15 g Oral Q15 Min PRN Behroozi, Saeid, MD       • diazePAM (VALIUM) tablet 2 mg  2 mg Oral BID Sukhjinder Trivedi MD   2 mg at 12/10/21 2054   • donepezil (ARICEPT) tablet 2.5 mg  2.5 mg Oral Nightly Sukhjinder Trivedi MD   2.5 mg at 12/10/21 2054   • glucagon (human recombinant) (GLUCAGEN DIAGNOSTIC) injection 1 mg  1 mg Subcutaneous Q15 Min PRN Behroozi, Saeid, MD       • hydrOXYzine (ATARAX) tablet 25 mg  25 mg Oral Nightly PRN Sukhjinder Trivedi MD       • insulin aspart (novoLOG) injection 0-9 Units  0-9 Units Subcutaneous TID AC Behroozi, Saeid, MD   6 Units at 12/09/21 1748   • insulin detemir (LEVEMIR) injection 15 Units  15 Units Subcutaneous Q12H Behroozi, Saeid, MD   15 Units at 12/10/21 2058   • insulin regular (humuLIN R,novoLIN R) injection 10 Units  10 Units Intravenous Once Behroozi, Saeid, MD       • ipratropium-albuterol (DUO-NEB) nebulizer solution 3 mL  3 mL Nebulization Q6H PRN Behroozi, Saeid, MD   3 mL at 12/06/21 2248   • isosorbide mononitrate (IMDUR) 24 hr tablet 30 mg  30 mg Oral Daily Behroozi, Saeid, MD   30 mg at 12/10/21 0852   • levothyroxine (SYNTHROID, LEVOTHROID) tablet 25 mcg  25 mcg Oral Daily Behroozi, Saeid, MD   25 mcg at 12/10/21 0844   • metoprolol tartrate (LOPRESSOR) injection 5 mg  5 mg Intravenous Q12H Papito Calderón MD   5 mg at 12/10/21 1144   • morphine injection 1 mg  1 mg Intravenous Q4H PRN Sukhjinder Trivedi MD   1 mg at 12/08/21 1714   • nitroglycerin (NITRODUR) 0.2 MG/HR patch 1 patch  1 patch Transdermal Daily Behroozi, Saeid, MD   1 patch at 12/10/21 0853   • nystatin (MYCOSTATIN) powder   Topical Q12H Sukhjinder Trivedi MD   Given at 12/10/21 2055   • OLANZapine (zyPREXA) tablet 5 mg  5 mg Oral Nightly Sukhjinder Trivedi MD   5 mg at 12/10/21 2055   • ondansetron (ZOFRAN) injection 4 mg  4 mg Intravenous Q6H PRN Daisy Cavanaugh MD   4 mg at 12/10/21 1831   • PARoxetine  (PAXIL) tablet 20 mg  20 mg Oral Nightly Behroozi, Saeid, MD   20 mg at 12/10/21 2055   • Pharmacy to dose vancomycin   Does not apply Continuous PRN Behroozi, Saeid, MD       • piperacillin-tazobactam (ZOSYN) 3.375 g/100 mL 0.9% NS IVPB (mbp)  3.375 g Intravenous Q8H Behroozi, Saeid, MD   3.375 g at 12/10/21 1831   • potassium chloride 10 mEq in 100 mL IVPB  10 mEq Intravenous Q1H PRN Behroozi, Saeid,  mL/hr at 12/10/21 1409 10 mEq at 12/10/21 1409   • potassium phosphate 45 mmol in sodium chloride 0.9 % 500 mL infusion  45 mmol Intravenous PRN Behroozi, Saeid, MD        Or   • potassium phosphate 30 mmol in sodium chloride 0.9 % 250 mL infusion  30 mmol Intravenous PRN Behroozi, Saeid, MD 31.3 mL/hr at 12/05/21 2345 30 mmol at 12/05/21 2345    Or   • Potassium Phosphates 15 mmol in sodium chloride 0.9 % 100 mL infusion  15 mmol Intravenous PRN Behroozi, Saeid, MD        Or   • sodium phosphates 45 mmol in sodium chloride 0.9 % 500 mL IVPB  45 mmol Intravenous PRN Behroozi, Saeid, MD        Or   • sodium phosphates 30 mmol in sodium chloride 0.9 % 250 mL IVPB  30 mmol Intravenous PRN Behroozi, Saeid, MD        Or   • sodium phosphates 15 mmol in sodium chloride 0.9 % 250 mL IVPB  15 mmol Intravenous PRN Behroozi, Saeid, MD       • sacubitril-valsartan (ENTRESTO) 24-26 MG tablet 1 tablet  1 tablet Oral Q12H Papito Calderón MD   1 tablet at 12/10/21 2055   • sodium chloride 0.9 % flush 10 mL  10 mL Intravenous PRN Yosi Trivedi DO       • sodium chloride 0.9 % flush 10 mL  10 mL Intravenous Q12H Behroozi, Saeid, MD   10 mL at 12/10/21 2055   • sodium chloride 0.9 % flush 10 mL  10 mL Intravenous PRN Behroozi, Saeid, MD       • sodium chloride 0.9 % flush 10 mL  10 mL Intravenous PRN Behroozi, Saeid, MD       • sodium chloride 0.9 % flush 10 mL  10 mL Intravenous Once PRN Behroozi, Saeid, MD       • sodium chloride 0.9 % flush 10 mL  10 mL Intravenous Q12H Behroozi, Saeid, MD   10 mL at 12/10/21 2055   •  sodium chloride 0.9 % flush 10 mL  10 mL Intravenous PRN Behroozi, Saeid, MD       • sodium chloride 0.9 % flush 3 mL  3 mL Intravenous Q12H Behroozi, Saeid, MD   3 mL at 12/10/21 2056   • sodium chloride 0.9 % infusion  10 mL/hr Intravenous Continuous PRN Behroozi, Saeid, MD       • traMADol (ULTRAM) tablet 50 mg  50 mg Oral Q6H PRN Sukhjinder Trivedi MD   50 mg at 12/10/21 1238   • vancomycin 750 mg/250 mL 0.9% NS IVPB ([facility])  750 mg Intravenous Q12H Behroozi, Saeid, MD   750 mg at 12/10/21 1525       Assessment and Plan:      Sepsis (HCC)  1.  Positive troponin.  Patient has not complained of having any symptoms of substernal chest pain.  Patient on admission had positive troponin.  Patient mental status was altered on admission.  Patient is currently responding and denies any symptoms of chest pain.  Patient and family was informed of the positive troponin.  Patient at the present time would not be subjected to any invasive evaluation and would be treated medically.    2.  Ischemic cardiomyopathy.  Patient is currently on Entresto and carvedilol.  Patient would receive Lasix on a as needed basis.  Patient complains of having some symptoms of shortness of breath.  Patient would be administered Lasix on a as needed basis.    3.  Arterial hypertension.  Patient blood pressure has been labile.  Patient would be continued on the present dose of the carvedilol and Entresto and would follow the blood pressure.    4.  Hypokalemia.  Patient potassium is 3.1.  Patient BUN is 15 and a creatinine of 0.55.  Patient renal function would be followed.    5.  Sepsis.  Patient on admission had a white blood cell count of 22,000.  Patient white blood cell count is 13.  Patient has not had any febrile episode.    6.  Altered mental status.  Patient mental status is improved patient is able to communicate.    The above plan of management were discussed with the patient and the nursing staff            Electronically signed by Papito  MD Stephane, 12/10/21, 10:25 PM CST.      Time: Time spent on face-to-face interaction 20 minutes    Dictated utilizing Dragon dictation.

## 2021-12-12 NOTE — SIGNIFICANT NOTE
12/12/21 1132   OTHER   Discipline physical therapy assistant   Rehab Time/Intention   Session Not Performed patient/family declined treatment  (pt. deferred PT this am and stated she did not feel good at all)

## 2021-12-12 NOTE — THERAPY TREATMENT NOTE
Acute Care - Speech Language Pathology   Swallow Treatment Note [facility]     Patient Name: Janelle Millard  : 1955  MRN: [id number]  Today's Date: 2021               Admit Date: 2021    Visit Dx:     ICD-10-CM ICD-9-CM   1. Sepsis without acute organ dysfunction, due to unspecified organism ([facility])  A41.9 038.9     995.91   2. Pneumonia of both lower lobes due to infectious organism  J18.9 486   3. Acute cystitis with hematuria  N30.01 595.0   4. Leukocytosis, unspecified type  D72.829 288.60   5. Altered mental status, unspecified altered mental status type  R41.82 780.97   6. Elevated liver enzymes  R74.8 790.5   7. Atherosclerosis of native coronary artery of native heart without angina pectoris  I25.10 414.01   8. Elevated troponin  R77.8 790.6   9. Atherosclerosis of autologous vein coronary artery bypass graft with unstable angina pectoris ([facility])  I25.710 414.02     411.1   10. Oropharyngeal dysphagia  R13.12 787.22   11. Impaired functional mobility, balance, gait, and endurance  Z74.09 V49.89   12. Impaired mobility and ADLs  Z74.09 V49.89    Z78.9      Patient Active Problem List   Diagnosis   • Carpal tunnel syndrome of left wrist   • Type 1 diabetes mellitus with diabetic polyneuropathy ([facility])   • Mixed diabetic hyperlipidemia associated with type 1 diabetes mellitus ([facility])   • Hypertension associated with diabetes ([facility])   • Syncope and collapse   • CAD (coronary artery disease)   • Asthma   • Depressive disorder, not elsewhere classified   • Neuropathy   • Other specified rheumatoid arthritis, multiple sites ([facility])   • Carpal tunnel syndrome on both sides   • Bilateral carotid artery stenosis   • Overweight (BMI 25.0-29.9)   • Precordial pain   • Sepsis with encephalopathy without septic shock ([facility])   • Tobacco abuse   • UTI (urinary tract infection)   • Ureteral stone with hydronephrosis   • Chest pain, rule out acute myocardial infarction   • Chest pain   • Chest pain with high  risk for cardiac etiology   • Sepsis (HCC)     Past Medical History:   Diagnosis Date   • Arthritis, rheumatoid (HCC)    • Arthropathy associated with neurological disorder    • Arthropathy of lumbar facet joint    • Asthma    • Benign hypertension    • Carpal tunnel syndrome    • Diabetes (HCC)    • Diabetic polyneuropathy (HCC)    • Dyslipidemia    • Fallen arches    • Hammer toe    • Heart disease    • Joint pain    • Mild depression (HCC)     Saddness post Surgery 7/10/14 improved after counseling.   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 Diffusely calcified LAD with 30-80% stenosis. OMB #3 with 60% to 70% stenosis. Preserved LV systolic function with EF of 55%.   • CARPAL TUNNEL RELEASE Right 10/15/2014    Right carpal tunnel release.   • CARPAL TUNNEL RELEASE     • CATARACT EXTRACTION, BILATERAL Bilateral    • CORONARY ARTERY BYPASS GRAFT  02/24/2014    CABG x 3 with LIMA to LAD, endarterectomy to LAD, SVG to OMB and SVG to distal RCA with endarterectomy to distal RCA. Endo-vein harvesting.   • CYSTECTOMY Left     Breast cycst   • CYSTOSCOPY, URETEROSCOPY, RETROGRADE PYELOGRAM, STENT INSERTION Left 3/10/2021    Procedure: , LEFT RETROGRADE PYELOGRAM, STENT INSERTION LEFT URETER;  Surgeon: Crawford, Cooper NOLAN MD;  Location: [facility];  Service: Urology;  Laterality: Left;   • EXCISION LESION      Remove breast lesion - cyst   • EYE SURGERY      Insert lens prosthesis   • FOOT SURGERY  10/18/2011    Exostectomy of the right foot. Preulcerative lesion with Charot deformity, right foot   • LUMBAR SPINE SURGERY  11/09/2015     Lumbosacral radiofrequency thermal coagulation.   • NERVE BLOCK  09/14/2015    Lumbar medial branch block.   • TOE NAIL AVULSION  09/03/2015    DEBRIDE NAIL 6 OR MORE 35870 (1)       SLP Recommendation and Plan  SLP Swallowing Diagnosis: mild-moderate, oral dysphagia, suspected pharyngeal dysphagia (12/12/21 0728)  SLP Diet Recommendation: thin liquids, mechanical soft with no mixed consistencies (12/12/21 0728)     SLP Rec. for Method of Medication Administration: as tolerated (12/12/21 0728)           Swallow Criteria for Skilled Therapeutic Interventions Met: demonstrates skilled criteria (12/12/21 0728)  Anticipated Discharge Disposition (SLP): skilled nursing facility (12/12/21 0728)  Rehab Potential/Prognosis, Swallowing: adequate, monitor progress closely (12/12/21 0728)  Therapy Frequency (Swallow): 3 days per week, 5 days per week (12/12/21 0728)  Predicted Duration Therapy Intervention (Days): until discharge (12/12/21 0728)     Daily Summary of Progress (SLP): progress toward functional goals as expected (12/12/21 0728)                   Plan of Care Reviewed With: patient  Progress: improving  Outcome Summary: ST dysphagia treatment provided this date.pt continues to demonstrate poor appetite.  she has good tolerance of mechanical soft solids with the few bites she takes.  dentures were not used this date as they do not improve the function of the oral phase or increase the amount of nutritional intake.  cough noted with thin liquids via cup rim in large bolus.  improved with decreased size.      SWALLOW EVALUATION (last 72 hours)     SLP Adult Swallow Evaluation     Row Name 12/12/21 0728 12/11/21 0800 12/10/21 0745 12/09/21 1200          Rehab Evaluation    Document Type therapy note (daily note)  -EC therapy note (daily note)  -CK therapy note (daily note)  -EC therapy note (daily note)  -EC     Total Evaluation Minutes, SLP -- 29  -CK -- --     Subjective Information complains of; nausea/vomiting   -EC fatigue  -CK fatigue  -EC fatigue  unable to hold head upright  -EC     Patient Observations alert; cooperative; agree to therapy  -EC lethargic; cooperative  -CK alert; cooperative; agree to therapy  -EC cooperative; agree to therapy; lethargic  -EC     Patient/Family/Caregiver Comments/Observations -- -- -- female relative  -EC     Patient Effort adequate  -EC adequate  -CK adequate  -EC fair  -EC            General Information    Patient Profile Reviewed yes  -EC yes  -CK yes  -EC yes  -EC     Current Method of Nutrition thin liquids; soft textures; ground  -EC thin liquids; soft textures; ground  -CK pureed; thin liquids  -EC pureed; thin liquids  -EC     Prior Level of Function-Swallowing no diet consistency restrictions  -EC no diet consistency restrictions  -CK no diet consistency restrictions  -EC no diet consistency restrictions  -EC     Plans/Goals Discussed with family; agreed upon  -EC family; agreed upon  -CK family; agreed upon  -EC family; agreed upon  -EC     Barriers to Rehab cognitive status; previous functional deficit  -EC cognitive status; previous functional deficit  -CK cognitive status; previous functional deficit  -EC cognitive status; previous functional deficit  -EC            Pain Scale: FACES Pre/Post-Treatment    Pain: FACES Scale, Pretreatment 0-->no hurt  -EC 0-->no hurt  -CK 0-->no hurt  -EC 0-->no hurt  -EC     Posttreatment Pain Rating 0-->no hurt  -EC 0-->no hurt  -CK 0-->no hurt  -EC 0-->no hurt  -EC            Oral Motor Structure and Function    Dentition Assessment edentulous; missing teeth; teeth are in poor condition  -EC edentulous; missing teeth; teeth are in poor condition  -CK edentulous; missing teeth; teeth are in poor condition  -EC edentulous; missing teeth  -EC     Secretion Management anterior loss; wet vocal quality  -EC anterior loss; wet vocal quality  -CK anterior loss; wet vocal quality  -EC anterior loss; wet vocal quality; problems swallowing secretions   "-EC     Mucosal Quality moist, healthy  -EC moist, healthy  -CK moist, healthy  -EC moist, healthy  -EC     Volitional Swallow delayed; weak  -EC delayed; weak  -CK delayed; weak  -EC delayed; weak  -EC     Volitional Cough weak  -EC weak  -CK weak  -EC weak  -EC            General Eating/Swallowing Observations    Respiratory Support Currently in Use -- nasal cannula  -CK nasal cannula  -EC nasal cannula  -EC     Eating/Swallowing Skills fed by SLP  -EC fed by SLP  -CK fed by SLP  -EC fed by SLP  -EC     Positioning During Eating upright 90 degree; upright in bed  -EC upright 90 degree; upright in bed  -CK upright 90 degree; upright in bed  -EC upright 90 degree; upright in bed  -EC     Utensils Used spoon; cup  -EC spoon; straw; cup  -CK spoon; straw; cup  -EC spoon; straw  -EC     Consistencies Trialed thin liquids; pureed; soft textures  banana, oats, milk via cup  -EC thin liquids; pureed; soft textures  cheerios with milk drained, water/cup/straw  -CK thin liquids; pureed; soft textures  cheerios with milk drained, water/cup/straw  -EC thin liquids; pureed  pt declined all chewable options  -EC     Pre SpO2 (%) -- -- -- 100  -EC            Clinical Swallow Eval    Oral Prep Phase -- impaired  -CK impaired  -EC impaired  -EC     Oral Transit -- impaired  -CK impaired  -EC impaired  -EC     Oral Residue -- impaired  -CK impaired  -EC impaired  -EC     Pharyngeal Phase suspected pharyngeal impairment  -EC suspected pharyngeal impairment  -CK suspected pharyngeal impairment  -EC suspected pharyngeal impairment  -EC     Clinical Swallow Evaluation Summary pt only took a few bites of solids c/o \"don't feel well\" there is cough with thin liquids in large bolus noted.  -EC Pt seen for [facility] this date to address orophargyneal dysphagia.  Pt only consumed limited amount of PO solids this date.  Pt consumed mech soft solids (scrambled eggs, blueberry muffin) w/increased oral prep time d/t prolonged " "mastication and mild oral residue.  Pt was unable to wear dentures as they were ill fitting once SLP placed and then had to remove.  Pt consumed thin liquids via cup rim w/single sip w/no overt s/s of aspiration.  Pt consumed thin liquids via straw w/single sip w/throat clearing.  SLP recommends continued mech soft solids w/thin liquids via cup rim and feeding assist.  SLP discussed need for increased PO intake for adequate nutrition w/pt; however, limited learning noted.  -CK pt with increased oral prep time and decreased attention to task when chewing.  benefits from full feed assist and VC to \"swallow it all\" increased wet voice and cough noted with thin liquids via straw as well as consecutive sips from cup  -EC pt with difficulty clearing oral cavity with anterior spillage and lateral pooling of liquid noted during trials. pt lethargic and requires assistance holding head upright  -EC            Oral Prep Concerns    Oral Prep Concerns prolonged mastication; increased prep time  -EC prolonged mastication; increased prep time  -CK reduced lip opening; incomplete or weak lip closure around spoon; anterior loss  -EC reduced lip opening; incomplete or weak lip closure around spoon; anterior loss  -EC     Prolonged Mastication mechanical soft  -EC mechanical soft  -CK -- --     Reduced Lip Opening -- --  -CK all consistencies  -EC all consistencies  -EC     Incomplete or Weak Lip Closure Around Spoon -- --  -CK all consistencies  -EC all consistencies  -EC     Anterior Loss -- --  -CK thin  -EC thin; pudding  -EC     Increased Prep Time mechanical soft  -EC mechanical soft  -CK -- --     Oral Prep Concerns, Comment -- -- -- pooling of residue, saliva with anterior spillage d/t head forward position  -EC            Oral Transit Concerns    Oral Transit Concerns -- increased oral transit time  -CK increased oral transit time  -EC increased oral transit time  -EC     Delayed Intiation of Bolus Transit -- -- -- thin; " pudding  -EC     Increased Oral Transit Time -- mechanical soft  -CK mechanical soft  -EC thin; pudding  -EC            Oral Residue Concerns    Oral Residue Concerns -- sulci residue, bilateral  -CK sulci residue, bilateral  -EC sulci residue, bilateral  -EC     Sulci Residue, Bilateral -- mechanical soft  -CK thin  -EC --     Oral Residue Concerns, Comment -- -- anterior spillage, poor labial seal  -EC --            Pharyngeal Phase Concerns    Pharyngeal Phase Concerns cough  -EC throat clear  -CK wet vocal quality; throat clear; cough  -EC wet vocal quality; multiple swallows; throat clear; cough  -EC     Wet Vocal Quality -- --  -CK thin  -EC thin; pudding; other (see comments)  saliva  -EC     Multiple Swallows -- -- -- pudding; thin  -EC     Cough thin  -EC --  -CK thin  -EC thin; pudding  -EC     Throat Clear -- thin  -CK thin  -EC thin; pudding  -EC     Pharyngeal Phase Concerns, Comment -- -- improved with small sips from cup rim.  -EC --            Clinical Impression    SLP Swallowing Diagnosis mild-moderate; oral dysphagia; suspected pharyngeal dysphagia  -EC mild-moderate; oral dysphagia; suspected pharyngeal dysphagia  -CK mild-moderate; oral dysphagia; suspected pharyngeal dysphagia  -EC mild-moderate; oral dysphagia; suspected pharyngeal dysphagia  -EC     Functional Impact risk of aspiration/pneumonia; risk of dehydration; risk of malnutrition  -EC risk of aspiration/pneumonia; risk of dehydration; risk of malnutrition  -CK risk of aspiration/pneumonia; risk of dehydration; risk of malnutrition  -EC risk of aspiration/pneumonia; risk of dehydration; risk of malnutrition  -EC     Rehab Potential/Prognosis, Swallowing adequate, monitor progress closely  -EC adequate, monitor progress closely  -CK adequate, monitor progress closely  -EC adequate, monitor progress closely  -EC     Swallow Criteria for Skilled Therapeutic Interventions Met demonstrates skilled criteria  -EC demonstrates skilled criteria   -CK demonstrates skilled criteria  -EC demonstrates skilled criteria  -EC            SLP Treatment Clinical Impressions    Treatment Assessment (SLP) pt continues to demonstrate poor appetite.  she has good tolerance of mechanical soft solids with the few bites she takes.  dentures were not used this date as they do not improve the function of the oral phase or increase the amount of nutritional intake.  cough noted with thin liquids via cup rim in large bolus.  improved with decreased size.  -EC Oropharyngeal dysphagia  -CK pt is more alert this date and willing to attempt chewable solids.  pt with increased oral prep time with chewable solids.  demonstrates weak labial seal with some anterior spillage of liquids noted.  pt also with wet vocal quality and increased cough with thin liquids via straw or in conscutive sips.  nsg educated for small sips from cup rim [facility] with no mixed consistancies.  pt appetite remains poor with only taking 4 bites.  -EC pt with decreased alertness and inability to hold head upright.  pt taking thin liquids via straw and magic cup (deferred all chewable solids).  there is oral reisude bilateral sulcus with some anterior spillage.  -EC     Daily Summary of Progress (SLP) progress toward functional goals as expected  -EC progress towards functional goals is fair  -CK progress toward functional goals as expected  -EC progress toward functional goals is gradual  -EC     Barriers to Overall Progress (SLP) Fatigue  -EC Fatigue  -CK Fatigue  -EC Cognitive status  -EC     Plan for Continued Treatment (SLP) continue treatment per plan of care  -EC continue treatment per plan of care  -CK continue treatment per plan of care  -EC continue treatment per plan of care  -EC     Care Plan Review care plan/treatment goals reviewed; risks/benefits reviewed; current/potential barriers reviewed; patient/other agree to care plan  -EC evaluation/treatment results reviewed;  care plan/treatment goals reviewed; risks/benefits reviewed; current/potential barriers reviewed; patient/other agree to care plan  -CK care plan/treatment goals reviewed; risks/benefits reviewed; current/potential barriers reviewed; patient/other agree to care plan  -EC care plan/treatment goals reviewed; risks/benefits reviewed; current/potential barriers reviewed; patient/other agree to care plan  -EC     Care Plan Review, Other Participant(s) -- -- -- family  -EC            Recommendations    Therapy Frequency (Swallow) 3 days per week; 5 days per week  -EC 3 days per week; 5 days per week  -CK 3 days per week; 5 days per week  -EC 3 days per week; 5 days per week  -EC     Predicted Duration Therapy Intervention (Days) until discharge  -EC until discharge  -CK until discharge  -EC until discharge  -EC     SLP Diet Recommendation thin liquids; mechanical soft with no mixed consistencies  -EC thin liquids; mechanical soft with no mixed consistencies  -CK thin liquids; mechanical soft with no mixed consistencies  -EC puree; thin liquids  -EC     Oral Care Recommendations Oral Care BID/PRN  -EC Oral Care BID/PRN  -CK Oral Care BID/PRN  -EC Oral Care BID/PRN  -EC     SLP Rec. for Method of Medication Administration as tolerated  -EC as tolerated  -CK as tolerated  -EC meds via alternate route  -EC     Anticipated Discharge Disposition (SLP) skilled nursing facility  -EC skilled nursing facility  -CK skilled nursing facility  -EC skilled nursing facility  -EC            Swallow Goals (SLP)    Oral Nutrition/Hydration Goal Selection (SLP) oral nutrition/hydration, SLP goal 1  -EC oral nutrition/hydration, SLP goal 1  -CK oral nutrition/hydration, SLP goal 1  -EC oral nutrition/hydration, SLP goal 1  -EC            Oral Nutrition/Hydration Goal 1 (SLP)    Oral Nutrition/Hydration Goal 1, SLP Pt will safely tolerate trials of PO solids/liquids w/no overt s/s of aspiration for establishment of safe PO diet.  -EC Pt will  safely tolerate trials of PO solids/liquids w/no overt s/s of aspiration for establishment of safe PO diet.  -CK Pt will safely tolerate trials of PO solids/liquids w/no overt s/s of aspiration for establishment of safe PO diet.  -EC Pt will safely tolerate trials of PO solids/liquids w/no overt s/s of aspiration for establishment of safe PO diet.  -EC     Time Frame (Oral Nutrition/Hydration Goal 1, SLP) by discharge  -EC by discharge  -CK by discharge  -EC by discharge  -EC     Barriers (Oral Nutrition/Hydration Goal 1, SLP) cognition  -EC cognition  -CK cognition  -EC cognition  -EC     Progress/Outcomes (Oral Nutrition/Hydration Goal 1, SLP) goal ongoing  -EC goal ongoing  -CK goal ongoing  -EC goal ongoing  -EC           User Key  (r) = Recorded By, (t) = Taken By, (c) = Cosigned By    Initials Name Effective Dates    EC Genet Christensen, CCC-SLP 06/16/21 -     CK Kacey Pop MS CCC-SLP 06/16/21 -                 EDUCATION  The patient has been educated in the following areas:   Dysphagia (Swallowing Impairment) Modified Diet Instruction.        SLP GOALS     Row Name 12/12/21 0728 12/11/21 0800 12/10/21 0745       Oral Nutrition/Hydration Goal 1 (SLP)    Oral Nutrition/Hydration Goal 1, SLP Pt will safely tolerate trials of PO solids/liquids w/no overt s/s of aspiration for establishment of safe PO diet.  -EC Pt will safely tolerate trials of PO solids/liquids w/no overt s/s of aspiration for establishment of safe PO diet.  -CK Pt will safely tolerate trials of PO solids/liquids w/no overt s/s of aspiration for establishment of safe PO diet.  -EC    Time Frame (Oral Nutrition/Hydration Goal 1, SLP) by discharge  -EC by discharge  -CK by discharge  -EC    Barriers (Oral Nutrition/Hydration Goal 1, SLP) cognition  -EC cognition  -CK cognition  -EC    Progress/Outcomes (Oral Nutrition/Hydration Goal 1, SLP) goal ongoing  -EC goal ongoing  -CK goal ongoing  -EC    Row Name 12/09/21 1200             Oral  Nutrition/Hydration Goal 1 (SLP)    Oral Nutrition/Hydration Goal 1, SLP Pt will safely tolerate trials of PO solids/liquids w/no overt s/s of aspiration for establishment of safe PO diet.  -EC      Time Frame (Oral Nutrition/Hydration Goal 1, SLP) by discharge  -EC      Barriers (Oral Nutrition/Hydration Goal 1, SLP) cognition  -EC      Progress/Outcomes (Oral Nutrition/Hydration Goal 1, SLP) goal ongoing  -EC            User Key  (r) = Recorded By, (t) = Taken By, (c) = Cosigned By    Initials Name Provider Type    Genet Henderson CCC-SLP Speech and Language Pathologist    Kacey Gambino, MS CCC-SLP Speech and Language Pathologist                   Time Calculation:    Time Calculation- SLP     Row Name 12/12/21 0940             Time Calculation- SLP    SLP Start Time 0728  -EC      SLP Stop Time 0806  -EC      SLP Time Calculation (min) 38 min  -EC      Total Timed Code Minutes- SLP 38 minute(s)  -EC      SLP Received On 12/12/21  -EC      SLP Goal Re-Cert Due Date 12/20/21  -EC              Untimed Charges    13773-IC Treatment Swallow Minutes 38  -EC              Total Minutes    Untimed Charges Total Minutes 38  -EC       Total Minutes 38  -EC            User Key  (r) = Recorded By, (t) = Taken By, (c) = Cosigned By    Initials Name Provider Type    Genet Henderson CCC-SLP Speech and Language Pathologist                Therapy Charges for Today     Code Description Service Date Service Provider Modifiers Qty    [id number] HC ST TREATMENT SWALLOW 3 12/12/2021 Genet Christensen CCC-SLP GN 1               CAITLIN Shrestha  12/12/2021

## 2021-12-12 NOTE — PLAN OF CARE
Goal Outcome Evaluation:  Plan of Care Reviewed With: patient        Progress: improving  Outcome Summary: ST dysphagia treatment provided this date.pt continues to demonstrate poor appetite.  she has good tolerance of mechanical soft solids with the few bites she takes.  dentures were not used this date as they do not improve the function of the oral phase or increase the amount of nutritional intake.  cough noted with thin liquids via cup rim in large bolus.  improved with decreased size.

## 2021-12-12 NOTE — PROGRESS NOTES
[facility] Medicine Services  INPATIENT PROGRESS NOTE    Length of Stay: 7  Date of Admission: 12/5/2021  Primary Care Physician: Yosef Casey MD    Subjective   Chief Complaint: Altered mental status  HPI: Some abdominal pain today.  States she does not feel as well as she has been.      Review of Systems   Constitutional: Negative for appetite change, chills, fatigue, fever and unexpected weight change.   Respiratory: Negative for cough, choking, chest tightness, shortness of breath and wheezing.    Cardiovascular: Negative for chest pain, palpitations and leg swelling.   Gastrointestinal: Negative for abdominal pain, blood in stool, constipation, diarrhea, nausea and vomiting.   Genitourinary: Negative for dysuria, flank pain and hematuria.   Neurological: Negative for dizziness, seizures, syncope, speech difficulty, weakness, light-headedness, numbness and headaches.   Hematological: Does not bruise/bleed easily.        All pertinent negatives and positives are as above. All other systems have been reviewed and are negative unless otherwise stated.     Objective    Temp:  [97.6 °F (36.4 °C)-97.8 °F (36.6 °C)] 97.8 °F (36.6 °C)  Heart Rate:  [] 106  Resp:  [16-18] 16  BP: (108-157)/(41-65) 131/61    Physical Exam  Vitals reviewed.   Constitutional:       Appearance: She is well-developed.   HENT:      Head: Normocephalic and atraumatic.   Eyes:      Pupils: Pupils are equal, round, and reactive to light.   Cardiovascular:      Rate and Rhythm: Normal rate and regular rhythm.      Heart sounds: Normal heart sounds. No murmur heard.  No friction rub. No gallop.    Pulmonary:      Effort: Pulmonary effort is normal. No respiratory distress.      Breath sounds: Normal breath sounds. No wheezing or rales.   Chest:      Chest wall: No tenderness.   Abdominal:      General: Bowel sounds are normal. There is no distension.      Palpations: Abdomen is soft.       Tenderness: There is no abdominal tenderness. There is no guarding.   Musculoskeletal:      Cervical back: Normal range of motion and neck supple.   Skin:     General: Skin is warm and dry.   Neurological:      Mental Status: She is alert.      Comments: Remains somewhat confused, likely near baseline.       Results Review:  I have reviewed the labs, radiology results, and diagnostic studies.    Laboratory Data:   Results from last 7 days   Lab Units 12/11/21 2153 12/11/21  0557 12/10/21  2050 12/10/21  0631 12/10/21  0631 12/09/21  0409 12/09/21  0409 12/09/21  0002 12/08/21  0503 12/07/21  0518 12/07/21  0518 12/06/21  0352 12/06/21  0352   SODIUM mmol/L  --  148*  --   --  142  --  144  --  153*   < > 146*   < > 141   POTASSIUM mmol/L 4.4 3.3* 3.9   < > 3.1*   < > 3.9   < > 2.6*   < > 3.3*   < > 4.1   CHLORIDE mmol/L  --  118*  --   --  111*  --  115*  --  122*   < > 116*   < > 114*   CO2 mmol/L  --  24.0  --   --  24.0  --  23.0  --  24.0   < > 22.0   < > 19.0*   BUN mg/dL  --  9  --   --  15  --  19  --  21   < > 23   < > 32*   CREATININE mg/dL  --  0.52*  --   --  0.55*  --  0.55*  --  0.62   < > 0.64   < > 0.72   GLUCOSE mg/dL  --  91  --   --  115*  --  215*  --  172*   < > 273*   < > 236*   CALCIUM mg/dL  --  8.8  --   --  8.9  --  9.4  --  10.3   < > 9.9   < > 10.2   BILIRUBIN mg/dL  --   --   --   --   --   --   --   --  0.6  --  0.7  --  0.4   ALK PHOS U/L  --   --   --   --   --   --   --   --  123*  --  126*  --  152*   ALT (SGPT) U/L  --   --   --   --   --   --   --   --  124*  --  124*  --  134*   AST (SGOT) U/L  --   --   --   --   --   --   --   --  98*  --  109*  --  169*   ANION GAP mmol/L  --  6.0  --   --  7.0  --  6.0  --  7.0   < > 8.0   < > 8.0    < > = values in this interval not displayed.     Estimated Creatinine Clearance: 80.5 mL/min (A) (by C-G formula based on SCr of 0.52 mg/dL (L)).  Results from last 7 days   Lab Units 12/11/21  2153 12/11/21  0557 12/08/21  0503 12/07/21  0518  12/06/21  0755   MAGNESIUM mg/dL  --   --  2.0 1.9 2.0   PHOSPHORUS mg/dL 3.3 2.5  --   --  2.6         Results from last 7 days   Lab Units 12/12/21  0524 12/11/21  0557 12/10/21  0631 12/09/21  0409 12/08/21  0503   WBC 10*3/mm3 16.49* 15.56* 13.67* 12.91* 14.85*   HEMOGLOBIN g/dL 11.8* 11.1* 11.9* 10.5* 11.5*   HEMATOCRIT % 36.7 33.9* 36.7 32.6* 35.6   PLATELETS 10*3/mm3 198 235 227 218 278     Results from last 7 days   Lab Units 12/08/21  0502   INR  1.40*       Culture Data:   No results found for: BLOODCX  No results found for: URINECX  No results found for: RESPCX  No results found for: WOUNDCX  No results found for: STOOLCX  No components found for: BODYFLD    Radiology Data:   Imaging Results (Last 24 Hours)     ** No results found for the last 24 hours. **          I have reviewed the patient's current medications.     Assessment/Plan     Active Hospital Problems    Diagnosis    • Sepsis (HCC)        Plan:  1.  DKA: Resolved.  2.  Sepsis: Cultures remain pending.  Continue empiric antibiotics.  White blood cell count continues to trend up slightly.  We will repeat cultures.  3.  Metabolic encephalopathy: Significantly better today.  Awake and alert.  Mostly oriented.  Likely near baseline.  4.  Elevated troponin: Trending down.  Dr. Calderón following.    5.  Atrial fibrillation: Continue home oral Cardizem.  6.  Hypothyroidism: Continue Synthroid.  7.  Dysphagia: Improving.  Safe for puréed solids and thin liquids.  Speech therapy following.  8.  DVT prophylaxis: Heparin.    The patient was evaluated during the global COVID-19 pandemic, and the diagnosis was suspected/considered upon their initial presentation.  Evaluation, treatment, and testing were consistent with current guidelines for patients who present with complaints or symptoms that may be related to COVID-19.    I confirmed that the patient's Advance Care Plan is present, code status is documented, or surrogate decision maker is listed in the  patient's medical record.       Discharge Planning: I expect patient to be discharged to skilled facility in 3-4 days.        This document has been electronically signed by Sukhjinder Trivedi MD on December 12, 2021 11:34 CST

## 2021-12-12 NOTE — PLAN OF CARE
Goal Outcome Evaluation:  Plan of Care Reviewed With: patient     Progress: no change  Outcome Summary: Pt reporting abdominal pain and nausea this morning, relief with zofran. WBC trending up and pt tachycardic and positive sepsis screen, provider aware and cardizem reordered -- HR now within normals. Several BMs today. VSS other than elevated HR this morning. Pt oriented today and having conversations with nursing staff.

## 2021-12-12 NOTE — PLAN OF CARE
Goal Outcome Evaluation:  Plan of Care Reviewed With: patient        Progress: improving  Outcome Summary: pt pleasant and cooperative. has been more alert this shift. v/s are stable. no s/s of distress noted or observed at this time. will continue to monitor.

## 2021-12-13 NOTE — PROGRESS NOTES
[facility] Medicine Services  INPATIENT PROGRESS NOTE    Length of Stay: 8  Date of Admission: 12/5/2021  Primary Care Physician: Yosef Casey MD    Subjective   Chief Complaint: Altered mental status  HPI: Some abdominal pain today.  States the pain is some better.  Feels worse after eating.      Review of Systems   Constitutional: Negative for appetite change, chills, fatigue, fever and unexpected weight change.   Respiratory: Negative for cough, choking, chest tightness, shortness of breath and wheezing.    Cardiovascular: Negative for chest pain, palpitations and leg swelling.   Gastrointestinal: Negative for abdominal pain, blood in stool, constipation, diarrhea, nausea and vomiting.   Genitourinary: Negative for dysuria, flank pain and hematuria.   Neurological: Negative for dizziness, seizures, syncope, speech difficulty, weakness, light-headedness, numbness and headaches.   Hematological: Does not bruise/bleed easily.        All pertinent negatives and positives are as above. All other systems have been reviewed and are negative unless otherwise stated.     Objective    Temp:  [97.3 °F (36.3 °C)-97.8 °F (36.6 °C)] 97.6 °F (36.4 °C)  Heart Rate:  [77-85] 77  Resp:  [16-18] 18  BP: (119-141)/(58-66) 124/58    Physical Exam  Vitals reviewed.   Constitutional:       Appearance: She is well-developed.   HENT:      Head: Normocephalic and atraumatic.   Eyes:      Pupils: Pupils are equal, round, and reactive to light.   Cardiovascular:      Rate and Rhythm: Normal rate and regular rhythm.      Heart sounds: Normal heart sounds. No murmur heard.  No friction rub. No gallop.    Pulmonary:      Effort: Pulmonary effort is normal. No respiratory distress.      Breath sounds: Normal breath sounds. No wheezing or rales.   Chest:      Chest wall: No tenderness.   Abdominal:      General: Bowel sounds are normal. There is no distension.      Palpations: Abdomen is  soft.      Tenderness: There is no abdominal tenderness. There is no guarding.   Musculoskeletal:      Cervical back: Normal range of motion and neck supple.   Skin:     General: Skin is warm and dry.   Neurological:      Mental Status: She is alert. Mental status is at baseline.       Results Review:  I have reviewed the labs, radiology results, and diagnostic studies.    Laboratory Data:   Results from last 7 days   Lab Units 12/13/21  0612 12/12/21  1037 12/11/21  2153 12/11/21  0557 12/11/21  0557 12/09/21  0002 12/08/21  0503 12/07/21  0518 12/07/21  0518   SODIUM mmol/L 141 141  --   --  148*   < > 153*   < > 146*   POTASSIUM mmol/L 3.9 4.5 4.4   < > 3.3*   < > 2.6*   < > 3.3*   CHLORIDE mmol/L 110* 110*  --   --  118*   < > 122*   < > 116*   CO2 mmol/L 22.0 20.0*  --   --  24.0   < > 24.0   < > 22.0   BUN mg/dL 10 13  --   --  9   < > 21   < > 23   CREATININE mg/dL 0.76 0.83  --   --  0.52*   < > 0.62   < > 0.64   GLUCOSE mg/dL 121* 397*  --   --  91   < > 172*   < > 273*   CALCIUM mg/dL 9.4 8.9  --   --  8.8   < > 10.3   < > 9.9   BILIRUBIN mg/dL  --   --   --   --   --   --  0.6  --  0.7   ALK PHOS U/L  --   --   --   --   --   --  123*  --  126*   ALT (SGPT) U/L  --   --   --   --   --   --  124*  --  124*   AST (SGOT) U/L  --   --   --   --   --   --  98*  --  109*   ANION GAP mmol/L 9.0 11.0  --   --  6.0   < > 7.0   < > 8.0    < > = values in this interval not displayed.     Estimated Creatinine Clearance: 80.5 mL/min (by C-G formula based on SCr of 0.76 mg/dL).  Results from last 7 days   Lab Units 12/11/21  2153 12/11/21  0557 12/08/21  0503 12/07/21  0518   MAGNESIUM mg/dL  --   --  2.0 1.9   PHOSPHORUS mg/dL 3.3 2.5  --   --          Results from last 7 days   Lab Units 12/13/21  0612 12/12/21  0524 12/11/21  0557 12/10/21  0631 12/09/21  0409   WBC 10*3/mm3 15.08* 16.49* 15.56* 13.67* 12.91*   HEMOGLOBIN g/dL 11.3* 11.8* 11.1* 11.9* 10.5*   HEMATOCRIT % 34.4 36.7 33.9* 36.7 32.6*   PLATELETS  10*3/mm3 302 198 235 227 218     Results from last 7 days   Lab Units 12/08/21  0502   INR  1.40*       Culture Data:   No results found for: BLOODCX  No results found for: URINECX  No results found for: RESPCX  No results found for: WOUNDCX  No results found for: STOOLCX  No components found for: BODYFLD    Radiology Data:   Imaging Results (Last 24 Hours)     ** No results found for the last 24 hours. **          I have reviewed the patient's current medications.     Assessment/Plan     Active Hospital Problems    Diagnosis    • Sepsis (HCC)        Plan:  1.  DKA: Resolved.  2.  Sepsis: Cultures remain pending.  Continue empiric antibiotics.  White blood cell count continues to trend up slightly.  We will repeat cultures.  3.  Metabolic encephalopathy: Significantly better today.  Awake and alert.  Mostly oriented.  Likely near baseline.  4.  Elevated troponin: Trending down.  Dr. Calderón following.    5.  Atrial fibrillation: Continue home oral Cardizem.  6.  Hypothyroidism: Continue Synthroid.  7.  Dysphagia: Improving.  Safe for puréed solids and thin liquids.  Speech therapy following.  Complaining of some abdominal pain today.  8.  DVT prophylaxis: Heparin.    The patient was evaluated during the global COVID-19 pandemic, and the diagnosis was suspected/considered upon their initial presentation.  Evaluation, treatment, and testing were consistent with current guidelines for patients who present with complaints or symptoms that may be related to COVID-19.    I confirmed that the patient's Advance Care Plan is present, code status is documented, or surrogate decision maker is listed in the patient's medical record.       Discharge Planning: I expect patient to be discharged to skilled facility in 3-4 days.        This document has been electronically signed by Sukhjinder Trivedi MD on December 13, 2021 11:07 CST

## 2021-12-13 NOTE — SIGNIFICANT NOTE
12/13/21 6499   OTHER   Discipline speech language pathologist   Rehab Time/Intention   Session Not Performed patient/family declined treatment   SLP spoke w/pt's POA who stated that pt wasn't waking to eat.  SLP discussed POC w/pt's POA and she declined MBS recommendations and possible need for NTL.  She stated that the pt would not want to do the study or be placed on NTL and did not want to lessen her quality of life.  SLP discussed d/c on current diet w/no straw restriction and pt's POA was in agreement.

## 2021-12-13 NOTE — NURSING NOTE
"Pt expressed to RN that she is \"tired of fighting\" and ready to \"be with the Lord when he is ready to take her.\" RN spoke with pt about goals of care, and discussed her code status (DNR) with her and pt expressed that her POA Violet knows her feelings about this situation and is the only person that understands and will carry out her wishes. Pt states, \"that's why she's my power of , because she respects the decision that I made\". Pt repeats that she is tired and that she has lost all of her family members and there is barely anyone left that cares about her. RN offered for patient to speak with hospital  and pt adamantly refused, stating that she does not want to talk about this situation with anyone else because she is too tired. Charge RN informed and will pass on to next RN.  "

## 2021-12-13 NOTE — PLAN OF CARE
Goal Outcome Evaluation:  Plan of Care Reviewed With: patient        Progress: improving  Outcome Summary: pt c/o of abdominal pain-prn medication given. v/s are stable. no s/s of distress noted or observed at this time. will continue to monitor.

## 2021-12-13 NOTE — SIGNIFICANT NOTE
12/13/21 1032   OTHER   Discipline physical therapy assistant   Rehab Time/Intention   Session Not Performed patient/family declined treatment  (pt. deferred therapy and with encouragment still deferred due to pt. stated I dont feel good at all)

## 2021-12-13 NOTE — PLAN OF CARE
Goal Outcome Evaluation:  Plan of Care Reviewed With: friend        Progress: improving  Outcome Summary: Pt eating 0-50%, per visitor present, pt has not had good po intake for some time. Will drink Boost at snf---RD added. Will monitor.

## 2021-12-13 NOTE — CONSULTS
"Adult Nutrition  Assessment    Patient Name:  Janelle Millard  YOB: 1955  MRN: [id number]  Admit Date:  12/5/2021    Assessment Date:  12/13/2021    Comments: Pt continues on abx tx. Pt continues w/vairable po intake (0-50%), pt sleeping at time of visit, but visitor present says pt not eating well \"is nothing new\". Pt did take Boost GC at [facility]---RD added. Weight has been stable. Per notes, pt w/little motivation and has said she is \"tired of fighting\". RD will monitor course and make recs as indicated.      Reason for Assessment     Row Name 12/13/21 1246          Reason for Assessment    Reason For Assessment follow-up protocol                Nutrition/Diet History     Row Name 12/13/21 1246          Nutrition/Diet History    Typical Food/Fluid Intake Pt sleeping, spoke w/visitor in room who reports doesn't eat well, \"but that isn't new\". Visitor says pt drinks Boost at [facility].                  Labs/Tests/Procedures/Meds     Row Name 12/13/21 1247          Labs/Procedures/Meds    Lab Results Reviewed reviewed            Medications    Pertinent Medications Reviewed reviewed                    Nutrition Prescription Ordered     Row Name 12/13/21 1248          Nutrition Prescription PO    Current PO Diet Soft Texture     Texture Ground     Fluid Consistency Thin     Supplement Ice Cream; Milk     Common Modifiers Consistent Carbohydrate                Evaluation of Received Nutrient/Fluid Intake     Row Name 12/13/21 1248          PO Evaluation    Number of Meals 6     % PO Intake 50 x 3, 25 x 2, 0 x1                     Electronically signed by:  Kym Cohn RD  12/13/21 12:54 CST  "

## 2021-12-14 NOTE — THERAPY TREATMENT NOTE
Patient Name: Janelle Millard  : 1955    MRN: [id number]                              Today's Date: 2021       Admit Date: 2021    Visit Dx:     ICD-10-CM ICD-9-CM   1. Sepsis without acute organ dysfunction, due to unspecified organism ([facility])  A41.9 038.9     995.91   2. Pneumonia of both lower lobes due to infectious organism  J18.9 486   3. Acute cystitis with hematuria  N30.01 595.0   4. Leukocytosis, unspecified type  D72.829 288.60   5. Altered mental status, unspecified altered mental status type  R41.82 780.97   6. Elevated liver enzymes  R74.8 790.5   7. Atherosclerosis of native coronary artery of native heart without angina pectoris  I25.10 414.01   8. Elevated troponin  R77.8 790.6   9. Atherosclerosis of autologous vein coronary artery bypass graft with unstable angina pectoris ([facility])  I25.710 414.02     411.1   10. Oropharyngeal dysphagia  R13.12 787.22   11. Impaired functional mobility, balance, gait, and endurance  Z74.09 V49.89   12. Impaired mobility and ADLs  Z74.09 V49.89    Z78.9      Patient Active Problem List   Diagnosis   • Carpal tunnel syndrome of left wrist   • Type 1 diabetes mellitus with diabetic polyneuropathy ([facility])   • Mixed diabetic hyperlipidemia associated with type 1 diabetes mellitus ([facility])   • Hypertension associated with diabetes ([facility])   • Syncope and collapse   • CAD (coronary artery disease)   • Asthma   • Depressive disorder, not elsewhere classified   • Neuropathy   • Other specified rheumatoid arthritis, multiple sites ([facility])   • Carpal tunnel syndrome on both sides   • Bilateral carotid artery stenosis   • Overweight (BMI 25.0-29.9)   • Precordial pain   • Sepsis with encephalopathy without septic shock ([facility])   • Tobacco abuse   • UTI (urinary tract infection)   • Ureteral stone with hydronephrosis   • Chest pain, rule out acute myocardial infarction   • Chest pain   • Chest pain with high risk for cardiac etiology   • Sepsis ([facility])     Past Medical  History:   Diagnosis Date   • Arthritis, rheumatoid (HCC)    • Arthropathy associated with neurological disorder    • Arthropathy of lumbar facet joint    • Asthma    • Benign hypertension    • Carpal tunnel syndrome    • Diabetes (HCC)    • Diabetic polyneuropathy (HCC)    • Dyslipidemia    • Fallen arches    • Hammer toe    • Heart disease    • Joint pain    • Mild depression (HCC)     Saddness post Surgery 7/10/14 improved after counseling.   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 Diffusely calcified LAD with 30-80% stenosis. OMB #3 with 60% to 70% stenosis. Preserved LV systolic function with EF of 55%.   • CARPAL TUNNEL RELEASE Right 10/15/2014    Right carpal tunnel release.   • CARPAL TUNNEL RELEASE     • CATARACT EXTRACTION, BILATERAL Bilateral    • CORONARY ARTERY BYPASS GRAFT  02/24/2014    CABG x 3 with LIMA to LAD, endarterectomy to LAD, SVG to OMB and SVG to distal RCA with endarterectomy to distal RCA. Endo-vein harvesting.   • CYSTECTOMY Left     Breast cycst   • CYSTOSCOPY, URETEROSCOPY, RETROGRADE PYELOGRAM, STENT INSERTION Left 3/10/2021    Procedure: , LEFT RETROGRADE PYELOGRAM, STENT INSERTION LEFT URETER;  Surgeon: [place], Cooper NOLAN MD;  Location: [facility];  Service: Urology;  Laterality: Left;   • EXCISION LESION      Remove breast lesion - cyst   • EYE SURGERY      Insert lens prosthesis   • FOOT SURGERY  10/18/2011    Exostectomy of the right foot. Preulcerative lesion with Charot deformity, right foot   • LUMBAR SPINE SURGERY  11/09/2015    Lumbosacral radiofrequency thermal coagulation.   • NERVE  BLOCK  09/14/2015    Lumbar medial branch block.   • TOE NAIL AVULSION  09/03/2015    DEBRIDE NAIL 6 OR MORE 13009 (1)      General Information     Row Name 12/14/21 0729          OT Time and Intention    Document Type therapy note (daily note)  -BB     Mode of Treatment occupational therapy; individual therapy  -BB     Row Name 12/14/21 0729          General Information    Patient Profile Reviewed yes  -BB     Existing Precautions/Restrictions fall  -BB     Row Name 12/14/21 0729          Cognition    Orientation Status (Cognition) oriented to; person; place  -BB     Row Name 12/14/21 0729          Safety Issues, Functional Mobility    Impairments Affecting Function (Mobility) balance; endurance/activity tolerance  -BB           User Key  (r) = Recorded By, (t) = Taken By, (c) = Cosigned By    Initials Name Provider Type    BB Zoey Carolina COTA Occupational Therapy Assistant                 Mobility/ADL's     Row Name 12/14/21 0729          Bed Mobility    Bed Mobility supine-sit; sit-supine; scooting/bridging; rolling left; rolling right  -BB     All Activities, [place] (Bed Mobility) maximum assist (25% patient effort); 2 person assist  -BB     Rolling Left [place] (Bed Mobility) minimum assist (75% patient effort)  -BB     Rolling Right [place] (Bed Mobility) minimum assist (75% patient effort)  -BB     Scooting/Bridging [place] (Bed Mobility) dependent (less than 25% patient effort); 2 person assist  -BB     Bed Mobility, Safety Issues cognitive deficits limit understanding; decreased use of arms for pushing/pulling; decreased use of legs for bridging/pushing  -BB     Row Name 12/14/21 0729          Activities of Daily Living    BADL Assessment/Intervention grooming; toileting; feeding  -     Row Name 12/14/21 0729          Lower Body Dressing Assessment/Training    [place] Level (Lower Body Dressing) doff; don; socks; dependent (less than 25% patient effort)  -BB     Position  (Lower Body Dressing) supine  -[facility] Name 12/14/21 0729          Grooming Assessment/Training    [place] Level (Grooming) hair care, combing/brushing; moderate assist (50% patient effort); wash face, hands; standby assist  -BB     Position (Grooming) sitting up in bed  -[facility] Name 12/14/21 0729          Toileting Assessment/Training    [place] Level (Toileting) adjust/manage clothing; perform perineal hygiene; dependent (less than 25% patient effort)  -     Assistive Devices (Toileting) bedpan  -[facility] Name 12/14/21 0729          Self-Feeding Assessment/Training    [place] Level (Feeding) liquids to mouth; standby assist; prepare tray/open items; maximum assist (25% patient effort); scoop food and bring to mouth; contact guard assist  -BB     Position (Self-Feeding) sitting up in bed  -           User Key  (r) = Recorded By, (t) = Taken By, (c) = Cosigned By    Initials Name Provider Type    BB Zoey Carolina COTA Occupational Therapy Assistant               Obj/Interventions    No documentation.                Goals/Plan     [facility] Name 12/14/21 0729          Grooming Goal 1 (OT)    Activity/Device (Grooming Goal 1, OT) oral care; wash face, hands; built-up handle items  -BB     [place] (Grooming Goal 1, OT) minimum assist (75% or more patient effort)  -BB     Time Frame (Grooming Goal 1, OT) long term goal (LTG); by discharge  -BB     Progress/Outcome (Grooming Goal 1, OT) goal not met  -[facility] Name 12/14/21 0729          Self-Feeding Goal 1 (OT)    Activity/Device (Self-Feeding Goal 1, OT) self-feeding skills, all  -     [place] Level/Cues Needed (Self-Feeding Goal 1, OT) minimum assist (75% or more patient effort)  -BB     Time Frame (Self-Feeding Goal 1, OT) long term goal (LTG); by discharge  -BB     Progress/Outcomes (Self-Feeding Goal 1, OT) goal met  -[facility] Name 12/14/21 0729          Problem Specific Goal 1 (OT)    Problem Specific Goal 1 (OT)  Patient to participate in BUE HEP with min A to increase strength and endurance for ADLs and EOB sitting tolerance.  -BB     Time Frame (Problem Specific Goal 1, OT) long term goal (LTG); by discharge  -BB     Progress/Outcome (Problem Specific Goal 1, OT) goal not met  -BB           User Key  (r) = Recorded By, (t) = Taken By, (c) = Cosigned By    Initials Name Provider Type    Zoey Stover COTA Occupational Therapy Assistant               Clinical Impression     Row Name 12/14/21 0729          Pain Scale: Numbers Pre/Post-Treatment    Pretreatment Pain Rating 5/10  -BB     Posttreatment Pain Rating 5/10  -BB     Pain Location back  -BB     Pain Intervention(s) Repositioned  -BB     Row Name 12/14/21 0729          Pain Scale: FACES Pre/Post-Treatment    Pain: FACES Scale, Pretreatment 0-->no hurt  -BB     Posttreatment Pain Rating 0-->no hurt  -BB     Row Name 12/14/21 0729          Plan of Care Review    Plan of Care Reviewed With patient  -BB     Progress no change  -BB     Outcome Summary Pt is SBA to wash face and hands, Mod  A for combing hair. Pt is SBA for taking cup to mouth and CGA for self feeding with utensil. Continue OT POC  -BB     Row Name 12/14/21 0729          Therapy Assessment/Plan (OT)    Rehab Potential (OT) good, to achieve stated therapy goals  -BB     Criteria for Skilled Therapeutic Interventions Met (OT) yes; skilled treatment is necessary  -BB     Therapy Frequency (OT) other (see comments)  3-5 d/wk  -BB     Row Name 12/14/21 0729          Therapy Plan Review/Discharge Plan (OT)    Anticipated Discharge Disposition (OT) skilled nursing facility  -BB     Row Name 12/14/21 0729          Positioning and Restraints    Pre-Treatment Position in bed  -BB     Post Treatment Position bed  -BB     In Bed fowlers; call light within reach; encouraged to call for assist; exit alarm on  -BB           User Key  (r) = Recorded By, (t) = Taken By, (c) = Cosigned By    Initials Name Provider  Type    Zoey Stover COTA Occupational Therapy Assistant               Outcome Measures     Row Name 12/14/21 0729          How much help from another is currently needed...    Putting on and taking off regular lower body clothing? 1  -BB     Bathing (including washing, rinsing, and drying) 1  -BB     Toileting (which includes using toilet bed pan or urinal) 1  -BB     Putting on and taking off regular upper body clothing 1  -BB     Taking care of personal grooming (such as brushing teeth) 2  -BB     Eating meals 3  -BB     AM-PAC 6 Clicks Score (OT) 9  -BB           User Key  (r) = Recorded By, (t) = Taken By, (c) = Cosigned By    Initials Name Provider Type    Zoey Stover COTA Occupational Therapy Assistant                Occupational Therapy Education                 Title: PT OT SLP Therapies (In Progress)     Topic: Occupational Therapy (In Progress)     Point: ADL training (In Progress)     Description:   Instruct learner(s) on proper safety adaptation and remediation techniques during self care or transfers.   Instruct in proper use of assistive devices.              Learning Progress Summary           Patient Acceptance, E, NR by BB at 12/14/2021 1016                   Point: Home exercise program (Not Started)     Description:   Instruct learner(s) on appropriate technique for monitoring, assisting and/or progressing therapeutic exercises/activities.              Learner Progress:  Not documented in this visit.          Point: Precautions (In Progress)     Description:   Instruct learner(s) on prescribed precautions during self-care and functional transfers.              Learning Progress Summary           Patient Acceptance, E,TB, NR by SJ at 12/10/2021 1332    Comment: POC, role of OT, goals                   Point: Body mechanics (In Progress)     Description:   Instruct learner(s) on proper positioning and spine alignment during self-care, functional mobility activities and/or  exercises.              Learning Progress Summary           Patient Acceptance, E,TB, NR by  at 12/10/2021 1332    Comment: POC, role of OT, goals                               User Key     Initials Effective Dates Name Provider Type Discipline     06/16/21 -  Zoey Carolina COTA Occupational Therapy Assistant OT     06/14/21 -  Tomi Burdick, OT Occupational Therapist OT              OT Recommendation and Plan  Therapy Frequency (OT): other (see comments) (3-5 d/wk)  Plan of Care Review  Plan of Care Reviewed With: patient  Progress: no change  Outcome Summary: Pt is SBA to wash face and hands, Mod  A for combing hair. Pt is SBA for taking cup to mouth and CGA for self feeding with utensil. Continue OT POC     Time Calculation:    Time Calculation- OT     Row Name 12/14/21 1016             Time Calculation- OT    OT Start Time 0729  -BB      OT Stop Time 0839  -BB      OT Time Calculation (min) 70 min  -BB      Total Timed Code Minutes- OT 70 minute(s)  -BB      OT Received On 12/14/21  -BB              Timed Charges    55464 - OT Self Care/Mgmt Minutes 70  -BB              Total Minutes    Timed Charges Total Minutes 70  -BB       Total Minutes 70  -BB            User Key  (r) = Recorded By, (t) = Taken By, (c) = Cosigned By    Initials Name Provider Type    BB Zoey Carolina COTA Occupational Therapy Assistant              Therapy Charges for Today     Code Description Service Date Service Provider Modifiers Qty    [id number] HC OT SELF CARE/MGMT/TRAIN EA 15 MIN 12/14/2021 Zoey Carolina COTA GO 5               JACQUIE Maciel  12/14/2021

## 2021-12-14 NOTE — SIGNIFICANT NOTE
12/14/21 0948   OTHER   Discipline physical therapy assistant   Rehab Time/Intention   Session Not Performed patient/family declined treatment  (pt is very pleasant but declines all tx options despite encouragment.)

## 2021-12-14 NOTE — PLAN OF CARE
Goal Outcome Evaluation:            Patient aspiration precautions maintained no straws, patient pain controlled.  Bed bath performed by nursing assistants and powder and z guard utilized in groin region due to rash will leave open to air at this time and bed alarm activated

## 2021-12-14 NOTE — PROGRESS NOTES
[facility]  INPATIENT PROGRESS NOTE    Length of Stay: 9  Date of Admission: 12/5/2021  Primary Care Physician: Yosef Casey MD    Subjective   Chief Complaint: Altered mental status  HPI: Abdominal pain feels better.  She states that overall she is doing well.      Review of Systems   Constitutional: Positive for fatigue. Negative for appetite change, chills, fever and unexpected weight change.   Respiratory: Negative for cough, choking, chest tightness, shortness of breath and wheezing.    Cardiovascular: Negative for chest pain, palpitations and leg swelling.   Gastrointestinal: Negative for abdominal pain, blood in stool, constipation, diarrhea, nausea and vomiting.   Genitourinary: Negative for dysuria, flank pain and hematuria.   Neurological: Positive for weakness. Negative for dizziness, seizures, syncope, speech difficulty, light-headedness, numbness and headaches.   Hematological: Does not bruise/bleed easily.        All pertinent negatives and positives are as above. All other systems have been reviewed and are negative unless otherwise stated.     Objective    Temp:  [96.9 °F (36.1 °C)-99.2 °F (37.3 °C)] 97.6 °F (36.4 °C)  Heart Rate:  [65-80] 80  Resp:  [18] 18  BP: (119-141)/(57-64) 141/64    Physical Exam  Vitals reviewed.   Constitutional:       Appearance: She is well-developed.   HENT:      Head: Normocephalic and atraumatic.   Eyes:      Pupils: Pupils are equal, round, and reactive to light.   Cardiovascular:      Rate and Rhythm: Normal rate and regular rhythm.      Heart sounds: Normal heart sounds. No murmur heard.  No friction rub. No gallop.    Pulmonary:      Effort: Pulmonary effort is normal. No respiratory distress.      Breath sounds: Normal breath sounds. No wheezing or rales.   Chest:      Chest wall: No tenderness.   Abdominal:      General: Bowel sounds are normal. There is no distension.      Palpations:  Abdomen is soft.      Tenderness: There is no abdominal tenderness. There is no guarding.   Musculoskeletal:      Cervical back: Normal range of motion and neck supple.   Skin:     General: Skin is warm and dry.   Neurological:      Mental Status: She is alert. Mental status is at baseline.       Results Review:  I have reviewed the labs, radiology results, and diagnostic studies.    Laboratory Data:   Results from last 7 days   Lab Units 12/14/21  0506 12/13/21  0612 12/12/21  1037 12/09/21  0002 12/08/21  0503   SODIUM mmol/L 141 141 141   < > 153*   POTASSIUM mmol/L 4.1 3.9 4.5   < > 2.6*   CHLORIDE mmol/L 111* 110* 110*   < > 122*   CO2 mmol/L 24.0 22.0 20.0*   < > 24.0   BUN mg/dL 10 10 13   < > 21   CREATININE mg/dL 0.81 0.76 0.83   < > 0.62   GLUCOSE mg/dL 162* 121* 397*   < > 172*   CALCIUM mg/dL 8.9 9.4 8.9   < > 10.3   BILIRUBIN mg/dL  --   --   --   --  0.6   ALK PHOS U/L  --   --   --   --  123*   ALT (SGPT) U/L  --   --   --   --  124*   AST (SGOT) U/L  --   --   --   --  98*   ANION GAP mmol/L 6.0 9.0 11.0   < > 7.0    < > = values in this interval not displayed.     Estimated Creatinine Clearance: 80.4 mL/min (by C-G formula based on SCr of 0.81 mg/dL).  Results from last 7 days   Lab Units 12/11/21  2153 12/11/21  0557 12/08/21  0503   MAGNESIUM mg/dL  --   --  2.0   PHOSPHORUS mg/dL 3.3 2.5  --          Results from last 7 days   Lab Units 12/14/21  0506 12/13/21  0612 12/12/21  0524 12/11/21  0557 12/10/21  0631   WBC 10*3/mm3 12.88* 15.08* 16.49* 15.56* 13.67*   HEMOGLOBIN g/dL 10.5* 11.3* 11.8* 11.1* 11.9*   HEMATOCRIT % 32.1* 34.4 36.7 33.9* 36.7   PLATELETS 10*3/mm3 278 302 198 235 227     Results from last 7 days   Lab Units 12/08/21  0502   INR  1.40*       Culture Data:   No results found for: BLOODCX  No results found for: URINECX  No results found for: RESPCX  No results found for: WOUNDCX  No results found for: STOOLCX  No components found for: BODYFLD    Radiology Data:   Imaging Results  (Last 24 Hours)     ** No results found for the last 24 hours. **          I have reviewed the patient's current medications.     Assessment/Plan     Active Hospital Problems    Diagnosis    • Sepsis (HCC)        Plan:  1.  DKA: Resolved.  2.  Sepsis: Resolved.  Completed antibiotic course.  White count trending down.  3.  Metabolic encephalopathy:  Awake and alert.  Mostly oriented.  Likely near baseline.  4.  Elevated troponin: Trending down.  Dr. Calderón following.    5.  Atrial fibrillation: Continue home oral Cardizem.  6.  Hypothyroidism: Continue Synthroid.  7.  Dysphagia: Improving.  Safe for puréed solids and thin liquids.  Speech therapy following.  8.  DVT prophylaxis: Heparin.    The patient was evaluated during the global COVID-19 pandemic, and the diagnosis was suspected/considered upon their initial presentation.  Evaluation, treatment, and testing were consistent with current guidelines for patients who present with complaints or symptoms that may be related to COVID-19.    I confirmed that the patient's Advance Care Plan is present, code status is documented, or surrogate decision maker is listed in the patient's medical record.       Discharge Planning: I expect patient to be discharged to skilled facility in 1-2 days.        This document has been electronically signed by Sukhjinder Trivedi MD on December 14, 2021 11:21 CST

## 2021-12-15 NOTE — THERAPY TREATMENT NOTE
Acute Care - Physical Therapy Treatment Note  [facility]     Patient Name: Janelle Millard  : 1955  MRN: [id number]  Today's Date: 12/15/2021      Visit Dx:     ICD-10-CM ICD-9-CM   1. Sepsis without acute organ dysfunction, due to unspecified organism ([facility])  A41.9 038.9     995.91   2. Pneumonia of both lower lobes due to infectious organism  J18.9 486   3. Acute cystitis with hematuria  N30.01 595.0   4. Leukocytosis, unspecified type  D72.829 288.60   5. Altered mental status, unspecified altered mental status type  R41.82 780.97   6. Elevated liver enzymes  R74.8 790.5   7. Atherosclerosis of native coronary artery of native heart without angina pectoris  I25.10 414.01   8. Elevated troponin  R77.8 790.6   9. Atherosclerosis of autologous vein coronary artery bypass graft with unstable angina pectoris ([facility])  I25.710 414.02     411.1   10. Oropharyngeal dysphagia  R13.12 787.22   11. Impaired functional mobility, balance, gait, and endurance  Z74.09 V49.89   12. Impaired mobility and ADLs  Z74.09 V49.89    Z78.9      Patient Active Problem List   Diagnosis   • Carpal tunnel syndrome of left wrist   • Type 1 diabetes mellitus with diabetic polyneuropathy ([facility])   • Mixed diabetic hyperlipidemia associated with type 1 diabetes mellitus ([facility])   • Hypertension associated with diabetes ([facility])   • Syncope and collapse   • CAD (coronary artery disease)   • Asthma   • Depressive disorder, not elsewhere classified   • Neuropathy   • Other specified rheumatoid arthritis, multiple sites ([facility])   • Carpal tunnel syndrome on both sides   • Bilateral carotid artery stenosis   • Overweight (BMI 25.0-29.9)   • Precordial pain   • Sepsis with encephalopathy without septic shock ([facility])   • Tobacco abuse   • UTI (urinary tract infection)   • Ureteral stone with hydronephrosis   • Chest pain, rule out acute myocardial infarction   • Chest pain   • Chest pain with high risk for cardiac etiology   • Sepsis ([facility])     Past  Medical History:   Diagnosis Date   • Arthritis, rheumatoid (HCC)    • Arthropathy associated with neurological disorder    • Arthropathy of lumbar facet joint    • Asthma    • Benign hypertension    • Carpal tunnel syndrome    • Diabetes (HCC)    • Diabetic polyneuropathy (HCC)    • Dyslipidemia    • Fallen arches    • Hammer toe    • Heart disease    • Joint pain    • Mild depression (HCC)     Saddness post Surgery 7/10/14 improved after counseling.   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 Diffusely calcified LAD with 30-80% stenosis. OMB #3 with 60% to 70% stenosis. Preserved LV systolic function with EF of 55%.   • CARPAL TUNNEL RELEASE Right 10/15/2014    Right carpal tunnel release.   • CARPAL TUNNEL RELEASE     • CATARACT EXTRACTION, BILATERAL Bilateral    • CORONARY ARTERY BYPASS GRAFT  02/24/2014    CABG x 3 with LIMA to LAD, endarterectomy to LAD, SVG to OMB and SVG to distal RCA with endarterectomy to distal RCA. Endo-vein harvesting.   • CYSTECTOMY Left     Breast cycst   • CYSTOSCOPY, URETEROSCOPY, RETROGRADE PYELOGRAM, STENT INSERTION Left 3/10/2021    Procedure: , LEFT RETROGRADE PYELOGRAM, STENT INSERTION LEFT URETER;  Surgeon: Charleston, Cooper NOLAN MD;  Location: [facility];  Service: Urology;  Laterality: Left;   • EXCISION LESION      Remove breast lesion - cyst   • EYE SURGERY      Insert lens prosthesis   • FOOT SURGERY  10/18/2011    Exostectomy of the right foot. Preulcerative lesion with Charot deformity, right foot   • LUMBAR SPINE SURGERY  11/09/2015    Lumbosacral radiofrequency thermal coagulation.   •  NERVE BLOCK  09/14/2015    Lumbar medial branch block.   • TOE NAIL AVULSION  09/03/2015    DEBRIDE NAIL 6 OR MORE 63809 (1)     PT Assessment (last 12 hours)     PT Evaluation and Treatment     Row Name 12/15/21 0959          Physical Therapy Time and Intention    Subjective Information complains of; pain  -TA     Document Type therapy note (daily note)  -TA     Mode of Treatment physical therapy  -TA     Comment pt defered EOB/OOB  -TA     Row Name 12/15/21 0959          General Information    Patient Profile Reviewed yes  -TA     Existing Precautions/Restrictions fall  -TA     Row Name 12/15/21 0959          Cognition    Orientation Status (Cognition) oriented to; person; place  -TA     Personal Safety Interventions fall prevention program maintained; gait belt; muscle strengthening facilitated; nonskid shoes/slippers when out of bed; supervised activity  -TA     Row Name 12/15/21 0959          Pain Scale: Numbers Pre/Post-Treatment    Pretreatment Pain Rating 6/10  -TA     Posttreatment Pain Rating 6/10  -TA     Pain Location - Orientation lower  -TA     Pain Location extremity; back  -TA     Row Name 12/15/21 0959          Bed Mobility    Rolling Left [place] (Bed Mobility) not tested  -TA     Rolling Right [place] (Bed Mobility) not tested  -TA     Scooting/Bridging [place] (Bed Mobility) not tested  -TA     Supine-Sit [place] (Bed Mobility) not tested  -TA     Sit-Supine [place] (Bed Mobility) not tested  -TA     Row Name 12/15/21 0959          Safety Issues, Functional Mobility    Impairments Affecting Function (Mobility) balance; endurance/activity tolerance  -TA     Row Name 12/15/21 0959          Hip (Therapeutic Exercise)    Hip (Therapeutic Exercise) AROM (active range of motion); isometric exercises  -TA     Hip AROM (Therapeutic Exercise) bilateral; flexion; aBduction; aDduction; supine; 10 repetitions  -TA     Hip Isometrics (Therapeutic Exercise) bilateral; gluteal sets;  supine; 10 repetitions  -TA     Row Name 12/15/21 0959          Knee (Therapeutic Exercise)    Knee (Therapeutic Exercise) AROM (active range of motion); isometric exercises  -TA     Knee AROM (Therapeutic Exercise) bilateral; SAQ (short arc quad); heel slides; supine; 10 repetitions  -TA     Knee Isometrics (Therapeutic Exercise) bilateral; quad sets; supine; 10 repetitions  -TA     Row Name 12/15/21 0959          Ankle (Therapeutic Exercise)    Ankle (Therapeutic Exercise) AROM (active range of motion)  -TA     Ankle AROM (Therapeutic Exercise) bilateral; dorsiflexion; plantarflexion; supine; 10 repetitions  -TA     Row Name             Wound 12/05/21 0830 upper coccyx MASD (Moisture associated skin damage)    Wound - Properties Group Placement Date: 12/05/21  -CR Placement Time: 0830  -CR Present on Hospital Admission: Y  -CR Orientation: upper  -CR Location: coccyx  -CR Primary Wound Type: MASD  -CR     Retired Wound - Properties Group Date first assessed: 12/05/21  -CR Time first assessed: 0830  -CR Present on Hospital Admission: Y  -CR Location: coccyx  -CR Primary Wound Type: MASD  -CR     Row Name             Wound 12/09/21 2207 Bilateral medial coccyx Pressure Injury    Wound - Properties Group Placement Date: 12/09/21  -AP Placement Time: 2207  -AP Present on Hospital Admission: Y  -AP Side: Bilateral  -AP Orientation: medial  -AP Location: coccyx  -AP Primary Wound Type: Pressure inj  -AP Stage, Pressure Injury : Stage 3  -AP     Retired Wound - Properties Group Date first assessed: 12/09/21  -AP Time first assessed: 2207  -AP Present on Hospital Admission: Y  -AP Side: Bilateral  -AP Location: coccyx  -AP Primary Wound Type: Pressure inj  -AP     Row Name 12/15/21 0959          Plan of Care Review    Plan of Care Reviewed With patient  -TA     Outcome Summary pt performed AROM B LE exercises in supine  -TA     Row Name 12/15/21 0959          Vital Signs    Pre Systolic BP Rehab 137  -TA     Pre Treatment  Diastolic BP 62  -TA     Post Systolic BP Rehab 144  -TA     Post Treatment Diastolic BP 64  -TA     Pretreatment Heart Rate (beats/min) 83  -TA     Posttreatment Heart Rate (beats/min) 86  -TA     Pre SpO2 (%) 95  -TA     O2 Delivery Pre Treatment room air  -TA     Post SpO2 (%) 95  -TA     O2 Delivery Post Treatment room air  -TA     Pre Patient Position Supine  -TA     Intra Patient Position Supine  -TA     Post Patient Position Supine  -TA     Row Name 12/15/21 0959          Positioning and Restraints    Pre-Treatment Position in bed  -TA     Post Treatment Position bed  -TA     In Bed supine; call light within reach; exit alarm on  -TA     Row Name 12/15/21 0959          Therapy Assessment/Plan (PT)    Comment, Therapy Assessment/Plan (PT) continue  -TA           User Key  (r) = Recorded By, (t) = Taken By, (c) = Cosigned By    Initials Name Provider Type    Junie Clemens PTA Physical Therapy Assistant    Rebecca Guillory, RN Registered Nurse    Sophia Guallpa, RN Registered Nurse                Physical Therapy Education                 Title: PT OT SLP Therapies (In Progress)     Topic: Physical Therapy (In Progress)     Point: Mobility training (In Progress)     Learning Progress Summary           Patient Acceptance, E, NL by  at 12/10/2021 1328    Comment: Role of PT, POC                   Point: Home exercise program (Not Started)     Learner Progress:  Not documented in this visit.          Point: Body mechanics (Not Started)     Learner Progress:  Not documented in this visit.          Point: Precautions (In Progress)     Learning Progress Summary           Patient Acceptance, E, NL by  at 12/10/2021 1328    Comment: Role of PT, POC                               User Key     Initials Effective Dates Name Provider Type Discipline     06/16/21 -  Анна Mata, PT Physical Therapist PT              PT Recommendation and Plan  Anticipated Discharge Disposition (PT): skilled nursing  facility  Plan of Care Reviewed With: patient  Outcome Summary: pt performed AROM B LE exercises in supine   Outcome Measures     Row Name 12/15/21 1200             How much help from another person do you currently need...    Turning from your back to your side while in flat bed without using bedrails? 1  -TA      Moving from lying on back to sitting on the side of a flat bed without bedrails? 1  -TA      Moving to and from a bed to a chair (including a wheelchair)? 1  -TA      Standing up from a chair using your arms (e.g., wheelchair, bedside chair)? 1  -TA      Climbing 3-5 steps with a railing? 1  -TA      To walk in hospital room? 1  -TA      AM-PAC 6 Clicks Score (PT) 6  -TA              Functional Assessment    Outcome Measure Options AM-PAC 6 Clicks Basic Mobility (PT)  -TA            User Key  (r) = Recorded By, (t) = Taken By, (c) = Cosigned By    Initials Name Provider Type    Junie Clemens PTA Physical Therapy Assistant                 Time Calculation:    PT Charges     Row Name 12/15/21 1252             Time Calculation    Start Time 0959  -TA      Stop Time 1033  -TA      Time Calculation (min) 34 min  -TA      PT Received On 12/15/21  -TA              Time Calculation- PT    Total Timed Code Minutes- PT 34 minute(s)  -TA              Timed Charges    52400 - PT Therapeutic Exercise Minutes 34  -TA              Total Minutes    Timed Charges Total Minutes 34  -TA       Total Minutes 34  -TA            User Key  (r) = Recorded By, (t) = Taken By, (c) = Cosigned By    Initials Name Provider Type    Junie Clemens PTA Physical Therapy Assistant              Therapy Charges for Today     Code Description Service Date Service Provider Modifiers Qty    53618144406 HC PT THER PROC EA 15 MIN 12/15/2021 Junie Perry PTA GP 2          PT G-Codes  Outcome Measure Options: AM-PAC 6 Clicks Basic Mobility (PT)  AM-PAC 6 Clicks Score (PT): 6  AM-PAC 6 Clicks Score (OT): 9    Junie Perry  PTA  12/15/2021

## 2021-12-15 NOTE — PLAN OF CARE
Goal Outcome Evaluation:  Plan of Care Reviewed With: patient        Progress: no change  Outcome Summary: vss; c/o back pain; prn pain med effective; no s/s of distress at this time; resting comfortably; will continue to monitor

## 2021-12-15 NOTE — THERAPY TREATMENT NOTE
Patient Name: Janelle Millard  : 1955    MRN: [id number]                              Today's Date: 12/15/2021       Admit Date: 2021    Visit Dx:     ICD-10-CM ICD-9-CM   1. Sepsis without acute organ dysfunction, due to unspecified organism ([facility])  A41.9 038.9     995.91   2. Pneumonia of both lower lobes due to infectious organism  J18.9 486   3. Acute cystitis with hematuria  N30.01 595.0   4. Leukocytosis, unspecified type  D72.829 288.60   5. Altered mental status, unspecified altered mental status type  R41.82 780.97   6. Elevated liver enzymes  R74.8 790.5   7. Atherosclerosis of native coronary artery of native heart without angina pectoris  I25.10 414.01   8. Elevated troponin  R77.8 790.6   9. Atherosclerosis of autologous vein coronary artery bypass graft with unstable angina pectoris ([facility])  I25.710 414.02     411.1   10. Oropharyngeal dysphagia  R13.12 787.22   11. Impaired functional mobility, balance, gait, and endurance  Z74.09 V49.89   12. Impaired mobility and ADLs  Z74.09 V49.89    Z78.9      Patient Active Problem List   Diagnosis   • Carpal tunnel syndrome of left wrist   • Type 1 diabetes mellitus with diabetic polyneuropathy ([facility])   • Mixed diabetic hyperlipidemia associated with type 1 diabetes mellitus ([facility])   • Hypertension associated with diabetes ([facility])   • Syncope and collapse   • CAD (coronary artery disease)   • Asthma   • Depressive disorder, not elsewhere classified   • Neuropathy   • Other specified rheumatoid arthritis, multiple sites ([facility])   • Carpal tunnel syndrome on both sides   • Bilateral carotid artery stenosis   • Overweight (BMI 25.0-29.9)   • Precordial pain   • Sepsis with encephalopathy without septic shock ([facility])   • Tobacco abuse   • UTI (urinary tract infection)   • Ureteral stone with hydronephrosis   • Chest pain, rule out acute myocardial infarction   • Chest pain   • Chest pain with high risk for cardiac etiology   • Sepsis ([facility])     Past Medical  History:   Diagnosis Date   • Arthritis, rheumatoid (HCC)    • Arthropathy associated with neurological disorder    • Arthropathy of lumbar facet joint    • Asthma    • Benign hypertension    • Carpal tunnel syndrome    • Diabetes (HCC)    • Diabetic polyneuropathy (HCC)    • Dyslipidemia    • Fallen arches    • Hammer toe    • Heart disease    • Joint pain    • Mild depression (HCC)     Saddness post Surgery 7/10/14 improved after counseling.   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 Diffusely calcified LAD with 30-80% stenosis. OMB #3 with 60% to 70% stenosis. Preserved LV systolic function with EF of 55%.   • CARPAL TUNNEL RELEASE Right 10/15/2014    Right carpal tunnel release.   • CARPAL TUNNEL RELEASE     • CATARACT EXTRACTION, BILATERAL Bilateral    • CORONARY ARTERY BYPASS GRAFT  02/24/2014    CABG x 3 with LIMA to LAD, endarterectomy to LAD, SVG to OMB and SVG to distal RCA with endarterectomy to distal RCA. Endo-vein harvesting.   • CYSTECTOMY Left     Breast cycst   • CYSTOSCOPY, URETEROSCOPY, RETROGRADE PYELOGRAM, STENT INSERTION Left 3/10/2021    Procedure: , LEFT RETROGRADE PYELOGRAM, STENT INSERTION LEFT URETER;  Surgeon: Keenes, Cooper NOLAN MD;  Location: [facility];  Service: Urology;  Laterality: Left;   • EXCISION LESION      Remove breast lesion - cyst   • EYE SURGERY      Insert lens prosthesis   • FOOT SURGERY  10/18/2011    Exostectomy of the right foot. Preulcerative lesion with Charot deformity, right foot   • LUMBAR SPINE SURGERY  11/09/2015    Lumbosacral radiofrequency thermal coagulation.   • NERVE  BLOCK  09/14/2015    Lumbar medial branch block.   • TOE NAIL AVULSION  09/03/2015    DEBRIDE NAIL 6 OR MORE 77876 (1)      General Information     Row Name 12/15/21 [facility]          OT Time and Intention    Document Type therapy note (daily note)  -BB     Mode of Treatment occupational therapy; individual therapy  -     Row Name 12/15/21 02          General Information    Patient Profile Reviewed yes  -BB     Existing Precautions/Restrictions fall  -     Row Name 12/15/21 02          Cognition    Orientation Status (Cognition) oriented to; person; place  -     Row Name 12/15/21 02          Safety Issues, Functional Mobility    Impairments Affecting Function (Mobility) balance; endurance/activity tolerance  -BB           User Key  (r) = Recorded By, (t) = Taken By, (c) = Cosigned By    Initials Name Provider Type    BB Zoey Carolina COTA Occupational Therapy Assistant                 Mobility/ADL's     Row Name 12/15/21 0802          Bed Mobility    Bed Mobility supine-sit; sit-supine; scooting/bridging; rolling left; rolling right  -BB     All Activities, [place] (Bed Mobility) maximum assist (25% patient effort); 2 person assist  -BB     Rolling Left [place] (Bed Mobility) not tested  -BB     Rolling Right [place] (Bed Mobility) not tested  -BB     Scooting/Bridging [place] (Bed Mobility) dependent (less than 25% patient effort); 2 person assist  -BB     Supine-Sit [place] (Bed Mobility) not tested  -BB     Bed Mobility, Safety Issues cognitive deficits limit understanding; decreased use of arms for pushing/pulling; decreased use of legs for bridging/pushing  -     Row Name 12/15/21 0802          Grooming Assessment/Training    [place] Level (Grooming) hair care, combing/brushing; moderate assist (50% patient effort); wash face, hands; standby assist  -BB     Position (Grooming) sitting up in bed  -     Row Name 12/15/21 0802          Self-Feeding  Assessment/Training    [place] Level (Feeding) standby assist; prepare tray/open items; moderate assist (50% patient effort); scoop food and bring to mouth; maximum assist (25% patient effort)  -BB     Position (Self-Feeding) sitting up in bed  -BB           User Key  (r) = Recorded By, (t) = Taken By, (c) = Cosigned By    Initials Name Provider Type    Zoey Stover COTA Occupational Therapy Assistant               Obj/Interventions    No documentation.                Goals/Plan     Row Name 12/15/21 02          Grooming Goal 1 (OT)    Activity/Device (Grooming Goal 1, OT) oral care; wash face, hands; built-up handle items  -BB     [place] (Grooming Goal 1, OT) minimum assist (75% or more patient effort)  -BB     Time Frame (Grooming Goal 1, OT) long term goal (LTG); by discharge  -BB     Progress/Outcome (Grooming Goal 1, OT) goal not met  -BB     Row Name 12/15/21 0802          Self-Feeding Goal 1 (OT)    Activity/Device (Self-Feeding Goal 1, OT) self-feeding skills, all  -BB     [place] Level/Cues Needed (Self-Feeding Goal 1, OT) minimum assist (75% or more patient effort)  -BB     Time Frame (Self-Feeding Goal 1, OT) long term goal (LTG); by discharge  -BB     Progress/Outcomes (Self-Feeding Goal 1, OT) goal met  -BB     Row Name 12/15/21 0802          Problem Specific Goal 1 (OT)    Problem Specific Goal 1 (OT) Patient to participate in BUE HEP with min A to increase strength and endurance for ADLs and EOB sitting tolerance.  -BB     Time Frame (Problem Specific Goal 1, OT) long term goal (LTG); by discharge  -BB     Progress/Outcome (Problem Specific Goal 1, OT) goal not met  -BB           User Key  (r) = Recorded By, (t) = Taken By, (c) = Cosigned By    Initials Name Provider Type    Zoey Stover COTA Occupational Therapy Assistant               Clinical Impression     Row Name 12/15/21 02          Pain Scale: Numbers Pre/Post-Treatment    Pretreatment Pain Rating 6/10   -BB     Posttreatment Pain Rating 6/10  -BB     Pain Location back  -BB     Pain Intervention(s) Medication (See MAR)  -BB     Row Name 12/15/21 0802          Pain Scale: FACES Pre/Post-Treatment    Pain: FACES Scale, Pretreatment 0-->no hurt  -BB     Posttreatment Pain Rating 0-->no hurt  -BB     Row Name 12/15/21 0802          Plan of Care Review    Plan of Care Reviewed With patient  -BB     Progress no change  -BB     Outcome Summary Pt is Mod  Afor taking cup to mouth and Max A for self feeding. Pt c/o nausea and did not eat very much today. Continue oT POC  -BB     Row Name 12/15/21 0802          Therapy Assessment/Plan (OT)    Rehab Potential (OT) good, to achieve stated therapy goals  -BB     Criteria for Skilled Therapeutic Interventions Met (OT) yes; skilled treatment is necessary  -BB     Therapy Frequency (OT) other (see comments)  3-5 d/wk  -BB     Row Name 12/15/21 0802          Therapy Plan Review/Discharge Plan (OT)    Anticipated Discharge Disposition (OT) skilled nursing facility  -BB     Row Name 12/15/21 0802          Vital Signs    Pre Patient Position Supine  -BB     Post Patient Position Supine  -BB     Row Name 12/15/21 0802          Positioning and Restraints    Pre-Treatment Position in bed  -BB     Post Treatment Position bed  -BB     In Bed fowlers; call light within reach; encouraged to call for assist; exit alarm on  -BB           User Key  (r) = Recorded By, (t) = Taken By, (c) = Cosigned By    Initials Name Provider Type    BB Zoey Carolina COTA Occupational Therapy Assistant               Outcome Measures     Row Name 12/15/21 0802          How much help from another is currently needed...    Bathing (including washing, rinsing, and drying) 1  -BB     Toileting (which includes using toilet bed pan or urinal) 1  -BB     Putting on and taking off regular upper body clothing 1  -BB     Taking care of personal grooming (such as brushing teeth) 2  -BB     Eating meals 2  -BB            User Key  (r) = Recorded By, (t) = Taken By, (c) = Cosigned By    Initials Name Provider Type     Zoey Carolina COTA Occupational Therapy Assistant                Occupational Therapy Education                 Title: PT OT SLP Therapies (In Progress)     Topic: Occupational Therapy (In Progress)     Point: ADL training (In Progress)     Description:   Instruct learner(s) on proper safety adaptation and remediation techniques during self care or transfers.   Instruct in proper use of assistive devices.              Learning Progress Summary           Patient Acceptance, E, NR by  at 12/15/2021 0944    Acceptance, E, NR by BB at 12/14/2021 1016                   Point: Home exercise program (Not Started)     Description:   Instruct learner(s) on appropriate technique for monitoring, assisting and/or progressing therapeutic exercises/activities.              Learner Progress:  Not documented in this visit.          Point: Precautions (In Progress)     Description:   Instruct learner(s) on prescribed precautions during self-care and functional transfers.              Learning Progress Summary           Patient Acceptance, E,TB, NR by  at 12/10/2021 1332    Comment: POC, role of OT, goals                   Point: Body mechanics (In Progress)     Description:   Instruct learner(s) on proper positioning and spine alignment during self-care, functional mobility activities and/or exercises.              Learning Progress Summary           Patient Acceptance, E,TB, NR by  at 12/10/2021 1332    Comment: POC, role of OT, goals                               User Key     Initials Effective Dates Name Provider Type Discipline     06/16/21 -  Zoey Carolina COTA Occupational Therapy Assistant OT     06/14/21 -  Tomi Burdick OT Occupational Therapist OT              OT Recommendation and Plan  Therapy Frequency (OT): other (see comments) (3-5 d/wk)  Plan of Care Review  Plan of Care Reviewed With:  patient  Progress: no change  Outcome Summary: Pt is Mod  Afor taking cup to mouth and Max A for self feeding. Pt c/o nausea and did not eat very much today. Continue oT POC     Time Calculation:    Time Calculation- OT     Row Name 12/15/21 0945             Time Calculation- OT    OT Start Time 0802  -BB      OT Stop Time 0827  -BB      OT Time Calculation (min) 25 min  -BB      Total Timed Code Minutes- OT 25 minute(s)  -BB      OT Received On 12/15/21  -BB              Timed Charges    15721 - OT Self Care/Mgmt Minutes 25  -BB              Total Minutes    Timed Charges Total Minutes 25  -BB       Total Minutes 25  -BB            User Key  (r) = Recorded By, (t) = Taken By, (c) = Cosigned By    Initials Name Provider Type    Zoey Stover COTA Occupational Therapy Assistant              Therapy Charges for Today     Code Description Service Date Service Provider Modifiers Qty    40525644122 HC OT SELF CARE/MGMT/TRAIN EA 15 MIN 12/14/2021 Zoey Carolina COTA GO 5    35485871529 HC OT SELF CARE/MGMT/TRAIN EA 15 MIN 12/15/2021 Zoey Carolina COTA GO 2               JACQUIE Maciel  12/15/2021

## 2021-12-15 NOTE — PROGRESS NOTES
[facility] Medicine Services  INPATIENT PROGRESS NOTE    Length of Stay: 10  Date of Admission: 12/5/2021  Primary Care Physician: Yosef Casey MD    Subjective   Chief Complaint: Altered mental status  HPI: Complaining of significant abdominal pain and back pain this morning.  States she feels terrible.  She is unable to really tell me the specifics.  She states that she does not want to throw up.      Review of Systems   Constitutional: Positive for fatigue. Negative for appetite change, chills, fever and unexpected weight change.   Respiratory: Negative for cough, choking, chest tightness, shortness of breath and wheezing.    Cardiovascular: Negative for chest pain, palpitations and leg swelling.   Gastrointestinal: Positive for abdominal pain. Negative for blood in stool, constipation, diarrhea, nausea and vomiting.   Genitourinary: Negative for dysuria, flank pain and hematuria.   Musculoskeletal: Positive for back pain.   Neurological: Positive for weakness. Negative for dizziness, seizures, syncope, speech difficulty, light-headedness, numbness and headaches.   Hematological: Does not bruise/bleed easily.        All pertinent negatives and positives are as above. All other systems have been reviewed and are negative unless otherwise stated.     Objective    Temp:  [97.4 °F (36.3 °C)-99.3 °F (37.4 °C)] 97.4 °F (36.3 °C)  Heart Rate:  [72-81] 79  Resp:  [16-18] 16  BP: (116-140)/(54-66) 140/66    Physical Exam  Vitals reviewed.   Constitutional:       Appearance: She is well-developed.   HENT:      Head: Normocephalic and atraumatic.   Eyes:      Pupils: Pupils are equal, round, and reactive to light.   Cardiovascular:      Rate and Rhythm: Normal rate and regular rhythm.      Heart sounds: Normal heart sounds. No murmur heard.  No friction rub. No gallop.    Pulmonary:      Effort: Pulmonary effort is normal. No respiratory distress.      Breath sounds:  Normal breath sounds. No wheezing or rales.   Chest:      Chest wall: No tenderness.   Abdominal:      General: Bowel sounds are normal. There is no distension.      Palpations: Abdomen is soft.      Tenderness: There is abdominal tenderness (mild). There is no guarding.   Musculoskeletal:      Cervical back: Normal range of motion and neck supple.   Skin:     General: Skin is warm and dry.   Neurological:      Mental Status: She is alert. Mental status is at baseline.       Results Review:  I have reviewed the labs, radiology results, and diagnostic studies.    Laboratory Data:   Results from last 7 days   Lab Units 12/15/21  0537 12/14/21  0506 12/13/21  0612   SODIUM mmol/L 140 141 141   POTASSIUM mmol/L 3.7 4.1 3.9   CHLORIDE mmol/L 109* 111* 110*   CO2 mmol/L 26.0 24.0 22.0   BUN mg/dL 12 10 10   CREATININE mg/dL 0.77 0.81 0.76   GLUCOSE mg/dL 158* 162* 121*   CALCIUM mg/dL 8.8 8.9 9.4   ANION GAP mmol/L 5.0 6.0 9.0     Estimated Creatinine Clearance: 82.1 mL/min (by C-G formula based on SCr of 0.77 mg/dL).  Results from last 7 days   Lab Units 12/11/21  2153 12/11/21  0557   PHOSPHORUS mg/dL 3.3 2.5         Results from last 7 days   Lab Units 12/15/21  0537 12/14/21  0506 12/13/21  0612 12/12/21  0524 12/11/21  0557   WBC 10*3/mm3 12.21* 12.88* 15.08* 16.49* 15.56*   HEMOGLOBIN g/dL 10.5* 10.5* 11.3* 11.8* 11.1*   HEMATOCRIT % 31.9* 32.1* 34.4 36.7 33.9*   PLATELETS 10*3/mm3 288 278 302 198 235           Culture Data:   No results found for: BLOODCX  No results found for: URINECX  No results found for: RESPCX  No results found for: WOUNDCX  No results found for: STOOLCX  No components found for: BODYFLD    Radiology Data:   Imaging Results (Last 24 Hours)     ** No results found for the last 24 hours. **          I have reviewed the patient's current medications.     Assessment/Plan     Active Hospital Problems    Diagnosis    • Sepsis (HCC)        Plan:  1.  DKA: Resolved.  2.  Sepsis: Resolved.  Completed  antibiotic course.  White count relatively stable.  3.  Metabolic encephalopathy:  Awake and alert.  Mostly oriented.  Likely near baseline.  4.  Elevated troponin: Trending down.  Dr. Calderón following.    5.  Atrial fibrillation: Continue home oral Cardizem.  6.  Hypothyroidism: Continue Synthroid.  7.  Dysphagia: Improving.  Safe for puréed solids and thin liquids.  Speech therapy following.  8.  Worsening abdominal pain: Repeat urinalysis.  9.  DVT prophylaxis: Heparin.    The patient was evaluated during the global COVID-19 pandemic, and the diagnosis was suspected/considered upon their initial presentation.  Evaluation, treatment, and testing were consistent with current guidelines for patients who present with complaints or symptoms that may be related to COVID-19.    I confirmed that the patient's Advance Care Plan is present, code status is documented, or surrogate decision maker is listed in the patient's medical record.       Discharge Planning: I expect patient to be discharged to skilled facility in 1-2 days.        This document has been electronically signed by Sukhjinder Trivedi MD on December 15, 2021 08:11 CST       Anxiety and depression    GERD (gastroesophageal reflux disease)    History of pancreatitis  2018 taking creon been stable  Hypertension    Stress incontinence, female  S/P Sling

## 2021-12-15 NOTE — PLAN OF CARE
Goal Outcome Evaluation:  Plan of Care Reviewed With: patient           Outcome Summary: pt performed AROM B LE exercises in supine

## 2021-12-15 NOTE — PLAN OF CARE
Problem: Adult Inpatient Plan of Care  Goal: Plan of Care Review  Recent Flowsheet Documentation  Taken 12/15/2021 0802 by Zoey Carolina COTA  Progress: no change  Plan of Care Reviewed With: patient  Outcome Summary: Pt is Mod  Afor taking cup to mouth and Max A for self feeding. Pt c/o nausea and did not eat very much today. Continue oT POC   Goal Outcome Evaluation:  Plan of Care Reviewed With: patient        Progress: no change  Outcome Summary: Pt is Mod  Afor taking cup to mouth and Max A for self feeding. Pt c/o nausea and did not eat very much today. Continue oT POC

## 2021-12-16 NOTE — THERAPY TREATMENT NOTE
Acute Care - Physical Therapy Treatment Note  [facility]     Patient Name: Janelle Millard  : 1955  MRN: [id number]  Today's Date: 2021      Visit Dx:     ICD-10-CM ICD-9-CM   1. Sepsis without acute organ dysfunction, due to unspecified organism ([facility])  A41.9 038.9     995.91   2. Pneumonia of both lower lobes due to infectious organism  J18.9 486   3. Acute cystitis with hematuria  N30.01 595.0   4. Leukocytosis, unspecified type  D72.829 288.60   5. Altered mental status, unspecified altered mental status type  R41.82 780.97   6. Elevated liver enzymes  R74.8 790.5   7. Atherosclerosis of native coronary artery of native heart without angina pectoris  I25.10 414.01   8. Elevated troponin  R77.8 790.6   9. Atherosclerosis of autologous vein coronary artery bypass graft with unstable angina pectoris ([facility])  I25.710 414.02     411.1   10. Oropharyngeal dysphagia  R13.12 787.22   11. Impaired functional mobility, balance, gait, and endurance  Z74.09 V49.89   12. Impaired mobility and ADLs  Z74.09 V49.89    Z78.9      Patient Active Problem List   Diagnosis   • Carpal tunnel syndrome of left wrist   • Type 1 diabetes mellitus with diabetic polyneuropathy ([facility])   • Mixed diabetic hyperlipidemia associated with type 1 diabetes mellitus ([facility])   • Hypertension associated with diabetes ([facility])   • Syncope and collapse   • CAD (coronary artery disease)   • Asthma   • Depressive disorder, not elsewhere classified   • Neuropathy   • Other specified rheumatoid arthritis, multiple sites ([facility])   • Carpal tunnel syndrome on both sides   • Bilateral carotid artery stenosis   • Overweight (BMI 25.0-29.9)   • Precordial pain   • Sepsis with encephalopathy without septic shock ([facility])   • Tobacco abuse   • UTI (urinary tract infection)   • Ureteral stone with hydronephrosis   • Chest pain, rule out acute myocardial infarction   • Chest pain   • Chest pain with high risk for cardiac etiology   • Sepsis ([facility])     Past  Medical History:   Diagnosis Date   • Arthritis, rheumatoid (HCC)    • Arthropathy associated with neurological disorder    • Arthropathy of lumbar facet joint    • Asthma    • Benign hypertension    • Carpal tunnel syndrome    • Diabetes (HCC)    • Diabetic polyneuropathy (HCC)    • Dyslipidemia    • Fallen arches    • Hammer toe    • Heart disease    • Joint pain    • Mild depression (HCC)     Saddness post Surgery 7/10/14 improved after counseling.   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 Diffusely calcified LAD with 30-80% stenosis. OMB #3 with 60% to 70% stenosis. Preserved LV systolic function with EF of 55%.   • CARPAL TUNNEL RELEASE Right 10/15/2014    Right carpal tunnel release.   • CARPAL TUNNEL RELEASE     • CATARACT EXTRACTION, BILATERAL Bilateral    • CORONARY ARTERY BYPASS GRAFT  02/24/2014    CABG x 3 with LIMA to LAD, endarterectomy to LAD, SVG to OMB and SVG to distal RCA with endarterectomy to distal RCA. Endo-vein harvesting.   • CYSTECTOMY Left     Breast cycst   • CYSTOSCOPY, URETEROSCOPY, RETROGRADE PYELOGRAM, STENT INSERTION Left 3/10/2021    Procedure: , LEFT RETROGRADE PYELOGRAM, STENT INSERTION LEFT URETER;  Surgeon: Dowell, Cooper NOLAN MD;  Location: [facility];  Service: Urology;  Laterality: Left;   • EXCISION LESION      Remove breast lesion - cyst   • EYE SURGERY      Insert lens prosthesis   • FOOT SURGERY  10/18/2011    Exostectomy of the right foot. Preulcerative lesion with Charot deformity, right foot   • LUMBAR SPINE SURGERY  11/09/2015    Lumbosacral radiofrequency thermal coagulation.   •  NERVE BLOCK  09/14/2015    Lumbar medial branch block.   • TOE NAIL AVULSION  09/03/2015    DEBRIDE NAIL 6 OR MORE 66449 (1)     PT Assessment (last 12 hours)     PT Evaluation and Treatment     Row Name 12/16/21 1011          Physical Therapy Time and Intention    Subjective Information complains of; pain  -TA     Document Type therapy note (daily note)  -TA     Mode of Treatment physical therapy  -TA     Row Name 12/16/21 1011          General Information    Patient Profile Reviewed yes  -TA     Existing Precautions/Restrictions fall  -TA     Row Name 12/16/21 1011          Cognition    Orientation Status (Cognition) oriented to; person; place  -TA     Personal Safety Interventions fall prevention program maintained; gait belt; muscle strengthening facilitated; nonskid shoes/slippers when out of bed; supervised activity  -TA     Row Name 12/16/21 1011          Pain Scale: Numbers Pre/Post-Treatment    Pretreatment Pain Rating 6/10  -TA     Posttreatment Pain Rating 6/10  -TA     Pain Location - Side Bilateral  -TA     Pain Location foot; back  -TA     [facility] Name 12/16/21 1011          Bed Mobility    Rolling Left Cone[place] (Bed Mobility) not tested  -TA     Rolling Right [place] (Bed Mobility) not tested  -TA     Scooting/Bridging Conejos (Bed Mobility) dependent (less than 25% patient effort); 2 person assist  with drawsheet>HOB  -TA     Supine-Sit [place] (Bed Mobility) moderate assist (50% patient effort); 1 person assist  -TA     Sit-Supine [place] (Bed Mobility) moderate assist (50% patient effort); 1 person assist  -TA     Comment (Bed Mobility) pt sat @ EOB `2 minutes with min assist of 1, pt requested to return to supine  -TA     Row Name 12/16/21 1011          Gait/Stairs (Locomotion)    [place] Level (Gait) unable to assess  -TA     [facility] Name 12/16/21 1011          Safety Issues, Functional Mobility    Impairments Affecting Function (Mobility) balance; endurance/activity  tolerance  -TA     Row Name 12/16/21 1011          Hip (Therapeutic Exercise)    Hip (Therapeutic Exercise) AROM (active range of motion); isometric exercises  -TA     Hip AROM (Therapeutic Exercise) bilateral; aBduction; aDduction; supine; 10 repetitions; 5 repetitions  -TA     Hip Isometrics (Therapeutic Exercise) bilateral; gluteal sets; supine; 10 repetitions; 5 repetitions  -TA     Row Name 12/16/21 1011          Knee (Therapeutic Exercise)    Knee (Therapeutic Exercise) AROM (active range of motion); isometric exercises  -TA     Knee AROM (Therapeutic Exercise) bilateral; SAQ (short arc quad); heel slides; supine; 10 repetitions; 5 repetitions  -TA     Knee Isometrics (Therapeutic Exercise) bilateral; quad sets; supine; 10 repetitions; 5 repetitions  -TA     Row Name 12/16/21 1011          Ankle (Therapeutic Exercise)    Ankle (Therapeutic Exercise) AROM (active range of motion)  -TA     Ankle AROM (Therapeutic Exercise) bilateral; dorsiflexion; plantarflexion; supine; 10 repetitions; 5 repetitions  -TA     Row Name             Wound 12/05/21 0830 upper coccyx MASD (Moisture associated skin damage)    Wound - Properties Group Placement Date: 12/05/21  -CR Placement Time: 0830  -CR Present on Hospital Admission: Y  -CR Orientation: upper  -CR Location: coccyx  -CR Primary Wound Type: MASD  -CR     Retired Wound - Properties Group Date first assessed: 12/05/21  -CR Time first assessed: 0830  -CR Present on Hospital Admission: Y  -CR Location: coccyx  -CR Primary Wound Type: MASD  -CR     Row Name             Wound 12/09/21 2207 Bilateral medial coccyx Pressure Injury    Wound - Properties Group Placement Date: 12/09/21  -AP Placement Time: 2207 -AP Present on Hospital Admission: Y  -AP Side: Bilateral  -AP Orientation: medial  -AP Location: coccyx  -AP Primary Wound Type: Pressure inj  -AP Stage, Pressure Injury : Stage 3  -AP     Retired Wound - Properties Group Date first assessed: 12/09/21  -AP Time first  assessed: 2207  -AP Present on Hospital Admission: Y  -AP Side: Bilateral  -AP Location: coccyx  -AP Primary Wound Type: Pressure inj  -AP     Row Name 12/16/21 1011          Plan of Care Review    Plan of Care Reviewed With patient  -TA     Outcome Summary pt sup<>sit with mod assist of 1, pt sat @ EOB ~2 minutes with min assist of 1, pt perfromed AROM B LE exercises in supine. pt would benefit from 24/7 care & continued PT services @ D/C  -TA     Row Name 12/16/21 1011          Vital Signs    Pre Systolic BP Rehab 106  -TA     Pre Treatment Diastolic BP 54  -TA     Post Systolic BP Rehab 127  -TA     Post Treatment Diastolic BP 58  -TA     Pretreatment Heart Rate (beats/min) 73  -TA     Posttreatment Heart Rate (beats/min) 92  -TA     Pre SpO2 (%) 97  -TA     O2 Delivery Pre Treatment room air  -TA     Post SpO2 (%) 97  -TA     O2 Delivery Post Treatment room air  -TA     Pre Patient Position Supine  -TA     Post Patient Position Supine  -TA     Row Name 12/16/21 1011          Positioning and Restraints    Pre-Treatment Position in bed  -TA     Post Treatment Position bed  -TA     In Bed supine; call light within reach; exit alarm on  -TA     Row Name 12/16/21 1011          Therapy Assessment/Plan (PT)    Comment, Therapy Assessment/Plan (PT) continue  -TA           User Key  (r) = Recorded By, (t) = Taken By, (c) = Cosigned By    Initials Name Provider Type    Junie Clemens PTA Physical Therapy Assistant    Rebecca Guillory, RN Registered Nurse    Sophia Guallpa, RN Registered Nurse                Physical Therapy Education                 Title: PT OT SLP Therapies (In Progress)     Topic: Physical Therapy (In Progress)     Point: Mobility training (In Progress)     Learning Progress Summary           Patient Acceptance, E, NL by DIMITRIS at 12/10/2021 7678    Comment: Role of PT, POC                   Point: Home exercise program (Not Started)     Learner Progress:  Not documented in this visit.           Point: Body mechanics (Not Started)     Learner Progress:  Not documented in this visit.          Point: Precautions (In Progress)     Learning Progress Summary           Patient Acceptance, E, NL by DIMITRIS at 12/10/2021 1328    Comment: Role of PT, POC                               User Key     Initials Effective Dates Name Provider Type Discipline    DIMITRIS 06/16/21 -  Анна Mata, PT Physical Therapist PT              PT Recommendation and Plan  Anticipated Discharge Disposition (PT): skilled nursing facility  Plan of Care Reviewed With: patient  Outcome Summary: pt sup<>sit with mod assist of 1, pt sat @ EOB ~2 minutes with min assist of 1, pt perfromed AROM B LE exercises in supine. pt would benefit from 24/7 care & continued PT services @ D/C   Outcome Measures     Row Name 12/16/21 1300 12/16/21 0908 12/15/21 1200       How much help from another person do you currently need...    Turning from your back to your side while in flat bed without using bedrails? 2  -TA -- 1  -TA    Moving from lying on back to sitting on the side of a flat bed without bedrails? 2  -TA -- 1  -TA    Moving to and from a bed to a chair (including a wheelchair)? 1  -TA -- 1  -TA    Standing up from a chair using your arms (e.g., wheelchair, bedside chair)? 1  -TA -- 1  -TA    Climbing 3-5 steps with a railing? 1  -TA -- 1  -TA    To walk in hospital room? 1  -TA -- 1  -TA    AM-PAC 6 Clicks Score (PT) 8  -TA -- 6  -TA       How much help from another is currently needed...    Putting on and taking off regular lower body clothing? -- 1  -KH --    Bathing (including washing, rinsing, and drying) -- 1  -KH --    Toileting (which includes using toilet bed pan or urinal) -- 1  -KH --    Putting on and taking off regular upper body clothing -- 1  -KH --    Taking care of personal grooming (such as brushing teeth) -- 2  -KH --    Eating meals -- 2  -KH --    AM-PAC 6 Clicks Score (OT) -- 8  -KH --       Functional Assessment    Outcome Measure  Options AM-PAC 6 Clicks Basic Mobility (PT)  -TA AM-PAC 6 Clicks Daily Activity (OT)  -KH AM-PAC 6 Clicks Basic Mobility (PT)  -TA          User Key  (r) = Recorded By, (t) = Taken By, (c) = Cosigned By    Initials Name Provider Type    Junie Clemens PTA Physical Therapy Assistant    eJnnifer Irizarry, OT Occupational Therapist                 Time Calculation:    PT Charges     Row Name 12/16/21 1328             Time Calculation    Start Time 1011  -TA      Stop Time 1045  -TA      Time Calculation (min) 34 min  -TA      PT Received On 12/16/21  -TA              Time Calculation- PT    Total Timed Code Minutes- PT 34 minute(s)  -TA              Timed Charges    70520 - PT Therapeutic Exercise Minutes 34  -TA              Total Minutes    Timed Charges Total Minutes 34  -TA       Total Minutes 34  -TA            User Key  (r) = Recorded By, (t) = Taken By, (c) = Cosigned By    Initials Name Provider Type    Junie Clemens PTA Physical Therapy Assistant              Therapy Charges for Today     Code Description Service Date Service Provider Modifiers Qty    82896649748 HC PT THER PROC EA 15 MIN 12/15/2021 Junie Perry PTA GP 2    51582693836 HC PT THER PROC EA 15 MIN 12/16/2021 Junie Perry PTA GP 2          PT G-Codes  Outcome Measure Options: AM-PAC 6 Clicks Basic Mobility (PT)  AM-PAC 6 Clicks Score (PT): 8  AM-PAC 6 Clicks Score (OT): 8    Junie Perry PTA  12/16/2021

## 2021-12-16 NOTE — PLAN OF CARE
Goal Outcome Evaluation:  Plan of Care Reviewed With: caregiver, patient        Progress: improving  Outcome Summary: Pt with improved po intake.  More alert and talking.  Diet has been upgraded.  Will continue supplements.  Pressure injuries noted.

## 2021-12-16 NOTE — CONSULTS
"Adult Nutrition  Assessment    Patient Name:  Janelle Millard  YOB: 1955  MRN: [id number]  Admit Date:  12/5/2021    Assessment Date:  12/16/2021    Comments: Pt is much more alert--talking and eating better.  Per staff she is overall doing much better.  She has been upgraded to a Soft diet with grd meats with improved dysphagia.  Intake is now 45% average for the last 5 documented meals.  Excellent acceptance of the Boost GC.  Glucose better controlled.  Diet education is not appropriate for this pt as she lives at a SNF.  Glucose improved, covered by Levemir and SSI.   Of note--Skin Breakdown:  MASD to the coccyx; Stage 3 pressure injury to the Bilateral coccyx.    RD will continue to monitor.  Boost GC and milk will add extra protein for healing of wounds  .        Reason for Assessment     Row Name 12/16/21 1546          Reason for Assessment    Reason For Assessment follow-up protocol                Nutrition/Diet History     Row Name 12/16/21 1548          Nutrition/Diet History    Typical Food/Fluid Intake Pt awake and talking.  She states that she ate her lunch.  She \"Loves\" the Boost.  Denies any Gi distress.  Feeling much better                  Labs/Tests/Procedures/Meds     Row Name 12/16/21 1543          Labs/Procedures/Meds    Lab Results Reviewed reviewed, pertinent     Lab Results Comments Gluc 163            Diagnostic Tests/Procedures    Diagnostic Test/Procedure Reviewed reviewed, pertinent     Diagnostic Test/Procedures Comments EKG            Medications    Pertinent Medications Reviewed reviewed, pertinent     Pertinent Medications Comments Coreg; CArdizem; Aricept; Diflucan; SSI; Levemir;                Physical Findings     Row Name 12/16/21 1549          Physical Findings    Overall Physical Appearance on oxygen therapy     Skin pressure injury                  Nutrition Prescription Ordered     Row Name 12/16/21 1542          Nutrition Prescription PO    Current PO Diet " Soft Texture     Texture Ground     Fluid Consistency Thin     Supplement Boost Glucose Control (Glucerna Shake); Milk     Supplement Frequency 3 times a day     Common Modifiers Consistent Carbohydrate                Evaluation of Received Nutrient/Fluid Intake     Row Name 12/16/21 1546          PO Evaluation    Number of Days PO Intake Evaluated 3 days     Number of Meals 5     % PO Intake ~45% average                     Electronically signed by:  Anay Acharya RD  12/16/21 15:52 CST

## 2021-12-16 NOTE — PROGRESS NOTES
"    [facility] Medicine Services  INPATIENT PROGRESS NOTE    Length of Stay: 11  Date of Admission: 12/5/2021  Primary Care Physician: Yosef Casey MD    Subjective   Chief Complaint: fatigue, \"feel weak today\"  HPI: 66 year old female with past medical history of type 2 DM, RA, HTN, multivessel CAD who presented on 12/5/21 with complaints of altered mental status.  She is currently admitted for multiple issues including DKA, sepsis related to pneumonia, UTI.  During today's visit, patient reports feeling \"weak.\"  She had an episode of atrial fibrillation with RVR this morning with heart rates of 150, but rate has now improved to 80's.  She also reports having a rash on her \"privates.\"    Review of Systems   Constitutional: Positive for fatigue. Negative for chills and fever.   Respiratory: Negative for cough, chest tightness, shortness of breath and wheezing.    Cardiovascular: Negative for chest pain, palpitations and leg swelling.   Gastrointestinal: Negative for abdominal pain, diarrhea, nausea and vomiting.   Musculoskeletal: Negative for back pain and neck pain.   Skin: Positive for rash.   Neurological: Positive for weakness. Negative for dizziness and headaches.   Psychiatric/Behavioral: Negative for confusion. The patient is not nervous/anxious.         All pertinent negatives and positives are as above. All other systems have been reviewed and are negative unless otherwise stated.     Objective    Temp:  [97 °F (36.1 °C)-99.3 °F (37.4 °C)] 99.3 °F (37.4 °C)  Heart Rate:  [] 87  Resp:  [18] 18  BP: (106-158)/(54-72) 106/54    Physical Exam  Vitals and nursing note reviewed.   Constitutional:       General: She is not in acute distress.     Appearance: Normal appearance. She is ill-appearing.      Comments: Chronically ill appearing   HENT:      Head: Normocephalic and atraumatic.      Right Ear: External ear normal.      Left Ear: External ear normal. "      Nose: Nose normal.      Mouth/Throat:      Mouth: Mucous membranes are moist.      Pharynx: Oropharynx is clear.   Eyes:      General: No scleral icterus.        Right eye: No discharge.         Left eye: No discharge.      Conjunctiva/sclera: Conjunctivae normal.   Cardiovascular:      Rate and Rhythm: Normal rate and regular rhythm.      Pulses: Normal pulses.      Heart sounds: Normal heart sounds. No murmur heard.  No friction rub. No gallop.    Pulmonary:      Effort: Pulmonary effort is normal. No respiratory distress.      Breath sounds: Normal breath sounds. No stridor. No wheezing, rhonchi or rales.   Abdominal:      General: Bowel sounds are normal. There is no distension.      Palpations: Abdomen is soft.      Tenderness: There is no abdominal tenderness.   Musculoskeletal:         General: No swelling. Normal range of motion.      Cervical back: Normal range of motion and neck supple.   Skin:     General: Skin is warm and dry.      Coloration: Skin is pale.   Neurological:      General: No focal deficit present.      Mental Status: She is alert and oriented to person, place, and time.   Psychiatric:         Mood and Affect: Mood normal.         Behavior: Behavior normal.             Results Review:  I have reviewed the labs, radiology results, and diagnostic studies.    Laboratory Data:   Results from last 7 days   Lab Units 12/16/21  0530 12/15/21  0537 12/14/21  0506   SODIUM mmol/L 139 140 141   POTASSIUM mmol/L 3.9 3.7 4.1   CHLORIDE mmol/L 108* 109* 111*   CO2 mmol/L 24.0 26.0 24.0   BUN mg/dL 13 12 10   CREATININE mg/dL 0.89 0.77 0.81   GLUCOSE mg/dL 112* 158* 162*   CALCIUM mg/dL 8.9 8.8 8.9   ANION GAP mmol/L 7.0 5.0 6.0     Estimated Creatinine Clearance: 73.1 mL/min (by C-G formula based on SCr of 0.89 mg/dL).  Results from last 7 days   Lab Units 12/11/21 2153 12/11/21  0557   PHOSPHORUS mg/dL 3.3 2.5         Results from last 7 days   Lab Units 12/16/21  0530 12/15/21  0537  12/14/21  0506 12/13/21  0612 12/12/21  0524   WBC 10*3/mm3 13.38* 12.21* 12.88* 15.08* 16.49*   HEMOGLOBIN g/dL 10.7* 10.5* 10.5* 11.3* 11.8*   HEMATOCRIT % 33.0* 31.9* 32.1* 34.4 36.7   PLATELETS 10*3/mm3 268 288 278 302 198           Culture Data:   No results found for: BLOODCX  No results found for: URINECX  No results found for: RESPCX  No results found for: WOUNDCX  No results found for: STOOLCX  No components found for: BODYFLD    Radiology Data:   Imaging Results (Last 24 Hours)     ** No results found for the last 24 hours. **          I have reviewed the patient's current medications.     Assessment/Plan     Active Hospital Problems    Diagnosis    • Sepsis (HCC)        Plan:    1.  DKA: Resolved.  2.  Sepsis: Resolved.  Completed antibiotic course.  WBC 13  3.  Metabolic encephalopathy:  mental status at baseline.   4.  Elevated troponin: Trending down.  Dr. Calderón following.  denies chest pain  5.  Paroxysmal Atrial fibrillation: Continue oral Cardizem.  Is not currently anticoagulated.  Cardiology following.   6.  Hypothyroidism: Continue Synthroid.  7.  Dysphagia: Improving.  Safe for puréed solids and thin liquids.  Speech therapy following.  8.  Worsening abdominal pain: Repeat urinalysis shows yeast.  Vaginal candidiasis noted on exam.  Fluconazole added.  No complaints of abdominal pain today.     Discharge Planning: I expect patient to be discharged to SNF in 1 days.    I confirmed that the patient's Advance Care Plan is present, code status is documented, or surrogate decision maker is listed in the patient's medical record.          This document has been electronically signed by JESSIE Salvador on December 16, 2021 11:03 CST

## 2021-12-16 NOTE — PLAN OF CARE
Goal Outcome Evaluation:  Plan of Care Reviewed With: patient           Outcome Summary: pt sup<>sit with mod assist of 1, pt sat @ EOB ~2 minutes with min assist of 1, pt perfromed AROM B LE exercises in supine. pt would benefit from 24/7 care & continued PT services @ D/C

## 2021-12-16 NOTE — PLAN OF CARE
Goal Outcome Evaluation:  Plan of Care Reviewed With: patient        Progress: no change  Outcome Summary: vss; c/o abdominal pain earlier in the shift; prn pain med effective; no s/s of distress at this time; resting comfortably; will continue to monitor

## 2021-12-16 NOTE — THERAPY TREATMENT NOTE
Acute Care - Occupational Therapy Treatment Note  [facility]     Patient Name: Janelle Millard  : 1955  MRN: [id number]  Today's Date: 2021             Admit Date: 2021       ICD-10-CM ICD-9-CM   1. Sepsis without acute organ dysfunction, due to unspecified organism ([facility])  A41.9 038.9     995.91   2. Pneumonia of both lower lobes due to infectious organism  J18.9 486   3. Acute cystitis with hematuria  N30.01 595.0   4. Leukocytosis, unspecified type  D72.829 288.60   5. Altered mental status, unspecified altered mental status type  R41.82 780.97   6. Elevated liver enzymes  R74.8 790.5   7. Atherosclerosis of native coronary artery of native heart without angina pectoris  I25.10 414.01   8. Elevated troponin  R77.8 790.6   9. Atherosclerosis of autologous vein coronary artery bypass graft with unstable angina pectoris ([facility])  I25.710 414.02     411.1   10. Oropharyngeal dysphagia  R13.12 787.22   11. Impaired functional mobility, balance, gait, and endurance  Z74.09 V49.89   12. Impaired mobility and ADLs  Z74.09 V49.89    Z78.9      Patient Active Problem List   Diagnosis   • Carpal tunnel syndrome of left wrist   • Type 1 diabetes mellitus with diabetic polyneuropathy ([facility])   • Mixed diabetic hyperlipidemia associated with type 1 diabetes mellitus ([facility])   • Hypertension associated with diabetes ([facility])   • Syncope and collapse   • CAD (coronary artery disease)   • Asthma   • Depressive disorder, not elsewhere classified   • Neuropathy   • Other specified rheumatoid arthritis, multiple sites ([facility])   • Carpal tunnel syndrome on both sides   • Bilateral carotid artery stenosis   • Overweight (BMI 25.0-29.9)   • Precordial pain   • Sepsis with encephalopathy without septic shock ([facility])   • Tobacco abuse   • UTI (urinary tract infection)   • Ureteral stone with hydronephrosis   • Chest pain, rule out acute myocardial infarction   • Chest pain   • Chest pain with high risk for cardiac etiology    • Sepsis (HCC)     Past Medical History:   Diagnosis Date   • Arthritis, rheumatoid (HCC)    • Arthropathy associated with neurological disorder    • Arthropathy of lumbar facet joint    • Asthma    • Benign hypertension    • Carpal tunnel syndrome    • Diabetes (HCC)    • Diabetic polyneuropathy (HCC)    • Dyslipidemia    • Fallen arches    • Hammer toe    • Heart disease    • Joint pain    • Mild depression (HCC)     Saddness post Surgery 7/10/14 improved after counseling.   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 Diffusely calcified LAD with 30-80% stenosis. OMB #3 with 60% to 70% stenosis. Preserved LV systolic function with EF of 55%.   • CARPAL TUNNEL RELEASE Right 10/15/2014    Right carpal tunnel release.   • CARPAL TUNNEL RELEASE     • CATARACT EXTRACTION, BILATERAL Bilateral    • CORONARY ARTERY BYPASS GRAFT  02/24/2014    CABG x 3 with LIMA to LAD, endarterectomy to LAD, SVG to OMB and SVG to distal RCA with endarterectomy to distal RCA. Endo-vein harvesting.   • CYSTECTOMY Left     Breast cycst   • CYSTOSCOPY, URETEROSCOPY, RETROGRADE PYELOGRAM, STENT INSERTION Left 3/10/2021    Procedure: , LEFT RETROGRADE PYELOGRAM, STENT INSERTION LEFT URETER;  Surgeon: Babs, Cooper NOLAN MD;  Location: [facility];  Service: Urology;  Laterality: Left;   • EXCISION LESION      Remove breast lesion - cyst   • EYE SURGERY      Insert lens prosthesis   • FOOT SURGERY  10/18/2011    Exostectomy of the right foot. Preulcerative lesion with Charot deformity, right foot   • LUMBAR SPINE SURGERY  11/09/2015    Lumbosacral radiofrequency  "thermal coagulation.   • NERVE BLOCK  09/14/2015    Lumbar medial branch block.   • TOE NAIL AVULSION  09/03/2015    DEBRIDE NAIL 6 OR MORE 81411 (1)         OT ASSESSMENT FLOWSHEET (last 12 hours)     OT Evaluation and Treatment     Row Name 12/16/21 0908                   OT Time and Intention    Subjective Information complains of; pain  -        Document Type therapy note (daily note)  -        Mode of Treatment individual therapy; occupational therapy  -                  General Information    Patient Profile Reviewed yes  -                  Cognition    Orientation Status (Cognition) oriented to; person; place; time  -                  Pain Scale: Numbers Pre/Post-Treatment    Pretreatment Pain Rating 7/10  -        Posttreatment Pain Rating 7/10  -        Pain Location --  \"everywhere\"  -        Pain Intervention(s) --  RN reports just received pain meds  -                  Motor Skills    Therapeutic Exercise shoulder; elbow/forearm; wrist; hand  -                  Shoulder (Therapeutic Exercise)    Shoulder (Therapeutic Exercise) AAROM (active assistive range of motion)  -        Shoulder AAROM (Therapeutic Exercise) bilateral; aBduction; flexion; scapular protraction; scapular retraction  -                  Elbow/Forearm (Therapeutic Exercise)    Elbow/Forearm (Therapeutic Exercise) AROM (active range of motion)  -        Elbow/Forearm AROM (Therapeutic Exercise) bilateral; flexion; extension  -                  Wrist (Therapeutic Exercise)    Wrist (Therapeutic Exercise) AAROM (active assistive range of motion)  -        Wrist AAROM (Therapeutic Exercise) bilateral; flexion; extension; radial deviation; ulnar deviation  -                  Hand (Therapeutic Exercise)    Hand (Therapeutic Exercise) PROM (passive range of motion)  stretch  -        Hand PROM (Therapeutic Exercise) bilateral; finger extension  gentle; to pt daniella on R side  -                  Self-Feeding " Assessment/Training    [place] Level (Feeding) maximum assist (25% patient effort); scoop food and bring to mouth  -KH        Position (Self-Feeding) sitting up in bed  -KH                  Wound 12/05/21 0830 upper coccyx MASD (Moisture associated skin damage)    Wound - Properties Group Placement Date: 12/05/21  -CR Placement Time: 0830 -CR Present on Hospital Admission: Y  -CR Orientation: upper  -CR Location: coccyx  -CR Primary Wound Type: MASD  -CR        Retired Wound - Properties Group Date first assessed: 12/05/21  -CR Time first assessed: 0830 -CR Present on Hospital Admission: Y  -CR Location: coccyx  -CR Primary Wound Type: MASD  -CR                  Wound 12/09/21 2207 Bilateral medial coccyx Pressure Injury    Wound - Properties Group Placement Date: 12/09/21  -AP Placement Time: 2207 -AP Present on Hospital Admission: Y  -AP Side: Bilateral  -AP Orientation: medial  -AP Location: coccyx  -AP Primary Wound Type: Pressure inj  -AP Stage, Pressure Injury : Stage 3  -AP        Retired Wound - Properties Group Date first assessed: 12/09/21  -AP Time first assessed: 2207 -AP Present on Hospital Admission: Y  -AP Side: Bilateral  -AP Location: coccyx  -AP Primary Wound Type: Pressure inj  -AP                  Plan of Care Review    Plan of Care Reviewed With patient  -KH        Progress no change  -KH                  Vital Signs    Pre Systolic BP Rehab 141  -KH        Pre Treatment Diastolic BP 63  -KH        Pretreatment Heart Rate (beats/min) 89  -KH        Posttreatment Heart Rate (beats/min) 82  -KH        Pre SpO2 (%) 96  -KH        O2 Delivery Pre Treatment room air  -KH        Post SpO2 (%) 97  -KH        O2 Delivery Post Treatment room air  -KH        Pre Patient Position Supine  -KH        Post Patient Position Supine  -KH                  Positioning and Restraints    Pre-Treatment Position in bed  -KH        Post Treatment Position bed  -KH        In Bed fowlers; call light within  reach; encouraged to call for assist; exit alarm on  -                  Therapy Assessment/Plan (OT)    Rehab Potential (OT) fair, will monitor progress closely  -        Criteria for Skilled Therapeutic Interventions Met (OT) yes; skilled treatment is necessary  -        Therapy Frequency (OT) other (see comments)  3-5x/wk  -                  Therapy Plan Review/Discharge Plan (OT)    Anticipated Discharge Disposition (OT) skilled nursing facility  -              User Key  (r) = Recorded By, (t) = Taken By, (c) = Cosigned By    Initials Name Effective Dates    Jennifer Irizarry, JANE 06/16/21 -     Rebecca Guillory, LINO 06/16/21 -     Sophia Guallpa RN 05/24/21 -                  Occupational Therapy Education                 Title: PT OT SLP Therapies (In Progress)     Topic: Occupational Therapy (In Progress)     Point: ADL training (In Progress)     Description:   Instruct learner(s) on proper safety adaptation and remediation techniques during self care or transfers.   Instruct in proper use of assistive devices.              Learning Progress Summary           Patient Acceptance, E, NR by BB at 12/15/2021 0944    Acceptance, E, NR by BB at 12/14/2021 1016                   Point: Home exercise program (Not Started)     Description:   Instruct learner(s) on appropriate technique for monitoring, assisting and/or progressing therapeutic exercises/activities.              Learner Progress:  Not documented in this visit.          Point: Precautions (In Progress)     Description:   Instruct learner(s) on prescribed precautions during self-care and functional transfers.              Learning Progress Summary           Patient Acceptance, E,TB, NR by SHARMIN at 12/10/2021 1332    Comment: POC, role of OT, goals                   Point: Body mechanics (In Progress)     Description:   Instruct learner(s) on proper positioning and spine alignment during self-care, functional mobility activities and/or  exercises.              Learning Progress Summary           Patient Acceptance, E,TB, NR by  at 12/10/2021 0922    Comment: POC, role of OT, goals                               User Key     Initials Effective Dates Name Provider Type Discipline     06/16/21 -  Zoey Carolina COTA Occupational Therapy Assistant OT     06/14/21 -  Toim Burdick, OT Occupational Therapist OT                  OT Recommendation and Plan  Therapy Frequency (OT): other (see comments) (3-5x/wk)  Plan of Care Review  Plan of Care Reviewed With: patient  Progress: no change  Outcome Summary: RN okay'd OT tx this date. Pt in the bed upon arrival and agreeable to in bed act only with max encouragement d/t pain. (RN reports just received pain medication) Pt completed AAROM/PROM of BUEs to improve functional ROM and strength required for ADLs/IADLs. MaxA for self-feeding while seated at 90 deg in bed. Pt also unable to daniella sitting upright in bed for more than ~2 mins. Pt declining EOB/OOB this date. No goals met. Continue OT POC. SNF at d/c.  Plan of Care Reviewed With: patient  Outcome Summary: RN okay'd OT tx this date. Pt in the bed upon arrival and agreeable to in bed act only with max encouragement d/t pain. (RN reports just received pain medication) Pt completed AAROM/PROM of BUEs to improve functional ROM and strength required for ADLs/IADLs. MaxA for self-feeding while seated at 90 deg in bed. Pt also unable to daniella sitting upright in bed for more than ~2 mins. Pt declining EOB/OOB this date. No goals met. Continue OT POC. SNF at d/c.     Outcome Measures     Row Name 12/16/21 0908 12/15/21 1200          How much help from another person do you currently need...    Turning from your back to your side while in flat bed without using bedrails? -- 1  -TA     Moving from lying on back to sitting on the side of a flat bed without bedrails? -- 1  -TA     Moving to and from a bed to a chair (including a wheelchair)? -- 1  -TA      Standing up from a chair using your arms (e.g., wheelchair, bedside chair)? -- 1  -TA     Climbing 3-5 steps with a railing? -- 1  -TA     To walk in hospital room? -- 1  -TA     AM-PAC 6 Clicks Score (PT) -- 6  -TA            How much help from another is currently needed...    Putting on and taking off regular lower body clothing? 1  -KH --     Bathing (including washing, rinsing, and drying) 1  -KH --     Toileting (which includes using toilet bed pan or urinal) 1  -KH --     Putting on and taking off regular upper body clothing 1  -KH --     Taking care of personal grooming (such as brushing teeth) 2  -KH --     Eating meals 2  -KH --     AM-PAC 6 Clicks Score (OT) 8  -KH --            Functional Assessment    Outcome Measure Options AM-PAC 6 Clicks Daily Activity (OT)  - AM-PAC 6 Clicks Basic Mobility (PT)  -           User Key  (r) = Recorded By, (t) = Taken By, (c) = Cosigned By    Initials Name Provider Type    Junie Clemens PTA Physical Therapy Assistant    Jennifer Irizarry OT Occupational Therapist                Time Calculation:    Time Calculation- OT     Row Name 12/16/21 0936             Time Calculation-     OT Start Time 0908  -      OT Stop Time 0931  -      OT Time Calculation (min) 23 min  -      OT Received On 12/16/21  -            User Key  (r) = Recorded By, (t) = Taken By, (c) = Cosigned By    Initials Name Provider Type    Jennifer Irizarry OT Occupational Therapist              Therapy Charges for Today     Code Description Service Date Service Provider Modifiers Qty    [id number]  OT SELF CARE/MGMT/TRAIN EA 15 MIN 12/16/2021 Jennifer Samuel OT GO 1    [id number]  OT THER PROC EA 15 MIN 12/16/2021 Jennifer Samuel OT GO 1               Jennifer Samuel OT  12/16/2021

## 2021-12-16 NOTE — NURSING NOTE
At 0354, notified by telemetry that pt HR went up to 150s and has now converted to afib. No history of afib noted. STAT ekg obtained confirming the afib. Ventricular rate of 88. HR stable at this time. No s/s of distress at this time. MD notified and aware. MD ordered metoprolol q8 instead of q12. No extra dose ordered. MD stated hold off until dayshift team arrives on anticoagulation. SCDs will be placed for now. Will continue to monitor.

## 2021-12-16 NOTE — PLAN OF CARE
Problem: Adult Inpatient Plan of Care  Goal: Plan of Care Review  Flowsheets  Taken 12/16/2021 0996  Plan of Care Reviewed With: patient  Outcome Summary: RN okay'd OT tx this date. Pt in the bed upon arrival and agreeable to in bed act only with max encouragement d/t pain. (RN reports just received pain medication) Pt completed AAROM/PROM of BUEs to improve functional ROM and strength required for ADLs/IADLs. MaxA for self-feeding while seated at 90 deg in bed. Pt also unable to daniella sitting upright in bed for more than ~2 mins. Pt declining EOB/OOB this date. No goals met. Continue OT POC. SNF at d/c.

## 2021-12-17 NOTE — PLAN OF CARE
Goal Outcome Evaluation:  Plan of Care Reviewed With: patient        Progress: improving  Outcome Summary: vss; c/o abdominal pain earlier in the shift; zofran effective; no s/s of distress at this time; resting comfortably; will continue to monitor

## 2021-12-17 NOTE — DISCHARGE PLACEMENT REQUEST
"Kiley Millard (66 y.o. Female)             Date of Birth Social Security Number Address Home Phone MRN    1955  [address] [facility] [address] [phone] [id number]    Buddhist Marital Status             Zoroastrian        Admission Date Admission Type Admitting Provider Attending Provider Department, Room/Bed    12/5/21 Emergency Behroozi, Saeid, MD Behroozi, Saeid, MD [address], [address]    Discharge Date Discharge Disposition Discharge Destination                         Attending Provider: Behroozi, Saeid, MD    Allergies: Contrast Dye, Corticosteroids, Codeine, Aspirin, Ibuprofen    Isolation: None   Infection: None   Code Status: No CPR   Advance Care Planning Activity    Ht: 175.3 cm (69.02\")   Wt: 92.9 kg (204 lb 11.2 oz)    Admission Cmt: None   Principal Problem: None                Active Insurance as of 12/5/2021     Primary Coverage     Payor Plan Insurance Group Employer/Plan Group    MEDICARE MEDICARE A & B      Payor Plan Address Payor Plan Phone Number Payor Plan Fax Number Effective Dates    [address] [phone]  11/1/2012 - None Entered    [facility] [id number]       Subscriber Name Subscriber Birth Date Member ID       KILEY MILLARD 1955 [id number]           Secondary Coverage     Payor Plan Insurance Group Employer/Plan Group    AETNA COMMERCIAL AETNA  [employer] SUPP [id number]     Payor Plan Address Payor Plan Phone Number Payor Plan Fax Number Effective Dates    [address] [phone]  10/1/2018 - None Entered    [facility] [id number]       Subscriber Name Subscriber Birth Date Member ID       KILEY MILLARD 1955 [id number]                 Emergency Contacts      (Rel.) Home Phone Work Phone Mobile Phone    HOANG GONZALES (POA/NIECE) (Relative) [phone] -- [phone]    LionelAlexey pena (Brother) [phone] -- --              "

## 2021-12-17 NOTE — PROGRESS NOTES
"Physicians Statement of Medical Necessity for  Ambulance Transportation    GENERAL INFORMATION     Name: Janelle Millard  YOB: 1955  Medicare #: [id number]  Transport Date: 12/17/2021 (Valid for round trips this date, or for scheduled repetitive trips for 60 days from the date signed below.)  Origin: [facility] Room 351  Destination: [facility]  Is the Patient's stay covered under Medicare Part A (PPS/DRG?)Yes  Closest appropriate facility? Yes  If this a hosp-hosp transfer? No  Is this a hospice patient? No    MEDICAL NECESSITY QUESTIONAIRE    Ambulance Transportation is medically necessary only if other means of transportation are contraindicated or would be potentially harmful to the patient.  To meet this requirement, the patient must be either \"bed confined\" or suffer from a condition such that transport by means other than an ambulance is contraindicated by the patient's condition.  The following questions must be answered by the healthcare professional signing below for this form to be valid:     1) Describe the MEDICAL CONDITION (physical and/or mental) of this patient AT THE TIME OF AMBULANCE TRANSPORT that requires the patient to be transported in an ambulance, and why transport by other means is contraindicated by the patient's condition:       Patient has altered mental status secondary to psychotic disorder with delusions.  Patient admitted for sepsis and DKA, encephalopathy. Patient is completely dependent with ADLS, max assistance with self feeding. Unable to sit upright in bed greater than two minutes. Patient has not been EOB or OOB.     Past Medical History:   Diagnosis Date   • Arthritis, rheumatoid (HCC)    • Arthropathy associated with neurological disorder    • Arthropathy of lumbar facet joint    • Asthma    • Benign hypertension    • Carpal tunnel syndrome    • Diabetes (HCC)    • Diabetic polyneuropathy (HCC)    • Dyslipidemia    • Fallen arches    • Hammer " "toe    • Heart disease    • Joint pain    • Mild depression (HCC)     Saddness post Surgery 7/10/14 improved after counseling.   • Multiple vessel coronary artery disease     post CABG      • Myofascial pain    • Neurologic disorder associated with diabetes mellitus (HCC)     Neuropathy of chest      • Neuropathy    • Onychomycosis    • Peripheral vascular disease (HCC)    • Retinopathy    • Tobacco user    • Type 1 diabetes mellitus (HCC)         2) Is this patient \"bed confined\" as defined below?Yes   To be \"bed confined\" the patient must satisfy all three of the following criteria:  (1) unable to get up from bed without assistance; AND (2) unable to ambulate;  AND (3) unable to sit in a chair or wheelchair.  3) Can this patient safely be transported by car or wheelchair van (I.e., may safely sit during transport, without an attendant or monitoring?)No   4. In addition to completing questions 1-3 above, please check any of the following conditions that apply*:          *Note: supporting documentation for any boxes checked must be maintained in the patient's medical records Patient is confused, Medical attendant required, Unable to tolerate seated position for time needed to transport and Unable to sit in a chair or wheelchair due to decubitus ulcers or other wounds      SIGNATURE OF PHYSICIAN OR OTHER AUTHORIZED HEALTHCARE PROFESSIONAL    I certify that the above information is true and correct based on my evaluation of this patient, and represent that the patient requires transport by ambulance and that other forms of transport are contraindicated.  I understand that this information will be used by the Centers for Medicare and Medicaid Services (CMS) to support the determiniation of medical necessity for ambulance services, and I represent that I have personal knowledge of the patient's condition at the time of transport.       If this box is checked, I also certify that the patient is physically or mentally " incapable of signing the ambulance service's claim form and that the institution with which I am affiliated has furnished care, services or assistance to the patient.  My signature below is made on behalf of the patient pursuant to 42 .36(b)(4). In accordance with 42 .37, the specific reason(s) that the patient is physically or mentally incapable of signing the claim for is as follows:     Signature of Physician or Healthcare Professional     MAURICE Mejia Date/Time:     12/17/2021  10:01AM     (For Scheduled repetitive transport, this form is not valid for transports performed more than 60 days after this date).                                                                                                                                            --------------------------------------------------------------------------------------------  Printed Name and Credentials of Physician or Authorized Healthcare Professional     *Form must be signed by patient's attending physician for scheduled, repetitive transports,.  For non-repetitive ambulance transports, if unable to obtain the signature of the attending physician, any of the following may sign (please select below):     Physician  Clinical Nurse Specialist  Registered Nurse     Physician Assistant x Discharge Planner  Licensed Practical Nurse     Nurse Practitioner x

## 2021-12-17 NOTE — PROGRESS NOTES
Cardiology Progress Note     LOS: 12 days   Patient Care Team:  Yosef Casey MD as PCP - General (Family Medicine)  Francine Gallegos (Inactive) as Technician    Subjective:    Chart reviewed. Patient seen and examined. Patient had episodes of atrial fibrillation with rapid ventricular response.  Patient had episodes of tachyarrhythmia last night and subsequently had atrial fibrillation with a controlled ventricular rate.  Patient complains of feeling tired and fatigue.  Patient mental status has improved.  Review of the record indicate patient has no previous history of documented atrial fibrillation.      Objective:  Temp:  [97 °F (36.1 °C)-99.3 °F (37.4 °C)] 97.3 °F (36.3 °C)  Heart Rate:  [] 74  Resp:  [18] 18  BP: (106-144)/(54-72) 117/62    Intake/Output Summary (Last 24 hours) at 12/17/2021 0143  Last data filed at 12/16/2021 1832  Gross per 24 hour   Intake 720 ml   Output 652 ml   Net 68 ml       Physical Exam:   General Appearance:    Alert, oriented, cooperative, in no acute distress.   Head:    Normocephalic, atraumatic, without obvious abnormality   Eyes:             LILLIAN. Lids and lashes normal, conjunctivae and sclerae normal, no icterus, no pallor.   Ears:    Ears appear intact with no abnormalities noted.   Throat:   Mucous membranes pink and moist.   Neck:  Supple, trachea midline, no carotid bruit, no organomegaly or JVD.   Lungs:    Clear to auscultation and percussion.  Respirations regular, even and unlabored. No wheezes, rales, or rhonchi.    Heart:   Irregularly irregular S1 and S2 with a soft systolic murmur best heard at the apex no appreciable click.   Abdomen:    Soft, nontender, nondistended, no guarding, no rebound tenderness. Normal bowel sounds in all four quadrants, no masses, liver and spleen nonpalpable.    Genitalia:    Deferred.   Extremities:   Moves all extremities well, no edema, no cyanosis, no       redness, no clubbing.   Pulses:   Pulses palpable and equal  bilaterally.   Skin:   Moist and warm. No bleeding, bruising or rash.   Neurologic/Psychiatric:   Alert and oriented to person, place, and time.  Motor, power and tone in upper and lower extremities are grossly intact.  No focal neurological deficits. Normal cognitive function. No psychomotor reaction or tangential thought. No depression, homicidal ideations and suicidal ideations.          Results Review:    Results from last 7 days   Lab Units 12/16/21  0530   SODIUM mmol/L 139   POTASSIUM mmol/L 3.9   CHLORIDE mmol/L 108*   CO2 mmol/L 24.0   BUN mg/dL 13   CREATININE mg/dL 0.89   CALCIUM mg/dL 8.9   GLUCOSE mg/dL 112*             Results from last 7 days   Lab Units 12/16/21  0530   WBC 10*3/mm3 13.38*   HEMOGLOBIN g/dL 10.7*   HEMATOCRIT % 33.0*   PLATELETS 10*3/mm3 268                           ECHO:  Results for orders placed during the hospital encounter of 12/05/21    Adult Transthoracic Echo Limited W/ Cont if Necessary Per Protocol    Interpretation Summary  · The right atrial cavity is borderline dilated.  · Left ventricular ejection fraction appears to be 26 - 30%.  · Left ventricular diastolic function was normal.  · Moderately decreased posterior leaflet mobility.  · Mild mitral valve stenosis is present.  · The following left ventricular wall segments are hypokinetic: mid anterior, apical anterior, basal anterolateral, mid anterolateral, apical lateral, basal inferolateral, mid inferolateral, apical inferior, mid inferior, apical septal, basal inferoseptal, mid inferoseptal, apex hypokinetic, mid anteroseptal, basal anterior, basal inferior and basal inferoseptal.      ECG 12 Lead   Final Result   Test Reason : possible afib per tele   Blood Pressure :   */*   mmHG   Vent. Rate :  88 BPM     Atrial Rate :  89 BPM      P-R Int :   * ms          QRS Dur :  82 ms       QT Int : 358 ms       P-R-T Axes :   *   8 176 degrees      QTc Int : 433 ms      Atrial fibrillation   Anterolateral infarct (cited on  or before 11-MAY-2016)   Abnormal ECG   When compared with ECG of 06-DEC-2021 05:47,   Atrial fibrillation has replaced Sinus rhythm   Non-specific change in ST segment in Anterior leads      Referred By:            Confirmed By: CHRISTOPHE STACK MD      ECG 12 Lead   Final Result   Test Reason : Non-Q myocardial infarct   Blood Pressure :   */*   mmHG   Vent. Rate : 108 BPM     Atrial Rate : 108 BPM      P-R Int : 164 ms          QRS Dur :  80 ms       QT Int : 354 ms       P-R-T Axes :  44  62 142 degrees      QTc Int : 474 ms      Sinus tachycardia   Low voltage QRS   Cannot rule out Anterior infarct (cited on or before 11-MAY-2016)   T wave abnormality, consider inferolateral ischemia   Abnormal ECG   When compared with ECG of 05-DEC-2021 07:12,   T wave inversion now evident in Anterior leads      Referred By:            Confirmed By: THAD JACKSON MD      ECG 12 Lead   Final Result   Test Reason : abnormal EKG   Blood Pressure :   */*   mmHG   Vent. Rate : 123 BPM     Atrial Rate : 123 BPM      P-R Int : 184 ms          QRS Dur :  90 ms       QT Int : 290 ms       P-R-T Axes :  40  70  94 degrees      QTc Int : 415 ms      Sinus tachycardia   Possible Left atrial enlargement   Anterior infarct (cited on or before 11-MAY-2016)   Abnormal ECG   When compared with ECG of 05-DEC-2021 03:19,   No significant change was found      Referred By:            Confirmed By: THAD JACKSON MD      ECG 12 Lead   Final Result   Test Reason : AMS   Blood Pressure :   */*   mmHG   Vent. Rate : 129 BPM     Atrial Rate : 129 BPM      P-R Int : 164 ms          QRS Dur :  86 ms       QT Int : 276 ms       P-R-T Axes :  57  62  66 degrees      QTc Int : 404 ms      Sinus tachycardia   Anterior infarct (cited on or before 11-MAY-2016)   Abnormal ECG   When compared with ECG of 03-DEC-2021 06:14,   ST elevation now present in Anterior leads   Nonspecific T wave abnormality, worse in Lateral leads      Referred By: TRIAGE            Confirmed By: THAD CALDERÓN MD      SCANNED EKG   Final Result      SCANNED EKG   Final Result      SCANNED EKG   Final Result      SCANNED EKG   Final Result      SCANNED EKG   Final Result      SCANNED EKG   Final Result      ECG 12 Lead    (Results Pending)        Medication Review:   Current Facility-Administered Medications   Medication Dose Route Frequency Provider Last Rate Last Admin   • [START ON 12/18/2021] amiodarone (PACERONE) tablet 200 mg  200 mg Oral Q12H Thad Calderón MD       • amiodarone (PACERONE) tablet 400 mg  400 mg Oral Q12H Thad Calderón MD       • aspirin EC tablet 81 mg  81 mg Oral Daily Behroozi, Saeid, MD   81 mg at 12/16/21 0849   • atorvastatin (LIPITOR) tablet 20 mg  20 mg Oral Nightly Behroozi, Saeid, MD   20 mg at 12/16/21 2000   • carvedilol (COREG) tablet 12.5 mg  12.5 mg Oral BID Behroozi, Saeid, MD   12.5 mg at 12/16/21 2000   • dextrose (D50W) (25 g/50 mL) IV injection 12.5 g  12.5 g Intravenous PRN Behroozi, Saeid, MD   12.5 g at 12/05/21 2104   • dextrose (D50W) (25 g/50 mL) IV injection 25 g  25 g Intravenous Q15 Min PRN Behroozi, Saeid, MD       • dextrose (GLUTOSE) oral gel 15 g  15 g Oral Q15 Min PRN Behroozi, Saeid, MD       • diazePAM (VALIUM) tablet 2 mg  2 mg Oral BID Sukhjinder Trivedi MD   2 mg at 12/16/21 2000   • dilTIAZem (CARDIZEM) tablet 30 mg  30 mg Oral Q6H Sukhjinder Trivedi MD   30 mg at 12/16/21 2329   • donepezil (ARICEPT) tablet 2.5 mg  2.5 mg Oral Nightly Sukhjinder Trivedi MD   2.5 mg at 12/16/21 2000   • glucagon (human recombinant) (GLUCAGEN DIAGNOSTIC) injection 1 mg  1 mg Subcutaneous Q15 Min PRN Behroozi, Saeid, MD       • hydrOXYzine (ATARAX) tablet 25 mg  25 mg Oral Nightly PRN Sukhjinder Trivedi MD   25 mg at 12/15/21 2052   • insulin aspart (novoLOG) injection 0-9 Units  0-9 Units Subcutaneous TID AC Behroozi, Saeid, MD   2 Units at 12/16/21 1709   • insulin detemir (LEVEMIR) injection 15 Units  15 Units Subcutaneous Q12H Behroozi,  MD Wilmar   15 Units at 12/16/21 2108   • insulin regular (humuLIN R,novoLIN R) injection 10 Units  10 Units Intravenous Once Behroozi, Saeid, MD       • ipratropium-albuterol (DUO-NEB) nebulizer solution 3 mL  3 mL Nebulization Q6H PRN Behroozi, Saeid, MD   3 mL at 12/06/21 2248   • isosorbide mononitrate (IMDUR) 24 hr tablet 30 mg  30 mg Oral Daily Behroozi, Saeid, MD   30 mg at 12/16/21 0849   • levothyroxine (SYNTHROID, LEVOTHROID) tablet 25 mcg  25 mcg Oral Daily Behroozi, Saeid, MD   25 mcg at 12/16/21 0849   • magnesium oxide (MAG-OX) tablet 400 mg  400 mg Oral BID Papito Calderón MD       • metoprolol tartrate (LOPRESSOR) injection 5 mg  5 mg Intravenous Q8H Marci Lovett MD   5 mg at 12/16/21 1709   • nitroglycerin (NITRODUR) 0.2 MG/HR patch 1 patch  1 patch Transdermal Daily Behroozi, Saeid, MD   1 patch at 12/16/21 0848   • nystatin (MYCOSTATIN) powder   Topical Q12H Sukhjinder Trivedi MD   Given at 12/16/21 2001   • OLANZapine (zyPREXA) tablet 5 mg  5 mg Oral Nightly Sukhjinder Trivedi MD   5 mg at 12/16/21 2001   • ondansetron (ZOFRAN) injection 4 mg  4 mg Intravenous Q6H PRN Daisy Cavanaugh MD   4 mg at 12/16/21 2329   • PARoxetine (PAXIL) tablet 20 mg  20 mg Oral Nightly Behroozi, Saeid, MD   20 mg at 12/16/21 2001   • potassium chloride (MICRO-K) CR capsule 40 mEq  40 mEq Oral PRN Behroozi, Saeid, MD   40 mEq at 12/11/21 1422    Or   • potassium chloride (KLOR-CON) packet 40 mEq  40 mEq Oral PRN Behroozi, Saeid, MD       • potassium chloride 10 mEq in 100 mL IVPB  10 mEq Intravenous Q1H PRN Behroozi, Saeid,  mL/hr at 12/10/21 1409 10 mEq at 12/10/21 1409   • potassium phosphate 45 mmol in sodium chloride 0.9 % 500 mL infusion  45 mmol Intravenous PRN Behroozi, Saeid, MD        Or   • potassium phosphate 30 mmol in sodium chloride 0.9 % 250 mL infusion  30 mmol Intravenous PRN Behroozi, Saeid, MD 31.3 mL/hr at 12/05/21 2345 30 mmol at 12/05/21 2345    Or   • Potassium Phosphates 15  mmol in sodium chloride 0.9 % 100 mL infusion  15 mmol Intravenous PRN Behroozi, Saeid, MD   15 mmol at 12/11/21 1546    Or   • sodium phosphates 45 mmol in sodium chloride 0.9 % 500 mL IVPB  45 mmol Intravenous PRN Behroozi, Saeid, MD        Or   • sodium phosphates 30 mmol in sodium chloride 0.9 % 250 mL IVPB  30 mmol Intravenous PRN Behroozi, Saeid, MD        Or   • sodium phosphates 15 mmol in sodium chloride 0.9 % 250 mL IVPB  15 mmol Intravenous PRN Behroozi, Saeid, MD       • sacubitril-valsartan (ENTRESTO) 24-26 MG tablet 1 tablet  1 tablet Oral Q12H Papito Calderón MD   1 tablet at 12/16/21 2001   • sodium chloride 0.9 % flush 10 mL  10 mL Intravenous PRN Yosi Trivedi DO       • sodium chloride 0.9 % flush 10 mL  10 mL Intravenous Q12H Behroozi, Saeid, MD   10 mL at 12/14/21 0846   • sodium chloride 0.9 % flush 10 mL  10 mL Intravenous PRN Behroozi, Saeid, MD       • sodium chloride 0.9 % flush 10 mL  10 mL Intravenous PRN Behroozi, Saeid, MD       • sodium chloride 0.9 % flush 10 mL  10 mL Intravenous PRN Behroozi, Saeid, MD       • sodium chloride 0.9 % flush 3 mL  3 mL Intravenous Q12H Behroozi, Saeid, MD   3 mL at 12/14/21 0848   • sodium chloride 0.9 % infusion  10 mL/hr Intravenous Continuous PRN Behroozi, Saeid, MD       • traMADol (ULTRAM) tablet 50 mg  50 mg Oral Q6H PRN Meghna Reinoso APRN   50 mg at 12/16/21 1930       Assessment and Plan:      Sepsis (HCC)  1.  Atrial fibrillation.  Patient has new onset of atrial fibrillation.  Patient had episodes of atrial fibrillation with rapid ventricular response.  Patient would be started on oral amiodarone along with magnesium oxide.  We will check a thyroid function test.  Have discussed with the patient need for anticoagulation though patient does have history of anemia with previous blood transfusion.  Patient does have history of hypothyroidism and is currently on thyroid supplement    2.  Atherosclerotic coronary artery disease s/p  coronary artery bypass grafting.  Patient has not complained of having symptoms of severe substernal chest pain.  Patient at the present time would be treated medically.  Patient on admission had elevated troponin.  Patient and family was informed for need for coronary angiogram on initial presentation with elevated troponin.patient CODE STATUS was DO NOT RESUSCITATE and patient was not subjected to any invasive evaluation and intervention.    3.  Ischemic cardiomyopathy.  Patient has left ventricular systolic dysfunction.  Patient would be started on amiodarone.  Patient has not had any episodes of ventricular tachyarrhythmia.    4.  Sepsis.  Patient has not had any febrile episode.  Patient on admission had elevated white blood cell count with episodes of confusion which has improved.    5.  Dysphagia.  Patient has been evaluated by speech therapy.  Patient is tolerating oral feeding.    6.  Anemia.  Patient's hemoglobin is 10.7.  Patient has had previous history of bleeding requiring blood transfusion.  Patient would need anticoagulation to prevent thromboembolic event have discussed with the patient and would consider low-dose of Eliquis 2.5 mg twice a day.    The above plan of management were discussed with the patient and the nursing staff            Electronically signed by Papito Calderón MD, 12/16/21, 10:43 PM CST.      Time: Time spent on face-to-face interaction 20 minutes    Dictated utilizing Dragon dictation.

## 2021-12-17 NOTE — PLAN OF CARE
Goal Outcome Evaluation:  Plan of Care Reviewed With: patient        Progress: improving  Outcome Summary: dishcarging

## 2021-12-17 NOTE — DISCHARGE SUMMARY
[facility] Medicine Services  DISCHARGE SUMMARY       Date of Admission: 12/5/2021  Date of Discharge:  12/17/2021  Primary Care Physician: Yosef Casey MD    Presenting Problem/History of Present Illness:  DKA (diabetic ketoacidosis) (HCC) [E11.10]  Elevated liver enzymes [R74.8]  Acute cystitis with hematuria [N30.01]  Sepsis (HCC) [A41.9]  Pneumonia of both lower lobes due to infectious organism [J18.9]  Altered mental status, unspecified altered mental status type [R41.82]  Leukocytosis, unspecified type [D72.829]  Sepsis without acute organ dysfunction, due to unspecified organism (HCC) [A41.9]       Final Discharge Diagnoses:  Active Hospital Problems    Diagnosis    • Sepsis (HCC)        Consults:   Consults     Date and Time Order Name Status Description    12/5/2021  7:45 AM Inpatient Cardiology Consult Completed     12/5/2021  5:39 AM Hospitalist (on-call MD unless specified)            Procedures Performed:                 Pertinent Test Results:   Lab Results (last 24 hours)     Procedure Component Value Units Date/Time    COVID-19 and FLU A/B PCR - Swab, Nasopharynx [[id number]]  (Normal) Collected: 12/17/21 1310    Specimen: Swab from Nasopharynx Updated: 12/17/21 1348     COVID19 Not Detected     Influenza A PCR Not Detected     Influenza B PCR Not Detected    Narrative:      Fact sheet for providers: https://www.fda.gov/media/880285/download    Fact sheet for patients: https://www.fda.gov/media/371540/download    Test performed by PCR.    POC Glucose Once [[id number]]  (Abnormal) Collected: 12/17/21 1023    Specimen: Blood Updated: 12/17/21 1105     Glucose 156 mg/dL      Comment: RN NotifiedOperator: [id number] SAURABH TAKELAMreyna ID: [id number]       POC Glucose Once [[id number]]  (Normal) Collected: 12/17/21 0556    Specimen: Blood Updated: 12/17/21 0639     Glucose 117 mg/dL      Comment: : [id number] HAM JASMINEMeter ID: [id number]        POC Glucose Once [[id number]]  (Abnormal) Collected: 12/16/21 1930    Specimen: Blood Updated: 12/16/21 2042     Glucose 183 mg/dL      Comment: RN NotifiedOperator: [id number] FATOUMATA BOLAÑOSMeter ID: [id number]       POC Glucose Once [[id number]]  (Abnormal) Collected: 12/16/21 1626    Specimen: Blood Updated: 12/16/21 1646     Glucose 186 mg/dL      Comment: RN NotifiedOperator: [id number] RAISA QUINNMeter ID: [id number]           Imaging Results (All)     Procedure Component Value Units Date/Time    XR Chest 1 View [[id number]] Collected: 12/06/21 2226     Updated: 12/06/21 2300    Narrative:      EXAM DESCRIPTION:  Site:  XR CHEST 1 VW  RP: XR CHEST 1 VIEW     CLINICAL HISTORY:  66 years  Female;  sob, A41.9 Sepsis, unspecified organism J18.9  Pneumonia, unspecified organism N30.01 Acute cystitis with  hematuria D72.829 Elevated white blood cell count, unspecified  R41.82 Altered mental status, unspecified R74.8 Abnormal levels  of other serum enzymes I25.10 Atherosclerotic heart disease of  native coronary artery without angina pectoris R77.8 Other  specified abnormalities of plasma proteins I25    FINDINGS:    Since yesterday, diffuse bilateral interstitial edema has  slightly increased. No pneumothorax or significant pleural  effusion.  Left PICC line remains in place, tip in the SVC.  Mediastinum is unchanged. Sternal wires are again noted.      Impression:      Increased diffuse bilateral interstitial edema since yesterday    Electronically signed by:  Ozzie Srivastava MD  12/6/2021 10:59 PM [facility] [place]  Workstation: [id number]    CT Chest Without Contrast Diagnostic [[id number]] Collected: 12/05/21 0420     Updated: 12/05/21 0517    Narrative:      CT chest without contrast on  12/5/2021     CLINICAL INDICATION: Hypoxia, tachycardia    TECHNIQUE: Multiple axial images are obtained throughout the  chest without the administration of contrast. This exam was  performed according to our departmental  dose-optimization  program, which includes automated exposure control, adjustment of  the mA and/or kV according to patient size and/or use of  iterative reconstruction technique.  Total DLP is 700 mGy*cm.     COMPARISON:  CT chest from 3/19/2014 and CT abdomen and lower  chest from 3/9/2021    FINDINGS: There are very small bilateral pleural effusions. The  patient is status post median sternotomy and CABG. Coronary  artery calcifications and other extensive vascular calcifications  are noted. Left ureteral stent is partially imaged with its  proximal aspect in the left renal pelvis. Limited visualized  unenhanced upper abdomen is otherwise unremarkable. There is no  pericardial effusion. There are several small mediastinal lymph  nodes are likely reactive. There is extensive bilateral septal  thickening most consistent with pulmonary edema. There is some  bronchial wall thickening suggesting bronchitis as well. There is  mild bibasilar atelectasis. Degenerative changes are noted in the  spine. There is again noted an old compression fracture of the  superior endplate of L1.      Impression:      1.  Very small bilateral pleural effusions with extensive  bilateral septal thickening most consistent with pulmonary edema.  2.  Bronchial wall thickening suggesting bronchitis as well.    Electronically signed by:  Calderon Lubin  12/5/2021 5:15 AM  [facility] Workstation: [id number]    CT Head Without Contrast [[id number]] Collected: 12/05/21 0420     Updated: 12/05/21 0451    Narrative:      EXAM: CT HEAD WITHOUT IV CONTRAST    INDICATION:altered mental status    TECHNIQUE: Helical CT of the head was obtained without  intravenous contrast. Axial, coronal and sagittal images were  created.    Dose reduction technique was used including one or more of the  following: automated exposure control, adjustment of mA and kV  according to patient size, and/or iterative reconstruction.    COMPARISON: CTs most recent dated 3/6/2021      FINDINGS:    Quality: Average.  No unusual artifacts beyond the limitations of  routine CT.    Hemorrhage: None.    Brain parenchyma: Parenchymal attenuation characteristics  unchanged.  No new findings.    Ventricles: Unchanged in size and configuration from the most  recent prior study.    Mass effect: None.    Vascular: Atherosclerotic calcifications.    Visualized orbits: The imaged portions appear normal.    Paranasal sinuses and mastoid air cells: The imaged portions  demonstrate minimal mucosal thickening and/or rentention cyst  formation.    Bony structures: No depressed or displaced skull fractures.    Other: Noncontributory.        Impression:        1.   No acute intracranial findings.    If there is high clinical suspicion for acute infarct consider  MRI for further evaluation.     Electronically signed by:  Simón Becker MD  12/5/2021 4:50 AM  CST Workstation: [id number]    XR Chest 1 View [[id number]] Collected: 12/05/21 0344     Updated: 12/05/21 0447    Narrative:      EXAM: Single portable XR view of the chest  INDICATION: Simple Sepsis Triage Protocol  COMPARISON: Multiple radiographs most recent dated 12/2/2021    FINDINGS:  Multiple intact sternotomy wires. Left-sided PICC is stable in  position.  The cardiomediastinal silhouette is stable.   Increased bilateral interstitial markings/prominence. Trace  bilateral pleural effusions. No visible pneumothorax.      Impression:        Increased bilateral interstitial opacities likely representing  pulmonary edema with trace bilateral pleural effusions.  Superimposed infectious process not excluded.    Electronically signed by:  Simón Becker MD  12/5/2021 4:46 AM  CST Workstation: [id number]            Chief Complaint on Day of Discharge: none    Hospital Course:  66 year old female with past medical history of type 2 DM, RA, HTN, multivessel CAD who presented on 12/5/21 with complaints of altered mental status.  She was admitted for multiple  "issues including DKA, sepsis related to pneumonia, UTI.  DKA resolved with fluids and insulin therapy.  Patient completed antibiotic course for UTI and pneumonia.  She also suffered atrial fibrillation with RVR that initially required IV Cardizem therapy.  She is now rate controlled on oral cardizem and beta blocker therapy.  Cardiology has also added magnesium, amiodarone, and Eliquis therapy for anticoagulation.  Anticoagulation was discussed with patient and niece/POA as patient does have history of anemia requiring transfusion in the past.  Patient and niece are agreeable to anticoagulation plan of Eliquis 2.5 mg twice daily.  Patient is stable on day of discharge and will be sent back to SNF with instructions for one week PCP follow up and one month cardiology follow up.       Condition on Discharge:  Stable     Physical Exam on Discharge:  /64   Pulse 77   Temp 96.8 °F (36 °C) (Temporal)   Resp 18   Ht 175.3 cm (69.02\")   Wt 92.9 kg (204 lb 11.2 oz)   SpO2 98%   BMI 30.21 kg/m²   Physical Exam  Vitals and nursing note reviewed.   Constitutional:       General: She is not in acute distress.     Appearance: Normal appearance. She is ill-appearing.      Comments: Chronically ill appearing   HENT:      Head: Normocephalic and atraumatic.      Right Ear: External ear normal.      Left Ear: External ear normal.      Nose: Nose normal.      Mouth/Throat:      Mouth: Mucous membranes are moist.      Pharynx: Oropharynx is clear.   Eyes:      General: No scleral icterus.        Right eye: No discharge.         Left eye: No discharge.      Conjunctiva/sclera: Conjunctivae normal.   Cardiovascular:      Rate and Rhythm: Normal rate and regular rhythm.      Pulses: Normal pulses.      Heart sounds: Normal heart sounds. No murmur heard.  No friction rub. No gallop.    Pulmonary:      Effort: Pulmonary effort is normal. No respiratory distress.      Breath sounds: Normal breath sounds. No stridor. No wheezing, " rhonchi or rales.   Abdominal:      General: Bowel sounds are normal. There is no distension.      Palpations: Abdomen is soft.      Tenderness: There is no abdominal tenderness.   Musculoskeletal:         General: No swelling. Normal range of motion.      Cervical back: Normal range of motion and neck supple.   Skin:     General: Skin is warm and dry.   Neurological:      General: No focal deficit present.      Mental Status: She is alert and oriented to person, place, and time.   Psychiatric:         Mood and Affect: Mood normal.         Behavior: Behavior normal.        Discharge Disposition:  Skilled Nursing Facility (DC - External)    Discharge Medications:     Discharge Medications      New Medications      Instructions Start Date   amiodarone 400 MG tablet  Commonly known as: PACERONE   400 mg, Oral, Every 12 Hours Scheduled      amiodarone 200 MG tablet  Commonly known as: PACERONE   200 mg, Oral, Every 12 Hours Scheduled   Start Date: December 18, 2021     apixaban 2.5 MG tablet tablet  Commonly known as: ELIQUIS   5 mg, Oral, 2 Times Daily      magnesium oxide 400 tablet tablet  Commonly known as: MAG-OX   400 mg, Oral, 2 Times Daily      sacubitril-valsartan 24-26 MG tablet  Commonly known as: ENTRESTO   1 tablet, Oral, Every 12 Hours Scheduled         Changes to Medications      Instructions Start Date   albuterol (2.5 MG/3ML) 0.083% nebulizer solution  Commonly known as: PROVENTIL  What changed:   · how to take this  · when to take this  · reasons to take this   2.5 mg, Nebulization, Every 4 Hours PRN      oxyCODONE-acetaminophen  MG per tablet  Commonly known as: PERCOCET  What changed:   · when to take this  · reasons to take this   1 tablet, Oral, Every 4 Hours PRN         Continue These Medications      Instructions Start Date   acetaminophen 325 MG tablet  Commonly known as: TYLENOL   650 mg, Oral, 3 Times Daily      aspirin 81 MG EC tablet   81 mg, Oral, Daily      atorvastatin 20 MG  tablet  Commonly known as: LIPITOR   20 mg, Oral, Nightly      B-D ULTRAFINE III SHORT PEN 31G X 8 MM misc  Generic drug: Insulin Pen Needle   No dose, route, or frequency recorded.      Biofreeze Roll-On 4 % gel  Generic drug: Menthol (Topical Analgesic)   Topical      carvedilol 12.5 MG tablet  Commonly known as: COREG   TAKE 1 TABLET BY MOUTH TWICE DAILY      Centrum Silver 50+Women tablet tablet  Generic drug: multivitamin with minerals   1 tablet, Oral, Daily      chlorthalidone 25 MG tablet  Commonly known as: HYGROTON   25 mg, Oral, Daily      Dexcom G6 Sensor   Does not apply, As Needed, Every 10 daysDexcom G6 Sensor Kit [address]-00 Use as directed for continuous glucose monitoring      diazePAM 2 MG tablet  Commonly known as: VALIUM   2 mg, Oral, 2 Times Daily      dilTIAZem 30 MG tablet  Commonly known as: CARDIZEM   30 mg, Oral, Every 6 Hours Scheduled      donepezil 5 MG tablet  Commonly known as: ARICEPT   2.5 mg, Oral, Nightly      furosemide 20 MG tablet  Commonly known as: LASIX   20 mg, Oral, 2 Times Daily      glucose blood test strip   Livongot test strips use to test sugar 4 times per day. E 10.9      guaiFENesin 100 MG/5ML syrup  Commonly known as: ROBITUSSIN   200 mg, Oral, 3 Times Daily PRN      hydrOXYzine 25 MG tablet  Commonly known as: ATARAX   25 mg, Oral      insulin aspart 100 UNIT/ML solution pen-injector sc pen  Commonly known as: novoLOG FLEXPEN   150-200=2 units, 201-250=3 units, 251-300=5 units, 301-350=7 units, 351-400=9 units, 401-450=12 units, >450=15 units       isosorbide mononitrate 30 MG 24 hr tablet  Commonly known as: IMDUR   30 mg, Oral, Daily      lidocaine 5 %  Commonly known as: LIDODERM   1 patch, Transdermal, Every 24 Hours, Remove & Discard patch within 12 hours or as directed by MD      melatonin 5 MG tablet tablet   5 mg, Oral, Nightly      methotrexate 2.5 MG tablet   10 mg, Oral, Weekly      nitroglycerin 0.2 MG/HR patch  Commonly known as: NITRODUR   1 patch,  Transdermal, Daily      OLANZapine 5 MG tablet  Commonly known as: zyPREXA   5 mg, Oral, Nightly      PARoxetine 20 MG tablet  Commonly known as: PAXIL   20 mg, Oral, Nightly      Synthroid 25 MCG tablet  Generic drug: levothyroxine   50 mcg, Oral, Daily      Toujeo Max SoloStar 300 UNIT/ML solution pen-injector injection  Generic drug: Insulin Glargine (2 Unit Dial)   16 Units, Subcutaneous, Daily, Take 16 units every am       Toujeo Max SoloStar 300 UNIT/ML solution pen-injector injection  Generic drug: Insulin Glargine (2 Unit Dial)   26 Units, Subcutaneous, Nightly, Take 26 units every night      TRUEplus Lancets 33G misc   No dose, route, or frequency recorded.      Vaporizing Chest Rub 4.8-1.2-2.6 % ointment   1 application, Apply externally, 2 Times Daily      Vitamin D (Cholecalciferol) 50 MCG (2000 UT) capsule   1 capsule, Oral, Daily      Vitamin D3 Ultra Potency 1.25 MG (88265 UT) tablet  Generic drug: Cholecalciferol   1 capsule, Oral, Daily         Stop These Medications    sertraline 50 MG tablet  Commonly known as: ZOLOFT     traMADol 50 MG tablet  Commonly known as: ULTRAM            Discharge Diet:   Diet Instructions     Diet: Soft Texture; Thin Liquids, No Restrictions; Ground      Discharge Diet: Soft Texture    Fluid Consistency: Thin Liquids, No Restrictions    Soft Options: Ground          Activity at Discharge:   Activity Instructions     Activity as Tolerated            Discharge Care Plan/Instructions: follow up with PCP within one week and cardiology within one month     Follow-up Appointments:   Additional Instructions for the Follow-ups that You Need to Schedule     Discharge Follow-up with PCP   As directed       Currently Documented PCP:    Yosef Casey MD    PCP Phone Number:    [phone]     Follow Up Details: one week         Discharge Follow-up with Specified Provider: Stephane; 1 Month   As directed      To: Stephane    Follow Up: 1 Month            Contact  information for follow-up providers     Yosef Casey MD .    Specialty: Family Medicine  Why: one week  Contact information:  [address] [facility] [address] [phone]                   Contact information for after-discharge care     Destination     [facility] .    Service: Skilled Nursing  Contact information:  [address] [place] [id number] [phone]                             Test Results Pending at Discharge:         This document has been electronically signed by JESSIE Salvador on December 17, 2021 16:09 CST        Time: 30 minutes spent on assessment, discussion, management, and discharge planning for this patient.

## 2021-12-17 NOTE — SIGNIFICANT NOTE
12/17/21 1024   OTHER   Discipline physical therapy assistant   Rehab Time/Intention   Session Not Performed patient/family declined treatment

## 2022-01-01 ENCOUNTER — APPOINTMENT (OUTPATIENT)
Dept: GENERAL RADIOLOGY | Facility: HOSPITAL | Age: 67
End: 2022-01-01

## 2022-01-01 ENCOUNTER — LAB REQUISITION (OUTPATIENT)
Dept: LAB | Facility: HOSPITAL | Age: 67
End: 2022-01-01

## 2022-01-01 ENCOUNTER — APPOINTMENT (OUTPATIENT)
Dept: CT IMAGING | Facility: HOSPITAL | Age: 67
End: 2022-01-01

## 2022-01-01 ENCOUNTER — HOSPITAL ENCOUNTER (OUTPATIENT)
Facility: HOSPITAL | Age: 67
Setting detail: OBSERVATION
Discharge: NURSING, REHAB OR LONG-TERM CARE | End: 2022-01-19
Attending: EMERGENCY MEDICINE | Admitting: STUDENT IN AN ORGANIZED HEALTH CARE EDUCATION/TRAINING PROGRAM

## 2022-01-01 VITALS
OXYGEN SATURATION: 98 % | BODY MASS INDEX: 29.07 KG/M2 | DIASTOLIC BLOOD PRESSURE: 50 MMHG | WEIGHT: 196.3 LBS | TEMPERATURE: 97.2 F | RESPIRATION RATE: 18 BRPM | SYSTOLIC BLOOD PRESSURE: 91 MMHG | HEIGHT: 69 IN | HEART RATE: 91 BPM

## 2022-01-01 DIAGNOSIS — Z78.9 IMPAIRED MOBILITY AND ACTIVITIES OF DAILY LIVING: ICD-10-CM

## 2022-01-01 DIAGNOSIS — F41.9 ANXIETY DISORDER, UNSPECIFIED TYPE: ICD-10-CM

## 2022-01-01 DIAGNOSIS — Z74.09 IMPAIRED FUNCTIONAL MOBILITY, BALANCE, GAIT, AND ENDURANCE: ICD-10-CM

## 2022-01-01 DIAGNOSIS — G89.4 CHRONIC PAIN SYNDROME: ICD-10-CM

## 2022-01-01 DIAGNOSIS — N39.0 URINARY TRACT INFECTION, SITE NOT SPECIFIED: ICD-10-CM

## 2022-01-01 DIAGNOSIS — N39.0 URINARY TRACT INFECTION WITHOUT HEMATURIA, SITE UNSPECIFIED: Primary | ICD-10-CM

## 2022-01-01 DIAGNOSIS — R41.82 ALTERED MENTAL STATUS, UNSPECIFIED ALTERED MENTAL STATUS TYPE: ICD-10-CM

## 2022-01-01 DIAGNOSIS — I95.9 HYPOTENSION, UNSPECIFIED HYPOTENSION TYPE: ICD-10-CM

## 2022-01-01 DIAGNOSIS — A41.9 SEPSIS, DUE TO UNSPECIFIED ORGANISM, UNSPECIFIED WHETHER ACUTE ORGAN DYSFUNCTION PRESENT: ICD-10-CM

## 2022-01-01 DIAGNOSIS — Z74.09 IMPAIRED MOBILITY AND ACTIVITIES OF DAILY LIVING: ICD-10-CM

## 2022-01-01 LAB
ALBUMIN SERPL-MCNC: 3.2 G/DL (ref 3.5–5.2)
ALBUMIN/GLOB SERPL: 1.1 G/DL
ALP SERPL-CCNC: 120 U/L (ref 39–117)
ALT SERPL W P-5'-P-CCNC: 11 U/L (ref 1–33)
ANION GAP SERPL CALCULATED.3IONS-SCNC: 11 MMOL/L (ref 5–15)
ANION GAP SERPL CALCULATED.3IONS-SCNC: 11 MMOL/L (ref 5–15)
ANION GAP SERPL CALCULATED.3IONS-SCNC: 15 MMOL/L (ref 5–15)
ANION GAP SERPL CALCULATED.3IONS-SCNC: 6 MMOL/L (ref 5–15)
ANION GAP SERPL CALCULATED.3IONS-SCNC: 8 MMOL/L (ref 5–15)
AST SERPL-CCNC: 25 U/L (ref 1–32)
BACTERIA SPEC AEROBE CULT: NORMAL
BACTERIA UR QL AUTO: ABNORMAL /HPF
BASOPHILS # BLD AUTO: 0.06 10*3/MM3 (ref 0–0.2)
BASOPHILS # BLD AUTO: 0.07 10*3/MM3 (ref 0–0.2)
BASOPHILS NFR BLD AUTO: 0.7 % (ref 0–1.5)
BASOPHILS NFR BLD AUTO: 0.8 % (ref 0–1.5)
BASOPHILS NFR BLD AUTO: 0.9 % (ref 0–1.5)
BASOPHILS NFR BLD AUTO: 1 % (ref 0–1.5)
BASOPHILS NFR BLD AUTO: 1.2 % (ref 0–1.5)
BILIRUB SERPL-MCNC: 0.2 MG/DL (ref 0–1.2)
BILIRUB UR QL STRIP: NEGATIVE
BILIRUB UR QL STRIP: NEGATIVE
BUN SERPL-MCNC: 13 MG/DL (ref 8–23)
BUN SERPL-MCNC: 17 MG/DL (ref 8–23)
BUN SERPL-MCNC: 24 MG/DL (ref 8–23)
BUN SERPL-MCNC: 24 MG/DL (ref 8–23)
BUN SERPL-MCNC: 29 MG/DL (ref 8–23)
BUN/CREAT SERPL: 23.2 (ref 7–25)
BUN/CREAT SERPL: 25.8 (ref 7–25)
BUN/CREAT SERPL: 27.3 (ref 7–25)
BUN/CREAT SERPL: 29.3 (ref 7–25)
BUN/CREAT SERPL: 31.6 (ref 7–25)
CALCIUM SPEC-SCNC: 10 MG/DL (ref 8.6–10.5)
CALCIUM SPEC-SCNC: 9.4 MG/DL (ref 8.6–10.5)
CALCIUM SPEC-SCNC: 9.7 MG/DL (ref 8.6–10.5)
CALCIUM SPEC-SCNC: 9.8 MG/DL (ref 8.6–10.5)
CALCIUM SPEC-SCNC: 9.9 MG/DL (ref 8.6–10.5)
CHLORIDE SERPL-SCNC: 100 MMOL/L (ref 98–107)
CHLORIDE SERPL-SCNC: 103 MMOL/L (ref 98–107)
CHLORIDE SERPL-SCNC: 105 MMOL/L (ref 98–107)
CHLORIDE SERPL-SCNC: 109 MMOL/L (ref 98–107)
CHLORIDE SERPL-SCNC: 97 MMOL/L (ref 98–107)
CLARITY UR: ABNORMAL
CLARITY UR: ABNORMAL
CO2 SERPL-SCNC: 24 MMOL/L (ref 22–29)
CO2 SERPL-SCNC: 24 MMOL/L (ref 22–29)
CO2 SERPL-SCNC: 25 MMOL/L (ref 22–29)
CO2 SERPL-SCNC: 29 MMOL/L (ref 22–29)
CO2 SERPL-SCNC: 32 MMOL/L (ref 22–29)
COLOR UR: YELLOW
COLOR UR: YELLOW
CREAT SERPL-MCNC: 0.56 MG/DL (ref 0.57–1)
CREAT SERPL-MCNC: 0.66 MG/DL (ref 0.57–1)
CREAT SERPL-MCNC: 0.76 MG/DL (ref 0.57–1)
CREAT SERPL-MCNC: 0.88 MG/DL (ref 0.57–1)
CREAT SERPL-MCNC: 0.99 MG/DL (ref 0.57–1)
D-LACTATE SERPL-SCNC: 1.2 MMOL/L (ref 0.5–2)
DEPRECATED RDW RBC AUTO: 59.2 FL (ref 37–54)
DEPRECATED RDW RBC AUTO: 60 FL (ref 37–54)
DEPRECATED RDW RBC AUTO: 60.2 FL (ref 37–54)
DEPRECATED RDW RBC AUTO: 60.5 FL (ref 37–54)
DEPRECATED RDW RBC AUTO: 61.5 FL (ref 37–54)
EOSINOPHIL # BLD AUTO: 0.39 10*3/MM3 (ref 0–0.4)
EOSINOPHIL # BLD AUTO: 0.41 10*3/MM3 (ref 0–0.4)
EOSINOPHIL # BLD AUTO: 0.44 10*3/MM3 (ref 0–0.4)
EOSINOPHIL # BLD AUTO: 0.49 10*3/MM3 (ref 0–0.4)
EOSINOPHIL # BLD AUTO: 0.58 10*3/MM3 (ref 0–0.4)
EOSINOPHIL NFR BLD AUTO: 4.8 % (ref 0.3–6.2)
EOSINOPHIL NFR BLD AUTO: 5.5 % (ref 0.3–6.2)
EOSINOPHIL NFR BLD AUTO: 6.6 % (ref 0.3–6.2)
EOSINOPHIL NFR BLD AUTO: 7.2 % (ref 0.3–6.2)
EOSINOPHIL NFR BLD AUTO: 7.6 % (ref 0.3–6.2)
ERYTHROCYTE [DISTWIDTH] IN BLOOD BY AUTOMATED COUNT: 18 % (ref 12.3–15.4)
ERYTHROCYTE [DISTWIDTH] IN BLOOD BY AUTOMATED COUNT: 18.1 % (ref 12.3–15.4)
ERYTHROCYTE [DISTWIDTH] IN BLOOD BY AUTOMATED COUNT: 18.1 % (ref 12.3–15.4)
ERYTHROCYTE [DISTWIDTH] IN BLOOD BY AUTOMATED COUNT: 18.4 % (ref 12.3–15.4)
ERYTHROCYTE [DISTWIDTH] IN BLOOD BY AUTOMATED COUNT: 18.6 % (ref 12.3–15.4)
FLUAV RNA RESP QL NAA+PROBE: NOT DETECTED
FLUBV RNA RESP QL NAA+PROBE: NOT DETECTED
GFR SERPL CREATININE-BSD FRML MDRD: 108 ML/MIN/1.73
GFR SERPL CREATININE-BSD FRML MDRD: 56 ML/MIN/1.73
GFR SERPL CREATININE-BSD FRML MDRD: 64 ML/MIN/1.73
GFR SERPL CREATININE-BSD FRML MDRD: 76 ML/MIN/1.73
GFR SERPL CREATININE-BSD FRML MDRD: 89 ML/MIN/1.73
GLOBULIN UR ELPH-MCNC: 3 GM/DL
GLUCOSE BLDC GLUCOMTR-MCNC: 125 MG/DL (ref 70–130)
GLUCOSE BLDC GLUCOMTR-MCNC: 159 MG/DL (ref 70–130)
GLUCOSE BLDC GLUCOMTR-MCNC: 168 MG/DL (ref 70–130)
GLUCOSE BLDC GLUCOMTR-MCNC: 172 MG/DL (ref 70–130)
GLUCOSE BLDC GLUCOMTR-MCNC: 196 MG/DL (ref 70–130)
GLUCOSE BLDC GLUCOMTR-MCNC: 202 MG/DL (ref 70–130)
GLUCOSE BLDC GLUCOMTR-MCNC: 206 MG/DL (ref 70–130)
GLUCOSE BLDC GLUCOMTR-MCNC: 243 MG/DL (ref 70–130)
GLUCOSE BLDC GLUCOMTR-MCNC: 247 MG/DL (ref 70–130)
GLUCOSE BLDC GLUCOMTR-MCNC: 251 MG/DL (ref 70–130)
GLUCOSE BLDC GLUCOMTR-MCNC: 252 MG/DL (ref 70–130)
GLUCOSE BLDC GLUCOMTR-MCNC: 275 MG/DL (ref 70–130)
GLUCOSE BLDC GLUCOMTR-MCNC: 355 MG/DL (ref 70–130)
GLUCOSE BLDC GLUCOMTR-MCNC: 355 MG/DL (ref 70–130)
GLUCOSE BLDC GLUCOMTR-MCNC: 369 MG/DL (ref 70–130)
GLUCOSE BLDC GLUCOMTR-MCNC: 82 MG/DL (ref 70–130)
GLUCOSE SERPL-MCNC: 137 MG/DL (ref 65–99)
GLUCOSE SERPL-MCNC: 180 MG/DL (ref 65–99)
GLUCOSE SERPL-MCNC: 334 MG/DL (ref 65–99)
GLUCOSE SERPL-MCNC: 431 MG/DL (ref 65–99)
GLUCOSE SERPL-MCNC: 99 MG/DL (ref 65–99)
GLUCOSE UR STRIP-MCNC: ABNORMAL MG/DL
GLUCOSE UR STRIP-MCNC: NEGATIVE MG/DL
HCT VFR BLD AUTO: 28.1 % (ref 34–46.6)
HCT VFR BLD AUTO: 30.2 % (ref 34–46.6)
HCT VFR BLD AUTO: 31 % (ref 34–46.6)
HCT VFR BLD AUTO: 32.6 % (ref 34–46.6)
HCT VFR BLD AUTO: 33.2 % (ref 34–46.6)
HGB BLD-MCNC: 10.2 G/DL (ref 12–15.9)
HGB BLD-MCNC: 10.7 G/DL (ref 12–15.9)
HGB BLD-MCNC: 10.8 G/DL (ref 12–15.9)
HGB BLD-MCNC: 9.2 G/DL (ref 12–15.9)
HGB BLD-MCNC: 9.9 G/DL (ref 12–15.9)
HGB UR QL STRIP.AUTO: ABNORMAL
HGB UR QL STRIP.AUTO: ABNORMAL
HOLD SPECIMEN: NORMAL
HYALINE CASTS UR QL AUTO: ABNORMAL /LPF
IMM GRANULOCYTES # BLD AUTO: 0.01 10*3/MM3 (ref 0–0.05)
IMM GRANULOCYTES # BLD AUTO: 0.03 10*3/MM3 (ref 0–0.05)
IMM GRANULOCYTES # BLD AUTO: 0.03 10*3/MM3 (ref 0–0.05)
IMM GRANULOCYTES # BLD AUTO: 0.04 10*3/MM3 (ref 0–0.05)
IMM GRANULOCYTES # BLD AUTO: 0.05 10*3/MM3 (ref 0–0.05)
IMM GRANULOCYTES NFR BLD AUTO: 0.1 % (ref 0–0.5)
IMM GRANULOCYTES NFR BLD AUTO: 0.4 % (ref 0–0.5)
IMM GRANULOCYTES NFR BLD AUTO: 0.5 % (ref 0–0.5)
IMM GRANULOCYTES NFR BLD AUTO: 0.5 % (ref 0–0.5)
IMM GRANULOCYTES NFR BLD AUTO: 0.6 % (ref 0–0.5)
KETONES UR QL STRIP: ABNORMAL
KETONES UR QL STRIP: ABNORMAL
LEUKOCYTE ESTERASE UR QL STRIP.AUTO: ABNORMAL
LEUKOCYTE ESTERASE UR QL STRIP.AUTO: ABNORMAL
LYMPHOCYTES # BLD AUTO: 1.88 10*3/MM3 (ref 0.7–3.1)
LYMPHOCYTES # BLD AUTO: 1.93 10*3/MM3 (ref 0.7–3.1)
LYMPHOCYTES # BLD AUTO: 2.17 10*3/MM3 (ref 0.7–3.1)
LYMPHOCYTES # BLD AUTO: 2.39 10*3/MM3 (ref 0.7–3.1)
LYMPHOCYTES # BLD AUTO: 2.73 10*3/MM3 (ref 0.7–3.1)
LYMPHOCYTES NFR BLD AUTO: 22 % (ref 19.6–45.3)
LYMPHOCYTES NFR BLD AUTO: 25.8 % (ref 19.6–45.3)
LYMPHOCYTES NFR BLD AUTO: 27 % (ref 19.6–45.3)
LYMPHOCYTES NFR BLD AUTO: 38.5 % (ref 19.6–45.3)
LYMPHOCYTES NFR BLD AUTO: 41.1 % (ref 19.6–45.3)
MCH RBC QN AUTO: 29.7 PG (ref 26.6–33)
MCH RBC QN AUTO: 29.7 PG (ref 26.6–33)
MCH RBC QN AUTO: 29.8 PG (ref 26.6–33)
MCH RBC QN AUTO: 29.9 PG (ref 26.6–33)
MCH RBC QN AUTO: 30.1 PG (ref 26.6–33)
MCHC RBC AUTO-ENTMCNC: 32.5 G/DL (ref 31.5–35.7)
MCHC RBC AUTO-ENTMCNC: 32.7 G/DL (ref 31.5–35.7)
MCHC RBC AUTO-ENTMCNC: 32.8 G/DL (ref 31.5–35.7)
MCHC RBC AUTO-ENTMCNC: 32.8 G/DL (ref 31.5–35.7)
MCHC RBC AUTO-ENTMCNC: 32.9 G/DL (ref 31.5–35.7)
MCV RBC AUTO: 90.1 FL (ref 79–97)
MCV RBC AUTO: 90.8 FL (ref 79–97)
MCV RBC AUTO: 91.2 FL (ref 79–97)
MCV RBC AUTO: 91.2 FL (ref 79–97)
MCV RBC AUTO: 91.8 FL (ref 79–97)
MONOCYTES # BLD AUTO: 0.4 10*3/MM3 (ref 0.1–0.9)
MONOCYTES # BLD AUTO: 0.46 10*3/MM3 (ref 0.1–0.9)
MONOCYTES # BLD AUTO: 0.49 10*3/MM3 (ref 0.1–0.9)
MONOCYTES # BLD AUTO: 0.78 10*3/MM3 (ref 0.1–0.9)
MONOCYTES # BLD AUTO: 0.82 10*3/MM3 (ref 0.1–0.9)
MONOCYTES NFR BLD AUTO: 10.4 % (ref 5–12)
MONOCYTES NFR BLD AUTO: 14.1 % (ref 5–12)
MONOCYTES NFR BLD AUTO: 4.7 % (ref 5–12)
MONOCYTES NFR BLD AUTO: 6.1 % (ref 5–12)
MONOCYTES NFR BLD AUTO: 6.5 % (ref 5–12)
NEUTROPHILS NFR BLD AUTO: 2.07 10*3/MM3 (ref 1.7–7)
NEUTROPHILS NFR BLD AUTO: 3.44 10*3/MM3 (ref 1.7–7)
NEUTROPHILS NFR BLD AUTO: 35.5 % (ref 42.7–76)
NEUTROPHILS NFR BLD AUTO: 4.17 10*3/MM3 (ref 1.7–7)
NEUTROPHILS NFR BLD AUTO: 4.71 10*3/MM3 (ref 1.7–7)
NEUTROPHILS NFR BLD AUTO: 48.4 % (ref 42.7–76)
NEUTROPHILS NFR BLD AUTO: 5.73 10*3/MM3 (ref 1.7–7)
NEUTROPHILS NFR BLD AUTO: 55.8 % (ref 42.7–76)
NEUTROPHILS NFR BLD AUTO: 58.6 % (ref 42.7–76)
NEUTROPHILS NFR BLD AUTO: 67.1 % (ref 42.7–76)
NITRITE UR QL STRIP: NEGATIVE
NITRITE UR QL STRIP: NEGATIVE
NRBC BLD AUTO-RTO: 0 /100 WBC (ref 0–0.2)
PH UR STRIP.AUTO: 5.5 [PH] (ref 5–9)
PH UR STRIP.AUTO: 7 [PH] (ref 5–9)
PLATELET # BLD AUTO: 243 10*3/MM3 (ref 140–450)
PLATELET # BLD AUTO: 259 10*3/MM3 (ref 140–450)
PLATELET # BLD AUTO: 271 10*3/MM3 (ref 140–450)
PLATELET # BLD AUTO: 291 10*3/MM3 (ref 140–450)
PLATELET # BLD AUTO: 292 10*3/MM3 (ref 140–450)
PMV BLD AUTO: 10.6 FL (ref 6–12)
PMV BLD AUTO: 10.8 FL (ref 6–12)
PMV BLD AUTO: 10.8 FL (ref 6–12)
PMV BLD AUTO: 11 FL (ref 6–12)
PMV BLD AUTO: 11.2 FL (ref 6–12)
POTASSIUM SERPL-SCNC: 3.4 MMOL/L (ref 3.5–5.2)
POTASSIUM SERPL-SCNC: 4.1 MMOL/L (ref 3.5–5.2)
POTASSIUM SERPL-SCNC: 4.3 MMOL/L (ref 3.5–5.2)
POTASSIUM SERPL-SCNC: 4.3 MMOL/L (ref 3.5–5.2)
POTASSIUM SERPL-SCNC: 5.1 MMOL/L (ref 3.5–5.2)
PROT SERPL-MCNC: 6.2 G/DL (ref 6–8.5)
PROT UR QL STRIP: ABNORMAL
PROT UR QL STRIP: ABNORMAL
QT INTERVAL: 316 MS
QT INTERVAL: 344 MS
QT INTERVAL: 372 MS
QTC INTERVAL: 431 MS
QTC INTERVAL: 460 MS
QTC INTERVAL: 510 MS
RBC # BLD AUTO: 3.08 10*6/MM3 (ref 3.77–5.28)
RBC # BLD AUTO: 3.29 10*6/MM3 (ref 3.77–5.28)
RBC # BLD AUTO: 3.44 10*6/MM3 (ref 3.77–5.28)
RBC # BLD AUTO: 3.59 10*6/MM3 (ref 3.77–5.28)
RBC # BLD AUTO: 3.64 10*6/MM3 (ref 3.77–5.28)
RBC # UR STRIP: ABNORMAL /HPF
REF LAB TEST METHOD: ABNORMAL
SARS-COV-2 N GENE RESP QL NAA+PROBE: NOT DETECTED
SARS-COV-2 RNA RESP QL NAA+PROBE: NOT DETECTED
SODIUM SERPL-SCNC: 136 MMOL/L (ref 136–145)
SODIUM SERPL-SCNC: 138 MMOL/L (ref 136–145)
SODIUM SERPL-SCNC: 140 MMOL/L (ref 136–145)
SODIUM SERPL-SCNC: 141 MMOL/L (ref 136–145)
SODIUM SERPL-SCNC: 144 MMOL/L (ref 136–145)
SP GR UR STRIP: 1.01 (ref 1–1.03)
SP GR UR STRIP: 1.02 (ref 1–1.03)
SQUAMOUS #/AREA URNS HPF: ABNORMAL /HPF
T4 FREE SERPL-MCNC: 1.04 NG/DL (ref 0.93–1.7)
TSH SERPL DL<=0.05 MIU/L-ACNC: 5.04 UIU/ML (ref 0.27–4.2)
UROBILINOGEN UR QL STRIP: ABNORMAL
UROBILINOGEN UR QL STRIP: ABNORMAL
VIT B12 BLD-MCNC: 863 PG/ML (ref 211–946)
WBC # UR STRIP: ABNORMAL /HPF
WBC NRBC COR # BLD: 5.82 10*3/MM3 (ref 3.4–10.8)
WBC NRBC COR # BLD: 7.1 10*3/MM3 (ref 3.4–10.8)
WBC NRBC COR # BLD: 7.48 10*3/MM3 (ref 3.4–10.8)
WBC NRBC COR # BLD: 8.04 10*3/MM3 (ref 3.4–10.8)
WBC NRBC COR # BLD: 8.54 10*3/MM3 (ref 3.4–10.8)
WHOLE BLOOD HOLD SPECIMEN: NORMAL

## 2022-01-01 PROCEDURE — G0378 HOSPITAL OBSERVATION PER HR: HCPCS

## 2022-01-01 PROCEDURE — 93005 ELECTROCARDIOGRAM TRACING: CPT | Performed by: EMERGENCY MEDICINE

## 2022-01-01 PROCEDURE — 83605 ASSAY OF LACTIC ACID: CPT | Performed by: EMERGENCY MEDICINE

## 2022-01-01 PROCEDURE — 87635 SARS-COV-2 COVID-19 AMP PRB: CPT | Performed by: NURSE PRACTITIONER

## 2022-01-01 PROCEDURE — 85025 COMPLETE CBC W/AUTO DIFF WBC: CPT | Performed by: STUDENT IN AN ORGANIZED HEALTH CARE EDUCATION/TRAINING PROGRAM

## 2022-01-01 PROCEDURE — 93010 ELECTROCARDIOGRAM REPORT: CPT | Performed by: INTERNAL MEDICINE

## 2022-01-01 PROCEDURE — 85025 COMPLETE CBC W/AUTO DIFF WBC: CPT | Performed by: EMERGENCY MEDICINE

## 2022-01-01 PROCEDURE — 87636 SARSCOV2 & INF A&B AMP PRB: CPT | Performed by: EMERGENCY MEDICINE

## 2022-01-01 PROCEDURE — 36415 COLL VENOUS BLD VENIPUNCTURE: CPT | Performed by: EMERGENCY MEDICINE

## 2022-01-01 PROCEDURE — 93005 ELECTROCARDIOGRAM TRACING: CPT | Performed by: NURSE PRACTITIONER

## 2022-01-01 PROCEDURE — 25010000002 CEFTRIAXONE PER 250 MG: Performed by: STUDENT IN AN ORGANIZED HEALTH CARE EDUCATION/TRAINING PROGRAM

## 2022-01-01 PROCEDURE — 82962 GLUCOSE BLOOD TEST: CPT

## 2022-01-01 PROCEDURE — 82607 VITAMIN B-12: CPT | Performed by: STUDENT IN AN ORGANIZED HEALTH CARE EDUCATION/TRAINING PROGRAM

## 2022-01-01 PROCEDURE — 81003 URINALYSIS AUTO W/O SCOPE: CPT | Performed by: FAMILY MEDICINE

## 2022-01-01 PROCEDURE — 80048 BASIC METABOLIC PNL TOTAL CA: CPT | Performed by: STUDENT IN AN ORGANIZED HEALTH CARE EDUCATION/TRAINING PROGRAM

## 2022-01-01 PROCEDURE — 96366 THER/PROPH/DIAG IV INF ADDON: CPT

## 2022-01-01 PROCEDURE — 63710000001 INSULIN DETEMIR PER 5 UNITS: Performed by: STUDENT IN AN ORGANIZED HEALTH CARE EDUCATION/TRAINING PROGRAM

## 2022-01-01 PROCEDURE — 97110 THERAPEUTIC EXERCISES: CPT

## 2022-01-01 PROCEDURE — 63710000001 INSULIN ASPART PER 5 UNITS: Performed by: STUDENT IN AN ORGANIZED HEALTH CARE EDUCATION/TRAINING PROGRAM

## 2022-01-01 PROCEDURE — 87186 SC STD MICRODIL/AGAR DIL: CPT | Performed by: FAMILY MEDICINE

## 2022-01-01 PROCEDURE — 96361 HYDRATE IV INFUSION ADD-ON: CPT

## 2022-01-01 PROCEDURE — 87040 BLOOD CULTURE FOR BACTERIA: CPT | Performed by: EMERGENCY MEDICINE

## 2022-01-01 PROCEDURE — 87086 URINE CULTURE/COLONY COUNT: CPT | Performed by: FAMILY MEDICINE

## 2022-01-01 PROCEDURE — 99285 EMERGENCY DEPT VISIT HI MDM: CPT

## 2022-01-01 PROCEDURE — 84439 ASSAY OF FREE THYROXINE: CPT | Performed by: STUDENT IN AN ORGANIZED HEALTH CARE EDUCATION/TRAINING PROGRAM

## 2022-01-01 PROCEDURE — 97166 OT EVAL MOD COMPLEX 45 MIN: CPT

## 2022-01-01 PROCEDURE — 97530 THERAPEUTIC ACTIVITIES: CPT

## 2022-01-01 PROCEDURE — 36415 COLL VENOUS BLD VENIPUNCTURE: CPT | Performed by: STUDENT IN AN ORGANIZED HEALTH CARE EDUCATION/TRAINING PROGRAM

## 2022-01-01 PROCEDURE — 70450 CT HEAD/BRAIN W/O DYE: CPT

## 2022-01-01 PROCEDURE — 80053 COMPREHEN METABOLIC PANEL: CPT | Performed by: EMERGENCY MEDICINE

## 2022-01-01 PROCEDURE — C9803 HOPD COVID-19 SPEC COLLECT: HCPCS

## 2022-01-01 PROCEDURE — 96365 THER/PROPH/DIAG IV INF INIT: CPT

## 2022-01-01 PROCEDURE — 93005 ELECTROCARDIOGRAM TRACING: CPT | Performed by: STUDENT IN AN ORGANIZED HEALTH CARE EDUCATION/TRAINING PROGRAM

## 2022-01-01 PROCEDURE — 97535 SELF CARE MNGMENT TRAINING: CPT

## 2022-01-01 PROCEDURE — 71045 X-RAY EXAM CHEST 1 VIEW: CPT

## 2022-01-01 PROCEDURE — 84443 ASSAY THYROID STIM HORMONE: CPT | Performed by: STUDENT IN AN ORGANIZED HEALTH CARE EDUCATION/TRAINING PROGRAM

## 2022-01-01 PROCEDURE — 81001 URINALYSIS AUTO W/SCOPE: CPT | Performed by: EMERGENCY MEDICINE

## 2022-01-01 PROCEDURE — 87086 URINE CULTURE/COLONY COUNT: CPT | Performed by: STUDENT IN AN ORGANIZED HEALTH CARE EDUCATION/TRAINING PROGRAM

## 2022-01-01 PROCEDURE — 97162 PT EVAL MOD COMPLEX 30 MIN: CPT

## 2022-01-01 RX ORDER — SODIUM CHLORIDE 0.9 % (FLUSH) 0.9 %
10 SYRINGE (ML) INJECTION EVERY 12 HOURS SCHEDULED
Status: DISCONTINUED | OUTPATIENT
Start: 2022-01-01 | End: 2022-01-01 | Stop reason: HOSPADM

## 2022-01-01 RX ORDER — DONEPEZIL HYDROCHLORIDE 5 MG/1
2.5 TABLET, FILM COATED ORAL NIGHTLY
Status: DISCONTINUED | OUTPATIENT
Start: 2022-01-01 | End: 2022-01-01 | Stop reason: HOSPADM

## 2022-01-01 RX ORDER — OLANZAPINE 5 MG/1
5 TABLET ORAL NIGHTLY
Status: DISCONTINUED | OUTPATIENT
Start: 2022-01-01 | End: 2022-01-01 | Stop reason: HOSPADM

## 2022-01-01 RX ORDER — OXYCODONE AND ACETAMINOPHEN 10; 325 MG/1; MG/1
1 TABLET ORAL EVERY 4 HOURS PRN
Status: DISCONTINUED | OUTPATIENT
Start: 2022-01-01 | End: 2022-01-01 | Stop reason: HOSPADM

## 2022-01-01 RX ORDER — LEVOTHYROXINE SODIUM 0.05 MG/1
50 TABLET ORAL EVERY MORNING
Status: DISCONTINUED | OUTPATIENT
Start: 2022-01-01 | End: 2022-01-01 | Stop reason: HOSPADM

## 2022-01-01 RX ORDER — OXYCODONE AND ACETAMINOPHEN 10; 325 MG/1; MG/1
1 TABLET ORAL EVERY 4 HOURS PRN
Qty: 18 TABLET | Refills: 0 | Status: SHIPPED | OUTPATIENT
Start: 2022-01-01

## 2022-01-01 RX ORDER — ZINC SULFATE 50(220)MG
220 CAPSULE ORAL DAILY
Status: DISCONTINUED | OUTPATIENT
Start: 2022-01-01 | End: 2022-01-01 | Stop reason: HOSPADM

## 2022-01-01 RX ORDER — ASCORBIC ACID 500 MG
500 TABLET ORAL DAILY
COMMUNITY

## 2022-01-01 RX ORDER — ATORVASTATIN CALCIUM 20 MG/1
20 TABLET, FILM COATED ORAL NIGHTLY
Status: DISCONTINUED | OUTPATIENT
Start: 2022-01-01 | End: 2022-01-01 | Stop reason: HOSPADM

## 2022-01-01 RX ORDER — ZINC SULFATE 50(220)MG
220 CAPSULE ORAL DAILY
COMMUNITY

## 2022-01-01 RX ORDER — CEFDINIR 300 MG/1
300 CAPSULE ORAL 2 TIMES DAILY
Qty: 8 CAPSULE | Refills: 0 | Status: SHIPPED | OUTPATIENT
Start: 2022-01-01 | End: 2022-01-01

## 2022-01-01 RX ORDER — ALBUTEROL SULFATE 2.5 MG/3ML
2.5 SOLUTION RESPIRATORY (INHALATION) EVERY 4 HOURS PRN
Status: DISCONTINUED | OUTPATIENT
Start: 2022-01-01 | End: 2022-01-01 | Stop reason: HOSPADM

## 2022-01-01 RX ORDER — ACETAMINOPHEN 325 MG/1
650 TABLET ORAL 3 TIMES DAILY
Status: DISCONTINUED | OUTPATIENT
Start: 2022-01-01 | End: 2022-01-01 | Stop reason: HOSPADM

## 2022-01-01 RX ORDER — SODIUM CHLORIDE 0.9 % (FLUSH) 0.9 %
10 SYRINGE (ML) INJECTION AS NEEDED
Status: DISCONTINUED | OUTPATIENT
Start: 2022-01-01 | End: 2022-01-01 | Stop reason: HOSPADM

## 2022-01-01 RX ORDER — CHLORTHALIDONE 25 MG/1
25 TABLET ORAL DAILY
Status: DISCONTINUED | OUTPATIENT
Start: 2022-01-01 | End: 2022-01-01

## 2022-01-01 RX ORDER — LANOLIN ALCOHOL/MO/W.PET/CERES
5 CREAM (GRAM) TOPICAL NIGHTLY
Status: DISCONTINUED | OUTPATIENT
Start: 2022-01-01 | End: 2022-01-01 | Stop reason: HOSPADM

## 2022-01-01 RX ORDER — POTASSIUM CHLORIDE 750 MG/1
40 CAPSULE, EXTENDED RELEASE ORAL ONCE
Status: COMPLETED | OUTPATIENT
Start: 2022-01-01 | End: 2022-01-01

## 2022-01-01 RX ORDER — SODIUM CHLORIDE 9 MG/ML
100 INJECTION, SOLUTION INTRAVENOUS CONTINUOUS
Status: DISCONTINUED | OUTPATIENT
Start: 2022-01-01 | End: 2022-01-01 | Stop reason: HOSPADM

## 2022-01-01 RX ORDER — FUROSEMIDE 20 MG/1
20 TABLET ORAL 2 TIMES DAILY
Status: DISCONTINUED | OUTPATIENT
Start: 2022-01-01 | End: 2022-01-01 | Stop reason: HOSPADM

## 2022-01-01 RX ORDER — DIAZEPAM 2 MG/1
2 TABLET ORAL 2 TIMES DAILY
Status: DISCONTINUED | OUTPATIENT
Start: 2022-01-01 | End: 2022-01-01 | Stop reason: HOSPADM

## 2022-01-01 RX ORDER — NYSTATIN 10B UNIT
1 POWDER (EA) MISCELLANEOUS 2 TIMES DAILY
COMMUNITY

## 2022-01-01 RX ORDER — AMIODARONE HYDROCHLORIDE 200 MG/1
200 TABLET ORAL EVERY 12 HOURS SCHEDULED
Status: DISCONTINUED | OUTPATIENT
Start: 2022-01-01 | End: 2022-01-01 | Stop reason: HOSPADM

## 2022-01-01 RX ORDER — NICOTINE POLACRILEX 4 MG
15 LOZENGE BUCCAL
Status: DISCONTINUED | OUTPATIENT
Start: 2022-01-01 | End: 2022-01-01 | Stop reason: HOSPADM

## 2022-01-01 RX ORDER — DEXTROSE MONOHYDRATE 25 G/50ML
25 INJECTION, SOLUTION INTRAVENOUS
Status: DISCONTINUED | OUTPATIENT
Start: 2022-01-01 | End: 2022-01-01 | Stop reason: HOSPADM

## 2022-01-01 RX ORDER — HYDROXYZINE HYDROCHLORIDE 25 MG/1
25 TABLET, FILM COATED ORAL 3 TIMES DAILY PRN
Status: DISCONTINUED | OUTPATIENT
Start: 2022-01-01 | End: 2022-01-01 | Stop reason: HOSPADM

## 2022-01-01 RX ORDER — PAROXETINE HYDROCHLORIDE 20 MG/1
20 TABLET, FILM COATED ORAL NIGHTLY
Status: DISCONTINUED | OUTPATIENT
Start: 2022-01-01 | End: 2022-01-01 | Stop reason: HOSPADM

## 2022-01-01 RX ORDER — DIAZEPAM 2 MG/1
2 TABLET ORAL 2 TIMES DAILY
Qty: 6 TABLET | Refills: 0 | Status: SHIPPED | OUTPATIENT
Start: 2022-01-01

## 2022-01-01 RX ORDER — LIDOCAINE 50 MG/G
1 PATCH TOPICAL EVERY 24 HOURS
Status: DISCONTINUED | OUTPATIENT
Start: 2022-01-01 | End: 2022-01-01 | Stop reason: HOSPADM

## 2022-01-01 RX ORDER — CARVEDILOL 12.5 MG/1
12.5 TABLET ORAL 2 TIMES DAILY WITH MEALS
Status: DISCONTINUED | OUTPATIENT
Start: 2022-01-01 | End: 2022-01-01 | Stop reason: HOSPADM

## 2022-01-01 RX ORDER — ASPIRIN 81 MG/1
81 TABLET ORAL DAILY
Status: DISCONTINUED | OUTPATIENT
Start: 2022-01-01 | End: 2022-01-01 | Stop reason: HOSPADM

## 2022-01-01 RX ORDER — ASCORBIC ACID 500 MG
500 TABLET ORAL DAILY
Status: DISCONTINUED | OUTPATIENT
Start: 2022-01-01 | End: 2022-01-01 | Stop reason: HOSPADM

## 2022-01-01 RX ORDER — CARVEDILOL 3.12 MG/1
3.12 TABLET ORAL 2 TIMES DAILY WITH MEALS
Status: DISCONTINUED | OUTPATIENT
Start: 2022-01-01 | End: 2022-01-01

## 2022-01-01 RX ADMIN — CEFTRIAXONE SODIUM 1 G: 1 INJECTION, POWDER, FOR SOLUTION INTRAMUSCULAR; INTRAVENOUS at 00:06

## 2022-01-01 RX ADMIN — ASPIRIN 81 MG: 81 TABLET, FILM COATED ORAL at 08:42

## 2022-01-01 RX ADMIN — LEVOTHYROXINE SODIUM 50 MCG: 50 TABLET ORAL at 08:40

## 2022-01-01 RX ADMIN — CEFTRIAXONE SODIUM 1 G: 1 INJECTION, POWDER, FOR SOLUTION INTRAMUSCULAR; INTRAVENOUS at 23:51

## 2022-01-01 RX ADMIN — AMIODARONE HYDROCHLORIDE 200 MG: 200 TABLET ORAL at 21:16

## 2022-01-01 RX ADMIN — OLANZAPINE 5 MG: 5 TABLET, FILM COATED ORAL at 21:17

## 2022-01-01 RX ADMIN — INSULIN ASPART 5 UNITS: 100 INJECTION, SOLUTION INTRAVENOUS; SUBCUTANEOUS at 08:36

## 2022-01-01 RX ADMIN — SODIUM CHLORIDE 100 ML/HR: 9 INJECTION, SOLUTION INTRAVENOUS at 16:27

## 2022-01-01 RX ADMIN — SACUBITRIL AND VALSARTAN 1 TABLET: 24; 26 TABLET, FILM COATED ORAL at 08:35

## 2022-01-01 RX ADMIN — DILTIAZEM HYDROCHLORIDE 30 MG: 30 TABLET ORAL at 12:36

## 2022-01-01 RX ADMIN — INSULIN DETEMIR 15 UNITS: 100 INJECTION, SOLUTION SUBCUTANEOUS at 22:11

## 2022-01-01 RX ADMIN — ACETAMINOPHEN 650 MG: 325 TABLET, FILM COATED ORAL at 16:27

## 2022-01-01 RX ADMIN — ATORVASTATIN CALCIUM 20 MG: 20 TABLET, FILM COATED ORAL at 22:13

## 2022-01-01 RX ADMIN — Medication 400 MG: at 21:18

## 2022-01-01 RX ADMIN — APIXABAN 5 MG: 5 TABLET, FILM COATED ORAL at 20:43

## 2022-01-01 RX ADMIN — AMIODARONE HYDROCHLORIDE 200 MG: 200 TABLET ORAL at 22:12

## 2022-01-01 RX ADMIN — AMIODARONE HYDROCHLORIDE 200 MG: 200 TABLET ORAL at 19:34

## 2022-01-01 RX ADMIN — INSULIN ASPART 12 UNITS: 100 INJECTION, SOLUTION INTRAVENOUS; SUBCUTANEOUS at 08:07

## 2022-01-01 RX ADMIN — OXYCODONE HYDROCHLORIDE AND ACETAMINOPHEN 500 MG: 500 TABLET ORAL at 08:40

## 2022-01-01 RX ADMIN — AMIODARONE HYDROCHLORIDE 200 MG: 200 TABLET ORAL at 20:42

## 2022-01-01 RX ADMIN — SODIUM CHLORIDE 1000 ML: 9 INJECTION, SOLUTION INTRAVENOUS at 16:59

## 2022-01-01 RX ADMIN — PAROXETINE HYDROCHLORIDE 20 MG: 20 TABLET, FILM COATED ORAL at 20:43

## 2022-01-01 RX ADMIN — SACUBITRIL AND VALSARTAN 1 TABLET: 24; 26 TABLET, FILM COATED ORAL at 08:08

## 2022-01-01 RX ADMIN — DIAZEPAM 2 MG: 2 TABLET ORAL at 08:35

## 2022-01-01 RX ADMIN — APIXABAN 5 MG: 5 TABLET, FILM COATED ORAL at 19:34

## 2022-01-01 RX ADMIN — DONEPEZIL HYDROCHLORIDE 2.5 MG: 5 TABLET, FILM COATED ORAL at 20:43

## 2022-01-01 RX ADMIN — FUROSEMIDE 20 MG: 20 TABLET ORAL at 08:08

## 2022-01-01 RX ADMIN — INSULIN ASPART 12 UNITS: 100 INJECTION, SOLUTION INTRAVENOUS; SUBCUTANEOUS at 12:36

## 2022-01-01 RX ADMIN — INSULIN DETEMIR 15 UNITS: 100 INJECTION, SOLUTION SUBCUTANEOUS at 08:08

## 2022-01-01 RX ADMIN — INSULIN DETEMIR 15 UNITS: 100 INJECTION, SOLUTION SUBCUTANEOUS at 21:19

## 2022-01-01 RX ADMIN — LEVOTHYROXINE SODIUM 50 MCG: 50 TABLET ORAL at 06:14

## 2022-01-01 RX ADMIN — APIXABAN 5 MG: 5 TABLET, FILM COATED ORAL at 08:40

## 2022-01-01 RX ADMIN — Medication 400 MG: at 22:12

## 2022-01-01 RX ADMIN — CARVEDILOL 3.12 MG: 3.12 TABLET, FILM COATED ORAL at 08:35

## 2022-01-01 RX ADMIN — Medication 220 MG: at 08:36

## 2022-01-01 RX ADMIN — ATORVASTATIN CALCIUM 20 MG: 20 TABLET, FILM COATED ORAL at 20:43

## 2022-01-01 RX ADMIN — CEFTRIAXONE SODIUM 1 G: 1 INJECTION, POWDER, FOR SOLUTION INTRAMUSCULAR; INTRAVENOUS at 23:53

## 2022-01-01 RX ADMIN — INSULIN DETEMIR 15 UNITS: 100 INJECTION, SOLUTION SUBCUTANEOUS at 08:40

## 2022-01-01 RX ADMIN — INSULIN ASPART 12 UNITS: 100 INJECTION, SOLUTION INTRAVENOUS; SUBCUTANEOUS at 11:55

## 2022-01-01 RX ADMIN — INSULIN ASPART 5 UNITS: 100 INJECTION, SOLUTION INTRAVENOUS; SUBCUTANEOUS at 11:10

## 2022-01-01 RX ADMIN — SACUBITRIL AND VALSARTAN 1 TABLET: 24; 26 TABLET, FILM COATED ORAL at 21:16

## 2022-01-01 RX ADMIN — SODIUM CHLORIDE 100 ML/HR: 9 INJECTION, SOLUTION INTRAVENOUS at 11:57

## 2022-01-01 RX ADMIN — FUROSEMIDE 20 MG: 20 TABLET ORAL at 22:12

## 2022-01-01 RX ADMIN — INSULIN ASPART 3 UNITS: 100 INJECTION, SOLUTION INTRAVENOUS; SUBCUTANEOUS at 17:01

## 2022-01-01 RX ADMIN — ATORVASTATIN CALCIUM 20 MG: 20 TABLET, FILM COATED ORAL at 19:34

## 2022-01-01 RX ADMIN — ACETAMINOPHEN 650 MG: 325 TABLET, FILM COATED ORAL at 08:42

## 2022-01-01 RX ADMIN — Medication 400 MG: at 08:42

## 2022-01-01 RX ADMIN — DILTIAZEM HYDROCHLORIDE 30 MG: 30 TABLET ORAL at 00:06

## 2022-01-01 RX ADMIN — OXYCODONE HYDROCHLORIDE AND ACETAMINOPHEN 1 TABLET: 10; 325 TABLET ORAL at 21:21

## 2022-01-01 RX ADMIN — Medication 5.25 MG: at 21:18

## 2022-01-01 RX ADMIN — OXYCODONE HYDROCHLORIDE AND ACETAMINOPHEN 1 TABLET: 10; 325 TABLET ORAL at 10:26

## 2022-01-01 RX ADMIN — SODIUM CHLORIDE, PRESERVATIVE FREE 10 ML: 5 INJECTION INTRAVENOUS at 08:46

## 2022-01-01 RX ADMIN — DIAZEPAM 2 MG: 2 TABLET ORAL at 08:42

## 2022-01-01 RX ADMIN — FUROSEMIDE 20 MG: 20 TABLET ORAL at 08:35

## 2022-01-01 RX ADMIN — AMIODARONE HYDROCHLORIDE 200 MG: 200 TABLET ORAL at 08:42

## 2022-01-01 RX ADMIN — SODIUM CHLORIDE, PRESERVATIVE FREE 10 ML: 5 INJECTION INTRAVENOUS at 21:18

## 2022-01-01 RX ADMIN — DIAZEPAM 2 MG: 2 TABLET ORAL at 08:40

## 2022-01-01 RX ADMIN — SODIUM CHLORIDE, PRESERVATIVE FREE 10 ML: 5 INJECTION INTRAVENOUS at 09:00

## 2022-01-01 RX ADMIN — ACETAMINOPHEN 650 MG: 325 TABLET, FILM COATED ORAL at 08:08

## 2022-01-01 RX ADMIN — INSULIN DETEMIR 15 UNITS: 100 INJECTION, SOLUTION SUBCUTANEOUS at 08:45

## 2022-01-01 RX ADMIN — FUROSEMIDE 20 MG: 20 TABLET ORAL at 20:43

## 2022-01-01 RX ADMIN — Medication 400 MG: at 08:35

## 2022-01-01 RX ADMIN — CARVEDILOL 12.5 MG: 12.5 TABLET, FILM COATED ORAL at 08:40

## 2022-01-01 RX ADMIN — Medication 400 MG: at 08:40

## 2022-01-01 RX ADMIN — ASPIRIN 81 MG: 81 TABLET, FILM COATED ORAL at 08:08

## 2022-01-01 RX ADMIN — DIAZEPAM 2 MG: 2 TABLET ORAL at 08:08

## 2022-01-01 RX ADMIN — DONEPEZIL HYDROCHLORIDE 2.5 MG: 5 TABLET, FILM COATED ORAL at 22:13

## 2022-01-01 RX ADMIN — DILTIAZEM HYDROCHLORIDE 30 MG: 30 TABLET ORAL at 05:33

## 2022-01-01 RX ADMIN — OXYCODONE HYDROCHLORIDE AND ACETAMINOPHEN 500 MG: 500 TABLET ORAL at 08:35

## 2022-01-01 RX ADMIN — SODIUM CHLORIDE 100 ML/HR: 9 INJECTION, SOLUTION INTRAVENOUS at 13:53

## 2022-01-01 RX ADMIN — Medication 5.25 MG: at 22:12

## 2022-01-01 RX ADMIN — ACETAMINOPHEN 650 MG: 325 TABLET, FILM COATED ORAL at 21:16

## 2022-01-01 RX ADMIN — SODIUM CHLORIDE, PRESERVATIVE FREE 10 ML: 5 INJECTION INTRAVENOUS at 22:15

## 2022-01-01 RX ADMIN — ASPIRIN 81 MG: 81 TABLET, FILM COATED ORAL at 08:40

## 2022-01-01 RX ADMIN — LIDOCAINE 1 PATCH: 50 PATCH CUTANEOUS at 21:15

## 2022-01-01 RX ADMIN — OLANZAPINE 5 MG: 5 TABLET, FILM COATED ORAL at 22:35

## 2022-01-01 RX ADMIN — CARVEDILOL 12.5 MG: 12.5 TABLET, FILM COATED ORAL at 17:15

## 2022-01-01 RX ADMIN — FUROSEMIDE 20 MG: 20 TABLET ORAL at 08:42

## 2022-01-01 RX ADMIN — DONEPEZIL HYDROCHLORIDE 2.5 MG: 5 TABLET, FILM COATED ORAL at 21:17

## 2022-01-01 RX ADMIN — Medication 220 MG: at 08:40

## 2022-01-01 RX ADMIN — ACETAMINOPHEN 650 MG: 325 TABLET, FILM COATED ORAL at 20:43

## 2022-01-01 RX ADMIN — DIAZEPAM 2 MG: 2 TABLET ORAL at 22:14

## 2022-01-01 RX ADMIN — DIAZEPAM 2 MG: 2 TABLET ORAL at 20:41

## 2022-01-01 RX ADMIN — PAROXETINE HYDROCHLORIDE 20 MG: 20 TABLET, FILM COATED ORAL at 19:34

## 2022-01-01 RX ADMIN — SACUBITRIL AND VALSARTAN 1 TABLET: 24; 26 TABLET, FILM COATED ORAL at 19:33

## 2022-01-01 RX ADMIN — APIXABAN 5 MG: 5 TABLET, FILM COATED ORAL at 22:14

## 2022-01-01 RX ADMIN — ACETAMINOPHEN 650 MG: 325 TABLET, FILM COATED ORAL at 19:34

## 2022-01-01 RX ADMIN — Medication 400 MG: at 08:08

## 2022-01-01 RX ADMIN — APIXABAN 5 MG: 5 TABLET, FILM COATED ORAL at 08:36

## 2022-01-01 RX ADMIN — SACUBITRIL AND VALSARTAN 1 TABLET: 24; 26 TABLET, FILM COATED ORAL at 08:42

## 2022-01-01 RX ADMIN — APIXABAN 5 MG: 5 TABLET, FILM COATED ORAL at 21:17

## 2022-01-01 RX ADMIN — FUROSEMIDE 20 MG: 20 TABLET ORAL at 08:40

## 2022-01-01 RX ADMIN — PAROXETINE HYDROCHLORIDE 20 MG: 20 TABLET, FILM COATED ORAL at 22:13

## 2022-01-01 RX ADMIN — AMIODARONE HYDROCHLORIDE 200 MG: 200 TABLET ORAL at 08:08

## 2022-01-01 RX ADMIN — ACETAMINOPHEN 650 MG: 325 TABLET, FILM COATED ORAL at 08:35

## 2022-01-01 RX ADMIN — OXYCODONE HYDROCHLORIDE AND ACETAMINOPHEN 1 TABLET: 10; 325 TABLET ORAL at 05:43

## 2022-01-01 RX ADMIN — LIDOCAINE 1 PATCH: 50 PATCH CUTANEOUS at 22:12

## 2022-01-01 RX ADMIN — POTASSIUM CHLORIDE 40 MEQ: 750 CAPSULE, EXTENDED RELEASE ORAL at 08:45

## 2022-01-01 RX ADMIN — Medication 400 MG: at 20:43

## 2022-01-01 RX ADMIN — SACUBITRIL AND VALSARTAN 1 TABLET: 24; 26 TABLET, FILM COATED ORAL at 22:14

## 2022-01-01 RX ADMIN — LEVOTHYROXINE SODIUM 50 MCG: 50 TABLET ORAL at 05:30

## 2022-01-01 RX ADMIN — SACUBITRIL AND VALSARTAN 1 TABLET: 24; 26 TABLET, FILM COATED ORAL at 20:43

## 2022-01-01 RX ADMIN — FUROSEMIDE 20 MG: 20 TABLET ORAL at 21:17

## 2022-01-01 RX ADMIN — INSULIN ASPART 8 UNITS: 100 INJECTION, SOLUTION INTRAVENOUS; SUBCUTANEOUS at 17:24

## 2022-01-01 RX ADMIN — ATORVASTATIN CALCIUM 20 MG: 20 TABLET, FILM COATED ORAL at 21:17

## 2022-01-01 RX ADMIN — DIAZEPAM 2 MG: 2 TABLET ORAL at 21:16

## 2022-01-01 RX ADMIN — SODIUM CHLORIDE 100 ML/HR: 9 INJECTION, SOLUTION INTRAVENOUS at 03:35

## 2022-01-01 RX ADMIN — DILTIAZEM HYDROCHLORIDE 30 MG: 30 TABLET ORAL at 17:15

## 2022-01-01 RX ADMIN — CARVEDILOL 3.12 MG: 3.12 TABLET, FILM COATED ORAL at 17:01

## 2022-01-01 RX ADMIN — INSULIN ASPART 3 UNITS: 100 INJECTION, SOLUTION INTRAVENOUS; SUBCUTANEOUS at 17:15

## 2022-01-01 RX ADMIN — INSULIN DETEMIR 15 UNITS: 100 INJECTION, SOLUTION SUBCUTANEOUS at 08:39

## 2022-01-01 RX ADMIN — SODIUM CHLORIDE, PRESERVATIVE FREE 10 ML: 5 INJECTION INTRAVENOUS at 20:44

## 2022-01-01 RX ADMIN — SODIUM CHLORIDE 100 ML/HR: 9 INJECTION, SOLUTION INTRAVENOUS at 21:21

## 2022-01-01 RX ADMIN — ACETAMINOPHEN 650 MG: 325 TABLET, FILM COATED ORAL at 16:25

## 2022-01-01 RX ADMIN — APIXABAN 5 MG: 5 TABLET, FILM COATED ORAL at 08:08

## 2022-01-01 RX ADMIN — LIDOCAINE 1 PATCH: 50 PATCH CUTANEOUS at 20:45

## 2022-01-01 RX ADMIN — DILTIAZEM HYDROCHLORIDE 30 MG: 30 TABLET ORAL at 11:00

## 2022-01-01 RX ADMIN — PAROXETINE HYDROCHLORIDE 20 MG: 20 TABLET, FILM COATED ORAL at 21:16

## 2022-01-01 RX ADMIN — Medication 220 MG: at 17:01

## 2022-01-01 RX ADMIN — INSULIN DETEMIR 15 UNITS: 100 INJECTION, SOLUTION SUBCUTANEOUS at 20:45

## 2022-01-01 RX ADMIN — Medication 5.25 MG: at 20:42

## 2022-01-01 RX ADMIN — OLANZAPINE 5 MG: 5 TABLET, FILM COATED ORAL at 20:44

## 2022-01-01 RX ADMIN — ASPIRIN 81 MG: 81 TABLET, FILM COATED ORAL at 08:34

## 2022-01-01 RX ADMIN — AMIODARONE HYDROCHLORIDE 200 MG: 200 TABLET ORAL at 08:40

## 2022-01-01 RX ADMIN — Medication 400 MG: at 19:34

## 2022-01-01 RX ADMIN — ACETAMINOPHEN 650 MG: 325 TABLET, FILM COATED ORAL at 08:40

## 2022-01-01 RX ADMIN — OLANZAPINE 5 MG: 5 TABLET, FILM COATED ORAL at 19:34

## 2022-01-01 RX ADMIN — APIXABAN 5 MG: 5 TABLET, FILM COATED ORAL at 08:42

## 2022-01-01 RX ADMIN — ACETAMINOPHEN 650 MG: 325 TABLET, FILM COATED ORAL at 22:14

## 2022-01-01 RX ADMIN — DONEPEZIL HYDROCHLORIDE 2.5 MG: 5 TABLET, FILM COATED ORAL at 19:33

## 2022-01-01 RX ADMIN — OXYCODONE HYDROCHLORIDE AND ACETAMINOPHEN 500 MG: 500 TABLET ORAL at 17:01

## 2022-01-01 RX ADMIN — ACETAMINOPHEN 650 MG: 325 TABLET, FILM COATED ORAL at 17:15

## 2022-01-01 RX ADMIN — SACUBITRIL AND VALSARTAN 1 TABLET: 24; 26 TABLET, FILM COATED ORAL at 08:40

## 2022-01-01 RX ADMIN — AMIODARONE HYDROCHLORIDE 200 MG: 200 TABLET ORAL at 08:35

## 2022-01-01 RX ADMIN — LEVOTHYROXINE SODIUM 50 MCG: 50 TABLET ORAL at 08:08

## 2022-01-01 RX ADMIN — INSULIN ASPART 3 UNITS: 100 INJECTION, SOLUTION INTRAVENOUS; SUBCUTANEOUS at 11:01

## 2022-01-01 RX ADMIN — SODIUM CHLORIDE 100 ML/HR: 9 INJECTION, SOLUTION INTRAVENOUS at 02:29

## 2022-01-15 NOTE — ED NOTES
Unsuccessful urine collection via bed pan at this time. Urine missed bed martinez.       Sandrita Ortega, RN  01/15/22 9045

## 2022-01-15 NOTE — ED NOTES
Per [facility] they will be sending patient in. She is lethargic and is having consistent blood pressures 60s/40s. [facility] staff member states that patient is a DNR.     Faiza Camilo RN  01/15/22 6650

## 2022-01-15 NOTE — ED NOTES
MD aware of multiple unsuccessful IV and blood draw attempts.      Sandrita Ortega, RN  01/15/22 3257

## 2022-01-15 NOTE — ED PROVIDER NOTES
Subjective   Patient presents emergency department complaint of confusion and altered mental status.  Patient was at the nursing home playing [employer] when she realized she could not remember the numbers and a friend mentioned that to her that she was missing all the numbers on her card.  Numbers did not seem to make sense to her.  Patient was noted to have 60/40 blood pressure and be confused.  There is no focal neurologic deficits noted at the time.  Patient notes she does have a tendency to get UTIs into be sick often.  But no nausea vomiting.  Patient has had no diarrhea.  Has been no focal neurologic deficits.  Patient is on Eliquis.          Review of Systems   Constitutional: Positive for activity change, appetite change and fatigue. Negative for chills and fever.   HENT: Negative.  Negative for congestion.    Eyes: Negative.  Negative for photophobia and visual disturbance.   Respiratory: Negative.  Negative for cough, chest tightness and shortness of breath.    Cardiovascular: Negative.  Negative for chest pain and palpitations.   Gastrointestinal: Negative.  Negative for abdominal pain, constipation, diarrhea, nausea and vomiting.   Endocrine: Negative.    Genitourinary: Negative.  Negative for decreased urine volume, dysuria, flank pain and hematuria.   Musculoskeletal: Negative.  Negative for arthralgias, back pain, myalgias, neck pain and neck stiffness.   Skin: Negative.  Negative for pallor.   Neurological: Positive for weakness. Negative for dizziness, syncope, light-headedness, numbness and headaches.   Psychiatric/Behavioral: Positive for confusion and decreased concentration. Negative for suicidal ideas. The patient is not nervous/anxious.    All other systems reviewed and are negative.      Past Medical History:   Diagnosis Date   • Arthritis, rheumatoid (HCC)    • Arthropathy associated with neurological disorder    • Arthropathy of lumbar facet joint    • Asthma    • Benign hypertension    •  Carpal tunnel syndrome    • Diabetes (HCC)    • Diabetic polyneuropathy (HCC)    • Dyslipidemia    • Fallen arches    • Hammer toe    • Heart disease    • Joint pain    • Mild depression (HCC)     Saddness post Surgery 7/10/14 improved after counseling.   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 Diffusely calcified LAD with 30-80% stenosis. OMB #3 with 60% to 70% stenosis. Preserved LV systolic function with EF of 55%.   • CARPAL TUNNEL RELEASE Right 10/15/2014    Right carpal tunnel release.   • CARPAL TUNNEL RELEASE     • CATARACT EXTRACTION, BILATERAL Bilateral    • CORONARY ARTERY BYPASS GRAFT  02/24/2014    CABG x 3 with LIMA to LAD, endarterectomy to LAD, SVG to OMB and SVG to distal RCA with endarterectomy to distal RCA. Endo-vein harvesting.   • CYSTECTOMY Left     Breast cycst   • CYSTOSCOPY, URETEROSCOPY, RETROGRADE PYELOGRAM, STENT INSERTION Left 3/10/2021    Procedure: , LEFT RETROGRADE PYELOGRAM, STENT INSERTION LEFT URETER;  Surgeon: Lamar, Cooper NOLAN MD;  Location: [facility];  Service: Urology;  Laterality: Left;   • EXCISION LESION      Remove breast lesion - cyst   • EYE SURGERY      Insert lens prosthesis   • FOOT SURGERY  10/18/2011    Exostectomy of the right foot. Preulcerative lesion with Charot deformity, right foot   • LUMBAR SPINE SURGERY  11/09/2015     Lumbosacral radiofrequency thermal coagulation.   • NERVE BLOCK  09/14/2015    Lumbar medial branch block.   • TOE NAIL AVULSION  09/03/2015    DEBRIDE NAIL 6 OR MORE 65369 (1)       Family History   Problem Relation Age of Onset   • Asthma Other    • Coronary artery disease Other    • Diabetes Other    • Hyperlipidemia Other    • Hypertension Other    • Osteoporosis Other    • Cancer Other    • Osteoporosis Mother    • Heart disease Mother    • Heart disease Father    • Diabetes Father    • Heart disease Sister    • Diabetes Sister        Social History     Socioeconomic History   • Marital status:    Tobacco Use   • Smoking status: Former Smoker     Types: Cigarettes   • Smokeless tobacco: Never Used   Substance and Sexual Activity   • Alcohol use: No   • Drug use: No   • Sexual activity: Defer           Objective   Physical Exam  Vitals and nursing note reviewed.   Constitutional:       General: She is not in acute distress.     Appearance: Normal appearance. She is well-developed. She is not ill-appearing or diaphoretic.   HENT:      Head: Normocephalic and atraumatic.      Nose: Nose normal.      Mouth/Throat:      Mouth: Mucous membranes are moist.   Eyes:      General: No scleral icterus.     Extraocular Movements: Extraocular movements intact.      Conjunctiva/sclera: Conjunctivae normal.   Neck:      Vascular: No JVD.   Cardiovascular:      Rate and Rhythm: Normal rate and regular rhythm.      Heart sounds: Normal heart sounds. No murmur heard.  No friction rub. No gallop.    Pulmonary:      Effort: Pulmonary effort is normal. No respiratory distress.      Breath sounds: No wheezing or rales.   Chest:      Chest wall: No tenderness.   Abdominal:      General: There is no distension.      Palpations: Abdomen is soft. There is no mass.      Tenderness: There is no abdominal tenderness. There is no guarding or rebound.   Musculoskeletal:         General: No tenderness or deformity. Normal range of  motion.      Cervical back: Normal range of motion and neck supple.   Lymphadenopathy:      Cervical: No cervical adenopathy.   Skin:     General: Skin is warm and dry.      Capillary Refill: Capillary refill takes less than 2 seconds.      Coloration: Skin is not pale.      Findings: No erythema or rash.   Neurological:      General: No focal deficit present.      Mental Status: She is alert.      Cranial Nerves: No cranial nerve deficit.      Motor: No abnormal muscle tone.   Psychiatric:         Behavior: Behavior normal.         Thought Content: Thought content normal.         Judgment: Judgment normal.         Procedures           ED Course                                         Labs Reviewed   COMPREHENSIVE METABOLIC PANEL - Abnormal; Notable for the following components:       Result Value    Glucose 180 (*)     BUN 29 (*)     CO2 32.0 (*)     Albumin 3.20 (*)     Alkaline Phosphatase 120 (*)     eGFR Non African Amer 56 (*)     BUN/Creatinine Ratio 29.3 (*)     All other components within normal limits    Narrative:     GFR Normal >60  Chronic Kidney Disease <60  Kidney Failure <15     URINALYSIS W/ MICROSCOPIC IF INDICATED (NO CULTURE) - Abnormal; Notable for the following components:    Appearance, UA Cloudy (*)     Ketones, UA Trace (*)     Blood, UA Moderate (2+) (*)     Protein, UA Trace (*)     Leuk Esterase, UA Large (3+) (*)     All other components within normal limits   CBC WITH AUTO DIFFERENTIAL - Abnormal; Notable for the following components:    RBC 3.29 (*)     Hemoglobin 9.9 (*)     Hematocrit 30.2 (*)     RDW 18.1 (*)     RDW-SD 60.0 (*)     Neutrophil % 35.5 (*)     Monocyte % 14.1 (*)     Eosinophil % 7.6 (*)     Eosinophils, Absolute 0.44 (*)     All other components within normal limits   URINALYSIS, MICROSCOPIC ONLY - Abnormal; Notable for the following components:    RBC, UA 13-20 (*)     WBC, UA Too Numerous to Count (*)     All other components within normal limits   POCT GLUCOSE  FINGERSTICK - Abnormal; Notable for the following components:    Glucose 206 (*)     All other components within normal limits   COVID-19 AND FLU A/B, NP SWAB IN TRANSPORT MEDIA 8-12 HR TAT - Normal    Narrative:     Fact sheet for providers: https://www.fda.gov/media/601734/download    Fact sheet for patients: https://www.fda.gov/media/583601/download    Test performed by PCR.   LACTIC ACID, PLASMA - Normal   BLOOD CULTURE   BLOOD CULTURE   RAINBOW DRAW    Narrative:     The following orders were created for panel order [place] Draw.  Procedure                               Abnormality         Status                     ---------                               -----------         ------                     Green Top (Gel)[[id number]]                                  Final result               Lavender Top[[id number]]                                     Final result               Gold Top - SST[[id number]]                                   Final result               Light Blue Top[[id number]]                                   Final result                 Please view results for these tests on the individual orders.   POCT GLUCOSE FINGERSTICK   CBC AND DIFFERENTIAL    Narrative:     The following orders were created for panel order CBC & Differential.  Procedure                               Abnormality         Status                     ---------                               -----------         ------                     CBC Auto Differential[[id number]]        Abnormal            Final result                 Please view results for these tests on the individual orders.   GREEN TOP   LAVENDER TOP   GOLD TOP - SST   LIGHT BLUE TOP   LAVENDER TOP   GOLD TOP - SST   GREEN TOP   LIGHT BLUE TOP   EXTRA TUBES    Narrative:     The following orders were created for panel order Extra Tubes.  Procedure                               Abnormality         Status                     ---------                               -----------          ------                     Lavender Top[[id number]]                                     Final result               Gold Top - SST[[id number]]                                   Final result               Green Top (Gel)[[id number]]                                  Final result               Gray Top[[id number]]                                         In process                 Light Blue Top[[id number]]                                   Final result                 Please view results for these tests on the individual orders.   GRAY TOP       CT Head Without Contrast   Final Result      No acute intracranial process is identified.      Electronically signed by:  Masood Anna MD  1/15/2022 6:22 PM CST   Workstation: [id number]      XR Chest 1 View   Final Result   No acute cardiopulmonary process. Mild elevation of left   hemidiaphragm. Mild blunting of the left costophrenic angle may   represent pleural thickening or small effusion         Electronically signed by:  Gely Houser MD  1/15/2022 4:03 PM CST   Workstation: [id number]                      MDM    Final diagnoses:   Urinary tract infection without hematuria, site unspecified   Sepsis, due to unspecified organism, unspecified whether acute organ dysfunction present (HCC)   Altered mental status, unspecified altered mental status type   Hypotension, unspecified hypotension type       ED Disposition  ED Disposition     ED Disposition Condition Comment    Decision to Admit  Level of Care: Med/Surg [1]   Diagnosis: Urinary tract infection without hematuria, site unspecified [[id number]]   Admitting Physician: SONALI MORGAN [[id number]]   Attending Physician: SONALI MORGAN [[id number]]            No follow-up provider specified.       Medication List      No changes were made to your prescriptions during this visit.          Ethan Vazquez MD  01/15/22 2027

## 2022-01-15 NOTE — ED NOTES
Patient is at an increased risk for falls. Fall light activated, yellow falls bracelet and yellow non-skid socks placed on patient. Call light within reach and patient instructed to call for assistance. Side rails up x2, bed alarm activated, and gait belt readily accessible.      Sandrita Ortega, RN  01/15/22 5637

## 2022-01-15 NOTE — ED NOTES
Per EMS patient's BP is 90s/50s at this time. ETA few minutes.     Faiza Camilo, RN  01/15/22 9534

## 2022-01-16 NOTE — PLAN OF CARE
Goal Outcome Evaluation:  Plan of Care Reviewed With: patient           Outcome Summary: OT eval on this date as co-eval with PT.  Pt was min of 2 for bed mobility.  Max A  for sit to stand to sit.  Pt was max to dep for LB dressing. Pt could benefit from OT services to increase safety, endurance and independence with ADLs and functional mob.

## 2022-01-16 NOTE — PROGRESS NOTES
"    [facility] Medicine Services  INPATIENT PROGRESS NOTE    Length of Stay: 0  Date of Admission: 1/15/2022  Primary Care Physician: Yosef Casey MD    Subjective   Chief Complaint: \"weak, tired\"   HPI:  66 year old female with past medical history of RA, HTN, DM, HLD, CAD who presented from [facility] with confusion.  She is currently admitted related to acute cystitis.  During today's visit, she reports feeling weak and tired, but otherwise denies complaints.     Review of Systems   Constitutional: Positive for fatigue. Negative for chills and fever.   HENT: Negative for congestion, rhinorrhea and sore throat.    Respiratory: Negative for cough, chest tightness, shortness of breath and stridor.    Cardiovascular: Negative for chest pain, palpitations and leg swelling.   Gastrointestinal: Negative for abdominal pain, diarrhea, nausea and vomiting.   Musculoskeletal: Negative for back pain and neck pain.   Skin: Negative for pallor.   Neurological: Positive for weakness. Negative for dizziness and headaches.   Psychiatric/Behavioral: Positive for confusion. The patient is not nervous/anxious.         All pertinent negatives and positives are as above. All other systems have been reviewed and are negative unless otherwise stated.     Objective    Temp:  [96.9 °F (36.1 °C)-98.5 °F (36.9 °C)] 98.5 °F (36.9 °C)  Heart Rate:  [] 108  Resp:  [16-18] 18  BP: ()/(46-74) 124/67    Physical Exam  Vitals and nursing note reviewed.   Constitutional:       General: She is not in acute distress.     Appearance: Normal appearance. She is not ill-appearing.   HENT:      Head: Normocephalic and atraumatic.      Right Ear: External ear normal.      Left Ear: External ear normal.      Nose: Nose normal.      Mouth/Throat:      Mouth: Mucous membranes are moist.      Pharynx: Oropharynx is clear.   Eyes:      General: No scleral icterus.        Right eye: No " discharge.         Left eye: No discharge.      Conjunctiva/sclera: Conjunctivae normal.   Cardiovascular:      Rate and Rhythm: Normal rate and regular rhythm.      Pulses: Normal pulses.      Heart sounds: Normal heart sounds. No murmur heard.  No friction rub. No gallop.    Pulmonary:      Effort: Pulmonary effort is normal. No respiratory distress.      Breath sounds: Normal breath sounds. No stridor. No wheezing, rhonchi or rales.   Abdominal:      General: Bowel sounds are normal. There is no distension.      Palpations: Abdomen is soft.      Tenderness: There is no abdominal tenderness.   Musculoskeletal:         General: No swelling. Normal range of motion.      Cervical back: Normal range of motion and neck supple.   Skin:     General: Skin is warm and dry.   Neurological:      General: No focal deficit present.      Mental Status: She is alert and oriented to person, place, and time.   Psychiatric:         Mood and Affect: Mood normal.         Behavior: Behavior normal.             Results Review:  I have reviewed the labs, radiology results, and diagnostic studies.    Laboratory Data:   Results from last 7 days   Lab Units 01/16/22  0618 01/15/22  1647   SODIUM mmol/L 136 140   POTASSIUM mmol/L 4.3 4.3   CHLORIDE mmol/L 97* 100   CO2 mmol/L 24.0 32.0*   BUN mg/dL 24* 29*   CREATININE mg/dL 0.88 0.99   GLUCOSE mg/dL 431* 180*   CALCIUM mg/dL 10.0 9.9   BILIRUBIN mg/dL  --  0.2   ALK PHOS U/L  --  120*   ALT (SGPT) U/L  --  11   AST (SGOT) U/L  --  25   ANION GAP mmol/L 15.0 8.0     Estimated Creatinine Clearance: 72.7 mL/min (by C-G formula based on SCr of 0.88 mg/dL).          Results from last 7 days   Lab Units 01/16/22  0618 01/15/22  1647   WBC 10*3/mm3 7.48 5.82   HEMOGLOBIN g/dL 10.2* 9.9*   HEMATOCRIT % 31.0* 30.2*   PLATELETS 10*3/mm3 271 243           Culture Data:   No results found for: BLOODCX  No results found for: URINECX  No results found for: RESPCX  No results found for: WOUNDCX  No  results found for: STOOLCX  No components found for: BODYFLD    Radiology Data:   Imaging Results (Last 24 Hours)     Procedure Component Value Units Date/Time    CT Head Without Contrast [[id number]] Collected: 01/15/22 1805     Updated: 01/15/22 1823    Narrative:      INDICATION: ams    EXAMINATION: CT BRAIN - l    TECHNIQUE:    Multiple axial images were obtained of the head without  intravenous contrast. A radiation dose optimization technique was  used for this scan.  IV Contrast dosage and agent: None.    COMPARISON: CT head 12/5/2021  ____________________________________________    FINDINGS:      BRAIN PARENCHYMA:   No intra- or extra-axial hemorrhage. Grey white differentiation  is preserved. No intracranial mass or mass effect. Posterior  fossa structures are unremarkable.     CSF SPACES: Appropriate for age.  No hydrocephalus.      CALVARIUM, SKULL BASE, PARANASAL SINUSES AND MASTOID AIR CELLS:  Unremarkable           Impression:        No acute intracranial process is identified.    Electronically signed by:  Masood Anna MD  1/15/2022 6:22 PM [facility]  Workstation: [id number]    XR Chest 1 View [[id number]] Collected: 01/15/22 1524     Updated: 01/15/22 1604    Narrative:      PROCEDURE: XR CHEST 1 VW    VIEWS:Single    INDICATION: AMS protocol    COMPARISON: CXR: None    FINDINGS:       - lines/tubes: None. Median sternotomy wires are present. The  superiormost wire is broken, as before    - cardiac: Size within normal limits.    - mediastinum: Contour within normal limits.     - lungs: No evidence of a focal air space process, pulmonary  interstitial edema, nodule(s)/mass. Mild elevation of the left  hemidiaphragm    - pleura: Mild blunting of left costophrenic angle.      - osseous: Unremarkable for age.      Impression:      No acute cardiopulmonary process. Mild elevation of left  hemidiaphragm. Mild blunting of the left costophrenic angle may  represent pleural thickening or small  effusion      Electronically signed by:  Gely Houser MD  1/15/2022 4:03 PM CST  Workstation: [id number]          I have reviewed the patient's current medications.     Assessment/Plan     Active Hospital Problems    Diagnosis    • **UTI (urinary tract infection)    • Overweight (BMI 25.0-29.9)    • Hypertension associated with diabetes (HCC)    • Type 1 diabetes mellitus with diabetic polyneuropathy (HCC)    • CAD (coronary artery disease)    • Neuropathy        Plan:    1. Altered mental status: likely metabolic encephalopathy related to acute cystitis.  Patient awake and alert this morning.  Oriented to person, place, situation.   2. Acute cystitis: continue Rocephin and follow cultures.   3. HTN: continue Entresto  4. Hypothyroidism: continue Synthroid.   5. Atrial fibrillation: continue Eliquis, Amiodarone.   6. Hx CHF: no acute exacerbation. Continue entresto, Lasix.   7. Type 1 DM: FSBS AC and HS with SSI. Continue Levemir BID.    I confirmed that the patient's Advance Care Plan is present, code status is documented, or surrogate decision maker is listed in the patient's medical record.          This document has been electronically signed by JESSIE Salvador on January 16, 2022 11:03 CST

## 2022-01-16 NOTE — H&P
[facility] Medicine Admission      Date of Admission: 1/15/2022      Primary Care Physician: Yosef Casey MD      Chief Complaint: confusion     HPI:    She is a 66 year old from [facility] with diabetes, depression, PVD, tobacco use, HTN who presented from SNF with hypotension when she was playing bingo. She was confused and had trouble seeing. Her mental status improved some with IVF but she is still somewhat confused. She really like [facility] and wants to return there as soon as possible.    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    Past Surgical History:  has a past surgical history that includes Carpal tunnel release (Right, 10/15/2014); Cardiac surgery (02/21/2014); Cystectomy (Left); Cataract extraction, bilateral (Bilateral); Carpal tunnel release; Coronary artery bypass graft (02/24/2014); Toe Nail Avulsion (09/03/2015); Foot surgery (10/18/2011); Nerve Block (09/14/2015); Eye surgery; Lumbar spine surgery (11/09/2015); Excision Lesion; and cystoscopy, ureteroscopy, retrograde pyelogram, stent insertion (Left, 3/10/2021).    Family History: family history includes Asthma in an other family member; Cancer in an other family member; Coronary artery disease in an other family member; Diabetes in her father, sister, and another family  member; Heart disease in her father, mother, and sister; Hyperlipidemia in an other family member; Hypertension in an other family member; Osteoporosis in her mother and another family member.     Social History:  reports that she has quit smoking. Her smoking use included cigarettes. She has never used smokeless tobacco. She reports that she does not drink alcohol and does not use drugs.    Allergies:   Allergies   Allergen Reactions   • Contrast Dye Shortness Of Breath and Itching   • Corticosteroids Other (See Comments)     Pt diabetic makes sugar go up    • Codeine Itching   • Aspirin Unknown - Low Severity   • Ibuprofen Unknown - Low Severity       Medications:   Prior to Admission medications    Medication Sig Start Date End Date Taking? Authorizing Provider   acetaminophen (TYLENOL) 325 MG tablet Take 650 mg by mouth 3 (Three) Times a Day.    ProviderChristiano MD   albuterol (PROVENTIL) (2.5 MG/3ML) 0.083% nebulizer solution Take 2.5 mg by nebulization Every 4 (Four) Hours As Needed for Wheezing. 12/17/21   Meghna Reinoso APRN   amiodarone (PACERONE) 200 MG tablet Take 1 tablet by mouth Every 12 (Twelve) Hours. 12/18/21   Meghna Reinoso APRN   apixaban (ELIQUIS) 2.5 MG tablet tablet Take 2 tablets by mouth 2 (Two) Times a Day. 12/17/21   Meghna Reinoso APRN   aspirin 81 MG EC tablet Take 81 mg by mouth Daily.    Christiano Coates MD   atorvastatin (LIPITOR) 20 MG tablet Take 1 tablet by mouth Every Night. 9/16/19   Jorden Sanz MD   B-D ULTRAFINE III SHORT PEN 31G X 8 MM misc  8/8/16   Christiano Coates MD   Camphor-Eucalyptus-Menthol (Vaporizing Chest Rub) 4.8-1.2-2.6 % ointment Apply 1 application topically 2 (Two) Times a Day.    Christiano Coates MD   carvedilol (COREG) 12.5 MG tablet TAKE 1 TABLET BY MOUTH TWICE DAILY 4/27/20   Christiano Coates MD   chlorthalidone (HYGROTON) 25 MG tablet Take 1 tablet by mouth Daily. 3/19/21   Sukhjinder Trivedi MD   Cholecalciferol  (Vitamin D3 Ultra Potency) 1.25 MG (21036 UT) tablet Take 1 capsule by mouth Daily.    Christiano Coates MD   Continuous Blood Gluc Sensor (DEXCOM G6 SENSOR) As Needed (glucose control). Every 10 daysDexcom G6 Sensor Kit [address]-97 Use as directed for continuous glucose monitoring 10/21/19   Dominic Willard MD   diazePAM (VALIUM) 2 MG tablet Take 1 tablet by mouth 2 (Two) Times a Day. 12/17/21   Meghna Reinoso APRN   dilTIAZem (CARDIZEM) 30 MG tablet Take 1 tablet by mouth Every 6 (Six) Hours. 3/19/21   Sukhjinder Trivedi MD   donepezil (ARICEPT) 5 MG tablet Take 2.5 mg by mouth Every Night.    Christiano Coates MD   furosemide (LASIX) 20 MG tablet Take 20 mg by mouth 2 (Two) Times a Day.    Christiano Coates MD   glucose blood test strip Livongot test strips use to test sugar 4 times per day. E 10.9 12/7/20   Sophia Padron APRN   guaiFENesin (ROBITUSSIN) 100 MG/5ML syrup Take 200 mg by mouth 3 (Three) Times a Day As Needed for Cough (takes 10 ml per administration).    Christiano Coates MD   hydrOXYzine (ATARAX) 25 MG tablet Take 25 mg by mouth. 9/8/21   Christiano Coates MD   insulin aspart (novoLOG FLEXPEN) 100 UNIT/ML solution pen-injector sc pen 150-200=2 units, 201-250=3 units, 251-300=5 units, 301-350=7 units, 351-400=9 units, 401-450=12 units, >450=15 units 12/7/20   Sophia Padron APRN   Insulin Glargine, 2 Unit Dial, (Toujeo Max SoloStar) 300 UNIT/ML solution pen-injector injection Inject 16 Units under the skin into the appropriate area as directed Daily. Take 16 units every am    Christiano Coates MD   Insulin Glargine, 2 Unit Dial, (Toujeo Max SoloStar) 300 UNIT/ML solution pen-injector injection Inject 26 Units under the skin into the appropriate area as directed Every Night. Take 26 units every night 12/17/21   Meghna Reinoso APRN   isosorbide mononitrate (IMDUR) 30 MG 24 hr tablet Take 30 mg by mouth Daily. 7/18/16   Provider, MD Christiano   levothyroxine  (SYNTHROID) 25 MCG tablet Take 50 mcg by mouth Daily.    Christiano Coates MD   lidocaine (LIDODERM) 5 % Place 1 patch on the skin as directed by provider Daily. Remove & Discard patch within 12 hours or as directed by MD 3/29/21   Meghna Reinoso APRN   magnesium oxide (MAG-OX) 400 tablet tablet Take 1 tablet by mouth 2 (Two) Times a Day. 12/17/21   Meghna Reinoso APRN   melatonin 5 MG tablet tablet Take 5 mg by mouth Every Night.    Christiano Coates MD   Menthol, Topical Analgesic, (Biofreeze Roll-On) 4 % gel Apply  topically to the appropriate area as directed.    Christiano Coates MD   methotrexate 2.5 MG tablet Take 10 mg by mouth 1 (One) Time Per Week.    Christiano Coates MD   Multiple Vitamins-Minerals (Centrum Silver 50+Women) tablet Take 1 tablet by mouth Daily.    Christiano Coates MD   nitroglycerin (NITRODUR) 0.2 MG/HR patch Place 1 patch on the skin as directed by provider Daily. 6/23/16   Christiano Coates MD   OLANZapine (zyPREXA) 5 MG tablet Take 5 mg by mouth Every Night.    Christiano Coates MD   oxyCODONE-acetaminophen (PERCOCET)  MG per tablet Take 1 tablet by mouth Every 4 (Four) Hours As Needed for Moderate Pain . 12/17/21   Meghna Reinoso APRN   PARoxetine (PAXIL) 20 MG tablet Take 20 mg by mouth Every Night. 8/30/16   Christiano Coates MD   sacubitril-valsartan (ENTRESTO) 24-26 MG tablet Take 1 tablet by mouth Every 12 (Twelve) Hours. 12/17/21   Meghna Reinoso APRN   TRUEPLUS LANCETS 33G misc  7/6/16   Christiano Coates MD   Vitamin D, Cholecalciferol, 50 MCG (2000 UT) capsule Take 1 capsule by mouth Daily.    Christiano Coates MD       Review of Systems:  Review of Systems   Constitutional: Negative for chills, diaphoresis and fever.   HENT: Negative for sneezing and sore throat.    Eyes: Negative for pain and discharge.   Respiratory: Negative for cough and shortness of breath.    Gastrointestinal: Negative for constipation, diarrhea,  nausea and vomiting.   Endocrine: Negative for cold intolerance and heat intolerance.   Genitourinary: Negative for difficulty urinating, dysuria, frequency and urgency.   Musculoskeletal: Negative for arthralgias and myalgias.   Skin: Negative for color change and pallor.   Allergic/Immunologic: Negative for environmental allergies and food allergies.   Neurological: Positive for syncope and weakness. Negative for dizziness.   Psychiatric/Behavioral: Negative for confusion and sleep disturbance.      Otherwise complete ROS is negative except as mentioned above.    Physical Exam:   Temp:  [96.9 °F (36.1 °C)-97.9 °F (36.6 °C)] 96.9 °F (36.1 °C)  Heart Rate:  [] 104  Resp:  [16-18] 18  BP: ()/(46-74) 120/74  Physical Exam  Vitals reviewed.   Constitutional:       General: She is not in acute distress.     Appearance: She is well-developed.   HENT:      Head: Normocephalic and atraumatic.      Nose: Nose normal.   Eyes:      Conjunctiva/sclera: Conjunctivae normal.   Cardiovascular:      Rate and Rhythm: Normal rate and regular rhythm.   Pulmonary:      Effort: Pulmonary effort is normal. No respiratory distress.      Breath sounds: Normal breath sounds. No wheezing or rales.   Musculoskeletal:         General: Normal range of motion.      Cervical back: Normal range of motion and neck supple.   Skin:     General: Skin is warm and dry.   Neurological:      Mental Status: She is alert and oriented to person, place, and time.   Psychiatric:         Behavior: Behavior normal.           Results Reviewed:  I have personally reviewed current lab, radiology, and data and agree with results.  Lab Results (last 24 hours)     Procedure Component Value Units Date/Time    Extra Tubes [[id number]] Collected: 01/15/22 1742    Specimen: Blood, Venous Line Updated: 01/15/22 2145    Narrative:      The following orders were created for panel order Extra Tubes.  Procedure                               Abnormality          Status                     ---------                               -----------         ------                     Lavender Top[[id number]]                                     Final result               Gold Top - SST[[id number]]                                   Final result               Green Top (Gel)[[id number]]                                  Final result               Gray Top[[id number]]                                         Final result               Light Blue Top[[id number]]                                   Final result                 Please view results for these tests on the individual orders.    Gray Top [[id number]] Collected: 01/15/22 1742    Specimen: Blood Updated: 01/15/22 2145     Extra Tube Hold for add-ons.     Comment: Auto resulted.       Urine Culture - Urine, Urine, Clean Catch [[id number]] Collected: 01/15/22 1900    Specimen: Urine, Clean Catch Updated: 01/15/22 2036    POC Glucose Once [[id number]]  (Abnormal) Collected: 01/15/22 2002    Specimen: Blood Updated: 01/15/22 2014     Glucose 206 mg/dL      Comment: RN NotifiedOperator: [id number] [facility]CEFirst[facility]Meter ID: [id number]       Urinalysis, Microscopic Only - Urine, Clean Catch [[id number]]  (Abnormal) Collected: 01/15/22 1900    Specimen: Urine, Clean Catch Updated: 01/15/22 1919     RBC, UA 13-20 /HPF      WBC, UA Too Numerous to Count /HPF      Bacteria, UA None Seen /HPF      Squamous Epithelial Cells, UA 0-2 /HPF      Hyaline Casts, UA None Seen /LPF      Methodology Automated Microscopy    Urinalysis With Microscopic If Indicated (No Culture) - Urine, Clean Catch [[id number]]  (Abnormal) Collected: 01/15/22 1900    Specimen: Urine, Clean Catch Updated: 01/15/22 1916     Color, UA Yellow     Appearance, UA Cloudy     pH, UA 7.0     Specific Gravity, UA 1.010     Glucose, UA Negative     Ketones, UA Trace     Bilirubin, UA Negative     Blood, UA Moderate (2+)     Protein, UA Trace     Leuk Esterase, UA Large (3+)     Nitrite, UA  Negative     Urobilinogen, UA 0.2 E.U./dL    Lavender Top [[id number]] Collected: 01/15/22 1742    Specimen: Blood Updated: 01/15/22 1845     Extra Tube hold for add-on     Comment: Auto resulted       Gold Top - SST [[id number]] Collected: 01/15/22 1742    Specimen: Blood Updated: 01/15/22 1845     Extra Tube Hold for add-ons.     Comment: Auto resulted.       Green Top (Gel) [[id number]] Collected: 01/15/22 1742    Specimen: Blood Updated: 01/15/22 1845     Extra Tube Hold for add-ons.     Comment: Auto resulted.       Light Blue Top [[id number]] Collected: 01/15/22 1742    Specimen: Blood Updated: 01/15/22 1845     Extra Tube hold for add-on     Comment: Auto resulted       [place] Draw [[id number]] Collected: 01/15/22 1647    Specimen: Blood Updated: 01/15/22 1801    Narrative:      The following orders were created for panel order [place] Draw.  Procedure                               Abnormality         Status                     ---------                               -----------         ------                     Green Top (Gel)[[id number]]                                  Final result               Lavender Top[[id number]]                                     Final result               Gold Top - SST[[id number]]                                   Final result               Light Blue Top[[id number]]                                   Final result                 Please view results for these tests on the individual orders.    Gold Top - SST [[id number]] Collected: 01/15/22 1647    Specimen: Blood Updated: 01/15/22 1801     Extra Tube Hold for add-ons.     Comment: Auto resulted.       Light Blue Top [[id number]] Collected: 01/15/22 1647    Specimen: Blood Updated: 01/15/22 1801     Extra Tube hold for add-on     Comment: Auto resulted       Green Top (Gel) [[id number]] Collected: 01/15/22 1647    Specimen: Blood Updated: 01/15/22 1801     Extra Tube Hold for add-ons.     Comment: Auto resulted.       Lavender Top  [[id number]] Collected: 01/15/22 1647    Specimen: Blood Updated: 01/15/22 1801     Extra Tube hold for add-on     Comment: Auto resulted       Comprehensive Metabolic Panel [[id number]]  (Abnormal) Collected: 01/15/22 1647    Specimen: Blood Updated: 01/15/22 1722     Glucose 180 mg/dL      BUN 29 mg/dL      Creatinine 0.99 mg/dL      Sodium 140 mmol/L      Potassium 4.3 mmol/L      Chloride 100 mmol/L      CO2 32.0 mmol/L      Calcium 9.9 mg/dL      Total Protein 6.2 g/dL      Albumin 3.20 g/dL      ALT (SGPT) 11 U/L      AST (SGOT) 25 U/L      Alkaline Phosphatase 120 U/L      Total Bilirubin 0.2 mg/dL      eGFR Non African Amer 56 mL/min/1.73      Globulin 3.0 gm/dL      A/G Ratio 1.1 g/dL      BUN/Creatinine Ratio 29.3     Anion Gap 8.0 mmol/L     Narrative:      GFR Normal >60  Chronic Kidney Disease <60  Kidney Failure <15      Lactic Acid, Plasma [[id number]]  (Normal) Collected: 01/15/22 1647    Specimen: Blood Updated: 01/15/22 1720     Lactate 1.2 mmol/L     CBC & Differential [[id number]]  (Abnormal) Collected: 01/15/22 1647    Specimen: Blood Updated: 01/15/22 1657    Narrative:      The following orders were created for panel order CBC & Differential.  Procedure                               Abnormality         Status                     ---------                               -----------         ------                     CBC Auto Differential[[id number]]        Abnormal            Final result                 Please view results for these tests on the individual orders.    CBC Auto Differential [[id number]]  (Abnormal) Collected: 01/15/22 1647    Specimen: Blood Updated: 01/15/22 1657     WBC 5.82 10*3/mm3      RBC 3.29 10*6/mm3      Hemoglobin 9.9 g/dL      Hematocrit 30.2 %      MCV 91.8 fL      MCH 30.1 pg      MCHC 32.8 g/dL      RDW 18.1 %      RDW-SD 60.0 fl      MPV 10.6 fL      Platelets 243 10*3/mm3      Neutrophil % 35.5 %      Lymphocyte % 41.1 %      Monocyte % 14.1 %      Eosinophil % 7.6 %       Basophil % 1.2 %      Immature Grans % 0.5 %      Neutrophils, Absolute 2.07 10*3/mm3      Lymphocytes, Absolute 2.39 10*3/mm3      Monocytes, Absolute 0.82 10*3/mm3      Eosinophils, Absolute 0.44 10*3/mm3      Basophils, Absolute 0.07 10*3/mm3      Immature Grans, Absolute 0.03 10*3/mm3      nRBC 0.0 /100 WBC     COVID-19 and FLU A/B PCR - Swab, Nasopharynx [[id number]]  (Normal) Collected: 01/15/22 1509    Specimen: Swab from Nasopharynx Updated: 01/15/22 1546     COVID19 Not Detected     Influenza A PCR Not Detected     Influenza B PCR Not Detected    Narrative:      Fact sheet for providers: https://www.fda.gov/media/431386/download    Fact sheet for patients: https://www.fda.gov/media/769840/download    Test performed by PCR.        Imaging Results (Last 24 Hours)     Procedure Component Value Units Date/Time    CT Head Without Contrast [[id number]] Collected: 01/15/22 1805     Updated: 01/15/22 1823    Narrative:      INDICATION: ams    EXAMINATION: CT BRAIN - l    TECHNIQUE:    Multiple axial images were obtained of the head without  intravenous contrast. A radiation dose optimization technique was  used for this scan.  IV Contrast dosage and agent: None.    COMPARISON: CT head 12/5/2021  ____________________________________________    FINDINGS:      BRAIN PARENCHYMA:   No intra- or extra-axial hemorrhage. Grey white differentiation  is preserved. No intracranial mass or mass effect. Posterior  fossa structures are unremarkable.     CSF SPACES: Appropriate for age.  No hydrocephalus.      CALVARIUM, SKULL BASE, PARANASAL SINUSES AND MASTOID AIR CELLS:  Unremarkable           Impression:        No acute intracranial process is identified.    Electronically signed by:  Masood Anna MD  1/15/2022 6:22 PM [facility]  Workstation: [id number]    XR Chest 1 View [[id number]] Collected: 01/15/22 1524     Updated: 01/15/22 1604    Narrative:      PROCEDURE: XR CHEST 1 VW    VIEWS:Single    INDICATION: AMS  protocol    COMPARISON: CXR: None    FINDINGS:       - lines/tubes: None. Median sternotomy wires are present. The  superiormost wire is broken, as before    - cardiac: Size within normal limits.    - mediastinum: Contour within normal limits.     - lungs: No evidence of a focal air space process, pulmonary  interstitial edema, nodule(s)/mass. Mild elevation of the left  hemidiaphragm    - pleura: Mild blunting of left costophrenic angle.      - osseous: Unremarkable for age.      Impression:      No acute cardiopulmonary process. Mild elevation of left  hemidiaphragm. Mild blunting of the left costophrenic angle may  represent pleural thickening or small effusion      Electronically signed by:  Gely Houser MD  1/15/2022 4:03 PM CST  Workstation: [id number]            Assessment:    Active Hospital Problems    Diagnosis    • **UTI (urinary tract infection)    • Overweight (BMI 25.0-29.9)    • Hypertension associated with diabetes (HCC)    • Type 1 diabetes mellitus with diabetic polyneuropathy ([facility])    • CAD (coronary artery disease)    • Neuropathy              Plan:    Altered mental status with hypotension  -holding BP meds  -could be 2/2 UTI, continue antibiotics and f/u cultures   -reviewed CT scan  -unable to obtain MRI, pt unable to do closed spaces    Atrial Fibrillation  -continue Eliquis, amiodarone  -holding BB, cardizem,  Restart BP as able    Hypothyroidism  -synthroid    Anxiety  -continue home meds    Diabetes  -SSI  -Levemir 15 BID    Generalized weakness  -PT, OT     I confirmed that the patient's Advance Care Plan is present, code status is documented, or surrogate decision maker is listed in the patient's medical record.     I have utilized all available immediate resources to obtain, update, or review the patient's current medications.       I discussed the patients findings and my recommendations with: patient, ER provider         This document has been electronically signed by Genet CANDELARIO  MD Colleen on January 15, 2022 21:59 CST

## 2022-01-16 NOTE — PLAN OF CARE
Goal Outcome Evaluation:              Outcome Summary: Initial PT evaluation complete, co-evaluation with OT.  Patient is alert, cooperative.  She requires min Ax1-2 with bed mobility, max Ax1 for transfers and gait.  Patient able to stand with FWW but with severe forward head posture, very stooped, unable to advance either LE. Recommend skilled PT continue upon return to SNF. Goals established, continue skilled I/P PT.

## 2022-01-16 NOTE — THERAPY EVALUATION
Acute Care - Occupational Therapy Initial Evaluation  [facility]     Patient Name: Janelle Millard  : 1955  MRN: [id number]  Today's Date: 2022     Date of Referral to OT: 01/15/22       Admit Date: 1/15/2022       ICD-10-CM ICD-9-CM   1. Urinary tract infection without hematuria, site unspecified  N39.0 599.0   2. Sepsis, due to unspecified organism, unspecified whether acute organ dysfunction present (HCC)  A41.9 038.9     995.91   3. Altered mental status, unspecified altered mental status type  R41.82 780.97   4. Hypotension, unspecified hypotension type  I95.9 458.9   5. Impaired functional mobility, balance, gait, and endurance  Z74.09 V49.89   6. Impaired mobility and activities of daily living  Z74.09 V49.89    Z78.9      Patient Active Problem List   Diagnosis   • Carpal tunnel syndrome of left wrist   • Type 1 diabetes mellitus with diabetic polyneuropathy ([facility])   • Mixed diabetic hyperlipidemia associated with type 1 diabetes mellitus ([facility])   • Hypertension associated with diabetes ([facility])   • Syncope and collapse   • CAD (coronary artery disease)   • Asthma   • Depressive disorder, not elsewhere classified   • Neuropathy   • Other specified rheumatoid arthritis, multiple sites ([facility])   • Carpal tunnel syndrome on both sides   • Bilateral carotid artery stenosis   • Overweight (BMI 25.0-29.9)   • Precordial pain   • Sepsis with encephalopathy without septic shock ([facility])   • Tobacco abuse   • UTI (urinary tract infection)   • Ureteral stone with hydronephrosis   • Chest pain, rule out acute myocardial infarction   • Chest pain   • Chest pain with high risk for cardiac etiology   • Sepsis ([facility])     Past Medical History:   Diagnosis Date   • Arthritis, rheumatoid ([facility])    • Arthropathy associated with neurological disorder    • Arthropathy of lumbar facet joint    • Asthma    • Benign hypertension    • Carpal tunnel syndrome    • Diabetes ([facility])    • Diabetic polyneuropathy ([facility])    •  Dyslipidemia    • Fallen arches    • Hammer toe    • Heart disease    • Joint pain    • Mild depression (HCC)     Saddness post Surgery 7/10/14 improved after counseling.   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 Diffusely calcified LAD with 30-80% stenosis. OMB #3 with 60% to 70% stenosis. Preserved LV systolic function with EF of 55%.   • CARPAL TUNNEL RELEASE Right 10/15/2014    Right carpal tunnel release.   • CARPAL TUNNEL RELEASE     • CATARACT EXTRACTION, BILATERAL Bilateral    • CORONARY ARTERY BYPASS GRAFT  02/24/2014    CABG x 3 with LIMA to LAD, endarterectomy to LAD, SVG to OMB and SVG to distal RCA with endarterectomy to distal RCA. Endo-vein harvesting.   • CYSTECTOMY Left     Breast cycst   • CYSTOSCOPY, URETEROSCOPY, RETROGRADE PYELOGRAM, STENT INSERTION Left 3/10/2021    Procedure: , LEFT RETROGRADE PYELOGRAM, STENT INSERTION LEFT URETER;  Surgeon: Elderton, Cooper NOLAN MD;  Location: [facility];  Service: Urology;  Laterality: Left;   • EXCISION LESION      Remove breast lesion - cyst   • EYE SURGERY      Insert lens prosthesis   • FOOT SURGERY  10/18/2011    Exostectomy of the right foot. Preulcerative lesion with Charot deformity, right foot   • LUMBAR SPINE SURGERY  11/09/2015    Lumbosacral radiofrequency thermal coagulation.   • NERVE BLOCK  09/14/2015    Lumbar medial branch block.   • TOE NAIL AVULSION  09/03/2015    DEBRIDE NAIL 6 OR MORE 33859 (1)         OT ASSESSMENT FLOWSHEET (last 12 hours)     OT Evaluation and Treatment     Row Name 01/16/22 1013                   OT Time and Intention    Subjective  Information complains of; pain; fatigue; weakness  -RB        Document Type evaluation  -RB        Mode of Treatment co-treatment; physical therapy; occupational therapy  -RB                  General Information    Patient Profile Reviewed yes  -RB        Prior Level of Function independent:; w/c or scooter; min assist:; transfer; mod assist:; ADL's  -RB        Existing Precautions/Restrictions fall  -RB        Equipment Issued to Patient gait belt  -RB        Risks Reviewed patient:; LOB  -RB                  Living Environment    Lives With facility resident  -RB                  Home Main Entrance    Number of Stairs, Main Entrance none  -RB                  Stairs Within Home, Primary    Stairs, Within Home, Primary RWT for 7 mo.  -RB                  Sensory Assessment (Somatosensory)    Sensory Assessment (Somatosensory) UE sensation intact  -RB                  Cognition    Orientation Status (Cognition) verbal cues/prompts needed for orientation; oriented to; person; place  -RB                  Pain Scale: Numbers Pre/Post-Treatment    Pretreatment Pain Rating 7/10  -RB        Posttreatment Pain Rating 6/10  -RB        Pain Location - Orientation generalized  -RB        Pain Intervention(s) Medication (See MAR)  -RB                  Range of Motion Comprehensive    General Range of Motion upper extremity range of motion deficits identified  -RB        Comment, General Range of Motion ~ 70* B shld flex and rest of B UE AROM was WNL.  -RB                  Strength Comprehensive (MMT)    General Manual Muscle Testing (MMT) Assessment other (see comments)  -RB        Comment, General Manual Muscle Testing (MMT) Assessment B shld flex 2-/5 and rest of B UE strength was 4-/5  -RB                  Bed Mobility    Bed Mobility supine-sit; sit-supine; scooting/bridging  -RB        Scooting/Bridging [place] (Bed Mobility) dependent (less than 25% patient effort); 2 person assist  -RB        Supine-Sit  [place] (Bed Mobility) minimum assist (75% patient effort); 2 person assist  -RB        Sit-Supine [place] (Bed Mobility) minimum assist (75% patient effort)  -RB        Assistive Device (Bed Mobility) head of bed elevated; bed rails  -RB                  Functional Mobility    Functional Mobility- Ind. Level not tested  -RB                  Transfers    Sit-Stand [place] (Transfers) maximum assist (25% patient effort)  -RB                  Safety Issues, Functional Mobility    Impairments Affecting Function (Mobility) strength; endurance/activity tolerance; balance; pain  -RB                  Activities of Daily Living    BADL Assessment/Intervention lower body dressing  -RB                  Lower Body Dressing Assessment/Training    [place] Level (Lower Body Dressing) lower body dressing skills; maximum assist (25% patient effort); dependent (less than 25% patient effort)  -RB        Position (Lower Body Dressing) edge of bed sitting  -RB                  Wound 01/15/22 2130 Bilateral coccyx Pressure Injury    Wound - Properties Group Placement Date: 01/15/22  -JT Placement Time: 2130  -JT Present on Hospital Admission: Y  -JT Side: Bilateral  -JT Location: coccyx  -JT Primary Wound Type: Pressure inj  -JT        Retired Wound - Properties Group Date first assessed: 01/15/22  -JT Time first assessed: 2130  -JT Present on Hospital Admission: Y  -JT Side: Bilateral  -JT Location: coccyx  -JT Primary Wound Type: Pressure inj  -JT                  Plan of Care Review    Plan of Care Reviewed With patient  -RB                  Vital Signs    Pre Systolic BP Rehab 115  -RB        Pre Treatment Diastolic BP 58  -RB        Post Systolic BP Rehab 109  -RB        Post Treatment Diastolic BP 59  -RB        Pretreatment Heart Rate (beats/min) 118  -RB        Posttreatment Heart Rate (beats/min) 121  -RB        Pre SpO2 (%) 96  -RB        O2 Delivery Pre Treatment room air  -RB        Post SpO2 (%) 98  -RB         O2 Delivery Post Treatment room air  -RB        Pre Patient Position Supine  -RB        Intra Patient Position Standing  -RB        Post Patient Position Supine  -RB                  OT Goals    Bed Mobility Goal Selection (OT) bed mobility, OT goal 1  -RB        Transfer Goal Selection (OT) transfer, OT goal 1  -RB        Bathing Goal Selection (OT) bathing, OT goal 1  -RB        Dressing Goal Selection (OT) dressing, OT goal 1  -RB                  Bed Mobility Goal 1 (OT)    Activity/Assistive Device (Bed Mobility Goal 1, OT) bed mobility activities, all  -RB        [place] Level/Cues Needed (Bed Mobility Goal 1, OT) contact guard assist  -RB        Time Frame (Bed Mobility Goal 1, OT) long term goal (LTG)  -RB        Progress/Outcomes (Bed Mobility Goal 1, OT) goal not met  -RB                  Transfer Goal 1 (OT)    Activity/Assistive Device (Transfer Goal 1, OT) sit-to-stand/stand-to-sit; toilet  -RB        [place] Level/Cues Needed (Transfer Goal 1, OT) minimum assist (75% or more patient effort)  -RB        Time Frame (Transfer Goal 1, OT) long term goal (LTG)  -RB        Progress/Outcome (Transfer Goal 1, OT) goal not met  -RB                  Bathing Goal 1 (OT)    Activity/Device (Bathing Goal 1, OT) upper body bathing  -RB        [place] Level/Cues Needed (Bathing Goal 1, OT) contact guard assist  -RB        Time Frame (Bathing Goal 1, OT) long term goal (LTG)  -RB        Progress/Outcomes (Bathing Goal 1, OT) goal not met  -RB                  Dressing Goal 1 (OT)    Activity/Device (Dressing Goal 1, OT) upper body dressing  -RB        [place]/Cues Needed (Dressing Goal 1, OT) contact guard assist  -RB        Time Frame (Dressing Goal 1, OT) long term goal (LTG)  -RB        Progress/Outcome (Dressing Goal 1, OT) goal not met  -RB                  Positioning and Restraints    Pre-Treatment Position in bed  -RB        Post Treatment Position bed  -RB        In Bed supine; call  light within reach; encouraged to call for assist; exit alarm on  -RB                  Therapy Assessment/Plan (OT)    Date of Referral to OT 01/15/22  -RB        Functional Level at Time of Evaluation (OT) Imp mob and ADLs.  -RB        OT Diagnosis Imp mob and ADLs.  -RB        Rehab Potential (OT) fair, will monitor progress closely  -RB        Criteria for Skilled Therapeutic Interventions Met (OT) yes; meets criteria  -RB        Therapy Frequency (OT) other (see comments)  5-7 days/wk  -RB        Predicted Duration of Therapy Intervention (OT) Until D/C or goals met  -RB        Problem List (OT) problems related to; balance; coordination; mobility; range of motion (ROM); strength; inability to direct their own care  -RB        Activity Limitations Related to Problem List (OT) unable to ambulate safely; unable to transfer safely; BADLs not performed adequately or safely; IADLs not performed adequately or safely  -RB        Planned Therapy Interventions (OT) activity tolerance training; adaptive equipment training; BADL retraining; functional balance retraining; occupation/activity based interventions; patient/caregiver education/training; strengthening exercise; transfer/mobility retraining; ROM/therapeutic exercise  -RB                  Evaluation Complexity (OT)    Review Occupational Profile/Medical/Therapy History Complexity moderate complexity  -RB        Assessment, Occupational Performance/Identification of Deficit Complexity moderate complexity  -RB        Clinical Decision Making Complexity (OT) moderate complexity  -RB        Overall Complexity of Evaluation (OT) moderate complexity  -RB                  Therapy Plan Review/Discharge Plan (OT)    Anticipated Discharge Disposition (OT) skilled nursing facility  -RB              User Key  (r) = Recorded By, (t) = Taken By, (c) = Cosigned By    Initials Name Effective Dates    RB Baldomero Kwon, JANE 06/16/21 -     Stephanie Tolentino, LINO 09/28/21 -                   Occupational Therapy Education                 Title: PT OT SLP Therapies (In Progress)     Topic: Occupational Therapy (In Progress)     Point: ADL training (Not Started)     Description:   Instruct learner(s) on proper safety adaptation and remediation techniques during self care or transfers.   Instruct in proper use of assistive devices.              Learner Progress:  Not documented in this visit.          Point: Home exercise program (Not Started)     Description:   Instruct learner(s) on appropriate technique for monitoring, assisting and/or progressing therapeutic exercises/activities.              Learner Progress:  Not documented in this visit.          Point: Precautions (Done)     Description:   Instruct learner(s) on prescribed precautions during self-care and functional transfers.              Learning Progress Summary           Patient Acceptance, E, VU by MATHIEU at 1/16/2022 1038    Comment: Edu pt on use of gait belt and non skid socks when OOB and no OOB without assist.                   Point: Body mechanics (Not Started)     Description:   Instruct learner(s) on proper positioning and spine alignment during self-care, functional mobility activities and/or exercises.              Learner Progress:  Not documented in this visit.                      User Key     Initials Effective Dates Name Provider Type Discipline     06/16/21 -  Baldomero Kwon, OT Occupational Therapist OT                  OT Recommendation and Plan  Planned Therapy Interventions (OT): activity tolerance training, adaptive equipment training, BADL retraining, functional balance retraining, occupation/activity based interventions, patient/caregiver education/training, strengthening exercise, transfer/mobility retraining, ROM/therapeutic exercise  Therapy Frequency (OT): other (see comments) (5-7 days/wk)  Plan of Care Review  Plan of Care Reviewed With: patient  Outcome Summary: OT francesca on this date as co-eval with PT.  Pt  was min of 2 for bed mobility.  Max A  for sit to stand to sit.  Pt was max to dep for LB dressing. Pt could benefit from OT services to increase safety, endurance and independence with ADLs and functional mob.  Plan of Care Reviewed With: patient  Outcome Summary: OT eval on this date as co-eval with PT.  Pt was min of 2 for bed mobility.  Max A  for sit to stand to sit.  Pt was max to dep for LB dressing. Pt could benefit from OT services to increase safety, endurance and independence with ADLs and functional mob.     Outcome Measures     Row Name 01/16/22 1013             How much help from another is currently needed...    Putting on and taking off regular lower body clothing? 2  -RB      Bathing (including washing, rinsing, and drying) 2  -RB      Toileting (which includes using toilet bed pan or urinal) 2  -RB      Putting on and taking off regular upper body clothing 2  -RB      Taking care of personal grooming (such as brushing teeth) 2  -RB      Eating meals 3  -RB      AM-PAC 6 Clicks Score (OT) 13  -RB              Functional Assessment    Outcome Measure Options AM-PAC 6 Clicks Daily Activity (OT)  -RB            User Key  (r) = Recorded By, (t) = Taken By, (c) = Cosigned By    Initials Name Provider Type    RB Baldomero Kwon OT Occupational Therapist                Time Calculation:    Time Calculation- OT     Row Name 01/16/22 1204             Time Calculation- OT    OT Start Time 1013  -RB      OT Stop Time 1051  -RB      OT Time Calculation (min) 38 min  -RB      OT Received On 01/16/22  -RB      OT Goal Re-Cert Due Date 01/29/22  -RB            User Key  (r) = Recorded By, (t) = Taken By, (c) = Cosigned By    Initials Name Provider Type    RB Baldomero Kwon OT Occupational Therapist              Therapy Charges for Today     Code Description Service Date Service Provider Modifiers Qty    [id number]  OT EVAL MOD COMPLEXITY 3 1/16/2022 Baldomero Kwon OT GO 1               Baldomero Kwon  OT  1/16/2022

## 2022-01-16 NOTE — THERAPY EVALUATION
Patient Name: Janelle Millard  : 1955    MRN: [id number]                              Today's Date: 2022       Admit Date: 1/15/2022    Visit Dx:     ICD-10-CM ICD-9-CM   1. Urinary tract infection without hematuria, site unspecified  N39.0 599.0   2. Sepsis, due to unspecified organism, unspecified whether acute organ dysfunction present ([facility])  A41.9 038.9     995.91   3. Altered mental status, unspecified altered mental status type  R41.82 780.97   4. Hypotension, unspecified hypotension type  I95.9 458.9   5. Impaired functional mobility, balance, gait, and endurance  Z74.09 V49.89     Patient Active Problem List   Diagnosis   • Carpal tunnel syndrome of left wrist   • Type 1 diabetes mellitus with diabetic polyneuropathy ([facility])   • Mixed diabetic hyperlipidemia associated with type 1 diabetes mellitus ([facility])   • Hypertension associated with diabetes ([facility])   • Syncope and collapse   • CAD (coronary artery disease)   • Asthma   • Depressive disorder, not elsewhere classified   • Neuropathy   • Other specified rheumatoid arthritis, multiple sites ([facility])   • Carpal tunnel syndrome on both sides   • Bilateral carotid artery stenosis   • Overweight (BMI 25.0-29.9)   • Precordial pain   • Sepsis with encephalopathy without septic shock ([facility])   • Tobacco abuse   • UTI (urinary tract infection)   • Ureteral stone with hydronephrosis   • Chest pain, rule out acute myocardial infarction   • Chest pain   • Chest pain with high risk for cardiac etiology   • Sepsis ([facility])     Past Medical History:   Diagnosis Date   • Arthritis, rheumatoid ([facility])    • Arthropathy associated with neurological disorder    • Arthropathy of lumbar facet joint    • Asthma    • Benign hypertension    • Carpal tunnel syndrome    • Diabetes ([facility])    • Diabetic polyneuropathy ([facility])    • Dyslipidemia    • Fallen arches    • Hammer toe    • Heart disease    • Joint pain    • Mild depression ([facility])     Saddness post Surgery 7/10/14 improved  after counseling.   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 Diffusely calcified LAD with 30-80% stenosis. OMB #3 with 60% to 70% stenosis. Preserved LV systolic function with EF of 55%.   • CARPAL TUNNEL RELEASE Right 10/15/2014    Right carpal tunnel release.   • CARPAL TUNNEL RELEASE     • CATARACT EXTRACTION, BILATERAL Bilateral    • CORONARY ARTERY BYPASS GRAFT  02/24/2014    CABG x 3 with LIMA to LAD, endarterectomy to LAD, SVG to OMB and SVG to distal RCA with endarterectomy to distal RCA. Endo-vein harvesting.   • CYSTECTOMY Left     Breast cycst   • CYSTOSCOPY, URETEROSCOPY, RETROGRADE PYELOGRAM, STENT INSERTION Left 3/10/2021    Procedure: , LEFT RETROGRADE PYELOGRAM, STENT INSERTION LEFT URETER;  Surgeon: Barre, Cooper NOLAN MD;  Location: [facility];  Service: Urology;  Laterality: Left;   • EXCISION LESION      Remove breast lesion - cyst   • EYE SURGERY      Insert lens prosthesis   • FOOT SURGERY  10/18/2011    Exostectomy of the right foot. Preulcerative lesion with Charot deformity, right foot   • LUMBAR SPINE SURGERY  11/09/2015    Lumbosacral radiofrequency thermal coagulation.   • NERVE BLOCK  09/14/2015    Lumbar medial branch block.   • TOE NAIL AVULSION  09/03/2015    DEBRIDE NAIL 6 OR MORE 74265 (1)      General Information     Row Name 01/16/22 1010          Physical Therapy Time and Intention    Document Type evaluation  -CZ     Mode of Treatment co-treatment; physical therapy; occupational therapy  -CZ     Row Name 01/16/22 1010          General Information    Patient Profile Reviewed yes  -CZ     Prior  "Level of Function independent:; w/c or scooter; min assist:; transfer; gait  -CZ     Existing Precautions/Restrictions fall  -     Row Name 01/16/22 1010          Living Environment    Lives With facility resident  -     Row Name 01/16/22 1010          Home Main Entrance    Number of Stairs, Main Entrance none  -     Row Name 01/16/22 1010          Stairs Within Home, Primary    Stairs, Within Home, Primary At SNF x 7 months ([place]). Ambulates with 4WW in therapy. Assist with bathing and dressing. Reports legs just \"woke up\" 3 days ago.  -     Number of Stairs, Within Home, Primary none  -     Row Name 01/16/22 1010          Cognition    Orientation Status (Cognition) verbal cues/prompts needed for orientation; oriented to; person; place  -     Row Name 01/16/22 1010          Safety Issues, Functional Mobility    Impairments Affecting Function (Mobility) strength; endurance/activity tolerance; balance; pain  -     Comment, Safety Issues/Impairments (Mobility) Needs cues for current month and year.  -           User Key  (r) = Recorded By, (t) = Taken By, (c) = Cosigned By    Initials Name Provider Type    CZ Oliver Patel, PT Physical Therapist               Mobility     Row Name 01/16/22 1010          Bed Mobility    Bed Mobility supine-sit; sit-supine; scooting/bridging  -     Scooting/Bridging [place] (Bed Mobility) dependent (less than 25% patient effort); 2 person assist  -     Supine-Sit [place] (Bed Mobility) minimum assist (75% patient effort); 2 person assist  -CZ     Sit-Supine [place] (Bed Mobility) minimum assist (75% patient effort)  -     Assistive Device (Bed Mobility) head of bed elevated; bed rails  -     Row Name 01/16/22 1010          Transfers    Comment (Transfers) Bed height raised.  -     Row Name 01/16/22 1010          Sit-Stand Transfer    Sit-Stand [place] (Transfers) maximum assist (25% patient effort)  -     Row Name 01/16/22 1010    "       Gait/Stairs (Locomotion)    [place] Level (Gait) maximum assist (25% patient effort)  -CZ     Assistive Device (Gait) walker, front-wheeled  -CZ     Distance in Feet (Gait) 0  -CZ     Comment (Gait/Stairs) Able to stand, flexed knees, severe forward head posture, unable to advance either LE, limited by neck pain and fatigue.  -CZ           User Key  (r) = Recorded By, (t) = Taken By, (c) = Cosigned By    Initials Name Provider Type    CZ Oliver Patel, PT Physical Therapist               Obj/Interventions     Row Name 01/16/22 1038 01/16/22 1010       Range of Motion Comprehensive    General Range of Motion --  -CZ bilateral lower extremity ROM WFL  -CZ    Row Name 01/16/22 1038 01/16/22 1010       Strength Comprehensive (MMT)    General Manual Muscle Testing (MMT) Assessment --  -CZ other (see comments)  -CZ    Comment, General Manual Muscle Testing (MMT) Assessment --  -CZ BLEs: 3/5 grossly,.  -CZ    Row Name 01/16/22 1038 01/16/22 1010       Sensory Assessment (Somatosensory)    Sensory Assessment (Somatosensory) --  -CZ LE sensation intact  -CZ          User Key  (r) = Recorded By, (t) = Taken By, (c) = Cosigned By    Initials Name Provider Type    CZ lOiver Patel, PT Physical Therapist               Goals/Plan     Row Name 01/16/22 1010          Bed Mobility Goal 1 (PT)    Activity/Assistive Device (Bed Mobility Goal 1, PT) sit to supine/supine to sit  -CZ     [place] Level/Cues Needed (Bed Mobility Goal 1, PT) supervision required  -CZ     Time Frame (Bed Mobility Goal 1, PT) by discharge  -CZ     Progress/Outcomes (Bed Mobility Goal 1, PT) goal not met  -CZ     Row Name 01/16/22 1010          Transfer Goal 1 (PT)    Activity/Assistive Device (Transfer Goal 1, PT) sit-to-stand/stand-to-sit; bed-to-chair/chair-to-bed  -CZ     Time Frame (Transfer Goal 1, PT) by discharge  -CZ     Strategies/Barriers (Transfers Goal 1, PT) Severe forward head and stooped posture.  -CZ     Progress/Outcome  "(Transfer Goal 1, PT) goal not met  -CZ     Row Name 01/16/22 1010          Gait Training Goal 1 (PT)    Activity/Assistive Device (Gait Training Goal 1, PT) gait (walking locomotion); walker, rolling  -CZ     [place] Level (Gait Training Goal 1, PT) minimum assist (75% or more patient effort)  -CZ     Distance (Gait Training Goal 1, PT) 10'x1.  -CZ     Time Frame (Gait Training Goal 1, PT) by discharge  -CZ     Strategies/Barriers (Gait Training Goal 1, PT) Severe forward head and stooped posture.  -CZ     Row Name 01/16/22 1010          ROM Goal 1 (PT)    ROM Goal 1 (PT) Patient will tolerate BLE seated and supine AROM x 15-20 reps each plane.  -CZ     Time Frame (ROM Goal 1, PT) by discharge  -CZ     Strategies/Barriers (ROM Goal 1, PT) Patient reports legs just \"woke up\" 3 days ago.  -CZ     Progress/Outcome (ROM Goal 1, PT) goal not met  -CZ           User Key  (r) = Recorded By, (t) = Taken By, (c) = Cosigned By    Initials Name Provider Type    CZ Oliver Patel, PT Physical Therapist               Clinical Impression     Row Name 01/16/22 1010          Pain    Additional Documentation Pain Scale: Numbers Pre/Post-Treatment (Group)  -CZ     Row Name 01/16/22 1010          Pain Scale: Numbers Pre/Post-Treatment    Pretreatment Pain Rating 7/10  -CZ     Posttreatment Pain Rating 6/10  -CZ     Pain Location - Orientation generalized  -CZ     Pre/Posttreatment Pain Comment Chronic arthitic pain.  -CZ     Pain Intervention(s) Medication (See MAR); Repositioned; Ambulation/increased activity; Distraction  -CZ     Row Name 01/16/22 1010          Plan of Care Review    Outcome Summary Initial PT evaluation complete, co-evaluation with OT.  Patient is alert, cooperative.  She requires min Ax1-2 with bed mobility, max Ax1 for transfers and gait.  Patient able to stand with FWW but with severe forward head posture, very stooped, unable to advance either LE. Recommend skilled PT continue upon return to SNF. Goals " established, continue skilled I/P PT.  -CZ     Row Name 01/16/22 1010          Therapy Assessment/Plan (PT)    Rehab Potential (PT) fair, will monitor progress closely  -CZ     Criteria for Skilled Interventions Met (PT) yes; skilled treatment is necessary  -CZ     Row Name 01/16/22 1010          Vital Signs    Pre Systolic BP Rehab 115  -CZ     Pre Treatment Diastolic BP 58  -CZ     Post Systolic BP Rehab 109  -CZ     Post Treatment Diastolic BP 59  -CZ     Pretreatment Heart Rate (beats/min) 118  -CZ     Posttreatment Heart Rate (beats/min) 121  -CZ     Pre SpO2 (%) 96  -CZ     O2 Delivery Pre Treatment room air  -CZ     Post SpO2 (%) 98  -CZ     Pre Patient Position Supine  -CZ     Post Patient Position Supine  -CZ     Row Name 01/16/22 1010          Positioning and Restraints    Pre-Treatment Position in bed  -CZ     Post Treatment Position bed  -CZ     In Bed supine; call light within reach; encouraged to call for assist; exit alarm on  -CZ           User Key  (r) = Recorded By, (t) = Taken By, (c) = Cosigned By    Initials Name Provider Type    Oliver Ratliff, PT Physical Therapist               Outcome Measures     Row Name 01/16/22 1010          How much help from another person do you currently need...    Turning from your back to your side while in flat bed without using bedrails? 3  -CZ     Moving from lying on back to sitting on the side of a flat bed without bedrails? 3  -CZ     Moving to and from a bed to a chair (including a wheelchair)? 2  -CZ     Standing up from a chair using your arms (e.g., wheelchair, bedside chair)? 2  -CZ     Climbing 3-5 steps with a railing? 2  -CZ     To walk in hospital room? 2  -CZ     AM-PAC 6 Clicks Score (PT) 14  -CZ     Row Name 01/16/22 1010          Functional Assessment    Outcome Measure Options AM-PAC 6 Clicks Basic Mobility (PT)  -CZ           User Key  (r) = Recorded By, (t) = Taken By, (c) = Cosigned By    Initials Name Provider Type    Oliver Ratliff  YUVAL, PT Physical Therapist                             Physical Therapy Education                 Title: PT OT SLP Therapies (In Progress)     Topic: Physical Therapy (In Progress)     Point: Mobility training (Done)     Learning Progress Summary           Patient Acceptance, E, VU,NR by  at 1/16/2022 1104    Comment: Hand placement with transfers, posture in stance, PT POC, goals.                   Point: Home exercise program (Not Started)     Learner Progress:  Not documented in this visit.          Point: Body mechanics (Not Started)     Learner Progress:  Not documented in this visit.          Point: Precautions (Not Started)     Learner Progress:  Not documented in this visit.                      User Key     Initials Effective Dates Name Provider Type Discipline     06/16/21 -  Oliver Patel, PT Physical Therapist PT              PT Recommendation and Plan  Planned Therapy Interventions (PT): balance training, bed mobility training, gait training, patient/family education, transfer training, strengthening, stretching, ROM (range of motion)  Outcome Summary: Initial PT evaluation complete, co-evaluation with OT.  Patient is alert, cooperative.  She requires min Ax1-2 with bed mobility, max Ax1 for transfers and gait.  Patient able to stand with FWW but with severe forward head posture, very stooped, unable to advance either LE. Recommend skilled PT continue upon return to SNF. Goals established, continue skilled I/P PT.     Time Calculation:    PT Charges     Row Name 01/16/22 1107             Time Calculation    Start Time 1010  -CZ      Stop Time 1051  -CZ      Time Calculation (min) 41 min  -CZ      PT Received On 01/16/22  -      PT Goal Re-Cert Due Date 01/29/22  -              Untimed Charges    PT Eval/Re-eval Minutes 41  -CZ              Total Minutes    Untimed Charges Total Minutes 41  -CZ       Total Minutes 41  -CZ            User Key  (r) = Recorded By, (t) = Taken By, (c) = Cosigned By     Initials Name Provider Type    CZ Oliver Patel, PT Physical Therapist              Therapy Charges for Today     Code Description Service Date Service Provider Modifiers Qty    [id number] HC PT EVAL MOD COMPLEXITY 3 1/16/2022 Oliver Patel, PT GP 1          PT G-Codes  Outcome Measure Options: AM-PAC 6 Clicks Basic Mobility (PT)  AM-PAC 6 Clicks Score (PT): 14    Oliver Patel, PT  1/16/2022

## 2022-01-16 NOTE — PLAN OF CARE
Goal Outcome Evaluation:  Plan of Care Reviewed With: patient        Progress: improving  Outcome Summary: Pt has rested through the night. Vitals stable. Receiving antibiotic treatment for UTI. Continues to have some intermittent confusion. Will continue to monitor

## 2022-01-17 NOTE — THERAPY TREATMENT NOTE
Acute Care - Physical Therapy Treatment Note  [facility]     Patient Name: Janelle Millard  : 1955  MRN: [id number]  Today's Date: 2022      Visit Dx:     ICD-10-CM ICD-9-CM   1. Urinary tract infection without hematuria, site unspecified  N39.0 599.0   2. Sepsis, due to unspecified organism, unspecified whether acute organ dysfunction present ([facility])  A41.9 038.9     995.91   3. Altered mental status, unspecified altered mental status type  R41.82 780.97   4. Hypotension, unspecified hypotension type  I95.9 458.9   5. Impaired functional mobility, balance, gait, and endurance  Z74.09 V49.89   6. Impaired mobility and activities of daily living  Z74.09 V49.89    Z78.9      Patient Active Problem List   Diagnosis   • Carpal tunnel syndrome of left wrist   • Type 1 diabetes mellitus with diabetic polyneuropathy ([facility])   • Mixed diabetic hyperlipidemia associated with type 1 diabetes mellitus ([facility])   • Hypertension associated with diabetes ([facility])   • Syncope and collapse   • CAD (coronary artery disease)   • Asthma   • Depressive disorder, not elsewhere classified   • Neuropathy   • Other specified rheumatoid arthritis, multiple sites ([facility])   • Carpal tunnel syndrome on both sides   • Bilateral carotid artery stenosis   • Overweight (BMI 25.0-29.9)   • Precordial pain   • Sepsis with encephalopathy without septic shock ([facility])   • Tobacco abuse   • UTI (urinary tract infection)   • Ureteral stone with hydronephrosis   • Chest pain, rule out acute myocardial infarction   • Chest pain   • Chest pain with high risk for cardiac etiology   • Sepsis ([facility])     Past Medical History:   Diagnosis Date   • Arthritis, rheumatoid ([facility])    • Arthropathy associated with neurological disorder    • Arthropathy of lumbar facet joint    • Asthma    • Benign hypertension    • Carpal tunnel syndrome    • Diabetes ([facility])    • Diabetic polyneuropathy ([facility])    • Dyslipidemia    • Fallen arches    • Hammer toe    • Heart disease     • Joint pain    • Mild depression (HCC)     Saddness post Surgery 7/10/14 improved after counseling.   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 Diffusely calcified LAD with 30-80% stenosis. OMB #3 with 60% to 70% stenosis. Preserved LV systolic function with EF of 55%.   • CARPAL TUNNEL RELEASE Right 10/15/2014    Right carpal tunnel release.   • CARPAL TUNNEL RELEASE     • CATARACT EXTRACTION, BILATERAL Bilateral    • CORONARY ARTERY BYPASS GRAFT  02/24/2014    CABG x 3 with LIMA to LAD, endarterectomy to LAD, SVG to OMB and SVG to distal RCA with endarterectomy to distal RCA. Endo-vein harvesting.   • CYSTECTOMY Left     Breast cycst   • CYSTOSCOPY, URETEROSCOPY, RETROGRADE PYELOGRAM, STENT INSERTION Left 3/10/2021    Procedure: , LEFT RETROGRADE PYELOGRAM, STENT INSERTION LEFT URETER;  Surgeon: Haugan, Cooper NOLAN MD;  Location: [facility];  Service: Urology;  Laterality: Left;   • EXCISION LESION      Remove breast lesion - cyst   • EYE SURGERY      Insert lens prosthesis   • FOOT SURGERY  10/18/2011    Exostectomy of the right foot. Preulcerative lesion with Charot deformity, right foot   • LUMBAR SPINE SURGERY  11/09/2015    Lumbosacral radiofrequency thermal coagulation.   • NERVE BLOCK  09/14/2015    Lumbar medial branch block.   • TOE NAIL AVULSION  09/03/2015    DEBRIDE NAIL 6 OR MORE 59690 (1)     PT Assessment (last 12 hours)     PT Evaluation and Treatment     Row Name 01/17/22 1025          Physical Therapy Time and Intention    Document Type therapy note (daily note)  -DARRYL     Mode of Treatment physical therapy;  individual therapy  -     Patient Effort good  -[facility] Name 01/17/22 1025          General Information    Patient Profile Reviewed yes  -     Existing Precautions/Restrictions fall  -[facility] Name 01/17/22 1025          Cognition    Orientation Status (Cognition) verbal cues/prompts needed for orientation; oriented to; person; place  -[facility] Name 01/17/22 1025          Pain Scale: Numbers Pre/Post-Treatment    Pretreatment Pain Rating 4/10  -     Posttreatment Pain Rating 5/10  -     Pain Location - Orientation generalized  especially R foot,  -[facility] Name 01/17/22 1025          Pain Scale: Word Pre/Post-Treatment    Pain Intervention(s) Repositioned  -[facility] Name 01/17/22 1025          Range of Motion Comprehensive    General Range of Motion bilateral lower extremity ROM WFL  -[facility] Name 01/17/22 1025          Strength Comprehensive (MMT)    General Manual Muscle Testing (MMT) Assessment other (see comments)  -[facility] Name 01/17/22 1025          Bed Mobility    Bed Mobility supine-sit; sit-supine; scooting/bridging; rolling left; rolling right  -     Rolling Left [place] (Bed Mobility) moderate assist (50% patient effort)  -     Rolling Right [place] (Bed Mobility) moderate assist (50% patient effort)  -     Scooting/Bridging [place] (Bed Mobility) dependent (less than 25% patient effort); 2 person assist  -     Supine-Sit [place] (Bed Mobility) minimum assist (75% patient effort)  -     Sit-Supine [place] (Bed Mobility) maximum assist (25% patient effort)  -     Assistive Device (Bed Mobility) head of bed elevated; bed rails  -     Comment (Bed Mobility) SBA for sitting balance.  -DARRYL     Row Name 01/17/22 1025          Transfers    Transfers sit-stand transfer; stand-sit transfer  -     Sit-Stand [place] (Transfers) moderate assist (50% patient effort); maximum assist (25% patient effort)  -     Stand-Sit [place] (Transfers)  moderate assist (50% patient effort); maximum assist (25% patient effort)  -DARRYL     Row Name 01/17/22 1025          Sit-Stand Transfer    Assistive Device (Sit-Stand Transfers) walker, front-wheeled  -DARRYL     Row Name 01/17/22 1025          Stand-Sit Transfer    Assistive Device (Stand-Sit Transfers) walker, front-wheeled  -DARRYL     Row Name 01/17/22 1025          Gait/Stairs (Locomotion)    Gait/Stairs Locomotion gait/ambulation independence; gait/ambulation assistive device; distance ambulated; gait pattern; gait deviations  -     [place] Level (Gait) moderate assist (50% patient effort); maximum assist (25% patient effort)  -     Assistive Device (Gait) walker, front-wheeled  -     Distance in Feet (Gait) 3 small steps  -     Deviations/Abnormal Patterns (Gait) base of support, narrow; gait speed decreased; stride length decreased  -     Bilateral Gait Deviations forward flexed posture  -     Row Name 01/17/22 1025          Safety Issues, Functional Mobility    Impairments Affecting Function (Mobility) strength; endurance/activity tolerance; balance; pain  -     Row Name             Wound 01/15/22 2130 Bilateral coccyx Pressure Injury    Wound - Properties Group Placement Date: 01/15/22  -JT Placement Time: 2130  -JT Present on Hospital Admission: Y  -JT Side: Bilateral  -JT Location: coccyx  -JT Primary Wound Type: Pressure inj  -JT     Retired Wound - Properties Group Date first assessed: 01/15/22  -JT Time first assessed: 2130  -JT Present on Hospital Admission: Y  -JT Side: Bilateral  -JT Location: coccyx  -JT Primary Wound Type: Pressure inj  -JT     Row Name 01/17/22 1025          Vital Signs    Pre Systolic BP Rehab 122  -DARRYL     Pre Treatment Diastolic BP 56  -DARRYL     Intra Systolic BP Rehab 146  -DARRYL     Intra Treatment Diastolic BP 76  -DARRYL     Post Systolic BP Rehab 136  -DARRYL     Post Treatment Diastolic BP 63  -DARRYL     Pretreatment Heart Rate (beats/min) 121  -DARRYL     Intratreatment Heart Rate  "(beats/min) 121  -DARRYL     Posttreatment Heart Rate (beats/min) 117  -DARRYL     Pre SpO2 (%) 94  -DARRYL     O2 Delivery Pre Treatment room air  -DARRYL     Intra SpO2 (%) 99  -DARRYL     O2 Delivery Intra Treatment room air  -DARRYL     Post SpO2 (%) 98  -DARRYL     O2 Delivery Post Treatment room air  -DARRYL     Pre Patient Position Supine  -DARRYL     Post Patient Position Supine  -DARRYL     Row Name 01/17/22 1025          Bed Mobility Goal 1 (PT)    Activity/Assistive Device (Bed Mobility Goal 1, PT) sit to supine/supine to sit  -DARRYL     [place] Level/Cues Needed (Bed Mobility Goal 1, PT) supervision required  -DARRYL     Time Frame (Bed Mobility Goal 1, PT) by discharge  -DARRYL     Progress/Outcomes (Bed Mobility Goal 1, PT) goal not met  -     Row Name 01/17/22 1025          Transfer Goal 1 (PT)    Activity/Assistive Device (Transfer Goal 1, PT) sit-to-stand/stand-to-sit; bed-to-chair/chair-to-bed  -DARRYL     Time Frame (Transfer Goal 1, PT) by discharge  -DARRYL     Strategies/Barriers (Transfers Goal 1, PT) Severe forward head and stooped posture.  -DARRYL     Progress/Outcome (Transfer Goal 1, PT) goal not met  -     Row Name 01/17/22 1025          Gait Training Goal 1 (PT)    Activity/Assistive Device (Gait Training Goal 1, PT) gait (walking locomotion); walker, rolling  -DARRYL     [place] Level (Gait Training Goal 1, PT) minimum assist (75% or more patient effort)  -DARRYL     Distance (Gait Training Goal 1, PT) 10'x1.  -DARRYL     Time Frame (Gait Training Goal 1, PT) by discharge  -DARRYL     Strategies/Barriers (Gait Training Goal 1, PT) Severe forward head and stooped posture.  -DARRYL     Row Name 01/17/22 1025          ROM Goal 1 (PT)    ROM Goal 1 (PT) Patient will tolerate BLE seated and supine AROM x 15-20 reps each plane.  -DARRYL     Time Frame (ROM Goal 1, PT) by discharge  -DARRYL     Strategies/Barriers (ROM Goal 1, PT) Patient reports legs just \"woke up\" 3 days ago.  -DARRYL     Progress/Outcome (ROM Goal 1, PT) goal not met  -DARRYL     Row Name 01/17/22 1025    "       Positioning and Restraints    Pre-Treatment Position in bed  -     Post Treatment Position bed  -DARRYL     In Bed fowlers; call light within reach; encouraged to call for assist; exit alarm on  turned. R. all needs met  -     Row Name 01/17/22 1025          Therapy Assessment/Plan (PT)    Rehab Potential (PT) fair, will monitor progress closely  -     Criteria for Skilled Interventions Met (PT) yes; skilled treatment is necessary  -           User Key  (r) = Recorded By, (t) = Taken By, (c) = Cosigned By    Initials Name Provider Type    DARRYL Keven Bain PTA Physical Therapy Assistant    Stephanie Tolentino RN Registered Nurse                Physical Therapy Education                 Title: PT OT SLP Therapies (In Progress)     Topic: Physical Therapy (In Progress)     Point: Mobility training (In Progress)     Learning Progress Summary           Patient Acceptance, E, NR by DARRYL at 1/17/2022 1304    Acceptance, E, VU,NR by  at 1/16/2022 1104    Comment: Hand placement with transfers, posture in stance, PT POC, goals.                   Point: Home exercise program (Not Started)     Learner Progress:  Not documented in this visit.          Point: Body mechanics (Not Started)     Learner Progress:  Not documented in this visit.          Point: Precautions (Not Started)     Learner Progress:  Not documented in this visit.                      User Key     Initials Effective Dates Name Provider Type Discipline     06/16/21 -  Keven Bain PTA Physical Therapy Assistant PT     06/16/21 -  Oliver Patel, PT Physical Therapist PT              PT Recommendation and Plan  Therapy Frequency (PT): 5 times/wk  Plan of Care Reviewed With: patient  Progress: improving  Outcome Summary: pt responded well to PT. pt requires mod A to roll R/L. pt requires Dep A xx2 to scoot. pt completed sup-sit w/ min A and sit-sup max A. pt stood w/ mod-max A and able to take 3 small steps mod-max A w/ RW. HR elevated  to 120s throughout tx, otherwise VSS. no new goals met at this time. pt would continue to benefit from PT services.   Outcome Measures     Row Name 01/17/22 1025 01/16/22 1013          How much help from another person do you currently need...    Turning from your back to your side while in flat bed without using bedrails? 2  -DARRYL --     Moving from lying on back to sitting on the side of a flat bed without bedrails? 3  -DARRYL --     Moving to and from a bed to a chair (including a wheelchair)? 2  -DARRYL --     Standing up from a chair using your arms (e.g., wheelchair, bedside chair)? 2  -DARRYL --     Climbing 3-5 steps with a railing? 2  -DARRYL --     To walk in hospital room? 2  -DARRYL --     AM-PAC 6 Clicks Score (PT) 13  -DARRYL --            How much help from another is currently needed...    Putting on and taking off regular lower body clothing? -- 2  -RB     Bathing (including washing, rinsing, and drying) -- 2  -RB     Toileting (which includes using toilet bed pan or urinal) -- 2  -RB     Putting on and taking off regular upper body clothing -- 2  -RB     Taking care of personal grooming (such as brushing teeth) -- 2  -RB     Eating meals -- 3  -RB     AM-PAC 6 Clicks Score (OT) -- 13  -RB            Functional Assessment    Outcome Measure Options AM-PAC 6 Clicks Basic Mobility (PT)  -DARRYL AM-PAC 6 Clicks Daily Activity (OT)  -RB           User Key  (r) = Recorded By, (t) = Taken By, (c) = Cosigned By    Initials Name Provider Type    RB Baldomero Kwon, OT Occupational Therapist    Keven Randall PTA Physical Therapy Assistant                 Time Calculation:    PT Charges     Row Name 01/17/22 1306             Time Calculation    Start Time 1025  -      Stop Time 1053  -      Time Calculation (min) 28 min  -              Time Calculation- PT    Total Timed Code Minutes- PT 28 minute(s)  -              Timed Charges    75059 - PT Therapeutic Activity Minutes 28  -DARRYL              Total Minutes    Timed Charges  Total Minutes 28  -DARRYL       Total Minutes 28  -DARRYL            User Key  (r) = Recorded By, (t) = Taken By, (c) = Cosigned By    Initials Name Provider Type    Keven Randall PTA Physical Therapy Assistant              Therapy Charges for Today     Code Description Service Date Service Provider Modifiers Qty    [id number]  PT THERAPEUTIC ACT EA 15 MIN 1/17/2022 Keven Bain PTA GP 2          PT G-Codes  Outcome Measure Options: AM-PAC 6 Clicks Basic Mobility (PT)  AM-PAC 6 Clicks Score (PT): 13  AM-PAC 6 Clicks Score (OT): 13    Keven Bain PTA  1/17/2022

## 2022-01-17 NOTE — NURSING NOTE
Patient has stage 3 pressure wound to midline coccyx, measuring 5 x 1 x 0.1cm. Right buttock stage 3 measures 2 x 1.5 x 0.1cm.  Left buttock stage 3 measures 2 x 2.5 x 0.1cm.  Wound beds are a combination of yellow slough and granulation. Patient needs wound care, offloading and protection.  All wounds were POA.

## 2022-01-17 NOTE — PLAN OF CARE
Goal Outcome Evaluation:  Plan of Care Reviewed With: patient     Problem: Adult Inpatient Plan of Care  Goal: Plan of Care Review  Outcome: Ongoing, Progressing  Flowsheets (Taken 1/17/2022 1304)  Progress: improving  Plan of Care Reviewed With: patient  Outcome Summary: pt responded well to PT. pt requires mod A to roll R/L. pt requires Dep A x2 to scoot. pt completed sup-sit w/ min A and sit-sup max A. pt stood w/ mod-max A and able to take 3 small steps mod-max A w/ RW. HR elevated to 120s throughout tx, otherwise VSS. no new goals met at this time. pt would continue to benefit from PT services.

## 2022-01-17 NOTE — THERAPY TREATMENT NOTE
Patient Name: Janelle Millard  : 1955    MRN: [id number]                              Today's Date: 2022       Admit Date: 1/15/2022    Visit Dx:     ICD-10-CM ICD-9-CM   1. Urinary tract infection without hematuria, site unspecified  N39.0 599.0   2. Sepsis, due to unspecified organism, unspecified whether acute organ dysfunction present ([facility])  A41.9 038.9     995.91   3. Altered mental status, unspecified altered mental status type  R41.82 780.97   4. Hypotension, unspecified hypotension type  I95.9 458.9   5. Impaired functional mobility, balance, gait, and endurance  Z74.09 V49.89   6. Impaired mobility and activities of daily living  Z74.09 V49.89    Z78.9      Patient Active Problem List   Diagnosis   • Carpal tunnel syndrome of left wrist   • Type 1 diabetes mellitus with diabetic polyneuropathy ([facility])   • Mixed diabetic hyperlipidemia associated with type 1 diabetes mellitus ([facility])   • Hypertension associated with diabetes ([facility])   • Syncope and collapse   • CAD (coronary artery disease)   • Asthma   • Depressive disorder, not elsewhere classified   • Neuropathy   • Other specified rheumatoid arthritis, multiple sites ([facility])   • Carpal tunnel syndrome on both sides   • Bilateral carotid artery stenosis   • Overweight (BMI 25.0-29.9)   • Precordial pain   • Sepsis with encephalopathy without septic shock ([facility])   • Tobacco abuse   • UTI (urinary tract infection)   • Ureteral stone with hydronephrosis   • Chest pain, rule out acute myocardial infarction   • Chest pain   • Chest pain with high risk for cardiac etiology   • Sepsis ([facility])     Past Medical History:   Diagnosis Date   • Arthritis, rheumatoid ([facility])    • Arthropathy associated with neurological disorder    • Arthropathy of lumbar facet joint    • Asthma    • Benign hypertension    • Carpal tunnel syndrome    • Diabetes ([facility])    • Diabetic polyneuropathy ([facility])    • Dyslipidemia    • Fallen arches    • Hammer toe    • Heart disease    • Joint  pain    • Mild depression (HCC)     Saddness post Surgery 7/10/14 improved after counseling.   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 Diffusely calcified LAD with 30-80% stenosis. OMB #3 with 60% to 70% stenosis. Preserved LV systolic function with EF of 55%.   • CARPAL TUNNEL RELEASE Right 10/15/2014    Right carpal tunnel release.   • CARPAL TUNNEL RELEASE     • CATARACT EXTRACTION, BILATERAL Bilateral    • CORONARY ARTERY BYPASS GRAFT  02/24/2014    CABG x 3 with LIMA to LAD, endarterectomy to LAD, SVG to OMB and SVG to distal RCA with endarterectomy to distal RCA. Endo-vein harvesting.   • CYSTECTOMY Left     Breast cycst   • CYSTOSCOPY, URETEROSCOPY, RETROGRADE PYELOGRAM, STENT INSERTION Left 3/10/2021    Procedure: , LEFT RETROGRADE PYELOGRAM, STENT INSERTION LEFT URETER;  Surgeon: Maxwell, Cooper NOLAN MD;  Location: [facility];  Service: Urology;  Laterality: Left;   • EXCISION LESION      Remove breast lesion - cyst   • EYE SURGERY      Insert lens prosthesis   • FOOT SURGERY  10/18/2011    Exostectomy of the right foot. Preulcerative lesion with Charot deformity, right foot   • LUMBAR SPINE SURGERY  11/09/2015    Lumbosacral radiofrequency thermal coagulation.   • NERVE BLOCK  09/14/2015    Lumbar medial branch block.   • TOE NAIL AVULSION  09/03/2015    DEBRIDE NAIL 6 OR MORE 68652 (1)      General Information     Row Name 01/17/22 1505          OT Time and Intention    Document Type therapy note (daily note)  -CS     Mode of Treatment occupational therapy  -CS     Row Name 01/17/22 1505          General Information     Patient Profile Reviewed yes  -     Existing Precautions/Restrictions fall  -     Row Name 01/17/22 1505          Cognition    Orientation Status (Cognition) oriented to; person  -     Row Name 01/17/22 1505          Safety Issues, Functional Mobility    Impairments Affecting Function (Mobility) strength; endurance/activity tolerance; balance; pain  -CS           User Key  (r) = Recorded By, (t) = Taken By, (c) = Cosigned By    Initials Name Provider Type     Kacey Monroe COTA Occupational Therapy Assistant                 Mobility/ADL's    No documentation.                Obj/Interventions     Row Name 01/17/22 1505          Shoulder (Therapeutic Exercise)    Shoulder (Therapeutic Exercise) AROM (active range of motion)  -     Shoulder AROM (Therapeutic Exercise) flexion; extension; external rotation; internal rotation  -     Row Name 01/17/22 1505          Elbow/Forearm (Therapeutic Exercise)    Elbow/Forearm (Therapeutic Exercise) AROM (active range of motion)  -     Elbow/Forearm AROM (Therapeutic Exercise) bilateral; flexion; extension; supination; pronation  -     Row Name 01/17/22 1505          Wrist (Therapeutic Exercise)    Wrist (Therapeutic Exercise) AROM (active range of motion)  -     Wrist AROM (Therapeutic Exercise) bilateral; flexion; extension  -     Row Name 01/17/22 1505          Hand (Therapeutic Exercise)    Hand (Therapeutic Exercise) AROM (active range of motion)  -     Hand AROM/AAROM (Therapeutic Exercise) bilateral; finger flexion; finger extension  -     Row Name 01/17/22 1505          Therapeutic Exercise    Therapeutic Exercise shoulder; elbow/forearm; wrist; hand  -           User Key  (r) = Recorded By, (t) = Taken By, (c) = Cosigned By    Initials Name Provider Type    Kacey Rehman COTA Occupational Therapy Assistant               Goals/Plan     Row Name 01/17/22 1503          Bed Mobility Goal 1 (OT)    Activity/Assistive Device (Bed Mobility  Goal 1, OT) bed mobility activities, all  -CS     Cavalier Level/Cues Needed (Bed Mobility Goal 1, OT) contact guard assist  -CS     Time Frame (Bed Mobility Goal 1, OT) long term goal (LTG)  -CS     Progress/Outcomes (Bed Mobility Goal 1, OT) goal not met  -CS     Row Name 01/17/22 1505          Transfer Goal 1 (OT)    Activity/Assistive Device (Transfer Goal 1, OT) sit-to-stand/stand-to-sit; toilet  -CS     Cavalier Level/Cues Needed (Transfer Goal 1, OT) minimum assist (75% or more patient effort)  -CS     Time Frame (Transfer Goal 1, OT) long term goal (LTG)  -CS     Progress/Outcome (Transfer Goal 1, OT) goal not met  -CS     Row Name 01/17/22 1505          Bathing Goal 1 (OT)    Activity/Device (Bathing Goal 1, OT) upper body bathing  -CS     Cavalier Level/Cues Needed (Bathing Goal 1, OT) contact guard assist  -CS     Time Frame (Bathing Goal 1, OT) long term goal (LTG)  -CS     Progress/Outcomes (Bathing Goal 1, OT) goal not met  -CS     Row Name 01/17/22 1505          Dressing Goal 1 (OT)    Activity/Device (Dressing Goal 1, OT) upper body dressing  -CS     Cavalier/Cues Needed (Dressing Goal 1, OT) contact guard assist  -CS     Time Frame (Dressing Goal 1, OT) long term goal (LTG)  -CS     Progress/Outcome (Dressing Goal 1, OT) goal not met  -CS           User Key  (r) = Recorded By, (t) = Taken By, (c) = Cosigned By    Initials Name Provider Type    CS Kacey Monroe COTA Occupational Therapy Assistant               Clinical Impression     Row Name 01/17/22 1501          Pain Scale: Numbers Pre/Post-Treatment    Pretreatment Pain Rating 4/10  -CS     Posttreatment Pain Rating 4/10  -CS     Pain Location - Orientation generalized  -CS     Row Name 01/17/22 1505          Plan of Care Review    Plan of Care Reviewed With patient  -CS     Progress no change  -CS     Outcome Summary Pt tolerated tx fair this date. Pt gave fair effort with UE ther ex. Limited ROM due to arthritis. Continue  OT POC.  -CS     Row Name 01/17/22 1505          Therapy Assessment/Plan (OT)    Rehab Potential (OT) fair, will monitor progress closely  -CS     Criteria for Skilled Therapeutic Interventions Met (OT) yes; meets criteria  -CS     Row Name 01/17/22 1505          Therapy Plan Review/Discharge Plan (OT)    Anticipated Discharge Disposition (OT) skilled nursing facility  -     Row Name 01/17/22 1505          Vital Signs    Pre Patient Position Supine  -CS     Post Patient Position Supine  -CS     Row Name 01/17/22 1505          Positioning and Restraints    Pre-Treatment Position in bed  -CS     Post Treatment Position bed  -CS     In Bed supine; call light within reach; encouraged to call for assist; exit alarm on  -CS           User Key  (r) = Recorded By, (t) = Taken By, (c) = Cosigned By    Initials Name Provider Type    Kacey Rehman COTA Occupational Therapy Assistant               Outcome Measures     Row Name 01/17/22 1025          How much help from another person do you currently need...    Turning from your back to your side while in flat bed without using bedrails? 2  -DARRYL     Moving from lying on back to sitting on the side of a flat bed without bedrails? 3  -DARRYL     Moving to and from a bed to a chair (including a wheelchair)? 2  -DARRYL     Standing up from a chair using your arms (e.g., wheelchair, bedside chair)? 2  -DARRYL     Climbing 3-5 steps with a railing? 2  -DARRYL     To walk in hospital room? 2  -DARRYL     AM-PAC 6 Clicks Score (PT) 13  -DARRYL     Row Name 01/17/22 1025          Functional Assessment    Outcome Measure Options AM-PAC 6 Clicks Basic Mobility (PT)  -DARRYL           User Key  (r) = Recorded By, (t) = Taken By, (c) = Cosigned By    Initials Name Provider Type    Keven Randall PTA Physical Therapy Assistant                Occupational Therapy Education                 Title: PT OT SLP Therapies (In Progress)     Topic: Occupational Therapy (In Progress)     Point: ADL training (Not  Started)     Description:   Instruct learner(s) on proper safety adaptation and remediation techniques during self care or transfers.   Instruct in proper use of assistive devices.              Learner Progress:  Not documented in this visit.          Point: Home exercise program (Not Started)     Description:   Instruct learner(s) on appropriate technique for monitoring, assisting and/or progressing therapeutic exercises/activities.              Learner Progress:  Not documented in this visit.          Point: Precautions (Done)     Description:   Instruct learner(s) on prescribed precautions during self-care and functional transfers.              Learning Progress Summary           Patient Acceptance, E, VU by RB at 1/16/2022 1159    Comment: Edu pt on use of gait belt and non skid socks when OOB and no OOB without assist.                   Point: Body mechanics (Not Started)     Description:   Instruct learner(s) on proper positioning and spine alignment during self-care, functional mobility activities and/or exercises.              Learner Progress:  Not documented in this visit.                      User Key     Initials Effective Dates Name Provider Type Discipline     06/16/21 -  Baldomero Kwon, OT Occupational Therapist OT              OT Recommendation and Plan     Plan of Care Review  Plan of Care Reviewed With: patient  Progress: no change  Outcome Summary: Pt tolerated tx fair this date. Pt gave fair effort with UE ther ex. Limited ROM due to arthritis. Continue OT POC.     Time Calculation:    Time Calculation- OT     Row Name 01/17/22 1551             Time Calculation- OT    OT Start Time 1505  -CS      OT Stop Time 1529  -CS      OT Time Calculation (min) 24 min  -CS      Total Timed Code Minutes- OT 24 minute(s)  -CS      OT Received On 01/17/22  -CS              Timed Charges    70718 - OT Therapeutic Exercise Minutes 24  -CS              Total Minutes    Timed Charges Total Minutes 24  -CS        Total Minutes 24  -CS            User Key  (r) = Recorded By, (t) = Taken By, (c) = Cosigned By    Initials Name Provider Type    CS Kacey Monroe COTA Occupational Therapy Assistant              Therapy Charges for Today     Code Description Service Date Service Provider Modifiers Qty    [id number]  OT THER PROC EA 15 MIN 1/17/2022 Kacey Monroe COTA GO 2               JACQUIE Boudreaux  1/17/2022

## 2022-01-17 NOTE — PLAN OF CARE
Goal Outcome Evaluation:  Plan of Care Reviewed With: patient        Progress: no change  Outcome Summary: pt bp lower earlier in the shift; resolved; resting comfortably at this time; no s/s of distress; no new complaints; spoke with Dr. Macias in regards to pt new afib since Dec 2021; MD stated she will put in cardiology consult; HR stable at this time; will continue to monitor

## 2022-01-17 NOTE — DISCHARGE PLACEMENT REQUEST
"Kiley Millard (66 y.o. Female)             Date of Birth Social Security Number Address Home Phone MRN    [id number]  [id number]  [facility] [address] [phone] [id number]    Tenriism Marital Status             [place]        Admission Date Admission Type Admitting Provider Attending Provider Department, Room/Bed    1/15/22 Emergency Genet Macias MD Gerlach, Elizabeth T, MD [address], [address]    Discharge Date Discharge Disposition Discharge Destination                         Attending Provider: Genet Macias MD    Allergies: Contrast Dye, Corticosteroids, Codeine, Aspirin, Ibuprofen    Isolation: None   Infection: None   Code Status: No CPR   Advance Care Planning Activity    Ht: 175.3 cm (69\")   Wt: 89 kg (196 lb 4.8 oz)    Admission Cmt: None   Principal Problem: UTI (urinary tract infection) [N39.0]                 Active Insurance as of 1/15/2022     Primary Coverage     Payor Plan Insurance Group Employer/Plan Group    MEDICARE MEDICARE A & B      Payor Plan Address Payor Plan Phone Number Payor Plan Fax Number Effective Dates    [address] [phone]  11/1/2012 - None Entered    [facility] [id number]       Subscriber Name Subscriber Birth Date Member ID       KILEY MILLARD 1955 [id number]           Secondary Coverage     Payor Plan Insurance Group Employer/Plan Group    AETNA COMMERCIAL AETNA  [employer] SUPP [id number]     Payor Plan Address Payor Plan Phone Number Payor Plan Fax Number Effective Dates    [address] [phone]  10/1/2018 - None Entered    [facility] [address]       Subscriber Name Subscriber Birth Date Member ID       KILEY MILLARD 1955 [id number]                 Emergency Contacts      (Rel.) Home Phone Work Phone Mobile Phone    HOANG GONZALES (POA/NIECE) (Relative) [phone] -- [phone]    LionelAlexey pena (Brother) [phone] -- --            Emergency Contact " Information     Name Relation Home Work Mobile    HOANG GONZALES (POA/NIECE) Relative [phone]    Alexey Flowers [phone]            Insurance Information                MEDICARE/MEDICARE A & B Phone: [phone]    Subscriber: Janelle Millard Subscriber#: [id number]    Group#: -- Precert#: --        HA FRIAS/HA  [facility] Phone: [phone]    Subscriber: Janelle Millard Subscriber#: [id number]    Group#: [id number] Precert#: --

## 2022-01-17 NOTE — PROGRESS NOTES
"    [facility] Medicine Services  INPATIENT PROGRESS NOTE    Length of Stay: 0  Date of Admission: 1/15/2022  Primary Care Physician: Yosef Casey MD    Subjective   Chief Complaint: \"nauseated\"  HPI:  66 year old female with past medical history of RA, HTN, DM, HLD, CAD who presented from [facility] with confusion.  She is currently admitted related to acute cystitis.  During today's visit, she reports feeling weak and notes she is nauseated.    Review of Systems   Constitutional: Positive for fatigue. Negative for chills and fever.   HENT: Negative for congestion, rhinorrhea and sore throat.    Respiratory: Negative for cough, chest tightness, shortness of breath and stridor.    Cardiovascular: Negative for chest pain, palpitations and leg swelling.   Gastrointestinal: Positive for nausea. Negative for abdominal pain, diarrhea and vomiting.   Musculoskeletal: Negative for back pain and neck pain.   Skin: Negative for pallor.   Neurological: Positive for weakness. Negative for dizziness and headaches.   Psychiatric/Behavioral: Negative for confusion. The patient is not nervous/anxious.         All pertinent negatives and positives are as above. All other systems have been reviewed and are negative unless otherwise stated.     Objective    Temp:  [96.9 °F (36.1 °C)-97.6 °F (36.4 °C)] 97.6 °F (36.4 °C)  Heart Rate:  [107-117] 117  Resp:  [18-20] 18  BP: ()/(54-87) 108/55    Physical Exam  Vitals and nursing note reviewed.   Constitutional:       General: She is not in acute distress.     Appearance: Normal appearance. She is not ill-appearing.   HENT:      Head: Normocephalic and atraumatic.      Right Ear: External ear normal.      Left Ear: External ear normal.      Nose: Nose normal.      Mouth/Throat:      Mouth: Mucous membranes are moist.      Pharynx: Oropharynx is clear.   Eyes:      General: No scleral icterus.        Right eye: No " discharge.         Left eye: No discharge.      Conjunctiva/sclera: Conjunctivae normal.   Cardiovascular:      Rate and Rhythm: Normal rate and regular rhythm.      Pulses: Normal pulses.      Heart sounds: Normal heart sounds. No murmur heard.  No friction rub. No gallop.    Pulmonary:      Effort: Pulmonary effort is normal. No respiratory distress.      Breath sounds: Normal breath sounds. No stridor. No wheezing, rhonchi or rales.   Abdominal:      General: Bowel sounds are normal. There is no distension.      Palpations: Abdomen is soft.      Tenderness: There is no abdominal tenderness.   Musculoskeletal:         General: No swelling. Normal range of motion.      Cervical back: Normal range of motion and neck supple.   Skin:     General: Skin is warm and dry.   Neurological:      General: No focal deficit present.      Mental Status: She is alert and oriented to person, place, and time.   Psychiatric:         Mood and Affect: Mood normal.         Behavior: Behavior normal.             Results Review:  I have reviewed the labs, radiology results, and diagnostic studies.    Laboratory Data:   Results from last 7 days   Lab Units 01/17/22  0710 01/16/22  0618 01/15/22  1647   SODIUM mmol/L 138 136 140   POTASSIUM mmol/L 4.1 4.3 4.3   CHLORIDE mmol/L 103 97* 100   CO2 mmol/L 24.0 24.0 32.0*   BUN mg/dL 24* 24* 29*   CREATININE mg/dL 0.76 0.88 0.99   GLUCOSE mg/dL 99 431* 180*   CALCIUM mg/dL 9.8 10.0 9.9   BILIRUBIN mg/dL  --   --  0.2   ALK PHOS U/L  --   --  120*   ALT (SGPT) U/L  --   --  11   AST (SGOT) U/L  --   --  25   ANION GAP mmol/L 11.0 15.0 8.0     Estimated Creatinine Clearance: 80 mL/min (by C-G formula based on SCr of 0.76 mg/dL).          Results from last 7 days   Lab Units 01/17/22  0711 01/16/22  0618 01/15/22  1647   WBC 10*3/mm3 8.04 7.48 5.82   HEMOGLOBIN g/dL 10.7* 10.2* 9.9*   HEMATOCRIT % 32.6* 31.0* 30.2*   PLATELETS 10*3/mm3 292 271 243           Culture Data:   Blood Culture   Date  Value Ref Range Status   01/15/2022 No growth at 24 hours  Preliminary   01/15/2022 No growth at 24 hours  Preliminary     Urine Culture   Date Value Ref Range Status   01/15/2022 >100,000 CFU/mL Mixed Reagan Isolated  Final     No results found for: RESPCX  No results found for: WOUNDCX  No results found for: STOOLCX  No components found for: BODYFLD    Radiology Data:   Imaging Results (Last 24 Hours)     ** No results found for the last 24 hours. **          I have reviewed the patient's current medications.     Assessment/Plan     Active Hospital Problems    Diagnosis    • **UTI (urinary tract infection)    • Overweight (BMI 25.0-29.9)    • Hypertension associated with diabetes ([facility])    • Type 1 diabetes mellitus with diabetic polyneuropathy ([facility]CC)    • CAD (coronary artery disease)    • Neuropathy        Plan:    1. Altered mental status: likely metabolic encephalopathy related to acute cystitis.  Improving.  Patient awake and alert this morning.  Oriented to person, place, situation.   2. Acute cystitis: continue Rocephin and follow cultures.  Urine showing mixed reagan.   3. HTN: continue Entresto.  Antihypertensives previously held due to hypotension.  Will restart beta blocker at decreased dose due to tachycardia being noted today but blood pressure still slightly decreased.   4. Hypothyroidism: continue Synthroid.   5. Atrial fibrillation: continue Eliquis, Amiodarone.  Continue Coreg at decreased dose as to avoid hypotension. Will hold off on Cardizem for now due to soft blood pressure.   6. Hx CHF: no acute exacerbation. Continue entresto, Lasix.  Restart beta blocker.   7. Type 1 DM: FSBS AC and HS with SSI. Continue Levemir BID.    I confirmed that the patient's Advance Care Plan is present, code status is documented, or surrogate decision maker is listed in the patient's medical record.          This document has been electronically signed by JESSIE Salvador on January 17, 2022 15:47 CST

## 2022-01-17 NOTE — CONSULTS
"Adult Nutrition  Assessment    Patient Name:  Janelle Millard  YOB: 1955  MRN: [id number]  Admit Date:  1/15/2022    Assessment Date:  1/17/2022    Comments:  Pt admitted with AMS most likely related to a UTI.  She has pressure injuries present on admit. Glucose significantly elevated most likely related to infection.  RD will add milk with meals to optimize protein for healing.  Will monitor POC     Reason for Assessment     Row Name 01/17/22 1450          Reason for Assessment    Reason For Assessment identified at risk by screening criteria     Diagnosis infection/sepsis     Identified At Risk by Screening Criteria large or nonhealing wound, burn or pressure injury                Nutrition/Diet History     Row Name 01/17/22 5704          Nutrition/Diet History    Typical Food/Fluid Intake Pt states that her appetite is \"pretty good\"  She was eating \"fairly well\" pta.  Denies any eating difficulties. \"Loves milk\"                  Labs/Tests/Procedures/Meds     Row Name 01/17/22 5618          Labs/Procedures/Meds    Lab Results Reviewed reviewed, pertinent            Diagnostic Tests/Procedures    Diagnostic Test/Procedure Reviewed reviewed, pertinent            Medications    Pertinent Medications Reviewed reviewed, pertinent                Physical Findings     Row Name 01/17/22 9228          Physical Findings    Overall Physical Appearance on oxygen therapy     Skin pressure injury                Estimated/Assessed Needs     Row Name 01/17/22 7244          Calculation Measurements    Weight Used For Calculations 65.8 kg (145 lb)                Nutrition Prescription Ordered     Row Name 01/17/22 5405          Nutrition Prescription PO    Current PO Diet Regular     Fluid Consistency Thin     Common Modifiers Consistent Carbohydrate                Evaluation of Received Nutrient/Fluid Intake     Row Name 01/17/22 1500          PO Evaluation    Number of Meals 4     % PO Intake 70% average     "                 Electronically signed by:  Anay Acharya RD  01/17/22 15:03 CST

## 2022-01-17 NOTE — PLAN OF CARE
Goal Outcome Evaluation:  Plan of Care Reviewed With: patient        Progress: no change  Outcome Summary: Pt tolerated tx fair this date. Pt gave fair effort with UE ther ex. Limited ROM due to arthritis. Continue OT POC.

## 2022-01-17 NOTE — PLAN OF CARE
Goal Outcome Evaluation:  Plan of Care Reviewed With: caregiver, patient        Progress: no change  Outcome Summary: Pt admitted with a UTI with AMS.  Pressure injuries present on admit.  Will add milk for optimal protein for healing.  MOnitor POC

## 2022-01-17 NOTE — PLAN OF CARE
Goal Outcome Evaluation:              Outcome Summary: Pt confused at times. Pt's BP stable today at this time. Pt HR still tachycardic at this time. Generalized pain treated with schedule pain medications. pt turn q 2 hours and prn.

## 2022-01-18 NOTE — PROGRESS NOTES
"    [facility] Medicine Services  INPATIENT PROGRESS NOTE    Length of Stay: 0  Date of Admission: 1/15/2022  Primary Care Physician: Yosef Casey MD    Subjective   Chief Complaint: \"sleepy\"  HPI:  66 year old female with past medical history of RA, HTN, DM, HLD, CAD who presented from [facility] with confusion.  She is currently admitted related to acute cystitis.  During today's visit, she reports feeling sleepy but otherwise denies complaints.      Review of Systems   Constitutional: Positive for fatigue. Negative for chills and fever.   HENT: Negative for congestion, rhinorrhea and sore throat.    Respiratory: Negative for cough, chest tightness, shortness of breath and stridor.    Cardiovascular: Negative for chest pain, palpitations and leg swelling.   Gastrointestinal: Negative for abdominal pain, diarrhea, nausea and vomiting.   Musculoskeletal: Negative for back pain and neck pain.   Skin: Negative for pallor.   Neurological: Positive for weakness. Negative for dizziness and headaches.   Psychiatric/Behavioral: Negative for confusion. The patient is not nervous/anxious.         All pertinent negatives and positives are as above. All other systems have been reviewed and are negative unless otherwise stated.     Objective    Temp:  [97.3 °F (36.3 °C)-98.1 °F (36.7 °C)] 97.6 °F (36.4 °C)  Heart Rate:  [103-120] 120  Resp:  [18] 18  BP: (108-141)/(55-79) 140/74    Physical Exam  Vitals and nursing note reviewed.   Constitutional:       General: She is not in acute distress.     Appearance: Normal appearance. She is not ill-appearing.   HENT:      Head: Normocephalic and atraumatic.      Right Ear: External ear normal.      Left Ear: External ear normal.      Nose: Nose normal.      Mouth/Throat:      Mouth: Mucous membranes are moist.      Pharynx: Oropharynx is clear.   Eyes:      General: No scleral icterus.        Right eye: No discharge.         " Left eye: No discharge.      Conjunctiva/sclera: Conjunctivae normal.   Cardiovascular:      Rate and Rhythm: Normal rate and regular rhythm.      Pulses: Normal pulses.      Heart sounds: Normal heart sounds. No murmur heard.  No friction rub. No gallop.    Pulmonary:      Effort: Pulmonary effort is normal. No respiratory distress.      Breath sounds: Normal breath sounds. No stridor. No wheezing, rhonchi or rales.   Abdominal:      General: Bowel sounds are normal. There is no distension.      Palpations: Abdomen is soft.      Tenderness: There is no abdominal tenderness.   Musculoskeletal:         General: No swelling. Normal range of motion.      Cervical back: Normal range of motion and neck supple.   Skin:     General: Skin is warm and dry.   Neurological:      General: No focal deficit present.      Mental Status: She is alert and oriented to person, place, and time.   Psychiatric:         Mood and Affect: Mood normal.         Behavior: Behavior normal.             Results Review:  I have reviewed the labs, radiology results, and diagnostic studies.    Laboratory Data:   Results from last 7 days   Lab Units 01/18/22  0843 01/17/22  0710 01/16/22  0618 01/15/22  1647 01/15/22  1647   SODIUM mmol/L 141 138 136   < > 140   POTASSIUM mmol/L 5.1 4.1 4.3   < > 4.3   CHLORIDE mmol/L 105 103 97*   < > 100   CO2 mmol/L 25.0 24.0 24.0   < > 32.0*   BUN mg/dL 17 24* 24*   < > 29*   CREATININE mg/dL 0.66 0.76 0.88   < > 0.99   GLUCOSE mg/dL 334* 99 431*   < > 180*   CALCIUM mg/dL 9.7 9.8 10.0   < > 9.9   BILIRUBIN mg/dL  --   --   --   --  0.2   ALK PHOS U/L  --   --   --   --  120*   ALT (SGPT) U/L  --   --   --   --  11   AST (SGOT) U/L  --   --   --   --  25   ANION GAP mmol/L 11.0 11.0 15.0   < > 8.0    < > = values in this interval not displayed.     Estimated Creatinine Clearance: 80 mL/min (by C-G formula based on SCr of 0.66 mg/dL).          Results from last 7 days   Lab Units 01/18/22  0843 01/17/22  0711  01/16/22  0618 01/15/22  1647   WBC 10*3/mm3 8.54 8.04 7.48 5.82   HEMOGLOBIN g/dL 10.8* 10.7* 10.2* 9.9*   HEMATOCRIT % 33.2* 32.6* 31.0* 30.2*   PLATELETS 10*3/mm3 291 292 271 243           Culture Data:   Blood Culture   Date Value Ref Range Status   01/15/2022 No growth at 2 days  Preliminary   01/15/2022 No growth at 2 days  Preliminary     Urine Culture   Date Value Ref Range Status   01/15/2022 >100,000 CFU/mL Mixed Reagan Isolated  Final     No results found for: RESPCX  No results found for: WOUNDCX  No results found for: STOOLCX  No components found for: BODYFLD    Radiology Data:   Imaging Results (Last 24 Hours)     ** No results found for the last 24 hours. **          I have reviewed the patient's current medications.     Assessment/Plan     Active Hospital Problems    Diagnosis    • **UTI (urinary tract infection)    • Overweight (BMI 25.0-29.9)    • Hypertension associated with diabetes (HCC)    • Type 1 diabetes mellitus with diabetic polyneuropathy (HCC)    • CAD (coronary artery disease)    • Neuropathy        Plan:    1. Altered mental status: likely metabolic encephalopathy related to acute cystitis.  Improving.  Patient awake and alert this morning.  Oriented to person, place, situation.   2. Acute cystitis: continue Rocephin and follow cultures.  Urine showing mixed reagan.   3. HTN: continue Entresto, Coreg, Cardizem.   4. Hypothyroidism: continue Synthroid.   5. Atrial fibrillation: continue Eliquis, Amiodarone, Coreg, Cardizem.  RVR noted today.  Will monitor rate with restart of Cardizem and increase of Coreg.  May require IV meds if no improvement noted.   6. Hx CHF: no acute exacerbation. Continue Entresto, Lasix, Coreg.    7. Type 1 DM: FSBS AC and HS with SSI. Continue Levemir BID.    Discharge planning: possible discharge in 24-48 hours if atrial fib rate control can be achieved.    I confirmed that the patient's Advance Care Plan is present, code status is documented, or surrogate  decision maker is listed in the patient's medical record.          This document has been electronically signed by JESSIE Salvador on January 18, 2022 14:45 CST

## 2022-01-18 NOTE — THERAPY TREATMENT NOTE
Acute Care - Physical Therapy Treatment Note  [facility]     Patient Name: Janelle Millard  : 1955  MRN: [id number]  Today's Date: 2022      Visit Dx:     ICD-10-CM ICD-9-CM   1. Urinary tract infection without hematuria, site unspecified  N39.0 599.0   2. Sepsis, due to unspecified organism, unspecified whether acute organ dysfunction present ([facility])  A41.9 038.9     995.91   3. Altered mental status, unspecified altered mental status type  R41.82 780.97   4. Hypotension, unspecified hypotension type  I95.9 458.9   5. Impaired functional mobility, balance, gait, and endurance  Z74.09 V49.89   6. Impaired mobility and activities of daily living  Z74.09 V49.89    Z78.9      Patient Active Problem List   Diagnosis   • Carpal tunnel syndrome of left wrist   • Type 1 diabetes mellitus with diabetic polyneuropathy ([facility])   • Mixed diabetic hyperlipidemia associated with type 1 diabetes mellitus ([facility])   • Hypertension associated with diabetes ([facility])   • Syncope and collapse   • CAD (coronary artery disease)   • Asthma   • Depressive disorder, not elsewhere classified   • Neuropathy   • Other specified rheumatoid arthritis, multiple sites ([facility])   • Carpal tunnel syndrome on both sides   • Bilateral carotid artery stenosis   • Overweight (BMI 25.0-29.9)   • Precordial pain   • Sepsis with encephalopathy without septic shock ([facility])   • Tobacco abuse   • UTI (urinary tract infection)   • Ureteral stone with hydronephrosis   • Chest pain, rule out acute myocardial infarction   • Chest pain   • Chest pain with high risk for cardiac etiology   • Sepsis ([facility])     Past Medical History:   Diagnosis Date   • Arthritis, rheumatoid ([facility])    • Arthropathy associated with neurological disorder    • Arthropathy of lumbar facet joint    • Asthma    • Benign hypertension    • Carpal tunnel syndrome    • Diabetes ([facility])    • Diabetic polyneuropathy ([facility])    • Dyslipidemia    • Fallen arches    • Hammer toe    • Heart disease     • Joint pain    • Mild depression (HCC)     Saddness post Surgery 7/10/14 improved after counseling.   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 Diffusely calcified LAD with 30-80% stenosis. OMB #3 with 60% to 70% stenosis. Preserved LV systolic function with EF of 55%.   • CARPAL TUNNEL RELEASE Right 10/15/2014    Right carpal tunnel release.   • CARPAL TUNNEL RELEASE     • CATARACT EXTRACTION, BILATERAL Bilateral    • CORONARY ARTERY BYPASS GRAFT  02/24/2014    CABG x 3 with LIMA to LAD, endarterectomy to LAD, SVG to OMB and SVG to distal RCA with endarterectomy to distal RCA. Endo-vein harvesting.   • CYSTECTOMY Left     Breast cycst   • CYSTOSCOPY, URETEROSCOPY, RETROGRADE PYELOGRAM, STENT INSERTION Left 3/10/2021    Procedure: , LEFT RETROGRADE PYELOGRAM, STENT INSERTION LEFT URETER;  Surgeon: Ray, Cooper NOLAN MD;  Location: [facility];  Service: Urology;  Laterality: Left;   • EXCISION LESION      Remove breast lesion - cyst   • EYE SURGERY      Insert lens prosthesis   • FOOT SURGERY  10/18/2011    Exostectomy of the right foot. Preulcerative lesion with Charot deformity, right foot   • LUMBAR SPINE SURGERY  11/09/2015    Lumbosacral radiofrequency thermal coagulation.   • NERVE BLOCK  09/14/2015    Lumbar medial branch block.   • TOE NAIL AVULSION  09/03/2015    DEBRIDE NAIL 6 OR MORE 16813 (1)     PT Assessment (last 12 hours)     PT Evaluation and Treatment     Row Name 01/18/22 1246          Physical Therapy Time and Intention    Subjective Information complains of; pain  -TA     Document Type therapy note (daily  note)  -TA     Mode of Treatment physical therapy; individual therapy  -TA     Patient Effort good  -TA     Row Name 01/18/22 1246          General Information    Patient Profile Reviewed yes  -TA     Existing Precautions/Restrictions fall  -TA     Row Name 01/18/22 1246          Cognition    Orientation Status (Cognition) person; place; oriented to  -TA     Personal Safety Interventions fall prevention program maintained; gait belt; muscle strengthening facilitated; nonskid shoes/slippers when out of bed; supervised activity  -TA     [facility] Name 01/18/22 1246          Pain Scale: Numbers Pre/Post-Treatment    Pretreatment Pain Rating 6/10  -TA     Posttreatment Pain Rating 6/10  -TA     Pain Location - Orientation generalized  -TA     [facility] Name 01/18/22 1246          Range of Motion Comprehensive    General Range of Motion bilateral lower extremity ROM WFL  -TA     [facility] Name 01/18/22 1246          Strength Comprehensive (MMT)    General Manual Muscle Testing (MMT) Assessment other (see comments)  -TA     [facility] Name 01/18/22 1246          Bed Mobility    Bed Mobility supine-sit; sit-supine; scooting/bridging; rolling left; rolling right  -TA     Supine-Sit Porter (Bed Mobility) minimum assist (75% patient effort)  -TA     Sit-Supine Porter (Bed Mobility) maximum assist (25% patient effort)  -TA     Assistive Device (Bed Mobility) head of bed elevated; bed rails  -TA     [facility] Name 01/18/22 1246          Transfers    Transfers sit-stand transfer; stand-sit transfer  -TA     Comment (Transfers) pt performed sit<>stand x 3  -TA     Sit-Stand Porter (Transfers) moderate assist (50% patient effort)  -TA     Stand-Sit Porter (Transfers) moderate assist (50% patient effort)  -TA     Row Name 01/18/22 1246          Sit-Stand Transfer    Assistive Device (Sit-Stand Transfers) walker, front-wheeled  -TA     [facility] Name 01/18/22 1246          Stand-Sit Transfer    Assistive Device (Stand-Sit Transfers) walker,  front-wheeled  -TA     Row Name 01/18/22 1246          Gait/Stairs (Locomotion)    Gait/Stairs Locomotion gait/ambulation independence; gait/ambulation assistive device; distance ambulated; gait pattern; gait deviations  -TA     [place] Level (Gait) moderate assist (50% patient effort); 1 person assist  -TA     Assistive Device (Gait) walker, front-wheeled  -TA     Distance in Feet (Gait) 3 steps x 2 >HOB  -TA     Deviations/Abnormal Patterns (Gait) base of support, narrow; gait speed decreased; stride length decreased  -TA     Bilateral Gait Deviations forward flexed posture  -TA     Row Name 01/18/22 1246          Safety Issues, Functional Mobility    Impairments Affecting Function (Mobility) strength; endurance/activity tolerance; balance; pain  -TA     Row Name 01/18/22 1246          Ankle (Therapeutic Exercise)    Ankle (Therapeutic Exercise) AROM (active range of motion)  -TA     Ankle AROM (Therapeutic Exercise) bilateral; dorsiflexion; plantarflexion; sitting; 10 repetitions  -TA     Row Name             Wound 01/15/22 2130 Bilateral coccyx Pressure Injury    Wound - Properties Group Placement Date: 01/15/22  -JT Placement Time: 2130  -JT Present on Hospital Admission: Y  -JT Side: Bilateral  -JT Location: coccyx  -JT Primary Wound Type: Pressure inj  -JT     Retired Wound - Properties Group Date first assessed: 01/15/22  -JT Time first assessed: 2130  -JT Present on Hospital Admission: Y  -JT Side: Bilateral  -JT Location: coccyx  -JT Primary Wound Type: Pressure inj  -JT     Row Name 01/18/22 1246          Plan of Care Review    Plan of Care Reviewed With patient  -TA     Outcome Summary pt sup<>sit with min assist of 1, pt sit<>stand with mod assist of 1, pt took 3 steps x 2 with RW & mod assist of 1 >HOB. pt will require 24/7 care & continued PT services  -TA     Row Name 01/18/22 1246          Vital Signs    Pre Systolic BP Rehab 129  -TA     Pre Treatment Diastolic BP 77  -TA     Post Systolic BP  "Rehab 140  -TA     Post Treatment Diastolic BP 76  -TA     Pretreatment Heart Rate (beats/min) 123  -TA     Posttreatment Heart Rate (beats/min) 122  -TA     Pre SpO2 (%) 95  -TA     O2 Delivery Pre Treatment room air  -TA     Post SpO2 (%) 98  -TA     O2 Delivery Post Treatment room air  -TA     Pre Patient Position Supine  -TA     Post Patient Position Supine  -TA     Row Name 01/18/22 1246          Bed Mobility Goal 1 (PT)    Activity/Assistive Device (Bed Mobility Goal 1, PT) sit to supine/supine to sit  -TA     [place] Level/Cues Needed (Bed Mobility Goal 1, PT) supervision required  -TA     Time Frame (Bed Mobility Goal 1, PT) by discharge  -TA     Progress/Outcomes (Bed Mobility Goal 1, PT) goal not met  -TA     Row Name 01/18/22 1246          Transfer Goal 1 (PT)    Activity/Assistive Device (Transfer Goal 1, PT) sit-to-stand/stand-to-sit; bed-to-chair/chair-to-bed  -TA     Time Frame (Transfer Goal 1, PT) by discharge  -TA     Strategies/Barriers (Transfers Goal 1, PT) Severe forward head and stooped posture.  -TA     Progress/Outcome (Transfer Goal 1, PT) goal not met  -TA     Row Name 01/18/22 1246          Gait Training Goal 1 (PT)    Activity/Assistive Device (Gait Training Goal 1, PT) gait (walking locomotion); walker, rolling  -TA     [place] Level (Gait Training Goal 1, PT) minimum assist (75% or more patient effort)  -TA     Distance (Gait Training Goal 1, PT) 10'x1.  -TA     Time Frame (Gait Training Goal 1, PT) by discharge  -TA     Strategies/Barriers (Gait Training Goal 1, PT) Severe forward head and stooped posture.  -TA     Row Name 01/18/22 1246          ROM Goal 1 (PT)    ROM Goal 1 (PT) Patient will tolerate BLE seated and supine AROM x 15-20 reps each plane.  -TA     Time Frame (ROM Goal 1, PT) by discharge  -TA     Strategies/Barriers (ROM Goal 1, PT) Patient reports legs just \"woke up\" 3 days ago.  -TA     Progress/Outcome (ROM Goal 1, PT) goal not met  -TA     Row Name " 01/18/22 1246          Positioning and Restraints    Pre-Treatment Position in bed  -TA     Post Treatment Position bed  -TA     In Bed supine; call light within reach; exit alarm on  -TA     Row Name 01/18/22 1246          Therapy Assessment/Plan (PT)    Rehab Potential (PT) fair, will monitor progress closely  -TA     Criteria for Skilled Interventions Met (PT) yes; skilled treatment is necessary  -TA     Comment, Therapy Assessment/Plan (PT) contiue  -TA           User Key  (r) = Recorded By, (t) = Taken By, (c) = Cosigned By    Initials Name Provider Type    TA Junie Perry PTA Physical Therapy Assistant    Stephanie Tolentino RN Registered Nurse                Physical Therapy Education                 Title: PT OT SLP Therapies (In Progress)     Topic: Physical Therapy (In Progress)     Point: Mobility training (In Progress)     Learning Progress Summary           Patient Acceptance, E, NR by DARRYL at 1/17/2022 1304    Acceptance, E, VU,NR by ROXI at 1/16/2022 1104    Comment: Hand placement with transfers, posture in stance, PT POC, goals.                   Point: Home exercise program (Not Started)     Learner Progress:  Not documented in this visit.          Point: Body mechanics (Not Started)     Learner Progress:  Not documented in this visit.          Point: Precautions (Not Started)     Learner Progress:  Not documented in this visit.                      User Key     Initials Effective Dates Name Provider Type Discipline    DARRYL 06/16/21 -  Keven Bain PTA Physical Therapy Assistant PT    CZ 06/16/21 -  Oliver Patel, PT Physical Therapist PT              PT Recommendation and Plan  Anticipated Discharge Disposition (PT): home with 24/7 care, skilled nursing facility  Therapy Frequency (PT): 5 times/wk  Plan of Care Reviewed With: patient  Outcome Summary: pt sup<>sit with min assist of 1, pt sit<>stand with mod assist of 1, pt took 3 steps x 2 with RW & mod assist of 1 >HOB. pt will require  24/7 care & continued PT services   Outcome Measures     Row Name 01/18/22 1600 01/17/22 1025 01/16/22 1013       How much help from another person do you currently need...    Turning from your back to your side while in flat bed without using bedrails? 3  -TA 2  -DARRYL --    Moving from lying on back to sitting on the side of a flat bed without bedrails? 3  -TA 3  -DARRYL --    Moving to and from a bed to a chair (including a wheelchair)? 2  -TA 2  -DARRYL --    Standing up from a chair using your arms (e.g., wheelchair, bedside chair)? 2  -TA 2  -DARRYL --    Climbing 3-5 steps with a railing? 2  -TA 2  -DARRYL --    To walk in hospital room? 2  -TA 2  -DARRYL --    AM-PAC 6 Clicks Score (PT) 14  -TA 13  -DARRYL --       How much help from another is currently needed...    Putting on and taking off regular lower body clothing? -- -- 2  -RB    Bathing (including washing, rinsing, and drying) -- -- 2  -RB    Toileting (which includes using toilet bed pan or urinal) -- -- 2  -RB    Putting on and taking off regular upper body clothing -- -- 2  -RB    Taking care of personal grooming (such as brushing teeth) -- -- 2  -RB    Eating meals -- -- 3  -RB    AM-PAC 6 Clicks Score (OT) -- -- 13  -RB       Functional Assessment    Outcome Measure Options AM-PAC 6 Clicks Basic Mobility (PT)  -TA AM-PAC 6 Clicks Basic Mobility (PT)  -DARRYL AM-PAC 6 Clicks Daily Activity (OT)  -RB          User Key  (r) = Recorded By, (t) = Taken By, (c) = Cosigned By    Initials Name Provider Type    RB Baldomero Kwon OT Occupational Therapist    Junie Clemens, PTA Physical Therapy Assistant    Keven Randall, PTA Physical Therapy Assistant                 Time Calculation:    PT Charges     Row Name 01/18/22 1627             Time Calculation    Start Time 1246  -TA      Stop Time 1314  -TA      Time Calculation (min) 28 min  -TA      PT Received On 01/18/22  -TA              Time Calculation- PT    Total Timed Code Minutes- PT 28 minute(s)  -TA              Timed  Charges    83423 - PT Therapeutic Activity Minutes 28  -TA              Total Minutes    Timed Charges Total Minutes 28  -TA       Total Minutes 28  -TA            User Key  (r) = Recorded By, (t) = Taken By, (c) = Cosigned By    Initials Name Provider Type    Junie Clemens PTA Physical Therapy Assistant              Therapy Charges for Today     Code Description Service Date Service Provider Modifiers Qty    [id number] HC PT THERAPEUTIC ACT EA 15 MIN 1/18/2022 Junie Perry PTA GP 2          PT G-Codes  Outcome Measure Options: AM-PAC 6 Clicks Basic Mobility (PT)  AM-PAC 6 Clicks Score (PT): 14  AM-PAC 6 Clicks Score (OT): 13    Junie Perry PTA  1/18/2022

## 2022-01-18 NOTE — PLAN OF CARE
Goal Outcome Evaluation:  Plan of Care Reviewed With: patient           Outcome Summary: pt sup<>sit with min assist of 1, pt sit<>stand with mod assist of 1, pt took 3 steps x 2 with RW & mod assist of 1 >HOB. pt will require 24/7 care & continued PT services

## 2022-01-18 NOTE — PLAN OF CARE
Problem: Fall Injury Risk  Goal: Absence of Fall and Fall-Related Injury  Outcome: Ongoing, Progressing  Intervention: Promote Injury-Free Environment  Recent Flowsheet Documentation  Taken 1/18/2022 0100 by Elaine Bain RN  Safety Promotion/Fall Prevention: safety round/check completed  Taken 1/17/2022 2300 by Elaine Bain RN  Safety Promotion/Fall Prevention: safety round/check completed  Taken 1/17/2022 2100 by Elaine Bain RN  Safety Promotion/Fall Prevention: safety round/check completed  Taken 1/17/2022 1900 by Elaine Bain RN  Safety Promotion/Fall Prevention: safety round/check completed   Goal Outcome Evaluation:               Patient compliant with medical regiment, patient bed alarm activated, frequent checks, and turned q 2hours

## 2022-01-18 NOTE — THERAPY TREATMENT NOTE
Patient Name: Janelle Millard  : 1955    MRN: [id number]                              Today's Date: 2022       Admit Date: 1/15/2022    Visit Dx:     ICD-10-CM ICD-9-CM   1. Urinary tract infection without hematuria, site unspecified  N39.0 599.0   2. Sepsis, due to unspecified organism, unspecified whether acute organ dysfunction present ([facility])  A41.9 038.9     995.91   3. Altered mental status, unspecified altered mental status type  R41.82 780.97   4. Hypotension, unspecified hypotension type  I95.9 458.9   5. Impaired functional mobility, balance, gait, and endurance  Z74.09 V49.89   6. Impaired mobility and activities of daily living  Z74.09 V49.89    Z78.9      Patient Active Problem List   Diagnosis   • Carpal tunnel syndrome of left wrist   • Type 1 diabetes mellitus with diabetic polyneuropathy ([facility])   • Mixed diabetic hyperlipidemia associated with type 1 diabetes mellitus ([facility])   • Hypertension associated with diabetes ([facility])   • Syncope and collapse   • CAD (coronary artery disease)   • Asthma   • Depressive disorder, not elsewhere classified   • Neuropathy   • Other specified rheumatoid arthritis, multiple sites ([facility])   • Carpal tunnel syndrome on both sides   • Bilateral carotid artery stenosis   • Overweight (BMI 25.0-29.9)   • Precordial pain   • Sepsis with encephalopathy without septic shock ([facility])   • Tobacco abuse   • UTI (urinary tract infection)   • Ureteral stone with hydronephrosis   • Chest pain, rule out acute myocardial infarction   • Chest pain   • Chest pain with high risk for cardiac etiology   • Sepsis ([facility])     Past Medical History:   Diagnosis Date   • Arthritis, rheumatoid ([facility])    • Arthropathy associated with neurological disorder    • Arthropathy of lumbar facet joint    • Asthma    • Benign hypertension    • Carpal tunnel syndrome    • Diabetes ([facility])    • Diabetic polyneuropathy ([facility])    • Dyslipidemia    • Fallen arches    • Hammer toe    • Heart disease    • Joint  pain    • Mild depression ([facility])     Saddness post Surgery 7/10/14 improved after counseling.   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 Diffusely calcified LAD with 30-80% stenosis. OMB #3 with 60% to 70% stenosis. Preserved LV systolic function with EF of 55%.   • CARPAL TUNNEL RELEASE Right 10/15/2014    Right carpal tunnel release.   • CARPAL TUNNEL RELEASE     • CATARACT EXTRACTION, BILATERAL Bilateral    • CORONARY ARTERY BYPASS GRAFT  02/24/2014    CABG x 3 with LIMA to LAD, endarterectomy to LAD, SVG to OMB and SVG to distal RCA with endarterectomy to distal RCA. Endo-vein harvesting.   • CYSTECTOMY Left     Breast cycst   • CYSTOSCOPY, URETEROSCOPY, RETROGRADE PYELOGRAM, STENT INSERTION Left 3/10/2021    Procedure: , LEFT RETROGRADE PYELOGRAM, STENT INSERTION LEFT URETER;  Surgeon: Wainscott, Cooper NOLAN MD;  Location: [facility];  Service: Urology;  Laterality: Left;   • EXCISION LESION      Remove breast lesion - cyst   • EYE SURGERY      Insert lens prosthesis   • FOOT SURGERY  10/18/2011    Exostectomy of the right foot. Preulcerative lesion with Charot deformity, right foot   • LUMBAR SPINE SURGERY  11/09/2015    Lumbosacral radiofrequency thermal coagulation.   • NERVE BLOCK  09/14/2015    Lumbar medial branch block.   • TOE NAIL AVULSION  09/03/2015    DEBRIDE NAIL 6 OR MORE 58994 (1)      General Information     Row Name 01/18/22 1437          OT Time and Intention    Document Type therapy note (daily note)  -CS     Mode of Treatment occupational therapy  -CS     Row Name 01/18/22 1437          General Information     Patient Profile Reviewed yes  -CS     Existing Precautions/Restrictions fall  -CS     Row Name 01/18/22 1437          Cognition    Orientation Status (Cognition) oriented to; person  -CS     Row Name 01/18/22 1437          Safety Issues, Functional Mobility    Impairments Affecting Function (Mobility) strength; endurance/activity tolerance; balance; pain  -CS           User Key  (r) = Recorded By, (t) = Taken By, (c) = Cosigned By    Initials Name Provider Type    Kacey Rehman COTA Occupational Therapy Assistant                 Mobility/ADL's     Row Name 01/18/22 1437          Activities of Daily Living    BADL Assessment/Intervention grooming  -CS     Row Name 01/18/22 1437          Grooming Assessment/Training    [place] Level (Grooming) grooming skills; minimum assist (75% patient effort)  -CS     Position (Grooming) sitting up in bed  -CS           User Key  (r) = Recorded By, (t) = Taken By, (c) = Cosigned By    Initials Name Provider Type    Kacey Rehman COTA Occupational Therapy Assistant               Obj/Interventions    No documentation.                Goals/Plan     Row Name 01/18/22 1437          Bed Mobility Goal 1 (OT)    Activity/Assistive Device (Bed Mobility Goal 1, OT) bed mobility activities, all  -CS     [place] Level/Cues Needed (Bed Mobility Goal 1, OT) contact guard assist  -CS     Time Frame (Bed Mobility Goal 1, OT) long term goal (LTG)  -CS     Progress/Outcomes (Bed Mobility Goal 1, OT) goal not met  -CS     Row Name 01/18/22 1437          Transfer Goal 1 (OT)    Activity/Assistive Device (Transfer Goal 1, OT) sit-to-stand/stand-to-sit; toilet  -CS     [place] Level/Cues Needed (Transfer Goal 1, OT) minimum assist (75% or more patient effort)  -CS     Time Frame (Transfer Goal 1, OT) long term goal (LTG)  -CS     Progress/Outcome (Transfer Goal 1, OT) goal not met  -CS     Row Name 01/18/22 1437          Bathing Goal 1 (OT)    Activity/Device (Bathing Goal  1, OT) upper body bathing  -CS     [place] Level/Cues Needed (Bathing Goal 1, OT) contact guard assist  -CS     Time Frame (Bathing Goal 1, OT) long term goal (LTG)  -CS     Progress/Outcomes (Bathing Goal 1, OT) goal not met  -CS     Row Name 01/18/22 1437          Dressing Goal 1 (OT)    Activity/Device (Dressing Goal 1, OT) upper body dressing  -CS     [place]/Cues Needed (Dressing Goal 1, OT) contact guard assist  -CS     Time Frame (Dressing Goal 1, OT) long term goal (LTG)  -CS     Progress/Outcome (Dressing Goal 1, OT) goal not met  -CS           User Key  (r) = Recorded By, (t) = Taken By, (c) = Cosigned By    Initials Name Provider Type    CS Kacey Monroe COTA Occupational Therapy Assistant               Clinical Impression     Row Name 01/18/22 1437          Pain Scale: Numbers Pre/Post-Treatment    Pretreatment Pain Rating 9/10  -CS     Posttreatment Pain Rating 9/10  -CS     Pre/Posttreatment Pain Comment feet  -CS     Pain Intervention(s) Rest; Repositioned  -CS     Row Name 01/18/22 1437          Plan of Care Review    Plan of Care Reviewed With patient  -CS     Progress no change  -CS     Outcome Summary Pt stated that she is depressed and really to go back to SNF. Pt gave fair effort with UE ther ex. Pt was min A with grooming task. Continue OT POC.  -CS     Row Name 01/18/22 1437          Therapy Assessment/Plan (OT)    Rehab Potential (OT) fair, will monitor progress closely  -CS     Criteria for Skilled Therapeutic Interventions Met (OT) yes; meets criteria  -CS     Therapy Frequency (OT) other (see comments)  -CS     Row Name 01/18/22 1437          Therapy Plan Review/Discharge Plan (OT)    Anticipated Discharge Disposition (OT) skilled nursing facility  -     Row Name 01/18/22 1437          Vital Signs    Pre Patient Position Supine  -CS     Post Patient Position Supine  -CS     Row Name 01/18/22 1437          Positioning and Restraints    Pre-Treatment Position in bed  -CS      Post Treatment Position bed  -CS     In Bed fowlers; call light within reach; encouraged to call for assist; exit alarm on  -CS           User Key  (r) = Recorded By, (t) = Taken By, (c) = Cosigned By    Initials Name Provider Type    CS Kacey Monroe COTA Occupational Therapy Assistant               Outcome Measures    No documentation.                 Occupational Therapy Education                 Title: PT OT SLP Therapies (In Progress)     Topic: Occupational Therapy (In Progress)     Point: ADL training (Not Started)     Description:   Instruct learner(s) on proper safety adaptation and remediation techniques during self care or transfers.   Instruct in proper use of assistive devices.              Learner Progress:  Not documented in this visit.          Point: Home exercise program (Not Started)     Description:   Instruct learner(s) on appropriate technique for monitoring, assisting and/or progressing therapeutic exercises/activities.              Learner Progress:  Not documented in this visit.          Point: Precautions (Done)     Description:   Instruct learner(s) on prescribed precautions during self-care and functional transfers.              Learning Progress Summary           Patient Acceptance, E, VU by MATHIEU at 1/16/2022 2414    Comment: Edu pt on use of gait belt and non skid socks when OOB and no OOB without assist.                   Point: Body mechanics (Not Started)     Description:   Instruct learner(s) on proper positioning and spine alignment during self-care, functional mobility activities and/or exercises.              Learner Progress:  Not documented in this visit.                      User Key     Initials Effective Dates Name Provider Type Awais MURDOCK 06/16/21 -  Baldomero Kwon OT Occupational Therapist OT              OT Recommendation and Plan  Therapy Frequency (OT): other (see comments)  Plan of Care Review  Plan of Care Reviewed With: patient  Progress: no change  Outcome  Summary: Pt stated that she is depressed and really to go back to SNF. Pt gave fair effort with UE ther ex. Pt was min A with grooming task. Continue OT POC.     Time Calculation:    Time Calculation- OT     Row Name 01/18/22 1523             Time Calculation- OT    OT Start Time 1437  -CS      OT Stop Time 1515  -CS      OT Time Calculation (min) 38 min  -CS      Total Timed Code Minutes- OT 38 minute(s)  -CS      OT Received On 01/18/22  -CS              Timed Charges    27628 - OT Therapeutic Exercise Minutes 23  -CS      63211 - OT Self Care/Mgmt Minutes 15  -CS              Total Minutes    Timed Charges Total Minutes 38  -CS       Total Minutes 38  -CS            User Key  (r) = Recorded By, (t) = Taken By, (c) = Cosigned By    Initials Name Provider Type    CS Kacey Monroe COTA Occupational Therapy Assistant              Therapy Charges for Today     Code Description Service Date Service Provider Modifiers Qty    58136566540 HC OT THER PROC EA 15 MIN 1/17/2022 Kacey Monroe COTA GO 2    91625924148 HC OT THER PROC EA 15 MIN 1/18/2022 Kacey Monroe COTA GO 2    16431640640 HC OT SELF CARE/MGMT/TRAIN EA 15 MIN 1/18/2022 Kacey Monroe COTA GO 1               JACQUIE Boudreaux  1/18/2022

## 2022-01-18 NOTE — PLAN OF CARE
Goal Outcome Evaluation:  Plan of Care Reviewed With: patient        Progress: no change  Outcome Summary: Pt stated that she is depressed and really to go back to SNF. Pt gave fair effort with UE ther ex. Pt was min A with grooming task. Continue OT POC.

## 2022-01-19 NOTE — PROGRESS NOTES
"Physicians Statement of Medical Necessity for  Ambulance Transportation    GENERAL INFORMATION     Name: Janelle Millard  YOB: 1955  Medicare #: [id number]  Transport Date:  (Valid for round trips this date, or for scheduled repetitive trips for 60 days from the date signed below.)  Origin: [id number]  Destination: [place] [address]  Is the Patient's stay covered under Medicare Part A (PPS/DRG?)Yes  Closest appropriate facility? Yes  If this a hosp-hosp transfer? No  Is this a hospice patient? No    MEDICAL NECESSITY QUESTIONAIRE    Ambulance Transportation is medically necessary only if other means of transportation are contraindicated or would be potentially harmful to the patient.  To meet this requirement, the patient must be either \"bed confined\" or suffer from a condition such that transport by means other than an ambulance is contraindicated by the patient's condition.  The following questions must be answered by the healthcare professional signing below for this form to be valid:     1) Describe the MEDICAL CONDITION (physical and/or mental) of this patient AT THE TIME OF AMBULANCE TRANSPORT that requires the patient to be transported in an ambulance, and why transport by other means is contraindicated by the patient's condition: Presented with confusion secondary to UTI. Medical hx of rheumatoid arthritis, Arthropathy associated with neurological disorders,   Patient has stage 3 pressure wound to midline coccyx, measuring 5 x 1 x 0.1cm. Right buttock stage 3 measures 2 x 1.5 x 0.1cm.  Left buttock stage 3 measures 2 x 2.5 x 0.1cm.  Wound beds are a combination of yellow slough and granulation. Patient needs wound care, offloading and protection.  All wounds were POA. Patient has decreased strength and endurance secondary to current medical conditions.         2) Is this patient \"bed confined\" as defined below?No    To be \"bed confined\" the patient must satisfy all three of the " following criteria:  (1) unable to get up from bed without assistance; AND (2) unable to ambulate;  AND (3) unable to sit in a chair or wheelchair.  3) Can this patient safely be transported by car or wheelchair van (I.e., may safely sit during transport, without an attendant or monitoring?)No   4. In addition to completing questions 1-3 above, please check any of the following conditions that apply*:          *Note: supporting documentation for any boxes checked must be maintained in the patient's medical records Unable to tolerate seated position for time needed to transport and Unable to sit in a chair or wheelchair due to decubitus ulcers or other wounds      SIGNATURE OF PHYSICIAN OR OTHER AUTHORIZED HEALTHCARE PROFESSIONAL    I certify that the above information is true and correct based on my evaluation of this patient, and represent that the patient requires transport by ambulance and that other forms of transport are contraindicated.  I understand that this information will be used by the Centers for Medicare and Medicaid Services (CMS) to support the determiniation of medical necessity for ambulance services, and I represent that I have personal knowledge of the patient's condition at the time of transport.       If this box is checked, I also certify that the patient is physically or mentally incapable of signing the ambulance service's claim form and that the institution with which I am affiliated has furnished care, services or assistance to the patient.  My signature below is made on behalf of the patient pursuant to 42 .36(b)(4). In accordance with 42 .37, the specific reason(s) that the patient is physically or mentally incapable of signing the claim for is as follows:    Signature of Physician or Healthcare Professional   [facility] Date/Time:   1/19/2022 @11:09 a.m     (For Scheduled repetitive transport, this form is not valid for transports performed more than 60 days  after this date).                                                                                                                                            --------------------------------------------------------------------------------------------  Printed Name and Credentials of Physician or Authorized Healthcare Professional     *Form must be signed by patient's attending physician for scheduled, repetitive transports,.  For non-repetitive ambulance transports, if unable to obtain the signature of the attending physician, any of the following may sign (please select below):     Physician  Clinical Nurse Specialist  Registered Nurse     Physician Assistant  Discharge Planner X Licensed Practical Nurse     Nurse Practitioner

## 2022-01-19 NOTE — DISCHARGE SUMMARY
[facility] Medicine Services  DISCHARGE SUMMARY       Date of Admission: 1/15/2022  Date of Discharge:  1/19/2022  Primary Care Physician: Yosef Casey MD    Presenting Problem/History of Present Illness:  Hypotension, unspecified hypotension type [I95.9]  Urinary tract infection without hematuria, site unspecified [N39.0]  Altered mental status, unspecified altered mental status type [R41.82]  Sepsis, due to unspecified organism, unspecified whether acute organ dysfunction present (HCC) [A41.9]       Final Discharge Diagnoses:  Active Hospital Problems    Diagnosis    • **UTI (urinary tract infection)    • Overweight (BMI 25.0-29.9)    • Hypertension associated with diabetes (HCC)    • Type 1 diabetes mellitus with diabetic polyneuropathy (HCC)    • CAD (coronary artery disease)    • Neuropathy        Consults:   Consults     Date and Time Order Name Status Description    12/5/2021  7:45 AM Inpatient Cardiology Consult Completed           Procedures Performed:                 Pertinent Test Results:   Lab Results (last 24 hours)     Procedure Component Value Units Date/Time    COVID PRE-OP / PRE-PROCEDURE SCREENING ORDER (NO ISOLATION) - Swab, Nasopharynx [[id number]] Collected: 01/19/22 0915    Specimen: Swab from Nasopharynx Updated: 01/19/22 0920    Narrative:      The following orders were created for panel order COVID PRE-OP / PRE-PROCEDURE SCREENING ORDER (NO ISOLATION) - Swab, Nasopharynx.  Procedure                               Abnormality         Status                     ---------                               -----------         ------                     COVID-19, [facility] IN-HOUS...[[id number]]                      In process                   Please view results for these tests on the individual orders.    COVID-YULIA Calderón IN-HOUSE, NP SWAB IN TRANSPORT MEDIA 8-10 HR TAT - Swab, Nasopharynx [[id number]] Collected: 01/19/22 0915    Specimen: Swab from  Nasopharynx Updated: 01/19/22 0920    POC Glucose Once [[id number]]  (Normal) Collected: 01/19/22 0602    Specimen: Blood Updated: 01/19/22 0620     Glucose 125 mg/dL      Comment: RN NotifiedOperator: [id number] RAMO Fuller ID: [id number]       Basic Metabolic Panel [[id number]]  (Abnormal) Collected: 01/19/22 0507    Specimen: Blood Updated: 01/19/22 0612     Glucose 137 mg/dL      BUN 13 mg/dL      Creatinine 0.56 mg/dL      Sodium 144 mmol/L      Potassium 3.4 mmol/L      Chloride 109 mmol/L      CO2 29.0 mmol/L      Calcium 9.4 mg/dL      eGFR Non African Amer 108 mL/min/1.73      BUN/Creatinine Ratio 23.2     Anion Gap 6.0 mmol/L     Narrative:      GFR Normal >60  Chronic Kidney Disease <60  Kidney Failure <15      CBC Auto Differential [[id number]]  (Abnormal) Collected: 01/19/22 0507    Specimen: Blood Updated: 01/19/22 0549     WBC 7.10 10*3/mm3      RBC 3.08 10*6/mm3      Hemoglobin 9.2 g/dL      Hematocrit 28.1 %      MCV 91.2 fL      MCH 29.9 pg      MCHC 32.7 g/dL      RDW 18.4 %      RDW-SD 60.5 fl      MPV 10.8 fL      Platelets 259 10*3/mm3      Neutrophil % 48.4 %      Lymphocyte % 38.5 %      Monocyte % 6.5 %      Eosinophil % 5.5 %      Basophil % 1.0 %      Immature Grans % 0.1 %      Neutrophils, Absolute 3.44 10*3/mm3      Lymphocytes, Absolute 2.73 10*3/mm3      Monocytes, Absolute 0.46 10*3/mm3      Eosinophils, Absolute 0.39 10*3/mm3      Basophils, Absolute 0.07 10*3/mm3      Immature Grans, Absolute 0.01 10*3/mm3      nRBC 0.0 /100 WBC     Blood Culture - Blood, Arm, Right [[id number]]  (Normal) Collected: 01/15/22 2331    Specimen: Blood from Arm, Right Updated: 01/18/22 3655     Blood Culture No growth at 3 days    Blood Culture - Blood, Hand, Right [[id number]]  (Normal) Collected: 01/15/22 2331    Specimen: Blood from Hand, Right Updated: 01/18/22 1564     Blood Culture No growth at 3 days    POC Glucose Once [[id number]]  (Abnormal) Collected: 01/18/22 1931    Specimen: Blood  Updated: 01/18/22 2026     Glucose 275 mg/dL      Comment: RN NotifiedOperator: [id number] RAMO JORDANMeter ID: [id number]       POC Glucose Once [[id number]]  (Abnormal) Collected: 01/18/22 1650    Specimen: Blood Updated: 01/18/22 1708     Glucose 168 mg/dL      Comment: RN NotifiedOperator: [id number] WEIR CRYSTALMeter ID: [id number]       POC Glucose Once [[id number]]  (Abnormal) Collected: 01/18/22 1033    Specimen: Blood Updated: 01/18/22 1101     Glucose 355 mg/dL      Comment: RN NotifiedOperator: [id number] WEIR CRYSTALMeter ID: [id number]           Imaging Results (All)     Procedure Component Value Units Date/Time    CT Head Without Contrast [[id number]] Collected: 01/15/22 1805     Updated: 01/15/22 1823    Narrative:      INDICATION: ams    EXAMINATION: CT BRAIN - l    TECHNIQUE:    Multiple axial images were obtained of the head without  intravenous contrast. A radiation dose optimization technique was  used for this scan.  IV Contrast dosage and agent: None.    COMPARISON: CT head 12/5/2021  ____________________________________________    FINDINGS:      BRAIN PARENCHYMA:   No intra- or extra-axial hemorrhage. Grey white differentiation  is preserved. No intracranial mass or mass effect. Posterior  fossa structures are unremarkable.     CSF SPACES: Appropriate for age.  No hydrocephalus.      CALVARIUM, SKULL BASE, PARANASAL SINUSES AND MASTOID AIR CELLS:  Unremarkable           Impression:        No acute intracranial process is identified.    Electronically signed by:  Masood Anna MD  1/15/2022 6:22 PM CST  Workstation: [id number]    XR Chest 1 View [[id number]] Collected: 01/15/22 1524     Updated: 01/15/22 1604    Narrative:      PROCEDURE: XR CHEST 1 VW    VIEWS:Single    INDICATION: AMS protocol    COMPARISON: CXR: None    FINDINGS:       - lines/tubes: None. Median sternotomy wires are present. The  superiormost wire is broken, as before    - cardiac: Size within normal limits.    -  "mediastinum: Contour within normal limits.     - lungs: No evidence of a focal air space process, pulmonary  interstitial edema, nodule(s)/mass. Mild elevation of the left  hemidiaphragm    - pleura: Mild blunting of left costophrenic angle.      - osseous: Unremarkable for age.      Impression:      No acute cardiopulmonary process. Mild elevation of left  hemidiaphragm. Mild blunting of the left costophrenic angle may  represent pleural thickening or small effusion      Electronically signed by:  Gely Houser MD  1/15/2022 4:03 PM CST  Workstation: [id number]            Chief Complaint on Day of Discharge: None    Hospital Course:  66-year-old female with past medical history of RA, hypertension, diabetes, hyperlipidemia, coronary artery disease who presented from [facility] with confusion on 1/15/2022.  Patient was admitted related to acute cystitis and complications of atrial fibrillation with rapid ventricular response.  Patient was treated with IV Rocephin therapy during hospital stay and urine and blood cultures collected.  Blood cultures have been negative.  Urine cultures revealed mixed reagan.  Patient has been transitioned over to oral Omnicef therapy upon discharge for completion of treatment.  In regards to atrial fibrillation with rapid ventricular response, patient is currently rate controlled on her home dose of Coreg, Cardizem, amiodarone therapy.  She is continued on anticoagulation as well.  She will return to skilled nursing facility today in stable condition with instructions for 1 week follow-up with her primary care provider.    Condition on Discharge: Stable    Physical Exam on Discharge:  BP 98/56 (BP Location: Right arm, Patient Position: Lying)   Pulse 82   Temp 98.3 °F (36.8 °C) (Oral)   Resp 16   Ht 175.3 cm (69\")   Wt 89 kg (196 lb 4.8 oz)   SpO2 97%   BMI 28.99 kg/m²   Physical Exam  Vitals and nursing note reviewed.   Constitutional:       " General: She is not in acute distress.     Appearance: Normal appearance. She is not ill-appearing.   HENT:      Head: Normocephalic and atraumatic.      Right Ear: External ear normal.      Left Ear: External ear normal.      Nose: Nose normal.      Mouth/Throat:      Mouth: Mucous membranes are moist.      Pharynx: Oropharynx is clear.   Eyes:      General: No scleral icterus.        Right eye: No discharge.         Left eye: No discharge.      Conjunctiva/sclera: Conjunctivae normal.   Cardiovascular:      Rate and Rhythm: Normal rate and regular rhythm.      Pulses: Normal pulses.      Heart sounds: Normal heart sounds. No murmur heard.  No friction rub. No gallop.    Pulmonary:      Effort: Pulmonary effort is normal. No respiratory distress.      Breath sounds: Normal breath sounds. No stridor. No wheezing, rhonchi or rales.   Abdominal:      General: Bowel sounds are normal. There is no distension.      Palpations: Abdomen is soft.      Tenderness: There is no abdominal tenderness.   Musculoskeletal:         General: No swelling. Normal range of motion.      Cervical back: Normal range of motion and neck supple.   Skin:     General: Skin is warm and dry.   Neurological:      General: No focal deficit present.      Mental Status: She is alert and oriented to person, place, and time.   Psychiatric:         Mood and Affect: Mood normal.         Behavior: Behavior normal.           Discharge Disposition:  Skilled Nursing Facility (DC - External)    Discharge Medications:     Discharge Medications      New Medications      Instructions Start Date   cefdinir 300 MG capsule  Commonly known as: OMNICEF   300 mg, Oral, 2 Times Daily         Continue These Medications      Instructions Start Date   acetaminophen 325 MG tablet  Commonly known as: TYLENOL   650 mg, Oral, 3 Times Daily      albuterol (2.5 MG/3ML) 0.083% nebulizer solution  Commonly known as: PROVENTIL   2.5 mg, Nebulization, Every 4 Hours PRN       amiodarone 200 MG tablet  Commonly known as: PACERONE   200 mg, Oral, Every 12 Hours Scheduled      apixaban 2.5 MG tablet tablet  Commonly known as: ELIQUIS   5 mg, Oral, 2 Times Daily      ascorbic acid 500 MG tablet  Commonly known as: VITAMIN C   500 mg, Oral, Daily      aspirin 81 MG EC tablet   81 mg, Oral, Daily      atorvastatin 20 MG tablet  Commonly known as: LIPITOR   20 mg, Oral, Nightly      B-D ULTRAFINE III SHORT PEN 31G X 8 MM misc  Generic drug: Insulin Pen Needle   No dose, route, or frequency recorded.      Bag Balm ointment ointment   1 application, Topical, 2 Times Daily      Biofreeze Roll-On 4 % gel  Generic drug: Menthol (Topical Analgesic)   Topical      carvedilol 12.5 MG tablet  Commonly known as: COREG   TAKE 1 TABLET BY MOUTH TWICE DAILY      Centrum Silver 50+Women tablet tablet  Generic drug: multivitamin with minerals   1 tablet, Oral, Daily      Dexcom G6 Sensor   Does not apply, As Needed, Every 10 daysDexcom G6 Sensor Kit [address]-55 Use as directed for continuous glucose monitoring      diazePAM 2 MG tablet  Commonly known as: VALIUM   2 mg, Oral, 2 Times Daily      dilTIAZem 30 MG tablet  Commonly known as: CARDIZEM   30 mg, Oral, Every 6 Hours Scheduled      donepezil 5 MG tablet  Commonly known as: ARICEPT   2.5 mg, Oral, Nightly      furosemide 20 MG tablet  Commonly known as: LASIX   20 mg, Oral, 2 Times Daily      glucose blood test strip   Livongot test strips use to test sugar 4 times per day. E 10.9      guaiFENesin 100 MG/5ML syrup  Commonly known as: ROBITUSSIN   200 mg, Oral, 3 Times Daily PRN      hydrOXYzine 25 MG tablet  Commonly known as: ATARAX   25 mg, Oral      insulin aspart 100 UNIT/ML solution pen-injector sc pen  Commonly known as: novoLOG FLEXPEN   150-200=2 units, 201-250=3 units, 251-300=5 units, 301-350=7 units, 351-400=9 units, 401-450=12 units, >450=15 units       isosorbide mononitrate 30 MG 24 hr tablet  Commonly known as: IMDUR   30 mg, Oral,  Daily      lidocaine 5 %  Commonly known as: LIDODERM   1 patch, Transdermal, Every 24 Hours, Remove & Discard patch within 12 hours or as directed by MD      magnesium oxide 400 tablet tablet  Commonly known as: MAG-OX   400 mg, Oral, 2 Times Daily      melatonin 5 MG tablet tablet   5 mg, Oral, Nightly      methotrexate 2.5 MG tablet   10 mg, Oral, Weekly      nitroglycerin 0.2 MG/HR patch  Commonly known as: NITRODUR   1 patch, Transdermal, Daily      Nystatin powder   1 application, Does not apply, 2 Times Daily      OLANZapine 5 MG tablet  Commonly known as: zyPREXA   5 mg, Oral, Nightly      oxyCODONE-acetaminophen  MG per tablet  Commonly known as: PERCOCET   1 tablet, Oral, Every 4 Hours PRN      PARoxetine 20 MG tablet  Commonly known as: PAXIL   20 mg, Oral, Nightly      sacubitril-valsartan 24-26 MG tablet  Commonly known as: ENTRESTO   1 tablet, Oral, Every 12 Hours Scheduled      Synthroid 25 MCG tablet  Generic drug: levothyroxine   50 mcg, Oral, Daily      Toujeo Max SoloStar 300 UNIT/ML solution pen-injector injection  Generic drug: Insulin Glargine (2 Unit Dial)   16 Units, Subcutaneous, Daily, Take 16 units every am       Toujeo Max SoloStar 300 UNIT/ML solution pen-injector injection  Generic drug: Insulin Glargine (2 Unit Dial)   26 Units, Subcutaneous, Nightly, Take 26 units every night      TRUEplus Lancets 33G misc   No dose, route, or frequency recorded.      Vaporizing Chest Rub 4.8-1.2-2.6 % ointment   1 application, Apply externally, 2 Times Daily      Vitamin D (Cholecalciferol) 50 MCG (2000 UT) capsule   1 capsule, Oral, Daily      Vitamin D3 Ultra Potency 1.25 MG (90481 UT) tablet  Generic drug: Cholecalciferol   1 capsule, Oral, Daily      zinc sulfate 220 (50 Zn) MG capsule  Commonly known as: ZINCATE   220 mg, Oral, Daily             Discharge Diet:   Diet Instructions     Diet: Consistent Carbohydrate; Thin      Discharge Diet: Consistent Carbohydrate    Fluid Consistency:  Thin      Consistent carbohydrate           Activity at Discharge:   Activity Instructions     Activity as Tolerated      Continue PT/OT at SNF   Additional Activity Instructions:    Activity as tolerated           Discharge Care Plan/Instructions: Follow-up with PCP within 1 week of discharge.    Follow-up Appointments:   Additional Instructions for the Follow-ups that You Need to Schedule     Discharge Follow-up with PCP   As directed       Currently Documented PCP:    Yosef Casey MD    PCP Phone Number:    [phone]     Follow Up Details: one week            Contact information for follow-up providers     Yosef Casey MD .    Specialty: Family Medicine  Why: one week  Contact information:  [address] [facility] [address] [phone]                   Contact information for after-discharge care     Destination     [facility] .    Service: Skilled Nursing  Contact information:  [address] [place] [id number] [phone]                             Test Results Pending at Discharge:   Pending Labs     Order Current Status    COVID PRE-OP / PRE-PROCEDURE SCREENING ORDER (NO ISOLATION) - Swab, Nasopharynx In process    COVID-19, [facility] IN-HOUSE, NP SWAB IN TRANSPORT MEDIA 8-10 HR TAT - Swab, Nasopharynx In process    Blood Culture - Blood, Arm, Right Preliminary result    Blood Culture - Blood, Hand, Right Preliminary result              This document has been electronically signed by JESSIE Salvador on January 19, 2022 10:37 CST        Time: 30 minutes spent on assessment, discussion, management, and discharge planning for this patient.

## 2022-01-19 NOTE — SIGNIFICANT NOTE
01/19/22 1149   OTHER   Discipline occupational therapy assistant   Rehab Time/Intention   Session Not Performed patient/family declined treatment  (Pt stated that she is being D/C.)

## 2022-01-19 NOTE — PROGRESS NOTES
[facility] Encounter Date/Time: 1/15/2022 [employer]   Hospital Account: [id number]    MRN: [id number]   Patient:  Kiley Millard   Contact Serial #: [id number]   SSN:          ENCOUNTER             Patient Class: Observation   Unit: [address] Service: Medicine     Bed: 376/1   Admitting Provider: Genet Macias MD   Referring Physician: Ethan Vazquez   Attending Provider: Genet Macias MD   Adm Diagnosis: Hypotension, unspecified*               PATIENT          Name: Kiley Millard : 1955 (66 yrs)   Address: [address] [place] [address] Sex: Female   City: [place] [address]   County: [place]   Marital Status:  Ethnicity: NOT                                                                              Race: WHITE   Primary Care Provider: Yosef Casey* Patients Phone: Home Phone: [phone]     Mobile Phone: [phone]   EMERGENCY CONTACT   Contact Name Legal Guardian? Relationship to Patient Home Phone Work Phone   1. HOANG GONZALES (POA/NIEC*  2. Alexey Flowers      Relative  Brother [phone]           GUARANTOR            Guarantor: Kiley Millard     : 1955   Address: [address] [place] [address] Sex: Female     [place] [address]     Relation to Patient: Self       Home Phone: [phone]   Guarantor ID: [id number]       Work Phone:     GUARANTOR EMPLOYER   Employer:           Status: RETIRED   COVERAGE          PRIMARY INSURANCE   Payor: MEDICARE Plan: MEDICARE A & B   Group Number:   Insurance Type: INDEMNITY   Subscriber Name: KILEY MILLARD Subscriber : 1955   Subscriber ID: [id number] Coverage Address: [place] [address]   Pat. Rel. to Subscriber: Self Coverage Phone: [phone]   SECONDARY INSURANCE   Payor: [employer] Plan: AET[employer]   SUPP   Group Number: [id number] Insurance Type: INDEMNITY   Subscriber Name: KILEY MILLARD Subscriber :  1955   Subscriber ID: [id number] Coverage Address: [facility] [address]  [place] [address]   Pat. Rel. to Subscriber: SELF Coverage Phone: [phone]      Contact Serial # ([id number])  `       January 19, 2022    Chart ID ([id number] [facility] CHART-11)

## 2022-01-19 NOTE — PLAN OF CARE
"Goal Outcome Evaluation:  Plan of Care Reviewed With: patient        Progress: no change  Outcome Summary: Patient had a good night; very pleasant and cooperative;  difficult to encourage to change positions for pressure relief; c/o pain \"all over\" and received pain meds for;  developed a low grade temp during shift as well; will continue to monitor  "

## 2022-02-24 LAB — BACTERIA SPEC AEROBE CULT: ABNORMAL

## 2022-11-23 NOTE — THERAPY EVALUATION
Patient Name: Janelle Millard  : 1955    MRN: [id number]                              Today's Date: 3/12/2021       Admit Date: 3/6/2021    Visit Dx:     ICD-10-CM ICD-9-CM   1. Sepsis with encephalopathy without septic shock, due to unspecified organism (CMS/[facility])  A41.9 038.9    R65.20 995.91    G93.40 348.30   2. NAGI (acute kidney injury) (CMS/HCC)  N17.9 584.9   3. Ureteral stone with hydronephrosis  N13.2 592.1     591   4. Impaired functional mobility, balance, gait, and endurance  Z74.09 V49.89     Patient Active Problem List   Diagnosis   • Carpal tunnel syndrome of left wrist   • Type 1 diabetes mellitus with diabetic polyneuropathy (CMS/HCC)   • Mixed diabetic hyperlipidemia associated with type 1 diabetes mellitus (CMS/HCC)   • Hypertension associated with diabetes (CMS/HCC)   • Syncope and collapse   • CAD (coronary artery disease)   • Asthma   • Depressive disorder, not elsewhere classified   • Neuropathy   • Other specified rheumatoid arthritis, multiple sites (CMS/HCC)   • Carpal tunnel syndrome on both sides   • Bilateral carotid artery stenosis   • Overweight (BMI 25.0-29.9)   • Precordial pain   • Sepsis with encephalopathy without septic shock (CMS/HCC)   • Tobacco abuse   • UTI (urinary tract infection)   • Ureteral stone with hydronephrosis     Past Medical History:   Diagnosis Date   • Arthritis, rheumatoid (CMS/HCC)    • Arthropathy associated with neurological disorder    • Arthropathy of lumbar facet joint    • Asthma    • Benign hypertension    • Carpal tunnel syndrome    • Diabetes (CMS/HCC)    • Diabetic polyneuropathy (CMS/HCC)    • Dyslipidemia    • Fallen arches    • Hammer toe    • Heart disease    • Joint pain    • Mild depression (CMS/HCC)     Saddness post Surgery 7/10/14 improved after counseling.   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)     Neuropathy of chest       • Neuropathy    • Onychomycosis    • Peripheral vascular disease (CMS/HCC)    • Retinopathy    • Tobacco user    • Type 1 diabetes mellitus (CMS/HCC)      Past Surgical History:   Procedure Laterality Date   • CARDIAC SURGERY  02/21/2014    Critical stenosis in the RCA. Diffusely calcified LAD with 30-80% stenosis. OMB #3 with 60% to 70% stenosis. Preserved LV systolic function with EF of 55%.   • CARPAL TUNNEL RELEASE Right 10/15/2014    Right carpal tunnel release.   • CARPAL TUNNEL RELEASE     • CATARACT EXTRACTION, BILATERAL Bilateral    • CORONARY ARTERY BYPASS GRAFT  02/24/2014    CABG x 3 with LIMA to LAD, endarterectomy to LAD, SVG to OMB and SVG to distal RCA with endarterectomy to distal RCA. Endo-vein harvesting.   • CYSTECTOMY Left     Breast cycst   • EXCISION LESION      Remove breast lesion - cyst   • EYE SURGERY      Insert lens prosthesis   • FOOT SURGERY  10/18/2011    Exostectomy of the right foot. Preulcerative lesion with Charot deformity, right foot   • LUMBAR SPINE SURGERY  11/09/2015    Lumbosacral radiofrequency thermal coagulation.   • NERVE BLOCK  09/14/2015    Lumbar medial branch block.   • TOE NAIL AVULSION  09/03/2015    DEBRIDE NAIL 6 OR MORE 17054 (1)     General Information     Row Name 03/12/21 1311          Physical Therapy Time and Intention    Document Type  evaluation  -BS (r) HC (t) BS (c)     Mode of Treatment  individual therapy;physical therapy  -BS (r) HC (t) BS (c)     Row Name 03/12/21 1310          General Information    Patient Profile Reviewed  yes  -BS (r) HC (t) BS (c)     Prior Level of Function  independent:;grooming;dressing;min assist:;bathing;bed mobility;ADL's;gait;community mobility has caretaker during the week but is able to manage showering just needs help getting in and out, uses rollater to ambulate household distances  -BS (r) HC (t) BS (c)     Existing Precautions/Restrictions  fall  -BS (r) HC (t) BS (c)     Barriers to Rehab  medically  complex;previous functional deficit;cognitive status  -BS (r) HC (t) BS (c)     Row Name 03/12/21 1311          Living Environment    Lives With  other relative(s);other (see comments) nephew and spouse  -BS (r) HC (t) BS (c)     Row Name 03/12/21 1311          Home Main Entrance    Number of Stairs, Main Entrance  three  -BS (r) HC (t) BS (c)     Stair Railings, Main Entrance  railing on right side (ascending)  -BS (r) HC (t) BS (c)     Row Name 03/12/21 1311          Stairs Within Home, Primary    Number of Stairs, Within Home, Primary  none  -BS (r) HC (t) BS (c)     Stairs Comment, Within Home, Primary  one level home, has 2 lift chairs, w/c, walker, bedside commode at home  -BS (r) HC (t) BS (c)     Row Name 03/12/21 1311          Cognition    Orientation Status (Cognition)  oriented to;person;place  -BS (r) HC (t) BS (c)     Row Name 03/12/21 1311          Safety Issues, Functional Mobility    Safety Issues Affecting Function (Mobility)  safety precaution awareness;safety precautions follow-through/compliance  -BS (r) HC (t) BS (c)     Impairments Affecting Function (Mobility)  balance;cognition;endurance/activity tolerance;pain;range of motion (ROM);postural/trunk control;sensation/sensory awareness;shortness of breath;strength  -BS (r) HC (t) BS (c)     Row Name 03/12/21 1311          Relationship/Environment    Name(s) of Who Lives With Patient  Artis Niece and nephew  -BS (r) HC (t) BS (c)       User Key  (r) = Recorded By, (t) = Taken By, (c) = Cosigned By    Initials Name Provider Type    BS Malick Huitron, PT Physical Therapist    HC Page Burnham PT Student PT Student        Mobility     Row Name 03/12/21 1311          Bed Mobility    Bed Mobility  rolling left  -BS (r) HC (t) BS (c)     Rolling Left [place] (Bed Mobility)  maximum assist (25% patient effort);2 person assist  -BS (r) HC (t) BS (c)     Assistive Device (Bed Mobility)  head of bed elevated;draw sheet;bed rails  -BS (r)  HC (t) BS (c)     Row Name 03/12/21 1311          Sit-Stand Transfer    Sit-Stand [place] (Transfers)  maximum assist (25% patient effort);2 person assist;other (see comments) minimal assistance from 3rd PT  -BS (r) HC (t) BS (c)     Assistive Device (Sit-Stand Transfers)  walker, front-wheeled  -BS (r) HC (t) BS (c)       User Key  (r) = Recorded By, (t) = Taken By, (c) = Cosigned By    Initials Name Provider Type    BS Malick Huitron, PT Physical Therapist    HC Page Burnham, PT Student PT Student        Obj/Interventions     Row Name 03/12/21 1311          Range of Motion Comprehensive    General Range of Motion  upper extremity range of motion deficits identified;lower extremity range of motion deficits identified  -BS (r) HC (t) BS (c)     Comment, General Range of Motion  B shoulder flex/abd limited to 90°, B hip flex to 60° and B knee flex to 90° with c/o pain  -BS (r) HC (t) BS (c)     Row Name 03/12/21 1311          Strength Comprehensive (MMT)    General Manual Muscle Testing (MMT) Assessment  lower extremity strength deficits identified;upper extremity strength deficits identified  -BS (r) HC (t) BS (c)     Comment, General Manual Muscle Testing (MMT) Assessment  grossly B hip flex/ext 2-/5, grossly knee flex/ext 2-/5, L shoulder flex grossly 3/5  -BS (r) HC (t) BS (c)     Row Name 03/12/21 1311          Balance    Balance Assessment  sitting static balance;sitting dynamic balance;standing static balance;standing dynamic balance  -BS (r) HC (t) BS (c)     Static Sitting Balance  mild impairment  -BS (r) HC (t) BS (c)     Dynamic Sitting Balance  mild impairment  -BS (r) HC (t) BS (c)     Static Standing Balance  moderate impairment  -BS (r) HC (t) BS (c)     Dynamic Standing Balance  moderate impairment  -BS (r) HC (t) BS (c)     Row Name 03/12/21 1311          Sensory Assessment (Somatosensory)    Sensory Assessment (Somatosensory)  other (see comments);unable/difficult to assess lack of  sensation below the knees and in the feet bilaterally to light touch  -BS (r) HC (t) BS (c)       User Key  (r) = Recorded By, (t) = Taken By, (c) = Cosigned By    Initials Name Provider Type    Malick Fairchild, PT Physical Therapist    HC Page Burnham, PT Student PT Student        Goals/Plan     Row Name 03/12/21 1311          Bed Mobility Goal 1 (PT)    Activity/Assistive Device (Bed Mobility Goal 1, PT)  sit to supine;supine to sit  -BS (r) HC (t) BS (c)     [place] Level/Cues Needed (Bed Mobility Goal 1, PT)  minimum assist (75% or more patient effort);1 person assist  -BS (r) HC (t) BS (c)     Time Frame (Bed Mobility Goal 1, PT)  by discharge  -BS (r) HC (t) BS (c)     Progress/Outcomes (Bed Mobility Goal 1, PT)  goal not met  -BS (r) HC (t) BS (c)     Row Name 03/12/21 1311          Transfer Goal 1 (PT)    Activity/Assistive Device (Transfer Goal 1, PT)  sit-to-stand/stand-to-sit;bed-to-chair/chair-to-bed;walker, rolling  -BS (r) HC (t) BS (c)     [place] Level/Cues Needed (Transfer Goal 1, PT)  minimum assist (75% or more patient effort)  -BS (r) HC (t) BS (c)     Time Frame (Transfer Goal 1, PT)  by discharge  -BS (r) HC (t) BS (c)     Progress/Outcome (Transfer Goal 1, PT)  goal not met  -BS (r) HC (t) BS (c)     Row Name 03/12/21 1311          Gait Training Goal 1 (PT)    Activity/Assistive Device (Gait Training Goal 1, PT)  gait (walking locomotion);assistive device use;walker, rolling  -BS (r) HC (t) BS (c)     [place] Level (Gait Training Goal 1, PT)  minimum assist (75% or more patient effort)  -BS (r) HC (t) BS (c)     Distance (Gait Training Goal 1, PT)  10'x2  -BS (r) HC (t) BS (c)     Time Frame (Gait Training Goal 1, PT)  by discharge  -BS (r) HC (t) BS (c)     Progress/Outcome (Gait Training Goal 1, PT)  goal not met  -BS (r) HC (t) BS (c)       User Key  (r) = Recorded By, (t) = Taken By, (c) = Cosigned By    Initials Name Provider Type    Malick Fairchild, PT Physical  Therapist    HC Page Burnham, PT Student PT Student        Clinical Impression     Row Name 03/12/21 1311          Pain    Additional Documentation  Pain Scale: Numbers Pre/Post-Treatment (Group)  -BS     Row Name 03/12/21 1311          Pain Scale: Numbers Pre/Post-Treatment    Pretreatment Pain Rating  -- unable to rate due to AMS  -BS     Posttreatment Pain Rating  -- unable to rate  -BS     Pain Location - Side  Bilateral  -BS (r) HC (t) BS (c)     Pain Location  foot  -BS (r) HC (t) BS (c)     Pain Intervention(s)  Ambulation/increased activity;Repositioned  -BS (r) HC (t) BS (c)     Row Name 03/12/21 1311          Plan of Care Review    Progress  no change  -BS (r) HC (t) BS (c)     Outcome Summary  PT francesca completed this date. Patient is alert but confused and has increased difficulty with following commands. Niece reports patient has a hx of falls and neuropathy in her hands and feet. Pt has a caregiver for a couple of days during the week and requires assistance with ADLs but is able to ambulate household distances with rollater. Bed mobility: MaxAx2 rolling L. MaxAx2 with Anu of 3rd person supine>sit t/f. MaxAx2 sit>supine t/f. Pt required increased time with HOB elevated and bed rails d/t delayed mental processing and confusion. Transfers: MaxAx2 sit<>stand t/f. Pt presented with strong posterior trunk lean initially but able to maintain static balance. ModAx2 side stepping towards HOB. Pt mobility and function limited by pain in lower and upper extremities, impaired cognition, and mildly impaired sitting balance. PT recommends d/c to SNF vs home with 24/7 assist and HHPT.  -BS (r) HC (t) BS (c)     Row Name 03/12/21 1311          Therapy Assessment/Plan (PT)    Patient/Family Therapy Goals Statement (PT)  Patient and her niece would like her to return home to [facility].  -BS (r) HC (t) BS (c)     Rehab Potential (PT)  fair, will monitor progress closely  -BS     Criteria for Skilled Interventions Met  (PT)  yes;skilled treatment is necessary  -BS (r) HC (t) BS (c)     Predicted Duration of Therapy Intervention (PT)  Until goals met  -BS (r) HC (t) BS (c)     Row Name 03/12/21 1311          Vital Signs    Pre Systolic BP Rehab  186  -BS (r) HC (t) BS (c)     Pre Treatment Diastolic BP  85  -BS (r) HC (t) BS (c)     Post Systolic BP Rehab  168  -BS     Post Treatment Diastolic BP  93  -BS     Pretreatment Heart Rate (beats/min)  81  -BS (r) HC (t) BS (c)     Intratreatment Heart Rate (beats/min)  83  -BS     Pre SpO2 (%)  96  -BS (r) HC (t) BS (c)     Intra SpO2 (%)  97  -BS     O2 Delivery Intra Treatment  room air  -BS     Post SpO2 (%)  97  -BS (r) HC (t) BS (c)     Pre Patient Position  Supine  -BS (r) HC (t) BS (c)     Post Patient Position  Supine  -BS (r) HC (t) BS (c)     Row Name 03/12/21 1311          Positioning and Restraints    Pre-Treatment Position  in bed  -BS (r) HC (t) BS (c)     Post Treatment Position  bed  -BS (r) HC (t) BS (c)     In Bed  supine;call light within reach;with OT;encouraged to call for assist;exit alarm on  -BS (r) HC (t) BS (c)       User Key  (r) = Recorded By, (t) = Taken By, (c) = Cosigned By    Initials Name Provider Type    Malick Fairchild, PT Physical Therapist    Page Keller, MARCUS Student PT Student        Outcome Measures     Row Name 03/12/21 1311          How much help from another person do you currently need...    Turning from your back to your side while in flat bed without using bedrails?  2  -BS (r) HC (t) BS (c)     Moving from lying on back to sitting on the side of a flat bed without bedrails?  2  -BS (r) HC (t) BS (c)     Moving to and from a bed to a chair (including a wheelchair)?  2  -BS (r) HC (t) BS (c)     Standing up from a chair using your arms (e.g., wheelchair, bedside chair)?  2  -BS (r) HC (t) BS (c)     Climbing 3-5 steps with a railing?  1  -BS (r) HC (t) BS (c)     To walk in hospital room?  2  -BS (r) HC (t) BS (c)     AM-PAC 6 Clicks  Score (PT)  11  -BS (r) HC (t)     Row Name 03/12/21 1311          Functional Assessment    Outcome Measure Options  AM-PAC 6 Clicks Basic Mobility (PT)  -BS (r) HC (t) BS (c)       User Key  (r) = Recorded By, (t) = Taken By, (c) = Cosigned By    Initials Name Provider Type    Malick Fairchild, PT Physical Therapist    HC Page Burnham, PT Student PT Student          PT Recommendation and Plan           Time Calculation:   PT Charges     Row Name 03/12/21 1311             Time Calculation    Start Time  1311  -BS      Stop Time  1351  -BS      Time Calculation (min)  40 min  -BS      PT Received On  03/12/21  -BS      PT Goal Re-Cert Due Date  03/25/21  -BS        User Key  (r) = Recorded By, (t) = Taken By, (c) = Cosigned By    Initials Name Provider Type    Malick Fairchild, PT Physical Therapist        Therapy Charges for Today     Code Description Service Date Service Provider Modifiers Qty    [id number] HC PT EVAL MOD COMPLEXITY 3 3/12/2021 Malick Huitron, PT GP 1          PT G-Codes  Outcome Measure Options: AM-PAC 6 Clicks Basic Mobility (PT)  AM-PAC 6 Clicks Score (PT): 11    Malick Huitron, PT  3/12/2021     Enbrel Counseling:  I discussed with the patient the risks of etanercept including but not limited to myelosuppression, immunosuppression, autoimmune hepatitis, demyelinating diseases, lymphoma, and infections.  The patient understands that monitoring is required including a PPD at baseline and must alert us or the primary physician if symptoms of infection or other concerning signs are noted.

## (undated) DEVICE — SOL IRRG H2O PL/BG 1000ML STRL

## (undated) DEVICE — PK CYSTO LF 60

## (undated) DEVICE — SOL IRR NACL 0.9PCT 3000ML

## (undated) DEVICE — CONTAINER,SPECIMEN,OR STERILE,4OZ: Brand: MEDLINE

## (undated) DEVICE — SOL PVPI SPRY BETADINE 3OZ

## (undated) DEVICE — CATH URETRL OPEN END W/CONNECT 5F 70CM

## (undated) DEVICE — DRAINBAG,ANTI-REFLUX TOWER,L/F,2000ML,LL: Brand: MEDLINE

## (undated) DEVICE — CATHETER,FOLEY,100%SILICONE,16FR,10ML,LF: Brand: MEDLINE

## (undated) DEVICE — SAFESECURE,SECUREMENT,FOLEY CATH,STERILE: Brand: MEDLINE

## (undated) DEVICE — EVAC BLDR UROVAC W ADAPT

## (undated) DEVICE — SPNG GZ WOVN 4X4IN 12PLY 10/BX STRL

## (undated) DEVICE — GLV SURG NEOLON 2G PF LF 7.5 STRL

## (undated) DEVICE — GW PTFE FIX/CORE FLXTIP .038 3X150CM

## (undated) DEVICE — SOL IRR H2O BTL 1000ML STRL

## (undated) DEVICE — GLV SURG NEOLON 2G PF LF 6.5 STRL